# Patient Record
Sex: MALE | Race: OTHER | NOT HISPANIC OR LATINO | URBAN - METROPOLITAN AREA
[De-identification: names, ages, dates, MRNs, and addresses within clinical notes are randomized per-mention and may not be internally consistent; named-entity substitution may affect disease eponyms.]

---

## 2022-01-01 ENCOUNTER — OUTPATIENT (OUTPATIENT)
Dept: OUTPATIENT SERVICES | Facility: HOSPITAL | Age: 67
LOS: 1 days | End: 2022-01-01

## 2022-01-01 ENCOUNTER — RESULT REVIEW (OUTPATIENT)
Age: 67
End: 2022-01-01

## 2022-01-01 ENCOUNTER — TRANSCRIPTION ENCOUNTER (OUTPATIENT)
Age: 67
End: 2022-01-01

## 2022-01-01 ENCOUNTER — APPOINTMENT (OUTPATIENT)
Dept: ORTHOPEDIC SURGERY | Facility: CLINIC | Age: 67
End: 2022-01-01

## 2022-01-01 ENCOUNTER — OUTPATIENT (OUTPATIENT)
Dept: OUTPATIENT SERVICES | Facility: HOSPITAL | Age: 67
LOS: 1 days | Discharge: HOME | End: 2022-01-01
Payer: COMMERCIAL

## 2022-01-01 ENCOUNTER — APPOINTMENT (OUTPATIENT)
Dept: ORTHOPEDIC SURGERY | Facility: HOSPITAL | Age: 67
End: 2022-01-01

## 2022-01-01 VITALS
WEIGHT: 153 LBS | OXYGEN SATURATION: 99 % | TEMPERATURE: 98 F | HEIGHT: 62 IN | RESPIRATION RATE: 16 BRPM | SYSTOLIC BLOOD PRESSURE: 114 MMHG | DIASTOLIC BLOOD PRESSURE: 60 MMHG | HEART RATE: 124 BPM

## 2022-01-01 VITALS
SYSTOLIC BLOOD PRESSURE: 109 MMHG | RESPIRATION RATE: 17 BRPM | OXYGEN SATURATION: 97 % | DIASTOLIC BLOOD PRESSURE: 45 MMHG | HEART RATE: 101 BPM

## 2022-01-01 VITALS
WEIGHT: 153 LBS | DIASTOLIC BLOOD PRESSURE: 53 MMHG | RESPIRATION RATE: 16 BRPM | SYSTOLIC BLOOD PRESSURE: 128 MMHG | HEIGHT: 62 IN | OXYGEN SATURATION: 100 % | HEART RATE: 111 BPM | TEMPERATURE: 98 F

## 2022-01-01 VITALS
WEIGHT: 159 LBS | BODY MASS INDEX: 24.96 KG/M2 | HEART RATE: 137 BPM | OXYGEN SATURATION: 94 % | DIASTOLIC BLOOD PRESSURE: 73 MMHG | HEIGHT: 67 IN | SYSTOLIC BLOOD PRESSURE: 128 MMHG

## 2022-01-01 VITALS
DIASTOLIC BLOOD PRESSURE: 79 MMHG | WEIGHT: 159 LBS | HEIGHT: 67 IN | BODY MASS INDEX: 24.96 KG/M2 | SYSTOLIC BLOOD PRESSURE: 127 MMHG

## 2022-01-01 DIAGNOSIS — Z85.01 PERSONAL HISTORY OF MALIGNANT NEOPLASM OF ESOPHAGUS: ICD-10-CM

## 2022-01-01 DIAGNOSIS — R00.0 TACHYCARDIA, UNSPECIFIED: ICD-10-CM

## 2022-01-01 DIAGNOSIS — Z92.89 PERSONAL HISTORY OF OTHER MEDICAL TREATMENT: ICD-10-CM

## 2022-01-01 DIAGNOSIS — K21.9 GASTRO-ESOPHAGEAL REFLUX DISEASE WITHOUT ESOPHAGITIS: ICD-10-CM

## 2022-01-01 DIAGNOSIS — Z91.89 OTHER SPECIFIED PERSONAL RISK FACTORS, NOT ELSEWHERE CLASSIFIED: ICD-10-CM

## 2022-01-01 DIAGNOSIS — C15.9 MALIGNANT NEOPLASM OF ESOPHAGUS, UNSPECIFIED: ICD-10-CM

## 2022-01-01 DIAGNOSIS — M79.2 NEURALGIA AND NEURITIS, UNSPECIFIED: ICD-10-CM

## 2022-01-01 DIAGNOSIS — R68.89 OTHER GENERAL SYMPTOMS AND SIGNS: ICD-10-CM

## 2022-01-01 DIAGNOSIS — I10 ESSENTIAL (PRIMARY) HYPERTENSION: ICD-10-CM

## 2022-01-01 DIAGNOSIS — Z78.9 OTHER SPECIFIED HEALTH STATUS: ICD-10-CM

## 2022-01-01 DIAGNOSIS — M89.9 DISORDER OF BONE, UNSPECIFIED: ICD-10-CM

## 2022-01-01 DIAGNOSIS — C78.00 SECONDARY MALIGNANT NEOPLASM OF UNSPECIFIED LUNG: ICD-10-CM

## 2022-01-01 DIAGNOSIS — E03.9 HYPOTHYROIDISM, UNSPECIFIED: ICD-10-CM

## 2022-01-01 DIAGNOSIS — M25.561 PAIN IN RIGHT KNEE: ICD-10-CM

## 2022-01-01 LAB
ALBUMIN SERPL ELPH-MCNC: 3.6 G/DL — SIGNIFICANT CHANGE UP (ref 3.3–5)
ALP SERPL-CCNC: 206 U/L — HIGH (ref 40–120)
ALT FLD-CCNC: 40 U/L — SIGNIFICANT CHANGE UP (ref 4–41)
ANION GAP SERPL CALC-SCNC: 11 MMOL/L — SIGNIFICANT CHANGE UP (ref 7–14)
AST SERPL-CCNC: 24 U/L — SIGNIFICANT CHANGE UP (ref 4–40)
BILIRUB SERPL-MCNC: 0.2 MG/DL — SIGNIFICANT CHANGE UP (ref 0.2–1.2)
BLD GP AB SCN SERPL QL: NEGATIVE — SIGNIFICANT CHANGE UP
BLD GP AB SCN SERPL QL: NEGATIVE — SIGNIFICANT CHANGE UP
BUN SERPL-MCNC: 14 MG/DL — SIGNIFICANT CHANGE UP (ref 7–23)
CALCIUM SERPL-MCNC: 10.6 MG/DL — HIGH (ref 8.4–10.5)
CHLORIDE SERPL-SCNC: 96 MMOL/L — LOW (ref 98–107)
CO2 SERPL-SCNC: 23 MMOL/L — SIGNIFICANT CHANGE UP (ref 22–31)
CREAT SERPL-MCNC: 0.72 MG/DL — SIGNIFICANT CHANGE UP (ref 0.5–1.3)
EGFR: 100 ML/MIN/1.73M2 — SIGNIFICANT CHANGE UP
GLUCOSE SERPL-MCNC: 109 MG/DL — HIGH (ref 70–99)
HCT VFR BLD CALC: 26.6 % — LOW (ref 39–50)
HGB BLD-MCNC: 8.1 G/DL — LOW (ref 13–17)
MCHC RBC-ENTMCNC: 26.4 PG — LOW (ref 27–34)
MCHC RBC-ENTMCNC: 30.5 GM/DL — LOW (ref 32–36)
MCV RBC AUTO: 86.6 FL — SIGNIFICANT CHANGE UP (ref 80–100)
NRBC # BLD: 0 /100 WBCS — SIGNIFICANT CHANGE UP (ref 0–0)
NRBC # FLD: 0 K/UL — SIGNIFICANT CHANGE UP (ref 0–0)
PLATELET # BLD AUTO: 683 K/UL — HIGH (ref 150–400)
POTASSIUM SERPL-MCNC: 4.1 MMOL/L — SIGNIFICANT CHANGE UP (ref 3.5–5.3)
POTASSIUM SERPL-SCNC: 4.1 MMOL/L — SIGNIFICANT CHANGE UP (ref 3.5–5.3)
PROT SERPL-MCNC: 7.7 G/DL — SIGNIFICANT CHANGE UP (ref 6–8.3)
RBC # BLD: 3.07 M/UL — LOW (ref 4.2–5.8)
RBC # FLD: 16.4 % — HIGH (ref 10.3–14.5)
RH IG SCN BLD-IMP: POSITIVE — SIGNIFICANT CHANGE UP
RH IG SCN BLD-IMP: POSITIVE — SIGNIFICANT CHANGE UP
SARS-COV-2 N GENE NPH QL NAA+PROBE: NOT DETECTED
SODIUM SERPL-SCNC: 130 MMOL/L — LOW (ref 135–145)
WBC # BLD: 25.73 K/UL — HIGH (ref 3.8–10.5)
WBC # FLD AUTO: 25.73 K/UL — HIGH (ref 3.8–10.5)

## 2022-01-01 PROCEDURE — 99024 POSTOP FOLLOW-UP VISIT: CPT

## 2022-01-01 PROCEDURE — 27365 RESECT FEMUR/KNEE TUMOR: CPT | Mod: RT

## 2022-01-01 PROCEDURE — 93010 ELECTROCARDIOGRAM REPORT: CPT

## 2022-01-01 PROCEDURE — 20245 BONE BIOPSY OPEN DEEP: CPT | Mod: 59,RT

## 2022-01-01 PROCEDURE — 27381 REPAIR/GRAFT KNEECAP TENDON: CPT | Mod: RT

## 2022-01-01 PROCEDURE — 99204 OFFICE O/P NEW MOD 45 MIN: CPT

## 2022-01-01 RX ORDER — SODIUM CHLORIDE 9 MG/ML
1000 INJECTION INTRAMUSCULAR; INTRAVENOUS; SUBCUTANEOUS
Refills: 0 | Status: DISCONTINUED | OUTPATIENT
Start: 2022-01-01 | End: 2022-01-01

## 2022-01-01 RX ORDER — OXYCODONE HYDROCHLORIDE 5 MG/1
5 TABLET ORAL ONCE
Refills: 0 | Status: DISCONTINUED | OUTPATIENT
Start: 2022-01-01 | End: 2022-01-01

## 2022-01-01 RX ORDER — SODIUM CHLORIDE 9 MG/ML
1000 INJECTION, SOLUTION INTRAVENOUS
Refills: 0 | Status: DISCONTINUED | OUTPATIENT
Start: 2022-01-01 | End: 2022-01-01

## 2022-01-01 RX ORDER — ONDANSETRON 8 MG/1
4 TABLET, FILM COATED ORAL ONCE
Refills: 0 | Status: DISCONTINUED | OUTPATIENT
Start: 2022-01-01 | End: 2022-01-01

## 2022-01-01 RX ORDER — HYDROMORPHONE HYDROCHLORIDE 2 MG/ML
0.5 INJECTION INTRAMUSCULAR; INTRAVENOUS; SUBCUTANEOUS
Refills: 0 | Status: DISCONTINUED | OUTPATIENT
Start: 2022-01-01 | End: 2022-01-01

## 2022-01-01 RX ORDER — OXYCODONE HYDROCHLORIDE 5 MG/1
1 TABLET ORAL
Qty: 16 | Refills: 0
Start: 2022-01-01 | End: 2022-01-01

## 2022-01-01 RX ORDER — ASPIRIN/CALCIUM CARB/MAGNESIUM 324 MG
1 TABLET ORAL
Qty: 28 | Refills: 0
Start: 2022-01-01 | End: 2022-01-01

## 2022-01-01 RX ORDER — SODIUM CHLORIDE 9 MG/ML
1 INJECTION INTRAMUSCULAR; INTRAVENOUS; SUBCUTANEOUS
Qty: 0 | Refills: 0 | DISCHARGE

## 2022-01-01 RX ORDER — HYDROMORPHONE HYDROCHLORIDE 2 MG/ML
1 INJECTION INTRAMUSCULAR; INTRAVENOUS; SUBCUTANEOUS
Refills: 0 | Status: DISCONTINUED | OUTPATIENT
Start: 2022-01-01 | End: 2022-01-01

## 2022-01-01 RX ORDER — POLYETHYLENE GLYCOL 3350 17 G/17G
1 POWDER, FOR SOLUTION ORAL
Qty: 7 | Refills: 0
Start: 2022-01-01 | End: 2022-01-01

## 2022-01-01 RX ORDER — OXYCODONE HYDROCHLORIDE 5 MG/1
10 TABLET ORAL ONCE
Refills: 0 | Status: DISCONTINUED | OUTPATIENT
Start: 2022-01-01 | End: 2022-01-01

## 2022-01-01 RX ORDER — MEPERIDINE HYDROCHLORIDE 50 MG/ML
12.5 INJECTION INTRAMUSCULAR; INTRAVENOUS; SUBCUTANEOUS
Refills: 0 | Status: DISCONTINUED | OUTPATIENT
Start: 2022-01-01 | End: 2022-01-01

## 2022-01-01 RX ADMIN — OXYCODONE HYDROCHLORIDE 5 MILLIGRAM(S): 5 TABLET ORAL at 10:45

## 2022-01-01 RX ADMIN — OXYCODONE HYDROCHLORIDE 10 MILLIGRAM(S): 5 TABLET ORAL at 13:40

## 2022-01-01 RX ADMIN — HYDROMORPHONE HYDROCHLORIDE 1 MILLIGRAM(S): 2 INJECTION INTRAMUSCULAR; INTRAVENOUS; SUBCUTANEOUS at 10:20

## 2022-01-01 RX ADMIN — SODIUM CHLORIDE 30 MILLILITER(S): 9 INJECTION, SOLUTION INTRAVENOUS at 10:21

## 2022-01-01 RX ADMIN — OXYCODONE HYDROCHLORIDE 10 MILLIGRAM(S): 5 TABLET ORAL at 13:03

## 2022-01-01 RX ADMIN — HYDROMORPHONE HYDROCHLORIDE 1 MILLIGRAM(S): 2 INJECTION INTRAMUSCULAR; INTRAVENOUS; SUBCUTANEOUS at 10:50

## 2022-01-01 RX ADMIN — OXYCODONE HYDROCHLORIDE 5 MILLIGRAM(S): 5 TABLET ORAL at 11:00

## 2022-01-01 RX ADMIN — OXYCODONE HYDROCHLORIDE 5 MILLIGRAM(S): 5 TABLET ORAL at 11:15

## 2022-01-01 RX ADMIN — HYDROMORPHONE HYDROCHLORIDE 0.5 MILLIGRAM(S): 2 INJECTION INTRAMUSCULAR; INTRAVENOUS; SUBCUTANEOUS at 10:22

## 2022-01-01 RX ADMIN — HYDROMORPHONE HYDROCHLORIDE 0.5 MILLIGRAM(S): 2 INJECTION INTRAMUSCULAR; INTRAVENOUS; SUBCUTANEOUS at 10:10

## 2022-01-01 RX ADMIN — HYDROMORPHONE HYDROCHLORIDE 1 MILLIGRAM(S): 2 INJECTION INTRAMUSCULAR; INTRAVENOUS; SUBCUTANEOUS at 10:40

## 2022-01-01 RX ADMIN — HYDROMORPHONE HYDROCHLORIDE 1 MILLIGRAM(S): 2 INJECTION INTRAMUSCULAR; INTRAVENOUS; SUBCUTANEOUS at 11:01

## 2022-09-19 PROBLEM — Z00.00 ENCOUNTER FOR PREVENTIVE HEALTH EXAMINATION: Status: ACTIVE | Noted: 2022-01-01

## 2022-10-02 PROBLEM — M25.561 ACUTE PAIN OF RIGHT KNEE: Status: ACTIVE | Noted: 2022-01-01

## 2022-10-02 PROBLEM — Z85.01: Status: RESOLVED | Noted: 2022-01-01 | Resolved: 2022-01-01

## 2022-10-02 PROBLEM — C15.9 ESOPHAGEAL CARCINOMA: Status: ACTIVE | Noted: 2022-01-01

## 2022-10-02 PROBLEM — M89.9 BONE LESION: Status: ACTIVE | Noted: 2022-01-01

## 2022-10-02 PROBLEM — C78.00 LUNG METASTASES: Status: ACTIVE | Noted: 2022-01-01

## 2022-10-02 PROBLEM — Z78.9 CURRENT NON-SMOKER: Status: ACTIVE | Noted: 2022-01-01

## 2022-10-02 NOTE — PHYSICAL EXAM
[FreeTextEntry1] : Deconditioned and sarcopenia [Oriented To Time, Place, And Person] : Oriented to person, place, and time [Impaired Insight] : Insight and judgment were intact [Affect] : The affect was normal. [Mood] : the mood was normal [Sclera] : the sclera and conjunctiva were normal [Neck Cervical Mass (___cm)] : no neck mass was observed [Heart Rate And Rhythm] : heart rate was normal and rhythm regular [] : No respiratory distress [Abdomen Soft] : Soft [Walker] : Uses walker for ambulation. [Wheelchair] : Uses wheelchair for mobility. [Tenderness] : tenderness [Swelling] : swelling [Skin Changes - Describe changes:] : No skin changes noted [Full ROM Unless otherwise noted:] : Full range of motion unless otherwise noted: [LE  Motor Strength Normal unless otherwise noted:] : 5/5 strength in bilateral lower extemities unless otherwise noted. [Normal] : Sensation intact to light touch. [2+] : left 2+

## 2022-10-02 NOTE — REVIEW OF SYSTEMS
[Chills] : chills [Feeling Tired] : feeling tired [Fever] : no fever [Recent Wt Gain ___ (lbs)] : recent [unfilled] ~Ulb weight gain [Dyspnea] : dyspnea [Cough] : cough [Joint Pain] : joint pain

## 2022-10-02 NOTE — HISTORY OF PRESENT ILLNESS
[FreeTextEntry1] : This is a 67-year-old gentleman who was diagnosed with esophageal carcinoma in 2015.  He has had multiple lines of chemotherapy and radiation therapy.  He has known lung mets status post wedge resection as well as XRT in his chest.  He is known to have some paraspinal masses consistent with carcinoma most recently biopsied as well as a new right knee pain.  He had imaging showing a large patellar lesion and sent to me for further evaluation.  He is currently in a wheelchair.  He is very deconditioned.  He has significant pain with any motion but is able to bear weight. [Worsening] : worsening [___ mths] : [unfilled] month(s) ago [Bending] : worsened by bending [Direct Pressure] : worsened by direct pressure [Walking] : worsened by walking [None] : No relieving factors are noted

## 2022-10-02 NOTE — DATA REVIEWED
[Imaging Present] : Present [de-identified] : MRI 9/6/2022 right knee:\par Destructive 4.2 cm lesion in the superior pole of the patella coming through the superficial bone as well as going to the quad tendon consistent with a large bony metastasis versus primary lesion.  There is no clear evidence of septic arthritis.\par \par X-ray from late August 2022 similarly shows the same lesion with bony loss at the superior pole as well as a superficial cortical area in the patella.  There are no other findings seen.

## 2022-10-02 NOTE — DISCUSSION/SUMMARY
[Surgical risks reviewed] : Surgical risks reviewed [All Questions Answered] : Patient (and family) had all questions answered to an agreeable level of satisfaction [Interested in Proceeding] : Patient (and family) expressed understanding and interest in proceeding with the plan as outlined [de-identified] : Patient has been losing his right knee.  My recommendation is for biopsy of this as well as possible exploration curettage and treatment.  If this is metastatic carcinoma then I would likely curette out the entire area treat this and possibly cemented bone graft with augmentation of the quad tendon.  If this is a primary lesion then we will treated appropriately.  He understands there is likeliness for stiffness in the knee as well as possible continued pain.  He also may need radiation therapy in this area.  He is not having any significant bony problems other than his back which is being treated by the thoracic and spine surgeons.  I will see him again in time for surgery.\par \par If imaging was ordered, the patient was told to make an appointment to review findings right after all imaging is completed.\par \par We discussed risks, benefits and alternatives. Rationale of care was reviewed and all questions were answered. Patient (and family) had all questions answered to her degree of the level of satisfaction. Patient (and family) expressed understanding and interest in proceeding with the plan as outlined.\par \par \par \par \par This note was done with a voice recognition transcription software and any typos are related to this rather than medical error. Surgical risks reviewed. Patient (and family) had all questions answered to an agreeable level of satisfaction. Patient (and family) expressed understanding and interest in proceeding with the plan as outlined.  \par

## 2022-10-14 NOTE — H&P PST ADULT - NS MD HP INPLANTS MED DEV
Right chest wall mediport , G tube/Vascular access device Right chest wall mediport , G tube for meds and feedings./Vascular access device

## 2022-10-14 NOTE — H&P PST ADULT - NSICDXPASTMEDICALHX_GEN_ALL_CORE_FT
PAST MEDICAL HISTORY:  Disorder of bone, unspecified     Esophageal cancer     GERD (gastroesophageal reflux disease)     H/O constipation     HTN (hypertension)     Hypothyroidism     Lung metastases     Neuropathic pain

## 2022-10-14 NOTE — H&P PST ADULT - PROBLEM SELECTOR PLAN 5
Pt was recently admitted at [facility] 09/22 for elevated WBC and Anemia .  Pt was given 1 unit of PRBC.  Requesting medical  evaluation for hx of recent hospitalization. Pt was recently admitted at [facility] 09/22 for elevated WBC and Anemia .  Pt was given 1 unit of PRBC.  Requesting medical  evaluation for hx of recent hospitalization.  Instructed patient to come on 10/24/2022 for repeat T&S- due to recent blood transfusion.

## 2022-10-14 NOTE — H&P PST ADULT - PROBLEM SELECTOR PLAN 4
Pt 's - 126's at [facility] today.    EKG done .  Discussed case with Anesthesiologist Dr Enamorado .  As per Dr Enamorado' s advise  - Called PCP and faxed EKG to PCP.  PCP to make arrangement for medical evaluation as PCP  is Oncologist to pt .

## 2022-10-14 NOTE — H&P PST ADULT - PROBLEM SELECTOR PLAN 9
·Requesting medical  evaluation due to unable to assess mets( Activity intolerance).  Pt does not have cardiologist.

## 2022-10-14 NOTE — H&P PST ADULT - ASSESSMENT
pre op dx of disorder of bone unspecified is scheduled for biopsy possible curettage cement right patella lesion .   Disorder of bone unspecified

## 2022-10-14 NOTE — H&P PST ADULT - GASTROINTESTINAL COMMENTS
Pt has G tube since last 7 years. G Tube site benign. Dressing intact. Pt reports he cannot take oral food due to hx of esophageal cancer Had multiple chemo and radiation therapy via right chest wall mediport. Pt has G tube since last 7 years. Takes G tube feedings- 2Kcal 2.0 - 7 container in a day .

## 2022-10-14 NOTE — H&P PST ADULT - ATTENDING COMMENTS
I have reviewed and agree with note as written above  for right patella biopsy / curettage /resection possible quad tendon reconstruction  Risks, benefits and alternatives discussed with patient.  Lane Carter MD  Musculoskeletal Oncology  [phone]

## 2022-10-14 NOTE — H&P PST ADULT - PROBLEM SELECTOR PLAN 1
Patient tentatively scheduled for  biopsy possible curettage cement right patella lesion  on 10/27/2022 .    Pre-op instructions provided. Pt given verbal and written instructions with teach back on chlorhexidine wash and pepcid. Pt verbalized understanding with return demonstration.     Pt strongly advised  for  COVID testing requirements to be done  3- 5 days prior to procedure. Pt was provided with information for covid testing locations in written form.   Pt verbalized understanding with return demonstration.

## 2022-10-14 NOTE — H&P PST ADULT - HISTORY OF PRESENT ILLNESS
67 year old male with pre op dx of disorder of bone unspecified is scheduled for  67 year old male with pre op dx of disorder of bone unspecified is scheduled for biopsy possible curettage cement right patella lesion . 67 year old male with pre op dx of disorder of bone unspecified is scheduled for biopsy possible curettage cement right patella lesion .    *** Pt was recently admitted at [facility] 09/22 for elevated WBC and Anemia .  Pt was given 1 unit of PRBC.

## 2022-11-02 NOTE — H&P PST ADULT - ALCOHOL USE HISTORY SINGLE SELECT
never Ear Wedge Repair Text: A wedge excision was completed by carrying down an excision through the full thickness of the ear and cartilage with an inward facing Burow's triangle. The wound was then closed in a layered fashion.

## 2022-11-10 NOTE — PROVIDER CONTACT NOTE (OTHER) - ASSESSMENT
asked by ACU provider to assist with transportation for patient who needs ambulance for discharge home.  contact patient's insurance, [employer] [facility] ([phone]) and spoke to Ritchie DOLL. Per Ritchie, prior auth is not needed for transportation and patient has out of network benefits so he can use any vendor; she provided call reference # [id number].  spoke to Laurita at [facility] ([phone]) and scheduled ambulance. Ambulance is expected to arrive by 3:45pm; trip # [id number]. ACU provider was notified. No additional social work intervention needed at this time.

## 2022-11-10 NOTE — ASU DISCHARGE PLAN (ADULT/PEDIATRIC) - ASU DC SPECIAL INSTRUCTIONSFT
Keep dressing clean and dry  WBAT  KI when OOB  No knee flexion  Crutches for ambulation  Please call Dr. Carter's office to make a postop appointment

## 2022-11-10 NOTE — ASU DISCHARGE PLAN (ADULT/PEDIATRIC) - CARE PROVIDER_API CALL
Lane Carter (MD)  Orthopaedic Surgery  [address], [address]  [place] [address]  Phone: [phone]  Fax: [phone]  Follow Up Time:

## 2022-11-22 PROBLEM — M89.9 DISORDER OF BONE, UNSPECIFIED: Chronic | Status: ACTIVE | Noted: 2022-01-01

## 2022-11-22 PROBLEM — C15.9 MALIGNANT NEOPLASM OF ESOPHAGUS, UNSPECIFIED: Chronic | Status: ACTIVE | Noted: 2022-01-01

## 2022-11-22 PROBLEM — E03.9 HYPOTHYROIDISM, UNSPECIFIED: Chronic | Status: ACTIVE | Noted: 2022-01-01

## 2022-11-22 PROBLEM — C78.00 SECONDARY MALIGNANT NEOPLASM OF UNSPECIFIED LUNG: Chronic | Status: ACTIVE | Noted: 2022-01-01

## 2022-11-22 PROBLEM — Z87.19 PERSONAL HISTORY OF OTHER DISEASES OF THE DIGESTIVE SYSTEM: Chronic | Status: ACTIVE | Noted: 2022-01-01

## 2022-11-22 PROBLEM — K21.9 GASTRO-ESOPHAGEAL REFLUX DISEASE WITHOUT ESOPHAGITIS: Chronic | Status: ACTIVE | Noted: 2022-01-01

## 2022-11-22 PROBLEM — M79.2 NEURALGIA AND NEURITIS, UNSPECIFIED: Chronic | Status: ACTIVE | Noted: 2022-01-01

## 2022-11-22 PROBLEM — I10 ESSENTIAL (PRIMARY) HYPERTENSION: Chronic | Status: ACTIVE | Noted: 2022-01-01

## 2022-11-23 NOTE — HISTORY OF PRESENT ILLNESS
[___ Weeks Post Op] : [unfilled] weeks post op [Clean/Dry/Intact] : clean, dry and intact [Slow Progress] : is progressing slowly [No Sign of Infection] : is showing no signs of infection [Adequate Pain Control] : has adequate pain control [de-identified] : 11/10/2022 -resection and advancement of quadricep tendon right knee [de-identified] : Overall patient still in considerable pain.  He is walking better according to his family but seems still apprehensive with any motion.  He is likely to go back to chemotherapy. [de-identified] : On exam his incision is clean dry and intact.  He has no pain surrounding it.  He is able to move his hip and ankle although he does have pain at the knee with any motion.  His calves are soft and he is neurovascular intact.  I did not range his knee at all.  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 [de-identified] : This is all consistent with esophageal max to his patella.  This is very uncommon however was treated with biopsy. [de-identified] : Continue with extension.  He may always walk with his leg in extension due to the fact that he advanced his quad.  He is also likely be very tight but I want him in extension after at least 2 months.  I will see him again in 6 weeks time.  He is free to start chemotherapy at this point.  Continue DVT prophylaxis.\par \par If imaging was ordered, the patient was told to make an appointment to review findings right after all imaging is completed.\par \par We discussed risks, benefits and alternatives. Rationale of care was reviewed and all questions were answered. Patient (and family) had all questions answered to her degree of the level of satisfaction. Patient (and family) expressed understanding and interest in proceeding with the plan as outlined.\par \par \par \par \par This note was done with a voice recognition transcription software and any typos are related to this rather than medical error. Surgical risks reviewed. Patient (and family) had all questions answered to an agreeable level of satisfaction. Patient (and family) expressed understanding and interest in proceeding with the plan as outlined.  \par

## 2023-01-01 ENCOUNTER — INPATIENT (INPATIENT)
Facility: HOSPITAL | Age: 68
LOS: 30 days | Discharge: EXPIRED | End: 2023-03-08
Attending: STUDENT IN AN ORGANIZED HEALTH CARE EDUCATION/TRAINING PROGRAM | Admitting: STUDENT IN AN ORGANIZED HEALTH CARE EDUCATION/TRAINING PROGRAM
Payer: MEDICARE

## 2023-01-01 ENCOUNTER — APPOINTMENT (OUTPATIENT)
Dept: ORTHOPEDIC SURGERY | Facility: CLINIC | Age: 68
End: 2023-01-01

## 2023-01-01 ENCOUNTER — TRANSCRIPTION ENCOUNTER (OUTPATIENT)
Age: 68
End: 2023-01-01

## 2023-01-01 ENCOUNTER — INPATIENT (INPATIENT)
Facility: HOSPITAL | Age: 68
LOS: 24 days | Discharge: HOME HEALTH | End: 2023-01-26
Attending: STUDENT IN AN ORGANIZED HEALTH CARE EDUCATION/TRAINING PROGRAM | Admitting: STUDENT IN AN ORGANIZED HEALTH CARE EDUCATION/TRAINING PROGRAM
Payer: MEDICARE

## 2023-01-01 ENCOUNTER — FORM ENCOUNTER (OUTPATIENT)
Age: 68
End: 2023-01-01

## 2023-01-01 VITALS
HEART RATE: 105 BPM | HEIGHT: 62 IN | RESPIRATION RATE: 16 BRPM | SYSTOLIC BLOOD PRESSURE: 107 MMHG | TEMPERATURE: 97 F | DIASTOLIC BLOOD PRESSURE: 56 MMHG

## 2023-01-01 VITALS
DIASTOLIC BLOOD PRESSURE: 45 MMHG | SYSTOLIC BLOOD PRESSURE: 71 MMHG | RESPIRATION RATE: 10 BRPM | OXYGEN SATURATION: 97 % | HEART RATE: 61 BPM

## 2023-01-01 VITALS
DIASTOLIC BLOOD PRESSURE: 59 MMHG | TEMPERATURE: 99 F | RESPIRATION RATE: 18 BRPM | OXYGEN SATURATION: 99 % | HEART RATE: 118 BPM | SYSTOLIC BLOOD PRESSURE: 113 MMHG

## 2023-01-01 VITALS
HEART RATE: 120 BPM | SYSTOLIC BLOOD PRESSURE: 121 MMHG | RESPIRATION RATE: 16 BRPM | TEMPERATURE: 99 F | HEIGHT: 67 IN | DIASTOLIC BLOOD PRESSURE: 71 MMHG | OXYGEN SATURATION: 98 %

## 2023-01-01 DIAGNOSIS — A41.9 SEPSIS, UNSPECIFIED ORGANISM: ICD-10-CM

## 2023-01-01 DIAGNOSIS — K94.23 GASTROSTOMY MALFUNCTION: ICD-10-CM

## 2023-01-01 DIAGNOSIS — R25.9 UNSPECIFIED ABNORMAL INVOLUNTARY MOVEMENTS: ICD-10-CM

## 2023-01-01 DIAGNOSIS — M86.10 OTHER ACUTE OSTEOMYELITIS, UNSPECIFIED SITE: ICD-10-CM

## 2023-01-01 DIAGNOSIS — R50.9 FEVER, UNSPECIFIED: ICD-10-CM

## 2023-01-01 DIAGNOSIS — R65.10 SYSTEMIC INFLAMMATORY RESPONSE SYNDROME (SIRS) OF NON-INFECTIOUS ORIGIN WITHOUT ACUTE ORGAN DYSFUNCTION: ICD-10-CM

## 2023-01-01 DIAGNOSIS — M25.561 PAIN IN RIGHT KNEE: ICD-10-CM

## 2023-01-01 DIAGNOSIS — E03.9 HYPOTHYROIDISM, UNSPECIFIED: ICD-10-CM

## 2023-01-01 DIAGNOSIS — I10 ESSENTIAL (PRIMARY) HYPERTENSION: ICD-10-CM

## 2023-01-01 DIAGNOSIS — G89.3 NEOPLASM RELATED PAIN (ACUTE) (CHRONIC): ICD-10-CM

## 2023-01-01 DIAGNOSIS — Z01.818 ENCOUNTER FOR OTHER PREPROCEDURAL EXAMINATION: ICD-10-CM

## 2023-01-01 DIAGNOSIS — D64.9 ANEMIA, UNSPECIFIED: ICD-10-CM

## 2023-01-01 DIAGNOSIS — Z51.5 ENCOUNTER FOR PALLIATIVE CARE: ICD-10-CM

## 2023-01-01 DIAGNOSIS — Z29.9 ENCOUNTER FOR PROPHYLACTIC MEASURES, UNSPECIFIED: ICD-10-CM

## 2023-01-01 DIAGNOSIS — R73.03 PREDIABETES: ICD-10-CM

## 2023-01-01 DIAGNOSIS — E83.39 OTHER DISORDERS OF PHOSPHORUS METABOLISM: ICD-10-CM

## 2023-01-01 DIAGNOSIS — Z93.1 GASTROSTOMY STATUS: Chronic | ICD-10-CM

## 2023-01-01 DIAGNOSIS — R78.81 BACTEREMIA: ICD-10-CM

## 2023-01-01 DIAGNOSIS — J39.8 OTHER SPECIFIED DISEASES OF UPPER RESPIRATORY TRACT: ICD-10-CM

## 2023-01-01 DIAGNOSIS — R06.00 DYSPNEA, UNSPECIFIED: ICD-10-CM

## 2023-01-01 DIAGNOSIS — G93.40 ENCEPHALOPATHY, UNSPECIFIED: ICD-10-CM

## 2023-01-01 DIAGNOSIS — E83.52 HYPERCALCEMIA: ICD-10-CM

## 2023-01-01 DIAGNOSIS — K21.9 GASTRO-ESOPHAGEAL REFLUX DISEASE WITHOUT ESOPHAGITIS: ICD-10-CM

## 2023-01-01 DIAGNOSIS — C15.9 MALIGNANT NEOPLASM OF ESOPHAGUS, UNSPECIFIED: ICD-10-CM

## 2023-01-01 DIAGNOSIS — Z71.89 OTHER SPECIFIED COUNSELING: ICD-10-CM

## 2023-01-01 DIAGNOSIS — M79.2 NEURALGIA AND NEURITIS, UNSPECIFIED: ICD-10-CM

## 2023-01-01 DIAGNOSIS — R53.2 FUNCTIONAL QUADRIPLEGIA: ICD-10-CM

## 2023-01-01 DIAGNOSIS — M25.00 HEMARTHROSIS, UNSPECIFIED JOINT: ICD-10-CM

## 2023-01-01 DIAGNOSIS — E87.1 HYPO-OSMOLALITY AND HYPONATREMIA: ICD-10-CM

## 2023-01-01 DIAGNOSIS — R63.8 OTHER SYMPTOMS AND SIGNS CONCERNING FOOD AND FLUID INTAKE: ICD-10-CM

## 2023-01-01 DIAGNOSIS — D72.829 ELEVATED WHITE BLOOD CELL COUNT, UNSPECIFIED: ICD-10-CM

## 2023-01-01 DIAGNOSIS — M25.461 EFFUSION, RIGHT KNEE: ICD-10-CM

## 2023-01-01 LAB
% ALBUMIN: 32.5 % — SIGNIFICANT CHANGE UP
% ALPHA 1: 6.5 % — SIGNIFICANT CHANGE UP
% ALPHA 2: 14.7 % — SIGNIFICANT CHANGE UP
% BETA: 18.7 % — SIGNIFICANT CHANGE UP
% GAMMA: 27.7 % — SIGNIFICANT CHANGE UP
-  AMIKACIN: SIGNIFICANT CHANGE UP
-  AMIKACIN: SIGNIFICANT CHANGE UP
-  AMOXICILLIN/CLAVULANIC ACID: SIGNIFICANT CHANGE UP
-  AMOXICILLIN/CLAVULANIC ACID: SIGNIFICANT CHANGE UP
-  AMPICILLIN/SULBACTAM: SIGNIFICANT CHANGE UP
-  AMPICILLIN/SULBACTAM: SIGNIFICANT CHANGE UP
-  AMPICILLIN: SIGNIFICANT CHANGE UP
-  AZTREONAM: SIGNIFICANT CHANGE UP
-  AZTREONAM: SIGNIFICANT CHANGE UP
-  CEFAZOLIN: SIGNIFICANT CHANGE UP
-  CEFAZOLIN: SIGNIFICANT CHANGE UP
-  CEFEPIME: SIGNIFICANT CHANGE UP
-  CEFEPIME: SIGNIFICANT CHANGE UP
-  CEFOXITIN: SIGNIFICANT CHANGE UP
-  CEFOXITIN: SIGNIFICANT CHANGE UP
-  CEFTAZIDIME/AVIBACTAM: SIGNIFICANT CHANGE UP
-  CEFTAZIDIME/AVIBACTAM: SIGNIFICANT CHANGE UP
-  CEFTOLOZANE/TAZOBACTAM: SIGNIFICANT CHANGE UP
-  CEFTOLOZANE/TAZOBACTAM: SIGNIFICANT CHANGE UP
-  CEFTRIAXONE: SIGNIFICANT CHANGE UP
-  CEFTRIAXONE: SIGNIFICANT CHANGE UP
-  CIPROFLOXACIN: SIGNIFICANT CHANGE UP
-  CIPROFLOXACIN: SIGNIFICANT CHANGE UP
-  ERTAPENEM: SIGNIFICANT CHANGE UP
-  ERTAPENEM: SIGNIFICANT CHANGE UP
-  GENTAMICIN SYNERGY: SIGNIFICANT CHANGE UP
-  GENTAMICIN: SIGNIFICANT CHANGE UP
-  GENTAMICIN: SIGNIFICANT CHANGE UP
-  IMIPENEM: SIGNIFICANT CHANGE UP
-  IMIPENEM: SIGNIFICANT CHANGE UP
-  LEVOFLOXACIN: SIGNIFICANT CHANGE UP
-  LEVOFLOXACIN: SIGNIFICANT CHANGE UP
-  MEROPENEM: SIGNIFICANT CHANGE UP
-  MEROPENEM: SIGNIFICANT CHANGE UP
-  PIPERACILLIN/TAZOBACTAM: SIGNIFICANT CHANGE UP
-  PIPERACILLIN/TAZOBACTAM: SIGNIFICANT CHANGE UP
-  STREPTOMYCIN SYNERGY: SIGNIFICANT CHANGE UP
-  TOBRAMYCIN: SIGNIFICANT CHANGE UP
-  TOBRAMYCIN: SIGNIFICANT CHANGE UP
-  TRIMETHOPRIM/SULFAMETHOXAZOLE: SIGNIFICANT CHANGE UP
-  TRIMETHOPRIM/SULFAMETHOXAZOLE: SIGNIFICANT CHANGE UP
-  VANCOMYCIN: SIGNIFICANT CHANGE UP
24R-OH-CALCIDIOL SERPL-MCNC: 33.4 NG/ML — SIGNIFICANT CHANGE UP (ref 30–80)
24R-OH-CALCIDIOL SERPL-MCNC: 34.5 NG/ML — SIGNIFICANT CHANGE UP (ref 30–80)
A1C WITH ESTIMATED AVERAGE GLUCOSE RESULT: 5.8 % — HIGH (ref 4–5.6)
ALBUMIN SERPL ELPH-MCNC: 2.2 G/DL — LOW (ref 3.3–5)
ALBUMIN SERPL ELPH-MCNC: 2.3 G/DL — LOW (ref 3.3–5)
ALBUMIN SERPL ELPH-MCNC: 2.4 G/DL — LOW (ref 3.3–5)
ALBUMIN SERPL ELPH-MCNC: 2.44 G/DL — LOW (ref 3.3–4.4)
ALBUMIN SERPL ELPH-MCNC: 2.5 G/DL — LOW (ref 3.3–5)
ALBUMIN SERPL ELPH-MCNC: 2.6 G/DL — LOW (ref 3.3–5)
ALBUMIN SERPL ELPH-MCNC: 2.7 G/DL — LOW (ref 3.3–5)
ALBUMIN SERPL ELPH-MCNC: 2.8 G/DL — LOW (ref 3.3–5)
ALBUMIN SERPL ELPH-MCNC: 2.9 G/DL — LOW (ref 3.3–5)
ALBUMIN SERPL ELPH-MCNC: 3 G/DL — LOW (ref 3.3–5)
ALBUMIN SERPL ELPH-MCNC: 3.1 G/DL — LOW (ref 3.3–5)
ALBUMIN SERPL ELPH-MCNC: 3.2 G/DL — LOW (ref 3.3–5)
ALBUMIN SERPL ELPH-MCNC: 3.2 G/DL — LOW (ref 3.3–5)
ALBUMIN, RANDOM URINE: 10 MG/DL — SIGNIFICANT CHANGE UP
ALBUMIN/CREATININE RATIO (ACR): 243 MG/G — HIGH (ref 0–30)
ALBUMIN/GLOB SERPL ELPH: 0.5 RATIO — SIGNIFICANT CHANGE UP
ALP SERPL-CCNC: 188 U/L — HIGH (ref 40–120)
ALP SERPL-CCNC: 196 U/L — HIGH (ref 40–120)
ALP SERPL-CCNC: 202 U/L — HIGH (ref 40–120)
ALP SERPL-CCNC: 206 U/L — HIGH (ref 40–120)
ALP SERPL-CCNC: 206 U/L — HIGH (ref 40–120)
ALP SERPL-CCNC: 207 U/L — HIGH (ref 40–120)
ALP SERPL-CCNC: 207 U/L — HIGH (ref 40–120)
ALP SERPL-CCNC: 209 U/L — HIGH (ref 40–120)
ALP SERPL-CCNC: 210 U/L — HIGH (ref 40–120)
ALP SERPL-CCNC: 211 U/L — HIGH (ref 40–120)
ALP SERPL-CCNC: 213 U/L — HIGH (ref 40–120)
ALP SERPL-CCNC: 214 U/L — HIGH (ref 40–120)
ALP SERPL-CCNC: 217 U/L — HIGH (ref 40–120)
ALP SERPL-CCNC: 219 U/L — HIGH (ref 40–120)
ALP SERPL-CCNC: 219 U/L — HIGH (ref 40–120)
ALP SERPL-CCNC: 226 U/L — HIGH (ref 40–120)
ALP SERPL-CCNC: 227 U/L — HIGH (ref 40–120)
ALP SERPL-CCNC: 230 U/L — HIGH (ref 40–120)
ALP SERPL-CCNC: 235 U/L — HIGH (ref 40–120)
ALP SERPL-CCNC: 238 U/L — HIGH (ref 40–120)
ALP SERPL-CCNC: 241 U/L — HIGH (ref 40–120)
ALP SERPL-CCNC: 245 U/L — HIGH (ref 40–120)
ALP SERPL-CCNC: 245 U/L — HIGH (ref 40–120)
ALP SERPL-CCNC: 246 U/L — HIGH (ref 40–120)
ALP SERPL-CCNC: 248 U/L — HIGH (ref 40–120)
ALP SERPL-CCNC: 248 U/L — HIGH (ref 40–120)
ALP SERPL-CCNC: 250 U/L — HIGH (ref 40–120)
ALP SERPL-CCNC: 251 U/L — HIGH (ref 40–120)
ALP SERPL-CCNC: 256 U/L — HIGH (ref 40–120)
ALP SERPL-CCNC: 260 U/L — HIGH (ref 40–120)
ALP SERPL-CCNC: 261 U/L — HIGH (ref 40–120)
ALP SERPL-CCNC: 262 U/L — HIGH (ref 40–120)
ALP SERPL-CCNC: 272 U/L — HIGH (ref 40–120)
ALP SERPL-CCNC: 273 U/L — HIGH (ref 40–120)
ALP SERPL-CCNC: 277 U/L — HIGH (ref 40–120)
ALP SERPL-CCNC: 286 U/L — HIGH (ref 40–120)
ALP SERPL-CCNC: 288 U/L — HIGH (ref 40–120)
ALP SERPL-CCNC: 302 U/L — HIGH (ref 40–120)
ALP SERPL-CCNC: 309 U/L — HIGH (ref 40–120)
ALP SERPL-CCNC: 321 U/L — HIGH (ref 40–120)
ALP SERPL-CCNC: 333 U/L — HIGH (ref 40–120)
ALPHA1 GLOB SERPL ELPH-MCNC: 0.49 G/DL — HIGH (ref 0.1–0.3)
ALPHA2 GLOB SERPL ELPH-MCNC: 1.1 G/DL — HIGH (ref 0.6–1)
ALT FLD-CCNC: 20 U/L — SIGNIFICANT CHANGE UP (ref 4–41)
ALT FLD-CCNC: 21 U/L — SIGNIFICANT CHANGE UP (ref 4–41)
ALT FLD-CCNC: 21 U/L — SIGNIFICANT CHANGE UP (ref 4–41)
ALT FLD-CCNC: 22 U/L — SIGNIFICANT CHANGE UP (ref 4–41)
ALT FLD-CCNC: 23 U/L — SIGNIFICANT CHANGE UP (ref 4–41)
ALT FLD-CCNC: 24 U/L — SIGNIFICANT CHANGE UP (ref 4–41)
ALT FLD-CCNC: 25 U/L — SIGNIFICANT CHANGE UP (ref 4–41)
ALT FLD-CCNC: 27 U/L — SIGNIFICANT CHANGE UP (ref 4–41)
ALT FLD-CCNC: 27 U/L — SIGNIFICANT CHANGE UP (ref 4–41)
ALT FLD-CCNC: 28 U/L — SIGNIFICANT CHANGE UP (ref 4–41)
ALT FLD-CCNC: 29 U/L — SIGNIFICANT CHANGE UP (ref 4–41)
ALT FLD-CCNC: 29 U/L — SIGNIFICANT CHANGE UP (ref 4–41)
ALT FLD-CCNC: 30 U/L — SIGNIFICANT CHANGE UP (ref 4–41)
ALT FLD-CCNC: 32 U/L — SIGNIFICANT CHANGE UP (ref 4–41)
ALT FLD-CCNC: 32 U/L — SIGNIFICANT CHANGE UP (ref 4–41)
ALT FLD-CCNC: 33 U/L — SIGNIFICANT CHANGE UP (ref 4–41)
ALT FLD-CCNC: 33 U/L — SIGNIFICANT CHANGE UP (ref 4–41)
ALT FLD-CCNC: 34 U/L — SIGNIFICANT CHANGE UP (ref 4–41)
ALT FLD-CCNC: 35 U/L — SIGNIFICANT CHANGE UP (ref 4–41)
ALT FLD-CCNC: 36 U/L — SIGNIFICANT CHANGE UP (ref 4–41)
ALT FLD-CCNC: 36 U/L — SIGNIFICANT CHANGE UP (ref 4–41)
ALT FLD-CCNC: 42 U/L — HIGH (ref 4–41)
ALT FLD-CCNC: 46 U/L — HIGH (ref 4–41)
ALT FLD-CCNC: 55 U/L — HIGH (ref 4–41)
ANION GAP SERPL CALC-SCNC: 10 MMOL/L — SIGNIFICANT CHANGE UP (ref 7–14)
ANION GAP SERPL CALC-SCNC: 11 MMOL/L — SIGNIFICANT CHANGE UP (ref 7–14)
ANION GAP SERPL CALC-SCNC: 12 MMOL/L — SIGNIFICANT CHANGE UP (ref 7–14)
ANION GAP SERPL CALC-SCNC: 13 MMOL/L — SIGNIFICANT CHANGE UP (ref 7–14)
ANION GAP SERPL CALC-SCNC: 14 MMOL/L — SIGNIFICANT CHANGE UP (ref 7–14)
ANION GAP SERPL CALC-SCNC: 16 MMOL/L — HIGH (ref 7–14)
ANION GAP SERPL CALC-SCNC: 16 MMOL/L — HIGH (ref 7–14)
ANION GAP SERPL CALC-SCNC: 17 MMOL/L — HIGH (ref 7–14)
ANION GAP SERPL CALC-SCNC: 17 MMOL/L — HIGH (ref 7–14)
ANION GAP SERPL CALC-SCNC: 7 MMOL/L — SIGNIFICANT CHANGE UP (ref 7–14)
ANION GAP SERPL CALC-SCNC: 8 MMOL/L — SIGNIFICANT CHANGE UP (ref 7–14)
ANION GAP SERPL CALC-SCNC: 9 MMOL/L — SIGNIFICANT CHANGE UP (ref 7–14)
ANISOCYTOSIS BLD QL: SLIGHT — SIGNIFICANT CHANGE UP
APPEARANCE UR: CLEAR — SIGNIFICANT CHANGE UP
APTT BLD: 26.1 SEC — LOW (ref 27–36.3)
APTT BLD: 26.7 SEC — LOW (ref 27–36.3)
APTT BLD: 27 SEC — SIGNIFICANT CHANGE UP (ref 27–36.3)
APTT BLD: 27.2 SEC — SIGNIFICANT CHANGE UP (ref 27–36.3)
APTT BLD: 27.3 SEC — SIGNIFICANT CHANGE UP (ref 27–36.3)
APTT BLD: 27.5 SEC — SIGNIFICANT CHANGE UP (ref 27–36.3)
APTT BLD: 28 SEC — SIGNIFICANT CHANGE UP (ref 27–36.3)
APTT BLD: 28.1 SEC — SIGNIFICANT CHANGE UP (ref 27–36.3)
APTT BLD: 28.1 SEC — SIGNIFICANT CHANGE UP (ref 27–36.3)
APTT BLD: 28.3 SEC — SIGNIFICANT CHANGE UP (ref 27–36.3)
APTT BLD: 33 SEC — SIGNIFICANT CHANGE UP (ref 27–36.3)
APTT BLD: 33.3 SEC — SIGNIFICANT CHANGE UP (ref 27–36.3)
APTT BLD: 35 SEC — SIGNIFICANT CHANGE UP (ref 27–36.3)
AST SERPL-CCNC: 15 U/L — SIGNIFICANT CHANGE UP (ref 4–40)
AST SERPL-CCNC: 15 U/L — SIGNIFICANT CHANGE UP (ref 4–40)
AST SERPL-CCNC: 16 U/L — SIGNIFICANT CHANGE UP (ref 4–40)
AST SERPL-CCNC: 17 U/L — SIGNIFICANT CHANGE UP (ref 4–40)
AST SERPL-CCNC: 17 U/L — SIGNIFICANT CHANGE UP (ref 4–40)
AST SERPL-CCNC: 18 U/L — SIGNIFICANT CHANGE UP (ref 4–40)
AST SERPL-CCNC: 20 U/L — SIGNIFICANT CHANGE UP (ref 4–40)
AST SERPL-CCNC: 20 U/L — SIGNIFICANT CHANGE UP (ref 4–40)
AST SERPL-CCNC: 21 U/L — SIGNIFICANT CHANGE UP (ref 4–40)
AST SERPL-CCNC: 21 U/L — SIGNIFICANT CHANGE UP (ref 4–40)
AST SERPL-CCNC: 22 U/L — SIGNIFICANT CHANGE UP (ref 4–40)
AST SERPL-CCNC: 23 U/L — SIGNIFICANT CHANGE UP (ref 4–40)
AST SERPL-CCNC: 24 U/L — SIGNIFICANT CHANGE UP (ref 4–40)
AST SERPL-CCNC: 25 U/L — SIGNIFICANT CHANGE UP (ref 4–40)
AST SERPL-CCNC: 26 U/L — SIGNIFICANT CHANGE UP (ref 4–40)
AST SERPL-CCNC: 27 U/L — SIGNIFICANT CHANGE UP (ref 4–40)
AST SERPL-CCNC: 30 U/L — SIGNIFICANT CHANGE UP (ref 4–40)
AST SERPL-CCNC: 30 U/L — SIGNIFICANT CHANGE UP (ref 4–40)
AST SERPL-CCNC: 33 U/L — SIGNIFICANT CHANGE UP (ref 4–40)
AST SERPL-CCNC: 37 U/L — SIGNIFICANT CHANGE UP (ref 4–40)
AST SERPL-CCNC: 40 U/L — SIGNIFICANT CHANGE UP (ref 4–40)
AST SERPL-CCNC: 49 U/L — HIGH (ref 4–40)
AST SERPL-CCNC: 85 U/L — HIGH (ref 4–40)
B PERT DNA SPEC QL NAA+PROBE: SIGNIFICANT CHANGE UP
B PERT IGG+IGM PNL SER: ABNORMAL
B PERT+PARAPERT DNA PNL SPEC NAA+PROBE: SIGNIFICANT CHANGE UP
B-GLOBULIN SERPL ELPH-MCNC: 1.4 G/DL — HIGH (ref 0.6–1.1)
BACTERIA # UR AUTO: ABNORMAL
BACTERIA # UR AUTO: NEGATIVE — SIGNIFICANT CHANGE UP
BASE EXCESS BLDV CALC-SCNC: -3.4 MMOL/L — LOW (ref -2–3)
BASE EXCESS BLDV CALC-SCNC: -3.8 MMOL/L — LOW (ref -2–3)
BASE EXCESS BLDV CALC-SCNC: SIGNIFICANT CHANGE UP MMOL/L (ref -2–3)
BASOPHILS # BLD AUTO: 0 K/UL — SIGNIFICANT CHANGE UP (ref 0–0.2)
BASOPHILS # BLD AUTO: 0.05 K/UL — SIGNIFICANT CHANGE UP (ref 0–0.2)
BASOPHILS # BLD AUTO: 0.05 K/UL — SIGNIFICANT CHANGE UP (ref 0–0.2)
BASOPHILS # BLD AUTO: 0.07 K/UL — SIGNIFICANT CHANGE UP (ref 0–0.2)
BASOPHILS # BLD AUTO: 0.08 K/UL — SIGNIFICANT CHANGE UP (ref 0–0.2)
BASOPHILS # BLD AUTO: 0.09 K/UL — SIGNIFICANT CHANGE UP (ref 0–0.2)
BASOPHILS # BLD AUTO: 0.1 K/UL — SIGNIFICANT CHANGE UP (ref 0–0.2)
BASOPHILS # BLD AUTO: 0.11 K/UL — SIGNIFICANT CHANGE UP (ref 0–0.2)
BASOPHILS # BLD AUTO: 0.12 K/UL — SIGNIFICANT CHANGE UP (ref 0–0.2)
BASOPHILS # BLD AUTO: 0.13 K/UL — SIGNIFICANT CHANGE UP (ref 0–0.2)
BASOPHILS # BLD AUTO: 0.14 K/UL — SIGNIFICANT CHANGE UP (ref 0–0.2)
BASOPHILS # BLD AUTO: 0.15 K/UL — SIGNIFICANT CHANGE UP (ref 0–0.2)
BASOPHILS # BLD AUTO: 0.16 K/UL — SIGNIFICANT CHANGE UP (ref 0–0.2)
BASOPHILS # BLD AUTO: 0.16 K/UL — SIGNIFICANT CHANGE UP (ref 0–0.2)
BASOPHILS # BLD AUTO: 0.18 K/UL — SIGNIFICANT CHANGE UP (ref 0–0.2)
BASOPHILS # BLD AUTO: 0.24 K/UL — HIGH (ref 0–0.2)
BASOPHILS # BLD AUTO: 0.3 K/UL — HIGH (ref 0–0.2)
BASOPHILS # BLD AUTO: 0.31 K/UL — HIGH (ref 0–0.2)
BASOPHILS # BLD AUTO: 0.39 K/UL — HIGH (ref 0–0.2)
BASOPHILS NFR BLD AUTO: 0 % — SIGNIFICANT CHANGE UP (ref 0–2)
BASOPHILS NFR BLD AUTO: 0.1 % — SIGNIFICANT CHANGE UP (ref 0–2)
BASOPHILS NFR BLD AUTO: 0.2 % — SIGNIFICANT CHANGE UP (ref 0–2)
BASOPHILS NFR BLD AUTO: 0.3 % — SIGNIFICANT CHANGE UP (ref 0–2)
BASOPHILS NFR BLD AUTO: 0.4 % — SIGNIFICANT CHANGE UP (ref 0–2)
BASOPHILS NFR BLD AUTO: 0.5 % — SIGNIFICANT CHANGE UP (ref 0–2)
BASOPHILS NFR BLD AUTO: 0.6 % — SIGNIFICANT CHANGE UP (ref 0–2)
BASOPHILS NFR BLD AUTO: 0.9 % — SIGNIFICANT CHANGE UP (ref 0–2)
BILIRUB DIRECT SERPL-MCNC: 0.2 MG/DL — SIGNIFICANT CHANGE UP (ref 0–0.3)
BILIRUB INDIRECT FLD-MCNC: 0.1 MG/DL — SIGNIFICANT CHANGE UP (ref 0–1)
BILIRUB SERPL-MCNC: 0.2 MG/DL — SIGNIFICANT CHANGE UP (ref 0.2–1.2)
BILIRUB SERPL-MCNC: 0.3 MG/DL — SIGNIFICANT CHANGE UP (ref 0.2–1.2)
BILIRUB SERPL-MCNC: 0.4 MG/DL — SIGNIFICANT CHANGE UP (ref 0.2–1.2)
BILIRUB SERPL-MCNC: 0.5 MG/DL — SIGNIFICANT CHANGE UP (ref 0.2–1.2)
BILIRUB SERPL-MCNC: 0.5 MG/DL — SIGNIFICANT CHANGE UP (ref 0.2–1.2)
BILIRUB SERPL-MCNC: 0.6 MG/DL — SIGNIFICANT CHANGE UP (ref 0.2–1.2)
BILIRUB SERPL-MCNC: 0.7 MG/DL — SIGNIFICANT CHANGE UP (ref 0.2–1.2)
BILIRUB SERPL-MCNC: <0.2 MG/DL — SIGNIFICANT CHANGE UP (ref 0.2–1.2)
BILIRUB UR-MCNC: NEGATIVE — SIGNIFICANT CHANGE UP
BLD GP AB SCN SERPL QL: NEGATIVE — SIGNIFICANT CHANGE UP
BLOOD GAS ARTERIAL COMPREHENSIVE RESULT: SIGNIFICANT CHANGE UP
BLOOD GAS VENOUS COMPREHENSIVE RESULT: SIGNIFICANT CHANGE UP
BORDETELLA PARAPERTUSSIS (RAPRVP): SIGNIFICANT CHANGE UP
BUN SERPL-MCNC: 10 MG/DL — SIGNIFICANT CHANGE UP (ref 7–23)
BUN SERPL-MCNC: 12 MG/DL — SIGNIFICANT CHANGE UP (ref 7–23)
BUN SERPL-MCNC: 12 MG/DL — SIGNIFICANT CHANGE UP (ref 7–23)
BUN SERPL-MCNC: 13 MG/DL — SIGNIFICANT CHANGE UP (ref 7–23)
BUN SERPL-MCNC: 13 MG/DL — SIGNIFICANT CHANGE UP (ref 7–23)
BUN SERPL-MCNC: 14 MG/DL — SIGNIFICANT CHANGE UP (ref 7–23)
BUN SERPL-MCNC: 14 MG/DL — SIGNIFICANT CHANGE UP (ref 7–23)
BUN SERPL-MCNC: 15 MG/DL — SIGNIFICANT CHANGE UP (ref 7–23)
BUN SERPL-MCNC: 16 MG/DL — SIGNIFICANT CHANGE UP (ref 7–23)
BUN SERPL-MCNC: 17 MG/DL — SIGNIFICANT CHANGE UP (ref 7–23)
BUN SERPL-MCNC: 19 MG/DL — SIGNIFICANT CHANGE UP (ref 7–23)
BUN SERPL-MCNC: 20 MG/DL — SIGNIFICANT CHANGE UP (ref 7–23)
BUN SERPL-MCNC: 22 MG/DL — SIGNIFICANT CHANGE UP (ref 7–23)
BUN SERPL-MCNC: 23 MG/DL — SIGNIFICANT CHANGE UP (ref 7–23)
BUN SERPL-MCNC: 24 MG/DL — HIGH (ref 7–23)
BUN SERPL-MCNC: 25 MG/DL — HIGH (ref 7–23)
BUN SERPL-MCNC: 25 MG/DL — HIGH (ref 7–23)
BUN SERPL-MCNC: 26 MG/DL — HIGH (ref 7–23)
BUN SERPL-MCNC: 27 MG/DL — HIGH (ref 7–23)
BUN SERPL-MCNC: 28 MG/DL — HIGH (ref 7–23)
BUN SERPL-MCNC: 29 MG/DL — HIGH (ref 7–23)
BUN SERPL-MCNC: 30 MG/DL — HIGH (ref 7–23)
BUN SERPL-MCNC: 31 MG/DL — HIGH (ref 7–23)
BUN SERPL-MCNC: 32 MG/DL — HIGH (ref 7–23)
BUN SERPL-MCNC: 32 MG/DL — HIGH (ref 7–23)
BUN SERPL-MCNC: 33 MG/DL — HIGH (ref 7–23)
BUN SERPL-MCNC: 34 MG/DL — HIGH (ref 7–23)
BUN SERPL-MCNC: 34 MG/DL — HIGH (ref 7–23)
BUN SERPL-MCNC: 35 MG/DL — HIGH (ref 7–23)
BUN SERPL-MCNC: 36 MG/DL — HIGH (ref 7–23)
BUN SERPL-MCNC: 36 MG/DL — HIGH (ref 7–23)
BUN SERPL-MCNC: 37 MG/DL — HIGH (ref 7–23)
BUN SERPL-MCNC: 47 MG/DL — HIGH (ref 7–23)
BUN SERPL-MCNC: 49 MG/DL — HIGH (ref 7–23)
BUN SERPL-MCNC: 50 MG/DL — HIGH (ref 7–23)
BUN SERPL-MCNC: 52 MG/DL — HIGH (ref 7–23)
BUN SERPL-MCNC: 8 MG/DL — SIGNIFICANT CHANGE UP (ref 7–23)
BUN SERPL-MCNC: 9 MG/DL — SIGNIFICANT CHANGE UP (ref 7–23)
BUN SERPL-MCNC: 9 MG/DL — SIGNIFICANT CHANGE UP (ref 7–23)
C PNEUM DNA SPEC QL NAA+PROBE: SIGNIFICANT CHANGE UP
CA-I BLD-SCNC: 1.3 MMOL/L — HIGH (ref 1.15–1.29)
CA-I BLD-SCNC: 1.32 MMOL/L — HIGH (ref 1.15–1.29)
CA-I BLD-SCNC: 1.4 MMOL/L — HIGH (ref 1.15–1.29)
CA-I BLD-SCNC: 1.44 MMOL/L — HIGH (ref 1.15–1.29)
CA-I BLD-SCNC: 1.47 MMOL/L — HIGH (ref 1.15–1.29)
CA-I BLD-SCNC: 1.49 MMOL/L — HIGH (ref 1.15–1.29)
CA-I BLD-SCNC: 1.51 MMOL/L — HIGH (ref 1.15–1.29)
CA-I BLD-SCNC: 1.52 MMOL/L — HIGH (ref 1.15–1.29)
CA-I BLD-SCNC: 1.52 MMOL/L — HIGH (ref 1.15–1.29)
CA-I BLD-SCNC: 1.53 MMOL/L — HIGH (ref 1.15–1.29)
CA-I BLD-SCNC: 1.53 MMOL/L — HIGH (ref 1.15–1.29)
CA-I BLD-SCNC: 1.55 MMOL/L — HIGH (ref 1.15–1.29)
CA-I BLD-SCNC: 1.55 MMOL/L — HIGH (ref 1.15–1.29)
CA-I BLD-SCNC: 1.6 MMOL/L — HIGH (ref 1.15–1.29)
CA-I BLD-SCNC: 1.6 MMOL/L — HIGH (ref 1.15–1.29)
CA-I BLD-SCNC: 1.63 MMOL/L — HIGH (ref 1.15–1.29)
CA-I BLD-SCNC: 1.65 MMOL/L — HIGH (ref 1.15–1.29)
CA-I BLD-SCNC: 1.67 MMOL/L — HIGH (ref 1.15–1.29)
CA-I SERPL-SCNC: 1.73 MMOL/L — CRITICAL HIGH (ref 1.15–1.33)
CALCIUM SERPL-MCNC: 10 MG/DL — SIGNIFICANT CHANGE UP (ref 8.4–10.5)
CALCIUM SERPL-MCNC: 10.1 MG/DL — SIGNIFICANT CHANGE UP (ref 8.4–10.5)
CALCIUM SERPL-MCNC: 10.3 MG/DL — SIGNIFICANT CHANGE UP (ref 8.4–10.5)
CALCIUM SERPL-MCNC: 10.4 MG/DL — SIGNIFICANT CHANGE UP (ref 8.4–10.5)
CALCIUM SERPL-MCNC: 10.5 MG/DL — SIGNIFICANT CHANGE UP (ref 8.4–10.5)
CALCIUM SERPL-MCNC: 10.5 MG/DL — SIGNIFICANT CHANGE UP (ref 8.4–10.5)
CALCIUM SERPL-MCNC: 10.6 MG/DL — HIGH (ref 8.4–10.5)
CALCIUM SERPL-MCNC: 10.6 MG/DL — HIGH (ref 8.4–10.5)
CALCIUM SERPL-MCNC: 10.7 MG/DL — HIGH (ref 8.4–10.5)
CALCIUM SERPL-MCNC: 10.9 MG/DL — HIGH (ref 8.4–10.5)
CALCIUM SERPL-MCNC: 11.1 MG/DL — HIGH (ref 8.4–10.5)
CALCIUM SERPL-MCNC: 11.1 MG/DL — HIGH (ref 8.4–10.5)
CALCIUM SERPL-MCNC: 11.3 MG/DL — HIGH (ref 8.4–10.5)
CALCIUM SERPL-MCNC: 11.5 MG/DL — HIGH (ref 8.4–10.5)
CALCIUM SERPL-MCNC: 11.6 MG/DL — HIGH (ref 8.4–10.5)
CALCIUM SERPL-MCNC: 11.7 MG/DL — HIGH (ref 8.4–10.5)
CALCIUM SERPL-MCNC: 11.7 MG/DL — HIGH (ref 8.4–10.5)
CALCIUM SERPL-MCNC: 11.8 MG/DL — HIGH (ref 8.4–10.5)
CALCIUM SERPL-MCNC: 11.9 MG/DL — HIGH (ref 8.4–10.5)
CALCIUM SERPL-MCNC: 12.1 MG/DL — HIGH (ref 8.4–10.5)
CALCIUM SERPL-MCNC: 12.2 MG/DL — HIGH (ref 8.4–10.5)
CALCIUM SERPL-MCNC: 12.7 MG/DL — HIGH (ref 8.4–10.5)
CALCIUM SERPL-MCNC: 12.8 MG/DL — HIGH (ref 8.4–10.5)
CALCIUM SERPL-MCNC: 13.1 MG/DL — CRITICAL HIGH (ref 8.4–10.5)
CALCIUM SERPL-MCNC: 8.1 MG/DL — LOW (ref 8.4–10.5)
CALCIUM SERPL-MCNC: 8.5 MG/DL — SIGNIFICANT CHANGE UP (ref 8.4–10.5)
CALCIUM SERPL-MCNC: 8.6 MG/DL — SIGNIFICANT CHANGE UP (ref 8.4–10.5)
CALCIUM SERPL-MCNC: 8.7 MG/DL — SIGNIFICANT CHANGE UP (ref 8.4–10.5)
CALCIUM SERPL-MCNC: 8.7 MG/DL — SIGNIFICANT CHANGE UP (ref 8.4–10.5)
CALCIUM SERPL-MCNC: 8.8 MG/DL — SIGNIFICANT CHANGE UP (ref 8.4–10.5)
CALCIUM SERPL-MCNC: 8.9 MG/DL — SIGNIFICANT CHANGE UP (ref 8.4–10.5)
CALCIUM SERPL-MCNC: 8.9 MG/DL — SIGNIFICANT CHANGE UP (ref 8.4–10.5)
CALCIUM SERPL-MCNC: 9 MG/DL — SIGNIFICANT CHANGE UP (ref 8.4–10.5)
CALCIUM SERPL-MCNC: 9.1 MG/DL — SIGNIFICANT CHANGE UP (ref 8.4–10.5)
CALCIUM SERPL-MCNC: 9.2 MG/DL — SIGNIFICANT CHANGE UP (ref 8.4–10.5)
CALCIUM SERPL-MCNC: 9.3 MG/DL — SIGNIFICANT CHANGE UP (ref 8.4–10.5)
CALCIUM SERPL-MCNC: 9.4 MG/DL — SIGNIFICANT CHANGE UP (ref 8.4–10.5)
CALCIUM SERPL-MCNC: 9.4 MG/DL — SIGNIFICANT CHANGE UP (ref 8.4–10.5)
CALCIUM SERPL-MCNC: 9.5 MG/DL — SIGNIFICANT CHANGE UP (ref 8.4–10.5)
CALCIUM SERPL-MCNC: 9.5 MG/DL — SIGNIFICANT CHANGE UP (ref 8.4–10.5)
CALCIUM SERPL-MCNC: 9.6 MG/DL — SIGNIFICANT CHANGE UP (ref 8.4–10.5)
CALCIUM SERPL-MCNC: 9.7 MG/DL — SIGNIFICANT CHANGE UP (ref 8.4–10.5)
CALCIUM SERPL-MCNC: 9.8 MG/DL — SIGNIFICANT CHANGE UP (ref 8.4–10.5)
CALCIUM SERPL-MCNC: 9.9 MG/DL — SIGNIFICANT CHANGE UP (ref 8.4–10.5)
CHLORIDE BLDV-SCNC: 100 MMOL/L — SIGNIFICANT CHANGE UP (ref 96–108)
CHLORIDE BLDV-SCNC: 105 MMOL/L — SIGNIFICANT CHANGE UP (ref 96–108)
CHLORIDE BLDV-SCNC: 99 MMOL/L — SIGNIFICANT CHANGE UP (ref 96–108)
CHLORIDE SERPL-SCNC: 100 MMOL/L — SIGNIFICANT CHANGE UP (ref 98–107)
CHLORIDE SERPL-SCNC: 102 MMOL/L — SIGNIFICANT CHANGE UP (ref 98–107)
CHLORIDE SERPL-SCNC: 103 MMOL/L — SIGNIFICANT CHANGE UP (ref 98–107)
CHLORIDE SERPL-SCNC: 104 MMOL/L — SIGNIFICANT CHANGE UP (ref 98–107)
CHLORIDE SERPL-SCNC: 105 MMOL/L — SIGNIFICANT CHANGE UP (ref 98–107)
CHLORIDE SERPL-SCNC: 106 MMOL/L — SIGNIFICANT CHANGE UP (ref 98–107)
CHLORIDE SERPL-SCNC: 107 MMOL/L — SIGNIFICANT CHANGE UP (ref 98–107)
CHLORIDE SERPL-SCNC: 108 MMOL/L — HIGH (ref 98–107)
CHLORIDE SERPL-SCNC: 108 MMOL/L — HIGH (ref 98–107)
CHLORIDE SERPL-SCNC: 109 MMOL/L — HIGH (ref 98–107)
CHLORIDE SERPL-SCNC: 110 MMOL/L — HIGH (ref 98–107)
CHLORIDE SERPL-SCNC: 111 MMOL/L — HIGH (ref 98–107)
CHLORIDE SERPL-SCNC: 112 MMOL/L — HIGH (ref 98–107)
CHLORIDE SERPL-SCNC: 112 MMOL/L — HIGH (ref 98–107)
CHLORIDE SERPL-SCNC: 113 MMOL/L — HIGH (ref 98–107)
CHLORIDE SERPL-SCNC: 114 MMOL/L — HIGH (ref 98–107)
CHLORIDE SERPL-SCNC: 115 MMOL/L — HIGH (ref 98–107)
CHLORIDE SERPL-SCNC: 117 MMOL/L — HIGH (ref 98–107)
CHLORIDE SERPL-SCNC: 117 MMOL/L — HIGH (ref 98–107)
CHLORIDE SERPL-SCNC: 93 MMOL/L — LOW (ref 98–107)
CHLORIDE SERPL-SCNC: 95 MMOL/L — LOW (ref 98–107)
CHLORIDE SERPL-SCNC: 96 MMOL/L — LOW (ref 98–107)
CHLORIDE SERPL-SCNC: 97 MMOL/L — LOW (ref 98–107)
CHLORIDE SERPL-SCNC: 97 MMOL/L — LOW (ref 98–107)
CHLORIDE SERPL-SCNC: 98 MMOL/L — SIGNIFICANT CHANGE UP (ref 98–107)
CHLORIDE SERPL-SCNC: 98 MMOL/L — SIGNIFICANT CHANGE UP (ref 98–107)
CHLORIDE SERPL-SCNC: 99 MMOL/L — SIGNIFICANT CHANGE UP (ref 98–107)
CHLORIDE UR-SCNC: 54 MMOL/L — SIGNIFICANT CHANGE UP
CO2 BLDV-SCNC: 22.7 MMOL/L — SIGNIFICANT CHANGE UP (ref 22–26)
CO2 BLDV-SCNC: 23.4 MMOL/L — SIGNIFICANT CHANGE UP (ref 22–26)
CO2 BLDV-SCNC: SIGNIFICANT CHANGE UP MMOL/L (ref 22–26)
CO2 SERPL-SCNC: 17 MMOL/L — LOW (ref 22–31)
CO2 SERPL-SCNC: 18 MMOL/L — LOW (ref 22–31)
CO2 SERPL-SCNC: 19 MMOL/L — LOW (ref 22–31)
CO2 SERPL-SCNC: 20 MMOL/L — LOW (ref 22–31)
CO2 SERPL-SCNC: 21 MMOL/L — LOW (ref 22–31)
CO2 SERPL-SCNC: 22 MMOL/L — SIGNIFICANT CHANGE UP (ref 22–31)
CO2 SERPL-SCNC: 23 MMOL/L — SIGNIFICANT CHANGE UP (ref 22–31)
CO2 SERPL-SCNC: 24 MMOL/L — SIGNIFICANT CHANGE UP (ref 22–31)
COLOR FLD: ABNORMAL
COLOR SPEC: SIGNIFICANT CHANGE UP
COLOR SPEC: YELLOW — SIGNIFICANT CHANGE UP
COMMENT - FLUIDS: SIGNIFICANT CHANGE UP
COMMENT - URINE: SIGNIFICANT CHANGE UP
CORTIS AM PEAK SERPL-MCNC: 16.5 UG/DL — SIGNIFICANT CHANGE UP (ref 6–18.4)
CREAT ?TM UR-MCNC: 41 MG/DL — SIGNIFICANT CHANGE UP
CREAT ?TM UR-MCNC: 41 MG/DL — SIGNIFICANT CHANGE UP
CREAT ?TM UR-MCNC: 42 MG/DL — SIGNIFICANT CHANGE UP
CREAT SERPL-MCNC: 0.51 MG/DL — SIGNIFICANT CHANGE UP (ref 0.5–1.3)
CREAT SERPL-MCNC: 0.52 MG/DL — SIGNIFICANT CHANGE UP (ref 0.5–1.3)
CREAT SERPL-MCNC: 0.52 MG/DL — SIGNIFICANT CHANGE UP (ref 0.5–1.3)
CREAT SERPL-MCNC: 0.56 MG/DL — SIGNIFICANT CHANGE UP (ref 0.5–1.3)
CREAT SERPL-MCNC: 0.56 MG/DL — SIGNIFICANT CHANGE UP (ref 0.5–1.3)
CREAT SERPL-MCNC: 0.57 MG/DL — SIGNIFICANT CHANGE UP (ref 0.5–1.3)
CREAT SERPL-MCNC: 0.61 MG/DL — SIGNIFICANT CHANGE UP (ref 0.5–1.3)
CREAT SERPL-MCNC: 0.61 MG/DL — SIGNIFICANT CHANGE UP (ref 0.5–1.3)
CREAT SERPL-MCNC: 0.62 MG/DL — SIGNIFICANT CHANGE UP (ref 0.5–1.3)
CREAT SERPL-MCNC: 0.62 MG/DL — SIGNIFICANT CHANGE UP (ref 0.5–1.3)
CREAT SERPL-MCNC: 0.64 MG/DL — SIGNIFICANT CHANGE UP (ref 0.5–1.3)
CREAT SERPL-MCNC: 0.65 MG/DL — SIGNIFICANT CHANGE UP (ref 0.5–1.3)
CREAT SERPL-MCNC: 0.66 MG/DL — SIGNIFICANT CHANGE UP (ref 0.5–1.3)
CREAT SERPL-MCNC: 0.66 MG/DL — SIGNIFICANT CHANGE UP (ref 0.5–1.3)
CREAT SERPL-MCNC: 0.67 MG/DL — SIGNIFICANT CHANGE UP (ref 0.5–1.3)
CREAT SERPL-MCNC: 0.68 MG/DL — SIGNIFICANT CHANGE UP (ref 0.5–1.3)
CREAT SERPL-MCNC: 0.68 MG/DL — SIGNIFICANT CHANGE UP (ref 0.5–1.3)
CREAT SERPL-MCNC: 0.7 MG/DL — SIGNIFICANT CHANGE UP (ref 0.5–1.3)
CREAT SERPL-MCNC: 0.71 MG/DL — SIGNIFICANT CHANGE UP (ref 0.5–1.3)
CREAT SERPL-MCNC: 0.71 MG/DL — SIGNIFICANT CHANGE UP (ref 0.5–1.3)
CREAT SERPL-MCNC: 0.72 MG/DL — SIGNIFICANT CHANGE UP (ref 0.5–1.3)
CREAT SERPL-MCNC: 0.73 MG/DL — SIGNIFICANT CHANGE UP (ref 0.5–1.3)
CREAT SERPL-MCNC: 0.74 MG/DL — SIGNIFICANT CHANGE UP (ref 0.5–1.3)
CREAT SERPL-MCNC: 0.75 MG/DL — SIGNIFICANT CHANGE UP (ref 0.5–1.3)
CREAT SERPL-MCNC: 0.75 MG/DL — SIGNIFICANT CHANGE UP (ref 0.5–1.3)
CREAT SERPL-MCNC: 0.76 MG/DL — SIGNIFICANT CHANGE UP (ref 0.5–1.3)
CREAT SERPL-MCNC: 0.79 MG/DL — SIGNIFICANT CHANGE UP (ref 0.5–1.3)
CREAT SERPL-MCNC: 0.8 MG/DL — SIGNIFICANT CHANGE UP (ref 0.5–1.3)
CREAT SERPL-MCNC: 0.81 MG/DL — SIGNIFICANT CHANGE UP (ref 0.5–1.3)
CREAT SERPL-MCNC: 0.82 MG/DL — SIGNIFICANT CHANGE UP (ref 0.5–1.3)
CREAT SERPL-MCNC: 0.83 MG/DL — SIGNIFICANT CHANGE UP (ref 0.5–1.3)
CREAT SERPL-MCNC: 0.83 MG/DL — SIGNIFICANT CHANGE UP (ref 0.5–1.3)
CREAT SERPL-MCNC: 0.84 MG/DL — SIGNIFICANT CHANGE UP (ref 0.5–1.3)
CREAT SERPL-MCNC: 0.85 MG/DL — SIGNIFICANT CHANGE UP (ref 0.5–1.3)
CREAT SERPL-MCNC: 0.85 MG/DL — SIGNIFICANT CHANGE UP (ref 0.5–1.3)
CREAT SERPL-MCNC: 0.86 MG/DL — SIGNIFICANT CHANGE UP (ref 0.5–1.3)
CREAT SERPL-MCNC: 0.88 MG/DL — SIGNIFICANT CHANGE UP (ref 0.5–1.3)
CREAT SERPL-MCNC: 0.93 MG/DL — SIGNIFICANT CHANGE UP (ref 0.5–1.3)
CREAT SERPL-MCNC: 0.96 MG/DL — SIGNIFICANT CHANGE UP (ref 0.5–1.3)
CREAT SERPL-MCNC: 1.02 MG/DL — SIGNIFICANT CHANGE UP (ref 0.5–1.3)
CREAT SERPL-MCNC: 1.03 MG/DL — SIGNIFICANT CHANGE UP (ref 0.5–1.3)
CREAT SERPL-MCNC: 1.03 MG/DL — SIGNIFICANT CHANGE UP (ref 0.5–1.3)
CREAT SERPL-MCNC: 1.07 MG/DL — SIGNIFICANT CHANGE UP (ref 0.5–1.3)
CREAT SERPL-MCNC: 1.09 MG/DL — SIGNIFICANT CHANGE UP (ref 0.5–1.3)
CREAT SERPL-MCNC: 1.12 MG/DL — SIGNIFICANT CHANGE UP (ref 0.5–1.3)
CRP SERPL-MCNC: 235.3 MG/L — HIGH
CULTURE RESULTS: NO GROWTH — SIGNIFICANT CHANGE UP
CULTURE RESULTS: SIGNIFICANT CHANGE UP
DIFF PNL FLD: ABNORMAL
DIFF PNL FLD: NEGATIVE — SIGNIFICANT CHANGE UP
E FAECIUM DNA BLD POS QL NAA+NON-PROBE: SIGNIFICANT CHANGE UP
EGFR: 100 ML/MIN/1.73M2 — SIGNIFICANT CHANGE UP
EGFR: 101 ML/MIN/1.73M2 — SIGNIFICANT CHANGE UP
EGFR: 102 ML/MIN/1.73M2 — SIGNIFICANT CHANGE UP
EGFR: 103 ML/MIN/1.73M2 — SIGNIFICANT CHANGE UP
EGFR: 104 ML/MIN/1.73M2 — SIGNIFICANT CHANGE UP
EGFR: 105 ML/MIN/1.73M2 — SIGNIFICANT CHANGE UP
EGFR: 107 ML/MIN/1.73M2 — SIGNIFICANT CHANGE UP
EGFR: 108 ML/MIN/1.73M2 — SIGNIFICANT CHANGE UP
EGFR: 108 ML/MIN/1.73M2 — SIGNIFICANT CHANGE UP
EGFR: 110 ML/MIN/1.73M2 — SIGNIFICANT CHANGE UP
EGFR: 110 ML/MIN/1.73M2 — SIGNIFICANT CHANGE UP
EGFR: 111 ML/MIN/1.73M2 — SIGNIFICANT CHANGE UP
EGFR: 72 ML/MIN/1.73M2 — SIGNIFICANT CHANGE UP
EGFR: 74 ML/MIN/1.73M2 — SIGNIFICANT CHANGE UP
EGFR: 76 ML/MIN/1.73M2 — SIGNIFICANT CHANGE UP
EGFR: 80 ML/MIN/1.73M2 — SIGNIFICANT CHANGE UP
EGFR: 80 ML/MIN/1.73M2 — SIGNIFICANT CHANGE UP
EGFR: 81 ML/MIN/1.73M2 — SIGNIFICANT CHANGE UP
EGFR: 87 ML/MIN/1.73M2 — SIGNIFICANT CHANGE UP
EGFR: 90 ML/MIN/1.73M2 — SIGNIFICANT CHANGE UP
EGFR: 94 ML/MIN/1.73M2 — SIGNIFICANT CHANGE UP
EGFR: 95 ML/MIN/1.73M2 — SIGNIFICANT CHANGE UP
EGFR: 96 ML/MIN/1.73M2 — SIGNIFICANT CHANGE UP
EGFR: 97 ML/MIN/1.73M2 — SIGNIFICANT CHANGE UP
EGFR: 99 ML/MIN/1.73M2 — SIGNIFICANT CHANGE UP
ELLIPTOCYTES BLD QL SMEAR: SLIGHT — SIGNIFICANT CHANGE UP
EOSINOPHIL # BLD AUTO: 0 K/UL — SIGNIFICANT CHANGE UP (ref 0–0.5)
EOSINOPHIL # BLD AUTO: 0.1 K/UL — SIGNIFICANT CHANGE UP (ref 0–0.5)
EOSINOPHIL # BLD AUTO: 0.1 K/UL — SIGNIFICANT CHANGE UP (ref 0–0.5)
EOSINOPHIL # BLD AUTO: 0.16 K/UL — SIGNIFICANT CHANGE UP (ref 0–0.5)
EOSINOPHIL # BLD AUTO: 0.17 K/UL — SIGNIFICANT CHANGE UP (ref 0–0.5)
EOSINOPHIL # BLD AUTO: 0.18 K/UL — SIGNIFICANT CHANGE UP (ref 0–0.5)
EOSINOPHIL # BLD AUTO: 0.22 K/UL — SIGNIFICANT CHANGE UP (ref 0–0.5)
EOSINOPHIL # BLD AUTO: 0.23 K/UL — SIGNIFICANT CHANGE UP (ref 0–0.5)
EOSINOPHIL # BLD AUTO: 0.28 K/UL — SIGNIFICANT CHANGE UP (ref 0–0.5)
EOSINOPHIL # BLD AUTO: 0.29 K/UL — SIGNIFICANT CHANGE UP (ref 0–0.5)
EOSINOPHIL # BLD AUTO: 0.31 K/UL — SIGNIFICANT CHANGE UP (ref 0–0.5)
EOSINOPHIL # BLD AUTO: 0.32 K/UL — SIGNIFICANT CHANGE UP (ref 0–0.5)
EOSINOPHIL # BLD AUTO: 0.33 K/UL — SIGNIFICANT CHANGE UP (ref 0–0.5)
EOSINOPHIL # BLD AUTO: 0.34 K/UL — SIGNIFICANT CHANGE UP (ref 0–0.5)
EOSINOPHIL # BLD AUTO: 0.35 K/UL — SIGNIFICANT CHANGE UP (ref 0–0.5)
EOSINOPHIL # BLD AUTO: 0.36 K/UL — SIGNIFICANT CHANGE UP (ref 0–0.5)
EOSINOPHIL # BLD AUTO: 0.38 K/UL — SIGNIFICANT CHANGE UP (ref 0–0.5)
EOSINOPHIL # BLD AUTO: 0.38 K/UL — SIGNIFICANT CHANGE UP (ref 0–0.5)
EOSINOPHIL # BLD AUTO: 0.39 K/UL — SIGNIFICANT CHANGE UP (ref 0–0.5)
EOSINOPHIL # BLD AUTO: 0.4 K/UL — SIGNIFICANT CHANGE UP (ref 0–0.5)
EOSINOPHIL # BLD AUTO: 0.41 K/UL — SIGNIFICANT CHANGE UP (ref 0–0.5)
EOSINOPHIL # BLD AUTO: 0.42 K/UL — SIGNIFICANT CHANGE UP (ref 0–0.5)
EOSINOPHIL # BLD AUTO: 0.42 K/UL — SIGNIFICANT CHANGE UP (ref 0–0.5)
EOSINOPHIL # BLD AUTO: 0.43 K/UL — SIGNIFICANT CHANGE UP (ref 0–0.5)
EOSINOPHIL # BLD AUTO: 0.46 K/UL — SIGNIFICANT CHANGE UP (ref 0–0.5)
EOSINOPHIL # BLD AUTO: 0.47 K/UL — SIGNIFICANT CHANGE UP (ref 0–0.5)
EOSINOPHIL # BLD AUTO: 0.51 K/UL — HIGH (ref 0–0.5)
EOSINOPHIL # BLD AUTO: 0.53 K/UL — HIGH (ref 0–0.5)
EOSINOPHIL # BLD AUTO: 0.53 K/UL — HIGH (ref 0–0.5)
EOSINOPHIL # BLD AUTO: 0.54 K/UL — HIGH (ref 0–0.5)
EOSINOPHIL # BLD AUTO: 0.55 K/UL — HIGH (ref 0–0.5)
EOSINOPHIL # BLD AUTO: 0.56 K/UL — HIGH (ref 0–0.5)
EOSINOPHIL # BLD AUTO: 0.59 K/UL — HIGH (ref 0–0.5)
EOSINOPHIL # BLD AUTO: 0.64 K/UL — HIGH (ref 0–0.5)
EOSINOPHIL # BLD AUTO: 0.67 K/UL — HIGH (ref 0–0.5)
EOSINOPHIL # BLD AUTO: 0.73 K/UL — HIGH (ref 0–0.5)
EOSINOPHIL # BLD AUTO: 0.74 K/UL — HIGH (ref 0–0.5)
EOSINOPHIL # BLD AUTO: 0.74 K/UL — HIGH (ref 0–0.5)
EOSINOPHIL # BLD AUTO: 0.77 K/UL — HIGH (ref 0–0.5)
EOSINOPHIL # BLD AUTO: 0.78 K/UL — HIGH (ref 0–0.5)
EOSINOPHIL # BLD AUTO: 0.84 K/UL — HIGH (ref 0–0.5)
EOSINOPHIL # BLD AUTO: 0.89 K/UL — HIGH (ref 0–0.5)
EOSINOPHIL # BLD AUTO: 0.89 K/UL — HIGH (ref 0–0.5)
EOSINOPHIL # BLD AUTO: 0.94 K/UL — HIGH (ref 0–0.5)
EOSINOPHIL # BLD AUTO: 1.03 K/UL — HIGH (ref 0–0.5)
EOSINOPHIL # BLD AUTO: 1.03 K/UL — HIGH (ref 0–0.5)
EOSINOPHIL # BLD AUTO: 1.04 K/UL — HIGH (ref 0–0.5)
EOSINOPHIL # FLD: 0 % — SIGNIFICANT CHANGE UP
EOSINOPHIL NFR BLD AUTO: 0 % — SIGNIFICANT CHANGE UP (ref 0–6)
EOSINOPHIL NFR BLD AUTO: 0.3 % — SIGNIFICANT CHANGE UP (ref 0–6)
EOSINOPHIL NFR BLD AUTO: 0.3 % — SIGNIFICANT CHANGE UP (ref 0–6)
EOSINOPHIL NFR BLD AUTO: 0.5 % — SIGNIFICANT CHANGE UP (ref 0–6)
EOSINOPHIL NFR BLD AUTO: 0.7 % — SIGNIFICANT CHANGE UP (ref 0–6)
EOSINOPHIL NFR BLD AUTO: 0.7 % — SIGNIFICANT CHANGE UP (ref 0–6)
EOSINOPHIL NFR BLD AUTO: 0.8 % — SIGNIFICANT CHANGE UP (ref 0–6)
EOSINOPHIL NFR BLD AUTO: 0.9 % — SIGNIFICANT CHANGE UP (ref 0–6)
EOSINOPHIL NFR BLD AUTO: 1 % — SIGNIFICANT CHANGE UP (ref 0–6)
EOSINOPHIL NFR BLD AUTO: 1.2 % — SIGNIFICANT CHANGE UP (ref 0–6)
EOSINOPHIL NFR BLD AUTO: 1.2 % — SIGNIFICANT CHANGE UP (ref 0–6)
EOSINOPHIL NFR BLD AUTO: 1.3 % — SIGNIFICANT CHANGE UP (ref 0–6)
EOSINOPHIL NFR BLD AUTO: 1.5 % — SIGNIFICANT CHANGE UP (ref 0–6)
EOSINOPHIL NFR BLD AUTO: 1.6 % — SIGNIFICANT CHANGE UP (ref 0–6)
EOSINOPHIL NFR BLD AUTO: 1.6 % — SIGNIFICANT CHANGE UP (ref 0–6)
EOSINOPHIL NFR BLD AUTO: 1.7 % — SIGNIFICANT CHANGE UP (ref 0–6)
EOSINOPHIL NFR BLD AUTO: 1.8 % — SIGNIFICANT CHANGE UP (ref 0–6)
EOSINOPHIL NFR BLD AUTO: 1.9 % — SIGNIFICANT CHANGE UP (ref 0–6)
EOSINOPHIL NFR BLD AUTO: 1.9 % — SIGNIFICANT CHANGE UP (ref 0–6)
EOSINOPHIL NFR BLD AUTO: 2 % — SIGNIFICANT CHANGE UP (ref 0–6)
EOSINOPHIL NFR BLD AUTO: 2.2 % — SIGNIFICANT CHANGE UP (ref 0–6)
EOSINOPHIL NFR BLD AUTO: 2.3 % — SIGNIFICANT CHANGE UP (ref 0–6)
EOSINOPHIL NFR BLD AUTO: 2.6 % — SIGNIFICANT CHANGE UP (ref 0–6)
EOSINOPHIL NFR BLD AUTO: 2.7 % — SIGNIFICANT CHANGE UP (ref 0–6)
EOSINOPHIL NFR BLD AUTO: 2.7 % — SIGNIFICANT CHANGE UP (ref 0–6)
EOSINOPHIL NFR BLD AUTO: 3.1 % — SIGNIFICANT CHANGE UP (ref 0–6)
EOSINOPHIL NFR BLD AUTO: 3.1 % — SIGNIFICANT CHANGE UP (ref 0–6)
EOSINOPHIL NFR BLD AUTO: 3.3 % — SIGNIFICANT CHANGE UP (ref 0–6)
EOSINOPHIL NFR BLD AUTO: 3.3 % — SIGNIFICANT CHANGE UP (ref 0–6)
EOSINOPHIL NFR BLD AUTO: 3.4 % — SIGNIFICANT CHANGE UP (ref 0–6)
EOSINOPHIL NFR BLD AUTO: 3.5 % — SIGNIFICANT CHANGE UP (ref 0–6)
EOSINOPHIL NFR BLD AUTO: 3.9 % — SIGNIFICANT CHANGE UP (ref 0–6)
EOSINOPHIL NFR BLD AUTO: 4.1 % — SIGNIFICANT CHANGE UP (ref 0–6)
EOSINOPHIL NFR BLD AUTO: 4.2 % — SIGNIFICANT CHANGE UP (ref 0–6)
EOSINOPHIL NFR BLD AUTO: 4.5 % — SIGNIFICANT CHANGE UP (ref 0–6)
EPI CELLS # UR: 0 /HPF — SIGNIFICANT CHANGE UP (ref 0–5)
EPI CELLS # UR: 1 /HPF — SIGNIFICANT CHANGE UP (ref 0–5)
EPI CELLS # UR: 1 /HPF — SIGNIFICANT CHANGE UP (ref 0–5)
EPI CELLS # UR: 2 /HPF — SIGNIFICANT CHANGE UP (ref 0–5)
EPI CELLS # UR: 2 /HPF — SIGNIFICANT CHANGE UP (ref 0–5)
EPI CELLS # UR: 3 /HPF — SIGNIFICANT CHANGE UP (ref 0–5)
EPI CELLS # UR: 5 /HPF — SIGNIFICANT CHANGE UP (ref 0–5)
ERYTHROCYTE [SEDIMENTATION RATE] IN BLOOD: 109 MM/HR — HIGH (ref 1–15)
ESTIMATED AVERAGE GLUCOSE: 120 — SIGNIFICANT CHANGE UP
FLUAV SUBTYP SPEC NAA+PROBE: SIGNIFICANT CHANGE UP
FLUBV RNA SPEC QL NAA+PROBE: SIGNIFICANT CHANGE UP
FLUID INTAKE SUBSTANCE CLASS: SIGNIFICANT CHANGE UP
FOLATE+VIT B12 SERBLD-IMP: 0 % — SIGNIFICANT CHANGE UP
GAMMA GLOBULIN: 2.08 G/DL — HIGH (ref 0.7–1.7)
GAS PNL BLDV: 129 MMOL/L — LOW (ref 136–145)
GAS PNL BLDV: 133 MMOL/L — LOW (ref 136–145)
GAS PNL BLDV: 134 MMOL/L — LOW (ref 136–145)
GAS PNL BLDV: SIGNIFICANT CHANGE UP
GIANT PLATELETS BLD QL SMEAR: PRESENT — SIGNIFICANT CHANGE UP
GLUCOSE BLDC GLUCOMTR-MCNC: 102 MG/DL — HIGH (ref 70–99)
GLUCOSE BLDC GLUCOMTR-MCNC: 102 MG/DL — HIGH (ref 70–99)
GLUCOSE BLDC GLUCOMTR-MCNC: 104 MG/DL — HIGH (ref 70–99)
GLUCOSE BLDC GLUCOMTR-MCNC: 105 MG/DL — HIGH (ref 70–99)
GLUCOSE BLDC GLUCOMTR-MCNC: 106 MG/DL — HIGH (ref 70–99)
GLUCOSE BLDC GLUCOMTR-MCNC: 106 MG/DL — HIGH (ref 70–99)
GLUCOSE BLDC GLUCOMTR-MCNC: 108 MG/DL — HIGH (ref 70–99)
GLUCOSE BLDC GLUCOMTR-MCNC: 108 MG/DL — HIGH (ref 70–99)
GLUCOSE BLDC GLUCOMTR-MCNC: 111 MG/DL — HIGH (ref 70–99)
GLUCOSE BLDC GLUCOMTR-MCNC: 111 MG/DL — HIGH (ref 70–99)
GLUCOSE BLDC GLUCOMTR-MCNC: 112 MG/DL — HIGH (ref 70–99)
GLUCOSE BLDC GLUCOMTR-MCNC: 113 MG/DL — HIGH (ref 70–99)
GLUCOSE BLDC GLUCOMTR-MCNC: 115 MG/DL — HIGH (ref 70–99)
GLUCOSE BLDC GLUCOMTR-MCNC: 116 MG/DL — HIGH (ref 70–99)
GLUCOSE BLDC GLUCOMTR-MCNC: 117 MG/DL — HIGH (ref 70–99)
GLUCOSE BLDC GLUCOMTR-MCNC: 118 MG/DL — HIGH (ref 70–99)
GLUCOSE BLDC GLUCOMTR-MCNC: 122 MG/DL — HIGH (ref 70–99)
GLUCOSE BLDC GLUCOMTR-MCNC: 122 MG/DL — HIGH (ref 70–99)
GLUCOSE BLDC GLUCOMTR-MCNC: 123 MG/DL — HIGH (ref 70–99)
GLUCOSE BLDC GLUCOMTR-MCNC: 124 MG/DL — HIGH (ref 70–99)
GLUCOSE BLDC GLUCOMTR-MCNC: 125 MG/DL — HIGH (ref 70–99)
GLUCOSE BLDC GLUCOMTR-MCNC: 125 MG/DL — HIGH (ref 70–99)
GLUCOSE BLDC GLUCOMTR-MCNC: 131 MG/DL — HIGH (ref 70–99)
GLUCOSE BLDC GLUCOMTR-MCNC: 151 MG/DL — HIGH (ref 70–99)
GLUCOSE BLDC GLUCOMTR-MCNC: 206 MG/DL — HIGH (ref 70–99)
GLUCOSE BLDC GLUCOMTR-MCNC: 247 MG/DL — HIGH (ref 70–99)
GLUCOSE BLDC GLUCOMTR-MCNC: 85 MG/DL — SIGNIFICANT CHANGE UP (ref 70–99)
GLUCOSE BLDC GLUCOMTR-MCNC: 87 MG/DL — SIGNIFICANT CHANGE UP (ref 70–99)
GLUCOSE BLDC GLUCOMTR-MCNC: 87 MG/DL — SIGNIFICANT CHANGE UP (ref 70–99)
GLUCOSE BLDC GLUCOMTR-MCNC: 89 MG/DL — SIGNIFICANT CHANGE UP (ref 70–99)
GLUCOSE BLDC GLUCOMTR-MCNC: 89 MG/DL — SIGNIFICANT CHANGE UP (ref 70–99)
GLUCOSE BLDC GLUCOMTR-MCNC: 90 MG/DL — SIGNIFICANT CHANGE UP (ref 70–99)
GLUCOSE BLDC GLUCOMTR-MCNC: 93 MG/DL — SIGNIFICANT CHANGE UP (ref 70–99)
GLUCOSE BLDC GLUCOMTR-MCNC: 95 MG/DL — SIGNIFICANT CHANGE UP (ref 70–99)
GLUCOSE BLDC GLUCOMTR-MCNC: 95 MG/DL — SIGNIFICANT CHANGE UP (ref 70–99)
GLUCOSE BLDC GLUCOMTR-MCNC: 96 MG/DL — SIGNIFICANT CHANGE UP (ref 70–99)
GLUCOSE BLDC GLUCOMTR-MCNC: 97 MG/DL — SIGNIFICANT CHANGE UP (ref 70–99)
GLUCOSE BLDC GLUCOMTR-MCNC: 97 MG/DL — SIGNIFICANT CHANGE UP (ref 70–99)
GLUCOSE BLDC GLUCOMTR-MCNC: 99 MG/DL — SIGNIFICANT CHANGE UP (ref 70–99)
GLUCOSE BLDV-MCNC: 100 MG/DL — HIGH (ref 70–99)
GLUCOSE BLDV-MCNC: 142 MG/DL — HIGH (ref 70–99)
GLUCOSE BLDV-MCNC: 95 MG/DL — SIGNIFICANT CHANGE UP (ref 70–99)
GLUCOSE SERPL-MCNC: 101 MG/DL — HIGH (ref 70–99)
GLUCOSE SERPL-MCNC: 101 MG/DL — HIGH (ref 70–99)
GLUCOSE SERPL-MCNC: 102 MG/DL — HIGH (ref 70–99)
GLUCOSE SERPL-MCNC: 103 MG/DL — HIGH (ref 70–99)
GLUCOSE SERPL-MCNC: 103 MG/DL — HIGH (ref 70–99)
GLUCOSE SERPL-MCNC: 104 MG/DL — HIGH (ref 70–99)
GLUCOSE SERPL-MCNC: 104 MG/DL — HIGH (ref 70–99)
GLUCOSE SERPL-MCNC: 105 MG/DL — HIGH (ref 70–99)
GLUCOSE SERPL-MCNC: 106 MG/DL — HIGH (ref 70–99)
GLUCOSE SERPL-MCNC: 107 MG/DL — HIGH (ref 70–99)
GLUCOSE SERPL-MCNC: 108 MG/DL — HIGH (ref 70–99)
GLUCOSE SERPL-MCNC: 108 MG/DL — HIGH (ref 70–99)
GLUCOSE SERPL-MCNC: 109 MG/DL — HIGH (ref 70–99)
GLUCOSE SERPL-MCNC: 114 MG/DL — HIGH (ref 70–99)
GLUCOSE SERPL-MCNC: 114 MG/DL — HIGH (ref 70–99)
GLUCOSE SERPL-MCNC: 115 MG/DL — HIGH (ref 70–99)
GLUCOSE SERPL-MCNC: 117 MG/DL — HIGH (ref 70–99)
GLUCOSE SERPL-MCNC: 117 MG/DL — HIGH (ref 70–99)
GLUCOSE SERPL-MCNC: 118 MG/DL — HIGH (ref 70–99)
GLUCOSE SERPL-MCNC: 118 MG/DL — HIGH (ref 70–99)
GLUCOSE SERPL-MCNC: 119 MG/DL — HIGH (ref 70–99)
GLUCOSE SERPL-MCNC: 120 MG/DL — HIGH (ref 70–99)
GLUCOSE SERPL-MCNC: 122 MG/DL — HIGH (ref 70–99)
GLUCOSE SERPL-MCNC: 125 MG/DL — HIGH (ref 70–99)
GLUCOSE SERPL-MCNC: 126 MG/DL — HIGH (ref 70–99)
GLUCOSE SERPL-MCNC: 127 MG/DL — HIGH (ref 70–99)
GLUCOSE SERPL-MCNC: 130 MG/DL — HIGH (ref 70–99)
GLUCOSE SERPL-MCNC: 132 MG/DL — HIGH (ref 70–99)
GLUCOSE SERPL-MCNC: 132 MG/DL — HIGH (ref 70–99)
GLUCOSE SERPL-MCNC: 133 MG/DL — HIGH (ref 70–99)
GLUCOSE SERPL-MCNC: 133 MG/DL — HIGH (ref 70–99)
GLUCOSE SERPL-MCNC: 134 MG/DL — HIGH (ref 70–99)
GLUCOSE SERPL-MCNC: 135 MG/DL — HIGH (ref 70–99)
GLUCOSE SERPL-MCNC: 136 MG/DL — HIGH (ref 70–99)
GLUCOSE SERPL-MCNC: 137 MG/DL — HIGH (ref 70–99)
GLUCOSE SERPL-MCNC: 137 MG/DL — HIGH (ref 70–99)
GLUCOSE SERPL-MCNC: 138 MG/DL — HIGH (ref 70–99)
GLUCOSE SERPL-MCNC: 138 MG/DL — HIGH (ref 70–99)
GLUCOSE SERPL-MCNC: 139 MG/DL — HIGH (ref 70–99)
GLUCOSE SERPL-MCNC: 141 MG/DL — HIGH (ref 70–99)
GLUCOSE SERPL-MCNC: 142 MG/DL — HIGH (ref 70–99)
GLUCOSE SERPL-MCNC: 144 MG/DL — HIGH (ref 70–99)
GLUCOSE SERPL-MCNC: 146 MG/DL — HIGH (ref 70–99)
GLUCOSE SERPL-MCNC: 148 MG/DL — HIGH (ref 70–99)
GLUCOSE SERPL-MCNC: 149 MG/DL — HIGH (ref 70–99)
GLUCOSE SERPL-MCNC: 155 MG/DL — HIGH (ref 70–99)
GLUCOSE SERPL-MCNC: 163 MG/DL — HIGH (ref 70–99)
GLUCOSE SERPL-MCNC: 166 MG/DL — HIGH (ref 70–99)
GLUCOSE SERPL-MCNC: 167 MG/DL — HIGH (ref 70–99)
GLUCOSE SERPL-MCNC: 64 MG/DL — LOW (ref 70–99)
GLUCOSE SERPL-MCNC: 76 MG/DL — SIGNIFICANT CHANGE UP (ref 70–99)
GLUCOSE SERPL-MCNC: 80 MG/DL — SIGNIFICANT CHANGE UP (ref 70–99)
GLUCOSE SERPL-MCNC: 82 MG/DL — SIGNIFICANT CHANGE UP (ref 70–99)
GLUCOSE SERPL-MCNC: 83 MG/DL — SIGNIFICANT CHANGE UP (ref 70–99)
GLUCOSE SERPL-MCNC: 86 MG/DL — SIGNIFICANT CHANGE UP (ref 70–99)
GLUCOSE SERPL-MCNC: 86 MG/DL — SIGNIFICANT CHANGE UP (ref 70–99)
GLUCOSE SERPL-MCNC: 87 MG/DL — SIGNIFICANT CHANGE UP (ref 70–99)
GLUCOSE SERPL-MCNC: 88 MG/DL — SIGNIFICANT CHANGE UP (ref 70–99)
GLUCOSE SERPL-MCNC: 91 MG/DL — SIGNIFICANT CHANGE UP (ref 70–99)
GLUCOSE SERPL-MCNC: 92 MG/DL — SIGNIFICANT CHANGE UP (ref 70–99)
GLUCOSE SERPL-MCNC: 96 MG/DL — SIGNIFICANT CHANGE UP (ref 70–99)
GLUCOSE SERPL-MCNC: 96 MG/DL — SIGNIFICANT CHANGE UP (ref 70–99)
GLUCOSE SERPL-MCNC: 97 MG/DL — SIGNIFICANT CHANGE UP (ref 70–99)
GLUCOSE SERPL-MCNC: 97 MG/DL — SIGNIFICANT CHANGE UP (ref 70–99)
GLUCOSE SERPL-MCNC: 99 MG/DL — SIGNIFICANT CHANGE UP (ref 70–99)
GLUCOSE SERPL-MCNC: 99 MG/DL — SIGNIFICANT CHANGE UP (ref 70–99)
GLUCOSE UR QL: NEGATIVE — SIGNIFICANT CHANGE UP
GRAM STN FLD: SIGNIFICANT CHANGE UP
HADV DNA SPEC QL NAA+PROBE: SIGNIFICANT CHANGE UP
HCO3 BLDV-SCNC: 22 MMOL/L — SIGNIFICANT CHANGE UP (ref 22–29)
HCO3 BLDV-SCNC: 22 MMOL/L — SIGNIFICANT CHANGE UP (ref 22–29)
HCO3 BLDV-SCNC: SIGNIFICANT CHANGE UP MMOL/L (ref 22–29)
HCOV 229E RNA SPEC QL NAA+PROBE: SIGNIFICANT CHANGE UP
HCOV HKU1 RNA SPEC QL NAA+PROBE: SIGNIFICANT CHANGE UP
HCOV NL63 RNA SPEC QL NAA+PROBE: SIGNIFICANT CHANGE UP
HCOV OC43 RNA SPEC QL NAA+PROBE: SIGNIFICANT CHANGE UP
HCT VFR BLD CALC: 22.2 % — LOW (ref 39–50)
HCT VFR BLD CALC: 23.4 % — LOW (ref 39–50)
HCT VFR BLD CALC: 23.7 % — LOW (ref 39–50)
HCT VFR BLD CALC: 23.9 % — LOW (ref 39–50)
HCT VFR BLD CALC: 24 % — LOW (ref 39–50)
HCT VFR BLD CALC: 24.7 % — LOW (ref 39–50)
HCT VFR BLD CALC: 24.7 % — LOW (ref 39–50)
HCT VFR BLD CALC: 25.3 % — LOW (ref 39–50)
HCT VFR BLD CALC: 25.5 % — LOW (ref 39–50)
HCT VFR BLD CALC: 25.7 % — LOW (ref 39–50)
HCT VFR BLD CALC: 25.7 % — LOW (ref 39–50)
HCT VFR BLD CALC: 25.8 % — LOW (ref 39–50)
HCT VFR BLD CALC: 25.9 % — LOW (ref 39–50)
HCT VFR BLD CALC: 25.9 % — LOW (ref 39–50)
HCT VFR BLD CALC: 26 % — LOW (ref 39–50)
HCT VFR BLD CALC: 26 % — LOW (ref 39–50)
HCT VFR BLD CALC: 26.3 % — LOW (ref 39–50)
HCT VFR BLD CALC: 26.5 % — LOW (ref 39–50)
HCT VFR BLD CALC: 26.6 % — LOW (ref 39–50)
HCT VFR BLD CALC: 26.6 % — LOW (ref 39–50)
HCT VFR BLD CALC: 26.7 % — LOW (ref 39–50)
HCT VFR BLD CALC: 27 % — LOW (ref 39–50)
HCT VFR BLD CALC: 27 % — LOW (ref 39–50)
HCT VFR BLD CALC: 27.2 % — LOW (ref 39–50)
HCT VFR BLD CALC: 27.3 % — LOW (ref 39–50)
HCT VFR BLD CALC: 27.4 % — LOW (ref 39–50)
HCT VFR BLD CALC: 27.4 % — LOW (ref 39–50)
HCT VFR BLD CALC: 27.5 % — LOW (ref 39–50)
HCT VFR BLD CALC: 27.5 % — LOW (ref 39–50)
HCT VFR BLD CALC: 27.7 % — LOW (ref 39–50)
HCT VFR BLD CALC: 27.8 % — LOW (ref 39–50)
HCT VFR BLD CALC: 27.8 % — LOW (ref 39–50)
HCT VFR BLD CALC: 27.9 % — LOW (ref 39–50)
HCT VFR BLD CALC: 27.9 % — LOW (ref 39–50)
HCT VFR BLD CALC: 28 % — LOW (ref 39–50)
HCT VFR BLD CALC: 28 % — LOW (ref 39–50)
HCT VFR BLD CALC: 28.2 % — LOW (ref 39–50)
HCT VFR BLD CALC: 28.4 % — LOW (ref 39–50)
HCT VFR BLD CALC: 28.5 % — LOW (ref 39–50)
HCT VFR BLD CALC: 28.5 % — LOW (ref 39–50)
HCT VFR BLD CALC: 28.6 % — LOW (ref 39–50)
HCT VFR BLD CALC: 28.6 % — LOW (ref 39–50)
HCT VFR BLD CALC: 28.7 % — LOW (ref 39–50)
HCT VFR BLD CALC: 29.1 % — LOW (ref 39–50)
HCT VFR BLD CALC: 29.2 % — LOW (ref 39–50)
HCT VFR BLD CALC: 29.2 % — LOW (ref 39–50)
HCT VFR BLD CALC: 29.3 % — LOW (ref 39–50)
HCT VFR BLD CALC: 29.4 % — LOW (ref 39–50)
HCT VFR BLD CALC: 29.5 % — LOW (ref 39–50)
HCT VFR BLD CALC: 29.6 % — LOW (ref 39–50)
HCT VFR BLD CALC: 29.9 % — LOW (ref 39–50)
HCT VFR BLD CALC: 30.1 % — LOW (ref 39–50)
HCT VFR BLD CALC: 30.8 % — LOW (ref 39–50)
HCT VFR BLD CALC: 31 % — LOW (ref 39–50)
HCT VFR BLD CALC: 31.7 % — LOW (ref 39–50)
HCT VFR BLD CALC: 31.7 % — LOW (ref 39–50)
HCT VFR BLD CALC: 32.8 % — LOW (ref 39–50)
HCT VFR BLD CALC: 34.9 % — LOW (ref 39–50)
HCT VFR BLDA CALC: 24 % — LOW (ref 39–51)
HCT VFR BLDA CALC: 26 % — LOW (ref 39–51)
HCT VFR BLDA CALC: 31 % — LOW (ref 39–51)
HCV AB S/CO SERPL IA: 0.15 S/CO — SIGNIFICANT CHANGE UP (ref 0–0.99)
HCV AB SERPL-IMP: SIGNIFICANT CHANGE UP
HGB BLD CALC-MCNC: 10.4 G/DL — LOW (ref 12.6–17.4)
HGB BLD CALC-MCNC: 8 G/DL — LOW (ref 13–17)
HGB BLD CALC-MCNC: 8.7 G/DL — LOW (ref 13–17)
HGB BLD-MCNC: 10 G/DL — LOW (ref 13–17)
HGB BLD-MCNC: 10 G/DL — LOW (ref 13–17)
HGB BLD-MCNC: 6.7 G/DL — CRITICAL LOW (ref 13–17)
HGB BLD-MCNC: 6.9 G/DL — CRITICAL LOW (ref 13–17)
HGB BLD-MCNC: 6.9 G/DL — CRITICAL LOW (ref 13–17)
HGB BLD-MCNC: 7 G/DL — CRITICAL LOW (ref 13–17)
HGB BLD-MCNC: 7 G/DL — CRITICAL LOW (ref 13–17)
HGB BLD-MCNC: 7.1 G/DL — LOW (ref 13–17)
HGB BLD-MCNC: 7.1 G/DL — LOW (ref 13–17)
HGB BLD-MCNC: 7.3 G/DL — LOW (ref 13–17)
HGB BLD-MCNC: 7.5 G/DL — LOW (ref 13–17)
HGB BLD-MCNC: 7.5 G/DL — LOW (ref 13–17)
HGB BLD-MCNC: 7.6 G/DL — LOW (ref 13–17)
HGB BLD-MCNC: 7.6 G/DL — LOW (ref 13–17)
HGB BLD-MCNC: 7.7 G/DL — LOW (ref 13–17)
HGB BLD-MCNC: 7.8 G/DL — LOW (ref 13–17)
HGB BLD-MCNC: 7.8 G/DL — LOW (ref 13–17)
HGB BLD-MCNC: 7.9 G/DL — LOW (ref 13–17)
HGB BLD-MCNC: 7.9 G/DL — LOW (ref 13–17)
HGB BLD-MCNC: 8 G/DL — LOW (ref 13–17)
HGB BLD-MCNC: 8.1 G/DL — LOW (ref 13–17)
HGB BLD-MCNC: 8.2 G/DL — LOW (ref 13–17)
HGB BLD-MCNC: 8.3 G/DL — LOW (ref 13–17)
HGB BLD-MCNC: 8.4 G/DL — LOW (ref 13–17)
HGB BLD-MCNC: 8.5 G/DL — LOW (ref 13–17)
HGB BLD-MCNC: 8.6 G/DL — LOW (ref 13–17)
HGB BLD-MCNC: 8.7 G/DL — LOW (ref 13–17)
HGB BLD-MCNC: 8.7 G/DL — LOW (ref 13–17)
HGB BLD-MCNC: 8.8 G/DL — LOW (ref 13–17)
HGB BLD-MCNC: 8.9 G/DL — LOW (ref 13–17)
HGB BLD-MCNC: 9 G/DL — LOW (ref 13–17)
HGB BLD-MCNC: 9.1 G/DL — LOW (ref 13–17)
HGB BLD-MCNC: 9.3 G/DL — LOW (ref 13–17)
HGB BLD-MCNC: 9.5 G/DL — LOW (ref 13–17)
HGB BLD-MCNC: 9.6 G/DL — LOW (ref 13–17)
HMPV RNA SPEC QL NAA+PROBE: SIGNIFICANT CHANGE UP
HPIV1 RNA SPEC QL NAA+PROBE: SIGNIFICANT CHANGE UP
HPIV2 RNA SPEC QL NAA+PROBE: SIGNIFICANT CHANGE UP
HPIV3 RNA SPEC QL NAA+PROBE: SIGNIFICANT CHANGE UP
HPIV4 RNA SPEC QL NAA+PROBE: SIGNIFICANT CHANGE UP
HYALINE CASTS # UR AUTO: 0 /LPF — SIGNIFICANT CHANGE UP (ref 0–7)
HYALINE CASTS # UR AUTO: 1 /LPF — SIGNIFICANT CHANGE UP (ref 0–7)
HYALINE CASTS # UR AUTO: 3 /LPF — SIGNIFICANT CHANGE UP (ref 0–7)
HYALINE CASTS # UR AUTO: 4 /LPF — SIGNIFICANT CHANGE UP (ref 0–7)
HYALINE CASTS # UR AUTO: 9 /LPF — HIGH (ref 0–7)
HYPOCHROMIA BLD QL: SLIGHT — SIGNIFICANT CHANGE UP
IANC: 15.07 K/UL — HIGH (ref 1.8–7.4)
IANC: 16.42 K/UL — HIGH (ref 1.8–7.4)
IANC: 18.11 K/UL — HIGH (ref 1.8–7.4)
IANC: 18.92 K/UL — HIGH (ref 1.8–7.4)
IANC: 19.04 K/UL — HIGH (ref 1.8–7.4)
IANC: 19.38 K/UL — HIGH (ref 1.8–7.4)
IANC: 20.03 K/UL — HIGH (ref 1.8–7.4)
IANC: 20.29 K/UL — HIGH (ref 1.8–7.4)
IANC: 20.33 K/UL — HIGH (ref 1.8–7.4)
IANC: 20.43 K/UL — HIGH (ref 1.8–7.4)
IANC: 20.96 K/UL — HIGH (ref 1.8–7.4)
IANC: 21.14 K/UL — HIGH (ref 1.8–7.4)
IANC: 21.16 K/UL — HIGH (ref 1.8–7.4)
IANC: 21.2 K/UL — HIGH (ref 1.8–7.4)
IANC: 21.37 K/UL — HIGH (ref 1.8–7.4)
IANC: 21.42 K/UL — HIGH (ref 1.8–7.4)
IANC: 21.43 K/UL — HIGH (ref 1.8–7.4)
IANC: 21.45 K/UL — HIGH (ref 1.8–7.4)
IANC: 21.47 K/UL — HIGH (ref 1.8–7.4)
IANC: 21.63 K/UL — HIGH (ref 1.8–7.4)
IANC: 21.73 K/UL — HIGH (ref 1.8–7.4)
IANC: 22.28 K/UL — HIGH (ref 1.8–7.4)
IANC: 22.35 K/UL — HIGH (ref 1.8–7.4)
IANC: 22.45 K/UL — HIGH (ref 1.8–7.4)
IANC: 22.61 K/UL — HIGH (ref 1.8–7.4)
IANC: 22.94 K/UL — HIGH (ref 1.8–7.4)
IANC: 23 K/UL — HIGH (ref 1.8–7.4)
IANC: 23.57 K/UL — HIGH (ref 1.8–7.4)
IANC: 24.12 K/UL — HIGH (ref 1.8–7.4)
IANC: 24.26 K/UL — HIGH (ref 1.8–7.4)
IANC: 24.43 K/UL — HIGH (ref 1.8–7.4)
IANC: 24.47 K/UL — HIGH (ref 1.8–7.4)
IANC: 24.49 K/UL — HIGH (ref 1.8–7.4)
IANC: 24.64 K/UL — HIGH (ref 1.8–7.4)
IANC: 25.63 K/UL — HIGH (ref 1.8–7.4)
IANC: 26.11 K/UL — HIGH (ref 1.8–7.4)
IANC: 26.68 K/UL — HIGH (ref 1.8–7.4)
IANC: 27.8 K/UL — HIGH (ref 1.8–7.4)
IANC: 28.15 K/UL — HIGH (ref 1.8–7.4)
IANC: 28.96 K/UL — HIGH (ref 1.8–7.4)
IANC: 29.06 K/UL — HIGH (ref 1.8–7.4)
IANC: 29.17 K/UL — HIGH (ref 1.8–7.4)
IANC: 29.4 K/UL — HIGH (ref 1.8–7.4)
IANC: 30.08 K/UL — HIGH (ref 1.8–7.4)
IANC: 30.36 K/UL — HIGH (ref 1.8–7.4)
IANC: 30.69 K/UL — HIGH (ref 1.8–7.4)
IANC: 31.79 K/UL — HIGH (ref 1.8–7.4)
IANC: 31.95 K/UL — HIGH (ref 1.8–7.4)
IANC: 32.75 K/UL — HIGH (ref 1.8–7.4)
IANC: 33.63 K/UL — HIGH (ref 1.8–7.4)
IANC: 41.23 K/UL — HIGH (ref 1.8–7.4)
IMM GRANULOCYTES NFR BLD AUTO: 0.8 % — SIGNIFICANT CHANGE UP (ref 0–0.9)
IMM GRANULOCYTES NFR BLD AUTO: 0.8 % — SIGNIFICANT CHANGE UP (ref 0–0.9)
IMM GRANULOCYTES NFR BLD AUTO: 0.9 % — SIGNIFICANT CHANGE UP (ref 0–0.9)
IMM GRANULOCYTES NFR BLD AUTO: 1 % — HIGH (ref 0–0.9)
IMM GRANULOCYTES NFR BLD AUTO: 1 % — HIGH (ref 0–0.9)
IMM GRANULOCYTES NFR BLD AUTO: 1.1 % — HIGH (ref 0–0.9)
IMM GRANULOCYTES NFR BLD AUTO: 1.2 % — HIGH (ref 0–0.9)
IMM GRANULOCYTES NFR BLD AUTO: 1.3 % — HIGH (ref 0–0.9)
IMM GRANULOCYTES NFR BLD AUTO: 1.3 % — HIGH (ref 0–0.9)
IMM GRANULOCYTES NFR BLD AUTO: 1.4 % — HIGH (ref 0–0.9)
IMM GRANULOCYTES NFR BLD AUTO: 1.5 % — HIGH (ref 0–0.9)
IMM GRANULOCYTES NFR BLD AUTO: 1.6 % — HIGH (ref 0–0.9)
IMM GRANULOCYTES NFR BLD AUTO: 1.8 % — HIGH (ref 0–0.9)
IMM GRANULOCYTES NFR BLD AUTO: 1.8 % — HIGH (ref 0–0.9)
IMM GRANULOCYTES NFR BLD AUTO: 1.9 % — HIGH (ref 0–0.9)
IMM GRANULOCYTES NFR BLD AUTO: 2.1 % — HIGH (ref 0–0.9)
IMM GRANULOCYTES NFR BLD AUTO: 2.2 % — HIGH (ref 0–0.9)
IMM GRANULOCYTES NFR BLD AUTO: 2.2 % — HIGH (ref 0–0.9)
IMM GRANULOCYTES NFR BLD AUTO: 2.3 % — HIGH (ref 0–0.9)
IMM GRANULOCYTES NFR BLD AUTO: 2.4 % — HIGH (ref 0–0.9)
IMM GRANULOCYTES NFR BLD AUTO: 2.5 % — HIGH (ref 0–0.9)
IMM GRANULOCYTES NFR BLD AUTO: 2.8 % — HIGH (ref 0–0.9)
IMM GRANULOCYTES NFR BLD AUTO: 2.8 % — HIGH (ref 0–0.9)
IMM GRANULOCYTES NFR BLD AUTO: 2.9 % — HIGH (ref 0–0.9)
IMM GRANULOCYTES NFR BLD AUTO: 3.1 % — HIGH (ref 0–0.9)
INR BLD: 1.29 RATIO — HIGH (ref 0.88–1.16)
INR BLD: 1.31 RATIO — HIGH (ref 0.88–1.16)
INR BLD: 1.32 RATIO — HIGH (ref 0.88–1.16)
INR BLD: 1.33 RATIO — HIGH (ref 0.88–1.16)
INR BLD: 1.36 RATIO — HIGH (ref 0.88–1.16)
INR BLD: 1.37 RATIO — HIGH (ref 0.88–1.16)
INR BLD: 1.38 RATIO — HIGH (ref 0.88–1.16)
INR BLD: 1.43 RATIO — HIGH (ref 0.88–1.16)
INTERPRETATION SERPL IFE-IMP: SIGNIFICANT CHANGE UP
KETONES UR-MCNC: NEGATIVE — SIGNIFICANT CHANGE UP
LACTATE BLDV-MCNC: 1.4 MMOL/L — SIGNIFICANT CHANGE UP (ref 0.5–2)
LACTATE BLDV-MCNC: 1.8 MMOL/L — SIGNIFICANT CHANGE UP (ref 0.5–2)
LACTATE BLDV-MCNC: 1.9 MMOL/L — SIGNIFICANT CHANGE UP (ref 0.5–2)
LACTATE SERPL-SCNC: 4.7 MMOL/L — CRITICAL HIGH (ref 0.5–2)
LDH SERPL L TO P-CCNC: 180 U/L — SIGNIFICANT CHANGE UP (ref 135–225)
LEGIONELLA AG UR QL: NEGATIVE — SIGNIFICANT CHANGE UP
LEUKOCYTE ESTERASE UR-ACNC: ABNORMAL
LEUKOCYTE ESTERASE UR-ACNC: NEGATIVE — SIGNIFICANT CHANGE UP
LYMPHOCYTES # BLD AUTO: 0 % — LOW (ref 13–44)
LYMPHOCYTES # BLD AUTO: 0 K/UL — LOW (ref 1–3.3)
LYMPHOCYTES # BLD AUTO: 0.39 K/UL — LOW (ref 1–3.3)
LYMPHOCYTES # BLD AUTO: 0.42 K/UL — LOW (ref 1–3.3)
LYMPHOCYTES # BLD AUTO: 0.43 K/UL — LOW (ref 1–3.3)
LYMPHOCYTES # BLD AUTO: 0.46 K/UL — LOW (ref 1–3.3)
LYMPHOCYTES # BLD AUTO: 0.68 K/UL — LOW (ref 1–3.3)
LYMPHOCYTES # BLD AUTO: 0.79 K/UL — LOW (ref 1–3.3)
LYMPHOCYTES # BLD AUTO: 0.83 K/UL — LOW (ref 1–3.3)
LYMPHOCYTES # BLD AUTO: 0.86 K/UL — LOW (ref 1–3.3)
LYMPHOCYTES # BLD AUTO: 0.87 K/UL — LOW (ref 1–3.3)
LYMPHOCYTES # BLD AUTO: 0.89 K/UL — LOW (ref 1–3.3)
LYMPHOCYTES # BLD AUTO: 0.9 % — LOW (ref 13–44)
LYMPHOCYTES # BLD AUTO: 0.9 K/UL — LOW (ref 1–3.3)
LYMPHOCYTES # BLD AUTO: 0.91 K/UL — LOW (ref 1–3.3)
LYMPHOCYTES # BLD AUTO: 0.93 K/UL — LOW (ref 1–3.3)
LYMPHOCYTES # BLD AUTO: 0.94 K/UL — LOW (ref 1–3.3)
LYMPHOCYTES # BLD AUTO: 0.97 K/UL — LOW (ref 1–3.3)
LYMPHOCYTES # BLD AUTO: 1 K/UL — SIGNIFICANT CHANGE UP (ref 1–3.3)
LYMPHOCYTES # BLD AUTO: 1 K/UL — SIGNIFICANT CHANGE UP (ref 1–3.3)
LYMPHOCYTES # BLD AUTO: 1.02 K/UL — SIGNIFICANT CHANGE UP (ref 1–3.3)
LYMPHOCYTES # BLD AUTO: 1.05 K/UL — SIGNIFICANT CHANGE UP (ref 1–3.3)
LYMPHOCYTES # BLD AUTO: 1.06 K/UL — SIGNIFICANT CHANGE UP (ref 1–3.3)
LYMPHOCYTES # BLD AUTO: 1.08 K/UL — SIGNIFICANT CHANGE UP (ref 1–3.3)
LYMPHOCYTES # BLD AUTO: 1.09 K/UL — SIGNIFICANT CHANGE UP (ref 1–3.3)
LYMPHOCYTES # BLD AUTO: 1.11 K/UL — SIGNIFICANT CHANGE UP (ref 1–3.3)
LYMPHOCYTES # BLD AUTO: 1.17 K/UL — SIGNIFICANT CHANGE UP (ref 1–3.3)
LYMPHOCYTES # BLD AUTO: 1.2 K/UL — SIGNIFICANT CHANGE UP (ref 1–3.3)
LYMPHOCYTES # BLD AUTO: 1.23 K/UL — SIGNIFICANT CHANGE UP (ref 1–3.3)
LYMPHOCYTES # BLD AUTO: 1.25 K/UL — SIGNIFICANT CHANGE UP (ref 1–3.3)
LYMPHOCYTES # BLD AUTO: 1.25 K/UL — SIGNIFICANT CHANGE UP (ref 1–3.3)
LYMPHOCYTES # BLD AUTO: 1.32 K/UL — SIGNIFICANT CHANGE UP (ref 1–3.3)
LYMPHOCYTES # BLD AUTO: 1.32 K/UL — SIGNIFICANT CHANGE UP (ref 1–3.3)
LYMPHOCYTES # BLD AUTO: 1.34 K/UL — SIGNIFICANT CHANGE UP (ref 1–3.3)
LYMPHOCYTES # BLD AUTO: 1.34 K/UL — SIGNIFICANT CHANGE UP (ref 1–3.3)
LYMPHOCYTES # BLD AUTO: 1.36 K/UL — SIGNIFICANT CHANGE UP (ref 1–3.3)
LYMPHOCYTES # BLD AUTO: 1.36 K/UL — SIGNIFICANT CHANGE UP (ref 1–3.3)
LYMPHOCYTES # BLD AUTO: 1.38 K/UL — SIGNIFICANT CHANGE UP (ref 1–3.3)
LYMPHOCYTES # BLD AUTO: 1.4 K/UL — SIGNIFICANT CHANGE UP (ref 1–3.3)
LYMPHOCYTES # BLD AUTO: 1.42 K/UL — SIGNIFICANT CHANGE UP (ref 1–3.3)
LYMPHOCYTES # BLD AUTO: 1.42 K/UL — SIGNIFICANT CHANGE UP (ref 1–3.3)
LYMPHOCYTES # BLD AUTO: 1.43 K/UL — SIGNIFICANT CHANGE UP (ref 1–3.3)
LYMPHOCYTES # BLD AUTO: 1.44 K/UL — SIGNIFICANT CHANGE UP (ref 1–3.3)
LYMPHOCYTES # BLD AUTO: 1.44 K/UL — SIGNIFICANT CHANGE UP (ref 1–3.3)
LYMPHOCYTES # BLD AUTO: 1.46 K/UL — SIGNIFICANT CHANGE UP (ref 1–3.3)
LYMPHOCYTES # BLD AUTO: 1.47 K/UL — SIGNIFICANT CHANGE UP (ref 1–3.3)
LYMPHOCYTES # BLD AUTO: 1.53 K/UL — SIGNIFICANT CHANGE UP (ref 1–3.3)
LYMPHOCYTES # BLD AUTO: 1.63 K/UL — SIGNIFICANT CHANGE UP (ref 1–3.3)
LYMPHOCYTES # BLD AUTO: 1.7 % — LOW (ref 13–44)
LYMPHOCYTES # BLD AUTO: 1.83 K/UL — SIGNIFICANT CHANGE UP (ref 1–3.3)
LYMPHOCYTES # BLD AUTO: 1.89 K/UL — SIGNIFICANT CHANGE UP (ref 1–3.3)
LYMPHOCYTES # BLD AUTO: 2.6 % — LOW (ref 13–44)
LYMPHOCYTES # BLD AUTO: 2.7 % — LOW (ref 13–44)
LYMPHOCYTES # BLD AUTO: 2.7 % — LOW (ref 13–44)
LYMPHOCYTES # BLD AUTO: 2.8 % — LOW (ref 13–44)
LYMPHOCYTES # BLD AUTO: 3 % — LOW (ref 13–44)
LYMPHOCYTES # BLD AUTO: 3.1 % — LOW (ref 13–44)
LYMPHOCYTES # BLD AUTO: 3.2 % — LOW (ref 13–44)
LYMPHOCYTES # BLD AUTO: 3.4 % — LOW (ref 13–44)
LYMPHOCYTES # BLD AUTO: 3.5 % — LOW (ref 13–44)
LYMPHOCYTES # BLD AUTO: 3.5 % — LOW (ref 13–44)
LYMPHOCYTES # BLD AUTO: 3.6 % — LOW (ref 13–44)
LYMPHOCYTES # BLD AUTO: 3.6 % — LOW (ref 13–44)
LYMPHOCYTES # BLD AUTO: 3.7 % — LOW (ref 13–44)
LYMPHOCYTES # BLD AUTO: 4.1 % — LOW (ref 13–44)
LYMPHOCYTES # BLD AUTO: 4.1 % — LOW (ref 13–44)
LYMPHOCYTES # BLD AUTO: 4.3 % — LOW (ref 13–44)
LYMPHOCYTES # BLD AUTO: 4.3 % — LOW (ref 13–44)
LYMPHOCYTES # BLD AUTO: 4.4 % — LOW (ref 13–44)
LYMPHOCYTES # BLD AUTO: 4.5 % — LOW (ref 13–44)
LYMPHOCYTES # BLD AUTO: 4.6 % — LOW (ref 13–44)
LYMPHOCYTES # BLD AUTO: 4.7 % — LOW (ref 13–44)
LYMPHOCYTES # BLD AUTO: 4.7 % — LOW (ref 13–44)
LYMPHOCYTES # BLD AUTO: 5 % — LOW (ref 13–44)
LYMPHOCYTES # BLD AUTO: 5 % — LOW (ref 13–44)
LYMPHOCYTES # BLD AUTO: 5.1 % — LOW (ref 13–44)
LYMPHOCYTES # BLD AUTO: 5.1 % — LOW (ref 13–44)
LYMPHOCYTES # BLD AUTO: 5.2 % — LOW (ref 13–44)
LYMPHOCYTES # BLD AUTO: 5.3 % — LOW (ref 13–44)
LYMPHOCYTES # BLD AUTO: 5.5 % — LOW (ref 13–44)
LYMPHOCYTES # BLD AUTO: 5.7 % — LOW (ref 13–44)
LYMPHOCYTES # BLD AUTO: 5.9 % — LOW (ref 13–44)
LYMPHOCYTES # BLD AUTO: 6 % — LOW (ref 13–44)
LYMPHOCYTES # BLD AUTO: 6.2 % — LOW (ref 13–44)
LYMPHOCYTES # BLD AUTO: 6.9 % — LOW (ref 13–44)
LYMPHOCYTES # BLD AUTO: 7.6 % — LOW (ref 13–44)
LYMPHOCYTES # FLD: 20 % — SIGNIFICANT CHANGE UP
M PNEUMO DNA SPEC QL NAA+PROBE: SIGNIFICANT CHANGE UP
MACROCYTES BLD QL: SLIGHT — SIGNIFICANT CHANGE UP
MAGNESIUM SERPL-MCNC: 1.5 MG/DL — LOW (ref 1.6–2.6)
MAGNESIUM SERPL-MCNC: 1.5 MG/DL — LOW (ref 1.6–2.6)
MAGNESIUM SERPL-MCNC: 1.6 MG/DL — SIGNIFICANT CHANGE UP (ref 1.6–2.6)
MAGNESIUM SERPL-MCNC: 1.7 MG/DL — SIGNIFICANT CHANGE UP (ref 1.6–2.6)
MAGNESIUM SERPL-MCNC: 1.8 MG/DL — SIGNIFICANT CHANGE UP (ref 1.6–2.6)
MAGNESIUM SERPL-MCNC: 1.9 MG/DL — SIGNIFICANT CHANGE UP (ref 1.6–2.6)
MAGNESIUM SERPL-MCNC: 2 MG/DL — SIGNIFICANT CHANGE UP (ref 1.6–2.6)
MAGNESIUM SERPL-MCNC: 2.1 MG/DL — SIGNIFICANT CHANGE UP (ref 1.6–2.6)
MAGNESIUM SERPL-MCNC: 2.2 MG/DL — SIGNIFICANT CHANGE UP (ref 1.6–2.6)
MAGNESIUM SERPL-MCNC: 2.3 MG/DL — SIGNIFICANT CHANGE UP (ref 1.6–2.6)
MAGNESIUM SERPL-MCNC: 2.5 MG/DL — SIGNIFICANT CHANGE UP (ref 1.6–2.6)
MAGNESIUM SERPL-MCNC: 2.6 MG/DL — SIGNIFICANT CHANGE UP (ref 1.6–2.6)
MAGNESIUM SERPL-MCNC: 2.7 MG/DL — HIGH (ref 1.6–2.6)
MANUAL SMEAR VERIFICATION: SIGNIFICANT CHANGE UP
MANUAL SMEAR VERIFICATION: SIGNIFICANT CHANGE UP
MCHC RBC-ENTMCNC: 25.6 PG — LOW (ref 27–34)
MCHC RBC-ENTMCNC: 25.7 PG — LOW (ref 27–34)
MCHC RBC-ENTMCNC: 25.8 PG — LOW (ref 27–34)
MCHC RBC-ENTMCNC: 25.9 PG — LOW (ref 27–34)
MCHC RBC-ENTMCNC: 26 PG — LOW (ref 27–34)
MCHC RBC-ENTMCNC: 26 PG — LOW (ref 27–34)
MCHC RBC-ENTMCNC: 26.1 PG — LOW (ref 27–34)
MCHC RBC-ENTMCNC: 26.1 PG — LOW (ref 27–34)
MCHC RBC-ENTMCNC: 26.2 PG — LOW (ref 27–34)
MCHC RBC-ENTMCNC: 26.3 PG — LOW (ref 27–34)
MCHC RBC-ENTMCNC: 26.4 PG — LOW (ref 27–34)
MCHC RBC-ENTMCNC: 26.5 PG — LOW (ref 27–34)
MCHC RBC-ENTMCNC: 26.6 PG — LOW (ref 27–34)
MCHC RBC-ENTMCNC: 26.8 PG — LOW (ref 27–34)
MCHC RBC-ENTMCNC: 26.8 PG — LOW (ref 27–34)
MCHC RBC-ENTMCNC: 27 PG — SIGNIFICANT CHANGE UP (ref 27–34)
MCHC RBC-ENTMCNC: 27.2 PG — SIGNIFICANT CHANGE UP (ref 27–34)
MCHC RBC-ENTMCNC: 27.3 PG — SIGNIFICANT CHANGE UP (ref 27–34)
MCHC RBC-ENTMCNC: 27.4 GM/DL — LOW (ref 32–36)
MCHC RBC-ENTMCNC: 27.4 PG — SIGNIFICANT CHANGE UP (ref 27–34)
MCHC RBC-ENTMCNC: 27.5 GM/DL — LOW (ref 32–36)
MCHC RBC-ENTMCNC: 27.6 PG — SIGNIFICANT CHANGE UP (ref 27–34)
MCHC RBC-ENTMCNC: 27.7 PG — SIGNIFICANT CHANGE UP (ref 27–34)
MCHC RBC-ENTMCNC: 27.8 PG — SIGNIFICANT CHANGE UP (ref 27–34)
MCHC RBC-ENTMCNC: 27.9 PG — SIGNIFICANT CHANGE UP (ref 27–34)
MCHC RBC-ENTMCNC: 28 PG — SIGNIFICANT CHANGE UP (ref 27–34)
MCHC RBC-ENTMCNC: 28.1 PG — SIGNIFICANT CHANGE UP (ref 27–34)
MCHC RBC-ENTMCNC: 28.2 PG — SIGNIFICANT CHANGE UP (ref 27–34)
MCHC RBC-ENTMCNC: 28.3 PG — SIGNIFICANT CHANGE UP (ref 27–34)
MCHC RBC-ENTMCNC: 28.4 PG — SIGNIFICANT CHANGE UP (ref 27–34)
MCHC RBC-ENTMCNC: 28.6 PG — SIGNIFICANT CHANGE UP (ref 27–34)
MCHC RBC-ENTMCNC: 28.7 GM/DL — LOW (ref 32–36)
MCHC RBC-ENTMCNC: 28.8 GM/DL — LOW (ref 32–36)
MCHC RBC-ENTMCNC: 28.8 PG — SIGNIFICANT CHANGE UP (ref 27–34)
MCHC RBC-ENTMCNC: 28.9 GM/DL — LOW (ref 32–36)
MCHC RBC-ENTMCNC: 28.9 PG — SIGNIFICANT CHANGE UP (ref 27–34)
MCHC RBC-ENTMCNC: 28.9 PG — SIGNIFICANT CHANGE UP (ref 27–34)
MCHC RBC-ENTMCNC: 29 GM/DL — LOW (ref 32–36)
MCHC RBC-ENTMCNC: 29 PG — SIGNIFICANT CHANGE UP (ref 27–34)
MCHC RBC-ENTMCNC: 29.1 GM/DL — LOW (ref 32–36)
MCHC RBC-ENTMCNC: 29.1 GM/DL — LOW (ref 32–36)
MCHC RBC-ENTMCNC: 29.2 GM/DL — LOW (ref 32–36)
MCHC RBC-ENTMCNC: 29.3 GM/DL — LOW (ref 32–36)
MCHC RBC-ENTMCNC: 29.4 GM/DL — LOW (ref 32–36)
MCHC RBC-ENTMCNC: 29.5 GM/DL — LOW (ref 32–36)
MCHC RBC-ENTMCNC: 29.6 GM/DL — LOW (ref 32–36)
MCHC RBC-ENTMCNC: 29.6 GM/DL — LOW (ref 32–36)
MCHC RBC-ENTMCNC: 29.7 GM/DL — LOW (ref 32–36)
MCHC RBC-ENTMCNC: 29.7 GM/DL — LOW (ref 32–36)
MCHC RBC-ENTMCNC: 29.8 GM/DL — LOW (ref 32–36)
MCHC RBC-ENTMCNC: 29.8 GM/DL — LOW (ref 32–36)
MCHC RBC-ENTMCNC: 30 GM/DL — LOW (ref 32–36)
MCHC RBC-ENTMCNC: 30.1 GM/DL — LOW (ref 32–36)
MCHC RBC-ENTMCNC: 30.1 GM/DL — LOW (ref 32–36)
MCHC RBC-ENTMCNC: 30.2 GM/DL — LOW (ref 32–36)
MCHC RBC-ENTMCNC: 30.3 GM/DL — LOW (ref 32–36)
MCHC RBC-ENTMCNC: 30.4 GM/DL — LOW (ref 32–36)
MCHC RBC-ENTMCNC: 30.4 GM/DL — LOW (ref 32–36)
MCHC RBC-ENTMCNC: 30.5 GM/DL — LOW (ref 32–36)
MCHC RBC-ENTMCNC: 30.5 GM/DL — LOW (ref 32–36)
MCHC RBC-ENTMCNC: 30.6 GM/DL — LOW (ref 32–36)
MCHC RBC-ENTMCNC: 30.8 GM/DL — LOW (ref 32–36)
MCHC RBC-ENTMCNC: 30.9 GM/DL — LOW (ref 32–36)
MCHC RBC-ENTMCNC: 31 GM/DL — LOW (ref 32–36)
MCHC RBC-ENTMCNC: 31.1 GM/DL — LOW (ref 32–36)
MCHC RBC-ENTMCNC: 31.1 GM/DL — LOW (ref 32–36)
MCHC RBC-ENTMCNC: 31.2 GM/DL — LOW (ref 32–36)
MCHC RBC-ENTMCNC: 31.3 GM/DL — LOW (ref 32–36)
MCHC RBC-ENTMCNC: 31.3 GM/DL — LOW (ref 32–36)
MCHC RBC-ENTMCNC: 31.4 GM/DL — LOW (ref 32–36)
MCHC RBC-ENTMCNC: 31.5 GM/DL — LOW (ref 32–36)
MCHC RBC-ENTMCNC: 31.5 GM/DL — LOW (ref 32–36)
MCHC RBC-ENTMCNC: 31.8 GM/DL — LOW (ref 32–36)
MCHC RBC-ENTMCNC: 32 GM/DL — SIGNIFICANT CHANGE UP (ref 32–36)
MCHC RBC-ENTMCNC: 32.1 GM/DL — SIGNIFICANT CHANGE UP (ref 32–36)
MCV RBC AUTO: 100.9 FL — HIGH (ref 80–100)
MCV RBC AUTO: 104.3 FL — HIGH (ref 80–100)
MCV RBC AUTO: 80.7 FL — SIGNIFICANT CHANGE UP (ref 80–100)
MCV RBC AUTO: 81.9 FL — SIGNIFICANT CHANGE UP (ref 80–100)
MCV RBC AUTO: 82.6 FL — SIGNIFICANT CHANGE UP (ref 80–100)
MCV RBC AUTO: 82.9 FL — SIGNIFICANT CHANGE UP (ref 80–100)
MCV RBC AUTO: 83 FL — SIGNIFICANT CHANGE UP (ref 80–100)
MCV RBC AUTO: 83.1 FL — SIGNIFICANT CHANGE UP (ref 80–100)
MCV RBC AUTO: 83.2 FL — SIGNIFICANT CHANGE UP (ref 80–100)
MCV RBC AUTO: 83.3 FL — SIGNIFICANT CHANGE UP (ref 80–100)
MCV RBC AUTO: 83.3 FL — SIGNIFICANT CHANGE UP (ref 80–100)
MCV RBC AUTO: 84.4 FL — SIGNIFICANT CHANGE UP (ref 80–100)
MCV RBC AUTO: 84.6 FL — SIGNIFICANT CHANGE UP (ref 80–100)
MCV RBC AUTO: 85 FL — SIGNIFICANT CHANGE UP (ref 80–100)
MCV RBC AUTO: 86.1 FL — SIGNIFICANT CHANGE UP (ref 80–100)
MCV RBC AUTO: 86.1 FL — SIGNIFICANT CHANGE UP (ref 80–100)
MCV RBC AUTO: 87.2 FL — SIGNIFICANT CHANGE UP (ref 80–100)
MCV RBC AUTO: 87.2 FL — SIGNIFICANT CHANGE UP (ref 80–100)
MCV RBC AUTO: 87.7 FL — SIGNIFICANT CHANGE UP (ref 80–100)
MCV RBC AUTO: 87.8 FL — SIGNIFICANT CHANGE UP (ref 80–100)
MCV RBC AUTO: 87.8 FL — SIGNIFICANT CHANGE UP (ref 80–100)
MCV RBC AUTO: 88.2 FL — SIGNIFICANT CHANGE UP (ref 80–100)
MCV RBC AUTO: 88.6 FL — SIGNIFICANT CHANGE UP (ref 80–100)
MCV RBC AUTO: 88.6 FL — SIGNIFICANT CHANGE UP (ref 80–100)
MCV RBC AUTO: 88.8 FL — SIGNIFICANT CHANGE UP (ref 80–100)
MCV RBC AUTO: 89.2 FL — SIGNIFICANT CHANGE UP (ref 80–100)
MCV RBC AUTO: 89.5 FL — SIGNIFICANT CHANGE UP (ref 80–100)
MCV RBC AUTO: 89.7 FL — SIGNIFICANT CHANGE UP (ref 80–100)
MCV RBC AUTO: 89.7 FL — SIGNIFICANT CHANGE UP (ref 80–100)
MCV RBC AUTO: 89.9 FL — SIGNIFICANT CHANGE UP (ref 80–100)
MCV RBC AUTO: 90.1 FL — SIGNIFICANT CHANGE UP (ref 80–100)
MCV RBC AUTO: 90.4 FL — SIGNIFICANT CHANGE UP (ref 80–100)
MCV RBC AUTO: 90.8 FL — SIGNIFICANT CHANGE UP (ref 80–100)
MCV RBC AUTO: 90.8 FL — SIGNIFICANT CHANGE UP (ref 80–100)
MCV RBC AUTO: 90.9 FL — SIGNIFICANT CHANGE UP (ref 80–100)
MCV RBC AUTO: 91.1 FL — SIGNIFICANT CHANGE UP (ref 80–100)
MCV RBC AUTO: 91.2 FL — SIGNIFICANT CHANGE UP (ref 80–100)
MCV RBC AUTO: 91.4 FL — SIGNIFICANT CHANGE UP (ref 80–100)
MCV RBC AUTO: 91.7 FL — SIGNIFICANT CHANGE UP (ref 80–100)
MCV RBC AUTO: 91.9 FL — SIGNIFICANT CHANGE UP (ref 80–100)
MCV RBC AUTO: 91.9 FL — SIGNIFICANT CHANGE UP (ref 80–100)
MCV RBC AUTO: 92.2 FL — SIGNIFICANT CHANGE UP (ref 80–100)
MCV RBC AUTO: 92.4 FL — SIGNIFICANT CHANGE UP (ref 80–100)
MCV RBC AUTO: 92.8 FL — SIGNIFICANT CHANGE UP (ref 80–100)
MCV RBC AUTO: 93.7 FL — SIGNIFICANT CHANGE UP (ref 80–100)
MCV RBC AUTO: 93.9 FL — SIGNIFICANT CHANGE UP (ref 80–100)
MCV RBC AUTO: 94.1 FL — SIGNIFICANT CHANGE UP (ref 80–100)
MCV RBC AUTO: 94.5 FL — SIGNIFICANT CHANGE UP (ref 80–100)
MCV RBC AUTO: 94.5 FL — SIGNIFICANT CHANGE UP (ref 80–100)
MCV RBC AUTO: 94.6 FL — SIGNIFICANT CHANGE UP (ref 80–100)
MCV RBC AUTO: 94.7 FL — SIGNIFICANT CHANGE UP (ref 80–100)
MCV RBC AUTO: 95.3 FL — SIGNIFICANT CHANGE UP (ref 80–100)
MCV RBC AUTO: 95.5 FL — SIGNIFICANT CHANGE UP (ref 80–100)
MCV RBC AUTO: 95.9 FL — SIGNIFICANT CHANGE UP (ref 80–100)
MCV RBC AUTO: 96.1 FL — SIGNIFICANT CHANGE UP (ref 80–100)
MCV RBC AUTO: 96.3 FL — SIGNIFICANT CHANGE UP (ref 80–100)
MCV RBC AUTO: 96.5 FL — SIGNIFICANT CHANGE UP (ref 80–100)
MCV RBC AUTO: 96.6 FL — SIGNIFICANT CHANGE UP (ref 80–100)
MCV RBC AUTO: 96.6 FL — SIGNIFICANT CHANGE UP (ref 80–100)
MCV RBC AUTO: 96.7 FL — SIGNIFICANT CHANGE UP (ref 80–100)
MCV RBC AUTO: 96.8 FL — SIGNIFICANT CHANGE UP (ref 80–100)
MCV RBC AUTO: 97.3 FL — SIGNIFICANT CHANGE UP (ref 80–100)
MCV RBC AUTO: 97.3 FL — SIGNIFICANT CHANGE UP (ref 80–100)
MCV RBC AUTO: 98 FL — SIGNIFICANT CHANGE UP (ref 80–100)
MCV RBC AUTO: 98.8 FL — SIGNIFICANT CHANGE UP (ref 80–100)
MESOTHL CELL # FLD: 0 % — SIGNIFICANT CHANGE UP
METAMYELOCYTES # FLD: 0.9 % — SIGNIFICANT CHANGE UP (ref 0–1)
METHOD TYPE: SIGNIFICANT CHANGE UP
MICROCYTES BLD QL: SLIGHT — SIGNIFICANT CHANGE UP
MICROCYTES BLD QL: SLIGHT — SIGNIFICANT CHANGE UP
MONOCYTES # BLD AUTO: 0 K/UL — SIGNIFICANT CHANGE UP (ref 0–0.9)
MONOCYTES # BLD AUTO: 0 K/UL — SIGNIFICANT CHANGE UP (ref 0–0.9)
MONOCYTES # BLD AUTO: 0.49 K/UL — SIGNIFICANT CHANGE UP (ref 0–0.9)
MONOCYTES # BLD AUTO: 0.58 K/UL — SIGNIFICANT CHANGE UP (ref 0–0.9)
MONOCYTES # BLD AUTO: 0.71 K/UL — SIGNIFICANT CHANGE UP (ref 0–0.9)
MONOCYTES # BLD AUTO: 0.74 K/UL — SIGNIFICANT CHANGE UP (ref 0–0.9)
MONOCYTES # BLD AUTO: 0.86 K/UL — SIGNIFICANT CHANGE UP (ref 0–0.9)
MONOCYTES # BLD AUTO: 0.86 K/UL — SIGNIFICANT CHANGE UP (ref 0–0.9)
MONOCYTES # BLD AUTO: 0.88 K/UL — SIGNIFICANT CHANGE UP (ref 0–0.9)
MONOCYTES # BLD AUTO: 0.9 K/UL — SIGNIFICANT CHANGE UP (ref 0–0.9)
MONOCYTES # BLD AUTO: 0.95 K/UL — HIGH (ref 0–0.9)
MONOCYTES # BLD AUTO: 0.96 K/UL — HIGH (ref 0–0.9)
MONOCYTES # BLD AUTO: 0.97 K/UL — HIGH (ref 0–0.9)
MONOCYTES # BLD AUTO: 1 K/UL — HIGH (ref 0–0.9)
MONOCYTES # BLD AUTO: 1.04 K/UL — HIGH (ref 0–0.9)
MONOCYTES # BLD AUTO: 1.04 K/UL — HIGH (ref 0–0.9)
MONOCYTES # BLD AUTO: 1.1 K/UL — HIGH (ref 0–0.9)
MONOCYTES # BLD AUTO: 1.1 K/UL — HIGH (ref 0–0.9)
MONOCYTES # BLD AUTO: 1.13 K/UL — HIGH (ref 0–0.9)
MONOCYTES # BLD AUTO: 1.16 K/UL — HIGH (ref 0–0.9)
MONOCYTES # BLD AUTO: 1.17 K/UL — HIGH (ref 0–0.9)
MONOCYTES # BLD AUTO: 1.18 K/UL — HIGH (ref 0–0.9)
MONOCYTES # BLD AUTO: 1.18 K/UL — HIGH (ref 0–0.9)
MONOCYTES # BLD AUTO: 1.2 K/UL — HIGH (ref 0–0.9)
MONOCYTES # BLD AUTO: 1.2 K/UL — HIGH (ref 0–0.9)
MONOCYTES # BLD AUTO: 1.21 K/UL — HIGH (ref 0–0.9)
MONOCYTES # BLD AUTO: 1.22 K/UL — HIGH (ref 0–0.9)
MONOCYTES # BLD AUTO: 1.23 K/UL — HIGH (ref 0–0.9)
MONOCYTES # BLD AUTO: 1.24 K/UL — HIGH (ref 0–0.9)
MONOCYTES # BLD AUTO: 1.24 K/UL — HIGH (ref 0–0.9)
MONOCYTES # BLD AUTO: 1.26 K/UL — HIGH (ref 0–0.9)
MONOCYTES # BLD AUTO: 1.26 K/UL — HIGH (ref 0–0.9)
MONOCYTES # BLD AUTO: 1.29 K/UL — HIGH (ref 0–0.9)
MONOCYTES # BLD AUTO: 1.3 K/UL — HIGH (ref 0–0.9)
MONOCYTES # BLD AUTO: 1.32 K/UL — HIGH (ref 0–0.9)
MONOCYTES # BLD AUTO: 1.33 K/UL — HIGH (ref 0–0.9)
MONOCYTES # BLD AUTO: 1.35 K/UL — HIGH (ref 0–0.9)
MONOCYTES # BLD AUTO: 1.37 K/UL — HIGH (ref 0–0.9)
MONOCYTES # BLD AUTO: 1.38 K/UL — HIGH (ref 0–0.9)
MONOCYTES # BLD AUTO: 1.38 K/UL — HIGH (ref 0–0.9)
MONOCYTES # BLD AUTO: 1.41 K/UL — HIGH (ref 0–0.9)
MONOCYTES # BLD AUTO: 1.42 K/UL — HIGH (ref 0–0.9)
MONOCYTES # BLD AUTO: 1.42 K/UL — HIGH (ref 0–0.9)
MONOCYTES # BLD AUTO: 1.49 K/UL — HIGH (ref 0–0.9)
MONOCYTES # BLD AUTO: 1.52 K/UL — HIGH (ref 0–0.9)
MONOCYTES # BLD AUTO: 1.57 K/UL — HIGH (ref 0–0.9)
MONOCYTES # BLD AUTO: 1.57 K/UL — HIGH (ref 0–0.9)
MONOCYTES # BLD AUTO: 1.67 K/UL — HIGH (ref 0–0.9)
MONOCYTES # BLD AUTO: 1.74 K/UL — HIGH (ref 0–0.9)
MONOCYTES # BLD AUTO: 2.05 K/UL — HIGH (ref 0–0.9)
MONOCYTES # BLD AUTO: 2.19 K/UL — HIGH (ref 0–0.9)
MONOCYTES NFR BLD AUTO: 0 % — LOW (ref 2–14)
MONOCYTES NFR BLD AUTO: 0 % — LOW (ref 2–14)
MONOCYTES NFR BLD AUTO: 1.7 % — LOW (ref 2–14)
MONOCYTES NFR BLD AUTO: 1.7 % — LOW (ref 2–14)
MONOCYTES NFR BLD AUTO: 2.3 % — SIGNIFICANT CHANGE UP (ref 2–14)
MONOCYTES NFR BLD AUTO: 2.6 % — SIGNIFICANT CHANGE UP (ref 2–14)
MONOCYTES NFR BLD AUTO: 2.8 % — SIGNIFICANT CHANGE UP (ref 2–14)
MONOCYTES NFR BLD AUTO: 3 % — SIGNIFICANT CHANGE UP (ref 2–14)
MONOCYTES NFR BLD AUTO: 3.1 % — SIGNIFICANT CHANGE UP (ref 2–14)
MONOCYTES NFR BLD AUTO: 3.2 % — SIGNIFICANT CHANGE UP (ref 2–14)
MONOCYTES NFR BLD AUTO: 3.4 % — SIGNIFICANT CHANGE UP (ref 2–14)
MONOCYTES NFR BLD AUTO: 3.4 % — SIGNIFICANT CHANGE UP (ref 2–14)
MONOCYTES NFR BLD AUTO: 3.7 % — SIGNIFICANT CHANGE UP (ref 2–14)
MONOCYTES NFR BLD AUTO: 3.7 % — SIGNIFICANT CHANGE UP (ref 2–14)
MONOCYTES NFR BLD AUTO: 4 % — SIGNIFICANT CHANGE UP (ref 2–14)
MONOCYTES NFR BLD AUTO: 4 % — SIGNIFICANT CHANGE UP (ref 2–14)
MONOCYTES NFR BLD AUTO: 4.2 % — SIGNIFICANT CHANGE UP (ref 2–14)
MONOCYTES NFR BLD AUTO: 4.2 % — SIGNIFICANT CHANGE UP (ref 2–14)
MONOCYTES NFR BLD AUTO: 4.3 % — SIGNIFICANT CHANGE UP (ref 2–14)
MONOCYTES NFR BLD AUTO: 4.4 % — SIGNIFICANT CHANGE UP (ref 2–14)
MONOCYTES NFR BLD AUTO: 4.4 % — SIGNIFICANT CHANGE UP (ref 2–14)
MONOCYTES NFR BLD AUTO: 4.6 % — SIGNIFICANT CHANGE UP (ref 2–14)
MONOCYTES NFR BLD AUTO: 4.6 % — SIGNIFICANT CHANGE UP (ref 2–14)
MONOCYTES NFR BLD AUTO: 4.7 % — SIGNIFICANT CHANGE UP (ref 2–14)
MONOCYTES NFR BLD AUTO: 4.8 % — SIGNIFICANT CHANGE UP (ref 2–14)
MONOCYTES NFR BLD AUTO: 4.9 % — SIGNIFICANT CHANGE UP (ref 2–14)
MONOCYTES NFR BLD AUTO: 4.9 % — SIGNIFICANT CHANGE UP (ref 2–14)
MONOCYTES NFR BLD AUTO: 5 % — SIGNIFICANT CHANGE UP (ref 2–14)
MONOCYTES NFR BLD AUTO: 5.1 % — SIGNIFICANT CHANGE UP (ref 2–14)
MONOCYTES NFR BLD AUTO: 5.2 % — SIGNIFICANT CHANGE UP (ref 2–14)
MONOCYTES NFR BLD AUTO: 5.3 % — SIGNIFICANT CHANGE UP (ref 2–14)
MONOCYTES NFR BLD AUTO: 5.5 % — SIGNIFICANT CHANGE UP (ref 2–14)
MONOCYTES NFR BLD AUTO: 5.5 % — SIGNIFICANT CHANGE UP (ref 2–14)
MONOCYTES NFR BLD AUTO: 5.7 % — SIGNIFICANT CHANGE UP (ref 2–14)
MONOCYTES NFR BLD AUTO: 6 % — SIGNIFICANT CHANGE UP (ref 2–14)
MONOCYTES NFR BLD AUTO: 6.2 % — SIGNIFICANT CHANGE UP (ref 2–14)
MONOCYTES NFR BLD AUTO: 6.3 % — SIGNIFICANT CHANGE UP (ref 2–14)
MONOCYTES NFR BLD AUTO: 6.5 % — SIGNIFICANT CHANGE UP (ref 2–14)
MONOCYTES NFR BLD AUTO: 6.7 % — SIGNIFICANT CHANGE UP (ref 2–14)
MONOCYTES NFR BLD AUTO: 7.1 % — SIGNIFICANT CHANGE UP (ref 2–14)
MONOCYTES NFR BLD AUTO: 7.6 % — SIGNIFICANT CHANGE UP (ref 2–14)
MONOCYTES NFR BLD AUTO: 8.7 % — SIGNIFICANT CHANGE UP (ref 2–14)
MONOS+MACROS # FLD: 0 % — SIGNIFICANT CHANGE UP
MRSA PCR RESULT.: SIGNIFICANT CHANGE UP
MYELOCYTES NFR BLD: 0.9 % — HIGH (ref 0–0)
NEUTROPHILS # BLD AUTO: 15.07 K/UL — HIGH (ref 1.8–7.4)
NEUTROPHILS # BLD AUTO: 16.42 K/UL — HIGH (ref 1.8–7.4)
NEUTROPHILS # BLD AUTO: 18.11 K/UL — HIGH (ref 1.8–7.4)
NEUTROPHILS # BLD AUTO: 18.92 K/UL — HIGH (ref 1.8–7.4)
NEUTROPHILS # BLD AUTO: 19.04 K/UL — HIGH (ref 1.8–7.4)
NEUTROPHILS # BLD AUTO: 19.38 K/UL — HIGH (ref 1.8–7.4)
NEUTROPHILS # BLD AUTO: 20.03 K/UL — HIGH (ref 1.8–7.4)
NEUTROPHILS # BLD AUTO: 20.29 K/UL — HIGH (ref 1.8–7.4)
NEUTROPHILS # BLD AUTO: 20.33 K/UL — HIGH (ref 1.8–7.4)
NEUTROPHILS # BLD AUTO: 20.43 K/UL — HIGH (ref 1.8–7.4)
NEUTROPHILS # BLD AUTO: 20.96 K/UL — HIGH (ref 1.8–7.4)
NEUTROPHILS # BLD AUTO: 21.14 K/UL — HIGH (ref 1.8–7.4)
NEUTROPHILS # BLD AUTO: 21.16 K/UL — HIGH (ref 1.8–7.4)
NEUTROPHILS # BLD AUTO: 21.2 K/UL — HIGH (ref 1.8–7.4)
NEUTROPHILS # BLD AUTO: 21.42 K/UL — HIGH (ref 1.8–7.4)
NEUTROPHILS # BLD AUTO: 21.43 K/UL — HIGH (ref 1.8–7.4)
NEUTROPHILS # BLD AUTO: 21.45 K/UL — HIGH (ref 1.8–7.4)
NEUTROPHILS # BLD AUTO: 21.63 K/UL — HIGH (ref 1.8–7.4)
NEUTROPHILS # BLD AUTO: 21.7 K/UL — HIGH (ref 1.8–7.4)
NEUTROPHILS # BLD AUTO: 21.73 K/UL — HIGH (ref 1.8–7.4)
NEUTROPHILS # BLD AUTO: 21.86 K/UL — HIGH (ref 1.8–7.4)
NEUTROPHILS # BLD AUTO: 22.28 K/UL — HIGH (ref 1.8–7.4)
NEUTROPHILS # BLD AUTO: 22.35 K/UL — HIGH (ref 1.8–7.4)
NEUTROPHILS # BLD AUTO: 22.45 K/UL — HIGH (ref 1.8–7.4)
NEUTROPHILS # BLD AUTO: 22.64 K/UL — HIGH (ref 1.8–7.4)
NEUTROPHILS # BLD AUTO: 23 K/UL — HIGH (ref 1.8–7.4)
NEUTROPHILS # BLD AUTO: 23.57 K/UL — HIGH (ref 1.8–7.4)
NEUTROPHILS # BLD AUTO: 23.73 K/UL — HIGH (ref 1.8–7.4)
NEUTROPHILS # BLD AUTO: 24.12 K/UL — HIGH (ref 1.8–7.4)
NEUTROPHILS # BLD AUTO: 24.26 K/UL — HIGH (ref 1.8–7.4)
NEUTROPHILS # BLD AUTO: 24.43 K/UL — HIGH (ref 1.8–7.4)
NEUTROPHILS # BLD AUTO: 24.47 K/UL — HIGH (ref 1.8–7.4)
NEUTROPHILS # BLD AUTO: 24.49 K/UL — HIGH (ref 1.8–7.4)
NEUTROPHILS # BLD AUTO: 25.85 K/UL — HIGH (ref 1.8–7.4)
NEUTROPHILS # BLD AUTO: 26.11 K/UL — HIGH (ref 1.8–7.4)
NEUTROPHILS # BLD AUTO: 26.23 K/UL — HIGH (ref 1.8–7.4)
NEUTROPHILS # BLD AUTO: 26.68 K/UL — HIGH (ref 1.8–7.4)
NEUTROPHILS # BLD AUTO: 28.15 K/UL — HIGH (ref 1.8–7.4)
NEUTROPHILS # BLD AUTO: 28.95 K/UL — HIGH (ref 1.8–7.4)
NEUTROPHILS # BLD AUTO: 28.96 K/UL — HIGH (ref 1.8–7.4)
NEUTROPHILS # BLD AUTO: 29.17 K/UL — HIGH (ref 1.8–7.4)
NEUTROPHILS # BLD AUTO: 29.4 K/UL — HIGH (ref 1.8–7.4)
NEUTROPHILS # BLD AUTO: 29.68 K/UL — HIGH (ref 1.8–7.4)
NEUTROPHILS # BLD AUTO: 30.08 K/UL — HIGH (ref 1.8–7.4)
NEUTROPHILS # BLD AUTO: 30.36 K/UL — HIGH (ref 1.8–7.4)
NEUTROPHILS # BLD AUTO: 30.69 K/UL — HIGH (ref 1.8–7.4)
NEUTROPHILS # BLD AUTO: 31.79 K/UL — HIGH (ref 1.8–7.4)
NEUTROPHILS # BLD AUTO: 32.23 K/UL — HIGH (ref 1.8–7.4)
NEUTROPHILS # BLD AUTO: 32.75 K/UL — HIGH (ref 1.8–7.4)
NEUTROPHILS # BLD AUTO: 33.63 K/UL — HIGH (ref 1.8–7.4)
NEUTROPHILS # BLD AUTO: 42.05 K/UL — HIGH (ref 1.8–7.4)
NEUTROPHILS NFR BLD AUTO: 82.1 % — HIGH (ref 43–77)
NEUTROPHILS NFR BLD AUTO: 83.6 % — HIGH (ref 43–77)
NEUTROPHILS NFR BLD AUTO: 83.9 % — HIGH (ref 43–77)
NEUTROPHILS NFR BLD AUTO: 83.9 % — HIGH (ref 43–77)
NEUTROPHILS NFR BLD AUTO: 84.8 % — HIGH (ref 43–77)
NEUTROPHILS NFR BLD AUTO: 85 % — HIGH (ref 43–77)
NEUTROPHILS NFR BLD AUTO: 85.1 % — HIGH (ref 43–77)
NEUTROPHILS NFR BLD AUTO: 85.2 % — HIGH (ref 43–77)
NEUTROPHILS NFR BLD AUTO: 85.2 % — HIGH (ref 43–77)
NEUTROPHILS NFR BLD AUTO: 85.3 % — HIGH (ref 43–77)
NEUTROPHILS NFR BLD AUTO: 85.6 % — HIGH (ref 43–77)
NEUTROPHILS NFR BLD AUTO: 85.9 % — HIGH (ref 43–77)
NEUTROPHILS NFR BLD AUTO: 86.1 % — HIGH (ref 43–77)
NEUTROPHILS NFR BLD AUTO: 86.2 % — HIGH (ref 43–77)
NEUTROPHILS NFR BLD AUTO: 86.2 % — HIGH (ref 43–77)
NEUTROPHILS NFR BLD AUTO: 86.3 % — HIGH (ref 43–77)
NEUTROPHILS NFR BLD AUTO: 86.3 % — HIGH (ref 43–77)
NEUTROPHILS NFR BLD AUTO: 86.7 % — HIGH (ref 43–77)
NEUTROPHILS NFR BLD AUTO: 86.8 % — HIGH (ref 43–77)
NEUTROPHILS NFR BLD AUTO: 86.9 % — HIGH (ref 43–77)
NEUTROPHILS NFR BLD AUTO: 87 % — HIGH (ref 43–77)
NEUTROPHILS NFR BLD AUTO: 87.2 % — HIGH (ref 43–77)
NEUTROPHILS NFR BLD AUTO: 87.2 % — HIGH (ref 43–77)
NEUTROPHILS NFR BLD AUTO: 87.3 % — HIGH (ref 43–77)
NEUTROPHILS NFR BLD AUTO: 87.3 % — HIGH (ref 43–77)
NEUTROPHILS NFR BLD AUTO: 87.6 % — HIGH (ref 43–77)
NEUTROPHILS NFR BLD AUTO: 88.1 % — HIGH (ref 43–77)
NEUTROPHILS NFR BLD AUTO: 88.5 % — HIGH (ref 43–77)
NEUTROPHILS NFR BLD AUTO: 88.6 % — HIGH (ref 43–77)
NEUTROPHILS NFR BLD AUTO: 88.7 % — HIGH (ref 43–77)
NEUTROPHILS NFR BLD AUTO: 89 % — HIGH (ref 43–77)
NEUTROPHILS NFR BLD AUTO: 90.2 % — HIGH (ref 43–77)
NEUTROPHILS NFR BLD AUTO: 90.2 % — HIGH (ref 43–77)
NEUTROPHILS NFR BLD AUTO: 90.4 % — HIGH (ref 43–77)
NEUTROPHILS NFR BLD AUTO: 90.7 % — HIGH (ref 43–77)
NEUTROPHILS NFR BLD AUTO: 91.1 % — HIGH (ref 43–77)
NEUTROPHILS NFR BLD AUTO: 91.3 % — HIGH (ref 43–77)
NEUTROPHILS NFR BLD AUTO: 91.6 % — HIGH (ref 43–77)
NEUTROPHILS NFR BLD AUTO: 92.3 % — HIGH (ref 43–77)
NEUTROPHILS NFR BLD AUTO: 92.9 % — HIGH (ref 43–77)
NEUTROPHILS NFR BLD AUTO: 95.7 % — HIGH (ref 43–77)
NEUTROPHILS-BODY FLUID: 76 % — SIGNIFICANT CHANGE UP
NEUTS BAND # BLD: 1.7 % — SIGNIFICANT CHANGE UP (ref 0–6)
NEUTS BAND # BLD: 1.8 % — SIGNIFICANT CHANGE UP (ref 0–6)
NEUTS BAND # BLD: 3.5 % — SIGNIFICANT CHANGE UP (ref 0–6)
NITRITE UR-MCNC: NEGATIVE — SIGNIFICANT CHANGE UP
NRBC # BLD: 0 /100 WBCS — SIGNIFICANT CHANGE UP (ref 0–0)
NRBC # FLD: 0 K/UL — SIGNIFICANT CHANGE UP (ref 0–0)
NRBC # FLD: 0.02 K/UL — HIGH (ref 0–0)
NT-PROBNP SERPL-SCNC: 542 PG/ML — HIGH
ORGANISM # SPEC MICROSCOPIC CNT: SIGNIFICANT CHANGE UP
OSMOLALITY UR: 444 MOSM/KG — SIGNIFICANT CHANGE UP (ref 50–1200)
OSMOLALITY UR: 469 MOSM/KG — SIGNIFICANT CHANGE UP (ref 50–1200)
OTHER CELLS FLD MANUAL: 4 % — SIGNIFICANT CHANGE UP
OVALOCYTES BLD QL SMEAR: SLIGHT — SIGNIFICANT CHANGE UP
PCO2 BLDV: 39 MMHG — LOW (ref 42–55)
PCO2 BLDV: 41 MMHG — LOW (ref 42–55)
PCO2 BLDV: >125 MMHG — HIGH (ref 42–55)
PH BLDV: 7.34 — SIGNIFICANT CHANGE UP (ref 7.32–7.43)
PH BLDV: 7.35 — SIGNIFICANT CHANGE UP (ref 7.32–7.43)
PH BLDV: <6.9 — LOW (ref 7.32–7.43)
PH UR: 6 — SIGNIFICANT CHANGE UP (ref 5–8)
PH UR: 6 — SIGNIFICANT CHANGE UP (ref 5–8)
PH UR: 6.5 — SIGNIFICANT CHANGE UP (ref 5–8)
PH UR: 7 — SIGNIFICANT CHANGE UP (ref 5–8)
PH UR: 7.5 — SIGNIFICANT CHANGE UP (ref 5–8)
PH UR: 8 — SIGNIFICANT CHANGE UP (ref 5–8)
PHOSPHATE 24H UR-MCNC: 70.4 MG/DL — SIGNIFICANT CHANGE UP
PHOSPHATE SERPL-MCNC: 1 MG/DL — CRITICAL LOW (ref 2.5–4.5)
PHOSPHATE SERPL-MCNC: 1.1 MG/DL — LOW (ref 2.5–4.5)
PHOSPHATE SERPL-MCNC: 1.2 MG/DL — LOW (ref 2.5–4.5)
PHOSPHATE SERPL-MCNC: 1.3 MG/DL — LOW (ref 2.5–4.5)
PHOSPHATE SERPL-MCNC: 1.5 MG/DL — LOW (ref 2.5–4.5)
PHOSPHATE SERPL-MCNC: 1.6 MG/DL — LOW (ref 2.5–4.5)
PHOSPHATE SERPL-MCNC: 1.7 MG/DL — LOW (ref 2.5–4.5)
PHOSPHATE SERPL-MCNC: 1.7 MG/DL — LOW (ref 2.5–4.5)
PHOSPHATE SERPL-MCNC: 1.8 MG/DL — LOW (ref 2.5–4.5)
PHOSPHATE SERPL-MCNC: 1.9 MG/DL — LOW (ref 2.5–4.5)
PHOSPHATE SERPL-MCNC: 2 MG/DL — LOW (ref 2.5–4.5)
PHOSPHATE SERPL-MCNC: 2.1 MG/DL — LOW (ref 2.5–4.5)
PHOSPHATE SERPL-MCNC: 2.2 MG/DL — LOW (ref 2.5–4.5)
PHOSPHATE SERPL-MCNC: 2.2 MG/DL — LOW (ref 2.5–4.5)
PHOSPHATE SERPL-MCNC: 2.3 MG/DL — LOW (ref 2.5–4.5)
PHOSPHATE SERPL-MCNC: 2.4 MG/DL — LOW (ref 2.5–4.5)
PHOSPHATE SERPL-MCNC: 2.5 MG/DL — SIGNIFICANT CHANGE UP (ref 2.5–4.5)
PHOSPHATE SERPL-MCNC: 2.5 MG/DL — SIGNIFICANT CHANGE UP (ref 2.5–4.5)
PHOSPHATE SERPL-MCNC: 2.6 MG/DL — SIGNIFICANT CHANGE UP (ref 2.5–4.5)
PHOSPHATE SERPL-MCNC: 2.7 MG/DL — SIGNIFICANT CHANGE UP (ref 2.5–4.5)
PHOSPHATE SERPL-MCNC: 2.7 MG/DL — SIGNIFICANT CHANGE UP (ref 2.5–4.5)
PHOSPHATE SERPL-MCNC: 2.8 MG/DL — SIGNIFICANT CHANGE UP (ref 2.5–4.5)
PHOSPHATE SERPL-MCNC: 2.9 MG/DL — SIGNIFICANT CHANGE UP (ref 2.5–4.5)
PHOSPHATE SERPL-MCNC: 3 MG/DL — SIGNIFICANT CHANGE UP (ref 2.5–4.5)
PHOSPHATE SERPL-MCNC: 3.1 MG/DL — SIGNIFICANT CHANGE UP (ref 2.5–4.5)
PHOSPHATE SERPL-MCNC: 3.2 MG/DL — SIGNIFICANT CHANGE UP (ref 2.5–4.5)
PHOSPHATE SERPL-MCNC: 3.3 MG/DL — SIGNIFICANT CHANGE UP (ref 2.5–4.5)
PHOSPHATE SERPL-MCNC: 3.4 MG/DL — SIGNIFICANT CHANGE UP (ref 2.5–4.5)
PHOSPHATE SERPL-MCNC: 3.4 MG/DL — SIGNIFICANT CHANGE UP (ref 2.5–4.5)
PHOSPHATE SERPL-MCNC: 3.5 MG/DL — SIGNIFICANT CHANGE UP (ref 2.5–4.5)
PHOSPHATE SERPL-MCNC: 3.5 MG/DL — SIGNIFICANT CHANGE UP (ref 2.5–4.5)
PHOSPHATE SERPL-MCNC: 3.6 MG/DL — SIGNIFICANT CHANGE UP (ref 2.5–4.5)
PHOSPHATE SERPL-MCNC: 3.7 MG/DL — SIGNIFICANT CHANGE UP (ref 2.5–4.5)
PHOSPHATE SERPL-MCNC: 3.7 MG/DL — SIGNIFICANT CHANGE UP (ref 2.5–4.5)
PHOSPHATE SERPL-MCNC: 3.8 MG/DL — SIGNIFICANT CHANGE UP (ref 2.5–4.5)
PHOSPHATE SERPL-MCNC: 3.9 MG/DL — SIGNIFICANT CHANGE UP (ref 2.5–4.5)
PHOSPHATE SERPL-MCNC: 3.9 MG/DL — SIGNIFICANT CHANGE UP (ref 2.5–4.5)
PHOSPHATE SERPL-MCNC: 4.1 MG/DL — SIGNIFICANT CHANGE UP (ref 2.5–4.5)
PHOSPHATE SERPL-MCNC: 4.1 MG/DL — SIGNIFICANT CHANGE UP (ref 2.5–4.5)
PHOSPHATE SERPL-MCNC: 4.3 MG/DL — SIGNIFICANT CHANGE UP (ref 2.5–4.5)
PHOSPHATE SERPL-MCNC: 4.3 MG/DL — SIGNIFICANT CHANGE UP (ref 2.5–4.5)
PHOSPHATE SERPL-MCNC: 7.6 MG/DL — HIGH (ref 2.5–4.5)
PLAT MORPH BLD: ABNORMAL
PLAT MORPH BLD: NORMAL — SIGNIFICANT CHANGE UP
PLATELET # BLD AUTO: 519 K/UL — HIGH (ref 150–400)
PLATELET # BLD AUTO: 535 K/UL — HIGH (ref 150–400)
PLATELET # BLD AUTO: 540 K/UL — HIGH (ref 150–400)
PLATELET # BLD AUTO: 549 K/UL — HIGH (ref 150–400)
PLATELET # BLD AUTO: 568 K/UL — HIGH (ref 150–400)
PLATELET # BLD AUTO: 568 K/UL — HIGH (ref 150–400)
PLATELET # BLD AUTO: 569 K/UL — HIGH (ref 150–400)
PLATELET # BLD AUTO: 572 K/UL — HIGH (ref 150–400)
PLATELET # BLD AUTO: 574 K/UL — HIGH (ref 150–400)
PLATELET # BLD AUTO: 577 K/UL — HIGH (ref 150–400)
PLATELET # BLD AUTO: 578 K/UL — HIGH (ref 150–400)
PLATELET # BLD AUTO: 579 K/UL — HIGH (ref 150–400)
PLATELET # BLD AUTO: 581 K/UL — HIGH (ref 150–400)
PLATELET # BLD AUTO: 582 K/UL — HIGH (ref 150–400)
PLATELET # BLD AUTO: 585 K/UL — HIGH (ref 150–400)
PLATELET # BLD AUTO: 587 K/UL — HIGH (ref 150–400)
PLATELET # BLD AUTO: 591 K/UL — HIGH (ref 150–400)
PLATELET # BLD AUTO: 599 K/UL — HIGH (ref 150–400)
PLATELET # BLD AUTO: 604 K/UL — HIGH (ref 150–400)
PLATELET # BLD AUTO: 606 K/UL — HIGH (ref 150–400)
PLATELET # BLD AUTO: 607 K/UL — HIGH (ref 150–400)
PLATELET # BLD AUTO: 609 K/UL — HIGH (ref 150–400)
PLATELET # BLD AUTO: 610 K/UL — HIGH (ref 150–400)
PLATELET # BLD AUTO: 623 K/UL — HIGH (ref 150–400)
PLATELET # BLD AUTO: 627 K/UL — HIGH (ref 150–400)
PLATELET # BLD AUTO: 628 K/UL — HIGH (ref 150–400)
PLATELET # BLD AUTO: 630 K/UL — HIGH (ref 150–400)
PLATELET # BLD AUTO: 633 K/UL — HIGH (ref 150–400)
PLATELET # BLD AUTO: 638 K/UL — HIGH (ref 150–400)
PLATELET # BLD AUTO: 643 K/UL — HIGH (ref 150–400)
PLATELET # BLD AUTO: 643 K/UL — HIGH (ref 150–400)
PLATELET # BLD AUTO: 646 K/UL — HIGH (ref 150–400)
PLATELET # BLD AUTO: 647 K/UL — HIGH (ref 150–400)
PLATELET # BLD AUTO: 650 K/UL — HIGH (ref 150–400)
PLATELET # BLD AUTO: 654 K/UL — HIGH (ref 150–400)
PLATELET # BLD AUTO: 654 K/UL — HIGH (ref 150–400)
PLATELET # BLD AUTO: 658 K/UL — HIGH (ref 150–400)
PLATELET # BLD AUTO: 661 K/UL — HIGH (ref 150–400)
PLATELET # BLD AUTO: 663 K/UL — HIGH (ref 150–400)
PLATELET # BLD AUTO: 669 K/UL — HIGH (ref 150–400)
PLATELET # BLD AUTO: 671 K/UL — HIGH (ref 150–400)
PLATELET # BLD AUTO: 671 K/UL — HIGH (ref 150–400)
PLATELET # BLD AUTO: 672 K/UL — HIGH (ref 150–400)
PLATELET # BLD AUTO: 677 K/UL — HIGH (ref 150–400)
PLATELET # BLD AUTO: 679 K/UL — HIGH (ref 150–400)
PLATELET # BLD AUTO: 680 K/UL — HIGH (ref 150–400)
PLATELET # BLD AUTO: 681 K/UL — HIGH (ref 150–400)
PLATELET # BLD AUTO: 683 K/UL — HIGH (ref 150–400)
PLATELET # BLD AUTO: 684 K/UL — HIGH (ref 150–400)
PLATELET # BLD AUTO: 686 K/UL — HIGH (ref 150–400)
PLATELET # BLD AUTO: 688 K/UL — HIGH (ref 150–400)
PLATELET # BLD AUTO: 693 K/UL — HIGH (ref 150–400)
PLATELET # BLD AUTO: 696 K/UL — HIGH (ref 150–400)
PLATELET # BLD AUTO: 700 K/UL — HIGH (ref 150–400)
PLATELET # BLD AUTO: 713 K/UL — HIGH (ref 150–400)
PLATELET # BLD AUTO: 721 K/UL — HIGH (ref 150–400)
PLATELET # BLD AUTO: 735 K/UL — HIGH (ref 150–400)
PLATELET # BLD AUTO: 741 K/UL — HIGH (ref 150–400)
PLATELET # BLD AUTO: 750 K/UL — HIGH (ref 150–400)
PLATELET # BLD AUTO: 754 K/UL — HIGH (ref 150–400)
PLATELET # BLD AUTO: 757 K/UL — HIGH (ref 150–400)
PLATELET # BLD AUTO: 761 K/UL — HIGH (ref 150–400)
PLATELET # BLD AUTO: 773 K/UL — HIGH (ref 150–400)
PLATELET # BLD AUTO: 774 K/UL — HIGH (ref 150–400)
PLATELET # BLD AUTO: 818 K/UL — HIGH (ref 150–400)
PLATELET COUNT - ESTIMATE: ABNORMAL
PO2 BLDV: 26 MMHG — SIGNIFICANT CHANGE UP
PO2 BLDV: 34 MMHG — SIGNIFICANT CHANGE UP
PO2 BLDV: 73 MMHG — HIGH (ref 25–45)
POIKILOCYTOSIS BLD QL AUTO: SLIGHT — SIGNIFICANT CHANGE UP
POLYCHROMASIA BLD QL SMEAR: SIGNIFICANT CHANGE UP
POLYCHROMASIA BLD QL SMEAR: SLIGHT — SIGNIFICANT CHANGE UP
POTASSIUM BLDV-SCNC: 3.8 MMOL/L — SIGNIFICANT CHANGE UP (ref 3.5–5.1)
POTASSIUM BLDV-SCNC: 3.9 MMOL/L — SIGNIFICANT CHANGE UP (ref 3.5–5.1)
POTASSIUM BLDV-SCNC: 5 MMOL/L — SIGNIFICANT CHANGE UP (ref 3.5–5.1)
POTASSIUM SERPL-MCNC: 3.4 MMOL/L — LOW (ref 3.5–5.3)
POTASSIUM SERPL-MCNC: 3.6 MMOL/L — SIGNIFICANT CHANGE UP (ref 3.5–5.3)
POTASSIUM SERPL-MCNC: 3.7 MMOL/L — SIGNIFICANT CHANGE UP (ref 3.5–5.3)
POTASSIUM SERPL-MCNC: 3.8 MMOL/L — SIGNIFICANT CHANGE UP (ref 3.5–5.3)
POTASSIUM SERPL-MCNC: 3.9 MMOL/L — SIGNIFICANT CHANGE UP (ref 3.5–5.3)
POTASSIUM SERPL-MCNC: 4 MMOL/L — SIGNIFICANT CHANGE UP (ref 3.5–5.3)
POTASSIUM SERPL-MCNC: 4.1 MMOL/L — SIGNIFICANT CHANGE UP (ref 3.5–5.3)
POTASSIUM SERPL-MCNC: 4.2 MMOL/L — SIGNIFICANT CHANGE UP (ref 3.5–5.3)
POTASSIUM SERPL-MCNC: 4.3 MMOL/L — SIGNIFICANT CHANGE UP (ref 3.5–5.3)
POTASSIUM SERPL-MCNC: 4.4 MMOL/L — SIGNIFICANT CHANGE UP (ref 3.5–5.3)
POTASSIUM SERPL-MCNC: 4.5 MMOL/L — SIGNIFICANT CHANGE UP (ref 3.5–5.3)
POTASSIUM SERPL-MCNC: 4.6 MMOL/L — SIGNIFICANT CHANGE UP (ref 3.5–5.3)
POTASSIUM SERPL-MCNC: 4.7 MMOL/L — SIGNIFICANT CHANGE UP (ref 3.5–5.3)
POTASSIUM SERPL-MCNC: 4.8 MMOL/L — SIGNIFICANT CHANGE UP (ref 3.5–5.3)
POTASSIUM SERPL-MCNC: 4.9 MMOL/L — SIGNIFICANT CHANGE UP (ref 3.5–5.3)
POTASSIUM SERPL-MCNC: 4.9 MMOL/L — SIGNIFICANT CHANGE UP (ref 3.5–5.3)
POTASSIUM SERPL-MCNC: 5 MMOL/L — SIGNIFICANT CHANGE UP (ref 3.5–5.3)
POTASSIUM SERPL-MCNC: SIGNIFICANT CHANGE UP MMOL/L (ref 3.5–5.3)
POTASSIUM SERPL-SCNC: 3.4 MMOL/L — LOW (ref 3.5–5.3)
POTASSIUM SERPL-SCNC: 3.6 MMOL/L — SIGNIFICANT CHANGE UP (ref 3.5–5.3)
POTASSIUM SERPL-SCNC: 3.7 MMOL/L — SIGNIFICANT CHANGE UP (ref 3.5–5.3)
POTASSIUM SERPL-SCNC: 3.8 MMOL/L — SIGNIFICANT CHANGE UP (ref 3.5–5.3)
POTASSIUM SERPL-SCNC: 3.9 MMOL/L — SIGNIFICANT CHANGE UP (ref 3.5–5.3)
POTASSIUM SERPL-SCNC: 4 MMOL/L — SIGNIFICANT CHANGE UP (ref 3.5–5.3)
POTASSIUM SERPL-SCNC: 4.1 MMOL/L — SIGNIFICANT CHANGE UP (ref 3.5–5.3)
POTASSIUM SERPL-SCNC: 4.2 MMOL/L — SIGNIFICANT CHANGE UP (ref 3.5–5.3)
POTASSIUM SERPL-SCNC: 4.3 MMOL/L — SIGNIFICANT CHANGE UP (ref 3.5–5.3)
POTASSIUM SERPL-SCNC: 4.4 MMOL/L — SIGNIFICANT CHANGE UP (ref 3.5–5.3)
POTASSIUM SERPL-SCNC: 4.5 MMOL/L — SIGNIFICANT CHANGE UP (ref 3.5–5.3)
POTASSIUM SERPL-SCNC: 4.6 MMOL/L — SIGNIFICANT CHANGE UP (ref 3.5–5.3)
POTASSIUM SERPL-SCNC: 4.7 MMOL/L — SIGNIFICANT CHANGE UP (ref 3.5–5.3)
POTASSIUM SERPL-SCNC: 4.8 MMOL/L — SIGNIFICANT CHANGE UP (ref 3.5–5.3)
POTASSIUM SERPL-SCNC: 4.9 MMOL/L — SIGNIFICANT CHANGE UP (ref 3.5–5.3)
POTASSIUM SERPL-SCNC: 4.9 MMOL/L — SIGNIFICANT CHANGE UP (ref 3.5–5.3)
POTASSIUM SERPL-SCNC: 5 MMOL/L — SIGNIFICANT CHANGE UP (ref 3.5–5.3)
POTASSIUM SERPL-SCNC: SIGNIFICANT CHANGE UP MMOL/L (ref 3.5–5.3)
POTASSIUM UR-SCNC: 36.5 MMOL/L — SIGNIFICANT CHANGE UP
POTASSIUM UR-SCNC: 52.9 MMOL/L — SIGNIFICANT CHANGE UP
PROCALCITONIN SERPL-MCNC: 0.4 NG/ML — HIGH (ref 0.02–0.1)
PROCALCITONIN SERPL-MCNC: 0.52 NG/ML — HIGH (ref 0.02–0.1)
PROCALCITONIN SERPL-MCNC: 0.68 NG/ML — HIGH (ref 0.02–0.1)
PROCALCITONIN SERPL-MCNC: 1.69 NG/ML — HIGH (ref 0.02–0.1)
PROCALCITONIN SERPL-MCNC: 1.95 NG/ML — HIGH (ref 0.02–0.1)
PROCALCITONIN SERPL-MCNC: 2.04 NG/ML — HIGH (ref 0.02–0.1)
PROCALCITONIN SERPL-MCNC: 5.68 NG/ML — HIGH (ref 0.02–0.1)
PROCALCITONIN SERPL-MCNC: 5.79 NG/ML — HIGH (ref 0.02–0.1)
PROT ?TM UR-MCNC: 184 MG/DL — SIGNIFICANT CHANGE UP
PROT ?TM UR-MCNC: 60 MG/DL — SIGNIFICANT CHANGE UP
PROT PATTERN SERPL ELPH-IMP: SIGNIFICANT CHANGE UP
PROT SERPL-MCNC: 6.2 G/DL — SIGNIFICANT CHANGE UP (ref 6–8.3)
PROT SERPL-MCNC: 6.2 G/DL — SIGNIFICANT CHANGE UP (ref 6–8.3)
PROT SERPL-MCNC: 6.3 G/DL — SIGNIFICANT CHANGE UP (ref 6–8.3)
PROT SERPL-MCNC: 6.4 G/DL — SIGNIFICANT CHANGE UP (ref 6–8.3)
PROT SERPL-MCNC: 6.4 G/DL — SIGNIFICANT CHANGE UP (ref 6–8.3)
PROT SERPL-MCNC: 6.5 G/DL — SIGNIFICANT CHANGE UP (ref 6–8.3)
PROT SERPL-MCNC: 6.6 G/DL — SIGNIFICANT CHANGE UP (ref 6–8.3)
PROT SERPL-MCNC: 6.7 G/DL — SIGNIFICANT CHANGE UP (ref 6–8.3)
PROT SERPL-MCNC: 6.8 G/DL — SIGNIFICANT CHANGE UP (ref 6–8.3)
PROT SERPL-MCNC: 6.9 G/DL — SIGNIFICANT CHANGE UP (ref 6–8.3)
PROT SERPL-MCNC: 6.9 G/DL — SIGNIFICANT CHANGE UP (ref 6–8.3)
PROT SERPL-MCNC: 7 G/DL — SIGNIFICANT CHANGE UP (ref 6–8.3)
PROT SERPL-MCNC: 7 G/DL — SIGNIFICANT CHANGE UP (ref 6–8.3)
PROT SERPL-MCNC: 7.1 G/DL — SIGNIFICANT CHANGE UP (ref 6–8.3)
PROT SERPL-MCNC: 7.2 G/DL — SIGNIFICANT CHANGE UP (ref 6–8.3)
PROT SERPL-MCNC: 7.3 G/DL — SIGNIFICANT CHANGE UP (ref 6–8.3)
PROT SERPL-MCNC: 7.4 G/DL — SIGNIFICANT CHANGE UP (ref 6–8.3)
PROT SERPL-MCNC: 7.5 G/DL — SIGNIFICANT CHANGE UP
PROT SERPL-MCNC: 7.5 G/DL — SIGNIFICANT CHANGE UP (ref 6–8.3)
PROT SERPL-MCNC: 7.5 G/DL — SIGNIFICANT CHANGE UP (ref 6–8.3)
PROT SERPL-MCNC: 7.6 G/DL — SIGNIFICANT CHANGE UP (ref 6–8.3)
PROT SERPL-MCNC: 7.6 G/DL — SIGNIFICANT CHANGE UP (ref 6–8.3)
PROT SERPL-MCNC: 7.7 G/DL — SIGNIFICANT CHANGE UP (ref 6–8.3)
PROT SERPL-MCNC: 7.9 G/DL — SIGNIFICANT CHANGE UP (ref 6–8.3)
PROT SERPL-MCNC: 8 G/DL — SIGNIFICANT CHANGE UP (ref 6–8.3)
PROT UR-MCNC: ABNORMAL
PROT/CREAT UR-RTO: 1.4 RATIO — HIGH (ref 0–0.2)
PROTHROM AB SERPL-ACNC: 15 SEC — HIGH (ref 10.5–13.4)
PROTHROM AB SERPL-ACNC: 15.2 SEC — HIGH (ref 10.5–13.4)
PROTHROM AB SERPL-ACNC: 15.4 SEC — HIGH (ref 10.5–13.4)
PROTHROM AB SERPL-ACNC: 15.5 SEC — HIGH (ref 10.5–13.4)
PROTHROM AB SERPL-ACNC: 15.8 SEC — HIGH (ref 10.5–13.4)
PROTHROM AB SERPL-ACNC: 15.9 SEC — HIGH (ref 10.5–13.4)
PROTHROM AB SERPL-ACNC: 16.1 SEC — HIGH (ref 10.5–13.4)
PROTHROM AB SERPL-ACNC: 16.6 SEC — HIGH (ref 10.5–13.4)
PTH RELATED PROT SERPL-MCNC: 2.5 PMOL/L — SIGNIFICANT CHANGE UP
PTH RELATED PROT SERPL-MCNC: <2 PMOL/L — SIGNIFICANT CHANGE UP
PTH RELATED PROT SERPL-MCNC: <2 PMOL/L — SIGNIFICANT CHANGE UP
PTH-INTACT FLD-MCNC: 15 PG/ML — SIGNIFICANT CHANGE UP (ref 15–65)
PTH-INTACT FLD-MCNC: 5 PG/ML — LOW (ref 15–65)
PTH-INTACT FLD-MCNC: 6 PG/ML — LOW (ref 15–65)
RAPID RVP RESULT: SIGNIFICANT CHANGE UP
RBC # BLD: 2.42 M/UL — LOW (ref 4.2–5.8)
RBC # BLD: 2.43 M/UL — LOW (ref 4.2–5.8)
RBC # BLD: 2.45 M/UL — LOW (ref 4.2–5.8)
RBC # BLD: 2.5 M/UL — LOW (ref 4.2–5.8)
RBC # BLD: 2.52 M/UL — LOW (ref 4.2–5.8)
RBC # BLD: 2.6 M/UL — LOW (ref 4.2–5.8)
RBC # BLD: 2.61 M/UL — LOW (ref 4.2–5.8)
RBC # BLD: 2.63 M/UL — LOW (ref 4.2–5.8)
RBC # BLD: 2.68 M/UL — LOW (ref 4.2–5.8)
RBC # BLD: 2.7 M/UL — LOW (ref 4.2–5.8)
RBC # BLD: 2.79 M/UL — LOW (ref 4.2–5.8)
RBC # BLD: 2.8 M/UL — LOW (ref 4.2–5.8)
RBC # BLD: 2.81 M/UL — LOW (ref 4.2–5.8)
RBC # BLD: 2.83 M/UL — LOW (ref 4.2–5.8)
RBC # BLD: 2.83 M/UL — LOW (ref 4.2–5.8)
RBC # BLD: 2.85 M/UL — LOW (ref 4.2–5.8)
RBC # BLD: 2.86 M/UL — LOW (ref 4.2–5.8)
RBC # BLD: 2.87 M/UL — LOW (ref 4.2–5.8)
RBC # BLD: 2.87 M/UL — LOW (ref 4.2–5.8)
RBC # BLD: 2.88 M/UL — LOW (ref 4.2–5.8)
RBC # BLD: 2.9 M/UL — LOW (ref 4.2–5.8)
RBC # BLD: 2.9 M/UL — LOW (ref 4.2–5.8)
RBC # BLD: 2.92 M/UL — LOW (ref 4.2–5.8)
RBC # BLD: 2.93 M/UL — LOW (ref 4.2–5.8)
RBC # BLD: 2.93 M/UL — LOW (ref 4.2–5.8)
RBC # BLD: 2.94 M/UL — LOW (ref 4.2–5.8)
RBC # BLD: 2.96 M/UL — LOW (ref 4.2–5.8)
RBC # BLD: 2.96 M/UL — LOW (ref 4.2–5.8)
RBC # BLD: 2.97 M/UL — LOW (ref 4.2–5.8)
RBC # BLD: 3 M/UL — LOW (ref 4.2–5.8)
RBC # BLD: 3.01 M/UL — LOW (ref 4.2–5.8)
RBC # BLD: 3.02 M/UL — LOW (ref 4.2–5.8)
RBC # BLD: 3.04 M/UL — LOW (ref 4.2–5.8)
RBC # BLD: 3.06 M/UL — LOW (ref 4.2–5.8)
RBC # BLD: 3.06 M/UL — LOW (ref 4.2–5.8)
RBC # BLD: 3.08 M/UL — LOW (ref 4.2–5.8)
RBC # BLD: 3.11 M/UL — LOW (ref 4.2–5.8)
RBC # BLD: 3.12 M/UL — LOW (ref 4.2–5.8)
RBC # BLD: 3.12 M/UL — LOW (ref 4.2–5.8)
RBC # BLD: 3.13 M/UL — LOW (ref 4.2–5.8)
RBC # BLD: 3.14 M/UL — LOW (ref 4.2–5.8)
RBC # BLD: 3.16 M/UL — LOW (ref 4.2–5.8)
RBC # BLD: 3.2 M/UL — LOW (ref 4.2–5.8)
RBC # BLD: 3.23 M/UL — LOW (ref 4.2–5.8)
RBC # BLD: 3.24 M/UL — LOW (ref 4.2–5.8)
RBC # BLD: 3.25 M/UL — LOW (ref 4.2–5.8)
RBC # BLD: 3.27 M/UL — LOW (ref 4.2–5.8)
RBC # BLD: 3.31 M/UL — LOW (ref 4.2–5.8)
RBC # BLD: 3.32 M/UL — LOW (ref 4.2–5.8)
RBC # BLD: 3.34 M/UL — LOW (ref 4.2–5.8)
RBC # BLD: 3.34 M/UL — LOW (ref 4.2–5.8)
RBC # BLD: 3.35 M/UL — LOW (ref 4.2–5.8)
RBC # BLD: 3.35 M/UL — LOW (ref 4.2–5.8)
RBC # BLD: 3.37 M/UL — LOW (ref 4.2–5.8)
RBC # BLD: 3.42 M/UL — LOW (ref 4.2–5.8)
RBC # BLD: 3.46 M/UL — LOW (ref 4.2–5.8)
RBC # BLD: 3.47 M/UL — LOW (ref 4.2–5.8)
RBC # BLD: 3.52 M/UL — LOW (ref 4.2–5.8)
RBC # BLD: 3.56 M/UL — LOW (ref 4.2–5.8)
RBC # BLD: 3.82 M/UL — LOW (ref 4.2–5.8)
RBC # BLD: 3.87 M/UL — LOW (ref 4.2–5.8)
RBC # FLD: 16.7 % — HIGH (ref 10.3–14.5)
RBC # FLD: 16.9 % — HIGH (ref 10.3–14.5)
RBC # FLD: 17 % — HIGH (ref 10.3–14.5)
RBC # FLD: 17.4 % — HIGH (ref 10.3–14.5)
RBC # FLD: 17.5 % — HIGH (ref 10.3–14.5)
RBC # FLD: 17.5 % — HIGH (ref 10.3–14.5)
RBC # FLD: 17.6 % — HIGH (ref 10.3–14.5)
RBC # FLD: 17.7 % — HIGH (ref 10.3–14.5)
RBC # FLD: 17.7 % — HIGH (ref 10.3–14.5)
RBC # FLD: 17.9 % — HIGH (ref 10.3–14.5)
RBC # FLD: 18 % — HIGH (ref 10.3–14.5)
RBC # FLD: 18.1 % — HIGH (ref 10.3–14.5)
RBC # FLD: 18.2 % — HIGH (ref 10.3–14.5)
RBC # FLD: 18.3 % — HIGH (ref 10.3–14.5)
RBC # FLD: 18.3 % — HIGH (ref 10.3–14.5)
RBC # FLD: 18.4 % — HIGH (ref 10.3–14.5)
RBC # FLD: 18.5 % — HIGH (ref 10.3–14.5)
RBC # FLD: 18.6 % — HIGH (ref 10.3–14.5)
RBC # FLD: 18.7 % — HIGH (ref 10.3–14.5)
RBC # FLD: 18.8 % — HIGH (ref 10.3–14.5)
RBC # FLD: 18.9 % — HIGH (ref 10.3–14.5)
RBC # FLD: 19 % — HIGH (ref 10.3–14.5)
RBC # FLD: 19.1 % — HIGH (ref 10.3–14.5)
RBC # FLD: 19.2 % — HIGH (ref 10.3–14.5)
RBC # FLD: 19.3 % — HIGH (ref 10.3–14.5)
RBC # FLD: 19.3 % — HIGH (ref 10.3–14.5)
RBC # FLD: 19.4 % — HIGH (ref 10.3–14.5)
RBC # FLD: 19.4 % — HIGH (ref 10.3–14.5)
RBC # FLD: 19.5 % — HIGH (ref 10.3–14.5)
RBC # FLD: 19.6 % — HIGH (ref 10.3–14.5)
RBC # FLD: 19.8 % — HIGH (ref 10.3–14.5)
RBC BLD AUTO: ABNORMAL
RBC BLD AUTO: NORMAL — SIGNIFICANT CHANGE UP
RBC BLD AUTO: NORMAL — SIGNIFICANT CHANGE UP
RBC CASTS # UR COMP ASSIST: 0 /HPF — SIGNIFICANT CHANGE UP (ref 0–4)
RBC CASTS # UR COMP ASSIST: 2 /HPF — SIGNIFICANT CHANGE UP (ref 0–4)
RBC CASTS # UR COMP ASSIST: 2 /HPF — SIGNIFICANT CHANGE UP (ref 0–4)
RBC CASTS # UR COMP ASSIST: 4 /HPF — SIGNIFICANT CHANGE UP (ref 0–4)
RBC CASTS # UR COMP ASSIST: 4 /HPF — SIGNIFICANT CHANGE UP (ref 0–4)
RBC CASTS # UR COMP ASSIST: 5 /HPF — HIGH (ref 0–4)
RBC CASTS # UR COMP ASSIST: 6 /HPF — HIGH (ref 0–4)
RBC CASTS # UR COMP ASSIST: SIGNIFICANT CHANGE UP /HPF (ref 0–4)
RCV VOL RI: HIGH CELLS/UL (ref 0–5)
RH IG SCN BLD-IMP: POSITIVE — SIGNIFICANT CHANGE UP
RSV RNA SPEC QL NAA+PROBE: SIGNIFICANT CHANGE UP
RV+EV RNA SPEC QL NAA+PROBE: SIGNIFICANT CHANGE UP
S AUREUS DNA NOSE QL NAA+PROBE: SIGNIFICANT CHANGE UP
SAO2 % BLDV: 29.6 % — SIGNIFICANT CHANGE UP
SAO2 % BLDV: 49.4 % — SIGNIFICANT CHANGE UP
SAO2 % BLDV: 90.8 % — HIGH (ref 67–88)
SARS-COV-2 RNA SPEC QL NAA+PROBE: SIGNIFICANT CHANGE UP
SODIUM SERPL-SCNC: 122 MMOL/L — LOW (ref 135–145)
SODIUM SERPL-SCNC: 126 MMOL/L — LOW (ref 135–145)
SODIUM SERPL-SCNC: 126 MMOL/L — LOW (ref 135–145)
SODIUM SERPL-SCNC: 127 MMOL/L — LOW (ref 135–145)
SODIUM SERPL-SCNC: 130 MMOL/L — LOW (ref 135–145)
SODIUM SERPL-SCNC: 131 MMOL/L — LOW (ref 135–145)
SODIUM SERPL-SCNC: 132 MMOL/L — LOW (ref 135–145)
SODIUM SERPL-SCNC: 133 MMOL/L — LOW (ref 135–145)
SODIUM SERPL-SCNC: 134 MMOL/L — LOW (ref 135–145)
SODIUM SERPL-SCNC: 135 MMOL/L — SIGNIFICANT CHANGE UP (ref 135–145)
SODIUM SERPL-SCNC: 136 MMOL/L — SIGNIFICANT CHANGE UP (ref 135–145)
SODIUM SERPL-SCNC: 137 MMOL/L — SIGNIFICANT CHANGE UP (ref 135–145)
SODIUM SERPL-SCNC: 138 MMOL/L — SIGNIFICANT CHANGE UP (ref 135–145)
SODIUM SERPL-SCNC: 139 MMOL/L — SIGNIFICANT CHANGE UP (ref 135–145)
SODIUM SERPL-SCNC: 140 MMOL/L — SIGNIFICANT CHANGE UP (ref 135–145)
SODIUM SERPL-SCNC: 141 MMOL/L — SIGNIFICANT CHANGE UP (ref 135–145)
SODIUM SERPL-SCNC: 142 MMOL/L — SIGNIFICANT CHANGE UP (ref 135–145)
SODIUM SERPL-SCNC: 143 MMOL/L — SIGNIFICANT CHANGE UP (ref 135–145)
SODIUM SERPL-SCNC: 143 MMOL/L — SIGNIFICANT CHANGE UP (ref 135–145)
SODIUM SERPL-SCNC: 144 MMOL/L — SIGNIFICANT CHANGE UP (ref 135–145)
SODIUM SERPL-SCNC: 144 MMOL/L — SIGNIFICANT CHANGE UP (ref 135–145)
SODIUM SERPL-SCNC: 145 MMOL/L — SIGNIFICANT CHANGE UP (ref 135–145)
SODIUM SERPL-SCNC: 147 MMOL/L — HIGH (ref 135–145)
SODIUM SERPL-SCNC: 148 MMOL/L — HIGH (ref 135–145)
SODIUM SERPL-SCNC: 151 MMOL/L — HIGH (ref 135–145)
SODIUM SERPL-SCNC: 151 MMOL/L — HIGH (ref 135–145)
SODIUM SERPL-SCNC: 153 MMOL/L — HIGH (ref 135–145)
SODIUM UR-SCNC: 142 MMOL/L — SIGNIFICANT CHANGE UP
SODIUM UR-SCNC: 58 MMOL/L — SIGNIFICANT CHANGE UP
SODIUM UR-SCNC: 77 MMOL/L — SIGNIFICANT CHANGE UP
SP GR SPEC: 1.01 — SIGNIFICANT CHANGE UP (ref 1.01–1.05)
SP GR SPEC: 1.02 — SIGNIFICANT CHANGE UP (ref 1.01–1.05)
SP GR SPEC: 1.03 — SIGNIFICANT CHANGE UP (ref 1.01–1.05)
SPECIMEN SOURCE FLD: SIGNIFICANT CHANGE UP
SPECIMEN SOURCE: SIGNIFICANT CHANGE UP
STOMATOCYTES BLD QL SMEAR: SLIGHT — SIGNIFICANT CHANGE UP
SYNOVIAL CRYSTALS CLARITY: ABNORMAL
SYNOVIAL CRYSTALS COLOR: ABNORMAL
SYNOVIAL CRYSTALS ID: SIGNIFICANT CHANGE UP
SYNOVIAL CRYSTALS TUBE: SIGNIFICANT CHANGE UP
T4 FREE SERPL-MCNC: 0.8 NG/DL — LOW (ref 0.9–1.8)
T4 FREE SERPL-MCNC: 0.9 NG/DL — SIGNIFICANT CHANGE UP (ref 0.9–1.8)
T4 FREE SERPL-MCNC: 1 NG/DL — SIGNIFICANT CHANGE UP (ref 0.9–1.8)
T4 FREE SERPL-MCNC: 1 NG/DL — SIGNIFICANT CHANGE UP (ref 0.9–1.8)
T4 FREE SERPL-MCNC: 1.2 NG/DL — SIGNIFICANT CHANGE UP (ref 0.9–1.8)
T4 FREE SERPL-MCNC: 1.4 NG/DL — SIGNIFICANT CHANGE UP (ref 0.9–1.8)
TARGETS BLD QL SMEAR: SLIGHT — SIGNIFICANT CHANGE UP
TOTAL CELLS COUNTED, BODY FLUID: 25 CELLS — SIGNIFICANT CHANGE UP
TOTAL NUCLEATED CELL COUNT, BODY FLUID: 90 CELLS/UL — HIGH (ref 0–5)
TROPONIN T, HIGH SENSITIVITY RESULT: 31 NG/L — SIGNIFICANT CHANGE UP
TSH SERPL-MCNC: 11.98 UIU/ML — HIGH (ref 0.27–4.2)
TSH SERPL-MCNC: 23.86 UIU/ML — HIGH (ref 0.27–4.2)
TSH SERPL-MCNC: 4.32 UIU/ML — HIGH (ref 0.27–4.2)
TSH SERPL-MCNC: 6.55 UIU/ML — HIGH (ref 0.27–4.2)
TSH SERPL-MCNC: 7.56 UIU/ML — HIGH (ref 0.27–4.2)
TUBE TYPE: SIGNIFICANT CHANGE UP
URATE CRY FLD QL MICRO: SIGNIFICANT CHANGE UP
URATE SERPL-MCNC: 1.9 MG/DL — LOW (ref 3.4–8.8)
URATE SERPL-MCNC: 3.2 MG/DL — LOW (ref 3.4–8.8)
UROBILINOGEN FLD QL: ABNORMAL
UROBILINOGEN FLD QL: SIGNIFICANT CHANGE UP
UUN UR-MCNC: 328.4 MG/DL — SIGNIFICANT CHANGE UP
VANCOMYCIN FLD-MCNC: 12.1 UG/ML — SIGNIFICANT CHANGE UP
VANCOMYCIN TROUGH SERPL-MCNC: 14.7 UG/ML — SIGNIFICANT CHANGE UP (ref 10–20)
VANCOMYCIN TROUGH SERPL-MCNC: 19.5 UG/ML — SIGNIFICANT CHANGE UP (ref 10–20)
VANCOMYCIN TROUGH SERPL-MCNC: 22.2 UG/ML — HIGH (ref 10–20)
VARIANT LYMPHS # BLD: 0.9 % — SIGNIFICANT CHANGE UP (ref 0–6)
VARIANT LYMPHS # BLD: 1.7 % — SIGNIFICANT CHANGE UP (ref 0–6)
VARIANT LYMPHS # BLD: 1.7 % — SIGNIFICANT CHANGE UP (ref 0–6)
VIT D25+D1,25 OH+D1,25 PNL SERPL-MCNC: 78.2 PG/ML — SIGNIFICANT CHANGE UP (ref 19.9–79.3)
WBC # BLD: 18.37 K/UL — HIGH (ref 3.8–10.5)
WBC # BLD: 19.64 K/UL — HIGH (ref 3.8–10.5)
WBC # BLD: 20.67 K/UL — HIGH (ref 3.8–10.5)
WBC # BLD: 21.02 K/UL — HIGH (ref 3.8–10.5)
WBC # BLD: 21.09 K/UL — HIGH (ref 3.8–10.5)
WBC # BLD: 21.82 K/UL — HIGH (ref 3.8–10.5)
WBC # BLD: 22.27 K/UL — HIGH (ref 3.8–10.5)
WBC # BLD: 22.5 K/UL — HIGH (ref 3.8–10.5)
WBC # BLD: 23.08 K/UL — HIGH (ref 3.8–10.5)
WBC # BLD: 23.55 K/UL — HIGH (ref 3.8–10.5)
WBC # BLD: 23.56 K/UL — HIGH (ref 3.8–10.5)
WBC # BLD: 24 K/UL — HIGH (ref 3.8–10.5)
WBC # BLD: 24.16 K/UL — HIGH (ref 3.8–10.5)
WBC # BLD: 24.2 K/UL — HIGH (ref 3.8–10.5)
WBC # BLD: 24.31 K/UL — HIGH (ref 3.8–10.5)
WBC # BLD: 24.33 K/UL — HIGH (ref 3.8–10.5)
WBC # BLD: 24.52 K/UL — HIGH (ref 3.8–10.5)
WBC # BLD: 24.63 K/UL — HIGH (ref 3.8–10.5)
WBC # BLD: 24.68 K/UL — HIGH (ref 3.8–10.5)
WBC # BLD: 24.85 K/UL — HIGH (ref 3.8–10.5)
WBC # BLD: 24.88 K/UL — HIGH (ref 3.8–10.5)
WBC # BLD: 25.13 K/UL — HIGH (ref 3.8–10.5)
WBC # BLD: 25.14 K/UL — HIGH (ref 3.8–10.5)
WBC # BLD: 25.14 K/UL — HIGH (ref 3.8–10.5)
WBC # BLD: 25.21 K/UL — HIGH (ref 3.8–10.5)
WBC # BLD: 25.42 K/UL — HIGH (ref 3.8–10.5)
WBC # BLD: 25.71 K/UL — HIGH (ref 3.8–10.5)
WBC # BLD: 25.87 K/UL — HIGH (ref 3.8–10.5)
WBC # BLD: 25.97 K/UL — HIGH (ref 3.8–10.5)
WBC # BLD: 26.24 K/UL — HIGH (ref 3.8–10.5)
WBC # BLD: 26.26 K/UL — HIGH (ref 3.8–10.5)
WBC # BLD: 26.29 K/UL — HIGH (ref 3.8–10.5)
WBC # BLD: 26.33 K/UL — HIGH (ref 3.8–10.5)
WBC # BLD: 26.42 K/UL — HIGH (ref 3.8–10.5)
WBC # BLD: 26.46 K/UL — HIGH (ref 3.8–10.5)
WBC # BLD: 26.89 K/UL — HIGH (ref 3.8–10.5)
WBC # BLD: 26.93 K/UL — HIGH (ref 3.8–10.5)
WBC # BLD: 27.06 K/UL — HIGH (ref 3.8–10.5)
WBC # BLD: 27.06 K/UL — HIGH (ref 3.8–10.5)
WBC # BLD: 27.11 K/UL — HIGH (ref 3.8–10.5)
WBC # BLD: 27.13 K/UL — HIGH (ref 3.8–10.5)
WBC # BLD: 27.42 K/UL — HIGH (ref 3.8–10.5)
WBC # BLD: 27.42 K/UL — HIGH (ref 3.8–10.5)
WBC # BLD: 27.9 K/UL — HIGH (ref 3.8–10.5)
WBC # BLD: 27.97 K/UL — HIGH (ref 3.8–10.5)
WBC # BLD: 28.15 K/UL — HIGH (ref 3.8–10.5)
WBC # BLD: 28.46 K/UL — HIGH (ref 3.8–10.5)
WBC # BLD: 28.59 K/UL — HIGH (ref 3.8–10.5)
WBC # BLD: 29.45 K/UL — HIGH (ref 3.8–10.5)
WBC # BLD: 30.25 K/UL — HIGH (ref 3.8–10.5)
WBC # BLD: 30.73 K/UL — HIGH (ref 3.8–10.5)
WBC # BLD: 32.27 K/UL — HIGH (ref 3.8–10.5)
WBC # BLD: 32.31 K/UL — HIGH (ref 3.8–10.5)
WBC # BLD: 32.56 K/UL — HIGH (ref 3.8–10.5)
WBC # BLD: 33.12 K/UL — HIGH (ref 3.8–10.5)
WBC # BLD: 33.17 K/UL — HIGH (ref 3.8–10.5)
WBC # BLD: 33.32 K/UL — HIGH (ref 3.8–10.5)
WBC # BLD: 33.96 K/UL — HIGH (ref 3.8–10.5)
WBC # BLD: 34.32 K/UL — HIGH (ref 3.8–10.5)
WBC # BLD: 34.36 K/UL — HIGH (ref 3.8–10.5)
WBC # BLD: 34.71 K/UL — HIGH (ref 3.8–10.5)
WBC # BLD: 36.78 K/UL — HIGH (ref 3.8–10.5)
WBC # BLD: 37.25 K/UL — HIGH (ref 3.8–10.5)
WBC # BLD: 37.6 K/UL — HIGH (ref 3.8–10.5)
WBC # BLD: 40.02 K/UL — CRITICAL HIGH (ref 3.8–10.5)
WBC # BLD: 42.82 K/UL — CRITICAL HIGH (ref 3.8–10.5)
WBC # BLD: 48.67 K/UL — CRITICAL HIGH (ref 3.8–10.5)
WBC # FLD AUTO: 18.37 K/UL — HIGH (ref 3.8–10.5)
WBC # FLD AUTO: 19.64 K/UL — HIGH (ref 3.8–10.5)
WBC # FLD AUTO: 20.67 K/UL — HIGH (ref 3.8–10.5)
WBC # FLD AUTO: 21.02 K/UL — HIGH (ref 3.8–10.5)
WBC # FLD AUTO: 21.09 K/UL — HIGH (ref 3.8–10.5)
WBC # FLD AUTO: 21.82 K/UL — HIGH (ref 3.8–10.5)
WBC # FLD AUTO: 22.27 K/UL — HIGH (ref 3.8–10.5)
WBC # FLD AUTO: 22.5 K/UL — HIGH (ref 3.8–10.5)
WBC # FLD AUTO: 23.08 K/UL — HIGH (ref 3.8–10.5)
WBC # FLD AUTO: 23.55 K/UL — HIGH (ref 3.8–10.5)
WBC # FLD AUTO: 23.56 K/UL — HIGH (ref 3.8–10.5)
WBC # FLD AUTO: 24 K/UL — HIGH (ref 3.8–10.5)
WBC # FLD AUTO: 24.16 K/UL — HIGH (ref 3.8–10.5)
WBC # FLD AUTO: 24.2 K/UL — HIGH (ref 3.8–10.5)
WBC # FLD AUTO: 24.31 K/UL — HIGH (ref 3.8–10.5)
WBC # FLD AUTO: 24.33 K/UL — HIGH (ref 3.8–10.5)
WBC # FLD AUTO: 24.52 K/UL — HIGH (ref 3.8–10.5)
WBC # FLD AUTO: 24.63 K/UL — HIGH (ref 3.8–10.5)
WBC # FLD AUTO: 24.68 K/UL — HIGH (ref 3.8–10.5)
WBC # FLD AUTO: 24.85 K/UL — HIGH (ref 3.8–10.5)
WBC # FLD AUTO: 24.88 K/UL — HIGH (ref 3.8–10.5)
WBC # FLD AUTO: 25.13 K/UL — HIGH (ref 3.8–10.5)
WBC # FLD AUTO: 25.14 K/UL — HIGH (ref 3.8–10.5)
WBC # FLD AUTO: 25.14 K/UL — HIGH (ref 3.8–10.5)
WBC # FLD AUTO: 25.21 K/UL — HIGH (ref 3.8–10.5)
WBC # FLD AUTO: 25.42 K/UL — HIGH (ref 3.8–10.5)
WBC # FLD AUTO: 25.71 K/UL — HIGH (ref 3.8–10.5)
WBC # FLD AUTO: 25.87 K/UL — HIGH (ref 3.8–10.5)
WBC # FLD AUTO: 25.97 K/UL — HIGH (ref 3.8–10.5)
WBC # FLD AUTO: 26.24 K/UL — HIGH (ref 3.8–10.5)
WBC # FLD AUTO: 26.26 K/UL — HIGH (ref 3.8–10.5)
WBC # FLD AUTO: 26.29 K/UL — HIGH (ref 3.8–10.5)
WBC # FLD AUTO: 26.33 K/UL — HIGH (ref 3.8–10.5)
WBC # FLD AUTO: 26.42 K/UL — HIGH (ref 3.8–10.5)
WBC # FLD AUTO: 26.46 K/UL — HIGH (ref 3.8–10.5)
WBC # FLD AUTO: 26.89 K/UL — HIGH (ref 3.8–10.5)
WBC # FLD AUTO: 26.93 K/UL — HIGH (ref 3.8–10.5)
WBC # FLD AUTO: 27.06 K/UL — HIGH (ref 3.8–10.5)
WBC # FLD AUTO: 27.06 K/UL — HIGH (ref 3.8–10.5)
WBC # FLD AUTO: 27.11 K/UL — HIGH (ref 3.8–10.5)
WBC # FLD AUTO: 27.13 K/UL — HIGH (ref 3.8–10.5)
WBC # FLD AUTO: 27.42 K/UL — HIGH (ref 3.8–10.5)
WBC # FLD AUTO: 27.42 K/UL — HIGH (ref 3.8–10.5)
WBC # FLD AUTO: 27.9 K/UL — HIGH (ref 3.8–10.5)
WBC # FLD AUTO: 27.97 K/UL — HIGH (ref 3.8–10.5)
WBC # FLD AUTO: 28.15 K/UL — HIGH (ref 3.8–10.5)
WBC # FLD AUTO: 28.46 K/UL — HIGH (ref 3.8–10.5)
WBC # FLD AUTO: 28.59 K/UL — HIGH (ref 3.8–10.5)
WBC # FLD AUTO: 29.45 K/UL — HIGH (ref 3.8–10.5)
WBC # FLD AUTO: 30.25 K/UL — HIGH (ref 3.8–10.5)
WBC # FLD AUTO: 30.73 K/UL — HIGH (ref 3.8–10.5)
WBC # FLD AUTO: 32.27 K/UL — HIGH (ref 3.8–10.5)
WBC # FLD AUTO: 32.31 K/UL — HIGH (ref 3.8–10.5)
WBC # FLD AUTO: 32.56 K/UL — HIGH (ref 3.8–10.5)
WBC # FLD AUTO: 33.12 K/UL — HIGH (ref 3.8–10.5)
WBC # FLD AUTO: 33.17 K/UL — HIGH (ref 3.8–10.5)
WBC # FLD AUTO: 33.32 K/UL — HIGH (ref 3.8–10.5)
WBC # FLD AUTO: 33.96 K/UL — HIGH (ref 3.8–10.5)
WBC # FLD AUTO: 34.32 K/UL — HIGH (ref 3.8–10.5)
WBC # FLD AUTO: 34.36 K/UL — HIGH (ref 3.8–10.5)
WBC # FLD AUTO: 34.71 K/UL — HIGH (ref 3.8–10.5)
WBC # FLD AUTO: 36.78 K/UL — HIGH (ref 3.8–10.5)
WBC # FLD AUTO: 37.25 K/UL — HIGH (ref 3.8–10.5)
WBC # FLD AUTO: 37.6 K/UL — HIGH (ref 3.8–10.5)
WBC # FLD AUTO: 40.02 K/UL — CRITICAL HIGH (ref 3.8–10.5)
WBC # FLD AUTO: 42.82 K/UL — CRITICAL HIGH (ref 3.8–10.5)
WBC # FLD AUTO: 48.67 K/UL — CRITICAL HIGH (ref 3.8–10.5)
WBC UR QL: 1 /HPF — SIGNIFICANT CHANGE UP (ref 0–5)
WBC UR QL: 2 /HPF — SIGNIFICANT CHANGE UP (ref 0–5)
WBC UR QL: 3 /HPF — SIGNIFICANT CHANGE UP (ref 0–5)
WBC UR QL: 5 /HPF — SIGNIFICANT CHANGE UP (ref 0–5)
WBC UR QL: SIGNIFICANT CHANGE UP /HPF (ref 0–5)
WBC UR QL: SIGNIFICANT CHANGE UP /HPF (ref 0–5)

## 2023-01-01 PROCEDURE — 99233 SBSQ HOSP IP/OBS HIGH 50: CPT

## 2023-01-01 PROCEDURE — 99233 SBSQ HOSP IP/OBS HIGH 50: CPT | Mod: GC

## 2023-01-01 PROCEDURE — 93010 ELECTROCARDIOGRAM REPORT: CPT

## 2023-01-01 PROCEDURE — 74018 RADEX ABDOMEN 1 VIEW: CPT | Mod: 26

## 2023-01-01 PROCEDURE — 36000 PLACE NEEDLE IN VEIN: CPT

## 2023-01-01 PROCEDURE — 71045 X-RAY EXAM CHEST 1 VIEW: CPT | Mod: 26

## 2023-01-01 PROCEDURE — 99232 SBSQ HOSP IP/OBS MODERATE 35: CPT

## 2023-01-01 PROCEDURE — 99239 HOSP IP/OBS DSCHRG MGMT >30: CPT

## 2023-01-01 PROCEDURE — 93306 TTE W/DOPPLER COMPLETE: CPT | Mod: 26

## 2023-01-01 PROCEDURE — 88305 TISSUE EXAM BY PATHOLOGIST: CPT | Mod: 26

## 2023-01-01 PROCEDURE — 73564 X-RAY EXAM KNEE 4 OR MORE: CPT | Mod: 26,RT

## 2023-01-01 PROCEDURE — 27334 REMOVE KNEE JOINT LINING: CPT | Mod: 59,79,RT

## 2023-01-01 PROCEDURE — 99223 1ST HOSP IP/OBS HIGH 75: CPT

## 2023-01-01 PROCEDURE — 88311 DECALCIFY TISSUE: CPT | Mod: 26

## 2023-01-01 PROCEDURE — 99238 HOSP IP/OBS DSCHRG MGMT 30/<: CPT | Mod: GC

## 2023-01-01 PROCEDURE — 43762 RPLC GTUBE NO REVJ TRC: CPT | Mod: GC

## 2023-01-01 PROCEDURE — 43762 RPLC GTUBE NO REVJ TRC: CPT

## 2023-01-01 PROCEDURE — 99285 EMERGENCY DEPT VISIT HI MDM: CPT | Mod: 25

## 2023-01-01 PROCEDURE — 99232 SBSQ HOSP IP/OBS MODERATE 35: CPT | Mod: GC

## 2023-01-01 PROCEDURE — 93970 EXTREMITY STUDY: CPT | Mod: 26

## 2023-01-01 PROCEDURE — 70486 CT MAXILLOFACIAL W/O DYE: CPT | Mod: 26

## 2023-01-01 PROCEDURE — 71275 CT ANGIOGRAPHY CHEST: CPT | Mod: 26,MA

## 2023-01-01 PROCEDURE — 99291 CRITICAL CARE FIRST HOUR: CPT | Mod: GC

## 2023-01-01 PROCEDURE — 99497 ADVNCD CARE PLAN 30 MIN: CPT | Mod: 25

## 2023-01-01 PROCEDURE — 73562 X-RAY EXAM OF KNEE 3: CPT | Mod: 26,RT

## 2023-01-01 PROCEDURE — 27365 RESECT FEMUR/KNEE TUMOR: CPT | Mod: 78,RT

## 2023-01-01 PROCEDURE — 99285 EMERGENCY DEPT VISIT HI MDM: CPT

## 2023-01-01 PROCEDURE — 71275 CT ANGIOGRAPHY CHEST: CPT | Mod: 26

## 2023-01-01 PROCEDURE — 27430 REVISION OF THIGH MUSCLES: CPT | Mod: 78,RT

## 2023-01-01 PROCEDURE — 99222 1ST HOSP IP/OBS MODERATE 55: CPT

## 2023-01-01 PROCEDURE — 73590 X-RAY EXAM OF LOWER LEG: CPT | Mod: 26,RT

## 2023-01-01 PROCEDURE — 84165 PROTEIN E-PHORESIS SERUM: CPT | Mod: 26

## 2023-01-01 PROCEDURE — 73723 MRI JOINT LWR EXTR W/O&W/DYE: CPT | Mod: 26,RT

## 2023-01-01 PROCEDURE — 99223 1ST HOSP IP/OBS HIGH 75: CPT | Mod: GC

## 2023-01-01 PROCEDURE — 86334 IMMUNOFIX E-PHORESIS SERUM: CPT | Mod: 26

## 2023-01-01 PROCEDURE — 99221 1ST HOSP IP/OBS SF/LOW 40: CPT | Mod: GC,25

## 2023-01-01 RX ORDER — GABAPENTIN 400 MG/1
1 CAPSULE ORAL
Qty: 0 | Refills: 0 | DISCHARGE

## 2023-01-01 RX ORDER — SENNA PLUS 8.6 MG/1
10 TABLET ORAL AT BEDTIME
Refills: 0 | Status: DISCONTINUED | OUTPATIENT
Start: 2023-01-01 | End: 2023-01-01

## 2023-01-01 RX ORDER — SODIUM,POTASSIUM PHOSPHATES 278-250MG
2 POWDER IN PACKET (EA) ORAL
Qty: 180 | Refills: 0
Start: 2023-01-01 | End: 2023-01-01

## 2023-01-01 RX ORDER — LEVOTHYROXINE SODIUM 125 MCG
150 TABLET ORAL DAILY
Refills: 0 | Status: DISCONTINUED | OUTPATIENT
Start: 2023-01-01 | End: 2023-01-01

## 2023-01-01 RX ORDER — POTASSIUM PHOSPHATE, MONOBASIC POTASSIUM PHOSPHATE, DIBASIC 236; 224 MG/ML; MG/ML
15 INJECTION, SOLUTION INTRAVENOUS ONCE
Refills: 0 | Status: COMPLETED | OUTPATIENT
Start: 2023-01-01 | End: 2023-01-01

## 2023-01-01 RX ORDER — LEVALBUTEROL 1.25 MG/.5ML
0.63 SOLUTION, CONCENTRATE RESPIRATORY (INHALATION) EVERY 6 HOURS
Refills: 0 | Status: DISCONTINUED | OUTPATIENT
Start: 2023-01-01 | End: 2023-01-01

## 2023-01-01 RX ORDER — IPRATROPIUM/ALBUTEROL SULFATE 18-103MCG
3 AEROSOL WITH ADAPTER (GRAM) INHALATION EVERY 12 HOURS
Refills: 0 | Status: DISCONTINUED | OUTPATIENT
Start: 2023-01-01 | End: 2023-01-01

## 2023-01-01 RX ORDER — SENNA PLUS 8.6 MG/1
2 TABLET ORAL
Qty: 0 | Refills: 0 | DISCHARGE
Start: 2023-01-01

## 2023-01-01 RX ORDER — DENOSUMAB 60 MG/ML
120 INJECTION SUBCUTANEOUS ONCE
Refills: 0 | Status: COMPLETED | OUTPATIENT
Start: 2023-01-01 | End: 2023-01-01

## 2023-01-01 RX ORDER — ACETAMINOPHEN 500 MG
1000 TABLET ORAL ONCE
Refills: 0 | Status: DISCONTINUED | OUTPATIENT
Start: 2023-01-01 | End: 2023-01-01

## 2023-01-01 RX ORDER — METOCLOPRAMIDE HCL 10 MG
10 TABLET ORAL THREE TIMES A DAY
Refills: 0 | Status: DISCONTINUED | OUTPATIENT
Start: 2023-01-01 | End: 2023-01-01

## 2023-01-01 RX ORDER — HYDROMORPHONE HYDROCHLORIDE 2 MG/ML
1 INJECTION INTRAMUSCULAR; INTRAVENOUS; SUBCUTANEOUS ONCE
Refills: 0 | Status: DISCONTINUED | OUTPATIENT
Start: 2023-01-01 | End: 2023-01-01

## 2023-01-01 RX ORDER — OXYCODONE HYDROCHLORIDE 5 MG/1
10 TABLET ORAL EVERY 4 HOURS
Refills: 0 | Status: DISCONTINUED | OUTPATIENT
Start: 2023-01-01 | End: 2023-01-01

## 2023-01-01 RX ORDER — POLYETHYLENE GLYCOL 3350 17 G/17G
17 POWDER, FOR SOLUTION ORAL
Refills: 0 | Status: DISCONTINUED | OUTPATIENT
Start: 2023-01-01 | End: 2023-01-01

## 2023-01-01 RX ORDER — ACETAMINOPHEN 500 MG
1000 TABLET ORAL ONCE
Refills: 0 | Status: COMPLETED | OUTPATIENT
Start: 2023-01-01 | End: 2023-01-01

## 2023-01-01 RX ORDER — OXYCODONE HYDROCHLORIDE 5 MG/1
5 TABLET ORAL EVERY 4 HOURS
Refills: 0 | Status: DISCONTINUED | OUTPATIENT
Start: 2023-01-01 | End: 2023-01-01

## 2023-01-01 RX ORDER — LEVOTHYROXINE SODIUM 125 MCG
112 TABLET ORAL
Refills: 0 | Status: DISCONTINUED | OUTPATIENT
Start: 2023-01-01 | End: 2023-01-01

## 2023-01-01 RX ORDER — ESOMEPRAZOLE MAGNESIUM 40 MG/1
1 CAPSULE, DELAYED RELEASE ORAL
Qty: 0 | Refills: 0 | DISCHARGE

## 2023-01-01 RX ORDER — KETOROLAC TROMETHAMINE 30 MG/ML
25 SYRINGE (ML) INJECTION EVERY 8 HOURS
Refills: 0 | Status: DISCONTINUED | OUTPATIENT
Start: 2023-01-01 | End: 2023-01-01

## 2023-01-01 RX ORDER — VANCOMYCIN HCL 1 G
1000 VIAL (EA) INTRAVENOUS ONCE
Refills: 0 | Status: COMPLETED | OUTPATIENT
Start: 2023-01-01 | End: 2023-01-01

## 2023-01-01 RX ORDER — AMLODIPINE BESYLATE 2.5 MG/1
5 TABLET ORAL DAILY
Refills: 0 | Status: DISCONTINUED | OUTPATIENT
Start: 2023-01-01 | End: 2023-01-01

## 2023-01-01 RX ORDER — METHADONE HYDROCHLORIDE 40 MG/1
2.5 TABLET ORAL EVERY 12 HOURS
Refills: 0 | Status: DISCONTINUED | OUTPATIENT
Start: 2023-01-01 | End: 2023-01-01

## 2023-01-01 RX ORDER — LEVALBUTEROL 1.25 MG/.5ML
1.25 SOLUTION, CONCENTRATE RESPIRATORY (INHALATION) EVERY 6 HOURS
Refills: 0 | Status: DISCONTINUED | OUTPATIENT
Start: 2023-01-01 | End: 2023-01-01

## 2023-01-01 RX ORDER — ASCORBIC ACID 60 MG
500 TABLET,CHEWABLE ORAL DAILY
Refills: 0 | Status: DISCONTINUED | OUTPATIENT
Start: 2023-01-01 | End: 2023-01-01

## 2023-01-01 RX ORDER — SODIUM,POTASSIUM PHOSPHATES 278-250MG
2 POWDER IN PACKET (EA) ORAL
Refills: 0 | Status: COMPLETED | OUTPATIENT
Start: 2023-01-01 | End: 2023-01-01

## 2023-01-01 RX ORDER — VANCOMYCIN HCL 1 G
1000 VIAL (EA) INTRAVENOUS EVERY 12 HOURS
Refills: 0 | Status: DISCONTINUED | OUTPATIENT
Start: 2023-01-01 | End: 2023-01-01

## 2023-01-01 RX ORDER — PIPERACILLIN AND TAZOBACTAM 4; .5 G/20ML; G/20ML
3.38 INJECTION, POWDER, LYOPHILIZED, FOR SOLUTION INTRAVENOUS ONCE
Refills: 0 | Status: COMPLETED | OUTPATIENT
Start: 2023-01-01 | End: 2023-01-01

## 2023-01-01 RX ORDER — SODIUM CHLORIDE 9 MG/ML
1 INJECTION INTRAMUSCULAR; INTRAVENOUS; SUBCUTANEOUS EVERY 12 HOURS
Refills: 0 | Status: DISCONTINUED | OUTPATIENT
Start: 2023-01-01 | End: 2023-01-01

## 2023-01-01 RX ORDER — ALBUTEROL 90 UG/1
2.5 AEROSOL, METERED ORAL ONCE
Refills: 0 | Status: COMPLETED | OUTPATIENT
Start: 2023-01-01 | End: 2023-01-01

## 2023-01-01 RX ORDER — LACTULOSE 10 G/15ML
10 SOLUTION ORAL ONCE
Refills: 0 | Status: COMPLETED | OUTPATIENT
Start: 2023-01-01 | End: 2023-01-01

## 2023-01-01 RX ORDER — OXYCODONE HYDROCHLORIDE 5 MG/1
1 TABLET ORAL
Qty: 42 | Refills: 0
Start: 2023-01-01 | End: 2023-01-01

## 2023-01-01 RX ORDER — SENNA PLUS 8.6 MG/1
2 TABLET ORAL AT BEDTIME
Refills: 0 | Status: DISCONTINUED | OUTPATIENT
Start: 2023-01-01 | End: 2023-01-01

## 2023-01-01 RX ORDER — LINACLOTIDE 145 UG/1
1 CAPSULE, GELATIN COATED ORAL
Qty: 0 | Refills: 0 | DISCHARGE

## 2023-01-01 RX ORDER — SENNA PLUS 8.6 MG/1
10 TABLET ORAL DAILY
Refills: 0 | Status: DISCONTINUED | OUTPATIENT
Start: 2023-01-01 | End: 2023-01-01

## 2023-01-01 RX ORDER — IPRATROPIUM/ALBUTEROL SULFATE 18-103MCG
3 AEROSOL WITH ADAPTER (GRAM) INHALATION EVERY 6 HOURS
Refills: 0 | Status: DISCONTINUED | OUTPATIENT
Start: 2023-01-01 | End: 2023-01-01

## 2023-01-01 RX ORDER — SODIUM CHLORIDE 9 MG/ML
1000 INJECTION INTRAMUSCULAR; INTRAVENOUS; SUBCUTANEOUS
Refills: 0 | Status: DISCONTINUED | OUTPATIENT
Start: 2023-01-01 | End: 2023-01-01

## 2023-01-01 RX ORDER — ALBUTEROL 90 UG/1
3 AEROSOL, METERED ORAL
Qty: 0 | Refills: 0 | DISCHARGE

## 2023-01-01 RX ORDER — IPRATROPIUM/ALBUTEROL SULFATE 18-103MCG
3 AEROSOL WITH ADAPTER (GRAM) INHALATION ONCE
Refills: 0 | Status: COMPLETED | OUTPATIENT
Start: 2023-01-01 | End: 2023-01-01

## 2023-01-01 RX ORDER — ENOXAPARIN SODIUM 100 MG/ML
40 INJECTION SUBCUTANEOUS ONCE
Refills: 0 | Status: COMPLETED | OUTPATIENT
Start: 2023-01-01 | End: 2023-01-01

## 2023-01-01 RX ORDER — SODIUM CHLORIDE 9 MG/ML
1000 INJECTION INTRAMUSCULAR; INTRAVENOUS; SUBCUTANEOUS ONCE
Refills: 0 | Status: COMPLETED | OUTPATIENT
Start: 2023-01-01 | End: 2023-01-01

## 2023-01-01 RX ORDER — POLYETHYLENE GLYCOL 3350 17 G/17G
17 POWDER, FOR SOLUTION ORAL DAILY
Refills: 0 | Status: DISCONTINUED | OUTPATIENT
Start: 2023-01-01 | End: 2023-01-01

## 2023-01-01 RX ORDER — SODIUM CHLORIDE 9 MG/ML
2 INJECTION INTRAMUSCULAR; INTRAVENOUS; SUBCUTANEOUS EVERY 24 HOURS
Refills: 0 | Status: DISCONTINUED | OUTPATIENT
Start: 2023-01-01 | End: 2023-01-01

## 2023-01-01 RX ORDER — CHLORHEXIDINE GLUCONATE 213 G/1000ML
1 SOLUTION TOPICAL DAILY
Refills: 0 | Status: DISCONTINUED | OUTPATIENT
Start: 2023-01-01 | End: 2023-01-01

## 2023-01-01 RX ORDER — SODIUM,POTASSIUM PHOSPHATES 278-250MG
1 POWDER IN PACKET (EA) ORAL ONCE
Refills: 0 | Status: COMPLETED | OUTPATIENT
Start: 2023-01-01 | End: 2023-01-01

## 2023-01-01 RX ORDER — RIVAROXABAN 15 MG-20MG
10 KIT ORAL DAILY
Refills: 0 | Status: DISCONTINUED | OUTPATIENT
Start: 2023-01-01 | End: 2023-01-01

## 2023-01-01 RX ORDER — ONDANSETRON 8 MG/1
4 TABLET, FILM COATED ORAL EVERY 6 HOURS
Refills: 0 | Status: DISCONTINUED | OUTPATIENT
Start: 2023-01-01 | End: 2023-01-01

## 2023-01-01 RX ORDER — IPRATROPIUM/ALBUTEROL SULFATE 18-103MCG
3 AEROSOL WITH ADAPTER (GRAM) INHALATION
Qty: 0 | Refills: 0 | DISCHARGE
Start: 2023-01-01

## 2023-01-01 RX ORDER — HEPARIN SODIUM 5000 [USP'U]/ML
5000 INJECTION INTRAVENOUS; SUBCUTANEOUS EVERY 12 HOURS
Refills: 0 | Status: DISCONTINUED | OUTPATIENT
Start: 2023-01-01 | End: 2023-01-01

## 2023-01-01 RX ORDER — INFLUENZA VIRUS VACCINE 15; 15; 15; 15 UG/.5ML; UG/.5ML; UG/.5ML; UG/.5ML
0.7 SUSPENSION INTRAMUSCULAR ONCE
Refills: 0 | Status: DISCONTINUED | OUTPATIENT
Start: 2023-01-01 | End: 2023-01-01

## 2023-01-01 RX ORDER — SODIUM CHLORIDE 9 MG/ML
1000 INJECTION, SOLUTION INTRAVENOUS
Refills: 0 | Status: DISCONTINUED | OUTPATIENT
Start: 2023-01-01 | End: 2023-01-01

## 2023-01-01 RX ORDER — LIDOCAINE 4 G/100G
1 CREAM TOPICAL DAILY
Refills: 0 | Status: DISCONTINUED | OUTPATIENT
Start: 2023-01-01 | End: 2023-01-01

## 2023-01-01 RX ORDER — CEFEPIME 1 G/1
2000 INJECTION, POWDER, FOR SOLUTION INTRAMUSCULAR; INTRAVENOUS ONCE
Refills: 0 | Status: COMPLETED | OUTPATIENT
Start: 2023-01-01 | End: 2023-01-01

## 2023-01-01 RX ORDER — RIVAROXABAN 15 MG-20MG
1 KIT ORAL
Qty: 4 | Refills: 0
Start: 2023-01-01 | End: 2023-01-01

## 2023-01-01 RX ORDER — CEFEPIME 1 G/1
INJECTION, POWDER, FOR SOLUTION INTRAMUSCULAR; INTRAVENOUS
Refills: 0 | Status: COMPLETED | OUTPATIENT
Start: 2023-01-01 | End: 2023-01-01

## 2023-01-01 RX ORDER — SODIUM,POTASSIUM PHOSPHATES 278-250MG
2 POWDER IN PACKET (EA) ORAL EVERY 8 HOURS
Refills: 0 | Status: COMPLETED | OUTPATIENT
Start: 2023-01-01 | End: 2023-01-01

## 2023-01-01 RX ORDER — SODIUM CHLORIDE 9 MG/ML
2 INJECTION INTRAMUSCULAR; INTRAVENOUS; SUBCUTANEOUS EVERY 8 HOURS
Refills: 0 | Status: DISCONTINUED | OUTPATIENT
Start: 2023-01-01 | End: 2023-01-01

## 2023-01-01 RX ORDER — METHADONE HYDROCHLORIDE 40 MG/1
2.5 TABLET ORAL
Refills: 0 | Status: DISCONTINUED | OUTPATIENT
Start: 2023-01-01 | End: 2023-01-01

## 2023-01-01 RX ORDER — POTASSIUM CHLORIDE 20 MEQ
1 PACKET (EA) ORAL
Qty: 0 | Refills: 0 | DISCHARGE

## 2023-01-01 RX ORDER — SODIUM,POTASSIUM PHOSPHATES 278-250MG
1 POWDER IN PACKET (EA) ORAL EVERY 4 HOURS
Refills: 0 | Status: COMPLETED | OUTPATIENT
Start: 2023-01-01 | End: 2023-01-01

## 2023-01-01 RX ORDER — GABAPENTIN 400 MG/1
300 CAPSULE ORAL THREE TIMES A DAY
Refills: 0 | Status: DISCONTINUED | OUTPATIENT
Start: 2023-01-01 | End: 2023-01-01

## 2023-01-01 RX ORDER — HYDROMORPHONE HYDROCHLORIDE 2 MG/ML
0.25 INJECTION INTRAMUSCULAR; INTRAVENOUS; SUBCUTANEOUS
Refills: 0 | Status: DISCONTINUED | OUTPATIENT
Start: 2023-01-01 | End: 2023-01-01

## 2023-01-01 RX ORDER — MAGNESIUM OXIDE 400 MG ORAL TABLET 241.3 MG
1 TABLET ORAL
Qty: 0 | Refills: 0 | DISCHARGE

## 2023-01-01 RX ORDER — OXYCODONE HYDROCHLORIDE 5 MG/1
10 TABLET ORAL EVERY 6 HOURS
Refills: 0 | Status: DISCONTINUED | OUTPATIENT
Start: 2023-01-01 | End: 2023-01-01

## 2023-01-01 RX ORDER — NYSTATIN CREAM 100000 [USP'U]/G
1 CREAM TOPICAL EVERY 12 HOURS
Refills: 0 | Status: DISCONTINUED | OUTPATIENT
Start: 2023-01-01 | End: 2023-01-01

## 2023-01-01 RX ORDER — ENOXAPARIN SODIUM 100 MG/ML
40 INJECTION SUBCUTANEOUS EVERY 24 HOURS
Refills: 0 | Status: DISCONTINUED | OUTPATIENT
Start: 2023-01-01 | End: 2023-01-01

## 2023-01-01 RX ORDER — CALCITONIN SALMON 200 [IU]/ML
250 INJECTION, SOLUTION INTRAMUSCULAR EVERY 12 HOURS
Refills: 0 | Status: DISCONTINUED | OUTPATIENT
Start: 2023-01-01 | End: 2023-01-01

## 2023-01-01 RX ORDER — SODIUM,POTASSIUM PHOSPHATES 278-250MG
2 POWDER IN PACKET (EA) ORAL
Refills: 0 | Status: DISCONTINUED | OUTPATIENT
Start: 2023-01-01 | End: 2023-01-01

## 2023-01-01 RX ORDER — MEROPENEM 1 G/30ML
INJECTION INTRAVENOUS
Refills: 0 | Status: DISCONTINUED | OUTPATIENT
Start: 2023-01-01 | End: 2023-01-01

## 2023-01-01 RX ORDER — OXYCODONE HYDROCHLORIDE 5 MG/1
5 TABLET ORAL EVERY 6 HOURS
Refills: 0 | Status: DISCONTINUED | OUTPATIENT
Start: 2023-01-01 | End: 2023-01-01

## 2023-01-01 RX ORDER — MORPHINE SULFATE 50 MG/1
1 CAPSULE, EXTENDED RELEASE ORAL EVERY 6 HOURS
Refills: 0 | Status: DISCONTINUED | OUTPATIENT
Start: 2023-01-01 | End: 2023-01-01

## 2023-01-01 RX ORDER — MAGNESIUM OXIDE 400 MG ORAL TABLET 241.3 MG
400 TABLET ORAL
Refills: 0 | Status: DISCONTINUED | OUTPATIENT
Start: 2023-01-01 | End: 2023-01-01

## 2023-01-01 RX ORDER — PIPERACILLIN AND TAZOBACTAM 4; .5 G/20ML; G/20ML
3.38 INJECTION, POWDER, LYOPHILIZED, FOR SOLUTION INTRAVENOUS EVERY 8 HOURS
Refills: 0 | Status: DISCONTINUED | OUTPATIENT
Start: 2023-01-01 | End: 2023-01-01

## 2023-01-01 RX ORDER — MEROPENEM 1 G/30ML
1000 INJECTION INTRAVENOUS ONCE
Refills: 0 | Status: COMPLETED | OUTPATIENT
Start: 2023-01-01 | End: 2023-01-01

## 2023-01-01 RX ORDER — MAGNESIUM SULFATE 500 MG/ML
2 VIAL (ML) INJECTION ONCE
Refills: 0 | Status: COMPLETED | OUTPATIENT
Start: 2023-01-01 | End: 2023-01-01

## 2023-01-01 RX ORDER — IOHEXOL 300 MG/ML
30 INJECTION, SOLUTION INTRAVENOUS ONCE
Refills: 0 | Status: COMPLETED | OUTPATIENT
Start: 2023-01-01 | End: 2023-01-01

## 2023-01-01 RX ORDER — ACETAMINOPHEN 500 MG
650 TABLET ORAL EVERY 6 HOURS
Refills: 0 | Status: DISCONTINUED | OUTPATIENT
Start: 2023-01-01 | End: 2023-01-01

## 2023-01-01 RX ORDER — SODIUM CHLORIDE 9 MG/ML
1 INJECTION INTRAMUSCULAR; INTRAVENOUS; SUBCUTANEOUS EVERY 8 HOURS
Refills: 0 | Status: DISCONTINUED | OUTPATIENT
Start: 2023-01-01 | End: 2023-01-01

## 2023-01-01 RX ORDER — POTASSIUM PHOSPHATE, MONOBASIC POTASSIUM PHOSPHATE, DIBASIC 236; 224 MG/ML; MG/ML
30 INJECTION, SOLUTION INTRAVENOUS ONCE
Refills: 0 | Status: COMPLETED | OUTPATIENT
Start: 2023-01-01 | End: 2023-01-01

## 2023-01-01 RX ORDER — LEVALBUTEROL 1.25 MG/.5ML
1.25 SOLUTION, CONCENTRATE RESPIRATORY (INHALATION) EVERY 12 HOURS
Refills: 0 | Status: DISCONTINUED | OUTPATIENT
Start: 2023-01-01 | End: 2023-01-01

## 2023-01-01 RX ORDER — SODIUM CHLORIDE 9 MG/ML
4 INJECTION INTRAMUSCULAR; INTRAVENOUS; SUBCUTANEOUS EVERY 12 HOURS
Refills: 0 | Status: DISCONTINUED | OUTPATIENT
Start: 2023-01-01 | End: 2023-01-01

## 2023-01-01 RX ORDER — SODIUM CHLORIDE 9 MG/ML
250 INJECTION INTRAMUSCULAR; INTRAVENOUS; SUBCUTANEOUS ONCE
Refills: 0 | Status: COMPLETED | OUTPATIENT
Start: 2023-01-01 | End: 2023-01-01

## 2023-01-01 RX ORDER — MORPHINE SULFATE 50 MG/1
3 CAPSULE, EXTENDED RELEASE ORAL ONCE
Refills: 0 | Status: DISCONTINUED | OUTPATIENT
Start: 2023-01-01 | End: 2023-01-01

## 2023-01-01 RX ORDER — LANOLIN ALCOHOL/MO/W.PET/CERES
6 CREAM (GRAM) TOPICAL ONCE
Refills: 0 | Status: COMPLETED | OUTPATIENT
Start: 2023-01-01 | End: 2023-01-01

## 2023-01-01 RX ORDER — OXYCODONE HYDROCHLORIDE 5 MG/1
1 TABLET ORAL
Qty: 20 | Refills: 0
Start: 2023-01-01 | End: 2023-01-01

## 2023-01-01 RX ORDER — RIVAROXABAN 15 MG-20MG
1 KIT ORAL
Qty: 30 | Refills: 0
Start: 2023-01-01 | End: 2023-01-01

## 2023-01-01 RX ORDER — PANTOPRAZOLE SODIUM 20 MG/1
40 TABLET, DELAYED RELEASE ORAL DAILY
Refills: 0 | Status: DISCONTINUED | OUTPATIENT
Start: 2023-01-01 | End: 2023-01-01

## 2023-01-01 RX ORDER — AMLODIPINE BESYLATE 2.5 MG/1
1 TABLET ORAL
Qty: 0 | Refills: 0 | DISCHARGE

## 2023-01-01 RX ORDER — MEROPENEM 1 G/30ML
1000 INJECTION INTRAVENOUS EVERY 8 HOURS
Refills: 0 | Status: COMPLETED | OUTPATIENT
Start: 2023-01-01 | End: 2023-01-01

## 2023-01-01 RX ORDER — DIATRIZOATE MEGLUMINE 180 MG/ML
30 INJECTION, SOLUTION INTRAVESICAL ONCE
Refills: 0 | Status: COMPLETED | OUTPATIENT
Start: 2023-01-01 | End: 2023-01-01

## 2023-01-01 RX ORDER — OXYCODONE HYDROCHLORIDE 5 MG/1
1 TABLET ORAL
Qty: 0 | Refills: 0 | DISCHARGE
Start: 2023-01-01

## 2023-01-01 RX ORDER — SODIUM,POTASSIUM PHOSPHATES 278-250MG
1 POWDER IN PACKET (EA) ORAL ONCE
Refills: 0 | Status: DISCONTINUED | OUTPATIENT
Start: 2023-01-01 | End: 2023-01-01

## 2023-01-01 RX ORDER — HYDROMORPHONE HYDROCHLORIDE 2 MG/ML
0.75 INJECTION INTRAMUSCULAR; INTRAVENOUS; SUBCUTANEOUS ONCE
Refills: 0 | Status: DISCONTINUED | OUTPATIENT
Start: 2023-01-01 | End: 2023-01-01

## 2023-01-01 RX ORDER — GABAPENTIN 400 MG/1
400 CAPSULE ORAL THREE TIMES A DAY
Refills: 0 | Status: DISCONTINUED | OUTPATIENT
Start: 2023-01-01 | End: 2023-01-01

## 2023-01-01 RX ORDER — LEVOTHYROXINE SODIUM 125 MCG
1 TABLET ORAL
Qty: 0 | Refills: 0 | DISCHARGE

## 2023-01-01 RX ORDER — OXYCODONE AND ACETAMINOPHEN 5; 325 MG/1; MG/1
1 TABLET ORAL EVERY 6 HOURS
Refills: 0 | Status: DISCONTINUED | OUTPATIENT
Start: 2023-01-01 | End: 2023-01-01

## 2023-01-01 RX ORDER — OXYCODONE HYDROCHLORIDE 5 MG/1
7.5 TABLET ORAL EVERY 6 HOURS
Refills: 0 | Status: DISCONTINUED | OUTPATIENT
Start: 2023-01-01 | End: 2023-01-01

## 2023-01-01 RX ORDER — HYDROMORPHONE HYDROCHLORIDE 2 MG/ML
2 INJECTION INTRAMUSCULAR; INTRAVENOUS; SUBCUTANEOUS ONCE
Refills: 0 | Status: DISCONTINUED | OUTPATIENT
Start: 2023-01-01 | End: 2023-01-01

## 2023-01-01 RX ORDER — CEFEPIME 1 G/1
2000 INJECTION, POWDER, FOR SOLUTION INTRAMUSCULAR; INTRAVENOUS EVERY 8 HOURS
Refills: 0 | Status: COMPLETED | OUTPATIENT
Start: 2023-01-01 | End: 2023-01-01

## 2023-01-01 RX ORDER — ROBINUL 0.2 MG/ML
0.4 INJECTION INTRAMUSCULAR; INTRAVENOUS EVERY 6 HOURS
Refills: 0 | Status: DISCONTINUED | OUTPATIENT
Start: 2023-01-01 | End: 2023-01-01

## 2023-01-01 RX ORDER — PANTOPRAZOLE SODIUM 20 MG/1
40 TABLET, DELAYED RELEASE ORAL
Refills: 0 | Status: DISCONTINUED | OUTPATIENT
Start: 2023-01-01 | End: 2023-01-01

## 2023-01-01 RX ORDER — SODIUM,POTASSIUM PHOSPHATES 278-250MG
2 POWDER IN PACKET (EA) ORAL EVERY 8 HOURS
Refills: 0 | Status: DISCONTINUED | OUTPATIENT
Start: 2023-01-01 | End: 2023-01-01

## 2023-01-01 RX ORDER — ACETAMINOPHEN 500 MG
325 TABLET ORAL ONCE
Refills: 0 | Status: COMPLETED | OUTPATIENT
Start: 2023-01-01 | End: 2023-01-01

## 2023-01-01 RX ORDER — SODIUM CHLORIDE 9 MG/ML
1000 INJECTION, SOLUTION INTRAVENOUS ONCE
Refills: 0 | Status: COMPLETED | OUTPATIENT
Start: 2023-01-01 | End: 2023-01-01

## 2023-01-01 RX ORDER — MEROPENEM 1 G/30ML
1000 INJECTION INTRAVENOUS EVERY 8 HOURS
Refills: 0 | Status: DISCONTINUED | OUTPATIENT
Start: 2023-01-01 | End: 2023-01-01

## 2023-01-01 RX ORDER — ONDANSETRON 8 MG/1
4 TABLET, FILM COATED ORAL EVERY 8 HOURS
Refills: 0 | Status: DISCONTINUED | OUTPATIENT
Start: 2023-01-01 | End: 2023-01-01

## 2023-01-01 RX ORDER — POLYETHYLENE GLYCOL 3350 17 G/17G
17 POWDER, FOR SOLUTION ORAL
Qty: 238 | Refills: 0
Start: 2023-01-01 | End: 2023-01-01

## 2023-01-01 RX ORDER — METOPROLOL TARTRATE 50 MG
2.5 TABLET ORAL ONCE
Refills: 0 | Status: COMPLETED | OUTPATIENT
Start: 2023-01-01 | End: 2023-01-01

## 2023-01-01 RX ORDER — CALCITONIN SALMON 200 [IU]/ML
250 INJECTION, SOLUTION INTRAMUSCULAR EVERY 12 HOURS
Refills: 0 | Status: COMPLETED | OUTPATIENT
Start: 2023-01-01 | End: 2023-01-01

## 2023-01-01 RX ORDER — METHADONE HYDROCHLORIDE 40 MG/1
2.5 TABLET ORAL AT BEDTIME
Refills: 0 | Status: DISCONTINUED | OUTPATIENT
Start: 2023-01-01 | End: 2023-01-01

## 2023-01-01 RX ORDER — CEFAZOLIN SODIUM 1 G
2000 VIAL (EA) INJECTION EVERY 8 HOURS
Refills: 0 | Status: COMPLETED | OUTPATIENT
Start: 2023-01-01 | End: 2023-01-01

## 2023-01-01 RX ORDER — SODIUM,POTASSIUM PHOSPHATES 278-250MG
1 POWDER IN PACKET (EA) ORAL
Refills: 0 | Status: DISCONTINUED | OUTPATIENT
Start: 2023-01-01 | End: 2023-01-01

## 2023-01-01 RX ORDER — MIDODRINE HYDROCHLORIDE 2.5 MG/1
10 TABLET ORAL ONCE
Refills: 0 | Status: DISCONTINUED | OUTPATIENT
Start: 2023-01-01 | End: 2023-01-01

## 2023-01-01 RX ORDER — OXYCODONE HYDROCHLORIDE 5 MG/1
2.5 TABLET ORAL EVERY 4 HOURS
Refills: 0 | Status: DISCONTINUED | OUTPATIENT
Start: 2023-01-01 | End: 2023-01-01

## 2023-01-01 RX ORDER — POTASSIUM CHLORIDE 20 MEQ
20 PACKET (EA) ORAL EVERY 4 HOURS
Refills: 0 | Status: COMPLETED | OUTPATIENT
Start: 2023-01-01 | End: 2023-01-01

## 2023-01-01 RX ORDER — ZOLEDRONIC ACID 5 MG/100ML
4 INJECTION, SOLUTION INTRAVENOUS ONCE
Refills: 0 | Status: COMPLETED | OUTPATIENT
Start: 2023-01-01 | End: 2023-01-01

## 2023-01-01 RX ORDER — HYDROMORPHONE HYDROCHLORIDE 2 MG/ML
0.5 INJECTION INTRAMUSCULAR; INTRAVENOUS; SUBCUTANEOUS
Refills: 0 | Status: DISCONTINUED | OUTPATIENT
Start: 2023-01-01 | End: 2023-01-01

## 2023-01-01 RX ORDER — LANOLIN ALCOHOL/MO/W.PET/CERES
3 CREAM (GRAM) TOPICAL AT BEDTIME
Refills: 0 | Status: DISCONTINUED | OUTPATIENT
Start: 2023-01-01 | End: 2023-01-01

## 2023-01-01 RX ORDER — ACETAMINOPHEN 500 MG
975 TABLET ORAL EVERY 8 HOURS
Refills: 0 | Status: DISCONTINUED | OUTPATIENT
Start: 2023-01-01 | End: 2023-01-01

## 2023-01-01 RX ORDER — SODIUM CHLORIDE 9 MG/ML
2 INJECTION INTRAMUSCULAR; INTRAVENOUS; SUBCUTANEOUS
Qty: 180 | Refills: 0
Start: 2023-01-01 | End: 2023-01-01

## 2023-01-01 RX ORDER — OXYCODONE HYDROCHLORIDE 5 MG/1
5 TABLET ORAL ONCE
Refills: 0 | Status: DISCONTINUED | OUTPATIENT
Start: 2023-01-01 | End: 2023-01-01

## 2023-01-01 RX ORDER — POTASSIUM PHOSPHATE, MONOBASIC POTASSIUM PHOSPHATE, DIBASIC 236; 224 MG/ML; MG/ML
30 INJECTION, SOLUTION INTRAVENOUS ONCE
Refills: 0 | Status: DISCONTINUED | OUTPATIENT
Start: 2023-01-01 | End: 2023-01-01

## 2023-01-01 RX ORDER — ALBUTEROL 90 UG/1
2 AEROSOL, METERED ORAL EVERY 6 HOURS
Refills: 0 | Status: DISCONTINUED | OUTPATIENT
Start: 2023-01-01 | End: 2023-01-01

## 2023-01-01 RX ORDER — ASCORBIC ACID 60 MG
500 TABLET,CHEWABLE ORAL
Refills: 0 | Status: DISCONTINUED | OUTPATIENT
Start: 2023-01-01 | End: 2023-01-01

## 2023-01-01 RX ORDER — CEFEPIME 1 G/1
INJECTION, POWDER, FOR SOLUTION INTRAMUSCULAR; INTRAVENOUS
Refills: 0 | Status: DISCONTINUED | OUTPATIENT
Start: 2023-01-01 | End: 2023-01-01

## 2023-01-01 RX ORDER — MORPHINE SULFATE 50 MG/1
4 CAPSULE, EXTENDED RELEASE ORAL ONCE
Refills: 0 | Status: DISCONTINUED | OUTPATIENT
Start: 2023-01-01 | End: 2023-01-01

## 2023-01-01 RX ORDER — METHADONE HYDROCHLORIDE 40 MG/1
2.5 TABLET ORAL ONCE
Refills: 0 | Status: DISCONTINUED | OUTPATIENT
Start: 2023-01-01 | End: 2023-01-01

## 2023-01-01 RX ORDER — KETOROLAC TROMETHAMINE 30 MG/ML
15 SYRINGE (ML) INJECTION ONCE
Refills: 0 | Status: DISCONTINUED | OUTPATIENT
Start: 2023-01-01 | End: 2023-01-01

## 2023-01-01 RX ORDER — DIATRIZOATE MEGLUMINE 180 MG/ML
30 INJECTION, SOLUTION INTRAVESICAL ONCE
Refills: 0 | Status: DISCONTINUED | OUTPATIENT
Start: 2023-01-01 | End: 2023-01-01

## 2023-01-01 RX ORDER — TRAMADOL HYDROCHLORIDE 50 MG/1
25 TABLET ORAL EVERY 8 HOURS
Refills: 0 | Status: DISCONTINUED | OUTPATIENT
Start: 2023-01-01 | End: 2023-01-01

## 2023-01-01 RX ORDER — GABAPENTIN 400 MG/1
300 CAPSULE ORAL EVERY 8 HOURS
Refills: 0 | Status: DISCONTINUED | OUTPATIENT
Start: 2023-01-01 | End: 2023-01-01

## 2023-01-01 RX ORDER — IPRATROPIUM/ALBUTEROL SULFATE 18-103MCG
3 AEROSOL WITH ADAPTER (GRAM) INHALATION EVERY 6 HOURS
Refills: 0 | Status: COMPLETED | OUTPATIENT
Start: 2023-01-01 | End: 2023-01-01

## 2023-01-01 RX ORDER — CALAMINE AND ZINC OXIDE AND PHENOL 160; 10 MG/ML; MG/ML
1 LOTION TOPICAL
Qty: 0 | Refills: 0 | DISCHARGE

## 2023-01-01 RX ORDER — CALCITONIN SALMON 200 [IU]/ML
400 INJECTION, SOLUTION INTRAMUSCULAR EVERY 12 HOURS
Refills: 0 | Status: COMPLETED | OUTPATIENT
Start: 2023-01-01 | End: 2023-01-01

## 2023-01-01 RX ORDER — SODIUM CHLORIDE 9 MG/ML
2 INJECTION INTRAMUSCULAR; INTRAVENOUS; SUBCUTANEOUS EVERY 12 HOURS
Refills: 0 | Status: DISCONTINUED | OUTPATIENT
Start: 2023-01-01 | End: 2023-01-01

## 2023-01-01 RX ORDER — CEFEPIME 1 G/1
2000 INJECTION, POWDER, FOR SOLUTION INTRAMUSCULAR; INTRAVENOUS EVERY 8 HOURS
Refills: 0 | Status: DISCONTINUED | OUTPATIENT
Start: 2023-01-01 | End: 2023-01-01

## 2023-01-01 RX ORDER — OXYCODONE HYDROCHLORIDE 5 MG/1
2.5 TABLET ORAL ONCE
Refills: 0 | Status: DISCONTINUED | OUTPATIENT
Start: 2023-01-01 | End: 2023-01-01

## 2023-01-01 RX ORDER — PANTOPRAZOLE SODIUM 20 MG/1
40 TABLET, DELAYED RELEASE ORAL EVERY 24 HOURS
Refills: 0 | Status: DISCONTINUED | OUTPATIENT
Start: 2023-01-01 | End: 2023-01-01

## 2023-01-01 RX ORDER — METHADONE HYDROCHLORIDE 40 MG/1
1.25 TABLET ORAL
Qty: 0 | Refills: 0 | DISCHARGE
Start: 2023-01-01

## 2023-01-01 RX ORDER — SODIUM CHLORIDE 9 MG/ML
2 INJECTION INTRAMUSCULAR; INTRAVENOUS; SUBCUTANEOUS
Qty: 60 | Refills: 0
Start: 2023-01-01 | End: 2023-01-01

## 2023-01-01 RX ORDER — SODIUM CHLORIDE 9 MG/ML
500 INJECTION INTRAMUSCULAR; INTRAVENOUS; SUBCUTANEOUS ONCE
Refills: 0 | Status: COMPLETED | OUTPATIENT
Start: 2023-01-01 | End: 2023-01-01

## 2023-01-01 RX ORDER — SENNA PLUS 8.6 MG/1
10 TABLET ORAL
Refills: 0 | Status: DISCONTINUED | OUTPATIENT
Start: 2023-01-01 | End: 2023-01-01

## 2023-01-01 RX ORDER — SODIUM CHLORIDE 9 MG/ML
500 INJECTION, SOLUTION INTRAVENOUS ONCE
Refills: 0 | Status: DISCONTINUED | OUTPATIENT
Start: 2023-01-01 | End: 2023-01-01

## 2023-01-01 RX ORDER — FUROSEMIDE 40 MG
20 TABLET ORAL ONCE
Refills: 0 | Status: COMPLETED | OUTPATIENT
Start: 2023-01-01 | End: 2023-01-01

## 2023-01-01 RX ORDER — ONDANSETRON 8 MG/1
4 TABLET, FILM COATED ORAL ONCE
Refills: 0 | Status: DISCONTINUED | OUTPATIENT
Start: 2023-01-01 | End: 2023-01-01

## 2023-01-01 RX ORDER — METHADONE HYDROCHLORIDE 40 MG/1
1.25 TABLET ORAL
Qty: 35 | Refills: 0
Start: 2023-01-01 | End: 2023-01-01

## 2023-01-01 RX ORDER — NALOXONE HYDROCHLORIDE 4 MG/.1ML
0.3 SPRAY NASAL
Refills: 0 | Status: DISCONTINUED | OUTPATIENT
Start: 2023-01-01 | End: 2023-01-01

## 2023-01-01 RX ORDER — METOPROLOL TARTRATE 50 MG
1 TABLET ORAL
Qty: 0 | Refills: 0 | DISCHARGE

## 2023-01-01 RX ORDER — LEVOTHYROXINE SODIUM 125 MCG
150 TABLET ORAL
Refills: 0 | Status: DISCONTINUED | OUTPATIENT
Start: 2023-01-01 | End: 2023-01-01

## 2023-01-01 RX ORDER — CEFAZOLIN SODIUM 1 G
2000 VIAL (EA) INJECTION EVERY 8 HOURS
Refills: 0 | Status: DISCONTINUED | OUTPATIENT
Start: 2023-01-01 | End: 2023-01-01

## 2023-01-01 RX ORDER — BUMETANIDE 0.25 MG/ML
1 INJECTION INTRAMUSCULAR; INTRAVENOUS ONCE
Refills: 0 | Status: DISCONTINUED | OUTPATIENT
Start: 2023-01-01 | End: 2023-01-01

## 2023-01-01 RX ORDER — MORPHINE SULFATE 50 MG/1
0.5 CAPSULE, EXTENDED RELEASE ORAL EVERY 6 HOURS
Refills: 0 | Status: DISCONTINUED | OUTPATIENT
Start: 2023-01-01 | End: 2023-01-01

## 2023-01-01 RX ADMIN — ENOXAPARIN SODIUM 40 MILLIGRAM(S): 100 INJECTION SUBCUTANEOUS at 05:33

## 2023-01-01 RX ADMIN — Medication 3 MILLIGRAM(S): at 21:56

## 2023-01-01 RX ADMIN — GABAPENTIN 300 MILLIGRAM(S): 400 CAPSULE ORAL at 22:06

## 2023-01-01 RX ADMIN — Medication 650 MILLIGRAM(S): at 23:04

## 2023-01-01 RX ADMIN — GABAPENTIN 300 MILLIGRAM(S): 400 CAPSULE ORAL at 13:19

## 2023-01-01 RX ADMIN — CEFEPIME 100 MILLIGRAM(S): 1 INJECTION, POWDER, FOR SOLUTION INTRAMUSCULAR; INTRAVENOUS at 02:54

## 2023-01-01 RX ADMIN — LIDOCAINE 1 PATCH: 4 CREAM TOPICAL at 20:13

## 2023-01-01 RX ADMIN — PIPERACILLIN AND TAZOBACTAM 25 GRAM(S): 4; .5 INJECTION, POWDER, LYOPHILIZED, FOR SOLUTION INTRAVENOUS at 21:43

## 2023-01-01 RX ADMIN — Medication 3 MILLILITER(S): at 21:51

## 2023-01-01 RX ADMIN — Medication 650 MILLIGRAM(S): at 05:03

## 2023-01-01 RX ADMIN — LIDOCAINE 1 PATCH: 4 CREAM TOPICAL at 13:24

## 2023-01-01 RX ADMIN — SODIUM CHLORIDE 2 GRAM(S): 9 INJECTION INTRAMUSCULAR; INTRAVENOUS; SUBCUTANEOUS at 17:38

## 2023-01-01 RX ADMIN — Medication 1 APPLICATION(S): at 18:02

## 2023-01-01 RX ADMIN — SODIUM CHLORIDE 2 GRAM(S): 9 INJECTION INTRAMUSCULAR; INTRAVENOUS; SUBCUTANEOUS at 21:34

## 2023-01-01 RX ADMIN — MEROPENEM 100 MILLIGRAM(S): 1 INJECTION INTRAVENOUS at 05:05

## 2023-01-01 RX ADMIN — Medication 85 MILLIMOLE(S): at 11:52

## 2023-01-01 RX ADMIN — CEFEPIME 100 MILLIGRAM(S): 1 INJECTION, POWDER, FOR SOLUTION INTRAMUSCULAR; INTRAVENOUS at 03:14

## 2023-01-01 RX ADMIN — PANTOPRAZOLE SODIUM 40 MILLIGRAM(S): 20 TABLET, DELAYED RELEASE ORAL at 05:59

## 2023-01-01 RX ADMIN — GABAPENTIN 300 MILLIGRAM(S): 400 CAPSULE ORAL at 21:12

## 2023-01-01 RX ADMIN — Medication 650 MILLIGRAM(S): at 06:02

## 2023-01-01 RX ADMIN — SODIUM CHLORIDE 125 MILLILITER(S): 9 INJECTION, SOLUTION INTRAVENOUS at 09:23

## 2023-01-01 RX ADMIN — SODIUM CHLORIDE 2 GRAM(S): 9 INJECTION INTRAMUSCULAR; INTRAVENOUS; SUBCUTANEOUS at 21:20

## 2023-01-01 RX ADMIN — Medication 650 MILLIGRAM(S): at 09:27

## 2023-01-01 RX ADMIN — CEFEPIME 100 MILLIGRAM(S): 1 INJECTION, POWDER, FOR SOLUTION INTRAMUSCULAR; INTRAVENOUS at 19:13

## 2023-01-01 RX ADMIN — GABAPENTIN 300 MILLIGRAM(S): 400 CAPSULE ORAL at 23:32

## 2023-01-01 RX ADMIN — SODIUM CHLORIDE 2 GRAM(S): 9 INJECTION INTRAMUSCULAR; INTRAVENOUS; SUBCUTANEOUS at 13:17

## 2023-01-01 RX ADMIN — MAGNESIUM OXIDE 400 MG ORAL TABLET 400 MILLIGRAM(S): 241.3 TABLET ORAL at 17:23

## 2023-01-01 RX ADMIN — Medication 2 PACKET(S): at 17:27

## 2023-01-01 RX ADMIN — Medication 400 MILLIGRAM(S): at 22:49

## 2023-01-01 RX ADMIN — TRAMADOL HYDROCHLORIDE 25 MILLIGRAM(S): 50 TABLET ORAL at 13:12

## 2023-01-01 RX ADMIN — NYSTATIN CREAM 1 APPLICATION(S): 100000 CREAM TOPICAL at 17:31

## 2023-01-01 RX ADMIN — POTASSIUM PHOSPHATE, MONOBASIC POTASSIUM PHOSPHATE, DIBASIC 83.33 MILLIMOLE(S): 236; 224 INJECTION, SOLUTION INTRAVENOUS at 17:40

## 2023-01-01 RX ADMIN — Medication 3 MILLILITER(S): at 16:31

## 2023-01-01 RX ADMIN — Medication 975 MILLIGRAM(S): at 22:54

## 2023-01-01 RX ADMIN — Medication 650 MILLIGRAM(S): at 15:33

## 2023-01-01 RX ADMIN — LIDOCAINE 1 PATCH: 4 CREAM TOPICAL at 12:09

## 2023-01-01 RX ADMIN — GABAPENTIN 300 MILLIGRAM(S): 400 CAPSULE ORAL at 05:59

## 2023-01-01 RX ADMIN — OXYCODONE HYDROCHLORIDE 5 MILLIGRAM(S): 5 TABLET ORAL at 13:10

## 2023-01-01 RX ADMIN — SODIUM CHLORIDE 2 GRAM(S): 9 INJECTION INTRAMUSCULAR; INTRAVENOUS; SUBCUTANEOUS at 18:25

## 2023-01-01 RX ADMIN — Medication 650 MILLIGRAM(S): at 16:13

## 2023-01-01 RX ADMIN — OXYCODONE HYDROCHLORIDE 10 MILLIGRAM(S): 5 TABLET ORAL at 05:59

## 2023-01-01 RX ADMIN — Medication 150 MICROGRAM(S): at 05:47

## 2023-01-01 RX ADMIN — SODIUM CHLORIDE 2 GRAM(S): 9 INJECTION INTRAMUSCULAR; INTRAVENOUS; SUBCUTANEOUS at 14:50

## 2023-01-01 RX ADMIN — ENOXAPARIN SODIUM 40 MILLIGRAM(S): 100 INJECTION SUBCUTANEOUS at 05:51

## 2023-01-01 RX ADMIN — GABAPENTIN 300 MILLIGRAM(S): 400 CAPSULE ORAL at 05:48

## 2023-01-01 RX ADMIN — SODIUM CHLORIDE 1 GRAM(S): 9 INJECTION INTRAMUSCULAR; INTRAVENOUS; SUBCUTANEOUS at 05:55

## 2023-01-01 RX ADMIN — OXYCODONE HYDROCHLORIDE 10 MILLIGRAM(S): 5 TABLET ORAL at 10:54

## 2023-01-01 RX ADMIN — METHADONE HYDROCHLORIDE 2.5 MILLIGRAM(S): 40 TABLET ORAL at 05:50

## 2023-01-01 RX ADMIN — Medication 112 MICROGRAM(S): at 06:36

## 2023-01-01 RX ADMIN — LIDOCAINE 1 PATCH: 4 CREAM TOPICAL at 23:19

## 2023-01-01 RX ADMIN — Medication 112 MICROGRAM(S): at 05:55

## 2023-01-01 RX ADMIN — ENOXAPARIN SODIUM 40 MILLIGRAM(S): 100 INJECTION SUBCUTANEOUS at 12:29

## 2023-01-01 RX ADMIN — GABAPENTIN 300 MILLIGRAM(S): 400 CAPSULE ORAL at 16:12

## 2023-01-01 RX ADMIN — OXYCODONE HYDROCHLORIDE 10 MILLIGRAM(S): 5 TABLET ORAL at 09:01

## 2023-01-01 RX ADMIN — Medication 150 MICROGRAM(S): at 05:09

## 2023-01-01 RX ADMIN — METHADONE HYDROCHLORIDE 2.5 MILLIGRAM(S): 40 TABLET ORAL at 21:20

## 2023-01-01 RX ADMIN — LIDOCAINE 1 PATCH: 4 CREAM TOPICAL at 19:59

## 2023-01-01 RX ADMIN — POLYETHYLENE GLYCOL 3350 17 GRAM(S): 17 POWDER, FOR SOLUTION ORAL at 08:49

## 2023-01-01 RX ADMIN — POLYETHYLENE GLYCOL 3350 17 GRAM(S): 17 POWDER, FOR SOLUTION ORAL at 11:59

## 2023-01-01 RX ADMIN — LEVALBUTEROL 1.25 MILLIGRAM(S): 1.25 SOLUTION, CONCENTRATE RESPIRATORY (INHALATION) at 20:54

## 2023-01-01 RX ADMIN — Medication 650 MILLIGRAM(S): at 21:06

## 2023-01-01 RX ADMIN — OXYCODONE HYDROCHLORIDE 2.5 MILLIGRAM(S): 5 TABLET ORAL at 04:59

## 2023-01-01 RX ADMIN — GABAPENTIN 400 MILLIGRAM(S): 400 CAPSULE ORAL at 06:21

## 2023-01-01 RX ADMIN — ENOXAPARIN SODIUM 40 MILLIGRAM(S): 100 INJECTION SUBCUTANEOUS at 11:46

## 2023-01-01 RX ADMIN — Medication 650 MILLIGRAM(S): at 05:33

## 2023-01-01 RX ADMIN — Medication 85 MILLIMOLE(S): at 18:29

## 2023-01-01 RX ADMIN — OXYCODONE HYDROCHLORIDE 10 MILLIGRAM(S): 5 TABLET ORAL at 11:46

## 2023-01-01 RX ADMIN — Medication 250 MILLIGRAM(S): at 14:59

## 2023-01-01 RX ADMIN — Medication 650 MILLIGRAM(S): at 05:56

## 2023-01-01 RX ADMIN — METHADONE HYDROCHLORIDE 2.5 MILLIGRAM(S): 40 TABLET ORAL at 05:31

## 2023-01-01 RX ADMIN — CEFEPIME 100 MILLIGRAM(S): 1 INJECTION, POWDER, FOR SOLUTION INTRAMUSCULAR; INTRAVENOUS at 19:09

## 2023-01-01 RX ADMIN — Medication 1000 MILLIGRAM(S): at 03:58

## 2023-01-01 RX ADMIN — OXYCODONE HYDROCHLORIDE 10 MILLIGRAM(S): 5 TABLET ORAL at 18:23

## 2023-01-01 RX ADMIN — Medication 85 MILLIMOLE(S): at 18:10

## 2023-01-01 RX ADMIN — Medication 650 MILLIGRAM(S): at 00:42

## 2023-01-01 RX ADMIN — MEROPENEM 100 MILLIGRAM(S): 1 INJECTION INTRAVENOUS at 05:50

## 2023-01-01 RX ADMIN — OXYCODONE HYDROCHLORIDE 5 MILLIGRAM(S): 5 TABLET ORAL at 17:51

## 2023-01-01 RX ADMIN — SODIUM CHLORIDE 4 MILLILITER(S): 9 INJECTION INTRAMUSCULAR; INTRAVENOUS; SUBCUTANEOUS at 23:22

## 2023-01-01 RX ADMIN — Medication 650 MILLIGRAM(S): at 00:26

## 2023-01-01 RX ADMIN — Medication 150 MICROGRAM(S): at 06:25

## 2023-01-01 RX ADMIN — TRAMADOL HYDROCHLORIDE 25 MILLIGRAM(S): 50 TABLET ORAL at 15:10

## 2023-01-01 RX ADMIN — PANTOPRAZOLE SODIUM 40 MILLIGRAM(S): 20 TABLET, DELAYED RELEASE ORAL at 11:42

## 2023-01-01 RX ADMIN — ENOXAPARIN SODIUM 40 MILLIGRAM(S): 100 INJECTION SUBCUTANEOUS at 11:59

## 2023-01-01 RX ADMIN — TRAMADOL HYDROCHLORIDE 25 MILLIGRAM(S): 50 TABLET ORAL at 06:04

## 2023-01-01 RX ADMIN — Medication 1 PACKET(S): at 12:12

## 2023-01-01 RX ADMIN — OXYCODONE HYDROCHLORIDE 10 MILLIGRAM(S): 5 TABLET ORAL at 18:01

## 2023-01-01 RX ADMIN — LIDOCAINE 1 PATCH: 4 CREAM TOPICAL at 13:28

## 2023-01-01 RX ADMIN — Medication 3 MILLIGRAM(S): at 21:06

## 2023-01-01 RX ADMIN — Medication 3 MILLILITER(S): at 21:06

## 2023-01-01 RX ADMIN — LEVALBUTEROL 1.25 MILLIGRAM(S): 1.25 SOLUTION, CONCENTRATE RESPIRATORY (INHALATION) at 10:30

## 2023-01-01 RX ADMIN — POLYETHYLENE GLYCOL 3350 17 GRAM(S): 17 POWDER, FOR SOLUTION ORAL at 08:59

## 2023-01-01 RX ADMIN — SODIUM CHLORIDE 4 MILLILITER(S): 9 INJECTION INTRAMUSCULAR; INTRAVENOUS; SUBCUTANEOUS at 09:49

## 2023-01-01 RX ADMIN — Medication 1 TABLET(S): at 15:25

## 2023-01-01 RX ADMIN — CEFEPIME 100 MILLIGRAM(S): 1 INJECTION, POWDER, FOR SOLUTION INTRAMUSCULAR; INTRAVENOUS at 18:24

## 2023-01-01 RX ADMIN — Medication 2 PACKET(S): at 05:48

## 2023-01-01 RX ADMIN — OXYCODONE HYDROCHLORIDE 10 MILLIGRAM(S): 5 TABLET ORAL at 10:30

## 2023-01-01 RX ADMIN — Medication 500 MILLIGRAM(S): at 12:19

## 2023-01-01 RX ADMIN — Medication 650 MILLIGRAM(S): at 22:32

## 2023-01-01 RX ADMIN — PIPERACILLIN AND TAZOBACTAM 25 GRAM(S): 4; .5 INJECTION, POWDER, LYOPHILIZED, FOR SOLUTION INTRAVENOUS at 06:23

## 2023-01-01 RX ADMIN — TRAMADOL HYDROCHLORIDE 25 MILLIGRAM(S): 50 TABLET ORAL at 14:10

## 2023-01-01 RX ADMIN — NYSTATIN CREAM 1 APPLICATION(S): 100000 CREAM TOPICAL at 06:34

## 2023-01-01 RX ADMIN — Medication 25 GRAM(S): at 09:56

## 2023-01-01 RX ADMIN — LEVALBUTEROL 1.25 MILLIGRAM(S): 1.25 SOLUTION, CONCENTRATE RESPIRATORY (INHALATION) at 06:40

## 2023-01-01 RX ADMIN — Medication 15 MILLIGRAM(S): at 01:20

## 2023-01-01 RX ADMIN — LIDOCAINE 1 PATCH: 4 CREAM TOPICAL at 01:58

## 2023-01-01 RX ADMIN — DIATRIZOATE MEGLUMINE 30 MILLILITER(S): 180 INJECTION, SOLUTION INTRAVESICAL at 15:31

## 2023-01-01 RX ADMIN — Medication 250 MILLIGRAM(S): at 18:06

## 2023-01-01 RX ADMIN — Medication 2 PACKET(S): at 23:42

## 2023-01-01 RX ADMIN — OXYCODONE HYDROCHLORIDE 10 MILLIGRAM(S): 5 TABLET ORAL at 09:31

## 2023-01-01 RX ADMIN — MORPHINE SULFATE 4 MILLIGRAM(S): 50 CAPSULE, EXTENDED RELEASE ORAL at 13:21

## 2023-01-01 RX ADMIN — Medication 1000 MILLIGRAM(S): at 03:00

## 2023-01-01 RX ADMIN — GABAPENTIN 300 MILLIGRAM(S): 400 CAPSULE ORAL at 21:55

## 2023-01-01 RX ADMIN — CHLORHEXIDINE GLUCONATE 1 APPLICATION(S): 213 SOLUTION TOPICAL at 13:05

## 2023-01-01 RX ADMIN — Medication 3 MILLIGRAM(S): at 21:37

## 2023-01-01 RX ADMIN — OXYCODONE HYDROCHLORIDE 10 MILLIGRAM(S): 5 TABLET ORAL at 13:00

## 2023-01-01 RX ADMIN — CEFEPIME 100 MILLIGRAM(S): 1 INJECTION, POWDER, FOR SOLUTION INTRAMUSCULAR; INTRAVENOUS at 21:20

## 2023-01-01 RX ADMIN — Medication 3 MILLILITER(S): at 09:36

## 2023-01-01 RX ADMIN — LIDOCAINE 1 PATCH: 4 CREAM TOPICAL at 14:11

## 2023-01-01 RX ADMIN — OXYCODONE HYDROCHLORIDE 5 MILLIGRAM(S): 5 TABLET ORAL at 18:08

## 2023-01-01 RX ADMIN — GABAPENTIN 300 MILLIGRAM(S): 400 CAPSULE ORAL at 06:55

## 2023-01-01 RX ADMIN — OXYCODONE HYDROCHLORIDE 5 MILLIGRAM(S): 5 TABLET ORAL at 15:31

## 2023-01-01 RX ADMIN — GABAPENTIN 300 MILLIGRAM(S): 400 CAPSULE ORAL at 06:07

## 2023-01-01 RX ADMIN — MEROPENEM 100 MILLIGRAM(S): 1 INJECTION INTRAVENOUS at 05:56

## 2023-01-01 RX ADMIN — HYDROMORPHONE HYDROCHLORIDE 1 MILLIGRAM(S): 2 INJECTION INTRAMUSCULAR; INTRAVENOUS; SUBCUTANEOUS at 19:01

## 2023-01-01 RX ADMIN — GABAPENTIN 300 MILLIGRAM(S): 400 CAPSULE ORAL at 13:16

## 2023-01-01 RX ADMIN — Medication 650 MILLIGRAM(S): at 05:10

## 2023-01-01 RX ADMIN — Medication 1 TABLET(S): at 14:08

## 2023-01-01 RX ADMIN — Medication 1 APPLICATION(S): at 18:22

## 2023-01-01 RX ADMIN — Medication 650 MILLIGRAM(S): at 05:34

## 2023-01-01 RX ADMIN — Medication 150 MICROGRAM(S): at 05:17

## 2023-01-01 RX ADMIN — GABAPENTIN 300 MILLIGRAM(S): 400 CAPSULE ORAL at 05:35

## 2023-01-01 RX ADMIN — OXYCODONE HYDROCHLORIDE 10 MILLIGRAM(S): 5 TABLET ORAL at 07:02

## 2023-01-01 RX ADMIN — Medication 1 APPLICATION(S): at 17:37

## 2023-01-01 RX ADMIN — Medication 1 APPLICATION(S): at 06:15

## 2023-01-01 RX ADMIN — OXYCODONE HYDROCHLORIDE 7.5 MILLIGRAM(S): 5 TABLET ORAL at 12:14

## 2023-01-01 RX ADMIN — CEFEPIME 100 MILLIGRAM(S): 1 INJECTION, POWDER, FOR SOLUTION INTRAMUSCULAR; INTRAVENOUS at 11:57

## 2023-01-01 RX ADMIN — OXYCODONE HYDROCHLORIDE 10 MILLIGRAM(S): 5 TABLET ORAL at 21:21

## 2023-01-01 RX ADMIN — GABAPENTIN 300 MILLIGRAM(S): 400 CAPSULE ORAL at 12:05

## 2023-01-01 RX ADMIN — SODIUM CHLORIDE 125 MILLILITER(S): 9 INJECTION, SOLUTION INTRAVENOUS at 13:15

## 2023-01-01 RX ADMIN — OXYCODONE HYDROCHLORIDE 10 MILLIGRAM(S): 5 TABLET ORAL at 07:04

## 2023-01-01 RX ADMIN — Medication 1 PACKET(S): at 09:06

## 2023-01-01 RX ADMIN — MAGNESIUM OXIDE 400 MG ORAL TABLET 400 MILLIGRAM(S): 241.3 TABLET ORAL at 10:12

## 2023-01-01 RX ADMIN — GABAPENTIN 300 MILLIGRAM(S): 400 CAPSULE ORAL at 17:26

## 2023-01-01 RX ADMIN — Medication 3 MILLILITER(S): at 20:54

## 2023-01-01 RX ADMIN — CHLORHEXIDINE GLUCONATE 1 APPLICATION(S): 213 SOLUTION TOPICAL at 12:06

## 2023-01-01 RX ADMIN — Medication 650 MILLIGRAM(S): at 06:23

## 2023-01-01 RX ADMIN — OXYCODONE HYDROCHLORIDE 10 MILLIGRAM(S): 5 TABLET ORAL at 21:19

## 2023-01-01 RX ADMIN — SODIUM CHLORIDE 2 GRAM(S): 9 INJECTION INTRAMUSCULAR; INTRAVENOUS; SUBCUTANEOUS at 13:05

## 2023-01-01 RX ADMIN — LIDOCAINE 1 PATCH: 4 CREAM TOPICAL at 00:00

## 2023-01-01 RX ADMIN — NYSTATIN CREAM 1 APPLICATION(S): 100000 CREAM TOPICAL at 05:11

## 2023-01-01 RX ADMIN — GABAPENTIN 300 MILLIGRAM(S): 400 CAPSULE ORAL at 05:58

## 2023-01-01 RX ADMIN — Medication 150 MICROGRAM(S): at 06:37

## 2023-01-01 RX ADMIN — PIPERACILLIN AND TAZOBACTAM 200 GRAM(S): 4; .5 INJECTION, POWDER, LYOPHILIZED, FOR SOLUTION INTRAVENOUS at 11:47

## 2023-01-01 RX ADMIN — Medication 1 APPLICATION(S): at 06:11

## 2023-01-01 RX ADMIN — Medication 1 APPLICATION(S): at 07:02

## 2023-01-01 RX ADMIN — LIDOCAINE 1 PATCH: 4 CREAM TOPICAL at 12:44

## 2023-01-01 RX ADMIN — SODIUM CHLORIDE 1000 MILLILITER(S): 9 INJECTION, SOLUTION INTRAVENOUS at 11:37

## 2023-01-01 RX ADMIN — Medication 150 MICROGRAM(S): at 06:11

## 2023-01-01 RX ADMIN — SODIUM CHLORIDE 4 MILLILITER(S): 9 INJECTION INTRAMUSCULAR; INTRAVENOUS; SUBCUTANEOUS at 09:11

## 2023-01-01 RX ADMIN — Medication 10 MILLIGRAM(S): at 14:45

## 2023-01-01 RX ADMIN — SODIUM CHLORIDE 2 GRAM(S): 9 INJECTION INTRAMUSCULAR; INTRAVENOUS; SUBCUTANEOUS at 05:51

## 2023-01-01 RX ADMIN — Medication 150 MICROGRAM(S): at 05:56

## 2023-01-01 RX ADMIN — OXYCODONE HYDROCHLORIDE 5 MILLIGRAM(S): 5 TABLET ORAL at 17:09

## 2023-01-01 RX ADMIN — PANTOPRAZOLE SODIUM 40 MILLIGRAM(S): 20 TABLET, DELAYED RELEASE ORAL at 09:26

## 2023-01-01 RX ADMIN — METHADONE HYDROCHLORIDE 2.5 MILLIGRAM(S): 40 TABLET ORAL at 18:06

## 2023-01-01 RX ADMIN — Medication 100 MILLIGRAM(S): at 14:23

## 2023-01-01 RX ADMIN — SENNA PLUS 2 TABLET(S): 8.6 TABLET ORAL at 22:47

## 2023-01-01 RX ADMIN — CHLORHEXIDINE GLUCONATE 1 APPLICATION(S): 213 SOLUTION TOPICAL at 13:11

## 2023-01-01 RX ADMIN — Medication 650 MILLIGRAM(S): at 18:09

## 2023-01-01 RX ADMIN — Medication 650 MILLIGRAM(S): at 17:53

## 2023-01-01 RX ADMIN — OXYCODONE HYDROCHLORIDE 10 MILLIGRAM(S): 5 TABLET ORAL at 22:19

## 2023-01-01 RX ADMIN — Medication 1 TABLET(S): at 12:28

## 2023-01-01 RX ADMIN — SODIUM CHLORIDE 70 MILLILITER(S): 9 INJECTION, SOLUTION INTRAVENOUS at 21:13

## 2023-01-01 RX ADMIN — SODIUM CHLORIDE 125 MILLILITER(S): 9 INJECTION, SOLUTION INTRAVENOUS at 20:01

## 2023-01-01 RX ADMIN — LIDOCAINE 1 PATCH: 4 CREAM TOPICAL at 12:05

## 2023-01-01 RX ADMIN — MAGNESIUM OXIDE 400 MG ORAL TABLET 400 MILLIGRAM(S): 241.3 TABLET ORAL at 18:18

## 2023-01-01 RX ADMIN — RIVAROXABAN 10 MILLIGRAM(S): KIT at 14:17

## 2023-01-01 RX ADMIN — Medication 100 MILLIGRAM(S): at 06:35

## 2023-01-01 RX ADMIN — METHADONE HYDROCHLORIDE 2.5 MILLIGRAM(S): 40 TABLET ORAL at 06:59

## 2023-01-01 RX ADMIN — CHLORHEXIDINE GLUCONATE 1 APPLICATION(S): 213 SOLUTION TOPICAL at 11:53

## 2023-01-01 RX ADMIN — POLYETHYLENE GLYCOL 3350 17 GRAM(S): 17 POWDER, FOR SOLUTION ORAL at 09:53

## 2023-01-01 RX ADMIN — Medication 650 MILLIGRAM(S): at 12:10

## 2023-01-01 RX ADMIN — Medication 150 MICROGRAM(S): at 06:02

## 2023-01-01 RX ADMIN — OXYCODONE HYDROCHLORIDE 5 MILLIGRAM(S): 5 TABLET ORAL at 22:02

## 2023-01-01 RX ADMIN — Medication 1 APPLICATION(S): at 17:31

## 2023-01-01 RX ADMIN — ENOXAPARIN SODIUM 40 MILLIGRAM(S): 100 INJECTION SUBCUTANEOUS at 06:36

## 2023-01-01 RX ADMIN — LIDOCAINE 1 PATCH: 4 CREAM TOPICAL at 12:18

## 2023-01-01 RX ADMIN — SENNA PLUS 10 MILLILITER(S): 8.6 TABLET ORAL at 21:23

## 2023-01-01 RX ADMIN — MAGNESIUM OXIDE 400 MG ORAL TABLET 400 MILLIGRAM(S): 241.3 TABLET ORAL at 17:14

## 2023-01-01 RX ADMIN — Medication 1 APPLICATION(S): at 19:31

## 2023-01-01 RX ADMIN — Medication 650 MILLIGRAM(S): at 15:44

## 2023-01-01 RX ADMIN — Medication 150 MICROGRAM(S): at 05:20

## 2023-01-01 RX ADMIN — OXYCODONE HYDROCHLORIDE 10 MILLIGRAM(S): 5 TABLET ORAL at 02:37

## 2023-01-01 RX ADMIN — Medication 650 MILLIGRAM(S): at 13:10

## 2023-01-01 RX ADMIN — Medication 650 MILLIGRAM(S): at 21:20

## 2023-01-01 RX ADMIN — OXYCODONE HYDROCHLORIDE 10 MILLIGRAM(S): 5 TABLET ORAL at 11:00

## 2023-01-01 RX ADMIN — Medication 650 MILLIGRAM(S): at 03:56

## 2023-01-01 RX ADMIN — Medication 400 MILLIGRAM(S): at 01:54

## 2023-01-01 RX ADMIN — GABAPENTIN 300 MILLIGRAM(S): 400 CAPSULE ORAL at 21:32

## 2023-01-01 RX ADMIN — LEVALBUTEROL 1.25 MILLIGRAM(S): 1.25 SOLUTION, CONCENTRATE RESPIRATORY (INHALATION) at 07:06

## 2023-01-01 RX ADMIN — Medication 650 MILLIGRAM(S): at 17:52

## 2023-01-01 RX ADMIN — MEROPENEM 100 MILLIGRAM(S): 1 INJECTION INTRAVENOUS at 21:55

## 2023-01-01 RX ADMIN — OXYCODONE HYDROCHLORIDE 5 MILLIGRAM(S): 5 TABLET ORAL at 05:41

## 2023-01-01 RX ADMIN — MAGNESIUM OXIDE 400 MG ORAL TABLET 400 MILLIGRAM(S): 241.3 TABLET ORAL at 05:47

## 2023-01-01 RX ADMIN — CHLORHEXIDINE GLUCONATE 1 APPLICATION(S): 213 SOLUTION TOPICAL at 14:07

## 2023-01-01 RX ADMIN — Medication 650 MILLIGRAM(S): at 01:14

## 2023-01-01 RX ADMIN — PANTOPRAZOLE SODIUM 40 MILLIGRAM(S): 20 TABLET, DELAYED RELEASE ORAL at 06:55

## 2023-01-01 RX ADMIN — Medication 2 PACKET(S): at 14:14

## 2023-01-01 RX ADMIN — LIDOCAINE 1 PATCH: 4 CREAM TOPICAL at 12:29

## 2023-01-01 RX ADMIN — Medication 150 MICROGRAM(S): at 05:45

## 2023-01-01 RX ADMIN — OXYCODONE HYDROCHLORIDE 10 MILLIGRAM(S): 5 TABLET ORAL at 22:40

## 2023-01-01 RX ADMIN — Medication 650 MILLIGRAM(S): at 10:14

## 2023-01-01 RX ADMIN — LEVALBUTEROL 0.63 MILLIGRAM(S): 1.25 SOLUTION, CONCENTRATE RESPIRATORY (INHALATION) at 21:48

## 2023-01-01 RX ADMIN — ENOXAPARIN SODIUM 40 MILLIGRAM(S): 100 INJECTION SUBCUTANEOUS at 06:55

## 2023-01-01 RX ADMIN — LEVALBUTEROL 0.63 MILLIGRAM(S): 1.25 SOLUTION, CONCENTRATE RESPIRATORY (INHALATION) at 10:01

## 2023-01-01 RX ADMIN — SENNA PLUS 10 MILLILITER(S): 8.6 TABLET ORAL at 11:50

## 2023-01-01 RX ADMIN — SODIUM CHLORIDE 1000 MILLILITER(S): 9 INJECTION, SOLUTION INTRAVENOUS at 07:38

## 2023-01-01 RX ADMIN — Medication 650 MILLIGRAM(S): at 10:19

## 2023-01-01 RX ADMIN — NYSTATIN CREAM 1 APPLICATION(S): 100000 CREAM TOPICAL at 06:22

## 2023-01-01 RX ADMIN — Medication 1 APPLICATION(S): at 05:33

## 2023-01-01 RX ADMIN — Medication 1000 MILLIGRAM(S): at 10:56

## 2023-01-01 RX ADMIN — GABAPENTIN 300 MILLIGRAM(S): 400 CAPSULE ORAL at 11:54

## 2023-01-01 RX ADMIN — Medication 150 MICROGRAM(S): at 06:21

## 2023-01-01 RX ADMIN — PANTOPRAZOLE SODIUM 40 MILLIGRAM(S): 20 TABLET, DELAYED RELEASE ORAL at 15:24

## 2023-01-01 RX ADMIN — PIPERACILLIN AND TAZOBACTAM 25 GRAM(S): 4; .5 INJECTION, POWDER, LYOPHILIZED, FOR SOLUTION INTRAVENOUS at 01:18

## 2023-01-01 RX ADMIN — NYSTATIN CREAM 1 APPLICATION(S): 100000 CREAM TOPICAL at 19:31

## 2023-01-01 RX ADMIN — POLYETHYLENE GLYCOL 3350 17 GRAM(S): 17 POWDER, FOR SOLUTION ORAL at 05:29

## 2023-01-01 RX ADMIN — GABAPENTIN 300 MILLIGRAM(S): 400 CAPSULE ORAL at 05:16

## 2023-01-01 RX ADMIN — Medication 1 PACKET(S): at 18:18

## 2023-01-01 RX ADMIN — Medication 150 MICROGRAM(S): at 06:56

## 2023-01-01 RX ADMIN — LEVALBUTEROL 0.63 MILLIGRAM(S): 1.25 SOLUTION, CONCENTRATE RESPIRATORY (INHALATION) at 10:30

## 2023-01-01 RX ADMIN — SODIUM CHLORIDE 100 MILLILITER(S): 9 INJECTION INTRAMUSCULAR; INTRAVENOUS; SUBCUTANEOUS at 04:43

## 2023-01-01 RX ADMIN — METHADONE HYDROCHLORIDE 2.5 MILLIGRAM(S): 40 TABLET ORAL at 06:43

## 2023-01-01 RX ADMIN — PANTOPRAZOLE SODIUM 40 MILLIGRAM(S): 20 TABLET, DELAYED RELEASE ORAL at 08:49

## 2023-01-01 RX ADMIN — Medication 2 PACKET(S): at 22:46

## 2023-01-01 RX ADMIN — PIPERACILLIN AND TAZOBACTAM 25 GRAM(S): 4; .5 INJECTION, POWDER, LYOPHILIZED, FOR SOLUTION INTRAVENOUS at 05:19

## 2023-01-01 RX ADMIN — GABAPENTIN 300 MILLIGRAM(S): 400 CAPSULE ORAL at 05:34

## 2023-01-01 RX ADMIN — OXYCODONE HYDROCHLORIDE 10 MILLIGRAM(S): 5 TABLET ORAL at 06:18

## 2023-01-01 RX ADMIN — MORPHINE SULFATE 1 MILLIGRAM(S): 50 CAPSULE, EXTENDED RELEASE ORAL at 12:46

## 2023-01-01 RX ADMIN — LIDOCAINE 1 PATCH: 4 CREAM TOPICAL at 06:16

## 2023-01-01 RX ADMIN — DENOSUMAB 120 MILLIGRAM(S): 60 INJECTION SUBCUTANEOUS at 15:26

## 2023-01-01 RX ADMIN — SODIUM CHLORIDE 75 MILLILITER(S): 9 INJECTION, SOLUTION INTRAVENOUS at 17:33

## 2023-01-01 RX ADMIN — Medication 650 MILLIGRAM(S): at 10:59

## 2023-01-01 RX ADMIN — Medication 1 PACKET(S): at 17:00

## 2023-01-01 RX ADMIN — OXYCODONE HYDROCHLORIDE 10 MILLIGRAM(S): 5 TABLET ORAL at 15:15

## 2023-01-01 RX ADMIN — SODIUM CHLORIDE 125 MILLILITER(S): 9 INJECTION, SOLUTION INTRAVENOUS at 12:56

## 2023-01-01 RX ADMIN — Medication 500 MILLIGRAM(S): at 11:51

## 2023-01-01 RX ADMIN — Medication 650 MILLIGRAM(S): at 17:34

## 2023-01-01 RX ADMIN — PANTOPRAZOLE SODIUM 40 MILLIGRAM(S): 20 TABLET, DELAYED RELEASE ORAL at 05:41

## 2023-01-01 RX ADMIN — OXYCODONE HYDROCHLORIDE 10 MILLIGRAM(S): 5 TABLET ORAL at 21:59

## 2023-01-01 RX ADMIN — SODIUM CHLORIDE 4 MILLILITER(S): 9 INJECTION INTRAMUSCULAR; INTRAVENOUS; SUBCUTANEOUS at 00:02

## 2023-01-01 RX ADMIN — OXYCODONE HYDROCHLORIDE 5 MILLIGRAM(S): 5 TABLET ORAL at 23:58

## 2023-01-01 RX ADMIN — METHADONE HYDROCHLORIDE 2.5 MILLIGRAM(S): 40 TABLET ORAL at 05:36

## 2023-01-01 RX ADMIN — Medication 1 TABLET(S): at 12:10

## 2023-01-01 RX ADMIN — SODIUM CHLORIDE 2 GRAM(S): 9 INJECTION INTRAMUSCULAR; INTRAVENOUS; SUBCUTANEOUS at 21:22

## 2023-01-01 RX ADMIN — SODIUM CHLORIDE 2 GRAM(S): 9 INJECTION INTRAMUSCULAR; INTRAVENOUS; SUBCUTANEOUS at 06:34

## 2023-01-01 RX ADMIN — Medication 400 MILLIGRAM(S): at 08:59

## 2023-01-01 RX ADMIN — SODIUM CHLORIDE 4 MILLILITER(S): 9 INJECTION INTRAMUSCULAR; INTRAVENOUS; SUBCUTANEOUS at 21:47

## 2023-01-01 RX ADMIN — Medication 650 MILLIGRAM(S): at 22:17

## 2023-01-01 RX ADMIN — Medication 2 PACKET(S): at 06:03

## 2023-01-01 RX ADMIN — Medication 650 MILLIGRAM(S): at 11:58

## 2023-01-01 RX ADMIN — GABAPENTIN 300 MILLIGRAM(S): 400 CAPSULE ORAL at 22:00

## 2023-01-01 RX ADMIN — Medication 650 MILLIGRAM(S): at 15:02

## 2023-01-01 RX ADMIN — MEROPENEM 100 MILLIGRAM(S): 1 INJECTION INTRAVENOUS at 21:13

## 2023-01-01 RX ADMIN — CEFEPIME 100 MILLIGRAM(S): 1 INJECTION, POWDER, FOR SOLUTION INTRAMUSCULAR; INTRAVENOUS at 22:20

## 2023-01-01 RX ADMIN — NYSTATIN CREAM 1 APPLICATION(S): 100000 CREAM TOPICAL at 05:46

## 2023-01-01 RX ADMIN — LIDOCAINE 1 PATCH: 4 CREAM TOPICAL at 00:01

## 2023-01-01 RX ADMIN — LIDOCAINE 1 PATCH: 4 CREAM TOPICAL at 03:20

## 2023-01-01 RX ADMIN — OXYCODONE HYDROCHLORIDE 10 MILLIGRAM(S): 5 TABLET ORAL at 14:40

## 2023-01-01 RX ADMIN — Medication 2 PACKET(S): at 22:47

## 2023-01-01 RX ADMIN — Medication 150 MICROGRAM(S): at 05:35

## 2023-01-01 RX ADMIN — Medication 112 MICROGRAM(S): at 07:46

## 2023-01-01 RX ADMIN — CHLORHEXIDINE GLUCONATE 1 APPLICATION(S): 213 SOLUTION TOPICAL at 13:00

## 2023-01-01 RX ADMIN — CEFEPIME 100 MILLIGRAM(S): 1 INJECTION, POWDER, FOR SOLUTION INTRAMUSCULAR; INTRAVENOUS at 21:36

## 2023-01-01 RX ADMIN — PIPERACILLIN AND TAZOBACTAM 25 GRAM(S): 4; .5 INJECTION, POWDER, LYOPHILIZED, FOR SOLUTION INTRAVENOUS at 22:18

## 2023-01-01 RX ADMIN — ENOXAPARIN SODIUM 40 MILLIGRAM(S): 100 INJECTION SUBCUTANEOUS at 13:05

## 2023-01-01 RX ADMIN — Medication 650 MILLIGRAM(S): at 17:08

## 2023-01-01 RX ADMIN — OXYCODONE HYDROCHLORIDE 10 MILLIGRAM(S): 5 TABLET ORAL at 10:00

## 2023-01-01 RX ADMIN — LIDOCAINE 1 PATCH: 4 CREAM TOPICAL at 01:00

## 2023-01-01 RX ADMIN — CALCITONIN SALMON 250 INTERNATIONAL UNIT(S): 200 INJECTION, SOLUTION INTRAMUSCULAR at 07:26

## 2023-01-01 RX ADMIN — SODIUM CHLORIDE 2 GRAM(S): 9 INJECTION INTRAMUSCULAR; INTRAVENOUS; SUBCUTANEOUS at 05:34

## 2023-01-01 RX ADMIN — ENOXAPARIN SODIUM 40 MILLIGRAM(S): 100 INJECTION SUBCUTANEOUS at 12:59

## 2023-01-01 RX ADMIN — NYSTATIN CREAM 1 APPLICATION(S): 100000 CREAM TOPICAL at 06:02

## 2023-01-01 RX ADMIN — LEVALBUTEROL 1.25 MILLIGRAM(S): 1.25 SOLUTION, CONCENTRATE RESPIRATORY (INHALATION) at 03:31

## 2023-01-01 RX ADMIN — Medication 250 MILLIGRAM(S): at 16:10

## 2023-01-01 RX ADMIN — ALBUTEROL 2.5 MILLIGRAM(S): 90 AEROSOL, METERED ORAL at 09:09

## 2023-01-01 RX ADMIN — Medication 650 MILLIGRAM(S): at 18:17

## 2023-01-01 RX ADMIN — CEFEPIME 100 MILLIGRAM(S): 1 INJECTION, POWDER, FOR SOLUTION INTRAMUSCULAR; INTRAVENOUS at 15:30

## 2023-01-01 RX ADMIN — POLYETHYLENE GLYCOL 3350 17 GRAM(S): 17 POWDER, FOR SOLUTION ORAL at 06:53

## 2023-01-01 RX ADMIN — GABAPENTIN 300 MILLIGRAM(S): 400 CAPSULE ORAL at 14:24

## 2023-01-01 RX ADMIN — Medication 3 MILLILITER(S): at 04:24

## 2023-01-01 RX ADMIN — Medication 650 MILLIGRAM(S): at 21:55

## 2023-01-01 RX ADMIN — OXYCODONE HYDROCHLORIDE 10 MILLIGRAM(S): 5 TABLET ORAL at 00:04

## 2023-01-01 RX ADMIN — OXYCODONE HYDROCHLORIDE 5 MILLIGRAM(S): 5 TABLET ORAL at 12:36

## 2023-01-01 RX ADMIN — Medication 500 MILLIGRAM(S): at 13:29

## 2023-01-01 RX ADMIN — Medication 1 APPLICATION(S): at 18:36

## 2023-01-01 RX ADMIN — PANTOPRAZOLE SODIUM 40 MILLIGRAM(S): 20 TABLET, DELAYED RELEASE ORAL at 08:59

## 2023-01-01 RX ADMIN — Medication 150 MICROGRAM(S): at 05:43

## 2023-01-01 RX ADMIN — Medication 650 MILLIGRAM(S): at 06:32

## 2023-01-01 RX ADMIN — SODIUM CHLORIDE 9999 MILLILITER(S): 9 INJECTION INTRAMUSCULAR; INTRAVENOUS; SUBCUTANEOUS at 00:39

## 2023-01-01 RX ADMIN — Medication 85 MILLIMOLE(S): at 10:58

## 2023-01-01 RX ADMIN — Medication 1 APPLICATION(S): at 05:37

## 2023-01-01 RX ADMIN — GABAPENTIN 300 MILLIGRAM(S): 400 CAPSULE ORAL at 15:03

## 2023-01-01 RX ADMIN — Medication 650 MILLIGRAM(S): at 04:21

## 2023-01-01 RX ADMIN — GABAPENTIN 300 MILLIGRAM(S): 400 CAPSULE ORAL at 22:41

## 2023-01-01 RX ADMIN — OXYCODONE HYDROCHLORIDE 10 MILLIGRAM(S): 5 TABLET ORAL at 23:56

## 2023-01-01 RX ADMIN — TRAMADOL HYDROCHLORIDE 25 MILLIGRAM(S): 50 TABLET ORAL at 21:30

## 2023-01-01 RX ADMIN — MORPHINE SULFATE 1 MILLIGRAM(S): 50 CAPSULE, EXTENDED RELEASE ORAL at 13:05

## 2023-01-01 RX ADMIN — Medication 3 MILLIGRAM(S): at 22:46

## 2023-01-01 RX ADMIN — SODIUM CHLORIDE 2 GRAM(S): 9 INJECTION INTRAMUSCULAR; INTRAVENOUS; SUBCUTANEOUS at 17:47

## 2023-01-01 RX ADMIN — POLYETHYLENE GLYCOL 3350 17 GRAM(S): 17 POWDER, FOR SOLUTION ORAL at 05:59

## 2023-01-01 RX ADMIN — Medication 3 MILLILITER(S): at 00:02

## 2023-01-01 RX ADMIN — OXYCODONE HYDROCHLORIDE 5 MILLIGRAM(S): 5 TABLET ORAL at 21:30

## 2023-01-01 RX ADMIN — OXYCODONE HYDROCHLORIDE 10 MILLIGRAM(S): 5 TABLET ORAL at 00:30

## 2023-01-01 RX ADMIN — POLYETHYLENE GLYCOL 3350 17 GRAM(S): 17 POWDER, FOR SOLUTION ORAL at 21:07

## 2023-01-01 RX ADMIN — Medication 1 APPLICATION(S): at 06:10

## 2023-01-01 RX ADMIN — CHLORHEXIDINE GLUCONATE 1 APPLICATION(S): 213 SOLUTION TOPICAL at 13:01

## 2023-01-01 RX ADMIN — METHADONE HYDROCHLORIDE 2.5 MILLIGRAM(S): 40 TABLET ORAL at 21:55

## 2023-01-01 RX ADMIN — Medication 2 PACKET(S): at 21:31

## 2023-01-01 RX ADMIN — Medication 650 MILLIGRAM(S): at 14:11

## 2023-01-01 RX ADMIN — LEVALBUTEROL 0.63 MILLIGRAM(S): 1.25 SOLUTION, CONCENTRATE RESPIRATORY (INHALATION) at 04:08

## 2023-01-01 RX ADMIN — Medication 650 MILLIGRAM(S): at 18:25

## 2023-01-01 RX ADMIN — LEVALBUTEROL 1.25 MILLIGRAM(S): 1.25 SOLUTION, CONCENTRATE RESPIRATORY (INHALATION) at 04:24

## 2023-01-01 RX ADMIN — Medication 150 MICROGRAM(S): at 05:12

## 2023-01-01 RX ADMIN — Medication 150 MICROGRAM(S): at 06:15

## 2023-01-01 RX ADMIN — OXYCODONE HYDROCHLORIDE 10 MILLIGRAM(S): 5 TABLET ORAL at 08:00

## 2023-01-01 RX ADMIN — SODIUM CHLORIDE 4 MILLILITER(S): 9 INJECTION INTRAMUSCULAR; INTRAVENOUS; SUBCUTANEOUS at 09:41

## 2023-01-01 RX ADMIN — SENNA PLUS 10 MILLILITER(S): 8.6 TABLET ORAL at 13:10

## 2023-01-01 RX ADMIN — OXYCODONE HYDROCHLORIDE 10 MILLIGRAM(S): 5 TABLET ORAL at 22:21

## 2023-01-01 RX ADMIN — PANTOPRAZOLE SODIUM 40 MILLIGRAM(S): 20 TABLET, DELAYED RELEASE ORAL at 12:23

## 2023-01-01 RX ADMIN — LIDOCAINE 1 PATCH: 4 CREAM TOPICAL at 13:55

## 2023-01-01 RX ADMIN — CHLORHEXIDINE GLUCONATE 1 APPLICATION(S): 213 SOLUTION TOPICAL at 14:41

## 2023-01-01 RX ADMIN — Medication 975 MILLIGRAM(S): at 13:15

## 2023-01-01 RX ADMIN — CHLORHEXIDINE GLUCONATE 1 APPLICATION(S): 213 SOLUTION TOPICAL at 11:25

## 2023-01-01 RX ADMIN — OXYCODONE HYDROCHLORIDE 10 MILLIGRAM(S): 5 TABLET ORAL at 17:33

## 2023-01-01 RX ADMIN — PANTOPRAZOLE SODIUM 40 MILLIGRAM(S): 20 TABLET, DELAYED RELEASE ORAL at 11:39

## 2023-01-01 RX ADMIN — Medication 650 MILLIGRAM(S): at 05:37

## 2023-01-01 RX ADMIN — ENOXAPARIN SODIUM 40 MILLIGRAM(S): 100 INJECTION SUBCUTANEOUS at 17:50

## 2023-01-01 RX ADMIN — Medication 10 MILLIGRAM(S): at 21:56

## 2023-01-01 RX ADMIN — Medication 2 PACKET(S): at 13:35

## 2023-01-01 RX ADMIN — METHADONE HYDROCHLORIDE 2.5 MILLIGRAM(S): 40 TABLET ORAL at 22:20

## 2023-01-01 RX ADMIN — SODIUM CHLORIDE 4 MILLILITER(S): 9 INJECTION INTRAMUSCULAR; INTRAVENOUS; SUBCUTANEOUS at 07:06

## 2023-01-01 RX ADMIN — OXYCODONE HYDROCHLORIDE 10 MILLIGRAM(S): 5 TABLET ORAL at 06:34

## 2023-01-01 RX ADMIN — METHADONE HYDROCHLORIDE 2.5 MILLIGRAM(S): 40 TABLET ORAL at 21:30

## 2023-01-01 RX ADMIN — OXYCODONE HYDROCHLORIDE 10 MILLIGRAM(S): 5 TABLET ORAL at 19:23

## 2023-01-01 RX ADMIN — OXYCODONE HYDROCHLORIDE 5 MILLIGRAM(S): 5 TABLET ORAL at 22:45

## 2023-01-01 RX ADMIN — CEFEPIME 100 MILLIGRAM(S): 1 INJECTION, POWDER, FOR SOLUTION INTRAMUSCULAR; INTRAVENOUS at 13:14

## 2023-01-01 RX ADMIN — MAGNESIUM OXIDE 400 MG ORAL TABLET 400 MILLIGRAM(S): 241.3 TABLET ORAL at 07:17

## 2023-01-01 RX ADMIN — OXYCODONE HYDROCHLORIDE 10 MILLIGRAM(S): 5 TABLET ORAL at 03:34

## 2023-01-01 RX ADMIN — HEPARIN SODIUM 5000 UNIT(S): 5000 INJECTION INTRAVENOUS; SUBCUTANEOUS at 05:43

## 2023-01-01 RX ADMIN — CHLORHEXIDINE GLUCONATE 1 APPLICATION(S): 213 SOLUTION TOPICAL at 13:07

## 2023-01-01 RX ADMIN — METHADONE HYDROCHLORIDE 2.5 MILLIGRAM(S): 40 TABLET ORAL at 04:01

## 2023-01-01 RX ADMIN — MAGNESIUM OXIDE 400 MG ORAL TABLET 400 MILLIGRAM(S): 241.3 TABLET ORAL at 17:42

## 2023-01-01 RX ADMIN — Medication 10 MILLIGRAM(S): at 05:50

## 2023-01-01 RX ADMIN — Medication 1 APPLICATION(S): at 20:18

## 2023-01-01 RX ADMIN — Medication 650 MILLIGRAM(S): at 11:20

## 2023-01-01 RX ADMIN — LIDOCAINE 1 PATCH: 4 CREAM TOPICAL at 11:20

## 2023-01-01 RX ADMIN — OXYCODONE HYDROCHLORIDE 10 MILLIGRAM(S): 5 TABLET ORAL at 13:53

## 2023-01-01 RX ADMIN — MAGNESIUM OXIDE 400 MG ORAL TABLET 400 MILLIGRAM(S): 241.3 TABLET ORAL at 17:35

## 2023-01-01 RX ADMIN — SENNA PLUS 10 MILLILITER(S): 8.6 TABLET ORAL at 22:56

## 2023-01-01 RX ADMIN — Medication 650 MILLIGRAM(S): at 05:39

## 2023-01-01 RX ADMIN — SODIUM CHLORIDE 2 GRAM(S): 9 INJECTION INTRAMUSCULAR; INTRAVENOUS; SUBCUTANEOUS at 21:29

## 2023-01-01 RX ADMIN — LIDOCAINE 1 PATCH: 4 CREAM TOPICAL at 13:00

## 2023-01-01 RX ADMIN — Medication 1 TABLET(S): at 13:01

## 2023-01-01 RX ADMIN — METHADONE HYDROCHLORIDE 2.5 MILLIGRAM(S): 40 TABLET ORAL at 20:07

## 2023-01-01 RX ADMIN — Medication 250 MILLIGRAM(S): at 01:08

## 2023-01-01 RX ADMIN — OXYCODONE HYDROCHLORIDE 10 MILLIGRAM(S): 5 TABLET ORAL at 23:24

## 2023-01-01 RX ADMIN — ENOXAPARIN SODIUM 40 MILLIGRAM(S): 100 INJECTION SUBCUTANEOUS at 11:42

## 2023-01-01 RX ADMIN — Medication 1 TABLET(S): at 13:07

## 2023-01-01 RX ADMIN — Medication 25 GRAM(S): at 18:09

## 2023-01-01 RX ADMIN — Medication 500 MILLIGRAM(S): at 12:05

## 2023-01-01 RX ADMIN — LIDOCAINE 1 PATCH: 4 CREAM TOPICAL at 19:55

## 2023-01-01 RX ADMIN — GABAPENTIN 400 MILLIGRAM(S): 400 CAPSULE ORAL at 12:40

## 2023-01-01 RX ADMIN — POLYETHYLENE GLYCOL 3350 17 GRAM(S): 17 POWDER, FOR SOLUTION ORAL at 20:02

## 2023-01-01 RX ADMIN — CEFEPIME 100 MILLIGRAM(S): 1 INJECTION, POWDER, FOR SOLUTION INTRAMUSCULAR; INTRAVENOUS at 11:40

## 2023-01-01 RX ADMIN — Medication 650 MILLIGRAM(S): at 19:55

## 2023-01-01 RX ADMIN — Medication 150 MICROGRAM(S): at 05:55

## 2023-01-01 RX ADMIN — MEROPENEM 100 MILLIGRAM(S): 1 INJECTION INTRAVENOUS at 21:06

## 2023-01-01 RX ADMIN — OXYCODONE HYDROCHLORIDE 10 MILLIGRAM(S): 5 TABLET ORAL at 18:45

## 2023-01-01 RX ADMIN — LIDOCAINE 1 PATCH: 4 CREAM TOPICAL at 23:02

## 2023-01-01 RX ADMIN — Medication 130 MILLIGRAM(S): at 08:18

## 2023-01-01 RX ADMIN — Medication 650 MILLIGRAM(S): at 23:17

## 2023-01-01 RX ADMIN — Medication 500 MILLIGRAM(S): at 13:01

## 2023-01-01 RX ADMIN — Medication 400 MILLIGRAM(S): at 07:39

## 2023-01-01 RX ADMIN — SODIUM CHLORIDE 50 MILLILITER(S): 9 INJECTION, SOLUTION INTRAVENOUS at 04:42

## 2023-01-01 RX ADMIN — PANTOPRAZOLE SODIUM 40 MILLIGRAM(S): 20 TABLET, DELAYED RELEASE ORAL at 09:17

## 2023-01-01 RX ADMIN — ZOLEDRONIC ACID 4 MILLIGRAM(S): 5 INJECTION, SOLUTION INTRAVENOUS at 20:34

## 2023-01-01 RX ADMIN — TRAMADOL HYDROCHLORIDE 25 MILLIGRAM(S): 50 TABLET ORAL at 22:23

## 2023-01-01 RX ADMIN — SODIUM CHLORIDE 2 GRAM(S): 9 INJECTION INTRAMUSCULAR; INTRAVENOUS; SUBCUTANEOUS at 19:34

## 2023-01-01 RX ADMIN — Medication 10 MILLIGRAM(S): at 14:53

## 2023-01-01 RX ADMIN — Medication 250 MILLIGRAM(S): at 05:50

## 2023-01-01 RX ADMIN — PANTOPRAZOLE SODIUM 40 MILLIGRAM(S): 20 TABLET, DELAYED RELEASE ORAL at 06:52

## 2023-01-01 RX ADMIN — Medication 650 MILLIGRAM(S): at 18:34

## 2023-01-01 RX ADMIN — CHLORHEXIDINE GLUCONATE 1 APPLICATION(S): 213 SOLUTION TOPICAL at 11:17

## 2023-01-01 RX ADMIN — OXYCODONE HYDROCHLORIDE 10 MILLIGRAM(S): 5 TABLET ORAL at 21:29

## 2023-01-01 RX ADMIN — MAGNESIUM OXIDE 400 MG ORAL TABLET 400 MILLIGRAM(S): 241.3 TABLET ORAL at 07:34

## 2023-01-01 RX ADMIN — POLYETHYLENE GLYCOL 3350 17 GRAM(S): 17 POWDER, FOR SOLUTION ORAL at 12:20

## 2023-01-01 RX ADMIN — OXYCODONE HYDROCHLORIDE 10 MILLIGRAM(S): 5 TABLET ORAL at 15:06

## 2023-01-01 RX ADMIN — OXYCODONE HYDROCHLORIDE 10 MILLIGRAM(S): 5 TABLET ORAL at 20:24

## 2023-01-01 RX ADMIN — OXYCODONE HYDROCHLORIDE 10 MILLIGRAM(S): 5 TABLET ORAL at 19:13

## 2023-01-01 RX ADMIN — OXYCODONE HYDROCHLORIDE 5 MILLIGRAM(S): 5 TABLET ORAL at 17:28

## 2023-01-01 RX ADMIN — PANTOPRAZOLE SODIUM 40 MILLIGRAM(S): 20 TABLET, DELAYED RELEASE ORAL at 05:34

## 2023-01-01 RX ADMIN — SODIUM CHLORIDE 100 MILLILITER(S): 9 INJECTION INTRAMUSCULAR; INTRAVENOUS; SUBCUTANEOUS at 23:45

## 2023-01-01 RX ADMIN — HYDROMORPHONE HYDROCHLORIDE 0.5 MILLIGRAM(S): 2 INJECTION INTRAMUSCULAR; INTRAVENOUS; SUBCUTANEOUS at 10:31

## 2023-01-01 RX ADMIN — LIDOCAINE 1 PATCH: 4 CREAM TOPICAL at 13:07

## 2023-01-01 RX ADMIN — SODIUM CHLORIDE 500 MILLILITER(S): 9 INJECTION INTRAMUSCULAR; INTRAVENOUS; SUBCUTANEOUS at 23:52

## 2023-01-01 RX ADMIN — SODIUM CHLORIDE 4 MILLILITER(S): 9 INJECTION INTRAMUSCULAR; INTRAVENOUS; SUBCUTANEOUS at 10:23

## 2023-01-01 RX ADMIN — LEVALBUTEROL 0.63 MILLIGRAM(S): 1.25 SOLUTION, CONCENTRATE RESPIRATORY (INHALATION) at 19:59

## 2023-01-01 RX ADMIN — Medication 3 MILLILITER(S): at 09:48

## 2023-01-01 RX ADMIN — OXYCODONE HYDROCHLORIDE 10 MILLIGRAM(S): 5 TABLET ORAL at 04:04

## 2023-01-01 RX ADMIN — ENOXAPARIN SODIUM 40 MILLIGRAM(S): 100 INJECTION SUBCUTANEOUS at 13:29

## 2023-01-01 RX ADMIN — GABAPENTIN 300 MILLIGRAM(S): 400 CAPSULE ORAL at 22:33

## 2023-01-01 RX ADMIN — MEROPENEM 100 MILLIGRAM(S): 1 INJECTION INTRAVENOUS at 14:25

## 2023-01-01 RX ADMIN — Medication 150 MICROGRAM(S): at 05:33

## 2023-01-01 RX ADMIN — Medication 150 MICROGRAM(S): at 06:12

## 2023-01-01 RX ADMIN — SODIUM CHLORIDE 2 GRAM(S): 9 INJECTION INTRAMUSCULAR; INTRAVENOUS; SUBCUTANEOUS at 21:54

## 2023-01-01 RX ADMIN — POLYETHYLENE GLYCOL 3350 17 GRAM(S): 17 POWDER, FOR SOLUTION ORAL at 12:06

## 2023-01-01 RX ADMIN — Medication 650 MILLIGRAM(S): at 22:23

## 2023-01-01 RX ADMIN — LIDOCAINE 1 PATCH: 4 CREAM TOPICAL at 00:45

## 2023-01-01 RX ADMIN — MEROPENEM 100 MILLIGRAM(S): 1 INJECTION INTRAVENOUS at 05:37

## 2023-01-01 RX ADMIN — Medication 85 MILLIMOLE(S): at 09:29

## 2023-01-01 RX ADMIN — GABAPENTIN 300 MILLIGRAM(S): 400 CAPSULE ORAL at 13:10

## 2023-01-01 RX ADMIN — Medication 1 PACKET(S): at 10:17

## 2023-01-01 RX ADMIN — Medication 650 MILLIGRAM(S): at 13:34

## 2023-01-01 RX ADMIN — Medication 85 MILLIMOLE(S): at 10:25

## 2023-01-01 RX ADMIN — Medication 15 MILLIGRAM(S): at 23:44

## 2023-01-01 RX ADMIN — OXYCODONE HYDROCHLORIDE 10 MILLIGRAM(S): 5 TABLET ORAL at 07:12

## 2023-01-01 RX ADMIN — CEFEPIME 100 MILLIGRAM(S): 1 INJECTION, POWDER, FOR SOLUTION INTRAMUSCULAR; INTRAVENOUS at 12:31

## 2023-01-01 RX ADMIN — ENOXAPARIN SODIUM 40 MILLIGRAM(S): 100 INJECTION SUBCUTANEOUS at 12:04

## 2023-01-01 RX ADMIN — Medication 10 MILLIGRAM(S): at 21:40

## 2023-01-01 RX ADMIN — Medication 250 MILLIGRAM(S): at 00:37

## 2023-01-01 RX ADMIN — Medication 2 PACKET(S): at 17:47

## 2023-01-01 RX ADMIN — Medication 2 PACKET(S): at 14:13

## 2023-01-01 RX ADMIN — Medication 500 MILLIGRAM(S): at 15:23

## 2023-01-01 RX ADMIN — PIPERACILLIN AND TAZOBACTAM 200 GRAM(S): 4; .5 INJECTION, POWDER, LYOPHILIZED, FOR SOLUTION INTRAVENOUS at 23:32

## 2023-01-01 RX ADMIN — MAGNESIUM OXIDE 400 MG ORAL TABLET 400 MILLIGRAM(S): 241.3 TABLET ORAL at 18:59

## 2023-01-01 RX ADMIN — PANTOPRAZOLE SODIUM 40 MILLIGRAM(S): 20 TABLET, DELAYED RELEASE ORAL at 05:30

## 2023-01-01 RX ADMIN — Medication 650 MILLIGRAM(S): at 06:01

## 2023-01-01 RX ADMIN — SODIUM CHLORIDE 4 MILLILITER(S): 9 INJECTION INTRAMUSCULAR; INTRAVENOUS; SUBCUTANEOUS at 20:54

## 2023-01-01 RX ADMIN — OXYCODONE HYDROCHLORIDE 10 MILLIGRAM(S): 5 TABLET ORAL at 09:05

## 2023-01-01 RX ADMIN — SODIUM CHLORIDE 200 MILLILITER(S): 9 INJECTION INTRAMUSCULAR; INTRAVENOUS; SUBCUTANEOUS at 11:18

## 2023-01-01 RX ADMIN — LIDOCAINE 1 PATCH: 4 CREAM TOPICAL at 23:49

## 2023-01-01 RX ADMIN — Medication 2 PACKET(S): at 22:13

## 2023-01-01 RX ADMIN — LIDOCAINE 1 PATCH: 4 CREAM TOPICAL at 19:21

## 2023-01-01 RX ADMIN — Medication 650 MILLIGRAM(S): at 18:08

## 2023-01-01 RX ADMIN — GABAPENTIN 300 MILLIGRAM(S): 400 CAPSULE ORAL at 06:20

## 2023-01-01 RX ADMIN — SODIUM CHLORIDE 4 MILLILITER(S): 9 INJECTION INTRAMUSCULAR; INTRAVENOUS; SUBCUTANEOUS at 08:10

## 2023-01-01 RX ADMIN — RIVAROXABAN 10 MILLIGRAM(S): KIT at 12:25

## 2023-01-01 RX ADMIN — OXYCODONE HYDROCHLORIDE 5 MILLIGRAM(S): 5 TABLET ORAL at 17:35

## 2023-01-01 RX ADMIN — METHADONE HYDROCHLORIDE 2.5 MILLIGRAM(S): 40 TABLET ORAL at 09:13

## 2023-01-01 RX ADMIN — Medication 500 MILLIGRAM(S): at 12:10

## 2023-01-01 RX ADMIN — Medication 650 MILLIGRAM(S): at 18:26

## 2023-01-01 RX ADMIN — LIDOCAINE 1 PATCH: 4 CREAM TOPICAL at 15:25

## 2023-01-01 RX ADMIN — Medication 650 MILLIGRAM(S): at 03:14

## 2023-01-01 RX ADMIN — CALCITONIN SALMON 250 INTERNATIONAL UNIT(S): 200 INJECTION, SOLUTION INTRAMUSCULAR at 18:54

## 2023-01-01 RX ADMIN — Medication 1000 MILLIGRAM(S): at 19:40

## 2023-01-01 RX ADMIN — GABAPENTIN 300 MILLIGRAM(S): 400 CAPSULE ORAL at 13:20

## 2023-01-01 RX ADMIN — OXYCODONE HYDROCHLORIDE 10 MILLIGRAM(S): 5 TABLET ORAL at 23:45

## 2023-01-01 RX ADMIN — Medication 400 MILLIGRAM(S): at 23:01

## 2023-01-01 RX ADMIN — LIDOCAINE 1 PATCH: 4 CREAM TOPICAL at 18:12

## 2023-01-01 RX ADMIN — NYSTATIN CREAM 1 APPLICATION(S): 100000 CREAM TOPICAL at 17:32

## 2023-01-01 RX ADMIN — Medication 650 MILLIGRAM(S): at 17:18

## 2023-01-01 RX ADMIN — MEROPENEM 100 MILLIGRAM(S): 1 INJECTION INTRAVENOUS at 13:35

## 2023-01-01 RX ADMIN — Medication 1 TABLET(S): at 11:52

## 2023-01-01 RX ADMIN — POLYETHYLENE GLYCOL 3350 17 GRAM(S): 17 POWDER, FOR SOLUTION ORAL at 18:22

## 2023-01-01 RX ADMIN — Medication 650 MILLIGRAM(S): at 00:23

## 2023-01-01 RX ADMIN — CHLORHEXIDINE GLUCONATE 1 APPLICATION(S): 213 SOLUTION TOPICAL at 12:38

## 2023-01-01 RX ADMIN — OXYCODONE HYDROCHLORIDE 10 MILLIGRAM(S): 5 TABLET ORAL at 23:47

## 2023-01-01 RX ADMIN — OXYCODONE HYDROCHLORIDE 10 MILLIGRAM(S): 5 TABLET ORAL at 18:30

## 2023-01-01 RX ADMIN — Medication 10 MILLIGRAM(S): at 06:12

## 2023-01-01 RX ADMIN — SODIUM CHLORIDE 2 GRAM(S): 9 INJECTION INTRAMUSCULAR; INTRAVENOUS; SUBCUTANEOUS at 22:03

## 2023-01-01 RX ADMIN — Medication 650 MILLIGRAM(S): at 05:31

## 2023-01-01 RX ADMIN — MAGNESIUM OXIDE 400 MG ORAL TABLET 400 MILLIGRAM(S): 241.3 TABLET ORAL at 07:47

## 2023-01-01 RX ADMIN — PANTOPRAZOLE SODIUM 40 MILLIGRAM(S): 20 TABLET, DELAYED RELEASE ORAL at 11:59

## 2023-01-01 RX ADMIN — SODIUM CHLORIDE 4 MILLILITER(S): 9 INJECTION INTRAMUSCULAR; INTRAVENOUS; SUBCUTANEOUS at 21:00

## 2023-01-01 RX ADMIN — Medication 150 MICROGRAM(S): at 05:34

## 2023-01-01 RX ADMIN — OXYCODONE HYDROCHLORIDE 10 MILLIGRAM(S): 5 TABLET ORAL at 01:28

## 2023-01-01 RX ADMIN — OXYCODONE HYDROCHLORIDE 10 MILLIGRAM(S): 5 TABLET ORAL at 12:54

## 2023-01-01 RX ADMIN — MAGNESIUM OXIDE 400 MG ORAL TABLET 400 MILLIGRAM(S): 241.3 TABLET ORAL at 17:28

## 2023-01-01 RX ADMIN — Medication 150 MICROGRAM(S): at 06:01

## 2023-01-01 RX ADMIN — PANTOPRAZOLE SODIUM 40 MILLIGRAM(S): 20 TABLET, DELAYED RELEASE ORAL at 05:32

## 2023-01-01 RX ADMIN — SODIUM CHLORIDE 2 GRAM(S): 9 INJECTION INTRAMUSCULAR; INTRAVENOUS; SUBCUTANEOUS at 06:12

## 2023-01-01 RX ADMIN — Medication 63.75 MILLIMOLE(S): at 15:33

## 2023-01-01 RX ADMIN — CALCITONIN SALMON 250 INTERNATIONAL UNIT(S): 200 INJECTION, SOLUTION INTRAMUSCULAR at 19:13

## 2023-01-01 RX ADMIN — Medication 150 MICROGRAM(S): at 05:36

## 2023-01-01 RX ADMIN — OXYCODONE HYDROCHLORIDE 10 MILLIGRAM(S): 5 TABLET ORAL at 01:10

## 2023-01-01 RX ADMIN — NYSTATIN CREAM 1 APPLICATION(S): 100000 CREAM TOPICAL at 17:51

## 2023-01-01 RX ADMIN — Medication 650 MILLIGRAM(S): at 11:59

## 2023-01-01 RX ADMIN — TRAMADOL HYDROCHLORIDE 25 MILLIGRAM(S): 50 TABLET ORAL at 05:34

## 2023-01-01 RX ADMIN — TRAMADOL HYDROCHLORIDE 25 MILLIGRAM(S): 50 TABLET ORAL at 23:23

## 2023-01-01 RX ADMIN — Medication 2 PACKET(S): at 13:11

## 2023-01-01 RX ADMIN — METHADONE HYDROCHLORIDE 2.5 MILLIGRAM(S): 40 TABLET ORAL at 05:58

## 2023-01-01 RX ADMIN — Medication 20 MILLIEQUIVALENT(S): at 14:23

## 2023-01-01 RX ADMIN — ENOXAPARIN SODIUM 40 MILLIGRAM(S): 100 INJECTION SUBCUTANEOUS at 12:06

## 2023-01-01 RX ADMIN — MEROPENEM 100 MILLIGRAM(S): 1 INJECTION INTRAVENOUS at 14:36

## 2023-01-01 RX ADMIN — POLYETHYLENE GLYCOL 3350 17 GRAM(S): 17 POWDER, FOR SOLUTION ORAL at 13:35

## 2023-01-01 RX ADMIN — LIDOCAINE 1 PATCH: 4 CREAM TOPICAL at 20:34

## 2023-01-01 RX ADMIN — OXYCODONE HYDROCHLORIDE 5 MILLIGRAM(S): 5 TABLET ORAL at 16:30

## 2023-01-01 RX ADMIN — Medication 2 PACKET(S): at 05:12

## 2023-01-01 RX ADMIN — Medication 650 MILLIGRAM(S): at 11:18

## 2023-01-01 RX ADMIN — LEVALBUTEROL 1.25 MILLIGRAM(S): 1.25 SOLUTION, CONCENTRATE RESPIRATORY (INHALATION) at 20:49

## 2023-01-01 RX ADMIN — SODIUM CHLORIDE 100 MILLILITER(S): 9 INJECTION, SOLUTION INTRAVENOUS at 07:39

## 2023-01-01 RX ADMIN — MAGNESIUM OXIDE 400 MG ORAL TABLET 400 MILLIGRAM(S): 241.3 TABLET ORAL at 18:14

## 2023-01-01 RX ADMIN — POTASSIUM PHOSPHATE, MONOBASIC POTASSIUM PHOSPHATE, DIBASIC 83.33 MILLIMOLE(S): 236; 224 INJECTION, SOLUTION INTRAVENOUS at 10:07

## 2023-01-01 RX ADMIN — GABAPENTIN 300 MILLIGRAM(S): 400 CAPSULE ORAL at 21:19

## 2023-01-01 RX ADMIN — LIDOCAINE 1 PATCH: 4 CREAM TOPICAL at 00:56

## 2023-01-01 RX ADMIN — Medication 500 MILLIGRAM(S): at 12:27

## 2023-01-01 RX ADMIN — LIDOCAINE 1 PATCH: 4 CREAM TOPICAL at 19:45

## 2023-01-01 RX ADMIN — PIPERACILLIN AND TAZOBACTAM 25 GRAM(S): 4; .5 INJECTION, POWDER, LYOPHILIZED, FOR SOLUTION INTRAVENOUS at 13:09

## 2023-01-01 RX ADMIN — LEVALBUTEROL 1.25 MILLIGRAM(S): 1.25 SOLUTION, CONCENTRATE RESPIRATORY (INHALATION) at 06:27

## 2023-01-01 RX ADMIN — OXYCODONE HYDROCHLORIDE 10 MILLIGRAM(S): 5 TABLET ORAL at 19:06

## 2023-01-01 RX ADMIN — MEROPENEM 100 MILLIGRAM(S): 1 INJECTION INTRAVENOUS at 22:31

## 2023-01-01 RX ADMIN — OXYCODONE HYDROCHLORIDE 10 MILLIGRAM(S): 5 TABLET ORAL at 11:47

## 2023-01-01 RX ADMIN — Medication 650 MILLIGRAM(S): at 12:04

## 2023-01-01 RX ADMIN — LEVALBUTEROL 0.63 MILLIGRAM(S): 1.25 SOLUTION, CONCENTRATE RESPIRATORY (INHALATION) at 20:52

## 2023-01-01 RX ADMIN — TRAMADOL HYDROCHLORIDE 25 MILLIGRAM(S): 50 TABLET ORAL at 06:43

## 2023-01-01 RX ADMIN — Medication 250 MILLIGRAM(S): at 06:33

## 2023-01-01 RX ADMIN — OXYCODONE HYDROCHLORIDE 10 MILLIGRAM(S): 5 TABLET ORAL at 21:36

## 2023-01-01 RX ADMIN — OXYCODONE HYDROCHLORIDE 5 MILLIGRAM(S): 5 TABLET ORAL at 15:36

## 2023-01-01 RX ADMIN — GABAPENTIN 300 MILLIGRAM(S): 400 CAPSULE ORAL at 05:33

## 2023-01-01 RX ADMIN — POTASSIUM PHOSPHATE, MONOBASIC POTASSIUM PHOSPHATE, DIBASIC 83.33 MILLIMOLE(S): 236; 224 INJECTION, SOLUTION INTRAVENOUS at 15:06

## 2023-01-01 RX ADMIN — LIDOCAINE 1 PATCH: 4 CREAM TOPICAL at 19:53

## 2023-01-01 RX ADMIN — METHADONE HYDROCHLORIDE 2.5 MILLIGRAM(S): 40 TABLET ORAL at 17:19

## 2023-01-01 RX ADMIN — Medication 2 PACKET(S): at 06:56

## 2023-01-01 RX ADMIN — Medication 500 MILLIGRAM(S): at 12:00

## 2023-01-01 RX ADMIN — SODIUM CHLORIDE 70 MILLILITER(S): 9 INJECTION, SOLUTION INTRAVENOUS at 22:56

## 2023-01-01 RX ADMIN — LEVALBUTEROL 0.63 MILLIGRAM(S): 1.25 SOLUTION, CONCENTRATE RESPIRATORY (INHALATION) at 20:22

## 2023-01-01 RX ADMIN — PANTOPRAZOLE SODIUM 40 MILLIGRAM(S): 20 TABLET, DELAYED RELEASE ORAL at 12:33

## 2023-01-01 RX ADMIN — GABAPENTIN 300 MILLIGRAM(S): 400 CAPSULE ORAL at 05:20

## 2023-01-01 RX ADMIN — MEROPENEM 100 MILLIGRAM(S): 1 INJECTION INTRAVENOUS at 14:17

## 2023-01-01 RX ADMIN — Medication 40 MILLIGRAM(S): at 00:45

## 2023-01-01 RX ADMIN — Medication 1000 MILLIGRAM(S): at 19:00

## 2023-01-01 RX ADMIN — Medication 650 MILLIGRAM(S): at 23:41

## 2023-01-01 RX ADMIN — OXYCODONE HYDROCHLORIDE 10 MILLIGRAM(S): 5 TABLET ORAL at 16:00

## 2023-01-01 RX ADMIN — ENOXAPARIN SODIUM 40 MILLIGRAM(S): 100 INJECTION SUBCUTANEOUS at 11:14

## 2023-01-01 RX ADMIN — GABAPENTIN 300 MILLIGRAM(S): 400 CAPSULE ORAL at 14:36

## 2023-01-01 RX ADMIN — HYDROMORPHONE HYDROCHLORIDE 1 MILLIGRAM(S): 2 INJECTION INTRAMUSCULAR; INTRAVENOUS; SUBCUTANEOUS at 19:14

## 2023-01-01 RX ADMIN — PANTOPRAZOLE SODIUM 40 MILLIGRAM(S): 20 TABLET, DELAYED RELEASE ORAL at 05:33

## 2023-01-01 RX ADMIN — LIDOCAINE 1 PATCH: 4 CREAM TOPICAL at 00:07

## 2023-01-01 RX ADMIN — GABAPENTIN 300 MILLIGRAM(S): 400 CAPSULE ORAL at 14:49

## 2023-01-01 RX ADMIN — MEROPENEM 100 MILLIGRAM(S): 1 INJECTION INTRAVENOUS at 06:20

## 2023-01-01 RX ADMIN — Medication 650 MILLIGRAM(S): at 15:29

## 2023-01-01 RX ADMIN — Medication 975 MILLIGRAM(S): at 13:05

## 2023-01-01 RX ADMIN — MAGNESIUM OXIDE 400 MG ORAL TABLET 400 MILLIGRAM(S): 241.3 TABLET ORAL at 18:15

## 2023-01-01 RX ADMIN — OXYCODONE HYDROCHLORIDE 5 MILLIGRAM(S): 5 TABLET ORAL at 07:55

## 2023-01-01 RX ADMIN — Medication 3 MILLIGRAM(S): at 21:20

## 2023-01-01 RX ADMIN — PIPERACILLIN AND TAZOBACTAM 25 GRAM(S): 4; .5 INJECTION, POWDER, LYOPHILIZED, FOR SOLUTION INTRAVENOUS at 06:54

## 2023-01-01 RX ADMIN — Medication 1 ENEMA: at 12:17

## 2023-01-01 RX ADMIN — SODIUM CHLORIDE 70 MILLILITER(S): 9 INJECTION, SOLUTION INTRAVENOUS at 12:29

## 2023-01-01 RX ADMIN — Medication 500 MILLIGRAM(S): at 13:00

## 2023-01-01 RX ADMIN — METHADONE HYDROCHLORIDE 2.5 MILLIGRAM(S): 40 TABLET ORAL at 06:02

## 2023-01-01 RX ADMIN — SODIUM CHLORIDE 2 GRAM(S): 9 INJECTION INTRAMUSCULAR; INTRAVENOUS; SUBCUTANEOUS at 05:36

## 2023-01-01 RX ADMIN — Medication 1 APPLICATION(S): at 07:44

## 2023-01-01 RX ADMIN — Medication 650 MILLIGRAM(S): at 23:57

## 2023-01-01 RX ADMIN — CEFEPIME 100 MILLIGRAM(S): 1 INJECTION, POWDER, FOR SOLUTION INTRAMUSCULAR; INTRAVENOUS at 19:22

## 2023-01-01 RX ADMIN — NYSTATIN CREAM 1 APPLICATION(S): 100000 CREAM TOPICAL at 06:23

## 2023-01-01 RX ADMIN — SODIUM CHLORIDE 2 GRAM(S): 9 INJECTION INTRAMUSCULAR; INTRAVENOUS; SUBCUTANEOUS at 07:03

## 2023-01-01 RX ADMIN — MAGNESIUM OXIDE 400 MG ORAL TABLET 400 MILLIGRAM(S): 241.3 TABLET ORAL at 10:07

## 2023-01-01 RX ADMIN — OXYCODONE HYDROCHLORIDE 5 MILLIGRAM(S): 5 TABLET ORAL at 06:36

## 2023-01-01 RX ADMIN — MAGNESIUM OXIDE 400 MG ORAL TABLET 400 MILLIGRAM(S): 241.3 TABLET ORAL at 18:02

## 2023-01-01 RX ADMIN — MORPHINE SULFATE 3 MILLIGRAM(S): 50 CAPSULE, EXTENDED RELEASE ORAL at 01:32

## 2023-01-01 RX ADMIN — SENNA PLUS 10 MILLILITER(S): 8.6 TABLET ORAL at 20:08

## 2023-01-01 RX ADMIN — SODIUM CHLORIDE 4 MILLILITER(S): 9 INJECTION INTRAMUSCULAR; INTRAVENOUS; SUBCUTANEOUS at 10:45

## 2023-01-01 RX ADMIN — HYDROMORPHONE HYDROCHLORIDE 0.5 MILLIGRAM(S): 2 INJECTION INTRAMUSCULAR; INTRAVENOUS; SUBCUTANEOUS at 11:00

## 2023-01-01 RX ADMIN — OXYCODONE HYDROCHLORIDE 10 MILLIGRAM(S): 5 TABLET ORAL at 06:20

## 2023-01-01 RX ADMIN — POLYETHYLENE GLYCOL 3350 17 GRAM(S): 17 POWDER, FOR SOLUTION ORAL at 05:08

## 2023-01-01 RX ADMIN — CHLORHEXIDINE GLUCONATE 1 APPLICATION(S): 213 SOLUTION TOPICAL at 11:52

## 2023-01-01 RX ADMIN — LEVALBUTEROL 0.63 MILLIGRAM(S): 1.25 SOLUTION, CONCENTRATE RESPIRATORY (INHALATION) at 07:45

## 2023-01-01 RX ADMIN — POLYETHYLENE GLYCOL 3350 17 GRAM(S): 17 POWDER, FOR SOLUTION ORAL at 18:13

## 2023-01-01 RX ADMIN — OXYCODONE HYDROCHLORIDE 10 MILLIGRAM(S): 5 TABLET ORAL at 13:10

## 2023-01-01 RX ADMIN — SODIUM CHLORIDE 2 GRAM(S): 9 INJECTION INTRAMUSCULAR; INTRAVENOUS; SUBCUTANEOUS at 13:04

## 2023-01-01 RX ADMIN — METHADONE HYDROCHLORIDE 2.5 MILLIGRAM(S): 40 TABLET ORAL at 21:06

## 2023-01-01 RX ADMIN — PANTOPRAZOLE SODIUM 40 MILLIGRAM(S): 20 TABLET, DELAYED RELEASE ORAL at 13:14

## 2023-01-01 RX ADMIN — GABAPENTIN 300 MILLIGRAM(S): 400 CAPSULE ORAL at 23:10

## 2023-01-01 RX ADMIN — SODIUM CHLORIDE 75 MILLILITER(S): 9 INJECTION, SOLUTION INTRAVENOUS at 06:18

## 2023-01-01 RX ADMIN — GABAPENTIN 300 MILLIGRAM(S): 400 CAPSULE ORAL at 05:44

## 2023-01-01 RX ADMIN — Medication 1 APPLICATION(S): at 17:38

## 2023-01-01 RX ADMIN — Medication 500 MILLIGRAM(S): at 12:58

## 2023-01-01 RX ADMIN — SODIUM CHLORIDE 4 MILLILITER(S): 9 INJECTION INTRAMUSCULAR; INTRAVENOUS; SUBCUTANEOUS at 20:49

## 2023-01-01 RX ADMIN — GABAPENTIN 300 MILLIGRAM(S): 400 CAPSULE ORAL at 14:17

## 2023-01-01 RX ADMIN — LIDOCAINE 1 PATCH: 4 CREAM TOPICAL at 04:28

## 2023-01-01 RX ADMIN — OXYCODONE HYDROCHLORIDE 10 MILLIGRAM(S): 5 TABLET ORAL at 00:36

## 2023-01-01 RX ADMIN — PIPERACILLIN AND TAZOBACTAM 25 GRAM(S): 4; .5 INJECTION, POWDER, LYOPHILIZED, FOR SOLUTION INTRAVENOUS at 21:17

## 2023-01-01 RX ADMIN — MAGNESIUM OXIDE 400 MG ORAL TABLET 400 MILLIGRAM(S): 241.3 TABLET ORAL at 06:34

## 2023-01-01 RX ADMIN — ENOXAPARIN SODIUM 40 MILLIGRAM(S): 100 INJECTION SUBCUTANEOUS at 06:34

## 2023-01-01 RX ADMIN — Medication 975 MILLIGRAM(S): at 07:23

## 2023-01-01 RX ADMIN — LIDOCAINE 1 PATCH: 4 CREAM TOPICAL at 11:52

## 2023-01-01 RX ADMIN — OXYCODONE HYDROCHLORIDE 5 MILLIGRAM(S): 5 TABLET ORAL at 10:25

## 2023-01-01 RX ADMIN — MAGNESIUM OXIDE 400 MG ORAL TABLET 400 MILLIGRAM(S): 241.3 TABLET ORAL at 18:25

## 2023-01-01 RX ADMIN — Medication 100 MILLIGRAM(S): at 21:21

## 2023-01-01 RX ADMIN — Medication 1 TABLET(S): at 12:00

## 2023-01-01 RX ADMIN — CHLORHEXIDINE GLUCONATE 1 APPLICATION(S): 213 SOLUTION TOPICAL at 12:17

## 2023-01-01 RX ADMIN — OXYCODONE HYDROCHLORIDE 10 MILLIGRAM(S): 5 TABLET ORAL at 14:53

## 2023-01-01 RX ADMIN — MAGNESIUM OXIDE 400 MG ORAL TABLET 400 MILLIGRAM(S): 241.3 TABLET ORAL at 07:03

## 2023-01-01 RX ADMIN — OXYCODONE HYDROCHLORIDE 10 MILLIGRAM(S): 5 TABLET ORAL at 08:01

## 2023-01-01 RX ADMIN — PIPERACILLIN AND TAZOBACTAM 25 GRAM(S): 4; .5 INJECTION, POWDER, LYOPHILIZED, FOR SOLUTION INTRAVENOUS at 15:14

## 2023-01-01 RX ADMIN — TRAMADOL HYDROCHLORIDE 25 MILLIGRAM(S): 50 TABLET ORAL at 07:32

## 2023-01-01 RX ADMIN — OXYCODONE HYDROCHLORIDE 10 MILLIGRAM(S): 5 TABLET ORAL at 22:15

## 2023-01-01 RX ADMIN — Medication 650 MILLIGRAM(S): at 18:41

## 2023-01-01 RX ADMIN — SODIUM CHLORIDE 125 MILLILITER(S): 9 INJECTION, SOLUTION INTRAVENOUS at 22:52

## 2023-01-01 RX ADMIN — Medication 975 MILLIGRAM(S): at 21:54

## 2023-01-01 RX ADMIN — Medication 650 MILLIGRAM(S): at 16:18

## 2023-01-01 RX ADMIN — PANTOPRAZOLE SODIUM 40 MILLIGRAM(S): 20 TABLET, DELAYED RELEASE ORAL at 13:17

## 2023-01-01 RX ADMIN — Medication 650 MILLIGRAM(S): at 11:13

## 2023-01-01 RX ADMIN — Medication 650 MILLIGRAM(S): at 11:54

## 2023-01-01 RX ADMIN — CHLORHEXIDINE GLUCONATE 1 APPLICATION(S): 213 SOLUTION TOPICAL at 12:07

## 2023-01-01 RX ADMIN — LIDOCAINE 1 PATCH: 4 CREAM TOPICAL at 12:07

## 2023-01-01 RX ADMIN — POLYETHYLENE GLYCOL 3350 17 GRAM(S): 17 POWDER, FOR SOLUTION ORAL at 18:36

## 2023-01-01 RX ADMIN — SODIUM CHLORIDE 1 GRAM(S): 9 INJECTION INTRAMUSCULAR; INTRAVENOUS; SUBCUTANEOUS at 17:26

## 2023-01-01 RX ADMIN — SENNA PLUS 2 TABLET(S): 8.6 TABLET ORAL at 23:01

## 2023-01-01 RX ADMIN — SENNA PLUS 10 MILLILITER(S): 8.6 TABLET ORAL at 13:27

## 2023-01-01 RX ADMIN — ENOXAPARIN SODIUM 40 MILLIGRAM(S): 100 INJECTION SUBCUTANEOUS at 05:44

## 2023-01-01 RX ADMIN — Medication 150 MICROGRAM(S): at 05:03

## 2023-01-01 RX ADMIN — PIPERACILLIN AND TAZOBACTAM 25 GRAM(S): 4; .5 INJECTION, POWDER, LYOPHILIZED, FOR SOLUTION INTRAVENOUS at 13:27

## 2023-01-01 RX ADMIN — Medication 2 PACKET(S): at 06:19

## 2023-01-01 RX ADMIN — SENNA PLUS 10 MILLILITER(S): 8.6 TABLET ORAL at 12:06

## 2023-01-01 RX ADMIN — SODIUM CHLORIDE 75 MILLILITER(S): 9 INJECTION, SOLUTION INTRAVENOUS at 22:29

## 2023-01-01 RX ADMIN — POLYETHYLENE GLYCOL 3350 17 GRAM(S): 17 POWDER, FOR SOLUTION ORAL at 05:33

## 2023-01-01 RX ADMIN — RIVAROXABAN 10 MILLIGRAM(S): KIT at 11:33

## 2023-01-01 RX ADMIN — PIPERACILLIN AND TAZOBACTAM 200 GRAM(S): 4; .5 INJECTION, POWDER, LYOPHILIZED, FOR SOLUTION INTRAVENOUS at 07:40

## 2023-01-01 RX ADMIN — Medication 85 MILLIMOLE(S): at 12:13

## 2023-01-01 RX ADMIN — SODIUM CHLORIDE 75 MILLILITER(S): 9 INJECTION, SOLUTION INTRAVENOUS at 21:01

## 2023-01-01 RX ADMIN — OXYCODONE HYDROCHLORIDE 5 MILLIGRAM(S): 5 TABLET ORAL at 18:15

## 2023-01-01 RX ADMIN — LIDOCAINE 1 PATCH: 4 CREAM TOPICAL at 12:06

## 2023-01-01 RX ADMIN — Medication 1 APPLICATION(S): at 06:33

## 2023-01-01 RX ADMIN — SODIUM CHLORIDE 70 MILLILITER(S): 9 INJECTION, SOLUTION INTRAVENOUS at 15:38

## 2023-01-01 RX ADMIN — MEROPENEM 100 MILLIGRAM(S): 1 INJECTION INTRAVENOUS at 22:46

## 2023-01-01 RX ADMIN — METHADONE HYDROCHLORIDE 2.5 MILLIGRAM(S): 40 TABLET ORAL at 18:14

## 2023-01-01 RX ADMIN — PIPERACILLIN AND TAZOBACTAM 25 GRAM(S): 4; .5 INJECTION, POWDER, LYOPHILIZED, FOR SOLUTION INTRAVENOUS at 13:34

## 2023-01-01 RX ADMIN — Medication 250 MILLIGRAM(S): at 06:08

## 2023-01-01 RX ADMIN — Medication 975 MILLIGRAM(S): at 06:54

## 2023-01-01 RX ADMIN — SODIUM CHLORIDE 4 MILLILITER(S): 9 INJECTION INTRAMUSCULAR; INTRAVENOUS; SUBCUTANEOUS at 10:30

## 2023-01-01 RX ADMIN — Medication 1 APPLICATION(S): at 06:02

## 2023-01-01 RX ADMIN — HYDROMORPHONE HYDROCHLORIDE 0.5 MILLIGRAM(S): 2 INJECTION INTRAMUSCULAR; INTRAVENOUS; SUBCUTANEOUS at 13:00

## 2023-01-01 RX ADMIN — OXYCODONE HYDROCHLORIDE 10 MILLIGRAM(S): 5 TABLET ORAL at 22:06

## 2023-01-01 RX ADMIN — SODIUM CHLORIDE 4 MILLILITER(S): 9 INJECTION INTRAMUSCULAR; INTRAVENOUS; SUBCUTANEOUS at 11:38

## 2023-01-01 RX ADMIN — Medication 3 MILLIGRAM(S): at 22:35

## 2023-01-01 RX ADMIN — Medication 650 MILLIGRAM(S): at 06:56

## 2023-01-01 RX ADMIN — LIDOCAINE 1 PATCH: 4 CREAM TOPICAL at 19:28

## 2023-01-01 RX ADMIN — LIDOCAINE 1 PATCH: 4 CREAM TOPICAL at 22:14

## 2023-01-01 RX ADMIN — PIPERACILLIN AND TAZOBACTAM 25 GRAM(S): 4; .5 INJECTION, POWDER, LYOPHILIZED, FOR SOLUTION INTRAVENOUS at 22:08

## 2023-01-01 RX ADMIN — Medication 650 MILLIGRAM(S): at 20:55

## 2023-01-01 RX ADMIN — HYDROMORPHONE HYDROCHLORIDE 0.75 MILLIGRAM(S): 2 INJECTION INTRAMUSCULAR; INTRAVENOUS; SUBCUTANEOUS at 09:20

## 2023-01-01 RX ADMIN — MORPHINE SULFATE 3 MILLIGRAM(S): 50 CAPSULE, EXTENDED RELEASE ORAL at 01:17

## 2023-01-01 RX ADMIN — METHADONE HYDROCHLORIDE 2.5 MILLIGRAM(S): 40 TABLET ORAL at 08:54

## 2023-01-01 RX ADMIN — Medication 112 MICROGRAM(S): at 06:10

## 2023-01-01 RX ADMIN — SODIUM CHLORIDE 4 MILLILITER(S): 9 INJECTION INTRAMUSCULAR; INTRAVENOUS; SUBCUTANEOUS at 08:20

## 2023-01-01 RX ADMIN — Medication 650 MILLIGRAM(S): at 04:23

## 2023-01-01 RX ADMIN — MAGNESIUM OXIDE 400 MG ORAL TABLET 400 MILLIGRAM(S): 241.3 TABLET ORAL at 09:25

## 2023-01-01 RX ADMIN — Medication 650 MILLIGRAM(S): at 11:48

## 2023-01-01 RX ADMIN — Medication 650 MILLIGRAM(S): at 07:27

## 2023-01-01 RX ADMIN — Medication 10 MILLIGRAM(S): at 21:32

## 2023-01-01 RX ADMIN — Medication 112 MICROGRAM(S): at 06:13

## 2023-01-01 RX ADMIN — SODIUM CHLORIDE 4 MILLILITER(S): 9 INJECTION INTRAMUSCULAR; INTRAVENOUS; SUBCUTANEOUS at 06:43

## 2023-01-01 RX ADMIN — GABAPENTIN 300 MILLIGRAM(S): 400 CAPSULE ORAL at 18:59

## 2023-01-01 RX ADMIN — METHADONE HYDROCHLORIDE 2.5 MILLIGRAM(S): 40 TABLET ORAL at 18:23

## 2023-01-01 RX ADMIN — SODIUM CHLORIDE 100 MILLILITER(S): 9 INJECTION INTRAMUSCULAR; INTRAVENOUS; SUBCUTANEOUS at 09:30

## 2023-01-01 RX ADMIN — Medication 650 MILLIGRAM(S): at 05:17

## 2023-01-01 RX ADMIN — Medication 650 MILLIGRAM(S): at 17:16

## 2023-01-01 RX ADMIN — Medication 85 MILLIMOLE(S): at 09:37

## 2023-01-01 RX ADMIN — GABAPENTIN 300 MILLIGRAM(S): 400 CAPSULE ORAL at 19:31

## 2023-01-01 RX ADMIN — GABAPENTIN 300 MILLIGRAM(S): 400 CAPSULE ORAL at 13:03

## 2023-01-01 RX ADMIN — SODIUM CHLORIDE 100 MILLILITER(S): 9 INJECTION, SOLUTION INTRAVENOUS at 10:08

## 2023-01-01 RX ADMIN — Medication 2 PACKET(S): at 05:36

## 2023-01-01 RX ADMIN — OXYCODONE HYDROCHLORIDE 5 MILLIGRAM(S): 5 TABLET ORAL at 03:00

## 2023-01-01 RX ADMIN — POLYETHYLENE GLYCOL 3350 17 GRAM(S): 17 POWDER, FOR SOLUTION ORAL at 13:16

## 2023-01-01 RX ADMIN — LIDOCAINE 1 PATCH: 4 CREAM TOPICAL at 19:30

## 2023-01-01 RX ADMIN — GABAPENTIN 300 MILLIGRAM(S): 400 CAPSULE ORAL at 22:52

## 2023-01-01 RX ADMIN — Medication 1 TABLET(S): at 13:58

## 2023-01-01 RX ADMIN — GABAPENTIN 300 MILLIGRAM(S): 400 CAPSULE ORAL at 15:14

## 2023-01-01 RX ADMIN — MEROPENEM 100 MILLIGRAM(S): 1 INJECTION INTRAVENOUS at 23:56

## 2023-01-01 RX ADMIN — Medication 85 MILLIMOLE(S): at 11:53

## 2023-01-01 RX ADMIN — MEROPENEM 100 MILLIGRAM(S): 1 INJECTION INTRAVENOUS at 16:09

## 2023-01-01 RX ADMIN — Medication 2.5 MILLIGRAM(S): at 23:16

## 2023-01-01 RX ADMIN — OXYCODONE HYDROCHLORIDE 10 MILLIGRAM(S): 5 TABLET ORAL at 09:44

## 2023-01-01 RX ADMIN — Medication 650 MILLIGRAM(S): at 02:59

## 2023-01-01 RX ADMIN — SODIUM CHLORIDE 4 MILLILITER(S): 9 INJECTION INTRAMUSCULAR; INTRAVENOUS; SUBCUTANEOUS at 10:29

## 2023-01-01 RX ADMIN — SENNA PLUS 10 MILLILITER(S): 8.6 TABLET ORAL at 23:58

## 2023-01-01 RX ADMIN — OXYCODONE HYDROCHLORIDE 5 MILLIGRAM(S): 5 TABLET ORAL at 13:55

## 2023-01-01 RX ADMIN — PANTOPRAZOLE SODIUM 40 MILLIGRAM(S): 20 TABLET, DELAYED RELEASE ORAL at 07:39

## 2023-01-01 RX ADMIN — Medication 3 MILLILITER(S): at 15:38

## 2023-01-01 RX ADMIN — LIDOCAINE 1 PATCH: 4 CREAM TOPICAL at 11:50

## 2023-01-01 RX ADMIN — MAGNESIUM OXIDE 400 MG ORAL TABLET 400 MILLIGRAM(S): 241.3 TABLET ORAL at 17:34

## 2023-01-01 RX ADMIN — CHLORHEXIDINE GLUCONATE 1 APPLICATION(S): 213 SOLUTION TOPICAL at 13:15

## 2023-01-01 RX ADMIN — SODIUM CHLORIDE 100 MILLILITER(S): 9 INJECTION, SOLUTION INTRAVENOUS at 10:59

## 2023-01-01 RX ADMIN — GABAPENTIN 300 MILLIGRAM(S): 400 CAPSULE ORAL at 21:08

## 2023-01-01 RX ADMIN — OXYCODONE HYDROCHLORIDE 10 MILLIGRAM(S): 5 TABLET ORAL at 15:54

## 2023-01-01 RX ADMIN — ENOXAPARIN SODIUM 40 MILLIGRAM(S): 100 INJECTION SUBCUTANEOUS at 06:13

## 2023-01-01 RX ADMIN — LIDOCAINE 1 PATCH: 4 CREAM TOPICAL at 01:15

## 2023-01-01 RX ADMIN — OXYCODONE HYDROCHLORIDE 5 MILLIGRAM(S): 5 TABLET ORAL at 18:42

## 2023-01-01 RX ADMIN — SODIUM CHLORIDE 4 MILLILITER(S): 9 INJECTION INTRAMUSCULAR; INTRAVENOUS; SUBCUTANEOUS at 19:59

## 2023-01-01 RX ADMIN — OXYCODONE HYDROCHLORIDE 10 MILLIGRAM(S): 5 TABLET ORAL at 11:52

## 2023-01-01 RX ADMIN — Medication 650 MILLIGRAM(S): at 09:52

## 2023-01-01 RX ADMIN — Medication 650 MILLIGRAM(S): at 21:32

## 2023-01-01 RX ADMIN — LEVALBUTEROL 1.25 MILLIGRAM(S): 1.25 SOLUTION, CONCENTRATE RESPIRATORY (INHALATION) at 23:45

## 2023-01-01 RX ADMIN — OXYCODONE HYDROCHLORIDE 10 MILLIGRAM(S): 5 TABLET ORAL at 10:24

## 2023-01-01 RX ADMIN — Medication 2 PACKET(S): at 06:11

## 2023-01-01 RX ADMIN — OXYCODONE HYDROCHLORIDE 10 MILLIGRAM(S): 5 TABLET ORAL at 19:43

## 2023-01-01 RX ADMIN — ENOXAPARIN SODIUM 40 MILLIGRAM(S): 100 INJECTION SUBCUTANEOUS at 07:02

## 2023-01-01 RX ADMIN — NYSTATIN CREAM 1 APPLICATION(S): 100000 CREAM TOPICAL at 06:15

## 2023-01-01 RX ADMIN — Medication 150 MICROGRAM(S): at 06:19

## 2023-01-01 RX ADMIN — Medication 975 MILLIGRAM(S): at 23:38

## 2023-01-01 RX ADMIN — Medication 85 MILLIMOLE(S): at 09:19

## 2023-01-01 RX ADMIN — METHADONE HYDROCHLORIDE 2.5 MILLIGRAM(S): 40 TABLET ORAL at 18:21

## 2023-01-01 RX ADMIN — SENNA PLUS 2 TABLET(S): 8.6 TABLET ORAL at 22:12

## 2023-01-01 RX ADMIN — OXYCODONE HYDROCHLORIDE 10 MILLIGRAM(S): 5 TABLET ORAL at 18:44

## 2023-01-01 RX ADMIN — Medication 250 MILLIGRAM(S): at 18:09

## 2023-01-01 RX ADMIN — CEFEPIME 100 MILLIGRAM(S): 1 INJECTION, POWDER, FOR SOLUTION INTRAMUSCULAR; INTRAVENOUS at 03:13

## 2023-01-01 RX ADMIN — IOHEXOL 30 MILLILITER(S): 300 INJECTION, SOLUTION INTRAVENOUS at 09:40

## 2023-01-01 RX ADMIN — LEVALBUTEROL 1.25 MILLIGRAM(S): 1.25 SOLUTION, CONCENTRATE RESPIRATORY (INHALATION) at 16:49

## 2023-01-01 RX ADMIN — GABAPENTIN 300 MILLIGRAM(S): 400 CAPSULE ORAL at 21:42

## 2023-01-01 RX ADMIN — Medication 650 MILLIGRAM(S): at 19:35

## 2023-01-01 RX ADMIN — OXYCODONE HYDROCHLORIDE 10 MILLIGRAM(S): 5 TABLET ORAL at 17:12

## 2023-01-01 RX ADMIN — GABAPENTIN 300 MILLIGRAM(S): 400 CAPSULE ORAL at 06:02

## 2023-01-01 RX ADMIN — Medication 85 MILLIMOLE(S): at 12:47

## 2023-01-01 RX ADMIN — SODIUM CHLORIDE 70 MILLILITER(S): 9 INJECTION, SOLUTION INTRAVENOUS at 12:24

## 2023-01-01 RX ADMIN — METHADONE HYDROCHLORIDE 2.5 MILLIGRAM(S): 40 TABLET ORAL at 22:05

## 2023-01-01 RX ADMIN — MEROPENEM 100 MILLIGRAM(S): 1 INJECTION INTRAVENOUS at 22:56

## 2023-01-01 RX ADMIN — Medication 1 TABLET(S): at 13:29

## 2023-01-01 RX ADMIN — Medication 2 PACKET(S): at 09:17

## 2023-01-01 RX ADMIN — LEVALBUTEROL 1.25 MILLIGRAM(S): 1.25 SOLUTION, CONCENTRATE RESPIRATORY (INHALATION) at 08:11

## 2023-01-01 RX ADMIN — Medication 1 TABLET(S): at 13:04

## 2023-01-01 RX ADMIN — Medication 150 MICROGRAM(S): at 06:52

## 2023-01-01 RX ADMIN — SENNA PLUS 10 MILLILITER(S): 8.6 TABLET ORAL at 21:31

## 2023-01-01 RX ADMIN — Medication 3 MILLILITER(S): at 09:50

## 2023-01-01 RX ADMIN — Medication 400 MILLIGRAM(S): at 16:16

## 2023-01-01 RX ADMIN — Medication 250 MILLIGRAM(S): at 01:18

## 2023-01-01 RX ADMIN — Medication 150 MICROGRAM(S): at 06:57

## 2023-01-01 RX ADMIN — Medication 150 MICROGRAM(S): at 04:33

## 2023-01-01 RX ADMIN — Medication 1 APPLICATION(S): at 21:21

## 2023-01-01 RX ADMIN — LEVALBUTEROL 1.25 MILLIGRAM(S): 1.25 SOLUTION, CONCENTRATE RESPIRATORY (INHALATION) at 16:23

## 2023-01-01 RX ADMIN — Medication 650 MILLIGRAM(S): at 03:35

## 2023-01-01 RX ADMIN — OXYCODONE HYDROCHLORIDE 10 MILLIGRAM(S): 5 TABLET ORAL at 01:41

## 2023-01-01 RX ADMIN — LIDOCAINE 1 PATCH: 4 CREAM TOPICAL at 21:46

## 2023-01-01 RX ADMIN — TRAMADOL HYDROCHLORIDE 25 MILLIGRAM(S): 50 TABLET ORAL at 22:00

## 2023-01-01 RX ADMIN — CALCITONIN SALMON 250 INTERNATIONAL UNIT(S): 200 INJECTION, SOLUTION INTRAMUSCULAR at 05:03

## 2023-01-01 RX ADMIN — Medication 650 MILLIGRAM(S): at 17:39

## 2023-01-01 RX ADMIN — LEVALBUTEROL 1.25 MILLIGRAM(S): 1.25 SOLUTION, CONCENTRATE RESPIRATORY (INHALATION) at 04:10

## 2023-01-01 RX ADMIN — METHADONE HYDROCHLORIDE 2.5 MILLIGRAM(S): 40 TABLET ORAL at 17:38

## 2023-01-01 RX ADMIN — Medication 650 MILLIGRAM(S): at 17:25

## 2023-01-01 RX ADMIN — MEROPENEM 100 MILLIGRAM(S): 1 INJECTION INTRAVENOUS at 22:12

## 2023-01-01 RX ADMIN — LEVALBUTEROL 1.25 MILLIGRAM(S): 1.25 SOLUTION, CONCENTRATE RESPIRATORY (INHALATION) at 21:01

## 2023-01-01 RX ADMIN — GABAPENTIN 300 MILLIGRAM(S): 400 CAPSULE ORAL at 21:18

## 2023-01-01 RX ADMIN — Medication 650 MILLIGRAM(S): at 12:44

## 2023-01-01 RX ADMIN — Medication 650 MILLIGRAM(S): at 05:28

## 2023-01-01 RX ADMIN — CEFEPIME 100 MILLIGRAM(S): 1 INJECTION, POWDER, FOR SOLUTION INTRAMUSCULAR; INTRAVENOUS at 11:41

## 2023-01-01 RX ADMIN — OXYCODONE HYDROCHLORIDE 5 MILLIGRAM(S): 5 TABLET ORAL at 23:00

## 2023-01-01 RX ADMIN — SODIUM CHLORIDE 4 MILLILITER(S): 9 INJECTION INTRAMUSCULAR; INTRAVENOUS; SUBCUTANEOUS at 21:13

## 2023-01-01 RX ADMIN — Medication 650 MILLIGRAM(S): at 22:20

## 2023-01-01 RX ADMIN — PANTOPRAZOLE SODIUM 40 MILLIGRAM(S): 20 TABLET, DELAYED RELEASE ORAL at 08:04

## 2023-01-01 RX ADMIN — LIDOCAINE 1 PATCH: 4 CREAM TOPICAL at 19:46

## 2023-01-01 RX ADMIN — OXYCODONE HYDROCHLORIDE 10 MILLIGRAM(S): 5 TABLET ORAL at 07:18

## 2023-01-01 RX ADMIN — Medication 650 MILLIGRAM(S): at 15:11

## 2023-01-01 RX ADMIN — Medication 150 MICROGRAM(S): at 05:51

## 2023-01-01 RX ADMIN — CALCITONIN SALMON 400 INTERNATIONAL UNIT(S): 200 INJECTION, SOLUTION INTRAMUSCULAR at 08:09

## 2023-01-01 RX ADMIN — Medication 650 MILLIGRAM(S): at 17:26

## 2023-01-01 RX ADMIN — POLYETHYLENE GLYCOL 3350 17 GRAM(S): 17 POWDER, FOR SOLUTION ORAL at 12:13

## 2023-01-01 RX ADMIN — Medication 650 MILLIGRAM(S): at 17:30

## 2023-01-01 RX ADMIN — SENNA PLUS 10 MILLILITER(S): 8.6 TABLET ORAL at 19:37

## 2023-01-01 RX ADMIN — GABAPENTIN 300 MILLIGRAM(S): 400 CAPSULE ORAL at 22:08

## 2023-01-01 RX ADMIN — CALCITONIN SALMON 250 INTERNATIONAL UNIT(S): 200 INJECTION, SOLUTION INTRAMUSCULAR at 05:55

## 2023-01-01 RX ADMIN — Medication 150 MICROGRAM(S): at 06:26

## 2023-01-01 RX ADMIN — GABAPENTIN 300 MILLIGRAM(S): 400 CAPSULE ORAL at 05:31

## 2023-01-01 RX ADMIN — POLYETHYLENE GLYCOL 3350 17 GRAM(S): 17 POWDER, FOR SOLUTION ORAL at 12:33

## 2023-01-01 RX ADMIN — MEROPENEM 100 MILLIGRAM(S): 1 INJECTION INTRAVENOUS at 16:28

## 2023-01-01 RX ADMIN — LEVALBUTEROL 1.25 MILLIGRAM(S): 1.25 SOLUTION, CONCENTRATE RESPIRATORY (INHALATION) at 11:38

## 2023-01-01 RX ADMIN — OXYCODONE HYDROCHLORIDE 5 MILLIGRAM(S): 5 TABLET ORAL at 03:49

## 2023-01-01 RX ADMIN — SODIUM CHLORIDE 2 GRAM(S): 9 INJECTION INTRAMUSCULAR; INTRAVENOUS; SUBCUTANEOUS at 05:29

## 2023-01-01 RX ADMIN — METHADONE HYDROCHLORIDE 2.5 MILLIGRAM(S): 40 TABLET ORAL at 05:03

## 2023-01-01 RX ADMIN — Medication 2 PACKET(S): at 06:12

## 2023-01-01 RX ADMIN — GABAPENTIN 300 MILLIGRAM(S): 400 CAPSULE ORAL at 06:53

## 2023-01-01 RX ADMIN — OXYCODONE HYDROCHLORIDE 10 MILLIGRAM(S): 5 TABLET ORAL at 00:23

## 2023-01-01 RX ADMIN — SODIUM CHLORIDE 4 MILLILITER(S): 9 INJECTION INTRAMUSCULAR; INTRAVENOUS; SUBCUTANEOUS at 20:53

## 2023-01-01 RX ADMIN — PANTOPRAZOLE SODIUM 40 MILLIGRAM(S): 20 TABLET, DELAYED RELEASE ORAL at 05:36

## 2023-01-01 RX ADMIN — METHADONE HYDROCHLORIDE 2.5 MILLIGRAM(S): 40 TABLET ORAL at 22:37

## 2023-01-01 RX ADMIN — OXYCODONE HYDROCHLORIDE 5 MILLIGRAM(S): 5 TABLET ORAL at 12:06

## 2023-01-01 RX ADMIN — OXYCODONE HYDROCHLORIDE 10 MILLIGRAM(S): 5 TABLET ORAL at 10:28

## 2023-01-01 RX ADMIN — POLYETHYLENE GLYCOL 3350 17 GRAM(S): 17 POWDER, FOR SOLUTION ORAL at 06:02

## 2023-01-01 RX ADMIN — OXYCODONE HYDROCHLORIDE 10 MILLIGRAM(S): 5 TABLET ORAL at 01:31

## 2023-01-01 RX ADMIN — Medication 500 MILLIGRAM(S): at 13:57

## 2023-01-01 RX ADMIN — GABAPENTIN 300 MILLIGRAM(S): 400 CAPSULE ORAL at 13:58

## 2023-01-01 RX ADMIN — PIPERACILLIN AND TAZOBACTAM 25 GRAM(S): 4; .5 INJECTION, POWDER, LYOPHILIZED, FOR SOLUTION INTRAVENOUS at 05:28

## 2023-01-01 RX ADMIN — PIPERACILLIN AND TAZOBACTAM 25 GRAM(S): 4; .5 INJECTION, POWDER, LYOPHILIZED, FOR SOLUTION INTRAVENOUS at 05:17

## 2023-01-01 RX ADMIN — DIATRIZOATE MEGLUMINE 30 MILLILITER(S): 180 INJECTION, SOLUTION INTRAVESICAL at 15:48

## 2023-01-01 RX ADMIN — SODIUM CHLORIDE 1000 MILLILITER(S): 9 INJECTION, SOLUTION INTRAVENOUS at 09:00

## 2023-01-01 RX ADMIN — Medication 650 MILLIGRAM(S): at 06:55

## 2023-01-01 RX ADMIN — Medication 1 TABLET(S): at 12:19

## 2023-01-01 RX ADMIN — Medication 2 PACKET(S): at 09:12

## 2023-01-01 RX ADMIN — HEPARIN SODIUM 5000 UNIT(S): 5000 INJECTION INTRAVENOUS; SUBCUTANEOUS at 17:19

## 2023-01-01 RX ADMIN — METHADONE HYDROCHLORIDE 2.5 MILLIGRAM(S): 40 TABLET ORAL at 05:16

## 2023-01-01 RX ADMIN — LIDOCAINE 1 PATCH: 4 CREAM TOPICAL at 11:15

## 2023-01-01 RX ADMIN — Medication 650 MILLIGRAM(S): at 22:06

## 2023-01-01 RX ADMIN — METHADONE HYDROCHLORIDE 2.5 MILLIGRAM(S): 40 TABLET ORAL at 21:27

## 2023-01-01 RX ADMIN — OXYCODONE HYDROCHLORIDE 10 MILLIGRAM(S): 5 TABLET ORAL at 02:10

## 2023-01-01 RX ADMIN — SODIUM CHLORIDE 2 GRAM(S): 9 INJECTION INTRAMUSCULAR; INTRAVENOUS; SUBCUTANEOUS at 15:06

## 2023-01-01 RX ADMIN — Medication 250 MILLIGRAM(S): at 22:00

## 2023-01-01 RX ADMIN — PANTOPRAZOLE SODIUM 40 MILLIGRAM(S): 20 TABLET, DELAYED RELEASE ORAL at 06:01

## 2023-01-01 RX ADMIN — ENOXAPARIN SODIUM 40 MILLIGRAM(S): 100 INJECTION SUBCUTANEOUS at 15:24

## 2023-01-01 RX ADMIN — Medication 1 PACKET(S): at 15:15

## 2023-01-01 RX ADMIN — SENNA PLUS 10 MILLILITER(S): 8.6 TABLET ORAL at 11:53

## 2023-01-01 RX ADMIN — METHADONE HYDROCHLORIDE 2.5 MILLIGRAM(S): 40 TABLET ORAL at 08:28

## 2023-01-01 RX ADMIN — LIDOCAINE 1 PATCH: 4 CREAM TOPICAL at 19:42

## 2023-01-01 RX ADMIN — Medication 650 MILLIGRAM(S): at 19:02

## 2023-01-01 RX ADMIN — SODIUM CHLORIDE 2 GRAM(S): 9 INJECTION INTRAMUSCULAR; INTRAVENOUS; SUBCUTANEOUS at 06:36

## 2023-01-01 RX ADMIN — GABAPENTIN 300 MILLIGRAM(S): 400 CAPSULE ORAL at 22:59

## 2023-01-01 RX ADMIN — Medication 400 MILLIGRAM(S): at 21:02

## 2023-01-01 RX ADMIN — OXYCODONE HYDROCHLORIDE 5 MILLIGRAM(S): 5 TABLET ORAL at 18:26

## 2023-01-01 RX ADMIN — CEFEPIME 100 MILLIGRAM(S): 1 INJECTION, POWDER, FOR SOLUTION INTRAMUSCULAR; INTRAVENOUS at 19:19

## 2023-01-01 RX ADMIN — GABAPENTIN 300 MILLIGRAM(S): 400 CAPSULE ORAL at 22:28

## 2023-01-01 RX ADMIN — Medication 1 PACKET(S): at 16:46

## 2023-01-01 RX ADMIN — CHLORHEXIDINE GLUCONATE 1 APPLICATION(S): 213 SOLUTION TOPICAL at 19:30

## 2023-01-01 RX ADMIN — OXYCODONE HYDROCHLORIDE 10 MILLIGRAM(S): 5 TABLET ORAL at 13:40

## 2023-01-01 RX ADMIN — SODIUM CHLORIDE 4 MILLILITER(S): 9 INJECTION INTRAMUSCULAR; INTRAVENOUS; SUBCUTANEOUS at 21:46

## 2023-01-01 RX ADMIN — OXYCODONE HYDROCHLORIDE 5 MILLIGRAM(S): 5 TABLET ORAL at 22:00

## 2023-01-01 RX ADMIN — CHLORHEXIDINE GLUCONATE 1 APPLICATION(S): 213 SOLUTION TOPICAL at 12:29

## 2023-01-01 RX ADMIN — SODIUM CHLORIDE 100 MILLILITER(S): 9 INJECTION INTRAMUSCULAR; INTRAVENOUS; SUBCUTANEOUS at 19:32

## 2023-01-01 RX ADMIN — Medication 10 MILLIGRAM(S): at 05:34

## 2023-01-01 RX ADMIN — ENOXAPARIN SODIUM 40 MILLIGRAM(S): 100 INJECTION SUBCUTANEOUS at 11:21

## 2023-01-01 RX ADMIN — SODIUM CHLORIDE 1000 MILLILITER(S): 9 INJECTION INTRAMUSCULAR; INTRAVENOUS; SUBCUTANEOUS at 22:02

## 2023-01-01 RX ADMIN — Medication 25 GRAM(S): at 12:56

## 2023-01-01 RX ADMIN — OXYCODONE HYDROCHLORIDE 5 MILLIGRAM(S): 5 TABLET ORAL at 11:25

## 2023-01-01 RX ADMIN — POLYETHYLENE GLYCOL 3350 17 GRAM(S): 17 POWDER, FOR SOLUTION ORAL at 11:41

## 2023-01-01 RX ADMIN — GABAPENTIN 300 MILLIGRAM(S): 400 CAPSULE ORAL at 14:28

## 2023-01-01 RX ADMIN — SENNA PLUS 2 TABLET(S): 8.6 TABLET ORAL at 22:54

## 2023-01-01 RX ADMIN — POLYETHYLENE GLYCOL 3350 17 GRAM(S): 17 POWDER, FOR SOLUTION ORAL at 17:30

## 2023-01-01 RX ADMIN — OXYCODONE HYDROCHLORIDE 5 MILLIGRAM(S): 5 TABLET ORAL at 10:45

## 2023-01-01 RX ADMIN — MORPHINE SULFATE 4 MILLIGRAM(S): 50 CAPSULE, EXTENDED RELEASE ORAL at 09:38

## 2023-01-01 RX ADMIN — ENOXAPARIN SODIUM 40 MILLIGRAM(S): 100 INJECTION SUBCUTANEOUS at 05:55

## 2023-01-01 RX ADMIN — LEVALBUTEROL 0.63 MILLIGRAM(S): 1.25 SOLUTION, CONCENTRATE RESPIRATORY (INHALATION) at 13:42

## 2023-01-01 RX ADMIN — Medication 650 MILLIGRAM(S): at 09:09

## 2023-01-01 RX ADMIN — SODIUM CHLORIDE 100 MILLILITER(S): 9 INJECTION, SOLUTION INTRAVENOUS at 21:57

## 2023-01-01 RX ADMIN — LEVALBUTEROL 1.25 MILLIGRAM(S): 1.25 SOLUTION, CONCENTRATE RESPIRATORY (INHALATION) at 04:40

## 2023-01-01 RX ADMIN — MAGNESIUM OXIDE 400 MG ORAL TABLET 400 MILLIGRAM(S): 241.3 TABLET ORAL at 12:23

## 2023-01-01 RX ADMIN — ENOXAPARIN SODIUM 40 MILLIGRAM(S): 100 INJECTION SUBCUTANEOUS at 05:46

## 2023-01-01 RX ADMIN — OXYCODONE HYDROCHLORIDE 10 MILLIGRAM(S): 5 TABLET ORAL at 14:15

## 2023-01-01 RX ADMIN — Medication 3 MILLILITER(S): at 21:35

## 2023-01-01 RX ADMIN — ENOXAPARIN SODIUM 40 MILLIGRAM(S): 100 INJECTION SUBCUTANEOUS at 12:40

## 2023-01-01 RX ADMIN — TRAMADOL HYDROCHLORIDE 25 MILLIGRAM(S): 50 TABLET ORAL at 14:12

## 2023-01-01 RX ADMIN — PIPERACILLIN AND TAZOBACTAM 25 GRAM(S): 4; .5 INJECTION, POWDER, LYOPHILIZED, FOR SOLUTION INTRAVENOUS at 13:05

## 2023-01-01 RX ADMIN — SODIUM CHLORIDE 1000 MILLILITER(S): 9 INJECTION INTRAMUSCULAR; INTRAVENOUS; SUBCUTANEOUS at 12:00

## 2023-01-01 RX ADMIN — SODIUM CHLORIDE 4 MILLILITER(S): 9 INJECTION INTRAMUSCULAR; INTRAVENOUS; SUBCUTANEOUS at 22:07

## 2023-01-01 RX ADMIN — MAGNESIUM OXIDE 400 MG ORAL TABLET 400 MILLIGRAM(S): 241.3 TABLET ORAL at 07:51

## 2023-01-01 RX ADMIN — Medication 650 MILLIGRAM(S): at 05:59

## 2023-01-01 RX ADMIN — ENOXAPARIN SODIUM 40 MILLIGRAM(S): 100 INJECTION SUBCUTANEOUS at 13:01

## 2023-01-01 RX ADMIN — OXYCODONE HYDROCHLORIDE 5 MILLIGRAM(S): 5 TABLET ORAL at 00:23

## 2023-01-01 RX ADMIN — Medication 1000 MILLIGRAM(S): at 00:00

## 2023-01-01 RX ADMIN — LEVALBUTEROL 0.63 MILLIGRAM(S): 1.25 SOLUTION, CONCENTRATE RESPIRATORY (INHALATION) at 10:45

## 2023-01-01 RX ADMIN — Medication 500 MILLIGRAM(S): at 15:31

## 2023-01-01 RX ADMIN — OXYCODONE HYDROCHLORIDE 10 MILLIGRAM(S): 5 TABLET ORAL at 15:10

## 2023-01-01 RX ADMIN — ENOXAPARIN SODIUM 40 MILLIGRAM(S): 100 INJECTION SUBCUTANEOUS at 14:09

## 2023-01-01 RX ADMIN — GABAPENTIN 300 MILLIGRAM(S): 400 CAPSULE ORAL at 13:07

## 2023-01-01 RX ADMIN — Medication 3 MILLILITER(S): at 12:44

## 2023-01-01 RX ADMIN — METHADONE HYDROCHLORIDE 2.5 MILLIGRAM(S): 40 TABLET ORAL at 05:32

## 2023-01-01 RX ADMIN — OXYCODONE HYDROCHLORIDE 10 MILLIGRAM(S): 5 TABLET ORAL at 01:06

## 2023-01-01 RX ADMIN — Medication 1000 MILLIGRAM(S): at 10:45

## 2023-01-01 RX ADMIN — GABAPENTIN 300 MILLIGRAM(S): 400 CAPSULE ORAL at 14:23

## 2023-01-01 RX ADMIN — GABAPENTIN 300 MILLIGRAM(S): 400 CAPSULE ORAL at 17:01

## 2023-01-01 RX ADMIN — Medication 500 MILLIGRAM(S): at 12:38

## 2023-01-01 RX ADMIN — Medication 650 MILLIGRAM(S): at 21:40

## 2023-01-01 RX ADMIN — Medication 250 MILLIGRAM(S): at 23:55

## 2023-01-01 RX ADMIN — Medication 650 MILLIGRAM(S): at 01:18

## 2023-01-01 RX ADMIN — POTASSIUM PHOSPHATE, MONOBASIC POTASSIUM PHOSPHATE, DIBASIC 62.5 MILLIMOLE(S): 236; 224 INJECTION, SOLUTION INTRAVENOUS at 13:20

## 2023-01-01 RX ADMIN — OXYCODONE HYDROCHLORIDE 10 MILLIGRAM(S): 5 TABLET ORAL at 07:25

## 2023-01-01 RX ADMIN — LIDOCAINE 1 PATCH: 4 CREAM TOPICAL at 18:09

## 2023-01-01 RX ADMIN — METHADONE HYDROCHLORIDE 2.5 MILLIGRAM(S): 40 TABLET ORAL at 22:34

## 2023-01-01 RX ADMIN — OXYCODONE HYDROCHLORIDE 10 MILLIGRAM(S): 5 TABLET ORAL at 19:15

## 2023-01-01 RX ADMIN — Medication 1 APPLICATION(S): at 17:47

## 2023-01-01 RX ADMIN — Medication 150 MICROGRAM(S): at 06:20

## 2023-01-01 RX ADMIN — Medication 250 MILLIGRAM(S): at 17:30

## 2023-01-01 RX ADMIN — Medication 3 MILLILITER(S): at 09:02

## 2023-01-01 RX ADMIN — POLYETHYLENE GLYCOL 3350 17 GRAM(S): 17 POWDER, FOR SOLUTION ORAL at 18:05

## 2023-01-01 RX ADMIN — MEROPENEM 100 MILLIGRAM(S): 1 INJECTION INTRAVENOUS at 14:59

## 2023-01-01 RX ADMIN — GABAPENTIN 300 MILLIGRAM(S): 400 CAPSULE ORAL at 05:07

## 2023-01-01 RX ADMIN — SODIUM CHLORIDE 75 MILLILITER(S): 9 INJECTION, SOLUTION INTRAVENOUS at 21:29

## 2023-01-01 RX ADMIN — LEVALBUTEROL 0.63 MILLIGRAM(S): 1.25 SOLUTION, CONCENTRATE RESPIRATORY (INHALATION) at 15:46

## 2023-01-01 RX ADMIN — Medication 3 MILLIGRAM(S): at 22:05

## 2023-01-01 RX ADMIN — POLYETHYLENE GLYCOL 3350 17 GRAM(S): 17 POWDER, FOR SOLUTION ORAL at 18:21

## 2023-01-01 RX ADMIN — Medication 650 MILLIGRAM(S): at 23:23

## 2023-01-01 RX ADMIN — PIPERACILLIN AND TAZOBACTAM 200 GRAM(S): 4; .5 INJECTION, POWDER, LYOPHILIZED, FOR SOLUTION INTRAVENOUS at 14:32

## 2023-01-01 RX ADMIN — Medication 85 MILLIMOLE(S): at 17:44

## 2023-01-01 RX ADMIN — LEVALBUTEROL 0.63 MILLIGRAM(S): 1.25 SOLUTION, CONCENTRATE RESPIRATORY (INHALATION) at 15:32

## 2023-01-01 RX ADMIN — Medication 650 MILLIGRAM(S): at 04:39

## 2023-01-01 RX ADMIN — Medication 10 MILLIGRAM(S): at 22:22

## 2023-01-01 RX ADMIN — Medication 650 MILLIGRAM(S): at 12:18

## 2023-01-01 RX ADMIN — LEVALBUTEROL 1.25 MILLIGRAM(S): 1.25 SOLUTION, CONCENTRATE RESPIRATORY (INHALATION) at 17:00

## 2023-01-01 RX ADMIN — Medication 650 MILLIGRAM(S): at 01:26

## 2023-01-01 RX ADMIN — Medication 2 PACKET(S): at 14:18

## 2023-01-01 RX ADMIN — POLYETHYLENE GLYCOL 3350 17 GRAM(S): 17 POWDER, FOR SOLUTION ORAL at 17:53

## 2023-01-01 RX ADMIN — PIPERACILLIN AND TAZOBACTAM 25 GRAM(S): 4; .5 INJECTION, POWDER, LYOPHILIZED, FOR SOLUTION INTRAVENOUS at 05:34

## 2023-01-01 RX ADMIN — METHADONE HYDROCHLORIDE 2.5 MILLIGRAM(S): 40 TABLET ORAL at 17:34

## 2023-01-01 RX ADMIN — Medication 400 MILLIGRAM(S): at 10:41

## 2023-01-01 RX ADMIN — LIDOCAINE 1 PATCH: 4 CREAM TOPICAL at 00:50

## 2023-01-01 RX ADMIN — CHLORHEXIDINE GLUCONATE 1 APPLICATION(S): 213 SOLUTION TOPICAL at 11:21

## 2023-01-01 RX ADMIN — GABAPENTIN 300 MILLIGRAM(S): 400 CAPSULE ORAL at 13:06

## 2023-01-01 RX ADMIN — CHLORHEXIDINE GLUCONATE 1 APPLICATION(S): 213 SOLUTION TOPICAL at 14:01

## 2023-01-01 RX ADMIN — OXYCODONE HYDROCHLORIDE 10 MILLIGRAM(S): 5 TABLET ORAL at 18:14

## 2023-01-01 RX ADMIN — Medication 500 MILLIGRAM(S): at 12:31

## 2023-01-01 RX ADMIN — NYSTATIN CREAM 1 APPLICATION(S): 100000 CREAM TOPICAL at 18:22

## 2023-01-01 RX ADMIN — Medication 2.5 MILLIGRAM(S): at 22:49

## 2023-01-01 RX ADMIN — OXYCODONE HYDROCHLORIDE 10 MILLIGRAM(S): 5 TABLET ORAL at 16:30

## 2023-01-01 RX ADMIN — GABAPENTIN 300 MILLIGRAM(S): 400 CAPSULE ORAL at 05:43

## 2023-01-01 RX ADMIN — ENOXAPARIN SODIUM 40 MILLIGRAM(S): 100 INJECTION SUBCUTANEOUS at 11:13

## 2023-01-01 RX ADMIN — Medication 6 MILLIGRAM(S): at 22:40

## 2023-01-01 RX ADMIN — SODIUM CHLORIDE 70 MILLILITER(S): 9 INJECTION, SOLUTION INTRAVENOUS at 16:29

## 2023-01-01 RX ADMIN — LIDOCAINE 1 PATCH: 4 CREAM TOPICAL at 00:39

## 2023-01-01 RX ADMIN — PIPERACILLIN AND TAZOBACTAM 25 GRAM(S): 4; .5 INJECTION, POWDER, LYOPHILIZED, FOR SOLUTION INTRAVENOUS at 13:15

## 2023-01-01 RX ADMIN — Medication 1 APPLICATION(S): at 17:33

## 2023-01-01 RX ADMIN — ENOXAPARIN SODIUM 40 MILLIGRAM(S): 100 INJECTION SUBCUTANEOUS at 12:11

## 2023-01-01 RX ADMIN — Medication 85 MILLIMOLE(S): at 09:24

## 2023-01-01 RX ADMIN — Medication 1 PACKET(S): at 06:30

## 2023-01-01 RX ADMIN — POLYETHYLENE GLYCOL 3350 17 GRAM(S): 17 POWDER, FOR SOLUTION ORAL at 18:17

## 2023-01-01 RX ADMIN — LIDOCAINE 1 PATCH: 4 CREAM TOPICAL at 15:32

## 2023-01-01 RX ADMIN — OXYCODONE HYDROCHLORIDE 10 MILLIGRAM(S): 5 TABLET ORAL at 05:42

## 2023-01-01 RX ADMIN — PIPERACILLIN AND TAZOBACTAM 25 GRAM(S): 4; .5 INJECTION, POWDER, LYOPHILIZED, FOR SOLUTION INTRAVENOUS at 22:52

## 2023-01-01 RX ADMIN — Medication 150 MICROGRAM(S): at 05:16

## 2023-01-01 RX ADMIN — Medication 2 PACKET(S): at 22:54

## 2023-01-01 RX ADMIN — Medication 500 MILLIGRAM(S): at 11:14

## 2023-01-01 RX ADMIN — Medication 650 MILLIGRAM(S): at 13:04

## 2023-01-01 RX ADMIN — SODIUM CHLORIDE 2 GRAM(S): 9 INJECTION INTRAMUSCULAR; INTRAVENOUS; SUBCUTANEOUS at 20:16

## 2023-01-01 RX ADMIN — MEROPENEM 100 MILLIGRAM(S): 1 INJECTION INTRAVENOUS at 05:59

## 2023-01-01 RX ADMIN — NYSTATIN CREAM 1 APPLICATION(S): 100000 CREAM TOPICAL at 05:36

## 2023-01-01 RX ADMIN — POLYETHYLENE GLYCOL 3350 17 GRAM(S): 17 POWDER, FOR SOLUTION ORAL at 18:07

## 2023-01-01 RX ADMIN — Medication 1 APPLICATION(S): at 18:26

## 2023-01-01 RX ADMIN — OXYCODONE HYDROCHLORIDE 10 MILLIGRAM(S): 5 TABLET ORAL at 01:00

## 2023-01-01 RX ADMIN — OXYCODONE HYDROCHLORIDE 5 MILLIGRAM(S): 5 TABLET ORAL at 07:06

## 2023-01-01 RX ADMIN — MAGNESIUM OXIDE 400 MG ORAL TABLET 400 MILLIGRAM(S): 241.3 TABLET ORAL at 17:27

## 2023-01-01 RX ADMIN — Medication 150 MICROGRAM(S): at 05:28

## 2023-01-01 RX ADMIN — OXYCODONE HYDROCHLORIDE 5 MILLIGRAM(S): 5 TABLET ORAL at 02:49

## 2023-01-01 RX ADMIN — LEVALBUTEROL 1.25 MILLIGRAM(S): 1.25 SOLUTION, CONCENTRATE RESPIRATORY (INHALATION) at 08:27

## 2023-01-01 RX ADMIN — PIPERACILLIN AND TAZOBACTAM 25 GRAM(S): 4; .5 INJECTION, POWDER, LYOPHILIZED, FOR SOLUTION INTRAVENOUS at 14:05

## 2023-01-01 RX ADMIN — SODIUM CHLORIDE 75 MILLILITER(S): 9 INJECTION, SOLUTION INTRAVENOUS at 04:06

## 2023-01-01 RX ADMIN — METHADONE HYDROCHLORIDE 2.5 MILLIGRAM(S): 40 TABLET ORAL at 09:36

## 2023-01-01 RX ADMIN — PIPERACILLIN AND TAZOBACTAM 25 GRAM(S): 4; .5 INJECTION, POWDER, LYOPHILIZED, FOR SOLUTION INTRAVENOUS at 06:59

## 2023-01-01 RX ADMIN — OXYCODONE HYDROCHLORIDE 10 MILLIGRAM(S): 5 TABLET ORAL at 12:45

## 2023-01-01 RX ADMIN — CEFEPIME 100 MILLIGRAM(S): 1 INJECTION, POWDER, FOR SOLUTION INTRAMUSCULAR; INTRAVENOUS at 10:15

## 2023-01-01 RX ADMIN — Medication 100 MILLIGRAM(S): at 13:05

## 2023-01-01 RX ADMIN — Medication 150 MICROGRAM(S): at 05:38

## 2023-01-01 RX ADMIN — Medication 1 APPLICATION(S): at 06:57

## 2023-01-01 RX ADMIN — LACTULOSE 10 GRAM(S): 10 SOLUTION ORAL at 17:30

## 2023-01-01 RX ADMIN — Medication 150 MICROGRAM(S): at 04:40

## 2023-01-01 RX ADMIN — SODIUM CHLORIDE 100 MILLILITER(S): 9 INJECTION, SOLUTION INTRAVENOUS at 10:41

## 2023-01-01 RX ADMIN — OXYCODONE HYDROCHLORIDE 10 MILLIGRAM(S): 5 TABLET ORAL at 22:05

## 2023-01-01 RX ADMIN — Medication 3 MILLILITER(S): at 15:15

## 2023-01-01 RX ADMIN — GABAPENTIN 300 MILLIGRAM(S): 400 CAPSULE ORAL at 01:12

## 2023-01-01 RX ADMIN — SODIUM CHLORIDE 100 MILLILITER(S): 9 INJECTION, SOLUTION INTRAVENOUS at 12:40

## 2023-01-01 RX ADMIN — OXYCODONE HYDROCHLORIDE 10 MILLIGRAM(S): 5 TABLET ORAL at 18:11

## 2023-01-01 RX ADMIN — OXYCODONE HYDROCHLORIDE 10 MILLIGRAM(S): 5 TABLET ORAL at 01:58

## 2023-01-01 RX ADMIN — LEVALBUTEROL 1.25 MILLIGRAM(S): 1.25 SOLUTION, CONCENTRATE RESPIRATORY (INHALATION) at 20:32

## 2023-01-01 RX ADMIN — SODIUM CHLORIDE 4 MILLILITER(S): 9 INJECTION INTRAMUSCULAR; INTRAVENOUS; SUBCUTANEOUS at 21:06

## 2023-01-01 RX ADMIN — GABAPENTIN 300 MILLIGRAM(S): 400 CAPSULE ORAL at 06:34

## 2023-01-01 RX ADMIN — GABAPENTIN 300 MILLIGRAM(S): 400 CAPSULE ORAL at 22:53

## 2023-01-01 RX ADMIN — LIDOCAINE 1 PATCH: 4 CREAM TOPICAL at 19:49

## 2023-01-01 RX ADMIN — Medication 650 MILLIGRAM(S): at 07:02

## 2023-01-01 RX ADMIN — SODIUM CHLORIDE 4 MILLILITER(S): 9 INJECTION INTRAMUSCULAR; INTRAVENOUS; SUBCUTANEOUS at 07:45

## 2023-01-01 RX ADMIN — OXYCODONE HYDROCHLORIDE 10 MILLIGRAM(S): 5 TABLET ORAL at 08:21

## 2023-01-01 RX ADMIN — MAGNESIUM OXIDE 400 MG ORAL TABLET 400 MILLIGRAM(S): 241.3 TABLET ORAL at 17:38

## 2023-01-01 RX ADMIN — NYSTATIN CREAM 1 APPLICATION(S): 100000 CREAM TOPICAL at 06:07

## 2023-01-01 RX ADMIN — LIDOCAINE 1 PATCH: 4 CREAM TOPICAL at 19:35

## 2023-01-01 RX ADMIN — Medication 250 MILLIGRAM(S): at 18:01

## 2023-01-01 RX ADMIN — Medication 650 MILLIGRAM(S): at 13:22

## 2023-01-01 RX ADMIN — POLYETHYLENE GLYCOL 3350 17 GRAM(S): 17 POWDER, FOR SOLUTION ORAL at 19:38

## 2023-01-01 RX ADMIN — Medication 250 MILLIGRAM(S): at 06:22

## 2023-01-01 RX ADMIN — GABAPENTIN 300 MILLIGRAM(S): 400 CAPSULE ORAL at 12:24

## 2023-01-01 RX ADMIN — Medication 650 MILLIGRAM(S): at 23:58

## 2023-01-01 RX ADMIN — NYSTATIN CREAM 1 APPLICATION(S): 100000 CREAM TOPICAL at 17:26

## 2023-01-01 RX ADMIN — OXYCODONE HYDROCHLORIDE 10 MILLIGRAM(S): 5 TABLET ORAL at 12:28

## 2023-01-01 RX ADMIN — SENNA PLUS 10 MILLILITER(S): 8.6 TABLET ORAL at 22:13

## 2023-01-01 RX ADMIN — MEROPENEM 100 MILLIGRAM(S): 1 INJECTION INTRAVENOUS at 22:06

## 2023-01-01 RX ADMIN — Medication 500 MILLIGRAM(S): at 13:21

## 2023-01-01 RX ADMIN — MEROPENEM 100 MILLIGRAM(S): 1 INJECTION INTRAVENOUS at 06:15

## 2023-01-01 RX ADMIN — GABAPENTIN 300 MILLIGRAM(S): 400 CAPSULE ORAL at 13:27

## 2023-01-01 RX ADMIN — Medication 1 PACKET(S): at 22:38

## 2023-01-01 RX ADMIN — Medication 650 MILLIGRAM(S): at 00:41

## 2023-01-01 RX ADMIN — LIDOCAINE 1 PATCH: 4 CREAM TOPICAL at 00:59

## 2023-01-01 RX ADMIN — GABAPENTIN 300 MILLIGRAM(S): 400 CAPSULE ORAL at 12:58

## 2023-01-01 RX ADMIN — Medication 400 MILLIGRAM(S): at 03:09

## 2023-01-01 RX ADMIN — LIDOCAINE 1 PATCH: 4 CREAM TOPICAL at 19:39

## 2023-01-01 RX ADMIN — GABAPENTIN 300 MILLIGRAM(S): 400 CAPSULE ORAL at 22:23

## 2023-01-01 RX ADMIN — PANTOPRAZOLE SODIUM 40 MILLIGRAM(S): 20 TABLET, DELAYED RELEASE ORAL at 10:31

## 2023-01-01 RX ADMIN — OXYCODONE HYDROCHLORIDE 10 MILLIGRAM(S): 5 TABLET ORAL at 09:21

## 2023-01-01 RX ADMIN — MAGNESIUM OXIDE 400 MG ORAL TABLET 400 MILLIGRAM(S): 241.3 TABLET ORAL at 17:53

## 2023-01-01 RX ADMIN — OXYCODONE HYDROCHLORIDE 10 MILLIGRAM(S): 5 TABLET ORAL at 16:12

## 2023-01-01 RX ADMIN — Medication 1 PACKET(S): at 12:32

## 2023-01-01 RX ADMIN — Medication 650 MILLIGRAM(S): at 13:55

## 2023-01-01 RX ADMIN — Medication 1 TABLET(S): at 12:05

## 2023-01-01 RX ADMIN — GABAPENTIN 300 MILLIGRAM(S): 400 CAPSULE ORAL at 22:56

## 2023-01-01 RX ADMIN — Medication 20 MILLIEQUIVALENT(S): at 10:19

## 2023-01-01 RX ADMIN — SODIUM CHLORIDE 2 GRAM(S): 9 INJECTION INTRAMUSCULAR; INTRAVENOUS; SUBCUTANEOUS at 22:11

## 2023-01-01 RX ADMIN — NYSTATIN CREAM 1 APPLICATION(S): 100000 CREAM TOPICAL at 17:23

## 2023-01-01 RX ADMIN — Medication 500 MILLIGRAM(S): at 14:09

## 2023-01-01 RX ADMIN — SENNA PLUS 10 MILLILITER(S): 8.6 TABLET ORAL at 20:02

## 2023-01-01 RX ADMIN — OXYCODONE HYDROCHLORIDE 10 MILLIGRAM(S): 5 TABLET ORAL at 14:05

## 2023-01-01 RX ADMIN — METHADONE HYDROCHLORIDE 2.5 MILLIGRAM(S): 40 TABLET ORAL at 19:37

## 2023-01-01 RX ADMIN — PANTOPRAZOLE SODIUM 40 MILLIGRAM(S): 20 TABLET, DELAYED RELEASE ORAL at 05:22

## 2023-01-01 RX ADMIN — LEVALBUTEROL 1.25 MILLIGRAM(S): 1.25 SOLUTION, CONCENTRATE RESPIRATORY (INHALATION) at 12:44

## 2023-01-01 RX ADMIN — LIDOCAINE 1 PATCH: 4 CREAM TOPICAL at 01:30

## 2023-01-01 RX ADMIN — Medication 650 MILLIGRAM(S): at 18:39

## 2023-01-01 RX ADMIN — Medication 1 APPLICATION(S): at 18:05

## 2023-01-01 RX ADMIN — OXYCODONE HYDROCHLORIDE 10 MILLIGRAM(S): 5 TABLET ORAL at 06:55

## 2023-01-01 RX ADMIN — OXYCODONE HYDROCHLORIDE 10 MILLIGRAM(S): 5 TABLET ORAL at 15:40

## 2023-01-01 RX ADMIN — METHADONE HYDROCHLORIDE 2.5 MILLIGRAM(S): 40 TABLET ORAL at 17:58

## 2023-01-01 RX ADMIN — Medication 2 PACKET(S): at 20:28

## 2023-01-01 RX ADMIN — METHADONE HYDROCHLORIDE 2.5 MILLIGRAM(S): 40 TABLET ORAL at 18:08

## 2023-01-01 RX ADMIN — GABAPENTIN 300 MILLIGRAM(S): 400 CAPSULE ORAL at 05:28

## 2023-01-01 RX ADMIN — SENNA PLUS 10 MILLILITER(S): 8.6 TABLET ORAL at 21:07

## 2023-01-01 RX ADMIN — CEFEPIME 100 MILLIGRAM(S): 1 INJECTION, POWDER, FOR SOLUTION INTRAMUSCULAR; INTRAVENOUS at 11:39

## 2023-01-01 RX ADMIN — SODIUM CHLORIDE 2 GRAM(S): 9 INJECTION INTRAMUSCULAR; INTRAVENOUS; SUBCUTANEOUS at 06:33

## 2023-01-01 RX ADMIN — LEVALBUTEROL 1.25 MILLIGRAM(S): 1.25 SOLUTION, CONCENTRATE RESPIRATORY (INHALATION) at 09:10

## 2023-01-01 RX ADMIN — LIDOCAINE 1 PATCH: 4 CREAM TOPICAL at 21:25

## 2023-01-01 RX ADMIN — CEFEPIME 100 MILLIGRAM(S): 1 INJECTION, POWDER, FOR SOLUTION INTRAMUSCULAR; INTRAVENOUS at 18:58

## 2023-01-01 RX ADMIN — POLYETHYLENE GLYCOL 3350 17 GRAM(S): 17 POWDER, FOR SOLUTION ORAL at 12:12

## 2023-01-01 RX ADMIN — Medication 1000 MILLIGRAM(S): at 23:35

## 2023-01-01 RX ADMIN — SODIUM CHLORIDE 100 MILLILITER(S): 9 INJECTION, SOLUTION INTRAVENOUS at 00:06

## 2023-01-01 RX ADMIN — OXYCODONE HYDROCHLORIDE 10 MILLIGRAM(S): 5 TABLET ORAL at 06:57

## 2023-01-01 RX ADMIN — GABAPENTIN 300 MILLIGRAM(S): 400 CAPSULE ORAL at 06:15

## 2023-01-01 RX ADMIN — PIPERACILLIN AND TAZOBACTAM 25 GRAM(S): 4; .5 INJECTION, POWDER, LYOPHILIZED, FOR SOLUTION INTRAVENOUS at 13:36

## 2023-01-01 RX ADMIN — GABAPENTIN 300 MILLIGRAM(S): 400 CAPSULE ORAL at 13:11

## 2023-01-01 RX ADMIN — Medication 500 MILLIGRAM(S): at 11:15

## 2023-01-01 RX ADMIN — GABAPENTIN 300 MILLIGRAM(S): 400 CAPSULE ORAL at 13:34

## 2023-01-01 RX ADMIN — Medication 400 MILLIGRAM(S): at 22:27

## 2023-01-01 RX ADMIN — MAGNESIUM OXIDE 400 MG ORAL TABLET 400 MILLIGRAM(S): 241.3 TABLET ORAL at 07:45

## 2023-01-01 RX ADMIN — ENOXAPARIN SODIUM 40 MILLIGRAM(S): 100 INJECTION SUBCUTANEOUS at 11:49

## 2023-01-01 RX ADMIN — MORPHINE SULFATE 4 MILLIGRAM(S): 50 CAPSULE, EXTENDED RELEASE ORAL at 13:51

## 2023-01-01 RX ADMIN — OXYCODONE HYDROCHLORIDE 10 MILLIGRAM(S): 5 TABLET ORAL at 14:04

## 2023-01-01 RX ADMIN — SODIUM CHLORIDE 2 GRAM(S): 9 INJECTION INTRAMUSCULAR; INTRAVENOUS; SUBCUTANEOUS at 05:47

## 2023-01-01 RX ADMIN — LIDOCAINE 1 PATCH: 4 CREAM TOPICAL at 19:00

## 2023-01-01 RX ADMIN — OXYCODONE HYDROCHLORIDE 10 MILLIGRAM(S): 5 TABLET ORAL at 12:47

## 2023-01-01 RX ADMIN — OXYCODONE HYDROCHLORIDE 5 MILLIGRAM(S): 5 TABLET ORAL at 23:54

## 2023-01-01 RX ADMIN — Medication 1 TABLET(S): at 12:59

## 2023-01-01 RX ADMIN — MEROPENEM 100 MILLIGRAM(S): 1 INJECTION INTRAVENOUS at 21:29

## 2023-01-01 RX ADMIN — OXYCODONE HYDROCHLORIDE 10 MILLIGRAM(S): 5 TABLET ORAL at 22:56

## 2023-01-01 RX ADMIN — SODIUM CHLORIDE 100 MILLILITER(S): 9 INJECTION, SOLUTION INTRAVENOUS at 06:20

## 2023-01-01 RX ADMIN — Medication 650 MILLIGRAM(S): at 00:36

## 2023-01-01 RX ADMIN — OXYCODONE HYDROCHLORIDE 10 MILLIGRAM(S): 5 TABLET ORAL at 01:11

## 2023-01-01 RX ADMIN — OXYCODONE HYDROCHLORIDE 10 MILLIGRAM(S): 5 TABLET ORAL at 23:04

## 2023-01-01 RX ADMIN — OXYCODONE HYDROCHLORIDE 10 MILLIGRAM(S): 5 TABLET ORAL at 22:47

## 2023-01-01 RX ADMIN — Medication 3 MILLILITER(S): at 10:23

## 2023-01-01 RX ADMIN — Medication 3 MILLILITER(S): at 20:42

## 2023-01-01 RX ADMIN — PIPERACILLIN AND TAZOBACTAM 25 GRAM(S): 4; .5 INJECTION, POWDER, LYOPHILIZED, FOR SOLUTION INTRAVENOUS at 05:41

## 2023-01-01 RX ADMIN — GABAPENTIN 300 MILLIGRAM(S): 400 CAPSULE ORAL at 14:15

## 2023-01-01 RX ADMIN — PANTOPRAZOLE SODIUM 40 MILLIGRAM(S): 20 TABLET, DELAYED RELEASE ORAL at 07:23

## 2023-01-01 RX ADMIN — MEROPENEM 100 MILLIGRAM(S): 1 INJECTION INTRAVENOUS at 05:17

## 2023-01-01 RX ADMIN — Medication 1 APPLICATION(S): at 05:38

## 2023-01-01 RX ADMIN — LIDOCAINE 1 PATCH: 4 CREAM TOPICAL at 12:57

## 2023-01-01 RX ADMIN — LEVALBUTEROL 0.63 MILLIGRAM(S): 1.25 SOLUTION, CONCENTRATE RESPIRATORY (INHALATION) at 04:03

## 2023-01-01 RX ADMIN — Medication 40 MILLIGRAM(S): at 01:05

## 2023-01-01 RX ADMIN — Medication 85 MILLIMOLE(S): at 21:58

## 2023-01-01 RX ADMIN — SODIUM CHLORIDE 2 GRAM(S): 9 INJECTION INTRAMUSCULAR; INTRAVENOUS; SUBCUTANEOUS at 22:14

## 2023-01-01 RX ADMIN — Medication 10 MILLIGRAM(S): at 13:53

## 2023-01-01 RX ADMIN — LIDOCAINE 1 PATCH: 4 CREAM TOPICAL at 00:34

## 2023-01-01 RX ADMIN — Medication 10 MILLIGRAM(S): at 14:42

## 2023-01-01 RX ADMIN — Medication 650 MILLIGRAM(S): at 13:29

## 2023-01-01 RX ADMIN — LEVALBUTEROL 0.63 MILLIGRAM(S): 1.25 SOLUTION, CONCENTRATE RESPIRATORY (INHALATION) at 03:19

## 2023-01-01 RX ADMIN — LIDOCAINE 1 PATCH: 4 CREAM TOPICAL at 11:13

## 2023-01-01 RX ADMIN — SODIUM CHLORIDE 100 MILLILITER(S): 9 INJECTION INTRAMUSCULAR; INTRAVENOUS; SUBCUTANEOUS at 09:59

## 2023-01-01 RX ADMIN — Medication 1 TABLET(S): at 12:39

## 2023-01-01 RX ADMIN — HEPARIN SODIUM 5000 UNIT(S): 5000 INJECTION INTRAVENOUS; SUBCUTANEOUS at 05:20

## 2023-01-01 RX ADMIN — SODIUM CHLORIDE 100 MILLILITER(S): 9 INJECTION INTRAMUSCULAR; INTRAVENOUS; SUBCUTANEOUS at 01:19

## 2023-01-01 RX ADMIN — SODIUM CHLORIDE 1000 MILLILITER(S): 9 INJECTION INTRAMUSCULAR; INTRAVENOUS; SUBCUTANEOUS at 09:09

## 2023-01-01 RX ADMIN — ENOXAPARIN SODIUM 40 MILLIGRAM(S): 100 INJECTION SUBCUTANEOUS at 15:31

## 2023-01-01 RX ADMIN — POLYETHYLENE GLYCOL 3350 17 GRAM(S): 17 POWDER, FOR SOLUTION ORAL at 22:15

## 2023-01-01 RX ADMIN — Medication 25 GRAM(S): at 18:23

## 2023-01-01 RX ADMIN — OXYCODONE HYDROCHLORIDE 10 MILLIGRAM(S): 5 TABLET ORAL at 01:57

## 2023-01-01 RX ADMIN — Medication 650 MILLIGRAM(S): at 06:15

## 2023-01-01 RX ADMIN — OXYCODONE HYDROCHLORIDE 5 MILLIGRAM(S): 5 TABLET ORAL at 02:00

## 2023-01-01 RX ADMIN — OXYCODONE HYDROCHLORIDE 10 MILLIGRAM(S): 5 TABLET ORAL at 23:06

## 2023-01-01 RX ADMIN — PANTOPRAZOLE SODIUM 40 MILLIGRAM(S): 20 TABLET, DELAYED RELEASE ORAL at 06:16

## 2023-01-01 RX ADMIN — Medication 150 MICROGRAM(S): at 05:41

## 2023-01-01 RX ADMIN — Medication 650 MILLIGRAM(S): at 18:07

## 2023-01-01 RX ADMIN — SODIUM CHLORIDE 4 MILLILITER(S): 9 INJECTION INTRAMUSCULAR; INTRAVENOUS; SUBCUTANEOUS at 10:02

## 2023-01-01 RX ADMIN — SODIUM CHLORIDE 4 MILLILITER(S): 9 INJECTION INTRAMUSCULAR; INTRAVENOUS; SUBCUTANEOUS at 11:12

## 2023-01-01 RX ADMIN — Medication 650 MILLIGRAM(S): at 02:14

## 2023-01-01 RX ADMIN — Medication 400 MILLIGRAM(S): at 01:51

## 2023-01-01 RX ADMIN — Medication 400 MILLIGRAM(S): at 00:53

## 2023-01-01 RX ADMIN — SODIUM CHLORIDE 70 MILLILITER(S): 9 INJECTION, SOLUTION INTRAVENOUS at 13:04

## 2023-01-01 RX ADMIN — PANTOPRAZOLE SODIUM 40 MILLIGRAM(S): 20 TABLET, DELAYED RELEASE ORAL at 11:40

## 2023-01-01 RX ADMIN — Medication 250 MILLIGRAM(S): at 05:45

## 2023-01-01 RX ADMIN — Medication 250 MILLIGRAM(S): at 02:14

## 2023-01-01 RX ADMIN — PIPERACILLIN AND TAZOBACTAM 25 GRAM(S): 4; .5 INJECTION, POWDER, LYOPHILIZED, FOR SOLUTION INTRAVENOUS at 21:19

## 2023-01-01 RX ADMIN — Medication 85 MILLIMOLE(S): at 11:40

## 2023-01-01 RX ADMIN — OXYCODONE HYDROCHLORIDE 10 MILLIGRAM(S): 5 TABLET ORAL at 20:50

## 2023-01-01 RX ADMIN — LIDOCAINE 1 PATCH: 4 CREAM TOPICAL at 19:25

## 2023-01-01 RX ADMIN — GABAPENTIN 300 MILLIGRAM(S): 400 CAPSULE ORAL at 05:49

## 2023-01-01 RX ADMIN — Medication 400 MILLIGRAM(S): at 18:46

## 2023-01-01 RX ADMIN — GABAPENTIN 300 MILLIGRAM(S): 400 CAPSULE ORAL at 05:41

## 2023-01-01 RX ADMIN — CEFEPIME 100 MILLIGRAM(S): 1 INJECTION, POWDER, FOR SOLUTION INTRAMUSCULAR; INTRAVENOUS at 14:28

## 2023-01-01 RX ADMIN — ENOXAPARIN SODIUM 40 MILLIGRAM(S): 100 INJECTION SUBCUTANEOUS at 13:27

## 2023-01-01 RX ADMIN — Medication 1 APPLICATION(S): at 06:37

## 2023-01-01 RX ADMIN — Medication 1 PACKET(S): at 18:14

## 2023-01-01 RX ADMIN — ENOXAPARIN SODIUM 40 MILLIGRAM(S): 100 INJECTION SUBCUTANEOUS at 06:09

## 2023-01-01 RX ADMIN — OXYCODONE HYDROCHLORIDE 10 MILLIGRAM(S): 5 TABLET ORAL at 07:32

## 2023-01-01 RX ADMIN — Medication 1 TABLET(S): at 15:31

## 2023-01-01 RX ADMIN — PANTOPRAZOLE SODIUM 40 MILLIGRAM(S): 20 TABLET, DELAYED RELEASE ORAL at 05:03

## 2023-01-01 RX ADMIN — Medication 650 MILLIGRAM(S): at 04:20

## 2023-01-01 RX ADMIN — MEROPENEM 100 MILLIGRAM(S): 1 INJECTION INTRAVENOUS at 05:12

## 2023-01-01 RX ADMIN — PIPERACILLIN AND TAZOBACTAM 25 GRAM(S): 4; .5 INJECTION, POWDER, LYOPHILIZED, FOR SOLUTION INTRAVENOUS at 22:59

## 2023-01-01 RX ADMIN — CALCITONIN SALMON 250 INTERNATIONAL UNIT(S): 200 INJECTION, SOLUTION INTRAMUSCULAR at 18:01

## 2023-01-01 RX ADMIN — OXYCODONE HYDROCHLORIDE 10 MILLIGRAM(S): 5 TABLET ORAL at 00:24

## 2023-01-01 RX ADMIN — PANTOPRAZOLE SODIUM 40 MILLIGRAM(S): 20 TABLET, DELAYED RELEASE ORAL at 09:13

## 2023-01-01 RX ADMIN — GABAPENTIN 300 MILLIGRAM(S): 400 CAPSULE ORAL at 19:39

## 2023-01-01 RX ADMIN — CEFEPIME 100 MILLIGRAM(S): 1 INJECTION, POWDER, FOR SOLUTION INTRAMUSCULAR; INTRAVENOUS at 22:01

## 2023-01-01 RX ADMIN — ENOXAPARIN SODIUM 40 MILLIGRAM(S): 100 INJECTION SUBCUTANEOUS at 11:53

## 2023-01-01 RX ADMIN — CEFEPIME 100 MILLIGRAM(S): 1 INJECTION, POWDER, FOR SOLUTION INTRAMUSCULAR; INTRAVENOUS at 12:35

## 2023-01-01 RX ADMIN — MEROPENEM 100 MILLIGRAM(S): 1 INJECTION INTRAVENOUS at 06:08

## 2023-01-01 RX ADMIN — LIDOCAINE 1 PATCH: 4 CREAM TOPICAL at 00:12

## 2023-01-01 RX ADMIN — Medication 1 TABLET(S): at 11:14

## 2023-01-01 RX ADMIN — MEROPENEM 100 MILLIGRAM(S): 1 INJECTION INTRAVENOUS at 13:04

## 2023-01-01 RX ADMIN — Medication 3 MILLILITER(S): at 16:10

## 2023-01-01 RX ADMIN — OXYCODONE HYDROCHLORIDE 10 MILLIGRAM(S): 5 TABLET ORAL at 11:22

## 2023-01-01 RX ADMIN — Medication 400 MILLIGRAM(S): at 10:30

## 2023-01-01 RX ADMIN — Medication 650 MILLIGRAM(S): at 08:15

## 2023-01-01 RX ADMIN — PANTOPRAZOLE SODIUM 40 MILLIGRAM(S): 20 TABLET, DELAYED RELEASE ORAL at 12:12

## 2023-01-01 RX ADMIN — OXYCODONE HYDROCHLORIDE 2.5 MILLIGRAM(S): 5 TABLET ORAL at 04:09

## 2023-01-01 RX ADMIN — Medication 112 MICROGRAM(S): at 07:02

## 2023-01-01 RX ADMIN — CEFEPIME 100 MILLIGRAM(S): 1 INJECTION, POWDER, FOR SOLUTION INTRAMUSCULAR; INTRAVENOUS at 02:19

## 2023-01-01 RX ADMIN — MEROPENEM 100 MILLIGRAM(S): 1 INJECTION INTRAVENOUS at 06:03

## 2023-01-01 RX ADMIN — Medication 25 GRAM(S): at 22:07

## 2023-01-01 RX ADMIN — Medication 650 MILLIGRAM(S): at 11:50

## 2023-01-01 RX ADMIN — Medication 1000 MILLIGRAM(S): at 12:20

## 2023-01-01 RX ADMIN — PIPERACILLIN AND TAZOBACTAM 25 GRAM(S): 4; .5 INJECTION, POWDER, LYOPHILIZED, FOR SOLUTION INTRAVENOUS at 22:33

## 2023-01-01 RX ADMIN — Medication 650 MILLIGRAM(S): at 06:21

## 2023-01-01 RX ADMIN — Medication 150 MICROGRAM(S): at 05:50

## 2023-01-01 RX ADMIN — ENOXAPARIN SODIUM 40 MILLIGRAM(S): 100 INJECTION SUBCUTANEOUS at 19:23

## 2023-01-01 RX ADMIN — Medication 1 APPLICATION(S): at 19:33

## 2023-01-01 RX ADMIN — PANTOPRAZOLE SODIUM 40 MILLIGRAM(S): 20 TABLET, DELAYED RELEASE ORAL at 11:41

## 2023-01-01 RX ADMIN — Medication 250 MILLIGRAM(S): at 17:40

## 2023-01-01 RX ADMIN — PANTOPRAZOLE SODIUM 40 MILLIGRAM(S): 20 TABLET, DELAYED RELEASE ORAL at 11:58

## 2023-01-01 RX ADMIN — Medication 975 MILLIGRAM(S): at 05:32

## 2023-01-01 RX ADMIN — Medication 250 MILLIGRAM(S): at 12:22

## 2023-01-01 RX ADMIN — PANTOPRAZOLE SODIUM 40 MILLIGRAM(S): 20 TABLET, DELAYED RELEASE ORAL at 05:16

## 2023-01-01 RX ADMIN — CHLORHEXIDINE GLUCONATE 1 APPLICATION(S): 213 SOLUTION TOPICAL at 14:28

## 2023-01-01 RX ADMIN — Medication 2 PACKET(S): at 18:02

## 2023-01-01 RX ADMIN — Medication 650 MILLIGRAM(S): at 05:40

## 2023-01-01 RX ADMIN — Medication 3 MILLIGRAM(S): at 22:32

## 2023-01-01 RX ADMIN — LIDOCAINE 1 PATCH: 4 CREAM TOPICAL at 13:10

## 2023-01-01 RX ADMIN — SODIUM CHLORIDE 2 GRAM(S): 9 INJECTION INTRAMUSCULAR; INTRAVENOUS; SUBCUTANEOUS at 13:18

## 2023-01-01 RX ADMIN — Medication 112 MICROGRAM(S): at 07:15

## 2023-01-01 RX ADMIN — RIVAROXABAN 10 MILLIGRAM(S): KIT at 12:12

## 2023-01-01 RX ADMIN — Medication 1 TABLET(S): at 13:20

## 2023-01-01 RX ADMIN — Medication 650 MILLIGRAM(S): at 10:46

## 2023-01-01 RX ADMIN — Medication 650 MILLIGRAM(S): at 18:59

## 2023-01-01 RX ADMIN — NYSTATIN CREAM 1 APPLICATION(S): 100000 CREAM TOPICAL at 05:51

## 2023-01-01 RX ADMIN — Medication 650 MILLIGRAM(S): at 13:08

## 2023-01-01 RX ADMIN — NYSTATIN CREAM 1 APPLICATION(S): 100000 CREAM TOPICAL at 17:40

## 2023-01-01 RX ADMIN — SODIUM CHLORIDE 2 GRAM(S): 9 INJECTION INTRAMUSCULAR; INTRAVENOUS; SUBCUTANEOUS at 05:55

## 2023-01-01 RX ADMIN — CHLORHEXIDINE GLUCONATE 1 APPLICATION(S): 213 SOLUTION TOPICAL at 12:35

## 2023-01-01 RX ADMIN — OXYCODONE HYDROCHLORIDE 10 MILLIGRAM(S): 5 TABLET ORAL at 22:35

## 2023-01-01 RX ADMIN — CHLORHEXIDINE GLUCONATE 1 APPLICATION(S): 213 SOLUTION TOPICAL at 13:28

## 2023-01-01 RX ADMIN — OXYCODONE HYDROCHLORIDE 10 MILLIGRAM(S): 5 TABLET ORAL at 22:45

## 2023-01-01 RX ADMIN — MAGNESIUM OXIDE 400 MG ORAL TABLET 400 MILLIGRAM(S): 241.3 TABLET ORAL at 07:49

## 2023-01-01 RX ADMIN — CEFEPIME 100 MILLIGRAM(S): 1 INJECTION, POWDER, FOR SOLUTION INTRAMUSCULAR; INTRAVENOUS at 19:16

## 2023-01-01 RX ADMIN — Medication 650 MILLIGRAM(S): at 12:58

## 2023-01-01 RX ADMIN — Medication 150 MICROGRAM(S): at 06:09

## 2023-01-01 RX ADMIN — POLYETHYLENE GLYCOL 3350 17 GRAM(S): 17 POWDER, FOR SOLUTION ORAL at 14:05

## 2023-01-01 RX ADMIN — CHLORHEXIDINE GLUCONATE 1 APPLICATION(S): 213 SOLUTION TOPICAL at 16:29

## 2023-01-01 RX ADMIN — OXYCODONE HYDROCHLORIDE 10 MILLIGRAM(S): 5 TABLET ORAL at 05:46

## 2023-01-01 RX ADMIN — Medication 975 MILLIGRAM(S): at 22:38

## 2023-01-01 RX ADMIN — Medication 1 PACKET(S): at 22:56

## 2023-01-01 RX ADMIN — SODIUM CHLORIDE 100 MILLILITER(S): 9 INJECTION, SOLUTION INTRAVENOUS at 02:55

## 2023-01-01 RX ADMIN — PANTOPRAZOLE SODIUM 40 MILLIGRAM(S): 20 TABLET, DELAYED RELEASE ORAL at 11:08

## 2023-01-01 RX ADMIN — OXYCODONE HYDROCHLORIDE 10 MILLIGRAM(S): 5 TABLET ORAL at 21:02

## 2023-01-01 RX ADMIN — Medication 975 MILLIGRAM(S): at 05:55

## 2023-01-01 RX ADMIN — OXYCODONE HYDROCHLORIDE 10 MILLIGRAM(S): 5 TABLET ORAL at 10:15

## 2023-01-01 RX ADMIN — POLYETHYLENE GLYCOL 3350 17 GRAM(S): 17 POWDER, FOR SOLUTION ORAL at 06:12

## 2023-01-01 RX ADMIN — Medication 150 MICROGRAM(S): at 04:23

## 2023-01-01 RX ADMIN — LIDOCAINE 1 PATCH: 4 CREAM TOPICAL at 12:31

## 2023-01-01 RX ADMIN — OXYCODONE HYDROCHLORIDE 10 MILLIGRAM(S): 5 TABLET ORAL at 15:30

## 2023-01-01 RX ADMIN — GABAPENTIN 300 MILLIGRAM(S): 400 CAPSULE ORAL at 13:53

## 2023-01-01 RX ADMIN — ENOXAPARIN SODIUM 40 MILLIGRAM(S): 100 INJECTION SUBCUTANEOUS at 12:19

## 2023-01-01 RX ADMIN — LIDOCAINE 1 PATCH: 4 CREAM TOPICAL at 21:50

## 2023-01-01 RX ADMIN — Medication 650 MILLIGRAM(S): at 11:15

## 2023-01-01 RX ADMIN — POTASSIUM PHOSPHATE, MONOBASIC POTASSIUM PHOSPHATE, DIBASIC 83.33 MILLIMOLE(S): 236; 224 INJECTION, SOLUTION INTRAVENOUS at 09:55

## 2023-01-01 RX ADMIN — OXYCODONE HYDROCHLORIDE 10 MILLIGRAM(S): 5 TABLET ORAL at 12:05

## 2023-01-01 RX ADMIN — GABAPENTIN 300 MILLIGRAM(S): 400 CAPSULE ORAL at 06:01

## 2023-01-01 RX ADMIN — Medication 650 MILLIGRAM(S): at 13:20

## 2023-01-01 RX ADMIN — Medication 1000 MILLIGRAM(S): at 00:41

## 2023-01-01 RX ADMIN — METHADONE HYDROCHLORIDE 2.5 MILLIGRAM(S): 40 TABLET ORAL at 22:49

## 2023-01-01 RX ADMIN — PIPERACILLIN AND TAZOBACTAM 25 GRAM(S): 4; .5 INJECTION, POWDER, LYOPHILIZED, FOR SOLUTION INTRAVENOUS at 05:42

## 2023-01-01 RX ADMIN — METHADONE HYDROCHLORIDE 2.5 MILLIGRAM(S): 40 TABLET ORAL at 22:46

## 2023-01-01 RX ADMIN — Medication 63.75 MILLIMOLE(S): at 09:55

## 2023-01-01 RX ADMIN — Medication 20 MILLIGRAM(S): at 10:41

## 2023-01-01 RX ADMIN — MEROPENEM 100 MILLIGRAM(S): 1 INJECTION INTRAVENOUS at 20:07

## 2023-01-01 RX ADMIN — LEVALBUTEROL 0.63 MILLIGRAM(S): 1.25 SOLUTION, CONCENTRATE RESPIRATORY (INHALATION) at 15:40

## 2023-01-01 RX ADMIN — OXYCODONE HYDROCHLORIDE 5 MILLIGRAM(S): 5 TABLET ORAL at 10:38

## 2023-01-01 RX ADMIN — OXYCODONE HYDROCHLORIDE 10 MILLIGRAM(S): 5 TABLET ORAL at 11:44

## 2023-01-01 RX ADMIN — PANTOPRAZOLE SODIUM 40 MILLIGRAM(S): 20 TABLET, DELAYED RELEASE ORAL at 08:29

## 2023-01-01 RX ADMIN — SODIUM CHLORIDE 4 MILLILITER(S): 9 INJECTION INTRAMUSCULAR; INTRAVENOUS; SUBCUTANEOUS at 20:24

## 2023-01-01 RX ADMIN — GABAPENTIN 300 MILLIGRAM(S): 400 CAPSULE ORAL at 21:43

## 2023-01-01 RX ADMIN — SODIUM CHLORIDE 4 MILLILITER(S): 9 INJECTION INTRAMUSCULAR; INTRAVENOUS; SUBCUTANEOUS at 20:33

## 2023-01-01 RX ADMIN — LIDOCAINE 1 PATCH: 4 CREAM TOPICAL at 19:36

## 2023-01-01 RX ADMIN — GABAPENTIN 300 MILLIGRAM(S): 400 CAPSULE ORAL at 05:37

## 2023-01-01 RX ADMIN — SODIUM CHLORIDE 4 MILLILITER(S): 9 INJECTION INTRAMUSCULAR; INTRAVENOUS; SUBCUTANEOUS at 06:30

## 2023-01-01 RX ADMIN — OXYCODONE HYDROCHLORIDE 5 MILLIGRAM(S): 5 TABLET ORAL at 06:17

## 2023-01-01 RX ADMIN — SODIUM CHLORIDE 75 MILLILITER(S): 9 INJECTION, SOLUTION INTRAVENOUS at 17:42

## 2023-01-01 RX ADMIN — ENOXAPARIN SODIUM 40 MILLIGRAM(S): 100 INJECTION SUBCUTANEOUS at 13:20

## 2023-01-01 RX ADMIN — Medication 250 MILLIGRAM(S): at 11:39

## 2023-01-01 RX ADMIN — OXYCODONE HYDROCHLORIDE 5 MILLIGRAM(S): 5 TABLET ORAL at 14:36

## 2023-01-01 RX ADMIN — SODIUM CHLORIDE 4 MILLILITER(S): 9 INJECTION INTRAMUSCULAR; INTRAVENOUS; SUBCUTANEOUS at 23:40

## 2023-01-01 RX ADMIN — Medication 500 MILLIGRAM(S): at 13:04

## 2023-01-01 RX ADMIN — CEFEPIME 100 MILLIGRAM(S): 1 INJECTION, POWDER, FOR SOLUTION INTRAMUSCULAR; INTRAVENOUS at 02:56

## 2023-01-01 RX ADMIN — Medication 650 MILLIGRAM(S): at 23:16

## 2023-01-01 RX ADMIN — LIDOCAINE 1 PATCH: 4 CREAM TOPICAL at 11:58

## 2023-01-01 RX ADMIN — POLYETHYLENE GLYCOL 3350 17 GRAM(S): 17 POWDER, FOR SOLUTION ORAL at 17:08

## 2023-01-01 RX ADMIN — LIDOCAINE 1 PATCH: 4 CREAM TOPICAL at 17:29

## 2023-01-01 RX ADMIN — Medication 1000 MILLIGRAM(S): at 22:00

## 2023-01-01 RX ADMIN — CEFEPIME 100 MILLIGRAM(S): 1 INJECTION, POWDER, FOR SOLUTION INTRAMUSCULAR; INTRAVENOUS at 04:01

## 2023-01-01 RX ADMIN — Medication 650 MILLIGRAM(S): at 06:52

## 2023-01-01 RX ADMIN — PANTOPRAZOLE SODIUM 40 MILLIGRAM(S): 20 TABLET, DELAYED RELEASE ORAL at 05:45

## 2023-01-01 RX ADMIN — Medication 85 MILLIMOLE(S): at 15:50

## 2023-01-01 RX ADMIN — OXYCODONE HYDROCHLORIDE 10 MILLIGRAM(S): 5 TABLET ORAL at 15:34

## 2023-01-01 RX ADMIN — POLYETHYLENE GLYCOL 3350 17 GRAM(S): 17 POWDER, FOR SOLUTION ORAL at 05:03

## 2023-01-01 RX ADMIN — CHLORHEXIDINE GLUCONATE 1 APPLICATION(S): 213 SOLUTION TOPICAL at 13:19

## 2023-01-01 RX ADMIN — MEROPENEM 100 MILLIGRAM(S): 1 INJECTION INTRAVENOUS at 22:41

## 2023-01-01 RX ADMIN — POTASSIUM PHOSPHATE, MONOBASIC POTASSIUM PHOSPHATE, DIBASIC 83.33 MILLIMOLE(S): 236; 224 INJECTION, SOLUTION INTRAVENOUS at 10:46

## 2023-01-01 RX ADMIN — Medication 650 MILLIGRAM(S): at 17:50

## 2023-01-01 RX ADMIN — POLYETHYLENE GLYCOL 3350 17 GRAM(S): 17 POWDER, FOR SOLUTION ORAL at 17:52

## 2023-01-01 RX ADMIN — GABAPENTIN 300 MILLIGRAM(S): 400 CAPSULE ORAL at 05:03

## 2023-01-01 RX ADMIN — Medication 85 MILLIMOLE(S): at 12:20

## 2023-01-01 RX ADMIN — Medication 650 MILLIGRAM(S): at 00:30

## 2023-01-01 RX ADMIN — Medication 650 MILLIGRAM(S): at 13:00

## 2023-01-01 RX ADMIN — Medication 2 PACKET(S): at 18:16

## 2023-01-01 RX ADMIN — POLYETHYLENE GLYCOL 3350 17 GRAM(S): 17 POWDER, FOR SOLUTION ORAL at 20:08

## 2023-01-01 RX ADMIN — Medication 3 MILLIGRAM(S): at 22:41

## 2023-01-01 RX ADMIN — OXYCODONE HYDROCHLORIDE 10 MILLIGRAM(S): 5 TABLET ORAL at 10:44

## 2023-01-01 RX ADMIN — Medication 400 MILLIGRAM(S): at 17:28

## 2023-01-01 RX ADMIN — Medication 1 TABLET(S): at 11:15

## 2023-01-01 RX ADMIN — Medication 1 APPLICATION(S): at 18:04

## 2023-01-01 RX ADMIN — Medication 100 MILLIGRAM(S): at 22:40

## 2023-01-01 RX ADMIN — LEVALBUTEROL 0.63 MILLIGRAM(S): 1.25 SOLUTION, CONCENTRATE RESPIRATORY (INHALATION) at 16:05

## 2023-01-01 RX ADMIN — Medication 2 PACKET(S): at 07:06

## 2023-01-01 RX ADMIN — OXYCODONE HYDROCHLORIDE 10 MILLIGRAM(S): 5 TABLET ORAL at 13:34

## 2023-01-01 RX ADMIN — SODIUM CHLORIDE 2 GRAM(S): 9 INJECTION INTRAMUSCULAR; INTRAVENOUS; SUBCUTANEOUS at 22:39

## 2023-01-01 RX ADMIN — MEROPENEM 100 MILLIGRAM(S): 1 INJECTION INTRAVENOUS at 15:32

## 2023-01-01 RX ADMIN — Medication 1 APPLICATION(S): at 17:13

## 2023-01-01 RX ADMIN — CALCITONIN SALMON 400 INTERNATIONAL UNIT(S): 200 INJECTION, SOLUTION INTRAMUSCULAR at 17:57

## 2023-01-01 RX ADMIN — SODIUM CHLORIDE 2 GRAM(S): 9 INJECTION INTRAMUSCULAR; INTRAVENOUS; SUBCUTANEOUS at 05:32

## 2023-01-01 RX ADMIN — OXYCODONE HYDROCHLORIDE 5 MILLIGRAM(S): 5 TABLET ORAL at 05:33

## 2023-01-01 RX ADMIN — CEFEPIME 100 MILLIGRAM(S): 1 INJECTION, POWDER, FOR SOLUTION INTRAMUSCULAR; INTRAVENOUS at 03:10

## 2023-01-01 RX ADMIN — Medication 1 APPLICATION(S): at 17:02

## 2023-01-01 RX ADMIN — Medication 650 MILLIGRAM(S): at 14:24

## 2023-01-01 RX ADMIN — OXYCODONE HYDROCHLORIDE 10 MILLIGRAM(S): 5 TABLET ORAL at 22:02

## 2023-01-01 RX ADMIN — Medication 400 MILLIGRAM(S): at 13:05

## 2023-01-01 RX ADMIN — OXYCODONE HYDROCHLORIDE 5 MILLIGRAM(S): 5 TABLET ORAL at 12:00

## 2023-01-01 RX ADMIN — PANTOPRAZOLE SODIUM 40 MILLIGRAM(S): 20 TABLET, DELAYED RELEASE ORAL at 11:55

## 2023-01-01 RX ADMIN — CEFEPIME 100 MILLIGRAM(S): 1 INJECTION, POWDER, FOR SOLUTION INTRAMUSCULAR; INTRAVENOUS at 05:34

## 2023-01-01 RX ADMIN — CHLORHEXIDINE GLUCONATE 1 APPLICATION(S): 213 SOLUTION TOPICAL at 18:01

## 2023-01-01 RX ADMIN — METHADONE HYDROCHLORIDE 2.5 MILLIGRAM(S): 40 TABLET ORAL at 19:02

## 2023-01-01 RX ADMIN — LIDOCAINE 1 PATCH: 4 CREAM TOPICAL at 13:19

## 2023-01-01 RX ADMIN — GABAPENTIN 300 MILLIGRAM(S): 400 CAPSULE ORAL at 20:08

## 2023-01-01 RX ADMIN — NYSTATIN CREAM 1 APPLICATION(S): 100000 CREAM TOPICAL at 18:02

## 2023-01-01 RX ADMIN — CHLORHEXIDINE GLUCONATE 1 APPLICATION(S): 213 SOLUTION TOPICAL at 14:08

## 2023-01-01 RX ADMIN — Medication 85 MILLIMOLE(S): at 10:50

## 2023-01-01 RX ADMIN — Medication 1 APPLICATION(S): at 18:03

## 2023-01-01 RX ADMIN — Medication 112 MICROGRAM(S): at 05:30

## 2023-01-01 RX ADMIN — SODIUM CHLORIDE 4 MILLILITER(S): 9 INJECTION INTRAMUSCULAR; INTRAVENOUS; SUBCUTANEOUS at 21:51

## 2023-01-01 RX ADMIN — OXYCODONE HYDROCHLORIDE 7.5 MILLIGRAM(S): 5 TABLET ORAL at 13:05

## 2023-01-01 RX ADMIN — Medication 1000 MILLIGRAM(S): at 08:30

## 2023-01-01 RX ADMIN — METHADONE HYDROCHLORIDE 2.5 MILLIGRAM(S): 40 TABLET ORAL at 06:56

## 2023-01-01 RX ADMIN — LIDOCAINE 1 PATCH: 4 CREAM TOPICAL at 00:06

## 2023-01-01 RX ADMIN — Medication 400 MILLIGRAM(S): at 11:51

## 2023-01-01 RX ADMIN — CEFEPIME 100 MILLIGRAM(S): 1 INJECTION, POWDER, FOR SOLUTION INTRAMUSCULAR; INTRAVENOUS at 06:35

## 2023-01-01 RX ADMIN — OXYCODONE HYDROCHLORIDE 10 MILLIGRAM(S): 5 TABLET ORAL at 19:00

## 2023-01-01 RX ADMIN — Medication 150 MICROGRAM(S): at 05:49

## 2023-01-01 RX ADMIN — SODIUM CHLORIDE 4 MILLILITER(S): 9 INJECTION INTRAMUSCULAR; INTRAVENOUS; SUBCUTANEOUS at 21:43

## 2023-01-01 RX ADMIN — METHADONE HYDROCHLORIDE 2.5 MILLIGRAM(S): 40 TABLET ORAL at 18:54

## 2023-01-01 RX ADMIN — POLYETHYLENE GLYCOL 3350 17 GRAM(S): 17 POWDER, FOR SOLUTION ORAL at 13:11

## 2023-01-01 RX ADMIN — SODIUM CHLORIDE 100 MILLILITER(S): 9 INJECTION INTRAMUSCULAR; INTRAVENOUS; SUBCUTANEOUS at 09:55

## 2023-01-01 RX ADMIN — CHLORHEXIDINE GLUCONATE 1 APPLICATION(S): 213 SOLUTION TOPICAL at 15:34

## 2023-01-01 RX ADMIN — Medication 1 APPLICATION(S): at 06:41

## 2023-01-01 RX ADMIN — GABAPENTIN 300 MILLIGRAM(S): 400 CAPSULE ORAL at 04:09

## 2023-01-01 RX ADMIN — MEROPENEM 100 MILLIGRAM(S): 1 INJECTION INTRAVENOUS at 12:26

## 2023-01-01 RX ADMIN — PIPERACILLIN AND TAZOBACTAM 3.38 GRAM(S): 4; .5 INJECTION, POWDER, LYOPHILIZED, FOR SOLUTION INTRAVENOUS at 15:00

## 2023-01-01 RX ADMIN — ENOXAPARIN SODIUM 40 MILLIGRAM(S): 100 INJECTION SUBCUTANEOUS at 05:37

## 2023-01-01 RX ADMIN — SODIUM CHLORIDE 70 MILLILITER(S): 9 INJECTION, SOLUTION INTRAVENOUS at 22:06

## 2023-01-01 RX ADMIN — SODIUM CHLORIDE 2 GRAM(S): 9 INJECTION INTRAMUSCULAR; INTRAVENOUS; SUBCUTANEOUS at 06:56

## 2023-01-01 RX ADMIN — Medication 650 MILLIGRAM(S): at 09:17

## 2023-01-01 RX ADMIN — LIDOCAINE 1 PATCH: 4 CREAM TOPICAL at 13:01

## 2023-01-01 RX ADMIN — Medication 3 MILLILITER(S): at 03:32

## 2023-01-01 RX ADMIN — SODIUM CHLORIDE 100 MILLILITER(S): 9 INJECTION, SOLUTION INTRAVENOUS at 17:48

## 2023-01-01 RX ADMIN — OXYCODONE HYDROCHLORIDE 5 MILLIGRAM(S): 5 TABLET ORAL at 11:24

## 2023-01-01 RX ADMIN — Medication 650 MILLIGRAM(S): at 19:45

## 2023-01-01 RX ADMIN — LIDOCAINE 1 PATCH: 4 CREAM TOPICAL at 02:30

## 2023-01-01 RX ADMIN — SODIUM CHLORIDE 100 MILLILITER(S): 9 INJECTION INTRAMUSCULAR; INTRAVENOUS; SUBCUTANEOUS at 10:07

## 2023-01-01 RX ADMIN — RIVAROXABAN 10 MILLIGRAM(S): KIT at 11:53

## 2023-01-01 RX ADMIN — ENOXAPARIN SODIUM 40 MILLIGRAM(S): 100 INJECTION SUBCUTANEOUS at 07:08

## 2023-01-01 RX ADMIN — SODIUM CHLORIDE 70 MILLILITER(S): 9 INJECTION, SOLUTION INTRAVENOUS at 07:31

## 2023-01-01 RX ADMIN — MEROPENEM 100 MILLIGRAM(S): 1 INJECTION INTRAVENOUS at 21:31

## 2023-01-01 RX ADMIN — Medication 650 MILLIGRAM(S): at 11:53

## 2023-01-01 RX ADMIN — Medication 1 TABLET(S): at 12:30

## 2023-01-01 RX ADMIN — Medication 112 MICROGRAM(S): at 06:33

## 2023-01-01 RX ADMIN — Medication 250 MILLIGRAM(S): at 11:37

## 2023-01-01 RX ADMIN — Medication 650 MILLIGRAM(S): at 23:13

## 2023-01-01 RX ADMIN — Medication 150 MICROGRAM(S): at 05:59

## 2023-01-01 RX ADMIN — Medication 1 APPLICATION(S): at 07:15

## 2023-01-01 RX ADMIN — OXYCODONE HYDROCHLORIDE 10 MILLIGRAM(S): 5 TABLET ORAL at 02:01

## 2023-01-01 RX ADMIN — LEVALBUTEROL 1.25 MILLIGRAM(S): 1.25 SOLUTION, CONCENTRATE RESPIRATORY (INHALATION) at 04:14

## 2023-01-01 RX ADMIN — GABAPENTIN 300 MILLIGRAM(S): 400 CAPSULE ORAL at 22:17

## 2023-01-01 RX ADMIN — Medication 650 MILLIGRAM(S): at 22:30

## 2023-01-01 RX ADMIN — METHADONE HYDROCHLORIDE 2.5 MILLIGRAM(S): 40 TABLET ORAL at 17:18

## 2023-01-01 RX ADMIN — SODIUM CHLORIDE 60 MILLILITER(S): 9 INJECTION, SOLUTION INTRAVENOUS at 14:08

## 2023-01-01 RX ADMIN — Medication 2 PACKET(S): at 21:23

## 2023-01-01 RX ADMIN — PANTOPRAZOLE SODIUM 40 MILLIGRAM(S): 20 TABLET, DELAYED RELEASE ORAL at 12:20

## 2023-01-01 RX ADMIN — Medication 650 MILLIGRAM(S): at 15:58

## 2023-01-01 RX ADMIN — Medication 650 MILLIGRAM(S): at 15:32

## 2023-01-01 RX ADMIN — ENOXAPARIN SODIUM 40 MILLIGRAM(S): 100 INJECTION SUBCUTANEOUS at 13:02

## 2023-01-01 RX ADMIN — Medication 650 MILLIGRAM(S): at 23:45

## 2023-01-01 RX ADMIN — LEVALBUTEROL 1.25 MILLIGRAM(S): 1.25 SOLUTION, CONCENTRATE RESPIRATORY (INHALATION) at 15:11

## 2023-01-01 RX ADMIN — OXYCODONE HYDROCHLORIDE 10 MILLIGRAM(S): 5 TABLET ORAL at 23:15

## 2023-01-01 RX ADMIN — SODIUM CHLORIDE 500 MILLILITER(S): 9 INJECTION INTRAMUSCULAR; INTRAVENOUS; SUBCUTANEOUS at 06:37

## 2023-01-01 RX ADMIN — CEFEPIME 100 MILLIGRAM(S): 1 INJECTION, POWDER, FOR SOLUTION INTRAMUSCULAR; INTRAVENOUS at 06:32

## 2023-01-01 RX ADMIN — POTASSIUM PHOSPHATE, MONOBASIC POTASSIUM PHOSPHATE, DIBASIC 83.33 MILLIMOLE(S): 236; 224 INJECTION, SOLUTION INTRAVENOUS at 11:57

## 2023-01-01 RX ADMIN — SODIUM CHLORIDE 2 GRAM(S): 9 INJECTION INTRAMUSCULAR; INTRAVENOUS; SUBCUTANEOUS at 17:00

## 2023-01-01 RX ADMIN — Medication 112 MICROGRAM(S): at 05:33

## 2023-01-01 RX ADMIN — Medication 2 PACKET(S): at 05:09

## 2023-01-01 RX ADMIN — MORPHINE SULFATE 4 MILLIGRAM(S): 50 CAPSULE, EXTENDED RELEASE ORAL at 09:09

## 2023-01-01 RX ADMIN — Medication 650 MILLIGRAM(S): at 18:14

## 2023-01-01 RX ADMIN — Medication 650 MILLIGRAM(S): at 05:41

## 2023-01-01 NOTE — H&P ADULT - PROBLEM SELECTOR PLAN 3
Plan  - c/w synthroid 150 mcg daily (home med)  - check TSH, free T4 Plan  - c/w synthroid 150 mcg daily (home med) (or IV equivalent if needed)  - check TSH, free T4

## 2023-01-01 NOTE — ED PROCEDURE NOTE - PROCEDURE ADDITIONAL DETAILS
To be confirmed by tube study x-ray.
Peripheral IV access in the Emergency Department obtained under dynamic ultrasound guidance with dark nonpulsatile blood return.  Catheter was flushed afterwards without any resistance or resulting extravasation.  IV catheter confirmed in compressible vein after insertion.

## 2023-01-01 NOTE — ED PROVIDER NOTE - NS ED ROS FT
Needs appt before further refills. GENERAL: No fever, chills  EYES: no vision changes, no discharge.   ENT: no difficulty swallowing or speaking   CARDIAC: no chest pain/pressure, SOB, lower extremity swelling  PULMONARY: no cough, SOB  GI: no abdominal pain, n/v/d  : no dysuria, no hematuria  SKIN: no rashes, no ecchymosis  NEURO: no headache, lightheadedness  MSK: + joint pain, no myalgia, weakness.

## 2023-01-01 NOTE — ED PROVIDER NOTE - OBJECTIVE STATEMENT
67-year-old male with past medical history of metastatic esophageal CA, hypertension, hypothyroidism presents to the ED as a transfer from [facility] to see Ortho for possible right knee malignancy versus septic arthritis. 67-year-old male with past medical history of metastatic esophageal CA, hypertension, hypothyroidism presents to the ED as a transfer from [facility] to see Ortho for possible right knee malignancy versus septic arthritis. Patient had labs at [facility] showing leukocytosis of 30,000.  Patient received a dose of vancomycin and Zosyn as well as 1 L of IV fluids.  CT of the knee showing concern for osteomyelitis versus worsening malignancy of the patella. Patient initially went to [facility] for 2 days of worsening right knee pain and inability to bend it.  Patient was admitted for COVID-pneumonia the week prior and was discharged on 12/30.

## 2023-01-01 NOTE — ED ADULT NURSE NOTE - CHIEF COMPLAINT QUOTE
Pt. is transferred from [facility] by [id number] unit [id number])  for re-evaluation of his right knee. Pt. had a tumor removed on his right knee and needed to be tapped a month ago. PMH: esophageal cancer with metastasis to right knee and lungs. No chemotherapy or radiation therapy at present. c/o right knee pain. Received IV Morphine 4 mg given at 0430 am.  Pt. is covid positive and was being treated at [facility] for L lower lobe pneumonia. Accidentally pulled out GT at triage. Has Right side mediport. EKG performed at triage.

## 2023-01-01 NOTE — H&P ADULT - PROBLEM SELECTOR PLAN 2
- diagnosed about 8 yrs ago  - follows with Dr. Gi Thomas ([place])    Plan  - Will contact outpatient oncologist to understand current regimen and goal  - Continue goals of care conversation with family  - C/w pantoprazole 40 mg daily, miralax PRN daily, gabapentin 300 mg TID PRN for pain, MgOH and KCl for supplementation  - C/w tube feeds, strict NPO - diagnosed about 8 yrs ago  - follows with Dr. Gi Thomas ([place])  - CXR with near complete opacification of L lung, stable from previous imaging and likely represents pleural metastases    Plan  - Will contact outpatient oncologist to understand current regimen and goal  - Continue goals of care conversation with family  - C/w pantoprazole 40 mg daily, miralax PRN daily, gabapentin 300 mg TID PRN for pain, MgOH and KCl for supplementation  - C/w tube feeds, strict NPO

## 2023-01-01 NOTE — ED PROVIDER NOTE - CLINICAL SUMMARY MEDICAL DECISION MAKING FREE TEXT BOX
Cole Yung,  PGY-2: 67-year-old male with past medical history of metastatic esophageal CA, hypertension, hypothyroidism presents to the ED as a transfer from [facility] to see Ortho for possible right knee malignancy versus septic arthritis. Patient had labs at [facility] showing leukocytosis of 30,000.  Patient received a dose of vancomycin and Zosyn as well as 1 L of IV fluids.  CT of the knee showing concern for osteomyelitis versus worsening malignancy of the patella. Patient initially went to [facility] for 2 days of worsening right knee pain and inability to bend it.  Patient was admitted for COVID-pneumonia the week prior and was discharged on 12/30. Patient with significant tenderness diffusely over the R knee joint distal thigh and proximal shin without obvious knee effusion or skin changes patient refuses to bend the knee.  Concern for septic arthritis versus other intra-articular pathology and sepsis.  Plan for Ortho consult, sepsis work-up, imaging of the knee.  Reassess likely admit.

## 2023-01-01 NOTE — CONSULT NOTE PEDS - ASSESSMENT
ASSESSMENT & PLAN:  67y Male with worsening Right knee pain and serosanguinous sp mass excision on 11/10 concerning for oncologic process. Patient currently tachycardic with elevated wbc, covid positive, recently admitted for covid pneumonia, on  antibiotics.    -Please obtain MRI R knee w/wo contrast  - Please obtain Xray of Right knee 3 views  - WBAT RLE with knee in extension  - Recommend medicine admission  - Recommend ID consult  - Continue IV Antibiotics   -Pain control as needed  -DVT ppx  -WBAT RLE with knee in extension  - Discussed with attending orthopaedic oncologist Dr. Carter.    Orthopaedic Surgery  [id number]  J        [id number]  [facility]  [id number]/ [phone]

## 2023-01-01 NOTE — H&P ADULT - NSHPREVIEWOFSYSTEMS_GEN_ALL_CORE
REVIEW OF SYSTEMS:  CONSTITUTIONAL: No weakness, fevers or chills  EYES/ENT: No visual changes;  No vertigo or throat pain   NECK: No pain or stiffness  RESPIRATORY: No cough, wheezing, hemoptysis; No shortness of breath  CARDIOVASCULAR: No chest pain or palpitations  GASTROINTESTINAL: No abdominal or epigastric pain. No nausea, vomiting, or hematemesis; No diarrhea or constipation. No melena or hematochezia.  GENITOURINARY: No dysuria, frequency or hematuria  NEUROLOGICAL: No numbness or weakness  SKIN: No itching, rashes REVIEW OF SYSTEMS:  CONSTITUTIONAL: No weakness, fevers or chills  EYES/ENT: +throat pain. No visual changes or vertigo  NECK: No pain or stiffness  RESPIRATORY: + cough productive of white sputum; No wheezing, hemoptysis; No shortness of breath  CARDIOVASCULAR: No chest pain or palpitations  GASTROINTESTINAL: No abdominal or epigastric pain. No nausea, vomiting, or hematemesis; No diarrhea or constipation. No melena or hematochezia.  GENITOURINARY: No dysuria, frequency or hematuria  NEUROLOGICAL: + ambulatory dusfunction, knee pain. No numbness or weakness  SKIN: No itching, rashes REVIEW OF SYSTEMS:  CONSTITUTIONAL: No weakness, fevers or chills  EYES/ENT: +throat pain. No visual changes or vertigo  NECK: No pain or stiffness  RESPIRATORY: + cough productive of white sputum; No wheezing, hemoptysis; No shortness of breath  CARDIOVASCULAR: No chest pain or palpitations  GASTROINTESTINAL: No abdominal or epigastric pain. No nausea, vomiting, or hematemesis; No diarrhea or constipation. No melena or hematochezia.  GENITOURINARY: No dysuria, frequency or hematuria  NEUROLOGICAL: + ambulatory dysfunction, knee pain. No numbness or weakness  SKIN: No itching, rashes  MSK: R knee pain as per HPI  ALLERGY: NKDA

## 2023-01-01 NOTE — ED ADULT NURSE NOTE - NSIMPLEMENTINTERV_GEN_ALL_ED
Implemented All Fall with Harm Risk Interventions:  [place] to call system. Call bell, personal items and telephone within reach. Instruct patient to call for assistance. Room bathroom lighting operational. Non-slip footwear when patient is off stretcher. Physically safe environment: no spills, clutter or unnecessary equipment. Stretcher in lowest position, wheels locked, appropriate side rails in place. Provide visual cue, wrist band, yellow gown, etc. Monitor gait and stability. Monitor for mental status changes and reorient to person, place, and time. Review medications for side effects contributing to fall risk. Reinforce activity limits and safety measures with patient and family. Provide visual clues: red socks.

## 2023-01-01 NOTE — H&P ADULT - ATTENDING COMMENTS
Patient seen and examined, d/w Dr. Aguiar, agree w/ above.     68 yo M w/ metastatic esophageal cancer w/ mets to chest wall, c/b dysphagia s/p PEG placement, recent biopsy of patella positive for squamous cell carcinoma, HTN, hypothyroidism, GERD, recent hospitalization for COVID PNA, presents with worsening R knee pain, difficulty ambulating, transferred to [facility] for orthopedic evaluation/further management.     Patient lying in ED gurney, R knee w/ serosanguinous drainage (soaking through dressing), tender to palpation, swollen in size compared to L knee. Patient understands plan to obtain MRI for further evaluation of knee, and to continue empiric antibiotics at this time. Only request is for adequate pain control.     # R knee pain, leukocytosis, elevated inflammatory markers  - prior R patella biopsy w/ SCC, favor malignancy > septic arthritis, patient afebrile, lactate wnl, but will continue empiric antibiotics at this time (monitor vanc level to avoid nephrotoxicity), appreciate ortho input, await MRI knee for further evaluation, no plans for arthroscopy at this time given concern for seeding cancer, c/w pain management (titrate as needed, consider use of IV meds if pain not well controlled), bowel regimen to prevent opioid induced constipation  # Metastatic esophageal ca w/ chest wall involvement, likely pleural mets, c/b dysphagia on PEG feeds  - plan to coordinate care with otpt onc, c/w tube feeds (nutrition assistance appreciated)  # Hypothyroidism - c/w synthroid  # Essential HTN - holding BP meds at this time    VTE ppx: higher risk d/t active malignancy, on lovenox

## 2023-01-01 NOTE — H&P ADULT - HISTORY OF PRESENT ILLNESS
67-year-old male with PMH of metastatic esophageal cancer, hypothyroidism, HTN was transferred from [facility] for Orthopedics consult for possible right knee malignancy versus septic arthritis.     ED Course:   67-year-old male with PMH of metastatic esophageal cancer, hypothyroidism, HTN was transferred from [facility] for Orthopedics consult for possible right knee malignancy versus septic arthritis. Per Ortho note, patient has a right patella mass excision with Dr. Carter on 11/10 with pathology positive for squamous cell carcinoma.    Per ED documentation -- Patient had labs at [facility] showing leukocytosis of 30,000. Patient received a dose of vancomycin and Zosyn as well as 1 L of IV fluids.  CT of the knee showing concern for osteomyelitis versus worsening malignancy of the patella. Patient initially went to [facility] for 2 days of worsening right knee pain and inability to bend it.  Patient was admitted for COVID-pneumonia the week prior and was discharged on 12/30. Patient states that he has been able to ambulate but with more pain recently. Has not been bending his knee since surgery per Dr. Carter instruction. Patient typically wears a brace but has removed it because the knee was too painful. States that he "likely had a fever" recently. Per chart review, patient recently admitted to [facility] for Covid pneumonia. Had arthrocentesis which with neg cultures. XR/CT concerning for osteolysis of patella and distal femur. However unable to access original arthrocentesis data and view imaging.       ED Course:  VS 97.2 F, , /56, RR 24/100%  Zosyn, Vanc 1 g, 2 L of LR  PEG tube accidentally dislodged in triage, then replaced in ED  Ortho consulted   67-year-old male with PMH of metastatic esophageal cancer, hypothyroidism, HTN was transferred from [facility] for Orthopedics consult for possible right knee malignancy versus septic arthritis.     He received a biopsy of his right patella on 11/10/22 with pathology positive for squamous cell carcinoma. Per collateral from son, patient has been experiencing worsening pain, erythema, edema, and     Patient with recent admission to [facility] from 12/23-12/31 with COVID and superimposed bacterial pneumonia.      Per ED documentation -- Patient had labs at [facility] showing leukocytosis of 30,000. Patient received a dose of vancomycin and Zosyn as well as 1 L of IV fluids.  CT of the knee showing concern for osteomyelitis versus worsening malignancy of the patella. Patient initially went to [facility] for 2 days of worsening right knee pain and inability to bend it.  Patient was admitted for COVID-pneumonia the week prior and was discharged on 12/30. Patient states that he has been able to ambulate but with more pain recently. Has not been bending his knee since surgery per Dr. Carter instruction. Patient typically wears a brace but has removed it because the knee was too painful. States that he "likely had a fever" recently. Per chart review, patient recently admitted to [facility] for Covid pneumonia. Had arthrocentesis which with neg cultures. XR/CT concerning for osteolysis of patella and distal femur. However unable to access original arthrocentesis data and view imaging.       ED Course:  VS 97.2 F, , /56, RR 24/100%  Zosyn, Vanc 1 g, LR 2 L  PEG tube accidentally dislodged in triage, then replaced in ED  Ortho consulted   67-year-old male with PMH of metastatic esophageal cancer, hypothyroidism, HTN was transferred from [facility] for Orthopedics consult for possible right knee malignancy versus septic arthritis.     He received a biopsy of his right patella on 11/10/22 with pathology positive for squamous cell carcinoma. Per collateral from son, patient has been experiencing worsening pain, erythema, edema, and warmth at the R knee joint for about 1 month since biopsy. On 12/30, patient became unable to walk 2/2 pain. Previously was able to ambulate with walker and brace on R knee. On 12/31, son notes patient was attempting to walk and had significant bleeding from the joint. No history of infection in patient's knee or bleeding into any joints/bleeding disorder. Family brought patient to [facility], where they report that he had an arthroscopy which was negative for signs of infection (no records yet). Labs at [facility] significant for WBC 30k.     Of not, patient with recent admission to [facility] from 12/23-12/31 with COVID and superimposed bacterial pneumonia.      ED Course:  VS 97.2 F, , /56, RR 24/100%  Zosyn, Vanc 1 g, LR 2 L  PEG tube accidentally dislodged in triage, then replaced in ED  Ortho consulted, recommending MRI knee  XR of the knee showing concern for osteomyelitis versus worsening malignancy of the patella   67-year-old male with PMH of metastatic esophageal cancer, hypothyroidism, GERD, HTN was transferred from [facility] for Orthopedics consult for possible right knee malignancy versus septic arthritis.     He received a biopsy of his right patella on 11/10/22 with pathology positive for squamous cell carcinoma (Dr. Carter). Per collateral from son, patient has been experiencing worsening pain, erythema, edema, and warmth at the R knee joint for about 1 month since biopsy. On 12/30, patient became unable to walk 2/2 pain. Previously was able to ambulate with walker and brace on R knee. On 12/31, son notes patient was attempting to walk and had significant bleeding from the joint. No history of infection in patient's knee or bleeding into any joints/bleeding disorder. Family brought patient to [facility], where they report that he had an arthroscopy which was negative for signs of infection (no records yet). [facility]k.     Of not, patient with recent admission to [facility] from 12/23-12/31 with COVID and superimposed bacterial pneumonia.    Patient was diagnosed with esophageal SCC about 8 years ago and is s/p chemo, radiation, and ?resection of part of his lung.      ED Course:  VS 97.2 F, , /56, RR 24/100%  Zosyn, Vanc 1 g, LR 2 L  PEG tube accidentally dislodged in triage, then replaced in ED  Ortho consulted, recommending MRI knee  XR of the knee showing concern for osteomyelitis versus worsening malignancy of the patella   67-year-old male with PMH of metastatic esophageal cancer, hypothyroidism, GERD, HTN who was transferred from [facility] for Orthopedics consult for possible right knee malignancy versus septic arthritis.     He received a biopsy of his right patella on 11/10/22 with pathology positive for squamous cell carcinoma (Dr. Carter). Per collateral from son, patient has been experiencing progressively worsening pain, erythema, edema, and warmth at the R knee joint for about 1 month since biopsy. On 12/30, patient became unable to walk 2/2 pain. Previously was able to ambulate with walker and brace on R knee. On 12/31, son notes patient was attempting to walk and had bleeding from the joint that resolved when he elevated the leg and held pressure. No history of septic arthritis or bleeding disorder. Denies any fevers, chills, night sweats, sick contacts, SOB, chest pain, N/V at home. Patient with chronic productive cough and sore throat with hoarse voice. Family brought patient to [facility] ([facility]) ED, where they report that he had an arthroscopy (no records) and IV antibiotics.Collateral obtained from JUSTINE Linda at [facility], who stated patient was did not get an arthroscopy while in OSH ED. He did receive 1 dose of IV Zosyn and Vancomycin yesterday 12/31. He was afebrile but labs significant for leukocytosis to 30k.    Of note, patient with recent admission to [facility] from 12/23-12/31 with COVID and superimposed bacterial pneumonia. Patient was diagnosed with esophageal SCC about 8 years ago and is s/p chemo and radiation. In Nov 2019, he restarted chemotherapy for metastasis to his chest wall (last dose 2 months ago). He follows with outpatient oncologist Dr. Gi Thomas in [place]. He does not take anything by mouth, with all food and meds via PEG tube. Patient reports 10/10 pain currently at R knee and in throat. He is AOx4 but gives brief answers to questions.    ED Course:  VS 97.2 F, , /56, RR 24/100%  Zosyn, Vanc 1 g, LR 2 L  PEG tube accidentally dislodged in triage, then replaced in ED  Ortho consulted, recommending MRI knee  XR of the knee showing concern for osteomyelitis versus worsening malignancy of the patella   67-year-old male with PMH of metastatic esophageal cancer, hypothyroidism, GERD, HTN who was transferred from [facility] for Orthopedics consult for possible right knee malignancy versus septic arthritis.     He received a biopsy of his right patella on 11/10/22 with pathology positive for squamous cell carcinoma (Dr. Carter). Per collateral from son, patient has been experiencing progressively worsening pain, erythema, edema, and warmth at the R knee joint for about 1 month since biopsy. On 12/30, patient became unable to walk 2/2 pain. Previously was able to ambulate with walker and brace on R knee. On 12/31, son notes patient was attempting to walk and had bleeding from the joint that resolved when he elevated the leg and held pressure. No history of septic arthritis or bleeding disorder. Denies any fevers, chills, night sweats, sick contacts, SOB, chest pain, N/V at home. Patient with chronic productive cough and sore throat with hoarse voice. Family brought patient to [facility] ([facility]) ED, where they report that he had an arthroscopy (no records) and IV antibiotics.Collateral obtained from JUSTINE Linda at [facility], who stated patient was did not get an arthroscopy while in OSH ED. He did receive 1 dose of IV Zosyn and Vancomycin yesterday 12/31. He was afebrile but labs significant for leukocytosis to 30k.    Of note, patient with recent admission to [facility] from 12/23-12/31 with COVID and superimposed bacterial pneumonia. Patient was diagnosed with esophageal SCC about 8 years ago and is s/p chemo and radiation. In Nov 2019, he restarted chemotherapy for metastasis to his chest wall (last dose 2 months ago). He follows with outpatient oncologist Dr. Gi Thomas in [place]. He does not take anything by mouth, with all food and meds via PEG tube. Patient reports 10/10 pain currently at R knee and in throat. He is AOx4 but gives brief answers to questions.    ED Course:  VS 97.2 F, , /56, RR 24/100%  Zosyn, Vanc 1 g, LR 2 L  PEG tube accidentally dislodged in triage, then replaced in ED  Ortho consulted, recommending MRI knee  XR of the knee showing anterior soft tissue swelling  Concern for osteomyelitis versus worsening malignancy of the patella

## 2023-01-01 NOTE — ED PROCEDURE NOTE - ATTENDING CONTRIBUTION TO CARE
Gonnori: I have seen and examined the patient face to face.  I was present during the above procedure and  have reviewed and addended the procedure note.
Procedure performed as above and tolerated well by patient without any complications.

## 2023-01-01 NOTE — H&P ADULT - NSHPLABSRESULTS_GEN_ALL_CORE
LABS:                        8.2    24.52 )-----------( 568      ( 01 Jan 2023 07:40 )             26.3     01-01    130<L>  |  99  |  14  ----------------------------<  102<H>  4.0   |  21<L>  |  0.75    Ca    9.9      01 Jan 2023 07:40    TPro  7.1  /  Alb  3.2<L>  /  TBili  0.4  /  DBili  x   /  AST  33  /  ALT  36  /  AlkPhos  230<H>  01-01    PT/INR - ( 01 Jan 2023 07:40 )   PT: 16.6 sec;   INR: 1.43 ratio         PTT - ( 01 Jan 2023 07:40 )  PTT:35.0 sec LABS:                        8.2    24.52 )-----------( 568      ( 01 Jan 2023 07:40 )             26.3     01-01    130<L>  |  99  |  14  ----------------------------<  102<H>  4.0   |  21<L>  |  0.75    Ca    9.9      01 Jan 2023 07:40    TPro  7.1  /  Alb  3.2<L>  /  TBili  0.4  /  DBili  x   /  AST  33  /  ALT  36  /  AlkPhos  230<H>  01-01    PT/INR - ( 01 Jan 2023 07:40 )   PT: 16.6 sec;   INR: 1.43 ratio         PTT - ( 01 Jan 2023 07:40 )  PTT:35.0 sec    .3,     RVP negative, UA negative (30 protein, 5 RBCs)  VBG: pH 7.35, lactate 1.4    XR chest: L lung near complete opacification, posterior rib deformity  XR R knee: post-surgical changes, anterior swelling  XR abdomen: PEG in place LABS/Imaging/EKG/OSH records personally reviewed                        8.2    24.52 )-----------( 568      ( 01 Jan 2023 07:40 )             26.3     01-01    130<L>  |  99  |  14  ----------------------------<  102<H>  4.0   |  21<L>  |  0.75    Ca    9.9      01 Jan 2023 07:40    TPro  7.1  /  Alb  3.2<L>  /  TBili  0.4  /  DBili  x   /  AST  33  /  ALT  36  /  AlkPhos  230<H>  01-01    PT/INR - ( 01 Jan 2023 07:40 )   PT: 16.6 sec;   INR: 1.43 ratio         PTT - ( 01 Jan 2023 07:40 )  PTT:35.0 sec    .3,     RVP negative, UA negative (30 protein, 5 RBCs)  VBG: pH 7.35, lactate 1.4    EKG sinus tach 106 bpm    Consultant notes reviewed: Ortho  OSH records reviewed: [facility]    XR chest: L lung near complete opacification, posterior rib deformity  XR R knee: post-surgical changes, anterior swelling  XR abdomen: PEG in place

## 2023-01-01 NOTE — ED PROVIDER NOTE - PROGRESS NOTE DETAILS
Sheri: Patient endorsed to me from Dr. Sanchez, pending ortho eval and likely medicine admission.  67-year-old male with metastatic esophageal CA, hypertension, hypothyroidism transferred from [facility] for ortho evaluation for  possible right knee malignancy versus septic arthritis. s/p vancomycin at 1800 and Zosyn and 2100 yesterday, will dose antibiotics here in ED.  Repeat labs and blood cultures sent with placement of US guided IV.  Pt appears well resuscitated with dependent lower ext edema.  Will continue to monitor.  Ortho c/s pending. Henrietta Barkley MD ([id number]) -  Pt's Xray noted to be abnormal. Upon review of prior records from 12/31/22 at outside hospital, Xray results there show similar findings. Results from OSH also revealed that patient had a CT chest on 12/23 which showed near completed whiteout of lung could be c/w neoplastic process vs atelectasis vs postobstrutcive pna.

## 2023-01-01 NOTE — H&P ADULT - PROBLEM SELECTOR PLAN 5
- Diet: Osmolite 1.2 kcal per PEG tube  - Nutrition consult  - PT consult  - DVT ppx: Lovenox 40 mg SQ daily  - Dispo: pending course - Diet: Jevity 1.2 kcal with goal 40 cc/h continuous per PEG tube  - Nutrition consult  - PT consult  - DVT ppx: Lovenox 40 mg SQ daily  - Dispo: pending course - Diet: Jevity 1.2 kcal with goal 40 cc/h continuous per PEG tube  - Nutrition consult  - PT consult  - DVT ppx: Lovenox 40 mg SQ daily  - Dispo: pending course/clinical improvement

## 2023-01-01 NOTE — H&P ADULT - PROBLEM SELECTOR PLAN 4
- home meds include amlodipine 5 mg, metoprolol succinate 50 mg   - BP 90s-100s/50s-60s in ED    Plan  - hold BP meds as hypotensive in ED

## 2023-01-01 NOTE — ED ADULT TRIAGE NOTE - CHIEF COMPLAINT QUOTE
Pt. is transferred from [facility] by [facility]9 unit [id number])  for re-evaluation of his right knee. Pt. had a tumor removed on his right knee and needed to be tapped a month ago. PMH: esophageal cancer with metastasis to right knee and lungs. No chemotherapy or radiation therapy at present. c/o right knee pain. Received IV Morphine 4 mg given at 0430 am.  Pt. is covid positive and was being treated at [facility] for L lower lobe pneumonia. Accidentally pulled out GT at triage. Has Right side mediport. Pt. is transferred from [facility] by [facility] unit [id number])  for re-evaluation of his right knee. Pt. had a tumor removed on his right knee and needed to be tapped a month ago. PMH: esophageal cancer with metastasis to right knee and lungs. No chemotherapy or radiation therapy at present. c/o right knee pain. Received IV Morphine 4 mg given at 0430 am.  Pt. is covid positive and was being treated at [facility] for L lower lobe pneumonia. Accidentally pulled out GT at triage. Has Right side mediport. EKG performed at triage.

## 2023-01-01 NOTE — CHART NOTE - NSCHARTNOTEFT_GEN_A_CORE
ISTOP records checked, patient prescribed oxycodone-acetaminophen 5-325 mg tablet q6h PRN for severe pain by oncologist Dr. Thomas. Last filled on 12/29/22 for 30 day supply.    This report was requested by: Damaris Aguiar | Reference #: [id number]

## 2023-01-01 NOTE — PATIENT PROFILE ADULT - FALL HARM RISK - HARM RISK INTERVENTIONS

## 2023-01-01 NOTE — ED ADULT NURSE REASSESSMENT NOTE - NS ED NURSE REASSESS COMMENT FT1
20g US IV to the LAC. labs collected by MD and sent. pt medicated as per md orders. pt in NAD and resting in stretcher at this time.

## 2023-01-01 NOTE — H&P ADULT - ASSESSMENT
67-year-old male with PMH of metastatic esophageal cancer, hypothyroidism, GERD, HTN who was transferred from [facility] for Orthopedics consult for possible right knee malignancy versus septic arthritis.     He received a biopsy of his right patella on 11/10/22 with pathology positive for squamous cell carcinoma (Dr. Carter). Per collateral from son, patient has been experiencing progressively worsening pain, erythema, edema, and warmth at the R knee joint for about 1 month since biopsy. Orthopedics following, recommend continuing IV antibiotics pending MRI knee. Presentation more consistent with known R knee malignancy in the context of stable vital signs and progression over 1 month. Collateral information from [facility] states no arthroscopy performed, will continue to hold off due to risk of seeding cancer.

## 2023-01-01 NOTE — CONSULT NOTE PEDS - SUBJECTIVE AND OBJECTIVE BOX
67-year-old male with past medical history of metastatic esophageal CA, hypertension, hypothyroidism presents to the ED as a transfer from [facility] for Right knee pain. Patient is sp Right patella mass excision on 11/10 w Dr. Carter. Pathology was positive for squamous cell carcinoma. Patient states that he has been able to ambulate but with more pain recently. Has not been bending his knee since surgery per Dr. Carter instruction. Patient typically wears a brace but has removed it because the knee was too painful. States that he "likely had a fever" recently.    Per chart review, patient recently admitted to [facility] for Covid pneumonia. Had arthrocentesis which with neg cultures. XR/CT concerning for osteolysis of patella and distal femur. However unable to access original arthrocentesis data and view imaging.         PAST MEDICAL & SURGICAL HISTORY:  HTN (hypertension)      GERD (gastroesophageal reflux disease)      Hypothyroidism      Neuropathic pain      Disorder of bone, unspecified      H/O constipation      Esophageal cancer      Lung metastases        Home Medications:  amLODIPine 5 mg oral tablet: 1 tab(s) orally once a day (10 Nov 2022 08:06)  esomeprazole 40 mg oral delayed release capsule: 1 cap(s) orally once a day (10 Nov 2022 08:06)  gabapentin 300 mg oral capsule: 1 cap(s) orally 3 times a day, As Needed (10 Nov 2022 08:06)  levothyroxine 150 mcg (0.15 mg) oral tablet: 1 tab(s) orally once a day (10 Nov 2022 08:06)  Linzess 290 mcg oral capsule: 1 cap(s) orally once a day (10 Nov 2022 08:06)  magnesium oxide 400 mg oral tablet: 1 tab(s) orally 2 times a day (10 Nov 2022 08:06)  potassium chloride 20 mEq oral tablet, extended release: 1 tab(s) orally once a day (10 Nov 2022 08:06)    Allergies    No Known Allergies    Intolerances                            8.2    24.52 )-----------( 568      ( 01 Jan 2023 07:40 )             26.3     01-01    130<L>  |  99  |  14  ----------------------------<  102<H>  4.0   |  21<L>  |  0.75    Ca    9.9      01 Jan 2023 07:40    TPro  7.1  /  Alb  3.2<L>  /  TBili  0.4  /  DBili  x   /  AST  33  /  ALT  36  /  AlkPhos  230<H>  01-01    PT/INR - ( 01 Jan 2023 07:40 )   PT: 16.6 sec;   INR: 1.43 ratio         PTT - ( 01 Jan 2023 07:40 )  PTT:35.0 sec      VITALS  Vital Signs Last 24 Hrs  T(C): 36.8 (01 Jan 2023 08:28), Max: 37.6 (01 Jan 2023 06:27)  T(F): 98.2 (01 Jan 2023 08:28), Max: 99.7 (01 Jan 2023 06:27)  HR: 106 (01 Jan 2023 08:28) (105 - 107)  BP: 90/62 (01 Jan 2023 08:28) (90/62 - 110/57)  BP(mean): --  RR: 18 (01 Jan 2023 08:28) (16 - 24)  SpO2: 100% (01 Jan 2023 08:28) (100% - 100%)    Parameters below as of 01 Jan 2023 08:28  Patient On (Oxygen Delivery Method): room air        PHYSICAL EXAM  General: NAD, Awake and Alert, resting comfortably  Resp: Non-labored breathing, No accessory muscle use  RLE:  Skin mainly intact with <2mm punctate opening mid incision with sanguinous drainage  no erythema  knee globally swollen with TTP  deferred knee ROM  SILT L2-S1  palpable pulses  Warm      IMAGING:  Pending

## 2023-01-01 NOTE — H&P ADULT - NSHPPHYSICALEXAM_GEN_ALL_CORE
T(C): 36.8 (01-01-23 @ 08:28), Max: 37.6 (01-01-23 @ 06:27)  HR: 106 (01-01-23 @ 08:28) (105 - 107)  BP: 90/62 (01-01-23 @ 08:28) (90/62 - 110/57)  RR: 18 (01-01-23 @ 08:28) (16 - 24)  SpO2: 100% (01-01-23 @ 08:28) (100% - 100%)    CONSTITUTIONAL: Well groomed, no apparent distress  EYES: PERRLA and symmetric, EOMI, No conjunctival or scleral injection, non-icteric  ENMT: Oral mucosa with moist membranes. Normal dentition; no pharyngeal injection or exudates             NECK: Supple, symmetric and without tracheal deviation   RESP: No respiratory distress, no use of accessory muscles; CTA b/l, no WRR  CV: RRR, +S1S2, no MRG; no JVD; no peripheral edema  GI: Soft, NT, ND, no rebound, no guarding; no palpable masses; no hepatosplenomegaly; no hernia palpated  LYMPH: No cervical LAD or tenderness; no axillary LAD or tenderness; no inguinal LAD or tenderness  MSK: Normal gait; No digital clubbing or cyanosis; examination of the (head/neck/spine/ribs/pelvis, RUE, LUE, RLE, LLE) without misalignment,            Normal ROM without pain, no spinal tenderness, normal muscle strength/tone  SKIN: No rashes or ulcers noted; no subcutaneous nodules or induration palpable  NEURO: CN II-XII intact; normal reflexes in upper and lower extremities, sensation intact in upper and lower extremities b/l to light touch   PSYCH: Appropriate insight/judgment; A+O x 3, mood and affect appropriate, recent/remote memory intact T(C): 36.8 (01-01-23 @ 08:28), Max: 37.6 (01-01-23 @ 06:27)  HR: 106 (01-01-23 @ 08:28) (105 - 107)  BP: 90/62 (01-01-23 @ 08:28) (90/62 - 110/57)  RR: 18 (01-01-23 @ 08:28) (16 - 24)  SpO2: 100% (01-01-23 @ 08:28) (100% - 100%)    CONSTITUTIONAL: Alert, in moderate distress  EYES: PERRLA and symmetric, EOMI, No conjunctival or scleral injection, non-icteric  ENMT: Oral mucosa with moist membranes. Poor dentition, missing most teeth; no pharyngeal injection, tongue with white build-up             NECK: Supple, symmetric and without tracheal deviation   RESP: No respiratory distress, no use of accessory muscles; CTA b/l, no WRR, +productive cough  CV: RRR, +S1S2, no MRG; no JVD; no peripheral edema  GI: Soft, tender at PEG site (recently placed), ND, no rebound, no guarding; no palpable masses; no hepatosplenomegaly; no hernia palpated  MSK: Unable to ambulate. No digital clubbing or cyanosis;           - R knee: serosanguinous drainage on gauze covering knee; erythema and edema compared to L knee, warm and tender to touch, no ballotable effusion  SKIN: No rashes or ulcers noted; no subcutaneous nodules or induration palpable  NEURO: CN II-XII intact; normal reflexes in upper and lower extremities, sensation intact in upper and lower extremities b/l to light touch   PSYCH: Appropriate insight/judgment; A+O x 3, mood and affect appropriate, recent/remote memory intact T(C): 36.8 (01-01-23 @ 08:28), Max: 37.6 (01-01-23 @ 06:27)  HR: 106 (01-01-23 @ 08:28) (105 - 107)  BP: 90/62 (01-01-23 @ 08:28) (90/62 - 110/57)  RR: 18 (01-01-23 @ 08:28) (16 - 24)  SpO2: 100% (01-01-23 @ 08:28) (100% - 100%)    CONSTITUTIONAL: Alert, in moderate distress (2/2 pain)  EYES: PERRLA and symmetric, EOMI, No conjunctival or scleral injection, non-icteric  ENMT: Oral mucosa with moist membranes. Poor dentition, missing most teeth; no pharyngeal injection, tongue with white build-up             NECK: Supple, symmetric and without tracheal deviation   RESP: No respiratory distress, no use of accessory muscles; CTA b/l, no WRR, +productive cough  CV: RRR, +S1S2, no MRG; no JVD; no peripheral edema  GI: Soft, tender at PEG site (recently placed), ND, no rebound, no guarding; no palpable masses; no hepatosplenomegaly; no hernia palpated  MSK: Unable to ambulate. No digital clubbing or cyanosis;           - R knee: serosanguinous drainage on gauze covering knee; erythema and edema compared to L knee, warm and tender to touch, no ballotable effusion  SKIN: No rashes or ulcers noted; no subcutaneous nodules or induration palpable  NEURO: CN II-XII intact; normal reflexes in upper and lower extremities, sensation intact in upper and lower extremities b/l to light touch   PSYCH: Appropriate insight/judgment; A+O x 3, mood and affect appropriate, recent/remote memory intact

## 2023-01-01 NOTE — ED ADULT NURSE NOTE - OBJECTIVE STATEMENT
pt rcd to rm 20 transferred from [facility] for re-eval of right knee. 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 right knee appears to be red and swollen with active draining and bleeding. pt pmhx  chemo and was unable to state last tx. pt right chest wall port and states "only accessed for chemo." pt rcd IV morphine at 0430 AM PTA. pt rcd vanco 1800 and zosyn 2100. pt COVID positive. pt accidentally pulled out G tube in triage while trying to use urinal. replaced by ED MD at bedside. pt arrived with 20g to the  with no redness or swelling. pt respirations even and unlabored with no accessory muscle use. pt sinus tachy on monitor. pt denies chest pain, sob, nausea, vomiting, dizziness, headache. pt pending USIV at this time. MD aware. pt with non-blanchable pressure injury to sacral region. pt in NAD and stable at this time. covid swab collected and sent.

## 2023-01-01 NOTE — ED PROVIDER NOTE - PHYSICAL EXAMINATION
GEN: Patient awake alert NAD.   HEENT: normocephalic, atraumatic, moist MM  CARDIAC: RRR, S1, S2, no murmur.   PULM: CTA B/L no wheeze, rhonchi, rales.   ABD: soft NT, ND, no rebound no guarding, no CVA tenderness. PEG tube in left upper quadrant dislodged, site without erythema or drainage  MSK: Moving all extremities, no edema. 5/5 strength and full ROM in all extremities. Except significant tenderness diffusely over the R knee joint distal thigh and proximal shin without obvious knee effusion or skin changes patient refuses to bend the knee.   NEURO: A&Ox3, no focal neurological deficits  SKIN: warm, dry, no rash.

## 2023-01-01 NOTE — ED PROVIDER NOTE - CAS EDP CONSULT REGARDING 1

## 2023-01-01 NOTE — H&P ADULT - PROBLEM SELECTOR PLAN 1
Appreciate Ortho recs:  - Please obtain MRI R knee w/wo contrast  - Please obtain Xray of Right knee 3 views  - WBAT RLE with knee in extension  - Recommend ID consult  - Continue IV Antibiotics   - Pain control as needed  - DVT ppx - progressively worsening pain, erythema, edema, and warmth at the R knee joint for about 1 month  - recent excision of R patella on 11/10 with SCC on biopsy but unclear margins  - current presentation more consistent with progression of known cancer than septic arthritis  - WBC 30k, afebrile, CK and ESR elevated    Plan  - Pending MRI R knee w/wo contrast to evaluate for spread of cancer  - Appreciate Ortho recs:     - WBAT RLE with knee in extension  - Hold off on ID consult  - Continue IV Cefepime and Vancomycin for empiric coverage  - Pain control:      - Tylenol 650 mg q6h for mild to moderate  - DVT ppx with lovenox 40 mg SQ daily - progressively worsening pain, erythema, edema, and warmth at the R knee joint for about 1 month  - recent excision of R patella on 11/10 with SCC on biopsy but unclear margins  - current presentation more consistent with progression of known cancer than septic arthritis  - WBC 30k, afebrile, CK and ESR elevated    Plan  - Pending MRI R knee w/wo contrast to evaluate for spread of cancer  - Appreciate Ortho recs:     - WBAT RLE with knee in extension     - No indications for arthroscopy now due to risk of seeding cancer  - Hold off on ID consult  - Continue IV Cefepime and Vancomycin for empiric coverage  - Pain control:      - Tylenol 650 mg q6h for mild to moderate     - Oxycodone-acetaminophen 5-325 mg q6h PRN for severe  - DVT ppx with lovenox 40 mg SQ daily - progressively worsening pain, erythema, edema, and warmth at the R knee joint for about 1 month  - recent excision of R patella on 11/10 with SCC on biopsy but unclear margins  - current presentation more consistent with progression of known cancer than septic arthritis  - WBC 30k, afebrile, CK and ESR elevated    Plan  - Pending MRI R knee w/wo contrast to evaluate for spread of cancer  - Appreciate Ortho recs:     - WBAT RLE with knee in extension     - No indications for arthroscopy now due to risk of seeding cancer  - Hold off on ID consult  - Continue IV Cefepime and Vancomycin for empiric coverage (monitor vanc level to avoid nephrotoxicity)  - Pain control:      - Tylenol 650 mg q6h for mild to moderate     - Oxycodone-acetaminophen 5-325 mg q6h PRN for severe pain, c/w bowel regimen to prevent opioid induced constipation  - DVT ppx with lovenox 40 mg SQ daily

## 2023-01-01 NOTE — H&P ADULT - NSHPSOCIALHISTORY_GEN_ALL_CORE
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

## 2023-01-02 NOTE — PROGRESS NOTE ADULT - PROBLEM SELECTOR PLAN 2
- diagnosed about 8 yrs ago  - follows with Dr. 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    Plan  - Will contact outpatient oncologist to understand current regimen and goal  - Continue goals of care conversation with family  - C/w pantoprazole 40 mg daily, miralax PRN daily, gabapentin 300 mg TID PRN for pain, MgOH and KCl for supplementation  - C/w tube feeds, strict NPO - PEG tube dislodged overnight on 1/2 at 1:30 am, patient unsure how it happened  - Patient placed on D5-LR overnight while he was not receiving continuous tube feeds    Plan  - All essential medications switched to IV during the day, including pain meds as needed, synthroid, and antibiotics  - GI consulted to replace PEG at bedside, completed today afternoon  - XR abdomen with gastrografin confirmed placement  - Can resume using PEG tube for medications and nutrition

## 2023-01-02 NOTE — DIETITIAN INITIAL EVALUATION ADULT - PERTINENT LABORATORY DATA
01-02    132<L>  |  100  |  8   ----------------------------<  104<H>  3.8   |  21<L>  |  0.66    Ca    9.9      02 Jan 2023 07:45  Phos  1.3     01-02  Mg     1.90     01-02    TPro  6.8  /  Alb  3.2<L>  /  TBili  0.4  /  DBili  x   /  AST  26  /  ALT  28  /  AlkPhos  207<H>  01-02

## 2023-01-02 NOTE — PROGRESS NOTE ADULT - SUBJECTIVE AND OBJECTIVE BOX
ORTHO PROGRESS NOTE     Pt seen and examined at bedside, denies SOB, CP, Dizziness. N/V/D /HA.  No significant overnight events. Pain well controlled.    Vital Signs Last 24 Hrs  T(C): 37.1 (01 Jan 2023 22:17), Max: 37.1 (01 Jan 2023 22:17)  T(F): 98.7 (01 Jan 2023 22:17), Max: 98.7 (01 Jan 2023 22:17)  HR: 102 (01 Jan 2023 22:17) (88 - 121)  BP: 96/51 (01 Jan 2023 22:17) (90/62 - 114/60)  BP(mean): --  RR: 18 (01 Jan 2023 22:17) (18 - 18)  SpO2: 98% (01 Jan 2023 22:17) (98% - 100%)    Parameters below as of 01 Jan 2023 22:17  Patient On (Oxygen Delivery Method): room air        Gen: NAD, alert and oriented  Resp: Unlabored breathing  LLE: Dressing c/d/i       SILT DP/SP/ Rosio/Saph/tib       5/5 EHL 5/5 FHL 5/5 TA 5/5 Gastroc 5/5 IP        DP+,        soft compartments, no calf ttp,       Labs:  CBC Full  -  ( 01 Jan 2023 07:40 )  WBC Count : 24.52 K/uL  RBC Count : 3.16 M/uL  Hemoglobin : 8.2 g/dL  Hematocrit : 26.3 %  Platelet Count - Automated : 568 K/uL  Mean Cell Volume : 83.2 fL  Mean Cell Hemoglobin : 25.9 pg  Mean Cell Hemoglobin Concentration : 31.2 gm/dL  Auto Neutrophil # : 21.70 K/uL  Auto Lymphocyte # : 0.42 K/uL  Auto Monocyte # : 1.74 K/uL  Auto Eosinophil # : 0.00 K/uL  Auto Basophil # : 0.00 K/uL  Auto Neutrophil % : 86.7 %  Auto Lymphocyte % : 1.7 %  Auto Monocyte % : 7.1 %  Auto Eosinophil % : 0.0 %  Auto Basophil % : 0.0 %      01-01    130<L>  |  99  |  14  ----------------------------<  102<H>  4.0   |  21<L>  |  0.75    Ca    9.9      01 Jan 2023 07:40    TPro  7.1  /  Alb  3.2<L>  /  TBili  0.4  /  DBili  x   /  AST  33  /  ALT  36  /  AlkPhos  230<H>  01-01      67y Male with worsening Right knee pain and serosanguinous sp mass excision on 11/10 concerning for oncologic process. Patient currently tachycardic with elevated wbc, covid positive, recently admitted for covid pneumonia, on  antibiotics.    -Please obtain MRI R knee w/wo contrast  - Please obtain Xray of Right knee 3 views  - WBAT RLE with knee in extension  - Recommend medicine admission  - Recommend ID consult  - Continue IV Antibiotics   -Pain control as needed  -DVT ppx  -WBAT RLE with knee in extension  - Discussed with attending orthopaedic oncologist Dr. Carter. ORTHO PROGRESS NOTE     Pt seen and examined at bedside, denies SOB, CP, Dizziness. N/V/D /HA.  No significant overnight events. Pain well controlled.    Vital Signs Last 24 Hrs  T(C): 37.1 (01 Jan 2023 22:17), Max: 37.1 (01 Jan 2023 22:17)  T(F): 98.7 (01 Jan 2023 22:17), Max: 98.7 (01 Jan 2023 22:17)  HR: 102 (01 Jan 2023 22:17) (88 - 121)  BP: 96/51 (01 Jan 2023 22:17) (90/62 - 114/60)  BP(mean): --  RR: 18 (01 Jan 2023 22:17) (18 - 18)  SpO2: 98% (01 Jan 2023 22:17) (98% - 100%)    Parameters below as of 01 Jan 2023 22:17  Patient On (Oxygen Delivery Method): room air      PHYSICAL EXAM  General: NAD, Awake and Alert, resting comfortably  Resp: Non-labored breathing, No accessory muscle use  RLE:  Skin mainly intact with <2mm punctate opening mid incision with sanguinous drainage  no erythema  knee globally swollen with TTP  deferred knee ROM  SILT L2-S1  palpable pulses  Warm      Labs:  CBC Full  -  ( 01 Jan 2023 07:40 )  WBC Count : 24.52 K/uL  RBC Count : 3.16 M/uL  Hemoglobin : 8.2 g/dL  Hematocrit : 26.3 %  Platelet Count - Automated : 568 K/uL  Mean Cell Volume : 83.2 fL  Mean Cell Hemoglobin : 25.9 pg  Mean Cell Hemoglobin Concentration : 31.2 gm/dL  Auto Neutrophil # : 21.70 K/uL  Auto Lymphocyte # : 0.42 K/uL  Auto Monocyte # : 1.74 K/uL  Auto Eosinophil # : 0.00 K/uL  Auto Basophil # : 0.00 K/uL  Auto Neutrophil % : 86.7 %  Auto Lymphocyte % : 1.7 %  Auto Monocyte % : 7.1 %  Auto Eosinophil % : 0.0 %  Auto Basophil % : 0.0 %      01-01    130<L>  |  99  |  14  ----------------------------<  102<H>  4.0   |  21<L>  |  0.75    Ca    9.9      01 Jan 2023 07:40    TPro  7.1  /  Alb  3.2<L>  /  TBili  0.4  /  DBili  x   /  AST  33  /  ALT  36  /  AlkPhos  230<H>  01-01      67y Male with worsening Right knee pain and serosanguinous sp mass excision on 11/10 concerning for oncologic process. Patient currently tachycardic with elevated wbc, covid positive, recently admitted for covid pneumonia, on  antibiotics.    -Please obtain MRI R knee w/wo contrast  - Please obtain Xray of Right knee 3 views  - WBAT RLE with knee in extension  - Recommend medicine admission  - Recommend ID consult  - Continue IV Antibiotics   -Pain control as needed  -DVT ppx  -WBAT RLE with knee in extension  - Discussed with attending orthopaedic oncologist Dr. Carter.

## 2023-01-02 NOTE — DIETITIAN INITIAL EVALUATION ADULT - NSFNSGIIOFT_GEN_A_CORE
01-01-23 @ 07:01  -  01-02-23 @ 07:00  --------------------------------------------------------  OUT:  Total OUT: 0 mL    Total NET: 10 mL

## 2023-01-02 NOTE — DIETITIAN INITIAL EVALUATION ADULT - NS FNS DIET ORDER
Diet, NPO with Tube Feed:   Tube Feeding Modality: Gastrostomy  Jevity 1.2 Prateek (JEVITY1.2RTH)  Total Volume for 24 Hours (mL): 960  Continuous  Starting Tube Feed Rate {mL per Hour}: 10  Increase Tube Feed Rate by (mL): 10     Every 4 hours  Until Goal Tube Feed Rate (mL per Hour): 40  Tube Feed Duration (in Hours): 24  Tube Feed Start Time: 05:00 (01-01-23 @ 16:37)

## 2023-01-02 NOTE — CONSULT NOTE ADULT - ASSESSMENT
[facility] [place] [facility] [place] [address] [facility]  Adiel Shivers: [phone]  F: [phone]      Reason for Visit:   Alise Gandhi is a 67 y.o. female who is seen in consultation at the request of Dr. Steven Andino  for evaluation of coagulapathy; not on coumadin. History of Present Illness:   Ms. Alise Gandhi was admitted on 12/16/2019 from the ED when she presented with c/o abdominal pain associated with N/V. ED labs WBC 12.3 CT abd/pelv revealed sigmoid diverticulitis and RLL/RUL PNA. Hospital consults: cardiology, GI, intensivist, palliative   We have been consulted for coagulopathy. Elevated INR 3.0  Ms Truett Lot is awake and alert. States breathing is better, having some abd pain to lower mid abd area. Does not have much of an appetite. Significant other at bedside reports she has not eaten much for the last 3 weeks. He has encouraged her to eat but she is afraid  of throwing up; he has ordered some soup for her tonight to hopefully get her to eat more. She does endorse being on several rounds of antibiotics recently as an outpatient. She does not currently drink EtOH, but she states she was a heavy drinker 30 yrs ago.       Past Medical History:   Diagnosis Date    Anxiety and depression     COPD (chronic obstructive pulmonary disease) (HCC)     Crohn's disease ([address] 1989    Diverticulosis     Fibromyalgia 1989    Gastritis     Generalized anxiety disorder with panic attacks     GERD (gastroesophageal reflux disease)     Hypothyroid     Irritable bowel syndrome (IBS) 1989    Macular degeneration     Migraines     Multilevel degenerative disc disease 1989    Neuropathy     Anvik's syndrome     Paroxysmal A-fib ([address]     Rheumatoid arthritis ([address] 2018      Past Surgical History:   Procedure Laterality Date    HX BLADDER SUSPENSION  2019    HX OTHER SURGICAL  2019    vaginal suspension      Social History     Tobacco Use    Smoking status: Former Smoker     Packs/day: 1.50     Years: 59.00     Pack years: 88.50     Last attempt to quit: 2019     Years since quittin.1    Smokeless tobacco: Never Used   Substance Use Topics    Alcohol use: Not Currently      History reviewed. No pertinent family history.   Current Facility-Administered Medications   Medication Dose Route Frequency    methylPREDNISolone (PF) (SOLU-MEDROL) injection 20 mg  20 mg IntraVENous Q12H    docusate sodium (COLACE) capsule 100 mg  100 mg Oral DAILY    HYDROmorphone (DILAUDID) syringe 1 mg  1 mg IntraVENous Q4H PRN    levothyroxine (SYNTHROID) injection 75 mcg  75 mcg IntraVENous DAILY    amiodarone (CORDARONE) 375 mg in dextrose 5% 250 mL infusion  0.5-1 mg/min IntraVENous TITRATE    OLANZapine (ZyPREXA) 5 mg in sterile water (preservative free) 1 mL injection  5 mg IntraMUSCular Q6H PRN    dexmedeTOMidine in 0.9 % NaCl (PRECEDEX) 400 mcg/100 mL (4 mcg/mL) infusion soln  0.2-1.4 mcg/kg/hr IntraVENous TITRATE    levalbuterol (XOPENEX) nebulizer soln 1.25 mg/3 mL  1.25 mg Nebulization Q4H RT    ipratropium (ATROVENT) 0.02 % nebulizer solution 0.5 mg  0.5 mg Nebulization Q4H RT    levalbuterol (XOPENEX) nebulizer soln 1.25 mg/3 mL  1.25 mg Nebulization Q2H PRN    famotidine (PF) (PEPCID) 20 mg in 0.9% sodium chloride 10 mL injection  20 mg IntraVENous Q12H    pantoprazole (PROTONIX) 40 mg in 0.9% sodium chloride 10 mL injection  40 mg IntraVENous Q12H    nystatin (MYCOSTATIN) 100,000 unit/gram powder   Topical BID    influenza vaccine - (6 mos+)(PF) (FLUARIX/FLULAVAL/FLUZONE QUAD) injection 0.5 mL  0.5 mL IntraMUSCular PRIOR TO DISCHARGE    prochlorperazine (COMPAZINE) injection 5 mg  5 mg IntraVENous Q6H PRN    traMADol (ULTRAM) tablet 50 mg  50 mg Oral Q6H PRN    sodium chloride (NS) flush 5-40 mL  5-40 mL IntraVENous Q8H    sodium chloride (NS) flush 5-40 mL  5-40 mL IntraVENous PRN    sodium chloride (NS) flush 5-10 mL  5-10 mL IntraVENous PRN    sodium chloride (NS) flush 5-40 mL  5-40 mL IntraVENous Q8H    sodium chloride (NS) flush 5-40 mL  5-40 mL IntraVENous PRN    ondansetron (ZOFRAN) injection 4 mg  4 mg IntraVENous Q6H PRN    arformoterol (BROVANA) neb solution 15 mcg  15 mcg Nebulization BID RT    budesonide (PULMICORT) 500 mcg/2 ml nebulizer suspension  500 mcg Nebulization BID RT    acetaminophen (TYLENOL) tablet 650 mg  650 mg Oral Q6H PRN      Allergies   Allergen Reactions    Metronidazole Other (comments)     Family unaware of reaction          Review of Systems: A complete review of systems was obtained, negative except as described above. Physical Exam:     Visit Vitals  /59   Pulse 72   Temp 97.8 °F (36.6 °C)   Resp 13   Ht 5' 3\" (1.6 m)   Wt 60 kg (132 lb 4.4 oz)   SpO2 92%   BMI 23.43 kg/m²       General: elderly, ill appearing, No distress  Eyes: PERRLA, anicteric sclerae  HENT: Hi Flow in use; Atraumatic with normal appearance of ears and nose; OP clear  Neck: Supple; no thyromegaly   Lymphatic: No cervical, supraclavicular, or axillary adenopathy  Respiratory: anteriorly CTAB, normal respiratory effort  CV: Normal rate, regular rhythm, no murmurs, 2-3+ BLE pitting edema noted  GI: Soft, nontender, nondistended, no masses, no hepatomegaly, no splenomegaly  MS: Digits without clubbing or cyanosis. Skin: No rashes, ecchymoses, or petechiae. Normal temperature, turgor, and texture. Neuro/Psych: Alert, appropriate affect, judgment/insight not tested      Results:     Lab Results   Component Value Date/Time    WBC 25.4 (H) 01/02/2020 03:52 AM    HGB 9.6 (L) 01/02/2020 03:52 AM    HCT 29.1 (L) 01/02/2020 03:52 AM    PLATELET 578 (L) 05/56/2146 03:52 AM    MCV 92.4 01/02/2020 03:52 AM    ABS.  NEUTROPHILS 22.1 (H) 01/02/2020 03:52 AM     Lab Results   Component Value Date/Time    Sodium 140 01/02/2020 03:52 AM    Potassium 3.9 01/02/2020 03:52 AM    Chloride 103 01/02/2020 03:52 AM    CO2 31 01/02/2020 03:52 AM    Glucose 148 (H) 01/02/2020 03:52 AM    BUN 19 01/02/2020 03:52 AM    Creatinine 0.64 01/02/2020 03:52 AM    GFR est AA >60 01/02/2020 03:52 AM    GFR est non-AA >60 01/02/2020 03:52 AM    Calcium 7.7 (L) 01/02/2020 03:52 AM    Glucose (POC) 168 (H) 01/02/2020 11:55 AM     Lab Results   Component Value Date/Time    Bilirubin, total 0.8 01/02/2020 03:52 AM    ALT (SGPT) 33 01/02/2020 03:52 AM    AST (SGOT) 49 (H) 01/02/2020 03:52 AM    Alk. phosphatase 211 (H) 01/02/2020 03:52 AM    Protein, total 5.7 (L) 01/02/2020 03:52 AM    Albumin 2.0 (L) 01/02/2020 03:52 AM    Globulin 3.7 01/02/2020 03:52 AM     Lab Results   Component Value Date/Time    Reticulocyte count 1.8 12/17/2019 08:31 AM    Iron % saturation 38 12/17/2019 08:31 AM    TIBC 120 (L) 12/17/2019 08:31 AM    Ferritin 104 12/17/2019 08:31 AM    Vitamin B12 1,947 (H) 12/17/2019 08:31 AM    Folate 8.8 12/17/2019 08:31 AM    Haptoglobin 197 12/17/2019 08:31 AM     12/17/2019 08:31 AM    TSH 18.30 (H) 12/17/2019 12:17 AM    Lipase 107 12/16/2019 03:12 AM     Lab Results   Component Value Date/Time    INR 3.0 (H) 01/02/2020 03:52 AM    Fibrinogen 379 01/02/2020 08:48 AM     Lab Results   Component Value Date/Time     12/17/2019 08:31 AM     12/16/2019 CT ABD PELV W CONT  IMPRESSION:  1. Diverticulosis with findings suspicious for sigmoid diverticulitis. 2. Right lower lobe and right upper lobe pneumonia. 12/28/2019 CT ABD PELV W CONT  IMPRESSION:  No evidence of acute diverticulitis. Diffuse hepatic steatosis. Persistent  diffuse interstitial lung disease. Interval resolution of pleural effusions. 12/31/2019 XR CHEST  IMPRESSION: Moderately severe pulmonary edema. Left basilar atelectasis. Assessment and Recommendations:   66 yo female with PMH of anxiety, depression, COPD, Crohn's disease, diverticulosis, GERD, fibromyalgia, IBS, migraine HAs, Afib, RA admitted with abdominal pain.     1. Coagulopathy: Elevated INR 3.0 may be related to Vit K deficiency related to malnutrition/poor oral intake or multiple rounds of antibiotics. Normal fibrinogen rules out DIC. Recommend further evaluation with PT mixing study. No active bleeding at this time. Recommend awaiting results of mixing study before trial of Vit K.  -- PT mixing study ordered  -- Observe for signs of bleeding;     2. Acute resp failure / PNA: On broad spectrum antibiotics, IV steroids, and hi flow O2. Pulmonary following. 3. Sigmoid diverticulitis: GI following: completed course of abx    5. AFib: Cardio following    6. LLE DVT: Eliquis on hold due to coagulopathy    7. Goals of care: Palliative team following    Patient seen in conjunction with Arpita Martinez NP.     Signed By: Jared Davis MD Impression:     #PEG tube dislodgement  Unknown how the PEG tube got dislodged as per the patient. Primarily uses the PEG for food and medications. Pt. reported that he had the PEG tube replaced multiple times in the past, unknown when was the last time. He has no pus or erythema surrounding the PEG tube site.     Recommendations:   - will exchange the PEG tube bedside.     All recommendations are tentative until note is attested by an attending.     Deng Salazar, PGY-4  Gastroenterology/Hepatology Fellow  Available on Microsoft Teams  [phone] (Short Range Pager)  [phone] (Long Range Pager)    After 5pm, please contact the on-call GI fellow. [phone]

## 2023-01-02 NOTE — PROGRESS NOTE ADULT - PROBLEM SELECTOR PLAN 6
- Diet: Jevity 1.2 kcal with goal 40 cc/h continuous per PEG tube  - Nutrition consult, appreciate recs  - PT consult recommending JOHN PAUL  - DVT ppx: Lovenox 40 mg SQ daily  - Dispo: pending course/clinical improvement

## 2023-01-02 NOTE — DIETITIAN INITIAL EVALUATION ADULT - PERTINENT MEDS FT
MEDICATIONS  (STANDING):  cefepime   IVPB 2000 milliGRAM(s) IV Intermittent every 8 hours  cefepime   IVPB      diatrizoate meglumine/diatrizoate sodium. 30 milliLiter(s) Oral once  enoxaparin Injectable 40 milliGRAM(s) SubCutaneous every 24 hours  levothyroxine Injectable 112 MICROGram(s) IV Push <User Schedule>  magnesium oxide 400 milliGRAM(s) Oral two times a day with meals  pantoprazole   Suspension 40 milliGRAM(s) Oral daily  polyethylene glycol 3350 17 Gram(s) Oral daily  vancomycin  IVPB 1000 milliGRAM(s) IV Intermittent every 12 hours    MEDICATIONS  (PRN):  acetaminophen    Suspension .. 650 milliGRAM(s) Oral every 6 hours PRN Temp greater or equal to 38C (100.4F), Mild Pain (1 - 3), Moderate Pain (4 - 6)  albuterol    90 MICROgram(s) HFA Inhaler 2 Puff(s) Inhalation every 6 hours PRN Shortness of Breath and/or Wheezing  gabapentin 300 milliGRAM(s) Oral three times a day PRN neuropathic pain  oxyCODONE    IR 5 milliGRAM(s) Oral every 6 hours PRN Severe Pain (7 - 10)

## 2023-01-02 NOTE — PROGRESS NOTE ADULT - PROBLEM SELECTOR PLAN 3
Plan  - c/w synthroid 150 mcg daily (home med) (or IV equivalent if needed)  - check TSH, free T4 - diagnosed about 8 yrs ago  - follows with Dr. 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

## 2023-01-02 NOTE — PROGRESS NOTE ADULT - ASSESSMENT
67-year-old male with PMH of metastatic esophageal cancer, hypothyroidism, GERD, HTN who was transferred from [facility] for Orthopedics consult for possible right knee malignancy versus septic arthritis.     He received a biopsy of his right patella on 11/10/22 with pathology positive for squamous cell carcinoma (Dr. Carter). Per collateral from son, patient has been experiencing progressively worsening pain, erythema, edema, and warmth at the R knee joint for about 1 month since biopsy. Orthopedics following, recommend continuing IV antibiotics pending MRI knee. Presentation more consistent with known R knee malignancy in the context of stable vital signs and progression over 1 month. Collateral information from [facility] states no arthroscopy performed, will continue to hold off due to risk of seeding cancer.  67-year-old male with PMH of metastatic esophageal cancer, hypothyroidism, GERD, HTN who was transferred from [facility] for Orthopedics consult for possible right knee malignancy versus septic arthritis.     He received a biopsy of his right patella on 11/10/22 with pathology positive for squamous cell carcinoma (Dr. Carter). Per collateral from son, patient has been experiencing progressively worsening pain, erythema, edema, and warmth at the R knee joint for about 1 month since biopsy. Orthopedics following, recommend continuing IV antibiotics pending MRI knee. Presentation more consistent with known R knee malignancy in the context of stable vital signs and progression over 1 month. Collateral information from [facility] states no arthroscopy performed, will continue to hold off due to risk of seeding cancer.

## 2023-01-02 NOTE — CHART NOTE - NSCHARTNOTEFT_GEN_A_CORE
Others' Prescriptions  Patient Name: Chris Farris Date: 1955  Address: [address] [place] [id number]: Male  Rx Written	Rx Dispensed	Drug	Quantity	Days Supply	Prescriber Name	Prescriber Daisy #	Payment Method	Dispenser  12/19/2022	12/29/2022	oxycodone-acetaminophen 5-325 mg tablet	120	30	Gi Thomas	[id number]	[facility]  11/10/2022	11/25/2022	oxycodone hcl (ir) 5 mg tablet	16	4	Lane Carter J (MD)	[id number]	[facility]  11/14/2022	11/25/2022	oxycodone-acetaminophen 5-325 mg tablet	120	30	Gi Thomas	[id number]	[facility]  10/14/2022	10/17/2022	oxycodone-acetaminophen 5-325 mg tablet	120	30	Gi Thomas	[id number]	[facility]  08/29/2022	08/29/2022	oxycodone-acetaminophen 5-325 mg tablet	120	30	Gi Thomas	[id number]	[facility]  06/27/2022	07/01/2022	oxycodone-acetaminophen 5-325 mg tablet	120	30	Gi Thomas	[id number]	[facility]  05/03/2022	05/03/2022	oxycodone-acetaminophen 5-325 mg tablet	120	30	Gi Thomas	[id number]	[facility]  02/18/2022	02/22/2022	oxycodone-acetaminophen 5-325 mg tablet	120	30	Yazan Rubalcava P, MD	[id number]	[facility]  01/24/2022	01/24/2022	oxycodone-acetaminophen 5-325 mg tablet	120	30	Yazan Rubalcava P, MD	[id number]	[facility]

## 2023-01-02 NOTE — DIETITIAN INITIAL EVALUATION ADULT - OTHER INFO
Per chart review, 67-year-old male with PMH of metastatic esophageal cancer, hypothyroidism, GERD, HTN who was transferred from [facility] for Orthopedics consult for possible right knee malignancy versus septic arthritis.     Patient seen at bedside. TF is being held. PEG tube dislodgement. GI consulted this morning for PEG exchange. Patient has been ordered with Jevity 1.2 @ goal rate of 40ml x 24hr, which provides 1152kcal, and 53gm pro. Denies any GI issues (nausea/vomiting/diarrhea/constipation.) Last BM today. Patient noted with 2+ edema to rt knee which is masking his weight loss. Recommend change formula to Two Prateek initiate @ 25ml and increase 10ml q4hr to meet goal rate 45ml/hr x 24 hrs, total volume 1080ml. This TF provides total 2160ml, 90.18gm pro, and 915.8 Free water.

## 2023-01-02 NOTE — PROGRESS NOTE ADULT - PROBLEM SELECTOR PLAN 5
- Diet: Jevity 1.2 kcal with goal 40 cc/h continuous per PEG tube  - Nutrition consult  - PT consult  - DVT ppx: Lovenox 40 mg SQ daily  - Dispo: pending course/clinical improvement - home meds include amlodipine 5 mg, metoprolol succinate 50 mg   - BP 90s-100s/50s-60s in ED    Plan  - hold BP meds as hypotensive in ED

## 2023-01-02 NOTE — DIETITIAN INITIAL EVALUATION ADULT - ORAL INTAKE PTA/DIET HISTORY
Diet hx collateral with pt's wife at bedside, who reports pt has had multiple hospitalization in the past few months. Patient has been on PEG feeding for 7 years already. Home TF regimen per wife was Two Prateek via PEG bolus feeding.~7 bottles daily, 1bottle every 1-2 hrs. However, since he adm in the [facility] few months ago, they provided him Jevity 1.2. Reports ~20lbs weight loss in 3mos.

## 2023-01-02 NOTE — PHYSICAL THERAPY INITIAL EVALUATION ADULT - ADDITIONAL COMMENTS
pt lives with his family in a house with 5-67 stairs to enter and 12 steps to the bedroom. prior to admission pt was independent with all mobility and ambulated with a rolling walker. Pt has a home health aide for 5 hours/day 7 days a week.     Pt. left comfortable in bed with all tubes/lines intact, +bed alarm, call bell in reach and in NAD. pt lives with his family in a house with 5-6 stairs to enter and 12 steps to the bedroom. prior to admission pt was independent with all mobility and ambulated with a rolling walker. Pt has a home health aide for 5 hours/day 7 days a week.     Pt. left comfortable in bed with all tubes/lines intact, +bed alarm, call bell in reach and in NAD.

## 2023-01-02 NOTE — CHART NOTE - NSCHARTNOTEFT_GEN_A_CORE
Peg Exchange Note:     Pt seen at bedside. Oswald was removed from track.    A 20 French Luis Balloon Peg was placed into track. 10 cc of sterile water were inserted into balloon. Peg was noted to be secure in place with external bumper at 4.5 cm    Recommend:   -Tube study to evaluate location of peg in stomach. If tube study shows appropriate position, can restart tube feeding and medications via tube.  - wound care consultation to assess and treat skin around peg site, would likely need barrier cream  - please do not place gauze under external bumper as this can create tissue injury    GI will continue to follow.     All recommendations are tentative until note is attested by an attending.     Deng Salazar, PGY-4  Gastroenterology/Hepatology Fellow  Available on Microsoft Teams  [phone] (Short Range Pager)  [phone] (Long Range Pager)    After 5pm, please contact the on-call GI fellow. [phone]

## 2023-01-02 NOTE — PHYSICAL THERAPY INITIAL EVALUATION ADULT - RANGE OF MOTION EXAMINATION, REHAB EVAL
right LE not tested 2/2 pt complaint of pain/bilateral upper extremity ROM was WFL (within functional limits)/Left LE ROM was WFL (within functional limits)

## 2023-01-02 NOTE — DIETITIAN INITIAL EVALUATION ADULT - ADD RECOMMEND
1) When PEG in placed, recommend initiate Two Prateek initiate @ 25ml and increase 10ml q4hr to meet goal rate 45ml/hr x 24 hrs, total volume 1080ml. This TF provides total 2160ml, 90.18gm pro, and 915.8 Free water.   2) Monitor TF tolerance, Labs, weights, BMs, and skin integrity.   3) RD to remain available for further nutritional interventions as indicated.

## 2023-01-02 NOTE — CHART NOTE - NSCHARTNOTEFT_GEN_A_CORE
Patient seen and evaluated at bedside. RN had notified writer that PEG tube had apparently fallen out spontaneously. Patient endorsed PEG procedure had happened years ago, and that when tube has previously fallen out at home, it was replaced in a hospital setting. MAR temporized by inserting Oswald into PEG site (writer assisted), with plan to email GI for PEG tube replacement. Discussed with MAR and RN plan for RN to clamp Oswald catheter.

## 2023-01-02 NOTE — DIETITIAN INITIAL EVALUATION ADULT - ENTERAL
Recommend change formula to Two Prateek initiate @ 25ml and increase 10ml q4hr to meet goal rate 45ml/hr x 24 hrs, total volume 1080ml. This TF provides total 2160ml, 90.18gm pro, and 915.8 Free water.

## 2023-01-02 NOTE — PROGRESS NOTE ADULT - PROBLEM SELECTOR PLAN 4
- home meds include amlodipine 5 mg, metoprolol succinate 50 mg   - BP 90s-100s/50s-60s in ED    Plan  - hold BP meds as hypotensive in ED Plan  - c/w synthroid 150 mcg daily (home med) --> switched to IV equivalent today as PEG displaced  - check TSH, free T4

## 2023-01-02 NOTE — PROGRESS NOTE ADULT - PROBLEM SELECTOR PLAN 1
- progressively worsening pain, erythema, edema, and warmth at the R knee joint for about 1 month  - recent excision of R patella on 11/10 with SCC on biopsy but unclear margins  - current presentation more consistent with progression of known cancer than septic arthritis  - WBC 30k, afebrile, CK and ESR elevated    Plan  - Pending MRI R knee w/wo contrast to evaluate for spread of cancer  - Appreciate Ortho recs:     - WBAT RLE with knee in extension     - No indications for arthroscopy now due to risk of seeding cancer  - Hold off on ID consult  - Continue IV Cefepime and Vancomycin for empiric coverage (monitor vanc level to avoid nephrotoxicity)  - Pain control:      - Tylenol 650 mg q6h for mild to moderate     - Oxycodone-acetaminophen 5-325 mg q6h PRN for severe pain, c/w bowel regimen to prevent opioid induced constipation  - DVT ppx with lovenox 40 mg SQ daily - progressively worsening pain, erythema, edema, and warmth at the R knee joint for about 1 month  - recent excision of R patella on 11/10 with SCC on biopsy but unclear margins  - current presentation more consistent with progression of known cancer than septic arthritis  - WBC 30k, afebrile, CK and ESR elevated    Plan  - Pending MRI R knee w/wo contrast to evaluate for spread of cancer  - Appreciate Ortho recs:     - WBAT RLE with knee in extension     - No indications for arthroscopy now due to risk of seeding cancer  - Hold off on ID consult  - Continue IV Cefepime and Vancomycin for empiric coverage, day 2 now   - Monitor vanc level before 4th dose today to avoid nephrotoxicity  - Pain control:      - Tylenol 650 mg q6h for mild to moderate     - Oxycodone-acetaminophen 5-325 mg q6h PRN for severe pain     - c/w bowel regimen to prevent opioid induced constipation  - DVT ppx with lovenox 40 mg SQ daily

## 2023-01-02 NOTE — DIETITIAN INITIAL EVALUATION ADULT - FEEDING SKILL
Subjective findings: The patient return to clinic today for postop visit #1, 1 week status post amputation third toe right foot.  The patient is in good spirits.  Had no complaints.  The patient also complained of a painful second toe right foot.    Objective findings: The dressings were removed and wound margins well coaptated and maintained.  There is no edema, erythema, cellulitis, drainage or bleeding noted.  Neurovascular status is intact.  Vital signs are stable.  There is a small necrotic lesion at the distal aspect second toe right foot.  No drainage or bleeding noted.  There is a flexion deformity at the proximal interphalangeal joint second toe right foot.    Assessment: Osteomyelitis  Hammertoe second toe right foot    Plan: Sterile dressing was applied to the right foot.  The patient is to return to the clinic in 1 week for postop visit #2 at which time the sutures will be removed.  I have also recommended diabetic shoes.   independent Flor Haley

## 2023-01-02 NOTE — PROGRESS NOTE ADULT - SUBJECTIVE AND OBJECTIVE BOX
Damaris Aguiar, PGY1  TEAMS or Pager [phone]     Patient is a 67y old  Male who presents with a chief complaint of     SUBJECTIVE/INTERVAL EVENTS: Patient seen and examined at bedside. Otherwise, denies chest pain, SOB, lightheadedness, nausea, vomiting, constipation, or diarrhea.    MEDICATIONS  (STANDING):  cefepime   IVPB 2000 milliGRAM(s) IV Intermittent every 8 hours  cefepime   IVPB      dextrose 5% + lactated ringers. 1000 milliLiter(s) (50 mL/Hr) IV Continuous <Continuous>  enoxaparin Injectable 40 milliGRAM(s) SubCutaneous every 24 hours  levothyroxine Injectable 112 MICROGram(s) IV Push <User Schedule>  magnesium oxide 400 milliGRAM(s) Oral two times a day with meals  pantoprazole   Suspension 40 milliGRAM(s) Oral daily  polyethylene glycol 3350 17 Gram(s) Oral daily  vancomycin  IVPB 1000 milliGRAM(s) IV Intermittent every 12 hours    MEDICATIONS  (PRN):  acetaminophen    Suspension .. 650 milliGRAM(s) Oral every 6 hours PRN Temp greater or equal to 38C (100.4F), Mild Pain (1 - 3), Moderate Pain (4 - 6)  albuterol    90 MICROgram(s) HFA Inhaler 2 Puff(s) Inhalation every 6 hours PRN Shortness of Breath and/or Wheezing  gabapentin 300 milliGRAM(s) Oral three times a day PRN neuropathic pain  oxyCODONE    IR 5 milliGRAM(s) Oral every 6 hours PRN Severe Pain (7 - 10)      VITAL SIGNS:  T(F): 97.5 (23 @ 06:30), Max: 98.7 (23 @ 22:17)  HR: 113 (23 @ 06:30) (88 - 121)  BP: 104/61 (23 @ 06:30) (90/62 - 114/60)  RR: 18 (23 @ 06:30) (18 - 18)  SpO2: 100% (23 @ 06:30) (98% - 100%)    I&O's Summary    2023 07:01  -  2023 07:00  --------------------------------------------------------  IN: 60 mL / OUT: 400 mL / NET: -340 mL      Daily     Daily     PHYSICAL EXAM:  Gen: Alert, NAD  HEENT: NCAT, conjunctiva clear, sclera anicteric, no erythema or exudates in the oropharynx, mmm  Neck: Supple, no JVD  CV: RRR, S1S2, no m/r/g  Resp: CTAB, normal respiratory effort  Abd: Soft, nontender, nondistended, normal bowel sounds  Ext: no edema, no clubbing or cyanosis  Neuro: AOx3, CN2-12 grossly intact, ANDREWS  SKIN: warm, perfused    LABS:                        8.2    24.52 )-----------( 568      ( 2023 07:40 )             26.3     Hgb Trend: 8.2<--      130<L>  |  99  |  14  ----------------------------<  102<H>  4.0   |  21<L>  |  0.75    Ca    9.9      2023 07:40    TPro  7.1  /  Alb  3.2<L>  /  TBili  0.4  /  DBili  x   /  AST  33  /  ALT  36  /  AlkPhos  230<H>      Creatinine Trend: 0.75<--  LIVER FUNCTIONS - ( 2023 07:40 )  Alb: 3.2 g/dL / Pro: 7.1 g/dL / ALK PHOS: 230 U/L / ALT: 36 U/L / AST: 33 U/L / GGT: x           PT/INR - ( 2023 07:40 )   PT: 16.6 sec;   INR: 1.43 ratio         PTT - ( 2023 07:40 )  PTT:35.0 sec      Urinalysis Basic - ( 2023 10:08 )    Color: Yellow / Appearance: Clear / S.027 / pH: x  Gluc: x / Ketone: Negative  / Bili: Negative / Urobili: <2 mg/dL   Blood: x / Protein: 30 mg/dL / Nitrite: Negative   Leuk Esterase: Negative / RBC: 5 /HPF / WBC 1 /HPF   Sq Epi: x / Non Sq Epi: 1 /HPF / Bacteria: Negative        CAPILLARY BLOOD GLUCOSE          RADIOLOGY & ADDITIONAL TESTS: Reviewed    Imaging Personally Reviewed:    Consultant(s) Notes Reviewed:      Care Discussed with Consultants/Other Providers:   Damaris Aguiar, PGY1  TEAMS or Pager [phone]     Patient is a 67y old  Male who presents with a chief complaint of R knee pain    SUBJECTIVE/INTERVAL EVENTS: Overnight around 1:30 am, patient's PEG tube was displaced and a small tube was inserted to keep the tract open. Patient seen and examined at bedside. He reports 10/10 sharp R knee pain and is requesting IV pain medication as PEG tube not functioning. Orthopedics placed patient's knee in a wrap and brace today. He has limited ROM 2/2 pain and has not been able to reposition self in bed or ambulate. Patient reports normal BM today. Otherwise, denies chest pain, SOB, lightheadedness, nausea, vomiting, constipation, or diarrhea. GI replaced PEG at bedside today afternoon and XR with gastrografin confirmed placement.    MEDICATIONS  (STANDING):  cefepime   IVPB 2000 milliGRAM(s) IV Intermittent every 8 hours  cefepime   IVPB      dextrose 5% + lactated ringers. 1000 milliLiter(s) (50 mL/Hr) IV Continuous <Continuous>  enoxaparin Injectable 40 milliGRAM(s) SubCutaneous every 24 hours  levothyroxine Injectable 112 MICROGram(s) IV Push <User Schedule>  magnesium oxide 400 milliGRAM(s) Oral two times a day with meals  pantoprazole   Suspension 40 milliGRAM(s) Oral daily  polyethylene glycol 3350 17 Gram(s) Oral daily  vancomycin  IVPB 1000 milliGRAM(s) IV Intermittent every 12 hours    MEDICATIONS  (PRN):  acetaminophen    Suspension .. 650 milliGRAM(s) Oral every 6 hours PRN Temp greater or equal to 38C (100.4F), Mild Pain (1 - 3), Moderate Pain (4 - 6)  albuterol    90 MICROgram(s) HFA Inhaler 2 Puff(s) Inhalation every 6 hours PRN Shortness of Breath and/or Wheezing  gabapentin 300 milliGRAM(s) Oral three times a day PRN neuropathic pain  oxyCODONE    IR 5 milliGRAM(s) Oral every 6 hours PRN Severe Pain (7 - 10)      VITAL SIGNS:  T(F): 97.5 (23 @ 06:30), Max: 98.7 (23 @ 22:17)  HR: 113 (23 @ 06:30) (88 - 121)  BP: 104/61 (23 @ 06:30) (90/62 - 114/60)  RR: 18 (23 @ 06:30) (18 - 18)  SpO2: 100% (23 @ 06:30) (98% - 100%)    I&O's Summary    2023 07:01  -  2023 07:00  --------------------------------------------------------  IN: 60 mL / OUT: 400 mL / NET: -340 mL      Daily     Daily     PHYSICAL EXAM:  Gen: Alert, NAD  HEENT: NCAT, conjunctiva clear, sclera anicteric, poor dentition, missing most teeth; no pharyngeal injection, tongue with white build-up  Neck: Supple, no JVD  CV: RRR, S1S2, no m/r/g  Resp: CTAB, normal respiratory effort, +productive cough  Abd: Soft, mildly TTP at PEG site (recently dislodged), nondistended, normal bowel sounds  Ext: no edema, no clubbing or cyanosis   - R knee: wrapped in bandages and in brace; erythema and edema compared to L knee  Neuro: AOx3, CN2-12 grossly intact, ANDREWS, unable to ambulate  SKIN: warm, perfused    LABS:                        8.2    24.52 )-----------( 568      ( 2023 07:40 )             26.3     Hgb Trend: 8.2<--      130<L>  |  99  |  14  ----------------------------<  102<H>  4.0   |  21<L>  |  0.75    Ca    9.9      2023 07:40    TPro  7.1  /  Alb  3.2<L>  /  TBili  0.4  /  DBili  x   /  AST  33  /  ALT  36  /  AlkPhos  230<H>      Creatinine Trend: 0.75<--  LIVER FUNCTIONS - ( 2023 07:40 )  Alb: 3.2 g/dL / Pro: 7.1 g/dL / ALK PHOS: 230 U/L / ALT: 36 U/L / AST: 33 U/L / GGT: x           PT/INR - ( 2023 07:40 )   PT: 16.6 sec;   INR: 1.43 ratio         PTT - ( 2023 07:40 )  PTT:35.0 sec      Urinalysis Basic - ( 2023 10:08 )    Color: Yellow / Appearance: Clear / S.027 / pH: x  Gluc: x / Ketone: Negative  / Bili: Negative / Urobili: <2 mg/dL   Blood: x / Protein: 30 mg/dL / Nitrite: Negative   Leuk Esterase: Negative / RBC: 5 /HPF / WBC 1 /HPF   Sq Epi: x / Non Sq Epi: 1 /HPF / Bacteria: Negative      CAPILLARY BLOOD GLUCOSE    RADIOLOGY & ADDITIONAL TESTS: Reviewed    Imaging Personally Reviewed: XR knee    Consultant(s) Notes Reviewed:  Orthopedics, GI    Care Discussed with Consultants/Other Providers: GI   Damaris Aguiar, PGY1  TEAMS or Pager [phone]     Patient is a 67y old  Male who presents with a chief complaint of R knee pain    SUBJECTIVE/INTERVAL EVENTS: Overnight around 1:30 am, patient's PEG tube was displaced and a small tube was inserted to keep the tract open. Patient seen and examined at bedside. He reports 10/10 sharp R knee pain and is requesting IV pain medication as PEG tube not functioning. Orthopedics placed patient's knee in a wrap and brace today. He has limited ROM 2/2 pain and has not been able to reposition self in bed or ambulate. Patient reports normal BM today. Otherwise, denies chest pain, SOB, lightheadedness, nausea, vomiting, constipation, or diarrhea. GI replaced PEG at bedside today afternoon and XR with gastrografin confirmed placement.    MEDICATIONS  (STANDING):  cefepime   IVPB 2000 milliGRAM(s) IV Intermittent every 8 hours  cefepime   IVPB      dextrose 5% + lactated ringers. 1000 milliLiter(s) (50 mL/Hr) IV Continuous <Continuous>  enoxaparin Injectable 40 milliGRAM(s) SubCutaneous every 24 hours  levothyroxine Injectable 112 MICROGram(s) IV Push <User Schedule>  magnesium oxide 400 milliGRAM(s) Oral two times a day with meals  pantoprazole   Suspension 40 milliGRAM(s) Oral daily  polyethylene glycol 3350 17 Gram(s) Oral daily  vancomycin  IVPB 1000 milliGRAM(s) IV Intermittent every 12 hours    MEDICATIONS  (PRN):  acetaminophen    Suspension .. 650 milliGRAM(s) Oral every 6 hours PRN Temp greater or equal to 38C (100.4F), Mild Pain (1 - 3), Moderate Pain (4 - 6)  albuterol    90 MICROgram(s) HFA Inhaler 2 Puff(s) Inhalation every 6 hours PRN Shortness of Breath and/or Wheezing  gabapentin 300 milliGRAM(s) Oral three times a day PRN neuropathic pain  oxyCODONE    IR 5 milliGRAM(s) Oral every 6 hours PRN Severe Pain (7 - 10)      VITAL SIGNS:  T(F): 97.5 (23 @ 06:30), Max: 98.7 (23 @ 22:17)  HR: 113 (23 @ 06:30) (88 - 121)  BP: 104/61 (23 @ 06:30) (90/62 - 114/60)  RR: 18 (23 @ 06:30) (18 - 18)  SpO2: 100% (23 @ 06:30) (98% - 100%)    I&O's Summary    2023 07:  -  2023 07:00  --------------------------------------------------------  IN: 60 mL / OUT: 400 mL / NET: -340 mL      Daily     Daily     PHYSICAL EXAM:  Gen: Alert, NAD  HEENT: NCAT, conjunctiva clear, sclera anicteric, poor dentition, missing most teeth; no pharyngeal injection, tongue with white build-up  Neck: Supple, no JVD  CV: RRR, S1S2, no m/r/g  Resp: CTAB, normal respiratory effort, +productive cough  Abd: Soft, mildly TTP at PEG site (recently dislodged), nondistended, normal bowel sounds  Ext: no edema, no clubbing or cyanosis   - R knee: wrapped in bandages and in brace; erythema and edema compared to L knee  Neuro: AOx3, CN2-12 grossly intact, ANDREWS, unable to ambulate  SKIN: warm, perfused    LABS:                                   8.3    25.42 )-----------( 604      ( 2023 07:45 )             26.5                  8.2    24.52 )-----------( 568      ( 2023 07:40 )             26.3     Hgb Trend: 8.2<--      132<L>  |  100  |  8   ----------------------------<  104<H>  3.8   |  21<L>  |  0.66    Ca    9.9      2023 07:45  Phos  1.3       Mg     1.90         TPro  6.8  /  Alb  3.2<L>  /  TBili  0.4  /  DBili  x   /  AST  26  /  ALT  28  /  AlkPhos  207<H>      130<L>  |  99  |  14  ----------------------------<  102<H>  4.0   |  21<L>  |  0.75    Ca    9.9      2023 07:40    TPro  7.1  /  Alb  3.2<L>  /  TBili  0.4  /  DBili  x   /  AST  33  /  ALT  36  /  AlkPhos  230<H>      Creatinine Trend: 0.75<--  LIVER FUNCTIONS - ( 2023 07:40 )  Alb: 3.2 g/dL / Pro: 7.1 g/dL / ALK PHOS: 230 U/L / ALT: 36 U/L / AST: 33 U/L / GGT: x           PT/INR - ( 2023 07:40 )   PT: 16.6 sec;   INR: 1.43 ratio         PTT - ( 2023 07:40 )  PTT:35.0 sec      Urinalysis Basic - ( 2023 10:08 )    Color: Yellow / Appearance: Clear / S.027 / pH: x  Gluc: x / Ketone: Negative  / Bili: Negative / Urobili: <2 mg/dL   Blood: x / Protein: 30 mg/dL / Nitrite: Negative   Leuk Esterase: Negative / RBC: 5 /HPF / WBC 1 /HPF   Sq Epi: x / Non Sq Epi: 1 /HPF / Bacteria: Negative      CAPILLARY BLOOD GLUCOSE    RADIOLOGY & ADDITIONAL TESTS: Reviewed    Imaging Personally Reviewed:   < from: Xray Abdomen 1 View PORTABLE -Routine (Xray Abdomen 1 View PORTABLE -Routine .) (23 @ 09:19) >    IMPRESSION:  PEG tube in place  Nonobstructive bowel gas pattern.    < end of copied text >  < from: Xray Knee 4 Views, Right (23 @ 09:18) >    IMPRESSION:  Post-surgical changes of the patella. Anterior soft tissue swelling.    No acute fracture or dislocation.    Further information may be obtained from the patient's subsequent MRI of   the right knee which was pending at the time of this dictation.    < end of copied text >      Consultant(s) Notes Reviewed:  Orthopedics, GI    Care Discussed with Consultants/Other Providers: GI

## 2023-01-02 NOTE — CONSULT NOTE ADULT - SUBJECTIVE AND OBJECTIVE BOX
HPI:    Allergies:  No Known Allergies      Home Medications:    Hospital Medications:  acetaminophen    Suspension .. 650 milliGRAM(s) Oral every 6 hours PRN  albuterol    90 MICROgram(s) HFA Inhaler 2 Puff(s) Inhalation every 6 hours PRN  cefepime   IVPB 2000 milliGRAM(s) IV Intermittent every 8 hours  cefepime   IVPB      dextrose 5% + lactated ringers. 1000 milliLiter(s) IV Continuous <Continuous>  enoxaparin Injectable 40 milliGRAM(s) SubCutaneous every 24 hours  gabapentin 300 milliGRAM(s) Oral three times a day PRN  levothyroxine Injectable 112 MICROGram(s) IV Push <User Schedule>  magnesium oxide 400 milliGRAM(s) Oral two times a day with meals  oxyCODONE    IR 5 milliGRAM(s) Oral every 6 hours PRN  pantoprazole   Suspension 40 milliGRAM(s) Oral daily  polyethylene glycol 3350 17 Gram(s) Oral daily  vancomycin  IVPB 1000 milliGRAM(s) IV Intermittent every 12 hours      PMHX/PSHX:  HTN (hypertension)    GERD (gastroesophageal reflux disease)    Hypothyroidism    Neuropathic pain    Disorder of bone, unspecified    H/O constipation    Esophageal cancer    Liver metastases    Lung metastases    S/P percutaneous endoscopic gastrostomy (PEG) tube placement        Family history:  No pertinent family history in first degree relatives        Denies family history of colon cancer/polyps, stomach cancer/polyps, pancreatic cancer/masses, liver cancer/disease, ovarian cancer and endometrial cancer.    Social History:   Tob: Denies  EtOH: Denies  Illicit Drugs: Denies    ROS:     General:  No wt loss, fevers, chills, night sweats, fatigue  Eyes:  Good vision, no reported pain  ENT:  No sore throat, pain, runny nose, dysphagia  CV:  No pain, palpitations, hypo/hypertension  Pulm:  No dyspnea, cough, tachypnea, wheezing  GI:  see HPI  :  No pain, bleeding, incontinence, nocturia  Muscle:  No pain, weakness  Neuro:  No weakness, tingling, memory problems  Psych:  No fatigue, insomnia, mood problems, depression  Endocrine:  No polyuria, polydipsia, cold/heat intolerance  Heme:  No petechiae, ecchymosis, easy bruisability  Skin:  No rash, tattoos, scars, edema    PHYSICAL EXAM:     GENERAL:  No acute distress  HEENT:  NCAT, no scleral icterus   CHEST:  no respiratory distress  HEART:  Regular rate and rhythm  ABDOMEN:  Soft, non-tender, non-distended, normoactive bowel sounds,  no masses  EXTREMITIES: No edema  SKIN:  No rash/erythema/ecchymoses/petechiae/wounds/abscess/warm/dry  NEURO:  Alert and oriented x 3, no asterixis    Vital Signs:  Vital Signs Last 24 Hrs  T(C): 36.4 (2023 06:30), Max: 37.1 (2023 22:17)  T(F): 97.5 (2023 06:30), Max: 98.7 (2023 22:17)  HR: 113 (2023 06:30) (88 - 121)  BP: 104/61 (2023 06:30) (90/62 - 114/60)  BP(mean): --  RR: 18 (2023 06:30) (18 - 18)  SpO2: 100% (2023 06:30) (98% - 100%)    Parameters below as of 2023 06:30  Patient On (Oxygen Delivery Method): room air      Daily     Daily     LABS:                        8.2    24.52 )-----------( 568      ( 2023 07:40 )             26.3     Mean Cell Volume: 83.2 fL (23 @ 07:40)        130<L>  |  99  |  14  ----------------------------<  102<H>  4.0   |  21<L>  |  0.75    Ca    9.9      2023 07:40    TPro  7.1  /  Alb  3.2<L>  /  TBili  0.4  /  DBili  x   /  AST  33  /  ALT  36  /  AlkPhos  230<H>      LIVER FUNCTIONS - ( 2023 07:40 )  Alb: 3.2 g/dL / Pro: 7.1 g/dL / ALK PHOS: 230 U/L / ALT: 36 U/L / AST: 33 U/L / GGT: x           PT/INR - ( 2023 07:40 )   PT: 16.6 sec;   INR: 1.43 ratio         PTT - ( 2023 07:40 )  PTT:35.0 sec  Urinalysis Basic - ( 2023 10:08 )    Color: Yellow / Appearance: Clear / S.027 / pH: x  Gluc: x / Ketone: Negative  / Bili: Negative / Urobili: <2 mg/dL   Blood: x / Protein: 30 mg/dL / Nitrite: Negative   Leuk Esterase: Negative / RBC: 5 /HPF / WBC 1 /HPF   Sq Epi: x / Non Sq Epi: 1 /HPF / Bacteria: Negative                              8.2    24.52 )-----------( 568      ( 2023 07:40 )             26.3       Imaging:             HPI:    Ms. Benson is a 67 yrs old male w/ metastatic esophageal cancer, hypothyroidism, GERD, HTN who was transferred from [facility] for Orthopedics consult for possible right knee malignancy versus septic arthritis. Patient was diagnosed with esophageal SCC about 8 years ago and is s/p chemo and radiation. In 2019, he restarted chemotherapy for metastasis to his chest wall (last dose 2 months ago). He follows with outpatient oncologist Dr. Gi Thomas in [place]. He does not take anything by mouth, with all food and meds via PEG tube. However, his PEG tube got dislodged, pt. does not know how it fell out. Reported that he had the PEG tube replaced in the past. As per primary team, the ED had placed a small tube attached to a metz bag to gravity to keep the tract open. Pt. does not remember where or how he got the PEG placed initially. GI consulted for PEG exchange.     Allergies:  No Known Allergies      Home Medications:    · 	polyethylene glycol 3350 oral powder for reconstitution: Last Dose Taken:  , 1 gram(s) orally once a day (at bedtime) MDD:1 dose / day  · 	aspirin 81 mg oral capsule: Last Dose Taken:  , 1 cap(s) orally 2 times a day MDD:2 tabs/day  · 	oxyCODONE 5 mg oral tablet: Last Dose Taken:  , 1 tab(s) orally every 6 hours MDD:4 tabs/day  · 	gabapentin 300 mg oral capsule: Last Dose Taken:  , 1 cap(s) orally 3 times a day, As Needed  · 	potassium chloride 20 mEq oral tablet, extended release: Last Dose Taken:  , 1 tab(s) orally once a day  · 	amLODIPine 5 mg oral tablet: Last Dose Taken:  , 1 tab(s) orally once a day  · 	esomeprazole 40 mg oral delayed release capsule: Last Dose Taken:  , 1 cap(s) orally once a day  · 	Linzess 290 mcg oral capsule: Last Dose Taken:  , 1 cap(s) orally once a day  · 	levothyroxine 150 mcg (0.15 mg) oral tablet: Last Dose Taken:  , 1 tab(s) orally once a day  · 	magnesium oxide 400 mg oral tablet: Last Dose Taken:  , 1 tab(s) orally 2 times a day    Hospital Medications:  acetaminophen    Suspension .. 650 milliGRAM(s) Oral every 6 hours PRN  albuterol    90 MICROgram(s) HFA Inhaler 2 Puff(s) Inhalation every 6 hours PRN  cefepime   IVPB 2000 milliGRAM(s) IV Intermittent every 8 hours  cefepime   IVPB      dextrose 5% + lactated ringers. 1000 milliLiter(s) IV Continuous <Continuous>  enoxaparin Injectable 40 milliGRAM(s) SubCutaneous every 24 hours  gabapentin 300 milliGRAM(s) Oral three times a day PRN  levothyroxine Injectable 112 MICROGram(s) IV Push <User Schedule>  magnesium oxide 400 milliGRAM(s) Oral two times a day with meals  oxyCODONE    IR 5 milliGRAM(s) Oral every 6 hours PRN  pantoprazole   Suspension 40 milliGRAM(s) Oral daily  polyethylene glycol 3350 17 Gram(s) Oral daily  vancomycin  IVPB 1000 milliGRAM(s) IV Intermittent every 12 hours      PMHX/PSHX:  HTN (hypertension)    GERD (gastroesophageal reflux disease)    Hypothyroidism    Neuropathic pain    Disorder of bone, unspecified    H/O constipation    Esophageal cancer    Liver metastases    Lung metastases    S/P percutaneous endoscopic gastrostomy (PEG) tube placement    Family history:  No pertinent family history in first degree relatives      Social History:   Tob: Denies  EtOH: Denies  Illicit Drugs: Denies    ROS:     General:  No wt loss, fevers, chills, night sweats, fatigue  Eyes:  Good vision, no reported pain  ENT:  No sore throat, pain, runny nose, dysphagia  CV:  No pain, palpitations, hypo/hypertension  Pulm:  No dyspnea, cough, tachypnea, wheezing  GI:  see HPI  :  No pain, bleeding, incontinence, nocturia  Muscle:  No pain, weakness  Neuro:  No weakness, tingling, memory problems  Psych:  No fatigue, insomnia, mood problems, depression  Endocrine:  No polyuria, polydipsia, cold/heat intolerance  Heme:  No petechiae, ecchymosis, easy bruisability  Skin:  No rash, tattoos, scars, edema    PHYSICAL EXAM:     GENERAL:  No acute distress, lying in bed.   HEENT:  NCAT, no scleral icterus   CHEST:  no respiratory distress  HEART:  Regular rate and rhythm  ABDOMEN:  Soft, non distended, peg tube site intact w/ a tube connecting to the metz catheter, no erythema or pus around the site, mild tenderness w/ palpation.   EXTREMITIES: No LE edema, has significant tenderness in the right knee w/ swelling.   NEURO:  Alert and oriented x 3, no tremors.     Vital Signs:  Vital Signs Last 24 Hrs  T(C): 36.4 (2023 06:30), Max: 37.1 (2023 22:17)  T(F): 97.5 (2023 06:30), Max: 98.7 (2023 22:17)  HR: 113 (2023 06:30) (88 - 121)  BP: 104/61 (2023 06:30) (90/62 - 114/60)  BP(mean): --  RR: 18 (2023 06:30) (18 - 18)  SpO2: 100% (2023 06:30) (98% - 100%)    Parameters below as of 2023 06:30  Patient On (Oxygen Delivery Method): room air      Daily     Daily     LABS:                        8.2    24.52 )-----------( 568      ( 2023 07:40 )             26.3     Mean Cell Volume: 83.2 fL (23 @ 07:40)        130<L>  |  99  |  14  ----------------------------<  102<H>  4.0   |  21<L>  |  0.75    Ca    9.9      2023 07:40    TPro  7.1  /  Alb  3.2<L>  /  TBili  0.4  /  DBili  x   /  AST  33  /  ALT  36  /  AlkPhos  230<H>      LIVER FUNCTIONS - ( 2023 07:40 )  Alb: 3.2 g/dL / Pro: 7.1 g/dL / ALK PHOS: 230 U/L / ALT: 36 U/L / AST: 33 U/L / GGT: x           PT/INR - ( 2023 07:40 )   PT: 16.6 sec;   INR: 1.43 ratio         PTT - ( 2023 07:40 )  PTT:35.0 sec  Urinalysis Basic - ( 2023 10:08 )    Color: Yellow / Appearance: Clear / S.027 / pH: x  Gluc: x / Ketone: Negative  / Bili: Negative / Urobili: <2 mg/dL   Blood: x / Protein: 30 mg/dL / Nitrite: Negative   Leuk Esterase: Negative / RBC: 5 /HPF / WBC 1 /HPF   Sq Epi: x / Non Sq Epi: 1 /HPF / Bacteria: Negative                              8.2    24.52 )-----------( 568      ( 2023 07:40 )             26.3       Imaging:  No recent abd imaging.

## 2023-01-03 NOTE — CHART NOTE - NSCHARTNOTEFT_GEN_A_CORE
RCRI Class I Risk (3.9% 30 day risk of death or cardiac arrest)    67-year-old male with PMH of metastatic esophageal SCC, hypothyroidism, GERD, HTN who was transferred from [facility] for Orthopedics consult for possible right knee malignancy versus septic arthritis.         Condition:/Dx:/Planned procedure:/Any present SSx  Infection/Hematological/Thromboembolic/Cardiac (Valvular/ Arrhyth/ CHF/Excertional )/Pulm [COPD/Asthma/PHTN/IPF](SOB/Cough/sputum)/Metabolic (DM/Thyroid)/Immuno-Auto/Neoplasm. Use of Immunomodifier-suppressant/NSAID/ASA/Vit E/ A/C. Insulin/Antbx    VS/ Tergumen/ HEENT(TMJ/dental (denture/crown/implant),cleft palate).Neck (LN,Thyroid,Carotid)/Lungs:/CV/Breast/Abdomen//Ext:/ FB (eye/IC--shunt/[PPM/ICD/Stents]/Breast implant/IA pain modulator/HD Cath/Penil implant/ MS hardware.    Patient seen and examined today Plan discussed/Questions answered  (with patient/ancillary staff/colleagues) Chart notes reviewd More detailed Care notes, assessment and plan  will be added later today as needed after reviewing further labs as they become available     Notable interval events reviewed    Medically optimized for planned procedure. No contraindication. RCRI Class I Risk (minimal risk of adverse cardiac event)    67-year-old male with PMH of metastatic esophageal SCC, hypothyroidism, GERD, HTN who was transferred from [facility] for Orthopedics consult for possible right knee malignancy versus septic arthritis.     Diagnosis: R knee pain 2/2 progression of known SCC in knee vs septic arthritis  Planned procedure: R knee surgical washout  Any present symptoms: R knee pain, unable to ambulate 2/2 pain, chronic productive cough, NPO with PEG due to hx esophageal cancer    Patient seen and examined today. Plan discussed and questions answered. Notable interval events reviewed    Medically optimized for planned procedure. No contraindication. 67-year-old male with PMH of metastatic esophageal SCC, hypothyroidism, GERD, HTN who was transferred from [facility] for Orthopedics consult for possible right knee malignancy versus septic arthritis.     Diagnosis: R knee pain 2/2 progression of known SCC in knee vs septic arthritis  Planned procedure: R knee surgical washout  Any present symptoms: R knee pain, unable to ambulate 2/2 pain, chronic productive cough, NPO with PEG due to hx esophageal cancer    Patient seen and examined today. Plan discussed and questions answered. Notable interval events reviewed    The patient is not undergoing an emergent or elective surgery.   He has no recent cardiac events including MI, CAD, or CHF. No known history of bleeding disorder. Not currently on insulin therapy.  RCRI Class I Risk (minimal risk of adverse cardiac event)    Medically optimized for planned procedure. No contraindication.

## 2023-01-03 NOTE — PROGRESS NOTE ADULT - ATTENDING COMMENTS
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 Noted to have leukocytosis, elevated inflammatory markers, on empiric antibiotics though suspect malignancy > septic arthritis, awaiting MRI for further evaluation, appreciate Ortho recs  D/w Ortho, MRI to be done this evening, if any signs of infection would plan for OR in AM for washout.     Plan of care d/w earlier in the day with pt, wife, and son at bedside.

## 2023-01-03 NOTE — PROGRESS NOTE ADULT - PROBLEM SELECTOR PLAN 5
- home meds include amlodipine 5 mg, metoprolol succinate 50 mg   - BP 90s-100s/50s-60s in ED    Plan  - hold BP meds as hypotensive in ED - home meds include amlodipine 5 mg, metoprolol succinate 50 mg   - BP 90s-100s/50s-60s in ED    Plan  - BP meds held due to concern for sepsis  - BP increasing now to sys 130-160, will monitor and consider restarting tomorrow

## 2023-01-03 NOTE — PROGRESS NOTE ADULT - PROBLEM SELECTOR PLAN 4
Plan  - c/w synthroid 150 mcg daily (home med) --> switched to IV equivalent today as PEG displaced  - check TSH, free T4 - takes synthroid 150 mcg PO daily (home med)    Plan  - c/w synthroid 112 mcg IV daily  - TSH 11.98 but free T4 WNL  - patient denies new symptoms of hypothyroidism including constipation, hypotension, dry skin, brittle hair  - recommend outpatient follow-up as TSH may be inaccurate in context of current hospitalization

## 2023-01-03 NOTE — PROGRESS NOTE ADULT - ASSESSMENT
67y Male with worsening Right knee pain and serosanguinous sp mass excision on 11/10 concerning for oncologic process. Patient currently tachycardic with elevated wbc, covid positive, recently admitted for covid pneumonia, on antibiotics.    - Please obtain MRI R knee w/wo contrast  - WBAT RLE with knee in extension  - Recommend ID consult  - Continue IV Antibiotics   - Local wound care  - Pain control as needed  - DVT ppx per primary  - Discussed with attending orthopaedic oncologist Dr. Carter.

## 2023-01-03 NOTE — PROGRESS NOTE ADULT - PROBLEM SELECTOR PLAN 2
- PEG tube dislodged overnight on 1/2 at 1:30 am, patient unsure how it happened  - Patient placed on D5-LR overnight while he was not receiving continuous tube feeds    Plan  - All essential medications switched to IV during the day, including pain meds as needed, synthroid, and antibiotics  - GI consulted to replace PEG at bedside, completed today afternoon  - XR abdomen with gastrografin confirmed placement  - Can resume using PEG tube for medications and nutrition - PEG tube dislodged overnight on 1/2 at 1:30 am, patient unsure how it happened    Plan  - All essential medications switched to IV during the day, including pain meds as needed, synthroid, and antibiotics  - GI consulted to replace PEG at bedside, completed on 1/2   - XR abdomen with gastrografin confirmed placement  - Can resume using PEG tube for medications and nutrition

## 2023-01-03 NOTE — PROGRESS NOTE ADULT - PROBLEM SELECTOR PLAN 3
- diagnosed about 8 yrs ago  - follows with Dr. 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

## 2023-01-03 NOTE — CONSULT NOTE ADULT - SUBJECTIVE AND OBJECTIVE BOX
HPI:  67-year-old male with PMH of metastatic esophageal cancer, hypothyroidism, GERD, HTN who was transferred from [facility] on 1/1/2023 for Orthopedics consult for possible right knee malignancy versus septic arthritis.   He received a biopsy of his right patella on 11/10/22 with pathology positive for squamous cell carcinoma (Dr. Carter). Per collateral from son, patient has been experiencing progressively worsening pain, erythema, edema, and warmth at the R knee joint for about 1 month since biopsy. On 12/30/2022, patient became unable to walk 2/2 pain. Previously was able to ambulate with walker and brace on R knee. On 12/31, son notes patient was attempting to walk and had bleeding from the joint that resolved when he elevated the leg and held pressure. No history of septic arthritis or bleeding disorder. Denies any fevers, chills, night sweats, sick contacts, SOB, chest pain, N/V at home. Patient with chronic productive cough and sore throat with hoarse voice. Family brought patient to [facility] ([facility]) ED, where they report that he had an arthroscopy (no records) and IV antibiotics. Collateral obtained from JUSTINE Linda at [facility], who stated patient was did not get an arthroscopy while in OSH ED. He did receive 1 dose of IV Zosyn and Vancomycin 12/31. He was afebrile but labs significant for leukocytosis to 30k.    Of note, patient with recent admission to [facility] from 12/23-12/31 with COVID and superimposed bacterial pneumonia. Patient was diagnosed with esophageal SCC about 8 years ago and is s/p chemo and radiation. In Nov 2019, he restarted chemotherapy for metastasis to his chest wall (last dose 2 months ago). He follows with outpatient oncologist Dr. Gi Thomas in [place]. He does not take anything by mouth, with all food and meds via PEG tube. Patient reports 10/10 pain currently at R knee and in throat. He is AOx4 but gives brief answers to questions. At ER, VS 97.2 F, , /56, RR 24/100%. received zosyn and vanco. Then Started Cefepime and Vanc IV;  PEG tube accidentally dislodged in triage, then replaced in ED. Ortho consulted, recommending MRI knee  XR of the knee showing anterior soft tissue swelling, Concern for osteomyelitis versus worsening malignancy of the patella.     After admission, pt and family would like to transfer oncology care to [facility] (per moe team).       PAST MEDICAL & SURGICAL HISTORY:  HTN (hypertension)      GERD (gastroesophageal reflux disease)      Hypothyroidism      Neuropathic pain      Disorder of bone, unspecified      H/O constipation      Esophageal cancer      Lung metastases      S/P percutaneous endoscopic gastrostomy (PEG) tube placement          Allergies    No Known Allergies    Intolerances        MEDICATIONS  (STANDING):  cefepime   IVPB 2000 milliGRAM(s) IV Intermittent every 8 hours  cefepime   IVPB      enoxaparin Injectable 40 milliGRAM(s) SubCutaneous every 24 hours  levothyroxine Injectable 112 MICROGram(s) IV Push <User Schedule>  magnesium oxide 400 milliGRAM(s) Oral two times a day with meals  pantoprazole   Suspension 40 milliGRAM(s) Oral daily  polyethylene glycol 3350 17 Gram(s) Oral daily  vancomycin  IVPB 1000 milliGRAM(s) IV Intermittent every 12 hours    MEDICATIONS  (PRN):  acetaminophen    Suspension .. 650 milliGRAM(s) Oral every 6 hours PRN Temp greater or equal to 38C (100.4F), Mild Pain (1 - 3), Moderate Pain (4 - 6)  albuterol    90 MICROgram(s) HFA Inhaler 2 Puff(s) Inhalation every 6 hours PRN Shortness of Breath and/or Wheezing  gabapentin 300 milliGRAM(s) Oral three times a day PRN neuropathic pain  oxyCODONE    IR 5 milliGRAM(s) Oral every 6 hours PRN Severe Pain (7 - 10)      FAMILY HISTORY:  No pertinent family history in first degree relatives        SOCIAL HISTORY: No EtOH, no tobacco    REVIEW OF SYSTEMS:    See HPI     Height (cm): 170.2 (01-02 @ 17:30)  Weight (kg): 67.1 (01-02 @ 17:30)  BMI (kg/m2): 23.2 (01-02 @ 17:30)  BSA (m2): 1.78 (01-02 @ 17:30)    T(F): 98.2 (01-03-23 @ 06:30), Max: 100 (01-02-23 @ 21:54)  HR: 107 (01-03-23 @ 06:30)  BP: 106/62 (01-03-23 @ 06:30)  RR: 18 (01-03-23 @ 06:30)  SpO2: 100% (01-03-23 @ 06:30)  Wt(kg): --    GENERAL: NAD, well-developed  HEAD:  Atraumatic, Normocephalic  EYES: EOMI, PERRLA, conjunctiva and sclera clear  NECK: Supple, No JVD  CHEST/LUNG: Clear to auscultation bilaterally; No wheeze  HEART: Regular rate and rhythm; No murmurs, rubs, or gallops  ABDOMEN: Soft, Nontender, Nondistended; Bowel sounds present  EXTREMITIES:  2+ Peripheral Pulses, No clubbing, cyanosis, or edema  NEUROLOGY: non-focal  SKIN: No rashes or lesions                          7.5    24.63 )-----------( 627      ( 03 Jan 2023 06:15 )             24.0       01-03    127<L>  |  99  |  9   ----------------------------<  86  3.8   |  17<L>  |  0.72    Ca    9.3      03 Jan 2023 06:15  Phos  1.7     01-03  Mg     1.80     01-03    TPro  6.7  /  Alb  2.7<L>  /  TBili  0.3  /  DBili  x   /  AST  27  /  ALT  28  /  AlkPhos  207<H>  01-03      Magnesium, Serum: 1.80 mg/dL (01-03 @ 06:15)  Phosphorus Level, Serum: 1.7 mg/dL (01-03 @ 06:15)          Clean Catch Clean Catch (Midstream)  01-01 @ 10:08   <10,000 CFU/mL Normal Urogenital Susan  --  --      .Blood Blood-Peripheral  01-01 @ 07:40   No growth to date.  --  --      .Blood Blood-Peripheral  01-01 @ 06:30   No growth to date.  --  --      .Surgical Swab RIGHT PATELLA LESION  11-10 @ 09:28   No growth at 5 days  --    No polymorphonuclear leukocytes per low power field  No organisms seen per oil power field       HPI:  67-year-old male with PMH of metastatic esophageal cancer, hypothyroidism, GERD, HTN who was transferred from [facility] on 1/1/2023 for Orthopedics consult for possible right knee malignancy versus septic arthritis.   He received a biopsy of his right patella on 11/10/22 with pathology positive for squamous cell carcinoma (Dr. Carter). Per son, patient has been experiencing progressively worsening pain, erythema, edema, and warmth at the R knee joint for about 1 month since biopsy. On 12/30/2022, patient became unable to walk 2/2 pain. Previously was able to ambulate with walker and brace on R knee. On 12/31, son notes patient was attempting to walk and had bleeding from the joint that resolved when he elevated the leg and held pressure. No history of septic arthritis or bleeding disorder. Denies any fevers, chills, night sweats, sick contacts, SOB, chest pain, N/V at home. Patient with chronic productive cough and sore throat with hoarse voice. Family brought patient to [facility] ED, where they report that he had an arthroscopy (no records) and IV antibiotics. Coll[facility], who stated patient was did not get an arthroscopy while in OSH ED. He did receive 1 dose of IV Zosyn and Vancomycin 12/31. He was afebrile but labs significant for leukocytosis to 30k.    Of note, patient with recent admission to [facility] from 12/23-12/31 with COVID and superimposed bacterial pneumonia.     Patient was diagnosed with esophageal SCC about 8 years ago and is s/p chemo and radiation. In Nov 2019, he restarted chemotherapy for metastasis to his chest wall (last dose chemo about two and half months ago with his med oncologist). He follows with outpatient oncologist Dr. Gi Thomas at [facility] in [place]. He does not take anything by mouth, with all food and meds via PEG tube. He also received radiation therapy (about 8wks) with a radOnc at [facility] for chest wall mass.      Patient reports 10/10 pain currently at R knee and in throat. He is AOx4 but gives brief answers to questions. At ER, VS 97.2 F, , /56, RR 24/100%. received zosyn and vanco. Then Started Cefepime and Vanc IV;  PEG tube accidentally dislodged in triage, then replaced in ED. Ortho consulted, recommending MRI knee XR of the knee showing anterior soft tissue swelling, Concern for osteomyelitis versus worsening malignancy of the patella.     After admission, pt and family would like to request 2nd opinion at [facility].        PAST MEDICAL & SURGICAL HISTORY:  HTN (hypertension)      GERD (gastroesophageal reflux disease)      Hypothyroidism      Neuropathic pain      Disorder of bone, unspecified      H/O constipation      Esophageal cancer      Lung metastases      S/P percutaneous endoscopic gastrostomy (PEG) tube placement          Allergies    No Known Allergies    Intolerances        MEDICATIONS  (STANDING):  cefepime   IVPB 2000 milliGRAM(s) IV Intermittent every 8 hours  cefepime   IVPB      enoxaparin Injectable 40 milliGRAM(s) SubCutaneous every 24 hours  levothyroxine Injectable 112 MICROGram(s) IV Push <User Schedule>  magnesium oxide 400 milliGRAM(s) Oral two times a day with meals  pantoprazole   Suspension 40 milliGRAM(s) Oral daily  polyethylene glycol 3350 17 Gram(s) Oral daily  vancomycin  IVPB 1000 milliGRAM(s) IV Intermittent every 12 hours    MEDICATIONS  (PRN):  acetaminophen    Suspension .. 650 milliGRAM(s) Oral every 6 hours PRN Temp greater or equal to 38C (100.4F), Mild Pain (1 - 3), Moderate Pain (4 - 6)  albuterol    90 MICROgram(s) HFA Inhaler 2 Puff(s) Inhalation every 6 hours PRN Shortness of Breath and/or Wheezing  gabapentin 300 milliGRAM(s) Oral three times a day PRN neuropathic pain  oxyCODONE    IR 5 milliGRAM(s) Oral every 6 hours PRN Severe Pain (7 - 10)      FAMILY HISTORY:  No pertinent family history in first degree relatives        SOCIAL HISTORY: No EtOH, no tobacco    REVIEW OF SYSTEMS:    See HPI     Height (cm): 170.2 (01-02 @ 17:30)  Weight (kg): 67.1 (01-02 @ 17:30)  BMI (kg/m2): 23.2 (01-02 @ 17:30)  BSA (m2): 1.78 (01-02 @ 17:30)    T(F): 98.2 (01-03-23 @ 06:30), Max: 100 (01-02-23 @ 21:54)  HR: 107 (01-03-23 @ 06:30)  BP: 106/62 (01-03-23 @ 06:30)  RR: 18 (01-03-23 @ 06:30)  SpO2: 100% (01-03-23 @ 06:30)  Wt(kg): --    GENERAL: NAD, well-developed; following instructions;  Responded with simple words   HEAD:  Atraumatic, Normocephalic  EYES: EOMI, PERRLA, conjunctiva and sclera clear  NECK: Supple, No JVD  CHEST/LUNG: Clear to auscultation bilaterally; No wheeze; chemo port on R chest   HEART: Regular rate and rhythm; No murmurs, rubs, or gallops  ABDOMEN: Soft, Nontender, Nondistended; Bowel sounds present; PEG tube in placed.   EXTREMITIES:  2+ Peripheral Pulses, RLEx dressed; moving toes.   NEUROLOGY: non-focal on UEx and Katharine  SKIN: No rashes or lesions                          7.5    24.63 )-----------( 627      ( 03 Jan 2023 06:15 )             24.0       01-03    127<L>  |  99  |  9   ----------------------------<  86  3.8   |  17<L>  |  0.72    Ca    9.3      03 Jan 2023 06:15  Phos  1.7     01-03  Mg     1.80     01-03    TPro  6.7  /  Alb  2.7<L>  /  TBili  0.3  /  DBili  x   /  AST  27  /  ALT  28  /  AlkPhos  207<H>  01-03      Magnesium, Serum: 1.80 mg/dL (01-03 @ 06:15)  Phosphorus Level, Serum: 1.7 mg/dL (01-03 @ 06:15)          Clean Catch Clean Catch (Midstream)  01-01 @ 10:08   <10,000 CFU/mL Normal Urogenital Susan  --  --      .Blood Blood-Peripheral  01-01 @ 07:40   No growth to date.  --  --      .Blood Blood-Peripheral  01-01 @ 06:30   No growth to date.  --  --      .Surgical Swab RIGHT PATELLA LESION  11-10 @ 09:28   No growth at 5 days  --    No polymorphonuclear leukocytes per low power field  No organisms seen per oil power field

## 2023-01-03 NOTE — PROGRESS NOTE ADULT - PROBLEM SELECTOR PLAN 1
- progressively worsening pain, erythema, edema, and warmth at the R knee joint for about 1 month  - recent excision of R patella on 11/10 with SCC on biopsy but unclear margins  - current presentation more consistent with progression of known cancer than septic arthritis  - WBC 30k, afebrile, CK and ESR elevated    Plan  - Pending MRI R knee w/wo contrast to evaluate for spread of cancer  - Appreciate Ortho recs:     - WBAT RLE with knee in extension     - No indications for arthroscopy now due to risk of seeding cancer  - Hold off on ID consult  - Continue IV Cefepime and Vancomycin for empiric coverage, day 2 now   - Monitor vanc level before 4th dose today to avoid nephrotoxicity  - Pain control:      - Tylenol 650 mg q6h for mild to moderate     - Oxycodone-acetaminophen 5-325 mg q6h PRN for severe pain     - c/w bowel regimen to prevent opioid induced constipation  - DVT ppx with lovenox 40 mg SQ daily - progressively worsening pain, erythema, edema, and warmth at the R knee joint for about 1 month  - recent excision of R patella on 11/10 with SCC on biopsy but unclear margins  - current presentation more consistent with progression of known cancer than septic arthritis  - WBC 30k, afebrile, CK and ESR elevated    Plan  - Pending MRI R knee w/wo contrast to evaluate for spread of cancer  - Ortho recommending possible surgical washout of R knee tomorrow       - NPO at midnight, hold lovenox, AM labs orders, risk stratification note written  - Appreciate Ortho recs:     - WBAT RLE with knee in extension     - No indications for arthroscopy now due to risk of seeding cancer  - Hold off on ID consult  - Continue IV Cefepime and Vancomycin for empiric coverage, day 3 now   - Vanc trough on 1/2 before 4th dose 19.5, in therapeutic range  - Pain control:      - Tylenol 650 mg q6h for mild to moderate     - Oxycodone-acetaminophen 5-325 mg q6h PRN for severe pain     - c/w bowel regimen to prevent opioid induced constipation  - DVT ppx with lovenox 40 mg SQ daily

## 2023-01-03 NOTE — PROGRESS NOTE ADULT - ASSESSMENT
67-year-old male with PMH of metastatic esophageal cancer, hypothyroidism, GERD, HTN who was transferred from [facility] for Orthopedics consult for possible right knee malignancy versus septic arthritis.     He received a biopsy of his right patella on 11/10/22 with pathology positive for squamous cell carcinoma (Dr. Carter). Per collateral from son, patient has been experiencing progressively worsening pain, erythema, edema, and warmth at the R knee joint for about 1 month since biopsy. Orthopedics following, recommend continuing IV antibiotics pending MRI knee. Presentation more consistent with known R knee malignancy in the context of stable vital signs and progression over 1 month. Collateral information from [facility] states no arthroscopy performed, will continue to hold off due to risk of seeding cancer.

## 2023-01-03 NOTE — PROGRESS NOTE ADULT - SUBJECTIVE AND OBJECTIVE BOX
Orthopaedic Surgery Progress Note    Subjective:   Patient seen and examined. No acute events overnight. States pain is controlled. Denies fever/chills/chest pain/shortness of breath/numbness/tingling.    Objective:  T(C): 37.8 (23 @ 21:54), Max: 37.8 (23 @ 21:54)  HR: 105 (23 @ 21:54) (104 - 113)  BP: 113/62 (23 @ 21:54) (104/61 - 113/62)  RR: 18 (23 @ 21:54) (18 - 18)  SpO2: 99% (23 @ 21:54) (99% - 100%)  Wt(kg): --     @ 07:01  -   @ 07:00  --------------------------------------------------------  IN: 60 mL / OUT: 400 mL / NET: -340 mL        PHYSICAL EXAM  General: NAD, Awake and Alert, resting comfortably  Resp: Non-labored breathing, No accessory muscle use  RLE:  Skin mainly intact with <2mm punctate opening mid incision with sanguinous drainage  no erythema  knee globally swollen with TTP  deferred knee ROM  SILT L2-S1  palpable pulses  Warm                          8.3    25.42 )-----------( 604      ( 2023 07:45 )             26.5         132<L>  |  100  |  8   ----------------------------<  104<H>  3.8   |  21<L>  |  0.66    Ca    9.9      2023 07:45  Phos  1.3       Mg     1.90         TPro  6.8  /  Alb  3.2<L>  /  TBili  0.4  /  DBili  x   /  AST  26  /  ALT  28  /  AlkPhos  207<H>      PT/INR - ( 2023 07:40 )   PT: 16.6 sec;   INR: 1.43 ratio         PTT - ( 2023 07:40 )  PTT:35.0 sec  Urinalysis Basic - ( 2023 10:08 )    Color: Yellow / Appearance: Clear / S.027 / pH: x  Gluc: x / Ketone: Negative  / Bili: Negative / Urobili: <2 mg/dL   Blood: x / Protein: 30 mg/dL / Nitrite: Negative   Leuk Esterase: Negative / RBC: 5 /HPF / WBC 1 /HPF   Sq Epi: x / Non Sq Epi: 1 /HPF / Bacteria: Negative

## 2023-01-03 NOTE — CONSULT NOTE ADULT - ASSESSMENT
67-year-old male PMH of metastatic esophageal cancer, hypothyroidism, GERD, HTN who was transferred from [facility] on 1/1/2023 for Orthopedics consult for possible right knee malignancy versus septic arthritis. Recent admission to [facility] from 12/23-12/31 with COVID and superimposed bacterial pneumonia. COVID neg on this admission. Oncology consulted since pt and family would like to transfer oncology care to [facility].     #metastatic SCC on R Patella   #esophageal SCC about 8 years ago and is s/p chemo and radiation.   -Nov 2019, he restarted chemotherapy for metastasis to his chest wall (last dose 2 months ago). He follows with outpatient oncologist Dr. Gi Thomas in [place].   -PEG tube for food and med; new PEG placed at ER pending confirmation.   -s/p biopsy of his right patella on 11/10/22 with pathology positive for squamous cell carcinoma (Dr. Carter). Per collateral from son, patient has been experiencing progressively worsening pain, erythema, edema, and warmth at the R knee joint for about 1 month since biopsy. On 12/30/2022, patient became unable to walk 2/2 pain. On 12/31, son notes patient was attempting to walk and had bleeding from the joint that resolved when he elevated the leg and held pressure. No history of septic arthritis or bleeding disorder.  Collateral obtained from JUSTINE Linda at [facility], who stated patient was did not get an arthroscopy while in OSH ED. He did receive 1 dose of IV Zosyn and Vancomycin 12/31. He was afebrile but labs significant for leukocytosis to 30k.    Recommendations   -please request Medical record and image study report from his med onc Dr. Gi Thomas at [place]  -no plan for chemo as inpt; will refer pt to [facility] after d/c, staff may call pt for setting up an appointment.   -palliative care consultation for GOC discussion   -PEG tube use per GI   -C/w IV abx; f/u blood culture and local tissue/fluid culture   -F/u Ortho and MRI right Knee   -leukocytosis and thrombosis likely related to infection; continue monitor CBC with diff;   -check iron/ferritin, B12/folate, haptoglobin, LDH, uric acid    note is not finalized until signed by onc attending  Claritza Pardo PGY5   hem & onc fellow [phone]      67-year-old male PMH of metastatic esophageal cancer, hypothyroidism, GERD, HTN who was transferred from [facility] on 1/1/2023 for Orthopedics consult for possible right knee malignancy versus septic arthritis. Recent admission to [facility] from 12/23-12/31 with COVID and superimposed bacterial pneumonia. COVID neg on this admission. Oncology consulted since pt and family would like to request 2nd opinion for cancer treatment/care to [facility].     #metastatic SCC on R Patella   #esophageal SCC about 8 years ago and is s/p chemo and radiation.   -Nov 2019, he restarted chemotherapy for metastasis to his chest wall (last dose 2 months ago). He follows with outpatient oncologist Dr. Gi Thomas in [place].   -PEG tube for food and med; new PEG placed at ER; started feeding.   -s/p biopsy of his right patella on 11/10/22 with pathology positive for squamous cell carcinoma (Dr. Carter). Per collateral from son, patient has been experiencing progressively worsening pain, erythema, edema, and warmth at the R knee joint for about 1 month since biopsy. On 12/30/2022, patient became unable to walk 2/2 pain. On 12/31, son notes patient was attempting to walk and had bleeding from the joint that resolved when he elevated the leg and held pressure. No history of septic arthritis or bleeding disorder.  Collateral obtained from JUSTINE Linda at [facility], who stated patient was did not get an arthroscopy while in OSH ED. He did receive 1 dose of IV Zosyn and Vancomycin 12/31. He was afebrile but labs significant for leukocytosis to 30k.    Recommendations   -please request Medical record and image study report from his med onc Dr. Gi Thomas at [place]  -no plan for chemo as inpt; will refer pt to [facility] after d/c, staff may call pt for setting up an appointment.   -palliative care consultation for GOC discussion   -PEG tube feeding per GI; nutritional support    -C/w IV abx; f/u blood culture and local tissue/fluid culture   -F/u Ortho and MRI right Knee   -leukocytosis and thrombosis likely related to infection; continue monitor CBC with diff;   -check iron studies/ferritin, B12/folate, haptoglobin, LDH, uric acid    note is not finalized until signed by onc attending  Claritza Pardo PGY5   hem & onc fellow [phone]      67-year-old male PMH of metastatic esophageal cancer, hypothyroidism, GERD, HTN who was transferred from [facility] on 1/1/2023 for Orthopedics consult for possible right knee malignancy versus septic arthritis. Recent admission to [facility] from 12/23-12/31 with COVID and superimposed bacterial pneumonia. COVID neg on this admission. Oncology consulted since pt and family would like to request 2nd opinion for cancer treatment/care to [facility].     #metastatic SCC on R Patella   #esophageal SCC about 8 years ago and is s/p chemo and radiation.   -Nov 2019, he restarted chemotherapy for metastasis to his chest wall (last dose 2 months ago). He follows with outpatient oncologist Dr. Gi Thomas in [place].   -PEG tube for food and med; new PEG placed at ER; started feeding.   -s/p biopsy of his right patella on 11/10/22 with pathology positive for squamous cell carcinoma (Dr. Carter). Per collateral from son, patient has been experiencing progressively worsening pain, erythema, edema, and warmth at the R knee joint for about 1 month since biopsy. On 12/30/2022, patient became unable to walk 2/2 pain. On 12/31, son notes patient was attempting to walk and had bleeding from the joint that resolved when he elevated the leg and held pressure. No history of septic arthritis or bleeding disorder.  Collateral obtained from JUSTINE Linda at [facility], who stated patient was did not get an arthroscopy while in OSH ED. He did receive 1 dose of IV Zosyn and Vancomycin 12/31. He was afebrile but labs significant for leukocytosis to 30k.    Recommendations   -please request Medical record and image study report from his med onc Dr. Gi Thomas at [place]  -no plan for chemo as inpt; will refer pt to [facility] after d/c, staff may call pt for setting up an appointment.   -palliative care consultation for GOC discussion   -PEG tube feeding per GI; nutritional support    -C/w IV abx; f/u blood culture and local tissue/fluid culture   -F/u Ortho and MRI right Knee   -leukocytosis and thrombosis likely related to infection; continue monitor CBC with diff;   -check iron studies/ferritin, B12/folate, haptoglobin, LDH, uric acid    note is not finalized until signed by onc attending  Claritza Pardo PGY5   hem & onc fellow [phone]

## 2023-01-03 NOTE — PROGRESS NOTE ADULT - SUBJECTIVE AND OBJECTIVE BOX
Damaris Aguiar, PGY1  TEAMS or Pager [phone]     Patient is a 67y old  Male who presents with a chief complaint of R knee pain (2023 06:22)      SUBJECTIVE/INTERVAL EVENTS: Patient seen and examined at bedside. Otherwise, denies chest pain, SOB, lightheadedness, nausea, vomiting, constipation, or diarrhea.    MEDICATIONS  (STANDING):  cefepime   IVPB 2000 milliGRAM(s) IV Intermittent every 8 hours  cefepime   IVPB      enoxaparin Injectable 40 milliGRAM(s) SubCutaneous every 24 hours  levothyroxine Injectable 112 MICROGram(s) IV Push <User Schedule>  magnesium oxide 400 milliGRAM(s) Oral two times a day with meals  pantoprazole   Suspension 40 milliGRAM(s) Oral daily  polyethylene glycol 3350 17 Gram(s) Oral daily  vancomycin  IVPB 1000 milliGRAM(s) IV Intermittent every 12 hours    MEDICATIONS  (PRN):  acetaminophen    Suspension .. 650 milliGRAM(s) Oral every 6 hours PRN Temp greater or equal to 38C (100.4F), Mild Pain (1 - 3), Moderate Pain (4 - 6)  albuterol    90 MICROgram(s) HFA Inhaler 2 Puff(s) Inhalation every 6 hours PRN Shortness of Breath and/or Wheezing  gabapentin 300 milliGRAM(s) Oral three times a day PRN neuropathic pain  oxyCODONE    IR 5 milliGRAM(s) Oral every 6 hours PRN Severe Pain (7 - 10)      VITAL SIGNS:  T(F): 98.2 (23 @ 06:30), Max: 100 (23 @ 21:54)  HR: 107 (23 @ 06:30) (104 - 107)  BP: 106/62 (23 @ 06:30) (106/62 - 113/62)  RR: 18 (23 @ 06:30) (18 - 18)  SpO2: 100% (23 @ 06:30) (99% - 100%)    I&O's Summary    Daily Height in cm: 170.18 (2023 17:30)    Daily     PHYSICAL EXAM:  Gen: Alert, NAD  HEENT: NCAT, conjunctiva clear, sclera anicteric, no erythema or exudates in the oropharynx, mmm  Neck: Supple, no JVD  CV: RRR, S1S2, no m/r/g  Resp: CTAB, normal respiratory effort  Abd: Soft, nontender, nondistended, normal bowel sounds  Ext: no edema, no clubbing or cyanosis  Neuro: AOx3, CN2-12 grossly intact, ANDREWS  SKIN: warm, perfused    LABS:                        7.5    24.63 )-----------( 627      ( 2023 06:15 )             24.0     Hgb Trend: 7.5<--, 8.3<--, 8.2<--  01-02    132<L>  |  100  |  8   ----------------------------<  104<H>  3.8   |  21<L>  |  0.66    Ca    9.9      2023 07:45  Phos  1.3       Mg     1.90         TPro  6.8  /  Alb  3.2<L>  /  TBili  0.4  /  DBili  x   /  AST  26  /  ALT  28  /  AlkPhos  207<H>      Creatinine Trend: 0.66<--, 0.75<--  LIVER FUNCTIONS - ( 2023 07:45 )  Alb: 3.2 g/dL / Pro: 6.8 g/dL / ALK PHOS: 207 U/L / ALT: 28 U/L / AST: 26 U/L / GGT: x           PT/INR - ( 2023 07:40 )   PT: 16.6 sec;   INR: 1.43 ratio         PTT - ( 2023 07:40 )  PTT:35.0 sec      Urinalysis Basic - ( 2023 10:08 )    Color: Yellow / Appearance: Clear / S.027 / pH: x  Gluc: x / Ketone: Negative  / Bili: Negative / Urobili: <2 mg/dL   Blood: x / Protein: 30 mg/dL / Nitrite: Negative   Leuk Esterase: Negative / RBC: 5 /HPF / WBC 1 /HPF   Sq Epi: x / Non Sq Epi: 1 /HPF / Bacteria: Negative        CAPILLARY BLOOD GLUCOSE          RADIOLOGY & ADDITIONAL TESTS: Reviewed    Imaging Personally Reviewed:    Consultant(s) Notes Reviewed:      Care Discussed with Consultants/Other Providers:   Damaris Aguiar, PGY1  TEAMS or Pager [phone]     Patient is a 67y old  Male who presents with a chief complaint of R knee pain (2023 06:22)      SUBJECTIVE/INTERVAL EVENTS: NAOE. Patient seen and examined at bedside. Otherwise, denies chest pain, SOB, lightheadedness, nausea, vomiting, constipation, or diarrhea.    MEDICATIONS  (STANDING):  cefepime   IVPB 2000 milliGRAM(s) IV Intermittent every 8 hours  cefepime   IVPB      enoxaparin Injectable 40 milliGRAM(s) SubCutaneous every 24 hours  levothyroxine Injectable 112 MICROGram(s) IV Push <User Schedule>  magnesium oxide 400 milliGRAM(s) Oral two times a day with meals  pantoprazole   Suspension 40 milliGRAM(s) Oral daily  polyethylene glycol 3350 17 Gram(s) Oral daily  vancomycin  IVPB 1000 milliGRAM(s) IV Intermittent every 12 hours    MEDICATIONS  (PRN):  acetaminophen    Suspension .. 650 milliGRAM(s) Oral every 6 hours PRN Temp greater or equal to 38C (100.4F), Mild Pain (1 - 3), Moderate Pain (4 - 6)  albuterol    90 MICROgram(s) HFA Inhaler 2 Puff(s) Inhalation every 6 hours PRN Shortness of Breath and/or Wheezing  gabapentin 300 milliGRAM(s) Oral three times a day PRN neuropathic pain  oxyCODONE    IR 5 milliGRAM(s) Oral every 6 hours PRN Severe Pain (7 - 10)      VITAL SIGNS:  T(F): 98.2 (23 @ 06:30), Max: 100 (23 @ 21:54)  HR: 107 (23 @ 06:30) (104 - 107)  BP: 106/62 (23 @ 06:30) (106/62 - 113/62)  RR: 18 (23 @ 06:30) (18 - 18)  SpO2: 100% (23 @ 06:30) (99% - 100%)    I&O's Summary    Daily Height in cm: 170.18 (2023 17:30)    Daily     PHYSICAL EXAM:  Gen: Alert, NAD  HEENT: NCAT, conjunctiva clear, sclera anicteric, no erythema or exudates in the oropharynx, mmm  Neck: Supple, no JVD  CV: RRR, S1S2, no m/r/g  Resp: CTAB, normal respiratory effort  Abd: Soft, nontender, nondistended, normal bowel sounds  Ext: no edema, no clubbing or cyanosis  Neuro: AOx3, CN2-12 grossly intact, ANDREWS  SKIN: warm, perfused    LABS:                        7.5    24.63 )-----------( 627      ( 2023 06:15 )             24.0     Hgb Trend: 7.5<--, 8.3<--, 8.2<--  02    132<L>  |  100  |  8   ----------------------------<  104<H>  3.8   |  21<L>  |  0.66    Ca    9.9      2023 07:45  Phos  1.3       Mg     1.90         TPro  6.8  /  Alb  3.2<L>  /  TBili  0.4  /  DBili  x   /  AST  26  /  ALT  28  /  AlkPhos  207<H>      Creatinine Trend: 0.66<--, 0.75<--  LIVER FUNCTIONS - ( 2023 07:45 )  Alb: 3.2 g/dL / Pro: 6.8 g/dL / ALK PHOS: 207 U/L / ALT: 28 U/L / AST: 26 U/L / GGT: x           PT/INR - ( 2023 07:40 )   PT: 16.6 sec;   INR: 1.43 ratio         PTT - ( 2023 07:40 )  PTT:35.0 sec      Urinalysis Basic - ( 2023 10:08 )    Color: Yellow / Appearance: Clear / S.027 / pH: x  Gluc: x / Ketone: Negative  / Bili: Negative / Urobili: <2 mg/dL   Blood: x / Protein: 30 mg/dL / Nitrite: Negative   Leuk Esterase: Negative / RBC: 5 /HPF / WBC 1 /HPF   Sq Epi: x / Non Sq Epi: 1 /HPF / Bacteria: Negative        CAPILLARY BLOOD GLUCOSE          RADIOLOGY & ADDITIONAL TESTS: Reviewed    Imaging Personally Reviewed:    Consultant(s) Notes Reviewed:      Care Discussed with Consultants/Other Providers:    __________________    Risk stratification:    67-year-old male with PMH of metastatic esophageal SCC, hypothyroidism, GERD, HTN who was transferred from [facility] for Orthopedics consult for possible right knee malignancy versus septic arthritis.     Diagnosis: R knee pain 2/2 progression of known SCC in knee vs septic arthritis  Planned procedure: R knee surgical washout  Any present symptoms: R knee pain, unable to ambulate 2/2 pain, chronic productive cough, NPO with PEG due to hx esophageal cancer    Patient seen and examined today. Plan discussed and questions answered. Notable interval events reviewed    The patient is not undergoing an emergent or elective surgery.   He has no recent cardiac events including MI, CAD, or CHF. No known history of bleeding disorder. Not currently on insulin therapy.  RCRI Class I Risk (minimal risk of adverse cardiac event)    Medically optimized for planned procedure. No contraindication.   Damaris Aguiar, PGY1  TEAMS or Pager [phone]     Patient is a 67y old  Male who presents with a chief complaint of R knee pain (2023 06:22)    SUBJECTIVE/INTERVAL EVENTS: Overnight, PEG tube replaced by GI and XR confirmed placement so meds/feeds resumed. Patient seen and examined at bedside. He reports 10/10 pain this morning at his R knee exacerbated by movement. He reports PRN percocet by G-tube does make pain tolerable but wears off after 2-3 hours. He had a normal BM yesterday. Otherwise, denies chest pain, SOB, lightheadedness, nausea, vomiting, constipation, or diarrhea.    MEDICATIONS  (STANDING):  cefepime   IVPB 2000 milliGRAM(s) IV Intermittent every 8 hours  cefepime   IVPB      enoxaparin Injectable 40 milliGRAM(s) SubCutaneous every 24 hours  levothyroxine Injectable 112 MICROGram(s) IV Push <User Schedule>  magnesium oxide 400 milliGRAM(s) Oral two times a day with meals  pantoprazole   Suspension 40 milliGRAM(s) Oral daily  polyethylene glycol 3350 17 Gram(s) Oral daily  vancomycin  IVPB 1000 milliGRAM(s) IV Intermittent every 12 hours    MEDICATIONS  (PRN):  acetaminophen    Suspension .. 650 milliGRAM(s) Oral every 6 hours PRN Temp greater or equal to 38C (100.4F), Mild Pain (1 - 3), Moderate Pain (4 - 6)  albuterol    90 MICROgram(s) HFA Inhaler 2 Puff(s) Inhalation every 6 hours PRN Shortness of Breath and/or Wheezing  gabapentin 300 milliGRAM(s) Oral three times a day PRN neuropathic pain  oxyCODONE    IR 5 milliGRAM(s) Oral every 6 hours PRN Severe Pain (7 - 10)      VITAL SIGNS:  T(F): 98.2 (23 @ 06:30), Max: 100 (23 @ 21:54)  HR: 107 (23 @ 06:30) (104 - 107)  BP: 106/62 (23 @ 06:30) (106/62 - 113/62)  RR: 18 (23 @ 06:30) (18 - 18)  SpO2: 100% (23 @ 06:30) (99% - 100%)    I&O's Summary    Daily Height in cm: 170.18 (2023 17:30)    Daily     PHYSICAL EXAM:  Gen: Alert, NAD  HEENT: NCAT, conjunctiva clear, sclera anicteric, no erythema or exudates in the oropharynx, mmm  Neck: Supple, no JVD  CV: RRR, S1S2, no m/r/g  Resp: CTAB, normal respiratory effort  Abd: Soft, nontender, nondistended, normal bowel sounds  Ext: no edema, no clubbing or cyanosis  - R knee: wrapped and in immobilizer (slipped down below knee, attempted to adjust), 2+ PT pulse, painful with movement of leg or palpation of knee  Neuro: AOx3, CN2-12 grossly intact, ANDREWS  SKIN: warm, perfused    LABS:                        7.5    24.63 )-----------( 627      ( 2023 06:15 )             24.0     Hgb Trend: 7.5<--, 8.3<--, 8.2<--      132<L>  |  100  |  8   ----------------------------<  104<H>  3.8   |  21<L>  |  0.66    Ca    9.9      2023 07:45  Phos  1.3       Mg     1.90         TPro  6.8  /  Alb  3.2<L>  /  TBili  0.4  /  DBili  x   /  AST  26  /  ALT  28  /  AlkPhos  207<H>      Creatinine Trend: 0.66<--, 0.75<--  LIVER FUNCTIONS - ( 2023 07:45 )  Alb: 3.2 g/dL / Pro: 6.8 g/dL / ALK PHOS: 207 U/L / ALT: 28 U/L / AST: 26 U/L / GGT: x           PT/INR - ( 2023 07:40 )   PT: 16.6 sec;   INR: 1.43 ratio         PTT - ( 2023 07:40 )  PTT:35.0 sec      Urinalysis Basic - ( 2023 10:08 )    Color: Yellow / Appearance: Clear / S.027 / pH: x  Gluc: x / Ketone: Negative  / Bili: Negative / Urobili: <2 mg/dL   Blood: x / Protein: 30 mg/dL / Nitrite: Negative   Leuk Esterase: Negative / RBC: 5 /HPF / WBC 1 /HPF   Sq Epi: x / Non Sq Epi: 1 /HPF / Bacteria: Negative        CAPILLARY BLOOD GLUCOSE          RADIOLOGY & ADDITIONAL TESTS: Reviewed    Imaging Personally Reviewed:    Consultant(s) Notes Reviewed:      Care Discussed with Consultants/Other Providers:    __________________    Risk stratification:    67-year-old male with PMH of metastatic esophageal SCC, hypothyroidism, GERD, HTN who was transferred from [facility] for Orthopedics consult for possible right knee malignancy versus septic arthritis.     Diagnosis: R knee pain 2/2 progression of known SCC in knee vs septic arthritis  Planned procedure: R knee surgical washout  Any present symptoms: R knee pain, unable to ambulate 2/2 pain, chronic productive cough, NPO with PEG due to hx esophageal cancer    Patient seen and examined today. Plan discussed and questions answered. Notable interval events reviewed    The patient is not undergoing an emergent or elective surgery.   He has no recent cardiac events including MI, CAD, or CHF. No known history of bleeding disorder. Not currently on insulin therapy.  RCRI Class I Risk (minimal risk of adverse cardiac event)    Medically optimized for planned procedure. No contraindication.

## 2023-01-03 NOTE — PROGRESS NOTE ADULT - NUTRITIONAL ASSESSMENT
This patient has been assessed with a concern for Malnutrition and has been determined to have a diagnosis/diagnoses of Severe protein-calorie malnutrition.    This patient is being managed with:   Diet NPO with Tube Feed-  Tube Feeding Modality: Gastrostomy  TwoCal HN (TWOCALHNRTH)  Total Volume for 24 Hours (mL): 1080  Continuous  Starting Tube Feed Rate {mL per Hour}: 25  Increase Tube Feed Rate by (mL): 10     Every 4 hours  Until Goal Tube Feed Rate (mL per Hour): 45  Tube Feed Duration (in Hours): 24  Tube Feed Start Time: 16:00  Entered: Jan 2 2023  4:39PM

## 2023-01-03 NOTE — CHART NOTE - NSCHARTNOTEFT_GEN_A_CORE
I note the read of this patient's abdominal xray that indicates that some contrast was noted outside of the gastrointestinal tract. Of note, when I administered contrast immediately prior to this patient's scan, some contrast spilled onto his abdomen. Thus, I suspect that the identified contrast is extracorporeal. Additionally, I note that contrast is entering directly into the patient's stomach, and the patient does not have any abdominal pain or rigidity, and he has not had fevers. Thus, I strongly suspect that the patient's peg tube is placed in his stomach, and it is highly unlikely that there is any reason for contrast to have extravasated into the patient's abdomen.

## 2023-01-04 NOTE — PROGRESS NOTE ADULT - SUBJECTIVE AND OBJECTIVE BOX
Orthopaedic Surgery Progress Note    Subjective:   Patient seen and examined. No acute events overnight. Continues to endorse R knee pain.    Objective:  Vital Signs Last 24 Hrs  T(C): 36.9 (04 Jan 2023 06:17), Max: 37.1 (03 Jan 2023 21:44)  T(F): 98.5 (04 Jan 2023 06:17), Max: 98.7 (03 Jan 2023 21:44)  HR: 116 (04 Jan 2023 06:17) (110 - 116)  BP: 118/69 (04 Jan 2023 06:17) (117/64 - 120/65)  RR: 17 (04 Jan 2023 06:17) (17 - 18)  SpO2: 99% (04 Jan 2023 06:17) (99% - 100%)  Parameters below as of 04 Jan 2023 06:17  Patient On (Oxygen Delivery Method): room air      PHYSICAL EXAM  General: NAD, Awake and Alert, resting comfortably  Resp: Non-labored breathing  RLE:  BJKI c/d/i  Motor FHL/EHL/TA/GSC intact  Sensory DP/SP/Rosio/Saph/Tib SILT  Palpable DP pulse  WWP    LABS                        7.5    24.63 )-----------( 627      ( 03 Jan 2023 06:15 )             24.0     01-03    127<L>  |  99  |  9   ----------------------------<  86  3.8   |  17<L>  |  0.72    Ca    9.3      03 Jan 2023 06:15  Phos  1.7     01-03  Mg     1.80     01-03    TPro  6.7  /  Alb  2.7<L>  /  TBili  0.3  /  DBili  x   /  AST  27  /  ALT  28  /  AlkPhos  207<H>  01-03

## 2023-01-04 NOTE — DISCHARGE NOTE PROVIDER - NSCORESITESY/N_GEN_A_CORE_RD
Date: 4/15/2022    Time of Call: 2:10 PM     [ TORB ] Ordering provider: Kori NIETO and CVTS WILIAN  Order: Keep old chest tube sites and the lower aspect of pt's sternal wound open to air and dry, as much as possible.  Pt will need to see CVTS for removal of suture material from sternal wound next Tuesday.  Pt to remain on Keflex until appt.     Order received by: writer     Follow-up/additional notes: Called pt/caregiver to inform them of the above instructions and plans.  Scheduling to call pt to confirm appt time on Tuesday.               No

## 2023-01-04 NOTE — DISCHARGE NOTE PROVIDER - CARE PROVIDER_API CALL
PCP,   Phone: (   )    -  Fax: (   )    -  Follow Up Time:     Lane Carter)  Orthopaedic Surgery  [address], [address]  [place] [address]  Phone: [phone]  Fax: [phone]  Follow Up Time:

## 2023-01-04 NOTE — DISCHARGE NOTE PROVIDER - DETAILS OF MALNUTRITION DIAGNOSIS/DIAGNOSES
This patient has been assessed with a concern for Malnutrition and was treated during this hospitalization for the following Nutrition diagnosis/diagnoses:     -  01/02/2023: Severe protein-calorie malnutrition

## 2023-01-04 NOTE — PROGRESS NOTE ADULT - NUTRITIONAL ASSESSMENT
This patient has been assessed with a concern for Malnutrition and has been determined to have a diagnosis/diagnoses of Severe protein-calorie malnutrition.    This patient is being managed with:   Diet NPO after Midnight-     NPO Start Date: 03-Jan-2023   NPO Start Time: 23:59  Entered: Alexander  3 2023  2:09PM    Diet NPO with Tube Feed-  Tube Feeding Modality: Gastrostomy  TwoCal HN (TWOCALHNRTH)  Total Volume for 24 Hours (mL): 1080  Continuous  Starting Tube Feed Rate {mL per Hour}: 25  Increase Tube Feed Rate by (mL): 10     Every 4 hours  Until Goal Tube Feed Rate (mL per Hour): 45  Tube Feed Duration (in Hours): 24  Tube Feed Start Time: 16:00  Entered: Jan 2 2023  4:39PM

## 2023-01-04 NOTE — DISCHARGE NOTE PROVIDER - HOSPITAL COURSE
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 He received a biopsy of his right patella on 11/10/22 with pathology positive for squamous cell carcinoma (Dr. Carter). Per collateral from son, patient has been experiencing progressively worsening pain, erythema, edema, and warmth at the R knee joint for about 1 month since biopsy. On 12/30, patient became unable to walk 2/2 pain. Previously was able to ambulate with walker and brace on R knee. On 12/31, son notes patient was attempting to walk and had bleeding from the joint that resolved when he elevated the leg and held pressure. No history of septic arthritis or bleeding disorder. Denies any fevers, chills, night sweats, sick contacts, SOB, chest pain, N/V at home. Patient with chronic productive cough and sore throat with hoarse voice. Family brought patient to [facility] ([facility]) ED, where they report that he had an arthroscopy (no records) and IV antibiotics. Collateral obtained from JUSTINE Linda at [facility], who stated patient was did not get an arthroscopy while in their ED yesterday. He did receive 1 dose of IV Zosyn and Vancomycin on 12/31. He was afebrile but labs significant for leukocytosis to 30k.    Of note, patient with recent admission to [facility] from 12/23-12/31 with COVID and superimposed bacterial pneumonia. Patient was diagnosed with esophageal SCC about 8 years ago and is s/p chemo and radiation. In Nov 2019, he restarted chemotherapy for metastasis to his chest wall (last dose 2 months ago). He follows with outpatient oncologist Dr. Gi Thomas in and meds via PEG tube. Patient reports 10/10 pain currently at R knee and in throat. He is AOx4 but gives brief answers to questions.    ED Course:  VS 97.2 F, , /56, RR 24/100%  Given Zosyn, Vanc, LR x 2 L  PEG tube accidentally dislodged in triage, then replaced in ED  Ortho consulted, recommending MRI knee. XR of the knee showing anterior soft tissue swelling  Concern for septic arthritis versus worsening malignancy of the patella    Hospital Course:   For full details, please see H&P, progress notes, consult notes and ancillary notes. The patient's hospital course will be summarized in a problem based format.    # Right knee pain.   Patient presented with progressively worsening pain, erythema, edema, and warmth at the R knee joint for about 1 month. Patient had a arthroscopy on 12/25 with fluid culture showing no growth. Given negative culture, WBCs at baseline for last month, and lack of fevers, his current presentation is more consistent with progression of known cancer than septic arthritis. He had an XR of his R knee that showed patellar fragmentation and     - Pending MRI R knee w/wo contrast to evaluate for spread of cancer  - Ortho recommending possible surgical washout of R knee after MRI completed     - unlikely to be completed today per ortho, will resume feeds  - Appreciate Ortho recs:     - WBAT RLE with knee in extension     - No indications for arthroscopy now due to risk of seeding cancer  - Continue IV Cefepime and Vancomycin for empiric coverage, day 4 now      - Vanc trough on 1/2 before 4th dose 19.5, in therapeutic range  - Pain control:      - Tylenol 650 mg q6h for mild pain     - Oxycodone 5 mg q6h PRN for moderate pain     - Oxycodone 10 mg q6h PRN for severe pain        - c/w bowel regimen to prevent opioid induced constipation (miralax daily)  - DVT ppx with lovenox 40 mg SQ daily.    # Hyponatremia.   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    # Primary esophageal squamous cell carcinoma.   - diagnosed in 2015  - follows with Dr. Gi Thomas ([place])  - complicated by dysphagia with all meds/nutrition via PEG  - CXR with near complete opacification of L lung, stable from previous imaging and likely represents pleural metastases  - Will contact outpatient oncologist to understand current regimen and obtain records  - Family interested in transition to [facility], spoke with Oncology on 1/3  - Continue goals of care conversation with family  - C/w pantoprazole 40 mg daily, miralax PRN daily, gabapentin 300 mg TID PRN for pain, MgOH and KCl for supplementation  - C/w tube feeds, strict NPO.    # Hypothyroidism.   - takes synthroid 150 mcg PO daily (home med)  - c/w synthroid 112 mcg IV daily    On day of discharge, patient is clinically stable with no new exam findings or acute symptoms compared to prior. The patient was seen by the attending physician on the date of discharge and deemed stable and acceptable for discharge. The patient's chronic medical conditions were treated accordingly per the patient's home medication regimen. The patient's medication reconciliation (with changes made to chronic medications), follow up appointments, discharge orders, instructions, and significant lab and diagnostic studies are as noted.     Discharge follow up action items:     1. Follow up with PCP in 1-2 weeks.   2. Follow up labs, path, & imaging ***  3. Medication changes ***  4. On hold medications ***    Patient's ordered code status: FULL code    Patient disposition: JOHN PAUL? Discharge diagnoses:   R knee pain 2/2 squamous cell carcinoma    HPI:  67-year-old male with PMH of metastatic esophageal cancer, hypothyroidism, GERD, HTN who was transferred from [facility] for Orthopedics consult for possible right knee malignancy versus septic arthritis. He received a biopsy of his right patella on 11/10/22 with pathology positive for squamous cell carcinoma (Dr. Carter). Per collateral from son, patient has been experiencing progressively worsening pain, erythema, edema, and warmth at the R knee joint for about 1 month since biopsy. On 12/30, patient became unable to walk 2/2 pain. Previously was able to ambulate with walker and brace on R knee. On 12/31, son notes patient was attempting to walk and had bleeding from the joint that resolved when he elevated the leg and held pressure. No history of septic arthritis or bleeding disorder. Denies any fevers, chills, night sweats, sick contacts, SOB, chest pain, N/V at home. Patient with chronic productive cough and sore throat with hoarse voice. Family brought patient to [facility] ([facility]) ED, where they report that he had an arthroscopy (no records) and IV antibiotics. Collateral obtained from JUSTINE Linda at [facility], who stated patient was did not get an arthroscopy while in their ED yesterday. He did receive 1 dose of IV Zosyn and Vancomycin on 12/31. He was afebrile but labs significant for leukocytosis to 30k.    Of note, patient with recent admission to [facility] from 12/23-12/31 with COVID and superimposed bacterial pneumonia. Patient was diagnosed with esophageal SCC about 8 years ago and is s/p chemo and radiation. In Nov 2019, he restarted chemotherapy for metastasis to his chest wall (last dose 2 months ago). He follows with outpatient oncologist Dr. Gi Thomas in and meds via PEG tube. Patient reports 10/10 pain currently at R knee and in throat. He is AOx4 but gives brief answers to questions.    ED Course:  VS 97.2 F, , /56, RR 24/100%  Given Zosyn, Vanc, LR x 2 L  PEG tube accidentally dislodged in triage, then replaced in ED  Ortho consulted, recommending MRI knee. XR of the knee showing anterior soft tissue swelling  Concern for septic arthritis versus worsening malignancy of the patella    Hospital Course:   For full details, please see H&P, progress notes, consult notes and ancillary notes. The patient's hospital course will be summarized in a problem based format.    # Right knee pain.   Patient presented with progressively worsening pain, erythema, edema, and warmth at the R knee joint for about 1 month. Patient had a arthroscopy on 12/25 with fluid culture showing no growth. Given negative culture, WBCs at baseline for last month, and lack of fevers, his current presentation is more consistent with progression of known cancer than septic arthritis. He had an XR of his R knee that showed patellar fragmentation and     - Pending MRI R knee w/wo contrast to evaluate for spread of cancer  - Ortho recommending possible surgical washout of R knee after MRI completed     - unlikely to be completed today per ortho, will resume feeds  - Appreciate Ortho recs:     - WBAT RLE with knee in extension     - No indications for arthroscopy now due to risk of seeding cancer  - Continue IV Cefepime and Vancomycin for empiric coverage, day 4 now      - Vanc trough on 1/2 before 4th dose 19.5, in therapeutic range  - Pain control:      - Tylenol 650 mg q6h for mild pain     - Oxycodone 5 mg q6h PRN for moderate pain     - Oxycodone 10 mg q6h PRN for severe pain        - c/w bowel regimen to prevent opioid induced constipation (miralax daily)  - DVT ppx with lovenox 40 mg SQ daily.    # Hyponatremia.   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    # Primary esophageal squamous cell carcinoma.   - diagnosed in 2015  - follows with Dr. Gi Thomas ([place])  - complicated by dysphagia with all meds/nutrition via PEG  - CXR with near complete opacification of L lung, stable from previous imaging and likely represents pleural metastases  - Will contact outpatient oncologist to understand current regimen and obtain records  - Family interested in transition to [facility], spoke with Oncology on 1/3  - Continue goals of care conversation with family  - C/w pantoprazole 40 mg daily, miralax PRN daily, gabapentin 300 mg TID PRN for pain, MgOH and KCl for supplementation  - C/w tube feeds, strict NPO.    # Hypothyroidism.   - takes synthroid 150 mcg PO daily (home med)  - c/w synthroid 112 mcg IV daily    #hypophosphatemia  Unclear etiology at this time. Patient was noted to have persistently low phosphorus requiring supplementation. At first, this was thought to be refeeding syndrome but seemed inconsistent once patient was on feeds for two weeks. Magnesium supplementation and protonix were d/marion as it was thought that these medications were interfering with gut absorption of phosphorus but patient still had low phosphate levels. PTH and Vitamin D25 were at the low end of normal and patient urine studies were noted to have an elevated FePO4 indicating increased renal excretion of phosphorus  - Patient will continue with phosphorus packets outpatient  - Patient will follow up other labs regarding RTA work up, paraproteinemia given gamma gap and parathyroid-related peptide for paraneoplastic syndrome    On day of discharge, patient is clinically stable with no new exam findings or acute symptoms compared to prior. The patient was seen by the attending physician on the date of discharge and deemed stable and acceptable for discharge. The patient's chronic medical conditions were treated accordingly per the patient's home medication regimen. The patient's medication reconciliation (with changes made to chronic medications), follow up appointments, discharge orders, instructions, and significant lab and diagnostic studies are as noted.     Discharge follow up action items:     1. Follow up with PCP in 1-2 weeks.   2. Follow up labs, path, & imaging ***  3. Medication changes ***  4. On hold medications ***    Patient's ordered code status: FULL code    Patient disposition: JOHN PAUL? Discharge diagnoses:   R knee pain 2/2 squamous cell carcinoma    HPI:  67-year-old male with PMH of metastatic esophageal cancer, hypothyroidism, GERD, HTN who was transferred from [facility] for Orthopedics consult for possible right knee malignancy versus septic arthritis. He received a biopsy of his right patella on 11/10/22 with pathology positive for squamous cell carcinoma (Dr. Carter). Per collateral from son, patient has been experiencing progressively worsening pain, erythema, edema, and warmth at the R knee joint for about 1 month since biopsy. On 12/30, patient became unable to walk 2/2 pain. Previously was able to ambulate with walker and brace on R knee. On 12/31, son notes patient was attempting to walk and had bleeding from the joint that resolved when he elevated the leg and held pressure. No history of septic arthritis or bleeding disorder. Denies any fevers, chills, night sweats, sick contacts, SOB, chest pain, N/V at home. Patient with chronic productive cough and sore throat with hoarse voice. Family brought patient to [facility] ([facility]) ED, where they report that he had an arthroscopy (no records) and IV antibiotics. Collateral obtained from JUSTINE Linda at [facility], who stated patient was did not get an arthroscopy while in their ED yesterday. He did receive 1 dose of IV Zosyn and Vancomycin on 12/31. He was afebrile but labs significant for leukocytosis to 30k.    Of note, patient with recent admission to [facility] from 12/23-12/31 with COVID and superimposed bacterial pneumonia. Patient was diagnosed with esophageal SCC about 8 years ago and is s/p chemo and radiation. In Nov 2019, he restarted chemotherapy for metastasis to his chest wall (last dose 2 months ago). He follows with outpatient oncologist Dr. Gi Thomas in and meds via PEG tube. Patient reports 10/10 pain currently at R knee and in throat. He is AOx4 but gives brief answers to questions.    ED Course:  VS 97.2 F, , /56, RR 24/100%  Given Zosyn, Vanc, LR x 2 L  PEG tube accidentally dislodged in triage, then replaced in ED  Ortho consulted, recommending MRI knee. XR of the knee showing anterior soft tissue swelling  Concern for septic arthritis versus worsening malignancy of the patella    Hospital Course:   For full details, please see H&P, progress notes, consult notes and ancillary notes. The patient's hospital course will be summarized in a problem based format.    # Right knee pain.   Patient presented with progressively worsening pain, erythema, edema, and warmth at the R knee joint for about 1 month. Patient had a arthroscopy on 12/25 with fluid culture showing no growth. Given negative culture, WBCs at baseline for last month, and lack of fevers, his current presentation is more consistent with progression of known cancer than septic arthritis. He had an XR of his R knee that showed patellar fragmentation and     - MRI R knee w/wo contrast:  pathologic right patellar fracture  - Appreciate Ortho recs:     - WBAT RLE with knee in extension     - No indications for arthroscopy now due to risk of seeding cancer     - rad onc consulted - NTD inpatient  - Pain control:      - Tylenol 650 mg q6h for mild pain     - Oxycodone 10 mg q4h PRN for severe pain        - c/w bowel regimen to prevent opioid induced constipation (miralax daily)    # Primary esophageal squamous cell carcinoma.   - diagnosed in 2015  - follows with Dr. Gi Thomas ([place])  - complicated by dysphagia with all meds/nutrition via PEG  - CXR with near complete opacification of L lung, stable from previous imaging and likely represents pleural metastases  - C/w pantoprazole 40 mg daily, miralax PRN daily, gabapentin 300 mg TID PRN for pain, MgOH and KCl for supplementation  - C/w tube feeds, strict NPO.    #Fevers  BCx x 3 negative, RVP, CXR negative. Infectious disease consulted - not recommending anymore antibiotics at this time. fevers, likely 2/2 malignancy, continue to monitor.    #HAP  s/p treatment for enterobacter HAP seen on CT angio with cefepime (1/12-1/16).    # Hypothyroidism.   - takes synthroid 150 mcg PO daily (home med)  - c/w synthroid 112 mcg IV daily    #hypophosphatemia  Unclear etiology at this time. Patient was noted to have persistently low phosphorus requiring supplementation. At first, this was thought to be refeeding syndrome but seemed inconsistent once patient was on feeds for two weeks. Magnesium supplementation and protonix were d/marion as it was thought that these medications were interfering with gut absorption of phosphorus but patient still had low phosphate levels. PTH and Vitamin D25 were at the low end of normal and patient urine studies were noted to have an elevated FePO4 indicating increased renal excretion of phosphorus  - Patient will continue with phosphorus packets outpatient  - Patient will follow up other labs regarding RTA work up, paraproteinemia given gamma gap and parathyroid-related peptide for paraneoplastic syndrome    On day of discharge, patient is clinically stable with no new exam findings or acute symptoms compared to prior. The patient was seen by the attending physician on the date of discharge and deemed stable and acceptable for discharge. The patient's chronic medical conditions were treated accordingly per the patient's home medication regimen. The patient's medication reconciliation (with changes made to chronic medications), follow up appointments, discharge orders, instructions, and significant lab and diagnostic studies are as noted.     Discharge follow up action items:     1. Follow up with PCP, oncologist, radiation oncology in 1-2 weeks.     Patient's ordered code status: FULL code    Patient disposition: home w/ home PT   Discharge diagnoses:   R knee pain 2/2 squamous cell carcinoma    HPI:  67-year-old male with PMH of metastatic esophageal cancer, hypothyroidism, GERD, HTN who was transferred from [facility] for Orthopedics consult for possible right knee malignancy versus septic arthritis. He received a biopsy of his right patella on 11/10/22 with pathology positive for squamous cell carcinoma (Dr. Carter). Per collateral from son, patient has been experiencing progressively worsening pain, erythema, edema, and warmth at the R knee joint for about 1 month since biopsy. On 12/30, patient became unable to walk 2/2 pain. Previously was able to ambulate with walker and brace on R knee. On 12/31, son notes patient was attempting to walk and had bleeding from the joint that resolved when he elevated the leg and held pressure. No history of septic arthritis or bleeding disorder. Denies any fevers, chills, night sweats, sick contacts, SOB, chest pain, N/V at home. Patient with chronic productive cough and sore throat with hoarse voice. Family brought patient to [facility] ([facility]) ED, where they report that he had an arthroscopy (no records) and IV antibiotics. Collateral obtained from JUSTINE Linda at [facility], who stated patient was did not get an arthroscopy while in their ED yesterday. He did receive 1 dose of IV Zosyn and Vancomycin on 12/31. He was afebrile but labs significant for leukocytosis to 30k.    Of note, patient with recent admission to [facility] from 12/23-12/31 with COVID and superimposed bacterial pneumonia. Patient was diagnosed with esophageal SCC about 8 years ago and is s/p chemo and radiation. In Nov 2019, he restarted chemotherapy for metastasis to his chest wall (last dose 2 months ago). He follows with outpatient oncologist Dr. Gi Thomas in and meds via PEG tube. Patient reports 10/10 pain currently at R knee and in throat. He is AOx4 but gives brief answers to questions.    ED Course:  VS 97.2 F, , /56, RR 24/100%  Given Zosyn, Vanc, LR x 2 L  PEG tube accidentally dislodged in triage, then replaced in ED  Ortho consulted, recommending MRI knee. XR of the knee showing anterior soft tissue swelling  Concern for septic arthritis versus worsening malignancy of the patella    Hospital Course:   For full details, please see H&P, progress notes, consult notes and ancillary notes. The patient's hospital course will be summarized in a problem based format.    # Right knee pain.   Patient presented with progressively worsening pain, erythema, edema, and warmth at the R knee joint for about 1 month. Patient had a arthroscopy on 12/25 with fluid culture showing no growth. Given negative culture, WBCs at baseline for last month, and lack of fevers, his current presentation is more consistent with progression of known cancer than septic arthritis.   - MRI R knee w/wo contrast:  pathologic right patellar fracture  - S/p patellectomy, quadricepsplasty and synovectomy on 1/7 with Orthopedics      - WBAT RLE with knee in extension     - rad onc consulted - NTD inpatient  - Pain control:      - Tylenol 650 mg q6h for mild pain     - Oxycodone 10 mg q4h PRN for severe pain        - c/w bowel regimen to prevent opioid induced constipation (miralax daily)    # Primary esophageal squamous cell carcinoma.   - diagnosed in 2015  - follows with Dr. Gi Thomas ([place])  - complicated by dysphagia with all meds/nutrition via PEG  - CXR with near complete opacification of L lung, stable from previous imaging and likely represents pleural metastases  - C/w pantoprazole 40 mg daily, miralax PRN daily, gabapentin 300 mg TID PRN for pain, MgOH and KCl for supplementation  - C/w tube feeds, strict NPO.    #Fevers  BCx x 3 negative, RVP, CXR negative. Infectious disease consulted - not recommending anymore antibiotics at this time. fevers, likely 2/2 malignancy, continue to monitor.    #HAP  s/p treatment for enterobacter HAP seen on CT angio with cefepime (1/12-1/16).    # Hypothyroidism.   - takes synthroid 150 mcg PO daily (home med)  - c/w synthroid 112 mcg IV daily    #hypophosphatemia  Unclear etiology at this time. Patient was noted to have persistently low phosphorus requiring supplementation. At first, this was thought to be refeeding syndrome but seemed inconsistent once patient was on feeds for two weeks. Magnesium supplementation and protonix were d/marion as it was thought that these medications were interfering with gut absorption of phosphorus but patient still had low phosphate levels. PTH and Vitamin D25 were at the low end of normal and patient urine studies were noted to have an elevated FePO4 indicating increased renal excretion of phosphorus  - Patient will continue with phosphorus packets outpatient  - Patient will follow up other labs regarding RTA work up, paraproteinemia given gamma gap and parathyroid-related peptide for paraneoplastic syndrome    On day of discharge, patient is clinically stable with no new exam findings or acute symptoms compared to prior. The patient was seen by the attending physician on the date of discharge and deemed stable and acceptable for discharge. The patient's chronic medical conditions were treated accordingly per the patient's home medication regimen. The patient's medication reconciliation (with changes made to chronic medications), follow up appointments, discharge orders, instructions, and significant lab and diagnostic studies are as noted.     Discharge follow up action items:     1. Follow up with PCP, oncologist, radiation oncology in 1-2 weeks.     Patient's ordered code status: FULL code    Patient disposition: home w/ home PT

## 2023-01-04 NOTE — PROGRESS NOTE ADULT - SUBJECTIVE AND OBJECTIVE BOX
Damaris Aguiar, PGY1  TEAMS or Pager [phone]     Patient is a 67y old  Male who presents with a chief complaint of R knee pain (04 Jan 2023 07:02)    SUBJECTIVE/INTERVAL EVENTS: Patient seen and examined at bedside. Otherwise, denies chest pain, SOB, lightheadedness, nausea, vomiting, constipation, or diarrhea.    MEDICATIONS  (STANDING):  cefepime   IVPB 2000 milliGRAM(s) IV Intermittent every 8 hours  cefepime   IVPB      levothyroxine Injectable 112 MICROGram(s) IV Push <User Schedule>  magnesium oxide 400 milliGRAM(s) Oral two times a day with meals  pantoprazole   Suspension 40 milliGRAM(s) Oral daily  polyethylene glycol 3350 17 Gram(s) Oral daily  vancomycin  IVPB 1000 milliGRAM(s) IV Intermittent every 12 hours    MEDICATIONS  (PRN):  acetaminophen    Suspension .. 650 milliGRAM(s) Oral every 6 hours PRN Temp greater or equal to 38C (100.4F), Mild Pain (1 - 3)  albuterol    90 MICROgram(s) HFA Inhaler 2 Puff(s) Inhalation every 6 hours PRN Shortness of Breath and/or Wheezing  gabapentin 300 milliGRAM(s) Oral three times a day PRN neuropathic pain  oxyCODONE    IR 5 milliGRAM(s) Oral every 6 hours PRN Moderate Pain (4 - 6)  oxyCODONE    IR 10 milliGRAM(s) Oral every 6 hours PRN Severe Pain (7 - 10)      VITAL SIGNS:  T(F): 98.5 (01-04-23 @ 06:17), Max: 98.7 (01-03-23 @ 21:44)  HR: 116 (01-04-23 @ 06:17) (110 - 116)  BP: 118/69 (01-04-23 @ 06:17) (117/64 - 120/65)  RR: 17 (01-04-23 @ 06:17) (17 - 18)  SpO2: 99% (01-04-23 @ 06:17) (99% - 100%)    I&O's Summary    03 Jan 2023 07:01  -  04 Jan 2023 07:00  --------------------------------------------------------  IN: 590 mL / OUT: 650 mL / NET: -60 mL      Daily     Daily     PHYSICAL EXAM:  Gen: Alert, NAD  HEENT: NCAT, conjunctiva clear, sclera anicteric, no erythema or exudates in the oropharynx, mmm  Neck: Supple, no JVD  CV: RRR, S1S2, no m/r/g  Resp: CTAB, normal respiratory effort  Abd: Soft, nontender, nondistended, normal bowel sounds  Ext: no edema, no clubbing or cyanosis  Neuro: AOx3, CN2-12 grossly intact, ANDREWS  SKIN: warm, perfused    LABS:                        7.5    24.63 )-----------( 627      ( 03 Jan 2023 06:15 )             24.0     Hgb Trend: 7.5<--, 8.3<--, 8.2<--  01-03    127<L>  |  99  |  9   ----------------------------<  86  3.8   |  17<L>  |  0.72    Ca    9.3      03 Jan 2023 06:15  Phos  1.7     01-03  Mg     1.80     01-03    TPro  6.7  /  Alb  2.7<L>  /  TBili  0.3  /  DBili  x   /  AST  27  /  ALT  28  /  AlkPhos  207<H>  01-03    Creatinine Trend: 0.72<--, 0.66<--, 0.75<--  LIVER FUNCTIONS - ( 03 Jan 2023 06:15 )  Alb: 2.7 g/dL / Pro: 6.7 g/dL / ALK PHOS: 207 U/L / ALT: 28 U/L / AST: 27 U/L / GGT: x                     CAPILLARY BLOOD GLUCOSE          RADIOLOGY & ADDITIONAL TESTS: Reviewed    Imaging Personally Reviewed:    Consultant(s) Notes Reviewed:      Care Discussed with Consultants/Other Providers:   Damaris Aguiar, PGY1  TEAMS or Pager [phone]     Patient is a 67y old male who presents with a chief complaint of R knee pain (04 Jan 2023 07:02)    SUBJECTIVE/INTERVAL EVENTS: Overnight, patient became NPO at midnight for possible washout of R knee by Orthopedics today. He was also tachycardiac to . EKG showing sinus tachycardiac and patient asymptomatic. Patient seen and examined at bedside today. He reports 10/10 R knee pain despite receiving PRN oxycodone 5 mg 1.5 hours ago. In the last 24 hours, he received 4 doses of PRN oxycodone q6h. Discussed adjusting his pain regimen. Pain is worst at R patella but radiates down to R ankle. He also endorses chronic productive cough. He denies chest pain, SOB, palpitations, or vision changes. Otherwise, denies lightheadedness, nausea, vomiting, constipation, or diarrhea.    MEDICATIONS  (STANDING):  cefepime   IVPB 2000 milliGRAM(s) IV Intermittent every 8 hours  cefepime   IVPB      levothyroxine Injectable 112 MICROGram(s) IV Push <User Schedule>  magnesium oxide 400 milliGRAM(s) Oral two times a day with meals  pantoprazole   Suspension 40 milliGRAM(s) Oral daily  polyethylene glycol 3350 17 Gram(s) Oral daily  vancomycin  IVPB 1000 milliGRAM(s) IV Intermittent every 12 hours    MEDICATIONS  (PRN):  acetaminophen    Suspension .. 650 milliGRAM(s) Oral every 6 hours PRN Temp greater or equal to 38C (100.4F), Mild Pain (1 - 3)  albuterol    90 MICROgram(s) HFA Inhaler 2 Puff(s) Inhalation every 6 hours PRN Shortness of Breath and/or Wheezing  gabapentin 300 milliGRAM(s) Oral three times a day PRN neuropathic pain  oxyCODONE    IR 5 milliGRAM(s) Oral every 6 hours PRN Moderate Pain (4 - 6)  oxyCODONE    IR 10 milliGRAM(s) Oral every 6 hours PRN Severe Pain (7 - 10)    VITAL SIGNS:  T(F): 98.5 (01-04-23 @ 06:17), Max: 98.7 (01-03-23 @ 21:44)  HR: 116 (01-04-23 @ 06:17) (110 - 116)  BP: 118/69 (01-04-23 @ 06:17) (117/64 - 120/65)  RR: 17 (01-04-23 @ 06:17) (17 - 18)  SpO2: 99% (01-04-23 @ 06:17) (99% - 100%)    I&O's Summary    03 Jan 2023 07:01  -  04 Jan 2023 07:00  --------------------------------------------------------  IN: 590 mL / OUT: 650 mL / NET: -60 mL      Daily     Daily     PHYSICAL EXAM:  Gen: Alert, NAD  HEENT: NCAT, conjunctiva clear, sclera anicteric, white build-up on tongue, cracked lips  Neck: Supple, no JVD  CV: RRR, S1S2, no m/r/g, tachycardia  Resp: CTAB, normal respiratory effort, intermittent cough  Abd: Soft, nontender, nondistended, normal bowel sounds, PEG site C/D/I  Ext: 1+ edema to calf on R LE, no clubbing or cyanosis  - R leg: wrapped and in immobilizer, painful with any movements (ex: dorsi/plantarflexion), DP pulse 2+, warm toes  Neuro: AOx3, CN2-12 grossly intact, ANDREWS  SKIN: warm, perfused    LABS:                        7.5    24.63 )-----------( 627      ( 03 Jan 2023 06:15 )             24.0     Hgb Trend: 7.5<--, 8.3<--, 8.2<--  01-03    127<L>  |  99  |  9   ----------------------------<  86  3.8   |  17<L>  |  0.72    Ca    9.3      03 Jan 2023 06:15  Phos  1.7     01-03  Mg     1.80     01-03    TPro  6.7  /  Alb  2.7<L>  /  TBili  0.3  /  DBili  x   /  AST  27  /  ALT  28  /  AlkPhos  207<H>  01-03    Creatinine Trend: 0.72<--, 0.66<--, 0.75<--  LIVER FUNCTIONS - ( 03 Jan 2023 06:15 )  Alb: 2.7 g/dL / Pro: 6.7 g/dL / ALK PHOS: 207 U/L / ALT: 28 U/L / AST: 27 U/L / GGT: x           CAPILLARY BLOOD GLUCOSE    RADIOLOGY & ADDITIONAL TESTS: Reviewed    Imaging Personally Reviewed: XR knee    Consultant(s) Notes Reviewed: Orthopedics, Oncology    Care Discussed with Consultants/Other Providers: Orthopedics, Oncology

## 2023-01-04 NOTE — PROGRESS NOTE ADULT - ASSESSMENT
67y Male with worsening Right knee pain and serosanguinous sp mass excision on 11/10 concerning for oncologic process. Patient currently tachycardic with elevated wbc, covid positive, recently admitted for covid pneumonia, on antibiotics.    - Please keep NPO and hold DVT ppx today 1/4/23 in case MRI gets done and can take to OR  - Please obtain MRI R knee w/wo contrast  - WBAT RLE with knee in extension  - Recommend ID consult  - Continue IV Antibiotics   - Local wound care  - Pain control as needed

## 2023-01-04 NOTE — DISCHARGE NOTE PROVIDER - NSDCCPCAREPLAN_GEN_ALL_CORE_FT
PRINCIPAL DISCHARGE DIAGNOSIS  Diagnosis: Esophageal cancer  Assessment and Plan of Treatment:       SECONDARY DISCHARGE DIAGNOSES  Diagnosis: PNA (pneumonia)  Assessment and Plan of Treatment:     Diagnosis: Hypophosphatemia  Assessment and Plan of Treatment:      PRINCIPAL DISCHARGE DIAGNOSIS  Diagnosis: Esophageal cancer  Assessment and Plan of Treatment: Esophageal cancer starts in the cells that line the esophagus.   You came to the hospital with pain in your knee. You were found to have cancer that most likely metastasized from your original esophageal cancer. Orthopaedic surgeons performed surgeon on your knee.  - Please follow up with your PCP  - Please follow up with your oncologist  DISCHARGE INSTRUCTIONS:  Call your local emergency number (911 in the [place]) for any of the following:   •You have chest pain when you take a deep breath or cough.  •You suddenly feel lightheaded and short of breath.  •You cough up blood.  Seek care immediately if:   •You vomit multiple times and cannot keep food or liquids down.  •You feel you cannot cope with your illness.  •You are bleeding from your mouth or nose.  •Your arm or leg feels warm, tender, and painful. It may look swollen and red.  Call your doctor or oncologist if:   •You have a fever.  •You have pain that does not decrease or go away after you take your pain medicine.  •You have questions or concerns about your condition or care.        SECONDARY DISCHARGE DIAGNOSES  Diagnosis: PNA (pneumonia)  Assessment and Plan of Treatment: Bacterial pneumonia is a lung infection caused by bacteria. Your lungs become inflamed and cannot work well. Bacterial pneumonia germs are easily spread when an infected person coughs, sneezes, or has close contact with others.  You came into the hospital and you had irregular lung sounds. You got a CT showing possible pneumonia. You were given antibiotics and your condition improved.  - Please follow up with your PCP  DISCHARGE INSTRUCTIONS:  Call your local emergency number (911 in the [place]) or have someone call if:   •You are confused or cannot think clearly.  Seek care immediately if:   •You are urinating less or not at all.  •You cough up blood.  •You have more trouble breathing, or your breathing seems faster than normal.  •Your heart or pulse beats more than 100 times in 1 minute.  •Your lips or fingernails turn blue.  Call your doctor or pulmonologist if:   •Your symptoms are the same or get worse 48 hours after you start antibiotics.  •You cannot eat, you have loss of appetite or nausea, or you are vomiting.  •You have questions or concerns about your condition or care.      Diagnosis: Hypophosphatemia  Assessment and Plan of Treatment: Your phosphorus levels were constantly low in the hospital. You were given phosphorus packets and your phosphorus levels stabilized.   - Please follow up with your PCP  - Please retake your bloodwork to check your phosphorus levels  - Please take your phosphorus packets as prescribed  - Please follow up with your other blood work that is still in the [facility] lab (Parathyroid-related peptide hormone)     PRINCIPAL DISCHARGE DIAGNOSIS  Diagnosis: Esophageal cancer  Assessment and Plan of Treatment: Esophageal cancer starts in the cells that line the esophagus.   You came to the hospital with pain in your knee. You were found to have cancer that most likely metastasized from your original esophageal cancer. Orthopaedic surgeons performed surgeon on your knee. Radiation oncology was consulted and recommended following up outpatient.  - Please follow up with your PCP  - Please follow up with your oncologist  DISCHARGE INSTRUCTIONS:  Call your local emergency number (911 in the [place]) for any of the following:   •You have chest pain when you take a deep breath or cough.  •You suddenly feel lightheaded and short of breath.  •You cough up blood.  Seek care immediately if:   •You vomit multiple times and cannot keep food or liquids down.  •You feel you cannot cope with your illness.  •You are bleeding from your mouth or nose.  •Your arm or leg feels warm, tender, and painful. It may look swollen and red.  Call your doctor or oncologist if:   •You have a fever.  •You have pain that does not decrease or go away after you take your pain medicine.  •You have questions or concerns about your condition or care.        SECONDARY DISCHARGE DIAGNOSES  Diagnosis: PNA (pneumonia)  Assessment and Plan of Treatment: Bacterial pneumonia is a lung infection caused by bacteria. Your lungs become inflamed and cannot work well. Bacterial pneumonia germs are easily spread when an infected person coughs, sneezes, or has close contact with others.  You came into the hospital and you had irregular lung sounds. You got a CT showing possible pneumonia. You were given antibiotics and your condition improved.  - Please follow up with your PCP  DISCHARGE INSTRUCTIONS:  Call your local emergency number (911 in the [place]) or have someone call if:   •You are confused or cannot think clearly.  Seek care immediately if:   •You are urinating less or not at all.  •You cough up blood.  •You have more trouble breathing, or your breathing seems faster than normal.  •Your heart or pulse beats more than 100 times in 1 minute.  •Your lips or fingernails turn blue.  Call your doctor or pulmonologist if:   •Your symptoms are the same or get worse 48 hours after you start antibiotics.  •You cannot eat, you have loss of appetite or nausea, or you are vomiting.  •You have questions or concerns about your condition or care.      Diagnosis: Hypophosphatemia  Assessment and Plan of Treatment: Your phosphorus levels were constantly low in the hospital. You were given phosphorus packets and your phosphorus levels stabilized.   - Please follow up with your PCP  - Please retake your bloodwork to check your phosphorus levels  - Please take your phosphorus packets as prescribed  - Please follow up with your other blood work that is still in the [facility] lab (Parathyroid-related peptide hormone)     PRINCIPAL DISCHARGE DIAGNOSIS  Diagnosis: Esophageal cancer  Assessment and Plan of Treatment: Esophageal cancer starts in the cells that line the esophagus.   You came to the hospital with pain in your knee. You were found to have cancer that most likely metastasized from your original esophageal cancer. Orthopaedic surgeons performed surgeon on your knee. Radiation oncology was consulted and recommended following up outpatient. Please continue taking your blood thinner for 4 more days  - Please follow up with your PCP  - Please follow up with your oncologist  DISCHARGE INSTRUCTIONS:  Call your local emergency number (911 in the [place]) for any of the following:   •You have chest pain when you take a deep breath or cough.  •You suddenly feel lightheaded and short of breath.  •You cough up blood.  Seek care immediately if:   •You vomit multiple times and cannot keep food or liquids down.  •You feel you cannot cope with your illness.  •You are bleeding from your mouth or nose.  •Your arm or leg feels warm, tender, and painful. It may look swollen and red.  Call your doctor or oncologist if:   •You have a fever.  •You have pain that does not decrease or go away after you take your pain medicine.  •You have questions or concerns about your condition or care.        SECONDARY DISCHARGE DIAGNOSES  Diagnosis: PNA (pneumonia)  Assessment and Plan of Treatment: Bacterial pneumonia is a lung infection caused by bacteria. Your lungs become inflamed and cannot work well. Bacterial pneumonia germs are easily spread when an infected person coughs, sneezes, or has close contact with others.  You came into the hospital and you had irregular lung sounds. You got a CT showing possible pneumonia. You were given antibiotics and your condition improved.  - Please follow up with your PCP  DISCHARGE INSTRUCTIONS:  Call your local emergency number (461 in the [place]) or have someone call if:   •You are confused or cannot think clearly.  Seek care immediately if:   •You are urinating less or not at all.  •You cough up blood.  •You have more trouble breathing, or your breathing seems faster than normal.  •Your heart or pulse beats more than 100 times in 1 minute.  •Your lips or fingernails turn blue.  Call your doctor or pulmonologist if:   •Your symptoms are the same or get worse 48 hours after you start antibiotics.  •You cannot eat, you have loss of appetite or nausea, or you are vomiting.  •You have questions or concerns about your condition or care.      Diagnosis: Hypophosphatemia  Assessment and Plan of Treatment: Your phosphorus levels were constantly low in the hospital. You were given phosphorus packets and your phosphorus levels stabilized.   - Please follow up with your PCP  - Please retake your bloodwork to check your phosphorus levels  - Please take your phosphorus packets as prescribed  - Please follow up with your other blood work that is still in the [facility] lab (Parathyroid-related peptide hormone)

## 2023-01-04 NOTE — DISCHARGE NOTE PROVIDER - ATTENDING DISCHARGE PHYSICAL EXAMINATION:
NAD, resting comfortably  CV: Tachycardic otherwise, no m/r/g   Resp: CTABL   Abdomen: Soft, non-tender, non-distended   Ext: Trace LE edema

## 2023-01-04 NOTE — DISCHARGE NOTE PROVIDER - NSDCFUADDAPPT_GEN_ALL_CORE_FT
1. Please follow up with your PCP  2. Please follow up with your oncologist  3. Please follow up with  in regards to your knee surgery 1. Please follow up with your PCP  2. Please follow up with your oncologist. An appointment has been scheduled for you at 8 am on 1/23  3. Please follow up with  in regards to your knee surgery 1. Please follow up with your PCP  2. Please follow up with your oncologist. An appointment has been scheduled for you at 8 am on 1/23  3. Please follow up with  in regards to your knee surgery  4. Please follow up with yourradiation oncologist after dishcarge

## 2023-01-04 NOTE — CONSULT NOTE ADULT - SUBJECTIVE AND OBJECTIVE BOX
INFECTIOUS DISEASE CONSULT NOTE    Patient is a 67y old  Male who presents with a chief complaint of R knee pain (2023 07:24)    HPI:  67-year-old male with PMH of metastatic esophageal cancer, hypothyroidism, GERD, HTN who was transferred from [facility] for Orthopedics consult for possible right knee malignancy versus septic arthritis.     He received a biopsy of his right patella on 11/10/22 with pathology positive for squamous cell carcinoma (Dr. Carter). Per collateral from son, patient has been experiencing progressively worsening pain, erythema, edema, and warmth at the R knee joint for about 1 month since biopsy. On , patient became unable to walk 2/2 pain. Previously was able to ambulate with walker and brace on R knee. On , son notes patient was attempting to walk and had bleeding from the joint that resolved when he elevated the leg and held pressure. No history of septic arthritis or bleeding disorder. Denies any fevers, chills, night sweats, sick contacts, SOB, chest pain, N/V at home. Patient with chronic productive cough and sore throat with hoarse voice. Family brought patient to [facility] ([facility]) ED, where they report that he had an arthroscopy (no records) and IV antibiotics.Collateral obtained from JUSTINE Linda at [facility], who stated patient was did not get an arthroscopy while in OSH ED. He did receive 1 dose of IV Zosyn and Vancomycin yesterday . He was afebrile but labs significant for leukocytosis to 30k.    Of note, patient with recent admission to [facility] from - with COVID and superimposed bacterial pneumonia. Patient was diagnosed with esophageal SCC about 8 years ago and is s/p chemo and radiation. In 2019, he restarted chemotherapy for metastasis to his chest wall (last dose 2 months ago). He follows with outpatient oncologist Dr. Gi Thomas in [place]. He does not take anything by mouth, with all food and meds via PEG tube. Patient reports 10/10 pain currently at R knee and in throat. He is AOx4 but gives brief answers to questions.    ED Course:  VS 97.2 F, , /56, RR 24/100%  Zosyn, Vanc 1 g, LR 2 L  PEG tube accidentally dislodged in triage, then replaced in ED  Ortho consulted, recommending MRI knee  XR of the knee showing anterior soft tissue swelling  Concern for osteomyelitis versus worsening malignancy of the patella   (2023 11:33)         Prior hospital charts reviewed [Yes]  Primary team notes reviewed [Yes]  Other consultant notes reviewed [Yes]    REVIEW OF SYSTEMS:  CONSTITUTIONAL: No fever or chills  HEENT: No sore throat  RESPIRATORY: + intermittent cough  CARDIOVASCULAR: No chest pain or palpitations  GASTROINTESTINAL: No abdominal or epigastric pain  GENITOURINARY: No dysuria  NEUROLOGICAL: No headache/dizziness  MSK: + R knee pain  SKIN: No itching, rashes  All other ROS negative except noted above    PAST MEDICAL & SURGICAL HISTORY:  HTN (hypertension)      GERD (gastroesophageal reflux disease)      Hypothyroidism      Neuropathic pain      Disorder of bone, unspecified      H/O constipation      Esophageal cancer      Lung metastases      S/P percutaneous endoscopic gastrostomy (PEG) tube placement          SOCIAL HISTORY:  - Former heavy tobacco use  - Former heavy alcohol use  - Denies illicit drug use    FAMILY HISTORY:  Family history of liver diseases        Allergies:  No Known Allergies      ANTIMICROBIALS:      ANTIMICROBIALS (past 90 days):  MEDICATIONS  (STANDING):  cefepime   IVPB   100 mL/Hr IV Intermittent (23 @ 18:58)    cefepime   IVPB   100 mL/Hr IV Intermittent (23 @ 10:15)   100 mL/Hr IV Intermittent (23 @ 03:10)   100 mL/Hr IV Intermittent (23 @ 19:19)   100 mL/Hr IV Intermittent (23 @ 12:35)   100 mL/Hr IV Intermittent (23 @ 06:35)   100 mL/Hr IV Intermittent (23 @ 22:01)   100 mL/Hr IV Intermittent (23 @ 14:28)   100 mL/Hr IV Intermittent (23 @ 04:01)    piperacillin/tazobactam IVPB...   200 mL/Hr IV Intermittent (23 @ 07:40)    vancomycin  IVPB   250 mL/Hr IV Intermittent (23 @ 11:39)   250 mL/Hr IV Intermittent (23 @ 23:55)   250 mL/Hr IV Intermittent (23 @ 12:22)   250 mL/Hr IV Intermittent (23 @ 02:14)   250 mL/Hr IV Intermittent (23 @ 14:59)   250 mL/Hr IV Intermittent (23 @ 00:37)    vancomycin  IVPB.   250 mL/Hr IV Intermittent (23 @ 11:37)        OTHER MEDS:   MEDICATIONS  (STANDING):  acetaminophen    Suspension .. 650 every 6 hours PRN  albuterol    90 MICROgram(s) HFA Inhaler 2 every 6 hours PRN  enoxaparin Injectable 40 every 24 hours  gabapentin 300 three times a day PRN  levothyroxine Injectable 112 <User Schedule>  oxyCODONE    IR 5 every 6 hours PRN  oxyCODONE    IR 10 every 6 hours PRN  pantoprazole   Suspension 40 daily  polyethylene glycol 3350 17 daily      VITALS:  Vital Signs Last 24 Hrs  T(F): 98.2 (23 @ 12:13), Max: 100 (23 @ 21:54)    Vital Signs Last 24 Hrs  HR: 111 (23 @ 12:13) (111 - 116)  BP: 117/73 (23 @ 12:13) (117/73 - 120/65)  RR: 17 (23 @ 12:13)  SpO2: 96% (23 @ 12:13) (96% - 100%)  Wt(kg): --    EXAM:  GENERAL: NAD, lying in bed  HEAD: No head lesions  EYES: Conjunctiva pink and cornea white  ENT: Normal external ears and nose, no discharges; moist mucous membranes  NECK: Supple, nontender to palpation; no JVD; enlarged LN of right anterior chest  CHEST/LUNG: Nearly silence breath sound on left side  HEART: S1 S2  ABDOMEN: Soft, nontender, nondistended; normoactive bowel sounds: PEG tube in place  EXTREMITIES: No clubbing, cyanosis, or petal edema  NERVOUS SYSTEM: Alert and oriented to person, time, place and situation, speech clear. No focal deficits   MSK: Right knee is warm and tender, minimally erythematous  SKIN: No rashes or lesions, no superficial thrombophlebitis  PSYCH: Normal affect    Labs:                        7.6    22.27 )-----------( 630      ( 2023 07:14 )             23.7     -    126<L>  |  95<L>  |  10  ----------------------------<  97  4.2   |  19<L>  |  0.76    Ca    9.3      2023 07:14  Phos  2.0       Mg     1.80         TPro  6.7  /  Alb  2.7<L>  /  TBili  0.3  /  DBili  x   /  AST  27  /  ALT  28  /  AlkPhos  207<H>        WBC Trend:  WBC Count: 22.27 (23 @ 07:14)  WBC Count: 24.63 (23 @ 06:15)  WBC Count: 25.42 (23 @ 07:45)  WBC Count: 24.52 (23 @ 07:40)      Auto Neutrophil #: 18.92 K/uL (23 @ 07:14)  Auto Neutrophil #: 20.96 K/uL (23 @ 06:15)  Auto Neutrophil #: 22.28 K/uL (23 @ 07:45)  Auto Neutrophil #: 21.70 K/uL (23 @ 07:40)  Band Neutrophils %: 1.8 % (23 @ 07:40)      Creatine Trend:  Creatinine, Serum: 0.76 ()  Creatinine, Serum: 0.72 ()  Creatinine, Serum: 0.66 ()  Creatinine, Serum: 0.75 ()      Liver Biochemical Testing Trend:  Alanine Aminotransferase (ALT/SGPT): 28 ()  Alanine Aminotransferase (ALT/SGPT): 28 ()  Alanine Aminotransferase (ALT/SGPT): 36 ()  Alanine Aminotransferase (ALT/SGPT): 40 (10-14)  Aspartate Aminotransferase (AST/SGOT): 27 (23 @ 06:15)  Aspartate Aminotransferase (AST/SGOT): 26 (23 @ 07:45)  Aspartate Aminotransferase (AST/SGOT): 33 (23 @ 07:40)  Aspartate Aminotransferase (AST/SGOT): 24 (10-14- @ 11:41)  Bilirubin Total, Serum: 0.3 ()  Bilirubin Total, Serum: 0.4 ()  Bilirubin Total, Serum: 0.4 ()  Bilirubin Total, Serum: 0.2 (10-14)      Trend LDH      Auto Eosinophil %: 1.8 % (23 @ 07:14)  Auto Eosinophil %: 3.1 % (23 @ 06:15)  Auto Eosinophil %: 1.7 % (23 @ 07:45)      Urinalysis Basic - ( 2023 10:28 )    Color: Light Yellow / Appearance: Clear / S.015 / pH: x  Gluc: x / Ketone: Negative  / Bili: Negative / Urobili: <2 mg/dL   Blood: x / Protein: 30 mg/dL / Nitrite: Negative   Leuk Esterase: Small / RBC: 0-2 /HPF / WBC 2-5 /HPF   Sq Epi: x / Non Sq Epi: x / Bacteria: x        MICROBIOLOGY:  Vancomycin Level, Trough: 19.5 ( @ 01:01)        Culture - Urine (collected 2023 10:08)  Source: Clean Catch Clean Catch (Midstream)  Final Report (2023 09:53):    <10,000 CFU/mL Normal Urogenital Susan    Culture - Blood (collected 2023 07:40)  Source: .Blood Blood-Peripheral  Preliminary Report (2023 10:01):    No growth to date.    Culture - Blood (collected 2023 06:30)  Source: .Blood Blood-Peripheral  Preliminary Report (2023 10:01):    No growth to date.        Rapid RVP Result: NotDetec ( @ 07:34)    C-Reactive Protein, Serum: 235.3 ()  C-Reactive Protein, Serum: 219.3 ()      RADIOLOGY:  imaging below personally reviewed      < from: Xray Knee 3 Views, Right (23 @ 17:35) >  IMPRESSION:  Redemonstrated patellar fracture/fragmentation/discontinuity, currently   increased distraction with approximately 8 mm gap. Associated   peripatellar soft tissue swelling and small knee joint effusion. Also   correlate with findings on already pending knee MRI.    Intact aligned remaining osteoarticular structures.    Preserved tibiofemoral joint spaces.    Generalized mild osteopenia otherwise no discrete suspicious lytic or   blastic lesions.    < end of copied text >   INFECTIOUS DISEASE CONSULT NOTE    Patient is a 67y old  Male who presents with a chief complaint of R knee pain (2023 07:24)    HPI:  67-year-old male with PMH of metastatic esophageal cancer, hypothyroidism, GERD, HTN who was transferred from [facility] for Orthopedics consult for possible right knee malignancy versus septic arthritis.     He had a right patella mass radically resected on 11/10/22 with pathology positive for squamous cell carcinoma (Dr. Carter). Per collateral from son, patient has been experiencing progressively worsening pain, erythema, edema, and warmth at the R knee joint for about 1 month since biopsy. On , patient became unable to walk 2/2 pain. Previously was able to ambulate with walker and brace on R knee. On , son notes patient was attempting to walk and had bleeding from the joint that resolved when he elevated the leg and held pressure. No history of septic arthritis or bleeding disorder. Denies any fevers, chills, night sweats, sick contacts, SOB, chest pain, N/V at home. Patient with chronic productive cough and sore throat with hoarse voice. Family brought patient to [facility] ([facility]) ED, where they report that he had an arthroscopy (no records) and IV antibiotics.Collateral obtained from JUSTINE Linda at [facility], who stated patient was did not get an arthroscopy while in OSH ED. He did receive 1 dose of IV Zosyn and Vancomycin yesterday . He was afebrile but labs significant for leukocytosis to 30k.    Of note, patient with recent admission to [facility] from - with COVID and superimposed bacterial pneumonia. Patient was diagnosed with esophageal SCC about 8 years ago and is s/p chemo and radiation. In 2019, he restarted chemotherapy for metastasis to his chest wall (last dose 2 months ago). He follows with outpatient oncologist Dr. Gi Thomas in [place]. He does not take anything by mouth, with all food and meds via PEG tube. Patient reports 10/10 pain currently at R knee and in throat. He is AOx4 but gives brief answers to questions.    ED Course:  VS 97.2 F, , /56, RR 24/100%  Zosyn, Vanc 1 g, LR 2 L  PEG tube accidentally dislodged in triage, then replaced in ED  Ortho consulted, recommending MRI knee  XR of the knee showing anterior soft tissue swelling  Concern for osteomyelitis versus worsening malignancy of the patella   (2023 11:33)         Prior hospital charts reviewed [Yes]  Primary team notes reviewed [Yes]  Other consultant notes reviewed [Yes]    REVIEW OF SYSTEMS:  CONSTITUTIONAL: No fever or chills  HEENT: No sore throat  RESPIRATORY: + intermittent cough  CARDIOVASCULAR: No chest pain or palpitations  GASTROINTESTINAL: No abdominal or epigastric pain  GENITOURINARY: No dysuria  NEUROLOGICAL: No headache/dizziness  MSK: + R knee pain  SKIN: No itching, rashes  All other ROS negative except noted above    PAST MEDICAL & SURGICAL HISTORY:  HTN (hypertension)      GERD (gastroesophageal reflux disease)      Hypothyroidism      Neuropathic pain      Disorder of bone, unspecified      H/O constipation      Esophageal cancer      Lung metastases      S/P percutaneous endoscopic gastrostomy (PEG) tube placement          SOCIAL HISTORY:  - Former heavy tobacco use  - Former heavy alcohol use  - Denies illicit drug use    FAMILY HISTORY:  Family history of liver diseases        Allergies:  No Known Allergies      ANTIMICROBIALS:      ANTIMICROBIALS (past 90 days):  MEDICATIONS  (STANDING):  cefepime   IVPB   100 mL/Hr IV Intermittent (23 @ 18:58)    cefepime   IVPB   100 mL/Hr IV Intermittent (23 @ 10:15)   100 mL/Hr IV Intermittent (23 @ 03:10)   100 mL/Hr IV Intermittent (23 @ 19:19)   100 mL/Hr IV Intermittent (23 @ 12:35)   100 mL/Hr IV Intermittent (23 @ 06:35)   100 mL/Hr IV Intermittent (23 @ 22:01)   100 mL/Hr IV Intermittent (23 @ 14:28)   100 mL/Hr IV Intermittent (23 @ 04:01)    piperacillin/tazobactam IVPB...   200 mL/Hr IV Intermittent (23 @ 07:40)    vancomycin  IVPB   250 mL/Hr IV Intermittent (23 @ 11:39)   250 mL/Hr IV Intermittent (23 @ 23:55)   250 mL/Hr IV Intermittent (23 @ 12:22)   250 mL/Hr IV Intermittent (23 @ 02:14)   250 mL/Hr IV Intermittent (23 @ 14:59)   250 mL/Hr IV Intermittent (23 @ 00:37)    vancomycin  IVPB.   250 mL/Hr IV Intermittent (23 @ 11:37)        OTHER MEDS:   MEDICATIONS  (STANDING):  acetaminophen    Suspension .. 650 every 6 hours PRN  albuterol    90 MICROgram(s) HFA Inhaler 2 every 6 hours PRN  enoxaparin Injectable 40 every 24 hours  gabapentin 300 three times a day PRN  levothyroxine Injectable 112 <User Schedule>  oxyCODONE    IR 5 every 6 hours PRN  oxyCODONE    IR 10 every 6 hours PRN  pantoprazole   Suspension 40 daily  polyethylene glycol 3350 17 daily      VITALS:  Vital Signs Last 24 Hrs  T(F): 98.2 (23 @ 12:13), Max: 100 (23 @ 21:54)    Vital Signs Last 24 Hrs  HR: 111 (23 @ 12:13) (111 - 116)  BP: 117/73 (23 @ 12:13) (117/73 - 120/65)  RR: 17 (23 @ 12:13)  SpO2: 96% (23 @ 12:13) (96% - 100%)  Wt(kg): --    EXAM:  GENERAL: NAD, lying in bed  HEAD: No head lesions  EYES: Conjunctiva pink and cornea white  ENT: Normal external ears and nose, no discharges; moist mucous membranes  NECK: Supple, nontender to palpation; no JVD; enlarged LN of right anterior chest  CHEST/LUNG: Nearly absent breath sound on left side. clear on the right   HEART: regular rate   ABDOMEN: Soft, nontender, nondistended; normoactive bowel sounds: PEG tube in place  EXTREMITIES: No clubbing, cyanosis, or petal edema  NERVOUS SYSTEM: nonfocal   MSK: Right knee is dressed, in a brace. warm and tender, minimally erythematous  SKIN: No rashes or lesions, no superficial thrombophlebitis  PSYCH: Normal affect    Labs:                        7.6    22.27 )-----------( 630      ( 2023 07:14 )             23.7         126<L>  |  95<L>  |  10  ----------------------------<  97  4.2   |  19<L>  |  0.76    Ca    9.3      2023 07:14  Phos  2.0       Mg     1.80         TPro  6.7  /  Alb  2.7<L>  /  TBili  0.3  /  DBili  x   /  AST  27  /  ALT  28  /  AlkPhos  207<H>        WBC Trend:  WBC Count: 22.27 (23 @ 07:14)  WBC Count: 24.63 (23 @ 06:15)  WBC Count: 25.42 (23 @ 07:45)  WBC Count: 24.52 (23 @ 07:40)      Auto Neutrophil #: 18.92 K/uL (23 @ :14)  Auto Neutrophil #: 20.96 K/uL (23 @ 06:15)  Auto Neutrophil #: 22.28 K/uL (23 @ 07:45)  Auto Neutrophil #: 21.70 K/uL (23 @ 07:40)  Band Neutrophils %: 1.8 % (23 @ 07:40)      Creatine Trend:  Creatinine, Serum: 0.76 ()  Creatinine, Serum: 0.72 ()  Creatinine, Serum: 0.66 ()  Creatinine, Serum: 0.75 ()      Liver Biochemical Testing Trend:  Alanine Aminotransferase (ALT/SGPT): 28 ()  Alanine Aminotransferase (ALT/SGPT): 28 ()  Alanine Aminotransferase (ALT/SGPT): 36 ()  Alanine Aminotransferase (ALT/SGPT): 40 (10-14)  Aspartate Aminotransferase (AST/SGOT): 27 (23 @ 06:15)  Aspartate Aminotransferase (AST/SGOT): 26 (23 @ 07:45)  Aspartate Aminotransferase (AST/SGOT): 33 (23 @ 07:40)  Aspartate Aminotransferase (AST/SGOT): 24 (10-14- @ 11:41)  Bilirubin Total, Serum: 0.3 ()  Bilirubin Total, Serum: 0.4 ()  Bilirubin Total, Serum: 0.4 ()  Bilirubin Total, Serum: 0.2 (10-14)      Trend LDH      Auto Eosinophil %: 1.8 % (23 @ 07:14)  Auto Eosinophil %: 3.1 % (23 @ 06:15)  Auto Eosinophil %: 1.7 % (23 @ 07:45)      Urinalysis Basic - ( 2023 10:28 )    Color: Light Yellow / Appearance: Clear / S.015 / pH: x  Gluc: x / Ketone: Negative  / Bili: Negative / Urobili: <2 mg/dL   Blood: x / Protein: 30 mg/dL / Nitrite: Negative   Leuk Esterase: Small / RBC: 0-2 /HPF / WBC 2-5 /HPF   Sq Epi: x / Non Sq Epi: x / Bacteria: x        MICROBIOLOGY:  Vancomycin Level, Trough: 19.5 ( @ 01:01)        Culture - Urine (collected 2023 10:08)  Source: Clean Catch Clean Catch (Midstream)  Final Report (2023 09:53):    <10,000 CFU/mL Normal Urogenital Susan    Culture - Blood (collected 2023 07:40)  Source: .Blood Blood-Peripheral  Preliminary Report (2023 10:01):    No growth to date.    Culture - Blood (collected 2023 06:30)  Source: .Blood Blood-Peripheral  Preliminary Report (2023 10:01):    No growth to date.        Rapid RVP Result: NotDetec ( @ 07:34)    C-Reactive Protein, Serum: 235.3 ()  C-Reactive Protein, Serum: 219.3 ()      RADIOLOGY:  imaging below personally reviewed      < from: Xray Knee 3 Views, Right (23 @ 17:35) >  IMPRESSION:  Redemonstrated patellar fracture/fragmentation/discontinuity, currently   increased distraction with approximately 8 mm gap. Associated   peripatellar soft tissue swelling and small knee joint effusion. Also   correlate with findings on already pending knee MRI.    Intact aligned remaining osteoarticular structures.    Preserved tibiofemoral joint spaces.    Generalized mild osteopenia otherwise no discrete suspicious lytic or   blastic lesions.    < end of copied text >

## 2023-01-04 NOTE — CONSULT NOTE ADULT - ASSESSMENT
67-year-old male with PMH of metastatic esophageal cancer, hypothyroidism, GERD, HTN who was transferred from [facility] for Orthopedics consult for possible right knee malignancy versus septic arthritis.     ID is consulted for right septic knee infection  Biopsy of right patella on 11/10/22 showed +SCC, operative reports indicated that it did not involve joint space  Has been having worsening pain  Hospitalized for COVID in [facility], had R knee aspiration with negative fluid culture; no cell count was available; BCx and sputum Cx NGTD (in [facility]); received abx there  Returned with bleeding of right knee  Afebrile, but with leukocytosis  Xray right knee showed swelling of patella tendon and knee effusion  Elevated ESR/CRP  CXR showed opacification of left lung due to tumor invasion    Antibiotics:  Vancomycin 1/1 ->  Cefepime 1/1 ->    Pending MRI and ortho plan for possible joint washout  With recent negative fluid culture before abx in OSH, this is less likely to be septic joint infection  It would be helpful to have MRI and possible washout/sampling for culture  Would observe off abx  Leukocytosis could be reactive to knee inflammation      IMPRESSION:  Possible metastatic SCC to right knee  Leukocytosis  Hx metastatic esophageal cancer    RECOMMENDATIONS:  - D/C vancomycin and cefepime  - Follow up MRI result  - Ortho follow up for possible OR intervention; if so please send fluid for cell count, bacterial/fungal/AFB culture and cytology  - Trend WBC, fever curve, transaminases, creatinine daily  - Will continue to follow      Patient is seen and examined with attending and case is discussed with primary team      Meseret Lazo D.O.  PGY-5 Infectious Diseases Fellow  Please contact me via page or text through [employer] Teams  If after 5PM or on weekends, please call [phone]

## 2023-01-04 NOTE — PROGRESS NOTE ADULT - PROBLEM SELECTOR PLAN 5
- home meds include amlodipine 5 mg, metoprolol succinate 50 mg   - BP 90s-100s/50s-60s in ED    Plan  - BP meds held due to concern for sepsis  - BP increasing now to sys 130-160, will monitor and consider restarting tomorrow - home meds include amlodipine 5 mg, metoprolol succinate 50 mg   - BP 90s-100s/50s-60s in ED    Plan  - BP meds held due to concern for sepsis  - BP 110s-120s/60s on 1/4, will continue to hold

## 2023-01-04 NOTE — DISCHARGE NOTE PROVIDER - INSTRUCTIONS
Bolus tube feeds: TwoCal HN (237 ml x 5 feeds daily) TwoCal HN at a goal rate of 54 mL/hr x20 hrs to provide 1080 mL formula, 2160 kcal, 90.18 gm protein, 756 mL free water daily (meets 32.5 kcal/kg, 1.36 gm protein/kg @ dosing weight 66.4 kg)

## 2023-01-04 NOTE — ADVANCED PRACTICE NURSE CONSULT - REASON FOR CONSULT
Patient seen on skin care rounds after wound care referral received for assessment of skin impairment and recommendations of topical management. Patient is a 67-year-old male with PMH of metastatic esophageal cancer, hypothyroidism, GERD, HTN who was transferred from [facility] for Orthopedics consult for possible right knee malignancy versus septic arthritis. Patient followed by orthopedic surgery and infectious disease for R knee. Pending MRI right knee, Plan for possible washout in OR with orthopedic surgery. s/p PEG dislodgement, PEG exchanged by GI on 1/2/2022. Chart reviewed: WBC 22.27, H/H 7.6/23.7, INR 1.36, Platelets 630, BMI 22.9, talita 15.  Patient seen on skin care rounds after wound care referral received for assessment of skin impairment and recommendations of topical management. Patient is a 67-year-old male with PMH of metastatic esophageal cancer, hypothyroidism, GERD, HTN who was transferred from [facility] for Orthopedics consult for possible right knee malignancy versus septic arthritis. Patient followed by orthopedic surgery for R knee pain and drainage. Pending infectious disease consult, pending MRI of right knee, Plan for possible washout in OR today with orthopedic surgery. s/p PEG dislodgement, PEG exchanged by GI on 1/2/2022. Chart reviewed: WBC 22.27, H/H 7.6/23.7, INR 1.36, Platelets 630, BMI 22.9, talita 15.

## 2023-01-04 NOTE — PROGRESS NOTE ADULT - PROBLEM SELECTOR PLAN 2
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

## 2023-01-04 NOTE — ADVANCED PRACTICE NURSE CONSULT - RECOMMEDATIONS
Continue to f/u orthopedic surgery for R knee.     Topical recommendations:     PEG - Cleanse q shift with NS, apply liquid barrier film beneath mauri disc. If redness noted under mauri disc bumper apply thin foam  dressing without border (Mepilex Lite)- cut to mid dressing with a 'Y' shape. Secondary securement to abdomen taping in 'H' fashion with Steri-strips.     Plan discussed with patient, spouse, and primary RN Noemy Silveira.     Please contact Wound/Ostomy Care Service Line if we can be of further assistance (ext [phone]).  Continue to f/u with orthopedic surgery for R knee.     Topical recommendations:     PEG - Cleanse q shift with NS, apply liquid barrier film beneath mauri disc. If redness noted under mauri disc bumper apply thin foam  dressing without border (Mepilex Lite)- cut to mid dressing with a 'Y' shape. Secondary securement to abdomen taping in 'H' fashion with Steri-strips.     Plan discussed with patient, spouse, and primary RN Noemy Silveira.     Please contact Wound/Ostomy Care Service Line if we can be of further assistance (ext [phone]).  Continue to f/u with orthopedic surgery for R knee.     Topical recommendations:     PEG - Cleanse q shift with NS, apply liquid barrier film beneath mauri disc. If redness noted under mauri disc bumper apply thin foam  dressing without border (Mepilex Lite)- cut to mid dressing with a 'Y' shape. Secondary securement to abdomen taping in 'H' fashion with Steri-strips.     Continue low air loss bed therapy, continue heel elevation, continue to turn & reposition per protocol, continue moisture management with barrier creams & single breathable pad, continue measures to decrease friction/shear/pressure.     Plan discussed with patient, spouse, and primary RN Noemy Silveira.     Please contact Wound/Ostomy Care Service Line if we can be of further assistance (ext [phone]).

## 2023-01-04 NOTE — CONSULT NOTE ADULT - ATTENDING COMMENTS
PEG tube dislodged, metz was placed by primary team to maintain tract and exchanged with balloon replacement G-tube. Please see chart note from this afternoon for further recommendations. OK to use if placement confirmed on tube study.
Metastatic esophageal SCC.   SCC involving right knee patella s/p radical resection 11/10.   His knee pain and swelling never resolved.   Unless the joint space was entered intraoperatively (doesn't appear to be the case in operative report), I don't think he would have a secondary septic arthritis.   Furthermore, joint space fluid 12/25 (HIE) was negative growth.   Pending MRI and possible OR.     Between [facility] and here, I think he got enough antibiotics for possible pneumonia.   He's comfortable on room air and metastasis could explain the near-complete left lung opacification.     Favor monitoring off antibiotics.     Red Streeter MD   Infectious Disease   Available on TEAMS. After 5PM and on weekends please page fellow on call or call [phone]
I have seen and examined the patient at bedside. Agree with Dr. Pardo's assessment and recommendations as outlined in the note.  66 y/o M with metastatic esophageal cancer (to chest wall) recently on chemotherapy with outpatient oncologist (unclear what regimen), transferred from [facility] for further management of progressive R knee pain : metastasis vs. septic arthritis. Recent admission c/b COVID infection and pneumonia. Biopsy of R patella c/w SCC, concerning for metastasis. Patient's family would like a second oncology opinion. Please obtain oncology treatment records from [facility] so that further treatment recommendations can be made. NGS testing from recent R patella biopsy can be sent out for potential treatment targets. Recommend palliative care evaluation for further recommendations on symptoms management, pain control and GOC discussions. PT evaluation.

## 2023-01-04 NOTE — DISCHARGE NOTE PROVIDER - NSDCCPTREATMENT_GEN_ALL_CORE_FT
PRINCIPAL PROCEDURE  Procedure: CT  Findings and Treatment:        PRINCIPAL PROCEDURE  Procedure: CT  Findings and Treatment: FINDINGS:  PULMONARY ANGIOGRAM: No pulmonary embolus to the lobar branches of the   pulmonary arteries. Limited evaluation of segmental and subsegmental   vessels due to respiratory motion.  LYMPH NODES/MEDIASTINUM: Enlarged 1.7 x 1.2 cm lymph node in the azygos   esophageal recess (4:64). There is fluid/  < end of copied text >  debris within the lumen of the   esophagus.  HEART/VASCULATURE: The heart is normal in size. No pericardial effusion.   Mild calcified and noncalcified aortic plaque.  AIRWAYS/LUNGS/PLEURA: Central airways are patent. Patchy right upper lobe   groundglass opacities. Large mass in the left upper hemithorax measuring   9.7 x 6.8 x 10.3 cm, invading through the left chest wall. Associated   compressive atelectasis of the left upper and lower lobes. Small left   pleural effusion.  UPPER ABDOMEN: Small to moderate hiatal hernia. Mild left adrenal nodular   thickening.  BONES/SOFT TISSUES: Destructive changes of the left third through fifth   ribs as they course through the region of the aforementioned mass, with   near total obliteration of the anterolateral aspects of the left third   and fourth ribs.  IMPRESSION:  No pulmonary embolism detected.  Large mass in the left hemithorax invading the chest wall and involving   the left third through fifth ribs, which may be secondary to metastatic   disease.  Patchy right upper lobe groundglass opacities. While nonspecific, primary   considerations include infection and asymmetric pulmonary edema.  Small left pleural effusion.< from: CT Angio Chest PE Protocol w/ IV Cont (01.10.23 @ 18:24) >

## 2023-01-04 NOTE — PROGRESS NOTE ADULT - ASSESSMENT
67-year-old male with PMH of metastatic esophageal cancer, hypothyroidism, GERD, HTN who was transferred from [facility] for Orthopedics consult for possible right knee malignancy versus septic arthritis.     He received a biopsy of his right patella on 11/10/22 with pathology positive for squamous cell carcinoma (Dr. Carter). Per collateral from son, patient has been experiencing progressively worsening pain, erythema, edema, and warmth at the R knee joint for about 1 month since biopsy. Orthopedics following, recommend continuing IV antibiotics pending MRI knee. Presentation more consistent with known R knee malignancy in the context of stable vital signs and progression over 1 month. Collateral information from [facility] states no arthroscopy performed, will continue to hold off due to risk of seeding cancer. 67-year-old male with PMH of metastatic esophageal cancer, hypothyroidism, GERD, HTN who was transferred from [facility] for Orthopedics consult for possible right knee malignancy versus septic arthritis.     He received a biopsy of his right patella on 11/10/22 with pathology positive for squamous cell carcinoma (Dr. Carter). Per collateral from son, patient has been experiencing progressively worsening pain, erythema, edema, and warmth at the R knee joint for about 1 month since biopsy. Orthopedics following, recommend continuing IV antibiotics pending MRI knee. Presentation more consistent with known R knee malignancy in the context of stable vital signs and progression over 1 month. Collateral information from [facility] states no arthroscopy performed, will continue to hold off due to risk of seeding cancer. Patient scheduled for surgical washout of R knee by Orthopedics on 1/4 pending MRI knee.

## 2023-01-04 NOTE — ADVANCED PRACTICE NURSE CONSULT - ASSESSMENT
General: A&Ox4, bedbound, incontinent of urine and stool. Skin warm, dry. Assessment limited due to patient refusal. Pt states he was just cleaned up and cream (ni moisture barrier cream) was applied to his buttocks and that the orthopedic team is following for his R leg wound already. R leg with ace bandage intact in immobilizer. Patient did not want to turn for assessment and did not want R leg dressing to be refused, assessment limited to abdomen as per patient request.     Midline PEG - PEG intact with blanchable peritubular erythema extending 1cm circumferentially. Scant dried crust noted to previous dressing. Soiled dressing removed, area cleansed, mepilex lite dressing placed with secondary securement.     Spouse at bedside educated on importance of secondary securement of PEG tube and frequent turning and positioning while in bed, spouse verbalized understanding.  General: A&Ox4, bedbound, incontinent of urine and stool. Skin warm, dry. Assessment limited due to patient refusal. Pt states he was just cleaned up and cream (ni moisture barrier cream) was applied to his buttocks and that the orthopedic team is following for his R leg wound already. R leg with ace bandage intact in immobilizer. Patient did not want to turn for assessment and did not want R leg dressing to be removed, assessment limited to abdomen as per patient request.     Midline PEG - PEG intact with blanchable peritubular erythema extending 1cm circumferentially. Scant dried crust noted to previous dressing. Soiled dressing removed, area cleansed, mepilex lite dressing placed with secondary securement.     Spouse at bedside educated on importance of secondary securement of PEG tube and frequent turning and positioning while in bed, spouse verbalized understanding.  General: A&Ox4, bedbound, incontinent of urine and stool. Skin warm, dry. Assessment limited due to patient refusal. Pt states he was just cleaned up and cream (ni moisture barrier cream) was applied to his buttocks and that the orthopedic team is following for his R leg wound already. R leg with ace bandage intact in immobilizer. Patient did not want to turn for assessment and did not want R leg dressing to be removed, assessment limited to abdomen as per patient request. Primary YOSELIN Salguero and primary team MD Damaris Aguiar made aware of refusal.    Midline PEG - PEG intact with blanchable peritubular erythema extending 1cm circumferentially. Scant dried crust noted to previous dressing. Soiled dressing removed, area cleansed, mepilex lite dressing placed with secondary securement.     Spouse at bedside educated on importance of secondary securement of PEG tube and frequent turning and positioning while in bed, spouse verbalized understanding.

## 2023-01-04 NOTE — PROGRESS NOTE ADULT - PROBLEM SELECTOR PLAN 6
- Diet: Jevity 1.2 kcal with goal 40 cc/h continuous per PEG tube  - Nutrition consult, appreciate recs  - PT consult recommending JOHN PAUL  - DVT ppx: Lovenox 40 mg SQ daily  - Dispo: pending course/clinical improvement - Diet: Jevity 1.2 kcal with goal 40 cc/h continuous per PEG tube --> NPO now for procedure  - PT consult recommending JOHN PAUL  - DVT ppx: Lovenox 40 mg SQ daily  - Dispo: pending course/clinical improvement - Diet: Jevity 1.2 kcal with goal 40 cc/h continuous per PEG tube  - PT consult recommending JOHN PAUL  - DVT ppx: Lovenox 40 mg SQ daily  - Dispo: pending course/clinical improvement

## 2023-01-04 NOTE — DISCHARGE NOTE PROVIDER - NSDCMRMEDTOKEN_GEN_ALL_CORE_FT
albuterol 0.63 mg/3 mL (0.021%) inhalation solution: 3 milliliter(s) inhaled 3 times a day  amLODIPine 5 mg oral tablet: 1 tab(s) orally once a day  calamine topical lotion: Apply topically to affected area 1 to 2 times a day, As Needed  esomeprazole 20 mg oral delayed release capsule: 1 cap(s) orally once a day  gabapentin 300 mg oral capsule: 1 cap(s) orally 3 times a day, As Needed  levothyroxine 150 mcg (0.15 mg) oral tablet: 1 tab(s) orally once a day  Linzess 290 mcg oral capsule: 1 cap(s) orally once a day  magnesium oxide 400 mg oral tablet: 1 tab(s) orally 2 times a day  metoprolol succinate 50 mg oral tablet, extended release: 1 tab(s) orally once a day  oxyCODONE 5 mg oral tablet: 1 tab(s) orally every 6 hours MDD:4 tabs/day   albuterol 0.63 mg/3 mL (0.021%) inhalation solution: 3 milliliter(s) inhaled 3 times a day  calamine topical lotion: Apply topically to affected area 1 to 2 times a day, As Needed  esomeprazole 20 mg oral delayed release capsule: 1 cap(s) orally once a day  gabapentin 300 mg oral capsule: 1 cap(s) orally 3 times a day, As Needed  levothyroxine 150 mcg (0.15 mg) oral tablet: 1 tab(s) orally once a day  Linzess 290 mcg oral capsule: 1 cap(s) orally once a day  magnesium oxide 400 mg oral tablet: 1 tab(s) orally 2 times a day  oxyCODONE 5 mg oral tablet: 1 tab(s) orally every 6 hours MDD:4 tabs/day  polyethylene glycol 3350 oral powder for reconstitution: 17 gram(s) orally once a day  rivaroxaban 10 mg oral tablet: 1 tab(s) orally once a day   sodium chloride 1 g oral tablet: 2 tab(s) orally every 8 hours   albuterol 0.63 mg/3 mL (0.021%) inhalation solution: 3 milliliter(s) inhaled 3 times a day  calamine topical lotion: Apply topically to affected area 1 to 2 times a day, As Needed  esomeprazole 20 mg oral delayed release capsule: 1 cap(s) orally once a day  gabapentin 300 mg oral capsule: 1 cap(s) orally 3 times a day, As Needed  levothyroxine 150 mcg (0.15 mg) oral tablet: 1 tab(s) orally once a day  Linzess 290 mcg oral capsule: 1 cap(s) orally once a day  magnesium oxide 400 mg oral tablet: 1 tab(s) orally 2 times a day  oxyCODONE 5 mg oral tablet: 1 tab(s) orally every 6 hours MDD:4 tabs/day  polyethylene glycol 3350 oral powder for reconstitution: 17 gram(s) orally once a day  rivaroxaban 10 mg oral tablet: 1 tab(s) orally once a day   sodium chloride 1 g oral tablet: 2 tab(s) orally once a day    albuterol 0.63 mg/3 mL (0.021%) inhalation solution: 3 milliliter(s) inhaled 3 times a day  calamine topical lotion: Apply topically to affected area 1 to 2 times a day, As Needed  esomeprazole 20 mg oral delayed release capsule: 1 cap(s) orally once a day  gabapentin 300 mg oral capsule: 1 cap(s) orally 3 times a day, As Needed  levothyroxine 150 mcg (0.15 mg) oral tablet: 1 tab(s) orally once a day  Linzess 290 mcg oral capsule: 1 cap(s) orally once a day  oxyCODONE 5 mg oral tablet: 1 tab(s) orally every 6 hours MDD:4 tabs/day  polyethylene glycol 3350 oral powder for reconstitution: 17 gram(s) orally once a day  rivaroxaban 10 mg oral tablet: 1 tab(s) orally once a day    albuterol 0.63 mg/3 mL (0.021%) inhalation solution: 3 milliliter(s) inhaled 3 times a day  calamine topical lotion: Apply topically to affected area 1 to 2 times a day, As Needed  esomeprazole 20 mg oral delayed release capsule: 1 cap(s) orally once a day  gabapentin 300 mg oral capsule: 1 cap(s) orally 3 times a day, As Needed  levothyroxine 150 mcg (0.15 mg) oral tablet: 1 tab(s) orally once a day  Linzess 290 mcg oral capsule: 1 cap(s) orally once a day  oxyCODONE 5 mg oral tablet: 1 tab(s) orally every 6 hours MDD:4 tabs/day  polyethylene glycol 3350 oral powder for reconstitution: 17 gram(s) orally once a day  rivaroxaban 10 mg oral tablet: 1 tab(s) orally once a day   senna leaf extract oral tablet: 2 tab(s) orally once a day (at bedtime)   albuterol 0.63 mg/3 mL (0.021%) inhalation solution: 3 milliliter(s) inhaled 3 times a day  calamine topical lotion: Apply topically to affected area 1 to 2 times a day, As Needed  esomeprazole 20 mg oral delayed release capsule: 1 cap(s) orally once a day  gabapentin 300 mg oral capsule: 1 cap(s) orally 3 times a day, As Needed  levothyroxine 150 mcg (0.15 mg) oral tablet: 1 tab(s) orally once a day  Linzess 290 mcg oral capsule: 1 cap(s) orally once a day  oxyCODONE 10 mg oral tablet: 1 tab(s) orally every 4 hours, As needed, Severe Pain (7 - 10) MDD:60mg  polyethylene glycol 3350 oral powder for reconstitution: 17 gram(s) orally once a day  rivaroxaban 10 mg oral tablet: 1 tab(s) orally once a day   senna leaf extract oral tablet: 2 tab(s) orally once a day (at bedtime)   albuterol 0.63 mg/3 mL (0.021%) inhalation solution: 3 milliliter(s) inhaled 3 times a day  calamine topical lotion: Apply topically to affected area 1 to 2 times a day, As Needed  esomeprazole 20 mg oral delayed release capsule: 1 cap(s) by gastrostomy tube once a day  gabapentin 300 mg oral capsule: 1 cap(s) by gastrostomy tube 3 times a day, As Needed  levothyroxine 150 mcg (0.15 mg) oral tablet: 1 tab(s) by gastrostomy tube once a day  Linzess 290 mcg oral capsule: 1 cap(s) by gastrostomy tube once a day  oxyCODONE 10 mg oral tablet: 1 tab(s) by gastrostomy tube every 4 hours, As Needed -Severe Pain (7 - 10) MDD:60mg   polyethylene glycol 3350 oral powder for reconstitution: 17 gram(s) by gastrostomy tube once a day   rivaroxaban 10 mg oral tablet: 1 tab(s) by gastrostomy tube once a day   senna leaf extract oral tablet: 2 tab(s) by gastrostomy tube once a day (at bedtime)

## 2023-01-04 NOTE — PROGRESS NOTE ADULT - PROBLEM SELECTOR PLAN 4
- takes synthroid 150 mcg PO daily (home med)    Plan  - c/w synthroid 112 mcg IV daily  - TSH 11.98 but free T4 WNL  - patient denies new symptoms of hypothyroidism including constipation, hypotension, dry skin, brittle hair  - recommend outpatient follow-up as TSH may be inaccurate in context of current hospitalization

## 2023-01-04 NOTE — PROGRESS NOTE ADULT - PROBLEM SELECTOR PLAN 3
- diagnosed about 8 yrs ago  - follows with Dr. 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

## 2023-01-04 NOTE — PROGRESS NOTE ADULT - PROBLEM SELECTOR PLAN 1
- progressively worsening pain, erythema, edema, and warmth at the R knee joint for about 1 month  - recent excision of R patella on 11/10 with SCC on biopsy but unclear margins  - current presentation more consistent with progression of known cancer than septic arthritis  - WBC 30k, afebrile, CK and ESR elevated    Plan  - Pending MRI R knee w/wo contrast to evaluate for spread of cancer  - Ortho recommending possible surgical washout of R knee tomorrow       - NPO at midnight, hold lovenox, AM labs orders, risk stratification note written  - Appreciate Ortho recs:     - WBAT RLE with knee in extension     - No indications for arthroscopy now due to risk of seeding cancer  - Hold off on ID consult  - Continue IV Cefepime and Vancomycin for empiric coverage, day 3 now   - Vanc trough on 1/2 before 4th dose 19.5, in therapeutic range  - Pain control:      - Tylenol 650 mg q6h for mild to moderate     - Oxycodone-acetaminophen 5-325 mg q6h PRN for severe pain     - c/w bowel regimen to prevent opioid induced constipation  - DVT ppx with lovenox 40 mg SQ daily - progressively worsening pain, erythema, edema, and warmth at the R knee joint for about 1 month  - recent excision of R patella on 11/10 with SCC on biopsy but unclear margins  - current presentation more consistent with progression of known cancer than septic arthritis  - WBC 30k, afebrile, CK and ESR elevated    Plan  - Pending MRI R knee w/wo contrast to evaluate for spread of cancer  - Ortho recommending possible surgical washout of R knee today 1/4       - NPO at midnight, lovenox held, AM labs ordered, risk stratification note written  - Appreciate Ortho recs:     - WBAT RLE with knee in extension     - No indications for arthroscopy now due to risk of seeding cancer  - Continue IV Cefepime and Vancomycin for empiric coverage, day 4 now      - Vanc trough on 1/2 before 4th dose 19.5, in therapeutic range  - Pain control:      - Tylenol 650 mg q6h for mild pain     - Oxycodone 5 mg q6h PRN for moderate pain     - Oxycodone 10 mg q6h PRN for severe pain        - c/w bowel regimen to prevent opioid induced constipation (miralax daily)  - DVT ppx with lovenox 40 mg SQ daily - progressively worsening pain, erythema, edema, and warmth at the R knee joint for about 1 month  - recent excision of R patella on 11/10 with SCC on biopsy but unclear margins  - current presentation more consistent with progression of known cancer than septic arthritis    Plan  - Pending MRI R knee w/wo contrast to evaluate for spread of cancer  - Ortho recommending possible surgical washout of R knee after MRI completed     - unlikely to be completed today per ortho, will resume feeds  - Appreciate Ortho recs:     - WBAT RLE with knee in extension     - No indications for arthroscopy now due to risk of seeding cancer  - Continue IV Cefepime and Vancomycin for empiric coverage, day 4 now      - Vanc trough on 1/2 before 4th dose 19.5, in therapeutic range  - Pain control:      - Tylenol 650 mg q6h for mild pain     - Oxycodone 5 mg q6h PRN for moderate pain     - Oxycodone 10 mg q6h PRN for severe pain        - c/w bowel regimen to prevent opioid induced constipation (miralax daily)  - DVT ppx with lovenox 40 mg SQ daily

## 2023-01-04 NOTE — PROGRESS NOTE ADULT - ATTENDING COMMENTS
68 yo M w/ metastatic SCC esophageal cancer w/ mets to chest wall, c/b dysphagia s/p PEG placement, recent biopsy of R patella positive for squamous cell carcinoma, HTN, hypothyroidism, GERD, recent hospitalization for COVID PNA, presents with worsening R knee pain, difficulty ambulating, transferred to [facility] for orthopedic evaluation/further management. Noted to have leukocytosis, elevated inflammatory markers, on empiric antibiotics though suspect malignancy > septic arthritis, awaiting MRI for further evaluation.   Appreciate ID eval, will DC abx and monitor.   F/u Ortho recs.   C/w pain control.   D/w wife & son at bedside.

## 2023-01-05 NOTE — PROGRESS NOTE ADULT - PROBLEM SELECTOR PLAN 5
- home meds include amlodipine 5 mg, metoprolol succinate 50 mg   - BP 90s-100s/50s-60s in ED    Plan  - BP meds held due to concern for sepsis  - BP 110s-120s/60s on 1/4, will continue to hold - home meds include amlodipine 5 mg, metoprolol succinate 50 mg   - BP 90s-100s/50s-60s in ED; now 120s/70s    Plan  - BP meds held due to concern for sepsis  - BP within acceptable range, will continue to hold

## 2023-01-05 NOTE — CHART NOTE - NSCHARTNOTEFT_GEN_A_CORE
Pt seen for malnutrition follow up.    Medical Course:  - Per chart, pt is 67 year old male PMH metastatic esophageal cancer s/p PEG, hypothyroidism, GERD, HTN transferred from [facility] for orthopedics consult.  - Presentation more consistent with known right knee malignancy. Orthopedics following, pending MRI knee.     Nutrition Course:  - Pt was transitioned from Jevity 1.2 to TwoCal, visibly running via pump at goal rate 45 mL/hr at time of visit. Pt tolerating EN well. Pt denies GI distress, last BM 1/3, fecal incontinence per flowsheets; ordered for Miralax 17 gm qD.   - Labs notable for hyponatremia (ordered for salt tabs 2 gm q8 hrs) and hypophosphatemia (repleted).     Diet Prescription:   - NPO with Tube Feed: TwoCal HN via Gastrostomy initiated at 25 mL/hr increased by 10 mL q4 hrs to goal rate 45 mL/hr x24 hrs     Pertinent Medications:   - levothyroxine IV, magnesium oxide, pantoprazole Suspension, polyethylene glycol, potassium phosphate IV, sodium chloride    Pertinent Labs:   - (1/5) Na 122 mmol/L<L> Glu 109 mg/dL<H> K+ 3.9 mmol/L Cr 0.79 mg/dL BUN 14 mg/dL Phos 1.8 mg/dL<L> Alb 2.8 g/dL<L>    Weight: (1/5 bed scale obtained at time of visit, unable to tare) 146.7 lbs / 66.7 kg, (1/4 dosing) 146.3 lbs / 66.4 kg   Height: 67 in / 170.18 cm  IBW: 148 lbs / 67.3 kg +/-10%  BMI: 23.0 kg/m^2 (at most recent weight)    Physical Assessment, per flowsheets:  Edema: 1+ right knee  Pressure Injury: none noted    Estimated Needs:   [X] Recalculated, based on dosing weight 146.3 lbs / 66.4 kg   1992-2324 kcal daily @30-35 kcal/kg, 92..24 gm protein daily @1.4-1.6 gm/kg     Previous Nutrition Diagnosis: [X] Severe malnutrition in the context of acute illness, remains appropriate  New Nutrition Diagnosis: [X] not applicable     Interventions:   1) Consider transition to bolus feeds; recommend TwoCal HN 1 can (237 mL) bolus via PEG 5x daily to provide 1185 mL formula, 2370 kcal, 98.9 gm protein, 830 mL free water (meets 35.7 kcal/kg, 1.5 gm protein/kg @ dosing weight 66.4 kg).   2) Obtain weekly weights.   3) Monitor electrolytes (K+, Mg, P) and replete to within desired limits as clinically indicated.     Monitor & Evaluate:  Tolerance to EN, nutrition related lab values, weight trends, BMs/GI distress, hydration status, skin integrity.  Cyndee Andrade RDN, CDN #[id number]  Also available on [employer] Teams Pt seen for malnutrition follow up.    Medical Course:  - Per chart, pt is 67 year old male PMH metastatic esophageal cancer s/p PEG, hypothyroidism, GERD, HTN transferred from [facility] for orthopedics consult.  - Presentation more consistent with known right knee malignancy. Orthopedics following, pending MRI knee.     Nutrition Course:  - Pt was transitioned from [facility] 1.2 to TwoCal, visibly running via pump at goal rate 45 mL/hr at time of visit. This regimen provides 1080 mL formula, 2160 kcal, 90.18 gm protein, 756 mL free water daily. Pt tolerating EN well. Pt denies GI distress, last BM 1/3, fecal incontinence per flowsheets; ordered for Miralax 17 gm qD.   - Labs notable for hyponatremia (ordered for salt tabs 2 gm q8 hrs) and hypophosphatemia (repleted).     Diet Prescription:   - NPO with Tube Feed: TwoCal HN via Gastrostomy initiated at 25 mL/hr increased by 10 mL q4 hrs to goal rate 45 mL/hr x24 hrs     Pertinent Medications:   - levothyroxine IV, magnesium oxide, pantoprazole Suspension, polyethylene glycol, potassium phosphate IV, sodium chloride    Pertinent Labs:   - (1/5) Na 122 mmol/L<L> Glu 109 mg/dL<H> K+ 3.9 mmol/L Cr 0.79 mg/dL BUN 14 mg/dL Phos 1.8 mg/dL<L> Alb 2.8 g/dL<L>    Weight: (1/5 bed scale obtained at time of visit, unable to tare) 146.7 lbs / 66.7 kg, (1/4 dosing) 146.3 lbs / 66.4 kg   Height: 67 in / 170.18 cm  IBW: 148 lbs / 67.3 kg +/-10%  BMI: 23.0 kg/m^2 (at most recent weight)    Physical Assessment, per flowsheets:  Edema: 1+ right knee  Pressure Injury: none noted    Estimated Needs:   [X] Recalculated, based on dosing weight 146.3 lbs / 66.4 kg   1992-2324 kcal daily @30-35 kcal/kg, 92..24 gm protein daily @1.4-1.6 gm/kg     Previous Nutrition Diagnosis: [X] Severe malnutrition in the context of acute illness, remains appropriate  New Nutrition Diagnosis: [X] not applicable     Interventions:   1) Consider transition to bolus feeds; recommend TwoCal HN 1 can (237 mL) bolus via PEG 5x daily to provide 1185 mL formula, 2370 kcal, 98.9 gm protein, 830 mL free water (meets 35.7 kcal/kg, 1.5 gm protein/kg @ dosing weight 66.4 kg).   2) Obtain weekly weights.   3) Monitor electrolytes (K+, Mg, P) and replete to within desired limits as clinically indicated.     Monitor & Evaluate:  Tolerance to EN, nutrition related lab values, weight trends, BMs/GI distress, hydration status, skin integrity.  Cyndee Andrade RDN, CDN #[id number]  Also available on [employer] Teams

## 2023-01-05 NOTE — PROGRESS NOTE ADULT - PROBLEM SELECTOR PLAN 3
- diagnosed about 8 yrs ago  - follows with Dr. Gi Thomas ([place])  - complicated by dysphagia with all meds/nutrition via PEG  - CXR with near complete opacification of L lung, stable from previous imaging and likely represents pleural metastases    Plan  - Will contact outpatient oncologist to understand current regimen and obtain records  - Family interested in transition to [facility], spoke with Oncology on 1/3  - Continue goals of care conversation with family  - C/w pantoprazole 40 mg daily, miralax PRN daily, gabapentin 300 mg TID PRN for pain, MgOH and KCl for supplementation  - C/w tube feeds, strict NPO - diagnosed about 8 yrs ago  - follows with Dr. Gi Thomas ([place])  - complicated by dysphagia with all meds/nutrition via PEG  - CXR with near complete opacification of L lung, stable from previous imaging and likely represents pleural metastases    Plan  - Contacted outpatient oncologist, most recent progress note obtained  - Family considering transition to [facility], spoke with Oncology on 1/3  - On discussion on 1/5, family prefers to follow with current oncologist  - Continue goals of care conversation with family  - C/w pantoprazole 40 mg daily, miralax PRN daily, gabapentin 300 mg TID PRN for pain, MgOH and KCl for supplementation  - C/w tube feeds, strict NPO

## 2023-01-05 NOTE — PROGRESS NOTE ADULT - PROBLEM SELECTOR PLAN 6
- Diet: Jevity 1.2 kcal with goal 40 cc/h continuous per PEG tube  - PT consult recommending JOHN PAUL  - DVT ppx: Lovenox 40 mg SQ daily  - Dispo: pending course/clinical improvement - Diet: appreciate nutrition recs for bolus feeds with TwoCal HN (237 ml x 5 feeds daily)  - PT consult recommending JOHN PAUL, family declining  - DVT ppx: Lovenox 40 mg SQ daily  - Dispo: pending course/clinical improvement

## 2023-01-05 NOTE — CHART NOTE - NSCHARTNOTEFT_GEN_A_CORE
Oncology chart note    oncology fellow called pt's son Omkar and wife Patricia ([phone]). They reported that wife will call pt's med onc Dr. Gi Smith's office tomorrow 1/6/23 for requesting Medical Records to be sent to [facility] [place]. I also informed them that [facility] staff will call them tomorrow or in the next few days for setting up an appointment since they are requesting a 2nd opinion at [facility]. [facility] staff may provide fax number for them to send medical records there.     Onc team will f/u.     Claritza Pardo PGY5   hem & onc fellow p [phone]

## 2023-01-05 NOTE — PROGRESS NOTE ADULT - SUBJECTIVE AND OBJECTIVE BOX
Damaris Aguiar, PGY1  TEAMS or Pager [phone]     Patient is a 67y old  Male who presents with a chief complaint of R knee pain (2023 14:41)      SUBJECTIVE/INTERVAL EVENTS: Patient seen and examined at bedside. Otherwise, denies chest pain, SOB, lightheadedness, nausea, vomiting, constipation, or diarrhea.    MEDICATIONS  (STANDING):  enoxaparin Injectable 40 milliGRAM(s) SubCutaneous every 24 hours  levothyroxine Injectable 112 MICROGram(s) IV Push <User Schedule>  magnesium oxide 400 milliGRAM(s) Oral two times a day with meals  pantoprazole   Suspension 40 milliGRAM(s) Oral daily  polyethylene glycol 3350 17 Gram(s) Oral daily  sodium chloride 1 Gram(s) Oral every 12 hours    MEDICATIONS  (PRN):  acetaminophen    Suspension .. 650 milliGRAM(s) Oral every 6 hours PRN Temp greater or equal to 38C (100.4F), Mild Pain (1 - 3)  albuterol    90 MICROgram(s) HFA Inhaler 2 Puff(s) Inhalation every 6 hours PRN Shortness of Breath and/or Wheezing  gabapentin 300 milliGRAM(s) Oral three times a day PRN neuropathic pain  oxyCODONE    IR 5 milliGRAM(s) Oral every 6 hours PRN Moderate Pain (4 - 6)  oxyCODONE    IR 10 milliGRAM(s) Oral every 6 hours PRN Severe Pain (7 - 10)      VITAL SIGNS:  T(F): 99.1 (23 @ 22:52), Max: 99.1 (23 @ 22:52)  HR: 114 (23 @ 22:52) (111 - 116)  BP: 121/67 (23 @ 22:52) (117/73 - 121/67)  RR: 18 (23 @ 22:52) (17 - 18)  SpO2: 99% (23 @ 22:52) (96% - 99%)    I&O's Summary    2023 07:01  -  2023 07:00  --------------------------------------------------------  IN: 590 mL / OUT: 750 mL / NET: -160 mL    2023 07:01  -  2023 05:52  --------------------------------------------------------  IN: 568 mL / OUT: 900 mL / NET: -332 mL      Daily     Daily     PHYSICAL EXAM:  Gen: Alert, NAD  HEENT: NCAT, conjunctiva clear, sclera anicteric, no erythema or exudates in the oropharynx, mmm  Neck: Supple, no JVD  CV: RRR, S1S2, no m/r/g  Resp: CTAB, normal respiratory effort  Abd: Soft, nontender, nondistended, normal bowel sounds  Ext: no edema, no clubbing or cyanosis  Neuro: AOx3, CN2-12 grossly intact, ANDREWS  SKIN: warm, perfused    LABS:                        7.6    22.27 )-----------( 630      ( 2023 07:14 )             23.7     Hgb Trend: 7.6<--, 7.5<--, 8.3<--, 8.2<--  01-04    126<L>  |  95<L>  |  10  ----------------------------<  97  4.2   |  19<L>  |  0.76    Ca    9.3      2023 07:14  Phos  2.0     -  Mg     1.80         TPro  6.7  /  Alb  2.7<L>  /  TBili  0.3  /  DBili  x   /  AST  27  /  ALT  28  /  AlkPhos  207<H>  -03    Creatinine Trend: 0.76<--, 0.72<--, 0.66<--, 0.75<--  LIVER FUNCTIONS - ( 2023 06:15 )  Alb: 2.7 g/dL / Pro: 6.7 g/dL / ALK PHOS: 207 U/L / ALT: 28 U/L / AST: 27 U/L / GGT: x           PT/INR - ( 2023 07:14 )   PT: 15.8 sec;   INR: 1.36 ratio         PTT - ( 2023 07:14 )  PTT:33.3 sec      Urinalysis Basic - ( 2023 10:28 )    Color: Light Yellow / Appearance: Clear / S.015 / pH: x  Gluc: x / Ketone: Negative  / Bili: Negative / Urobili: <2 mg/dL   Blood: x / Protein: 30 mg/dL / Nitrite: Negative   Leuk Esterase: Small / RBC: 0-2 /HPF / WBC 2-5 /HPF   Sq Epi: x / Non Sq Epi: x / Bacteria: x        CAPILLARY BLOOD GLUCOSE          RADIOLOGY & ADDITIONAL TESTS: Reviewed    Imaging Personally Reviewed:    Consultant(s) Notes Reviewed:      Care Discussed with Consultants/Other Providers:   Damaris Aguiar, PGY1  TEAMS or Pager [phone]     Patient is a 67y old  Male who presents with a chief complaint of R knee pain (2023 14:41)      SUBJECTIVE/INTERVAL EVENTS: NAOE. Patient seen and examined at bedside. Otherwise, denies chest pain, SOB, lightheadedness, nausea, vomiting, constipation, or diarrhea.    MEDICATIONS  (STANDING):  enoxaparin Injectable 40 milliGRAM(s) SubCutaneous every 24 hours  levothyroxine Injectable 112 MICROGram(s) IV Push <User Schedule>  magnesium oxide 400 milliGRAM(s) Oral two times a day with meals  pantoprazole   Suspension 40 milliGRAM(s) Oral daily  polyethylene glycol 3350 17 Gram(s) Oral daily  sodium chloride 1 Gram(s) Oral every 12 hours    MEDICATIONS  (PRN):  acetaminophen    Suspension .. 650 milliGRAM(s) Oral every 6 hours PRN Temp greater or equal to 38C (100.4F), Mild Pain (1 - 3)  albuterol    90 MICROgram(s) HFA Inhaler 2 Puff(s) Inhalation every 6 hours PRN Shortness of Breath and/or Wheezing  gabapentin 300 milliGRAM(s) Oral three times a day PRN neuropathic pain  oxyCODONE    IR 5 milliGRAM(s) Oral every 6 hours PRN Moderate Pain (4 - 6)  oxyCODONE    IR 10 milliGRAM(s) Oral every 6 hours PRN Severe Pain (7 - 10)      VITAL SIGNS:  T(F): 99.1 (23 @ 22:52), Max: 99.1 (23 @ 22:52)  HR: 114 (23 @ 22:52) (111 - 116)  BP: 121/67 (23 @ 22:52) (117/73 - 121/67)  RR: 18 (23 @ 22:52) (17 - 18)  SpO2: 99% (23 @ 22:52) (96% - 99%)    I&O's Summary    2023 07:01  -  2023 07:00  --------------------------------------------------------  IN: 590 mL / OUT: 750 mL / NET: -160 mL    2023 07:01  -  2023 05:52  --------------------------------------------------------  IN: 568 mL / OUT: 900 mL / NET: -332 mL      Daily     Daily     PHYSICAL EXAM:  Gen: Alert, NAD  HEENT: NCAT, conjunctiva clear, sclera anicteric, no erythema or exudates in the oropharynx, mmm  Neck: Supple, no JVD  CV: RRR, S1S2, no m/r/g  Resp: CTAB, normal respiratory effort  Abd: Soft, nontender, nondistended, normal bowel sounds  Ext: no edema, no clubbing or cyanosis  Neuro: AOx3, CN2-12 grossly intact, ANDREWS  SKIN: warm, perfused    LABS:                        7.6    22.27 )-----------( 630      ( 2023 07:14 )             23.7     Hgb Trend: 7.6<--, 7.5<--, 8.3<--, 8.2<--  01-04    126<L>  |  95<L>  |  10  ----------------------------<  97  4.2   |  19<L>  |  0.76    Ca    9.3      2023 07:14  Phos  2.0     -  Mg     1.80         TPro  6.7  /  Alb  2.7<L>  /  TBili  0.3  /  DBili  x   /  AST  27  /  ALT  28  /  AlkPhos  207<H>      Creatinine Trend: 0.76<--, 0.72<--, 0.66<--, 0.75<--  LIVER FUNCTIONS - ( 2023 06:15 )  Alb: 2.7 g/dL / Pro: 6.7 g/dL / ALK PHOS: 207 U/L / ALT: 28 U/L / AST: 27 U/L / GGT: x           PT/INR - ( 2023 07:14 )   PT: 15.8 sec;   INR: 1.36 ratio         PTT - ( 2023 07:14 )  PTT:33.3 sec      Urinalysis Basic - ( 2023 10:28 )    Color: Light Yellow / Appearance: Clear / S.015 / pH: x  Gluc: x / Ketone: Negative  / Bili: Negative / Urobili: <2 mg/dL   Blood: x / Protein: 30 mg/dL / Nitrite: Negative   Leuk Esterase: Small / RBC: 0-2 /HPF / WBC 2-5 /HPF   Sq Epi: x / Non Sq Epi: x / Bacteria: x        CAPILLARY BLOOD GLUCOSE          RADIOLOGY & ADDITIONAL TESTS: Reviewed    Imaging Personally Reviewed:    Consultant(s) Notes Reviewed:      Care Discussed with Consultants/Other Providers:   Damaris Aguiar, PGY1  TEAMS or Pager [phone]     Patient is a 67y old  Male who presents with a chief complaint of R knee pain (2023 14:41)      SUBJECTIVE/INTERVAL EVENTS: NAOE. Patient seen and examined at bedside. He reports R knee pain is severe but slightly improved from yesterday. He requested PRN oxycodone 10 mg x 2 and 5 mg x 2 in last 24 hours. He denies constipation and last had a BM on 1/3. He has been tolerating tube feeds with no abdominal pain, N/V, or pain at PEG site. Otherwise, denies chest pain, SOB, lightheadedness, or palpitations.    MEDICATIONS  (STANDING):  enoxaparin Injectable 40 milliGRAM(s) SubCutaneous every 24 hours  levothyroxine Injectable 112 MICROGram(s) IV Push <User Schedule>  magnesium oxide 400 milliGRAM(s) Oral two times a day with meals  pantoprazole   Suspension 40 milliGRAM(s) Oral daily  polyethylene glycol 3350 17 Gram(s) Oral daily  sodium chloride 1 Gram(s) Oral every 12 hours    MEDICATIONS  (PRN):  acetaminophen    Suspension .. 650 milliGRAM(s) Oral every 6 hours PRN Temp greater or equal to 38C (100.4F), Mild Pain (1 - 3)  albuterol    90 MICROgram(s) HFA Inhaler 2 Puff(s) Inhalation every 6 hours PRN Shortness of Breath and/or Wheezing  gabapentin 300 milliGRAM(s) Oral three times a day PRN neuropathic pain  oxyCODONE    IR 5 milliGRAM(s) Oral every 6 hours PRN Moderate Pain (4 - 6)  oxyCODONE    IR 10 milliGRAM(s) Oral every 6 hours PRN Severe Pain (7 - 10)      VITAL SIGNS:  T(F): 99.1 (23 @ 22:52), Max: 99.1 (23 @ 22:52)  HR: 114 (23 @ 22:52) (111 - 116)  BP: 121/67 (23 @ 22:52) (117/73 - 121/67)  RR: 18 (23 @ 22:52) (17 - 18)  SpO2: 99% (23 @ 22:52) (96% - 99%)    I&O's Summary    2023 07:01  -  2023 07:00  --------------------------------------------------------  IN: 590 mL / OUT: 750 mL / NET: -160 mL    2023 07:01  -  2023 05:52  --------------------------------------------------------  IN: 568 mL / OUT: 900 mL / NET: -332 mL      Daily     Daily     PHYSICAL EXAM:  Gen: Alert, NAD  HEENT: NCAT, conjunctiva clear, sclera anicteric, white exudates in the oropharynx, dry mucous membranes  Neck: Supple, no JVD  CV: RRR, S1S2, no m/r/g, has productive cough  Resp: CTAB, normal respiratory effort  Abd: Soft, nontender, nondistended, normal bowel sounds, PEG site with no erythema, edema, or pain to palpation  Ext: 1+ edema to calf R foot only, no clubbing or cyanosis  - RLE: in immobilizer and ace wrap, patella painful to palpation, sensation intact to LT, 2+ DP  Neuro: AOx3, CN2-12 grossly intact, ANDREWS  SKIN: warm, perfused    LABS:                        7.6    22.27 )-----------( 630      ( 2023 07:14 )             23.7     Hgb Trend: 7.6<--, 7.5<--, 8.3<--, 8.2<--  01-04    126<L>  |  95<L>  |  10  ----------------------------<  97  4.2   |  19<L>  |  0.76    Ca    9.3      2023 07:14  Phos  2.0     01-04  Mg     1.80     01-04    TPro  6.7  /  Alb  2.7<L>  /  TBili  0.3  /  DBili  x   /  AST  27  /  ALT  28  /  AlkPhos  207<H>  -    Creatinine Trend: 0.76<--, 0.72<--, 0.66<--, 0.75<--  LIVER FUNCTIONS - ( 2023 06:15 )  Alb: 2.7 g/dL / Pro: 6.7 g/dL / ALK PHOS: 207 U/L / ALT: 28 U/L / AST: 27 U/L / GGT: x           PT/INR - ( 2023 07:14 )   PT: 15.8 sec;   INR: 1.36 ratio         PTT - ( 2023 07:14 )  PTT:33.3 sec      Urinalysis Basic - ( 2023 10:28 )    Color: Light Yellow / Appearance: Clear / S.015 / pH: x  Gluc: x / Ketone: Negative  / Bili: Negative / Urobili: <2 mg/dL   Blood: x / Protein: 30 mg/dL / Nitrite: Negative   Leuk Esterase: Small / RBC: 0-2 /HPF / WBC 2-5 /HPF   Sq Epi: x / Non Sq Epi: x / Bacteria: x        CAPILLARY BLOOD GLUCOSE      RADIOLOGY & ADDITIONAL TESTS: Reviewed    Imaging Personally Reviewed: MRI knee    Consultant(s) Notes Reviewed:  Orthopedics, Nutrition, Oncology    Care Discussed with Consultants/Other Providers: Orthopedics

## 2023-01-05 NOTE — PROGRESS NOTE ADULT - PROBLEM SELECTOR PLAN 2
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

## 2023-01-05 NOTE — PROGRESS NOTE ADULT - PROBLEM SELECTOR PLAN 1
- progressively worsening pain, erythema, edema, and warmth at the R knee joint for about 1 month  - recent excision of R patella on 11/10 with SCC on biopsy but unclear margins  - current presentation more consistent with progression of known cancer than septic arthritis    Plan  - Pending MRI R knee w/wo contrast to evaluate for spread of cancer  - Ortho recommending possible surgical washout of R knee after MRI completed     - unlikely to be completed today per ortho, will resume feeds  - Appreciate Ortho recs:     - WBAT RLE with knee in extension     - No indications for arthroscopy now due to risk of seeding cancer  - Continue IV Cefepime and Vancomycin for empiric coverage, day 4 now      - Vanc trough on 1/2 before 4th dose 19.5, in therapeutic range  - Pain control:      - Tylenol 650 mg q6h for mild pain     - Oxycodone 5 mg q6h PRN for moderate pain     - Oxycodone 10 mg q6h PRN for severe pain        - c/w bowel regimen to prevent opioid induced constipation (miralax daily)  - DVT ppx with lovenox 40 mg SQ daily - progressively worsening pain, erythema, edema, and warmth at the R knee joint for about 1 month  - recent excision of R patella on 11/10 with SCC on biopsy but unclear margins  - current presentation more consistent with progression of known cancer than septic arthritis    Plan  - MRI R knee w/wo contrast showing R patellar fracture with OM vs osteonecrosis, moderate complicated effusion  - Ortho recommending tap of R knee for cell counts, cytology, bacterial/fungal/AFB culture  - Appreciate Ortho recs:     - WBAT RLE with knee in extension  - Discontinue IV antibiotics per ID, less likely septic arthritis given chronic course and known cancer in that location  - Pain control:      - Tylenol 650 mg q6h for mild pain     - Oxycodone 5 mg q6h PRN for moderate pain     - Oxycodone 10 mg q6h PRN for severe pain        - c/w bowel regimen to prevent opioid induced constipation (miralax daily)  - DVT ppx with lovenox 40 mg SQ daily

## 2023-01-05 NOTE — PROGRESS NOTE ADULT - ASSESSMENT
67-year-old male with PMH of metastatic esophageal cancer, hypothyroidism, GERD, HTN who was transferred from [facility] for Orthopedics consult for possible right knee malignancy versus septic arthritis.     He received a biopsy of his right patella on 11/10/22 with pathology positive for squamous cell carcinoma (Dr. Carter). Per collateral from son, patient has been experiencing progressively worsening pain, erythema, edema, and warmth at the R knee joint for about 1 month since biopsy. Orthopedics following, recommend continuing IV antibiotics pending MRI knee. Presentation more consistent with known R knee malignancy in the context of stable vital signs and progression over 1 month. Collateral information from [facility] states no arthroscopy performed, will continue to hold off due to risk of seeding cancer. Patient scheduled for surgical washout of R knee by Orthopedics on 1/4 pending MRI knee.

## 2023-01-05 NOTE — PROGRESS NOTE ADULT - SUBJECTIVE AND OBJECTIVE BOX
Patient seen and examined. No acute events overnight. Continues to endorse R knee pain.    Objective:  Vital Signs Last 24 Hrs  T(C): 37.3 (05 Jan 2023 06:03), Max: 37.3 (04 Jan 2023 22:52)  T(F): 99.2 (05 Jan 2023 06:03), Max: 99.2 (05 Jan 2023 06:03)  HR: 115 (05 Jan 2023 06:03) (111 - 115)  BP: 106/66 (05 Jan 2023 06:03) (106/66 - 121/67)  RR: 18 (05 Jan 2023 06:03) (17 - 18)  SpO2: 99% (05 Jan 2023 06:03) (96% - 99%)  Parameters below as of 05 Jan 2023 06:03  Patient On (Oxygen Delivery Method): room air    PHYSICAL EXAM  General: NAD, Awake and Alert, resting comfortably  Resp: Non-labored breathing  RLE:  BJKI c/d/i  Motor FHL/EHL/TA/GSC intact  Sensory DP/SP/Rosio/Saph/Tib SILT  Palpable DP pulse  WWP      LABS                         7.6    22.27 )-----------( 630      ( 04 Jan 2023 07:14 )             23.7   01-04    126<L>  |  95<L>  |  10  ----------------------------<  97  4.2   |  19<L>  |  0.76    Ca    9.3      04 Jan 2023 07:14  Phos  2.0     01-04  Mg     1.80     01-04

## 2023-01-05 NOTE — PROGRESS NOTE ADULT - ASSESSMENT
67y Male with worsening Right knee pain and serosanguinous sp mass excision on 11/10 concerning for oncologic process. Patient currently tachycardic with elevated wbc, covid positive, recently admitted for covid pneumonia, on antibiotics.    - Please obtain MRI R knee w/wo contrast  - WBAT RLE with knee in extension  - Abx management per ID/primary team  - Local wound care  - Pain control as needed  - Care per primary team

## 2023-01-05 NOTE — PROGRESS NOTE ADULT - ATTENDING COMMENTS
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 Noted to have leukocytosis, elevated inflammatory markers, on empiric antibiotics though suspect malignancy > septic arthritis, awaiting MRI for further evaluation.   Appreciate ID eval, abx d/c'ed on 1/4, monitoring off abx.   Pain better controlled w/ increased oxycodone, ctm.   Hyponatremia - studies c/w SIADH, started on salt tabs, worsened this am, will increase salt tab, and repeat BMP this PM

## 2023-01-06 NOTE — PROGRESS NOTE ADULT - ASSESSMENT
67y Male with worsening Right knee pain and serosanguinous sp mass excision on 11/10 concerning for oncologic process. Patient currently tachycardic with elevated wbc, covid positive, recently admitted for covid pneumonia, on antibiotics.    - MRI R knee completed  - R knee arthrocentesis performed 1/5/23 after MRI: ~5cc sanguinous fluid, Gram stain negative, bacterial/fungal culture pending in lab, cell count/crystals/AFB not enough fluid for sample  - WBAT RLE with knee in extension/BJKI  - Abx management per ID/primary team  - Local wound care  - Pain control as needed  - Care per primary team 67y Male with worsening Right knee pain and serosanguinous sp mass excision on 11/10 concerning for oncologic process. Patient currently tachycardic with elevated wbc, covid positive, recently admitted for covid pneumonia, on antibiotics.    - MRI R knee completed: no evidence of neoplasm recurrence  - R knee arthrocentesis performed 1/5/23 after MRI: ~5cc sanguinous fluid, Gram stain negative, bacterial/fungal culture pending in lab, cell count/crystals/AFB not enough fluid for sample  - WBAT RLE with knee in extension/BJKI  - Abx management per ID/primary team  - Local wound care  - Pain control as needed  - Care per primary team  - F/u outpt with Dr. Carter in 1 week, please call office for appt

## 2023-01-06 NOTE — PROGRESS NOTE ADULT - SUBJECTIVE AND OBJECTIVE BOX
Follow Up: knee pain     Interval History/ROS: Knee still hurts. Some cough still, not worse, chronic per wife. No fevers or chills.     Allergies  No Known Allergies        ANTIMICROBIALS:      OTHER MEDS:  acetaminophen    Suspension .. 650 milliGRAM(s) Oral every 6 hours PRN  albuterol    90 MICROgram(s) HFA Inhaler 2 Puff(s) Inhalation every 6 hours PRN  gabapentin 300 milliGRAM(s) Oral three times a day PRN  levothyroxine Injectable 112 MICROGram(s) IV Push <User Schedule>  magnesium oxide 400 milliGRAM(s) Oral two times a day with meals  oxyCODONE    IR 5 milliGRAM(s) Oral every 6 hours PRN  oxyCODONE    IR 10 milliGRAM(s) Oral every 6 hours PRN  pantoprazole   Suspension 40 milliGRAM(s) Oral daily  polyethylene glycol 3350 17 Gram(s) Oral daily  potassium phosphate IVPB 30 milliMole(s) IV Intermittent once  sodium chloride 2 Gram(s) Oral every 8 hours      Vital Signs Last 24 Hrs  T(C): 36.9 (06 Jan 2023 16:30), Max: 36.9 (06 Jan 2023 14:34)  T(F): 98.5 (06 Jan 2023 16:30), Max: 98.5 (06 Jan 2023 14:34)  HR: 129 (06 Jan 2023 16:30) (109 - 129)  BP: 115/67 (06 Jan 2023 16:30) (104/61 - 128/70)  BP(mean): --  RR: 17 (06 Jan 2023 14:34) (17 - 18)  SpO2: 100% (06 Jan 2023 16:30) (99% - 100%)    Parameters below as of 06 Jan 2023 16:30  Patient On (Oxygen Delivery Method): room air        Physical Exam:  General: non toxic, tired  Cardio: regular rate   Respiratory: nonlabored, grossly clear   abd: nondistended, soft nontender  Musculoskeletal: right knee brace   vascular: no phlebitis   Skin: no rash                          7.9    25.13 )-----------( 646      ( 05 Jan 2023 06:47 )             25.5       01-06    131<L>  |  98  |  17  ----------------------------<  105<H>  4.5   |  21<L>  |  0.74    Ca    9.7      06 Jan 2023 12:30  Phos  1.2     01-06  Mg     1.90     01-06    TPro  6.8  /  Alb  2.9<L>  /  TBili  0.3  /  DBili  x   /  AST  49<H>  /  ALT  55<H>  /  AlkPhos  302<H>  01-06          MICROBIOLOGY:  Culture - Joint Fluid (collected 01-05-23 @ 17:43)  Source: Joint Fl Joint Fluid- right knee  Gram Stain (01-06-23 @ 02:18):    No polymorphonuclear leukocytes seen    No organisms seen    by cytocentrifuge    Culture - Fungal, Body Fluid (collected 01-05-23 @ 17:43)  Source: .Body Fluid Synovial Fluid-right knee  Preliminary Report (01-06-23 @ 08:06):    Testing in progress    Culture - Urine (collected 01-01-23 @ 10:08)  Source: Clean Catch Clean Catch (Midstream)  Final Report (01-02-23 @ 09:53):    <10,000 CFU/mL Normal Urogenital Susan    Culture - Blood (collected 01-01-23 @ 07:40)  Source: .Blood Blood-Peripheral  Final Report (01-06-23 @ 10:00):    No Growth Final    Culture - Blood (collected 01-01-23 @ 06:30)  Source: .Blood Blood-Peripheral  Final Report (01-06-23 @ 10:00):    No Growth Final    Rapid RVP Result: NotDetec (01-01 @ 07:34)      RADIOLOGY:  Images below reviewed personally  MR Knee w/wo IV Cont, Right (01.05.23 @ 14:51)   1.  Ill-defined comminuted pathologic right patellar fracture including   transverse component through the midpole with approximately 2 cm   retraction of the dominant proximal fragment. Superimposed acute   osteomyelitis and/or osteonecrosis of the enhancing dominant distal   patellar fragment. Limited evaluation for residual osseous metastasis in   this setting.  2.  Moderate volume complex right knee joint effusion without acute on   chronic synovitis. Consider aspiration analysis.  3.  Additional lower grade and chronic findings as detailed in body   section of this report.

## 2023-01-06 NOTE — PROGRESS NOTE ADULT - ASSESSMENT
67-year-old male with PMH of metastatic esophageal cancer, hypothyroidism, GERD, HTN who was transferred from [facility] for Orthopedics consult for possible right knee malignancy versus septic arthritis.     He received a biopsy of his right patella on 11/10/22 with pathology positive for squamous cell carcinoma (Dr. Carter). Per collateral from son, patient has been experiencing progressively worsening pain, erythema, edema, and warmth at the R knee joint for about 1 month since biopsy. Orthopedics following, recommend continuing IV antibiotics pending MRI knee. Presentation more consistent with known R knee malignancy in the context of stable vital signs and progression over 1 month. Collateral information from [facility] states no arthroscopy performed, will continue to hold off due to risk of seeding cancer. Patient scheduled for surgical washout of R knee by Orthopedics on 1/4 pending MRI knee.

## 2023-01-06 NOTE — PROGRESS NOTE ADULT - PROBLEM SELECTOR PLAN 3
- diagnosed about 8 yrs ago  - follows with Dr. 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

## 2023-01-06 NOTE — PROGRESS NOTE ADULT - PROBLEM SELECTOR PLAN 1
- progressively worsening pain, erythema, edema, and warmth at the R knee joint for about 1 month  - recent excision of R patella on 11/10 with SCC on biopsy but unclear margins  - current presentation more consistent with progression of known cancer than septic arthritis    Plan  - MRI R knee w/wo contrast showing R patellar fracture with OM vs osteonecrosis, moderate complicated effusion  - Ortho recommending tap of R knee for cell counts, cytology, bacterial/fungal/AFB culture  - Appreciate Ortho recs:     - WBAT RLE with knee in extension  - Discontinue IV antibiotics per ID, less likely septic arthritis given chronic course and known cancer in that location  - Pain control:      - Tylenol 650 mg q6h for mild pain     - Oxycodone 5 mg q6h PRN for moderate pain     - Oxycodone 10 mg q6h PRN for severe pain        - c/w bowel regimen to prevent opioid induced constipation (miralax daily)  - DVT ppx with lovenox 40 mg SQ daily - progressively worsening pain, erythema, edema, and warmth at the R knee joint for about 1 month  - recent excision of R patella on 11/10 with SCC on biopsy but unclear margins  - current presentation more consistent with progression of known cancer than septic arthritis    Plan  - MRI R knee w/wo contrast showing R patellar fracture with OM vs osteonecrosis, moderate complicated effusion  - S/p tap of R knee but small sample collected; gram stain with no PMNs or bacteria  - Appreciate Ortho recs:     - plan for patellar excision in OR tomorrow, NPO at midnight, pre-op labs ordered, lovenox held, given 2 units PRBCs to optimize patient for procedure     - WBAT RLE with knee in extension  - Discontinue IV antibiotics per ID, less likely septic arthritis given chronic course and known cancer in that location  - Pain control:      - Tylenol 650 mg q6h for mild pain     - Oxycodone 5 mg q6h PRN for moderate pain     - Oxycodone 10 mg q6h PRN for severe pain        - c/w bowel regimen to prevent opioid induced constipation (miralax daily)  - DVT ppx with lovenox 40 mg SQ daily

## 2023-01-06 NOTE — PROGRESS NOTE ADULT - SUBJECTIVE AND OBJECTIVE BOX
Damaris Aguiar, PGY1  TEAMS or Pager [phone]     Patient is a 67y old  Male who presents with a chief complaint of R knee pain (06 Jan 2023 06:57)      SUBJECTIVE/INTERVAL EVENTS: Patient seen and examined at bedside. Otherwise, denies chest pain, SOB, lightheadedness, nausea, vomiting, constipation, or diarrhea.    MEDICATIONS  (STANDING):  enoxaparin Injectable 40 milliGRAM(s) SubCutaneous once  levothyroxine Injectable 112 MICROGram(s) IV Push <User Schedule>  magnesium oxide 400 milliGRAM(s) Oral two times a day with meals  pantoprazole   Suspension 40 milliGRAM(s) Oral daily  polyethylene glycol 3350 17 Gram(s) Oral daily  potassium phosphate / sodium phosphate Powder (PHOS-NaK) 1 Packet(s) Oral once  potassium phosphate IVPB 30 milliMole(s) IV Intermittent once  sodium chloride 2 Gram(s) Oral every 8 hours    MEDICATIONS  (PRN):  acetaminophen    Suspension .. 650 milliGRAM(s) Oral every 6 hours PRN Temp greater or equal to 38C (100.4F), Mild Pain (1 - 3)  albuterol    90 MICROgram(s) HFA Inhaler 2 Puff(s) Inhalation every 6 hours PRN Shortness of Breath and/or Wheezing  gabapentin 300 milliGRAM(s) Oral three times a day PRN neuropathic pain  oxyCODONE    IR 5 milliGRAM(s) Oral every 6 hours PRN Moderate Pain (4 - 6)  oxyCODONE    IR 10 milliGRAM(s) Oral every 6 hours PRN Severe Pain (7 - 10)      VITAL SIGNS:  T(F): 98.5 (01-06-23 @ 14:34), Max: 98.5 (01-06-23 @ 14:34)  HR: 109 (01-06-23 @ 14:34) (108 - 116)  BP: 115/67 (01-06-23 @ 14:34) (104/61 - 142/84)  RR: 17 (01-06-23 @ 14:34) (17 - 18)  SpO2: 100% (01-06-23 @ 14:34) (98% - 100%)    I&O's Summary    05 Jan 2023 07:01  -  06 Jan 2023 07:00  --------------------------------------------------------  IN: 297 mL / OUT: 0 mL / NET: 297 mL      Daily     Daily     PHYSICAL EXAM:  Gen: Alert, NAD  HEENT: NCAT, conjunctiva clear, sclera anicteric, no erythema or exudates in the oropharynx, mmm  Neck: Supple, no JVD  CV: RRR, S1S2, no m/r/g  Resp: CTAB, normal respiratory effort  Abd: Soft, nontender, nondistended, normal bowel sounds  Ext: no edema, no clubbing or cyanosis  Neuro: AOx3, CN2-12 grossly intact, ANDREWS  SKIN: warm, perfused    LABS:                        7.9    25.13 )-----------( 646      ( 05 Jan 2023 06:47 )             25.5     Hgb Trend: 7.9<--, 7.6<--, 7.5<--, 8.3<--, 8.2<--  01-06    131<L>  |  98  |  17  ----------------------------<  105<H>  4.5   |  21<L>  |  0.74    Ca    9.7      06 Jan 2023 12:30  Phos  1.2     01-06  Mg     1.90     01-06    TPro  6.8  /  Alb  2.9<L>  /  TBili  0.3  /  DBili  x   /  AST  49<H>  /  ALT  55<H>  /  AlkPhos  302<H>  01-06    Creatinine Trend: 0.74<--, 0.76<--, 0.79<--, 0.76<--, 0.72<--, 0.66<--  LIVER FUNCTIONS - ( 06 Jan 2023 12:30 )  Alb: 2.9 g/dL / Pro: 6.8 g/dL / ALK PHOS: 302 U/L / ALT: 55 U/L / AST: 49 U/L / GGT: x                     CAPILLARY BLOOD GLUCOSE          RADIOLOGY & ADDITIONAL TESTS: Reviewed    Imaging Personally Reviewed:    Consultant(s) Notes Reviewed:      Care Discussed with Consultants/Other Providers:   Damaris Aguiar, PGY1  TEAMS or Pager [phone]     Patient is a 67y old  Male who presents with a chief complaint of R knee pain (06 Jan 2023 06:57)    SUBJECTIVE/INTERVAL EVENTS: NAOE. Patient seen and examined at bedside. He is AOx3 but still reports moderate R knee pain. He used PRN oxycodone 10 mg x 3 and 5 mg x 2 in last 24 hours. He denies SOB but has productive cough at baseline. Per wife at bedside, pt had a BM yesterday and no concerns for constipation. He denies pain at PEG site. Otherwise, denies chest pain, lightheadedness, nausea, vomiting, or diarrhea.    MEDICATIONS  (STANDING):  enoxaparin Injectable 40 milliGRAM(s) SubCutaneous once  levothyroxine Injectable 112 MICROGram(s) IV Push <User Schedule>  magnesium oxide 400 milliGRAM(s) Oral two times a day with meals  pantoprazole   Suspension 40 milliGRAM(s) Oral daily  polyethylene glycol 3350 17 Gram(s) Oral daily  potassium phosphate / sodium phosphate Powder (PHOS-NaK) 1 Packet(s) Oral once  potassium phosphate IVPB 30 milliMole(s) IV Intermittent once  sodium chloride 2 Gram(s) Oral every 8 hours    MEDICATIONS  (PRN):  acetaminophen    Suspension .. 650 milliGRAM(s) Oral every 6 hours PRN Temp greater or equal to 38C (100.4F), Mild Pain (1 - 3)  albuterol    90 MICROgram(s) HFA Inhaler 2 Puff(s) Inhalation every 6 hours PRN Shortness of Breath and/or Wheezing  gabapentin 300 milliGRAM(s) Oral three times a day PRN neuropathic pain  oxyCODONE    IR 5 milliGRAM(s) Oral every 6 hours PRN Moderate Pain (4 - 6)  oxyCODONE    IR 10 milliGRAM(s) Oral every 6 hours PRN Severe Pain (7 - 10)      VITAL SIGNS:  T(F): 98.5 (01-06-23 @ 14:34), Max: 98.5 (01-06-23 @ 14:34)  HR: 109 (01-06-23 @ 14:34) (108 - 116)  BP: 115/67 (01-06-23 @ 14:34) (104/61 - 142/84)  RR: 17 (01-06-23 @ 14:34) (17 - 18)  SpO2: 100% (01-06-23 @ 14:34) (98% - 100%)    I&O's Summary    05 Jan 2023 07:01  -  06 Jan 2023 07:00  --------------------------------------------------------  IN: 297 mL / OUT: 0 mL / NET: 297 mL      Daily     Daily     PHYSICAL EXAM:  Gen: Alert, NAD  HEENT: NCAT, conjunctiva clear, sclera anicteric, white exudates in the oropharynx, dry mucous membranes  Neck: Supple, no JVD  CV: RRR, S1S2, no m/r/g, no JVD  Resp: CTAB, normal respiratory effort, intermittent cough  Abd: Soft, nontender, nondistended, normal bowel sounds, no pain at PEG  Ext: no edema, no clubbing or cyanosis  - RLE: in ACE wrap and immobilizer, patella TTP, dorsi/plantarflexion 5/5 strength, 2+ DP  Neuro: AOx3, CN2-12 grossly intact, ANDREWS  SKIN: warm, perfused    LABS:                        7.9    25.13 )-----------( 646      ( 05 Jan 2023 06:47 )             25.5     Hgb Trend: 7.9<--, 7.6<--, 7.5<--, 8.3<--, 8.2<--  01-06    131<L>  |  98  |  17  ----------------------------<  105<H>  4.5   |  21<L>  |  0.74    Ca    9.7      06 Jan 2023 12:30  Phos  1.2     01-06  Mg     1.90     01-06    TPro  6.8  /  Alb  2.9<L>  /  TBili  0.3  /  DBili  x   /  AST  49<H>  /  ALT  55<H>  /  AlkPhos  302<H>  01-06    Creatinine Trend: 0.74<--, 0.76<--, 0.79<--, 0.76<--, 0.72<--, 0.66<--  LIVER FUNCTIONS - ( 06 Jan 2023 12:30 )  Alb: 2.9 g/dL / Pro: 6.8 g/dL / ALK PHOS: 302 U/L / ALT: 55 U/L / AST: 49 U/L / GGT: x           CAPILLARY BLOOD GLUCOSE    RADIOLOGY & ADDITIONAL TESTS: Reviewed    Imaging Personally Reviewed:    Consultant(s) Notes Reviewed:  Ortho, ID, Nutrition    Care Discussed with Consultants/Other Providers: Ortho, ID, Nutrition

## 2023-01-06 NOTE — PROGRESS NOTE ADULT - SUBJECTIVE AND OBJECTIVE BOX
Patient seen and examined. No acute events overnight.    Objective:  Vital Signs Last 24 Hrs  T(C): 36.8 (05 Jan 2023 21:45), Max: 36.8 (05 Jan 2023 21:45)  T(F): 98.3 (05 Jan 2023 21:45), Max: 98.3 (05 Jan 2023 21:45)  HR: 116 (05 Jan 2023 21:45) (108 - 116)  BP: 104/61 (05 Jan 2023 21:45) (104/61 - 142/84)  RR: 18 (05 Jan 2023 21:45) (18 - 18)  SpO2: 100% (05 Jan 2023 21:45) (98% - 100%)  Parameters below as of 05 Jan 2023 21:45  Patient On (Oxygen Delivery Method): room air    PHYSICAL EXAM  General: NAD, Awake and Alert, resting comfortably  Resp: Non-labored breathing  RLE:  BJKI c/d/i  Motor FHL/EHL/TA/GSC intact  Sensory DP/SP/Rosio/Saph/Tib SILT  Palpable DP pulse  WWP    LABS                                    7.9    25.13 )-----------( 646      ( 05 Jan 2023 06:47 )             25.5   01-05    126<L>  |  96<L>  |  15  ----------------------------<  122<H>  4.7   |  19<L>  |  0.76    Ca    9.1      05 Jan 2023 20:50  Phos  3.2     01-05  Mg     1.80     01-05    TPro  6.9  /  Alb  2.8<L>  /  TBili  0.3  /  DBili  x   /  AST  40  /  ALT  46<H>  /  AlkPhos  246<H>  01-05      IMAGING  < from: MR Knee w/wo IV Cont, Right (01.05.23 @ 14:51) >  ACC: [id number] EXAM:  MR KNEE WAW IC RT                          PROCEDURE DATE:  01/05/2023      INTERPRETATION:  RIGHT KNEE MRI    CLINICAL INDICATION: Metastatic esophageal carcinoma, status post   patellar biopsy/mass excision on 11/10/2022 demonstrating squamous cell   carcinoma with necrosis.    COMPARISON: Radiographs dated 1/2/2023. No prior MRI available..    TECHNIQUE: Multiplanar, Multisequence MRI was obtained of the right knee   both prior to and following administration of intravenous contrast. Total   7 cc Gadavist intravenous contrast was administered into 0.5 cc was   discarded.    FINDINGS:    CRUCIATE LIGAMENTS: Mucoid degeneration/fraying versus chronic grade 2   strain at the femoral origin of the anterior cruciate ligament, but with   intact fibers throughout. Posterior cruciate ligament is intact.    MEDIAL COLLATERAL LIGAMENT : Chronic grade 2 sprain and healed minimally   displaced cortical avulsion stress fracture at the femoral origin of the   medial collateral ligament.    LATERAL COLLATERAL LIGAMENTOUS COMPLEX:  Intact.    MEDIAL FEMOROTIBIAL COMPARTMENT: Likely status post post partial medial   meniscectomy with post debridement changes of the body and posterior horn.  Short segment longitudinal tearat the residual posterior horn/root   junction with minimally displaced free edge fragment (series 7, image 24).    LATERAL FEMOROTIBIAL COMPARTMENT: Lateral meniscus is intact. No focal   chondral defect.    PATELLOFEMORAL COMPARTMENT: Trochlear cartilage appears intact.    EXTENSOR MECHANISM: Severe tendinosis and postsurgical changes of the   quadriceps tendon. Tendinosis and laxity of the patellar tendon with   postsurgical changes along its proximal to mid lateral half.    OSSEOUS: Confluentmarrow edema like signal and site of ill-defined acute   comminuted patellar fracture including transverse component through the   midpole with approximately 2 cm retraction of the dominant proximal   fragment, worsened femoris and the performed radiographs. Homogeneous   enhancement of the dominant distal patellar fracture fragment (series 9,   image 19). No enhancement of the dominant proximal patellar fracture   fragment.    SYNOVIUM: Moderate volume complex joint effusion with heterogeneous   synovial hypertrophy and diffuse synovial hyperenhancement.    GENERAL: No distended Baker's popliteal cyst.    IMPRESSION:  1.  Ill-defined comminuted pathologic right patellar fracture including   transverse component through the midpole with approximately 2 cm   retraction of the dominant proximal fragment. Superimposed acute   osteomyelitis and/or osteonecrosis of the enhancing dominant distal   patellar fragment. Limited evaluation for residual osseous metastasis in   this setting.  2.  Moderate volume complex right knee joint effusion without acute on   chronic synovitis. Consider aspiration analysis.  3.  Additional lower grade and chronic findings as detailed in body   section of this report.      --- End of Report ---     Patient seen and examined. No acute events overnight.    Objective:  Vital Signs Last 24 Hrs  T(C): 36.8 (05 Jan 2023 21:45), Max: 36.8 (05 Jan 2023 21:45)  T(F): 98.3 (05 Jan 2023 21:45), Max: 98.3 (05 Jan 2023 21:45)  HR: 116 (05 Jan 2023 21:45) (108 - 116)  BP: 104/61 (05 Jan 2023 21:45) (104/61 - 142/84)  RR: 18 (05 Jan 2023 21:45) (18 - 18)  SpO2: 100% (05 Jan 2023 21:45) (98% - 100%)  Parameters below as of 05 Jan 2023 21:45  Patient On (Oxygen Delivery Method): room air    PHYSICAL EXAM  General: NAD, Awake and Alert, resting comfortably  Resp: Non-labored breathing  RLE:  BJKI c/d/i  Motor FHL/EHL/TA/GSC intact  Sensory DP/SP/Rosio/Saph/Tib SILT  Palpable DP pulse  WWP    LABS                                    7.9    25.13 )-----------( 646      ( 05 Jan 2023 06:47 )             25.5   01-05    126<L>  |  96<L>  |  15  ----------------------------<  122<H>  4.7   |  19<L>  |  0.76    Ca    9.1      05 Jan 2023 20:50  Phos  3.2     01-05  Mg     1.80     01-05    TPro  6.9  /  Alb  2.8<L>  /  TBili  0.3  /  DBili  x   /  AST  40  /  ALT  46<H>  /  AlkPhos  246<H>  01-05      IMAGING  < from: MR Knee w/wo IV Cont, Right (01.05.23 @ 14:51) >  ACC: [id number] EXAM:  MR KNEE WAW IC RT                          PROCEDURE DATE:  01/05/2023      INTERPRETATION:  RIGHT KNEE MRI    CLINICAL INDICATION: Metastatic esophageal carcinoma, status post   patellar biopsy/mass excision on 11/10/2022 demonstrating squamous cell   carcinoma with necrosis.    COMPARISON: Radiographs dated 1/2/2023. No prior MRI available..    TECHNIQUE: Multiplanar, Multisequence MRI was obtained of the right knee   both prior to and following administration of intravenous contrast. Total   7 cc Gadavist intravenous contrast was administered into 0.5 cc was   discarded.    FINDINGS:    CRUCIATE LIGAMENTS: Mucoid degeneration/fraying versus chronic grade 2   strain at the femoral origin of the anterior cruciate ligament, but with   intact fibers throughout. Posterior cruciate ligament is intact.    MEDIAL COLLATERAL LIGAMENT : Chronic grade 2 sprain and healed minimally   displaced cortical avulsion stress fracture at the femoral origin of the   medial collateral ligament.    LATERAL COLLATERAL LIGAMENTOUS COMPLEX:  Intact.    MEDIAL FEMOROTIBIAL COMPARTMENT: Likely status post post partial medial   meniscectomy with post debridement changes of the body and posterior horn.  Short segment longitudinal tearat the residual posterior horn/root   junction with minimally displaced free edge fragment (series 7, image 24).    LATERAL FEMOROTIBIAL COMPARTMENT: Lateral meniscus is intact. No focal   chondral defect.    PATELLOFEMORAL COMPARTMENT: Trochlear cartilage appears intact.    EXTENSOR MECHANISM: Severe tendinosis and postsurgical changes of the   quadriceps tendon. Tendinosis and laxity of the patellar tendon with   postsurgical changes along its proximal to mid lateral half.    OSSEOUS: Confluentmarrow edema like signal and site of ill-defined acute   comminuted patellar fracture including transverse component through the   midpole with approximately 2 cm retraction of the dominant proximal   fragment, worsened femoris and the performed radiographs. Homogeneous   enhancement of the dominant distal patellar fracture fragment (series 9,   image 19). No enhancement of the dominant proximal patellar fracture   fragment.    SYNOVIUM: Moderate volume complex joint effusion with heterogeneous   synovial hypertrophy and diffuse synovial hyperenhancement.    GENERAL: No distended Baker's popliteal cyst.    IMPRESSION:  1.  Ill-defined comminuted pathologic right patellar fracture including   transverse component through the midpole with approximately 2 cm   retraction of the dominant proximal fragment. Superimposed acute   osteomyelitis and/or osteonecrosis of the enhancing dominant distal   patellar fragment. Limited evaluation for residual osseous metastasis in   this setting.  2.  Moderate volume complex right knee joint effusion without acute on   chronic synovitis. Consider aspiration analysis.  3.  Additional lower grade and chronic findings as detailed in body   section of this report.    --- End of Report ---

## 2023-01-06 NOTE — PROGRESS NOTE ADULT - PROBLEM SELECTOR PLAN 2
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 - Na 122 on 1/5, increased to 131 on 1/6  - Serum osmolarity calculated as 262 (hypotonic), patient euvolemic on exam  - Patient with known pleural metastasis, 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

## 2023-01-06 NOTE — PROGRESS NOTE ADULT - PROBLEM SELECTOR PLAN 6
- Diet: appreciate nutrition recs for bolus feeds with TwoCal HN (237 ml x 5 feeds daily)  - PT consult recommending JOHN PAUL, family declining  - DVT ppx: Lovenox 40 mg SQ daily  - Dispo: pending course/clinical improvement

## 2023-01-06 NOTE — PROGRESS NOTE ADULT - ASSESSMENT
67M metastatic esophageal SCC.   SCC involving right patella s/p radical resection 11/10.   Pain and swelling never resolved.   Here 1/1 from [facility] for further eval.   No clear evidence of superimposed infection.   Joint space fluid no growth (HIE 12/25) and gram stain negative on repeat 1/5.   MRI suggests acute osteomyelitis and/or osteonecrosis of patella fragment. I favor the latter.   Planned for OR revision tomorrow.     Chronic cough, left chest wall metastases, opacification, completed enough antibiotics for possible pneumonia.     Suggest  -favor monitoring off antibiotics for now   -please send more cultures from OR tomorrow     Discussed with medicine and orthopedics     Red Streeter MD   Infectious Disease   Available on TEAMS. After 5PM and on weekends please page fellow on call or call [phone]

## 2023-01-06 NOTE — PROGRESS NOTE ADULT - ATTENDING COMMENTS
66 yo M w/ metastatic SCC esophageal cancer w/ mets to chest wall, c/b dysphagia s/p PEG placement, recent biopsy of R patella positive for squamous cell carcinoma, HTN, hypothyroidism, GERD, recent hospitalization for COVID PNA, presents with worsening R knee pain, difficulty ambulating, transferred to [facility] for orthopedic evaluation/further management. Noted to have leukocytosis, elevated inflammatory markers, on empiric antibiotics though suspect malignancy > septic arthritis, awaiting MRI for further evaluation.   Appreciate ID eval, abx d/c'ed on 1/4, monitoring off abx.   Pain better controlled w/ increased oxycodone, ctm.   Hyponatremia - studies c/w SIADH, started on salt tabs, now improving  MRI R knee "Ill-defined comminuted pathologic right patellar fracture including   transverse component through the midpole with approximately 2 cm   retraction of the dominant proximal fragment. Superimposed acute   osteomyelitis and/or osteonecrosis of the enhancing dominant distal   patellar fragment. Limited evaluation for residual osseous metastasis in   this setting."  As per d/w Ortho attending Dr. Carter, plan for surgical resection of patella, tentative plan for tomorrow. Refer to chart note from 1/3, pt is medically optimized for surgery, no further medical testing needed.

## 2023-01-06 NOTE — PROGRESS NOTE ADULT - PROBLEM SELECTOR PLAN 5
- home meds include amlodipine 5 mg, metoprolol succinate 50 mg   - BP 90s-100s/50s-60s in ED; now 120s/70s    Plan  - BP meds held due to concern for sepsis  - BP within acceptable range, will continue to hold

## 2023-01-06 NOTE — PROGRESS NOTE ADULT - NUTRITIONAL ASSESSMENT
This patient has been assessed with a concern for Malnutrition and has been determined to have a diagnosis/diagnoses of Severe protein-calorie malnutrition.    This patient is being managed with:   Diet NPO after Midnight-     NPO Start Date: 06-Jan-2023   NPO Start Time: 23:59  Entered: Jan 6 2023  1:13PM    Diet NPO with Tube Feed-  Tube Feeding Modality: Gastrostomy  TwoCal HN (TWOCALHNRTH)  Total Volume for 24 Hours (mL): 1896  Bolus  Total Volume of Bolus (mL):  237  Total # of Feeds: 5  Tube Feed Frequency: Every 3 hours   Tube Feed Start Time: 06:00  Bolus Feed Rate (mL per Hour): 237   Bolus Feed Duration (in Hours): 1  Entered: Jan 5 2023  4:40PM

## 2023-01-07 NOTE — PROGRESS NOTE ADULT - PROBLEM SELECTOR PLAN 4
- takes synthroid 150 mcg PO daily (home med)    Plan  - c/w synthroid 112 mcg IV daily  - TSH 11.98 but free T4 WNL  - patient denies new symptoms of hypothyroidism including constipation, hypotension, dry skin, brittle hair  - recommend outpatient follow-up as TSH may be inaccurate in context of current hospitalization and could instead be suggestive of sick euthyroid syndrome

## 2023-01-07 NOTE — PROGRESS NOTE ADULT - ASSESSMENT
67y Male with worsening Right knee pain and serosanguinous sp mass excision on 11/10 concerning for oncologic process. Patient currently tachycardic with elevated wbc, covid positive, recently admitted for covid pneumonia, on antibiotics.    - NPO/IVF for OR  - MRI R knee completed: no evidence of neoplasm recurrence  - R knee arthrocentesis performed 1/5/23 after MRI: ~5cc sanguinous fluid, Gram stain negative, bacterial/fungal culture pending in lab, cell count/crystals/AFB not enough fluid for sample  - WBAT RLE with knee in extension/BJKI  - Abx management per ID/primary team  - Local wound care  - Pain control as needed  - Care per primary team  - Plan for OR 1/7/23 with Dr. Carter for total patellectomy

## 2023-01-07 NOTE — PROGRESS NOTE ADULT - SUBJECTIVE AND OBJECTIVE BOX
Orthopaedic Surgery Progress Note    Subjective:   Patient seen and examined. No acute events overnight. States pain is controlled.    Objective:  T(C): 35.9 (23 @ 07:31), Max: 37.9 (23 @ 19:45)  HR: 118 (23 @ 07:31) (109 - 129)  BP: 121/65 (23 @ 07:31) (102/54 - 121/65)  RR: 18 (23 @ 07:31) (17 - 18)  SpO2: 100% (23 @ 07:31) (100% - 100%)  Wt(kg): --     @ 07:01  -   @ 07:00  --------------------------------------------------------  IN: 1628 mL / OUT: 0 mL / NET: 1628 mL        Physical Exam:    General: NAD, Awake and Alert, resting comfortably  Resp: Non-labored breathing  RLE:  BJKI c/d/i  Motor FHL/EHL/TA/GSC intact  Sensory DP/SP/Rosio/Saph/Tib SILT  Palpable DP pulse  WWP                          10.0   25.14 )-----------( 647      ( 2023 04:45 )             31.7         131<L>  |  97<L>  |  20  ----------------------------<  83  5.0   |  21<L>  |  0.76    Ca    9.8      2023 04:45  Phos  1.2       Mg     2.00         TPro  6.8  /  Alb  2.9<L>  /  TBili  0.3  /  DBili  x   /  AST  49<H>  /  ALT  55<H>  /  AlkPhos  302<H>      PT/INR - ( 2023 04:45 )   PT: 15.9 sec;   INR: 1.37 ratio         PTT - ( 2023 04:45 )  PTT:33.0 sec  Urinalysis Basic - ( 2023 04:32 )    Color: Yellow / Appearance: Clear / S.018 / pH: x  Gluc: x / Ketone: Negative  / Bili: Negative / Urobili: <2 mg/dL   Blood: x / Protein: 100 mg/dL / Nitrite: Negative   Leuk Esterase: Negative / RBC: 4 /HPF / WBC 3-6 /HPF   Sq Epi: x / Non Sq Epi: 3 /HPF / Bacteria: Negative

## 2023-01-07 NOTE — PROGRESS NOTE ADULT - ATTENDING COMMENTS
66 yo M w/ metastatic SCC esophageal cancer w/ mets to chest wall, c/b dysphagia s/p PEG placement, recent biopsy of R patella positive for squamous cell carcinoma, HTN, hypothyroidism, GERD, recent hospitalization for COVID PNA, presents with worsening R knee pain, difficulty ambulating, transferred to [facility] for orthopedic evaluation/further management. Noted to have leukocytosis, elevated inflammatory markers, on empiric antibiotics though suspect malignancy > septic arthritis, MRI showed pathologic fracture, with superimposed OM or osteonecrosis.  Appreciate ID eval, abx d/c'ed on 1/4, monitoring off abx.   Pain control as per resident note   Hyponatremia - studies c/w SIADH, started on salt tabs, now improving  Hypophosphatemia- replete  Chronic leukocytosis and thrombocytosis- continue to monitor  -Seen by ortho, s/p Rt patellectomy, quadricepsplasty, synovectomy 1/7  -monitor post-op  -ortho following    PT rec JOHN PAUL

## 2023-01-07 NOTE — PROGRESS NOTE ADULT - SUBJECTIVE AND OBJECTIVE BOX
Margarito Melgar [id number]     INTERVAL HPI/OVERNIGHT EVENTS: The patient was administered two units of packed red blood cells in anticipation of surgical resection of his right patella today.     SUBJECTIVE: Patient seen and examined at bedside.     CONSTITUTIONAL: No weakness, fevers, or chills  EYES/ENT: No visual changes; no dizziness   NECK: No pain, no stiffness  RESPIRATORY: Rare cough that has persisted for years, no shortness of breath  CARDIOVASCULAR: No chest pain, no palpitations  GASTROINTESTINAL: No abdominal or epigastric pain. No nausea, vomiting, or hematemesis; No diarrhea or constipation. No melena or hematochezia.  GENITOURINARY: No dysuria, frequency, or hematuria  NEUROLOGICAL: No numbness, no weakness  EXTREMITIES: Right knee pain and swelling noted  SKIN: No itching, no rashes    OBJECTIVE:    VITAL SIGNS:  T(C): 36.9 (2023 10:05), Max: 37.9 (2023 19:45)  T(F): 98.4 (2023 10:05), Max: 100.3 (2023 19:45)  HR: 118 (2023 11:15) (109 - 129)  BP: 125/75 (2023 11:15) (102/54 - 127/63)  BP(mean): 88 (2023 11:15) (67 - 88)  RR: 20 (2023 11:15) (12 - 22)  SpO2: 97% (2023 11:15) (97% - 100%)    O2 Parameters below as of 2023 11:10  Patient On (Oxygen Delivery Method): room air     @ : @ 07:00  --------------------------------------------------------  IN: 1628 mL / OUT: 0 mL / NET: 1628 mL     @ : @ 14:38  --------------------------------------------------------  IN: 0 mL / OUT: 270 mL / NET: -270 mL    CAPILLARY BLOOD GLUCOSE    PHYSICAL EXAM:    General: NAD; resting in bed comfortably    HEENT: PERRL grossly, clear conjunctiva  Neck: No jvd, no lymphadenopathy  Respiratory: CTA b/l, no rales, no wheezes; equal respiratory excursion   Cardiovascular: +S1/S2; RRR  Abdomen: soft, NT/ND; +BS x4  Extremities: WWP, 2+ peripheral pulses b/l; no LE edema; swollen and erythematous right knee wrapped   Skin: normal color and turgor; no rash  Neurological: No gross motor or sensory deficits; coordination intact     MEDICATIONS:  MEDICATIONS  (STANDING):  acetaminophen     Tablet .. 975 milliGRAM(s) Oral every 8 hours  ceFAZolin   IVPB 2000 milliGRAM(s) IV Intermittent every 8 hours  levothyroxine Injectable 112 MICROGram(s) IV Push <User Schedule>  magnesium oxide 400 milliGRAM(s) Oral two times a day with meals  pantoprazole   Suspension 40 milliGRAM(s) Oral daily  polyethylene glycol 3350 17 Gram(s) Oral daily  potassium phosphate IVPB 30 milliMole(s) IV Intermittent once  sodium chloride 2 Gram(s) Oral every 8 hours    MEDICATIONS  (PRN):  acetaminophen    Suspension .. 650 milliGRAM(s) Oral every 6 hours PRN Temp greater or equal to 38C (100.4F), Mild Pain (1 - 3)  albuterol    90 MICROgram(s) HFA Inhaler 2 Puff(s) Inhalation every 6 hours PRN Shortness of Breath and/or Wheezing  gabapentin 300 milliGRAM(s) Oral three times a day PRN neuropathic pain  ondansetron Injectable 4 milliGRAM(s) IV Push every 6 hours PRN Nausea and/or Vomiting  oxyCODONE    IR 5 milliGRAM(s) Oral every 4 hours PRN Severe Pain (7 - 10)  oxyCODONE    IR 2.5 milliGRAM(s) Oral every 4 hours PRN Moderate Pain (4 - 6)  oxyCODONE    IR 5 milliGRAM(s) Oral every 6 hours PRN Moderate Pain (4 - 6)  oxyCODONE    IR 10 milliGRAM(s) Oral every 6 hours PRN Severe Pain (7 - 10)      ALLERGIES:  Allergies    No Known Allergies    Intolerances        LABS:                        10.0   25.14 )-----------( 647      ( 2023 04:45 )             31.7         131<L>  |  97<L>  |  20  ----------------------------<  83  5.0   |  21<L>  |  0.76    Ca    9.8      2023 04:45  Phos  1.8       Mg     2.00         TPro  6.8  /  Alb  2.9<L>  /  TBili  0.3  /  DBili  x   /  AST  49<H>  /  ALT  55<H>  /  AlkPhos  302<H>      PT/INR - ( 2023 04:45 )   PT: 15.9 sec;   INR: 1.37 ratio         PTT - ( 2023 04:45 )  PTT:33.0 sec  Urinalysis Basic - ( 2023 04:32 )    Color: Yellow / Appearance: Clear / S.018 / pH: x  Gluc: x / Ketone: Negative  / Bili: Negative / Urobili: <2 mg/dL   Blood: x / Protein: 100 mg/dL / Nitrite: Negative   Leuk Esterase: Negative / RBC: 4 /HPF / WBC 3-6 /HPF   Sq Epi: x / Non Sq Epi: 3 /HPF / Bacteria: Negative        RADIOLOGY & ADDITIONAL TESTS: Reviewed.

## 2023-01-07 NOTE — PROGRESS NOTE ADULT - PROBLEM SELECTOR PLAN 1
- progressively worsening pain, erythema, edema, and warmth at the R knee joint for about 1 month  - recent excision of R patella on 11/10 with SCC on biopsy but unclear margins  - current presentation more consistent with progression of known cancer than septic arthritis    Plan  - MRI R knee w/wo contrast showing R patellar fracture with OM vs osteonecrosis, moderate complicated effusion--clinical suspicion is greatest for osteonecrosis from metastatic disease   - S/p tap of R knee but small sample collected; gram stain negative with no PMNs or bacteria  - Appreciate Ortho recs:     - plan for patellar excision in OR today   - Pain control:      - Tylenol 650 mg q6h for mild pain     - Oxycodone 5 mg q6h PRN for moderate pain     - Oxycodone 10 mg q6h PRN for severe pain        - c/w bowel regimen to prevent opioid induced constipation (miralax daily)  -Following surgical repair, the patient will require repeat evaluation by orthopedic surgery to plan for transition to outpatient care

## 2023-01-07 NOTE — PROGRESS NOTE ADULT - PROBLEM SELECTOR PLAN 2
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

## 2023-01-07 NOTE — PROGRESS NOTE ADULT - ASSESSMENT
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 Although septic arthritis initially was a consideration, the patient had no evident fevers or infectious signs, and his pain was progressive and slow, so antibiotics were discontinued. He was noted on mri of the knee to have findings concerning for metastatic disease and is planned for surgical resection of his right patella today.

## 2023-01-07 NOTE — PRE-OP CHECKLIST - SELECT TESTS ORDERED
BMP/CBC/PT/PTT/Type and Cross/Type and Screen/EKG BMP/CBC/PT/PTT/Type and Cross/Type and Screen/EKG/COVID-19

## 2023-01-07 NOTE — CHART NOTE - NSCHARTNOTEFT_GEN_A_CORE
ORTHOPEDIC SURGERY POST-OP CHECK    S: Patient seen and examined at bedside POD0 s/p patellectomy, quadricepsplasty. Pain well controlled with current regimen. Denies numbness/tingling in the extremity. Denies fever, chills, shortness of breath, and chest pain.     O: T(C): 36.9 (01-07-23 @ 10:05), Max: 37.9 (01-06-23 @ 19:45)  HR: 118 (01-07-23 @ 11:15) (109 - 129)  BP: 125/75 (01-07-23 @ 11:15) (102/54 - 127/63)  RR: 20 (01-07-23 @ 11:15) (12 - 22)  SpO2: 97% (01-07-23 @ 11:15) (97% - 100%)    Exam:   Gen: NAD, resting in bed  Resp: unlabored breathing  RLE: dressing c/d/i, prevena, HVx1, KI        +EHL/FHL/TA/GS         SILT Owens/Saph/Tib/DP/SP        +DP, cap refill <2 sec                    01-06-23 @ 07:01  -  01-07-23 @ 07:00  --------------------------------------------------------  IN: 1628 mL / OUT: 0 mL / NET: 1628 mL    01-07-23 @ 07:01  -  01-07-23 @ 12:51  --------------------------------------------------------  IN: 0 mL / OUT: 270 mL / NET: -270 mL          A/P: 67yMale POD0 s/p R patallectomy/quadicepsplasty, recovering well    - Pain control  - WBAT RLE  - DVT ppx: lovenox  - PT/OT  - OOB/AAT  - Regular diet  - Monitor I&Os  - FU drain output

## 2023-01-08 NOTE — PHYSICAL THERAPY INITIAL EVALUATION ADULT - PLANNED THERAPY INTERVENTIONS, PT EVAL
balance training/bed mobility training/gait training/strengthening/transfer training
stairs training/balance training/bed mobility training/gait training/transfer training

## 2023-01-08 NOTE — PROGRESS NOTE ADULT - PROBLEM SELECTOR PLAN 5
- home meds include amlodipine 5 mg, metoprolol succinate 50 mg   - BP 90s-100s/50s-60s in ED; now 120s/70s    Plan  - BP meds held due to concern for sepsis  - BP within acceptable range, will continue to hold - takes synthroid 150 mcg PO daily (home med)    Plan  - c/w synthroid 112 mcg IV daily  - TSH 11.98 but free T4 WNL  - patient denies new symptoms of hypothyroidism including constipation, hypotension, dry skin, brittle hair  - recommend outpatient follow-up as TSH may be inaccurate in context of current hospitalization and could instead be suggestive of sick euthyroid syndrome

## 2023-01-08 NOTE — PHYSICAL THERAPY INITIAL EVALUATION ADULT - NSPTDISCHREC_GEN_A_CORE
Sub-acute Rehab
Anticipated discharge to rehab facility to address current functional limitation to optimize safety to allow pt. to reach their optimal level of function.

## 2023-01-08 NOTE — PHYSICAL THERAPY INITIAL EVALUATION ADULT - CRITERIA FOR SKILLED THERAPEUTIC INTERVENTIONS
impairments found/functional limitations in following categories
impairments found/rehab potential/anticipated discharge recommendation

## 2023-01-08 NOTE — PROGRESS NOTE ADULT - PROBLEM SELECTOR PLAN 7
- Diet: appreciate nutrition recs for bolus feeds with TwoCal HN (237 ml x 5 feeds daily)  - PT consult recommending JOHN PAUL, family declining --> pending PT re-eval now that patient is post-op with improving pain  - DVT ppx: Lovenox 40 mg SQ daily  - Dispo: pending course/clinical improvement

## 2023-01-08 NOTE — OCCUPATIONAL THERAPY INITIAL EVALUATION ADULT - PERTINENT HX OF CURRENT PROBLEM, REHAB EVAL
Pt is a 67-year-old male with PMH of metastatic esophageal cancer, hypothyroidism, GERD, HTN who was transferred from [facility] for Orthopedics consult for possible right knee malignancy versus septic arthritis. Pt is now s/p right patellectomy and quadricepsplasty.

## 2023-01-08 NOTE — PROGRESS NOTE ADULT - PROBLEM SELECTOR PLAN 1
- progressively worsening pain, erythema, edema, and warmth at the R knee joint for about 1 month  - recent excision of R patella on 11/10 with SCC on biopsy but unclear margins  - current presentation more consistent with progression of known cancer than septic arthritis    Plan  - MRI R knee w/wo contrast showing R patellar fracture with OM vs osteonecrosis, moderate complicated effusion--clinical suspicion is greatest for osteonecrosis from metastatic disease   - S/p tap of R knee but small sample collected; gram stain negative with no PMNs or bacteria  - Appreciate Ortho recs:     - plan for patellar excision in OR today   - Pain control:      - Tylenol 650 mg q6h for mild pain     - Oxycodone 5 mg q6h PRN for moderate pain     - Oxycodone 10 mg q6h PRN for severe pain        - c/w bowel regimen to prevent opioid induced constipation (miralax daily)  -Following surgical repair, the patient will require repeat evaluation by orthopedic surgery to plan for transition to outpatient care - progressively worsening pain, erythema, edema, and warmth at the R knee joint for about 1 month  - recent partial excision of R patella on 11/10 with SCC on biopsy but unclear margins  - current presentation more consistent with progression of known cancer than septic arthritis  - MRI R knee w/wo contrast showing R patellar fracture with OM vs osteonecrosis, moderate complicated effusion--clinical suspicion is greatest for osteonecrosis from metastatic disease   - s/p tap of R knee but small sample collected; gram stain negative with no PMNs or bacteria    Plan  - Appreciate Ortho recs:     - s/p patellectomy, quadricepsplasty, and synovectomy on 1/7 in OR     - wound vac in place over R knee, drained 70 cc yesterday and 150 cc today     - continue to monitor drain output, follow up with ortho about wound care requirements  - Pain control:      - Tylenol 650 mg q6h for mild pain     - Oxycodone 5 mg q6h PRN for moderate pain     - Oxycodone 10 mg q6h PRN for severe pain        - c/w bowel regimen to prevent opioid induced constipation (miralax daily)  - patient requires repeat evaluation by orthopedic surgery to plan for transition to outpatient care

## 2023-01-08 NOTE — PHYSICAL THERAPY INITIAL EVALUATION ADULT - LEVEL OF INDEPENDENCE: SIT/STAND, REHAB EVAL
pt deferred despite maximum encouragement/unable to perform
Patient deferred despite polite encouragement/unable to perform

## 2023-01-08 NOTE — OCCUPATIONAL THERAPY INITIAL EVALUATION ADULT - ADDITIONAL COMMENTS
Pt required assistance with ADL and functional mobility prior to hospital. Pt had home health aide and therapy at home.

## 2023-01-08 NOTE — PROGRESS NOTE ADULT - PROBLEM SELECTOR PLAN 6
- Diet: appreciate nutrition recs for bolus feeds with TwoCal HN (237 ml x 5 feeds daily)  - PT consult recommending JOHN PAUL, family declining  - DVT ppx: Lovenox 40 mg SQ daily  - Dispo: pending course/clinical improvement - Diet: appreciate nutrition recs for bolus feeds with TwoCal HN (237 ml x 5 feeds daily)  - PT consult recommending JOHN PAUL, family declining --> pending PT re-eval now that patient is post-op with improving pain  - DVT ppx: Lovenox 40 mg SQ daily  - Dispo: pending course/clinical improvement - home meds include amlodipine 5 mg, metoprolol succinate 50 mg   - BP 90s-100s/50s-60s in ED; now 120s/70s    Plan  - BP meds held due to concern for sepsis  - BP within acceptable range, will continue to hold

## 2023-01-08 NOTE — PROGRESS NOTE ADULT - ASSESSMENT
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 for evaluation and management of right knee pain and swelling. Although septic arthritis initially was a consideration, the patient had no evident fevers or infectious signs, and his pain was progressive and slow, so antibiotics were discontinued. He was noted on mri of the knee to have findings concerning for metastatic disease and is planned for surgical resection of his right patella today.  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 Although septic arthritis initially was a consideration, the patient had no evident fevers or infectious signs, and his pain was progressive and slow, so antibiotics were discontinued. He was noted on MRI of the knee to have findings concerning for metastatic disease and is s/p surgical resection of his right patella on 1/7. He reports slight improvement in pain after procedure, now pending PT re-evaluation and disposition.

## 2023-01-08 NOTE — PHYSICAL THERAPY INITIAL EVALUATION ADULT - ACTIVE RANGE OF MOTION EXAMINATION, REHAB EVAL
left LE: not assessed except ankle WFL/bilateral upper extremity Active ROM was WFL (within functional limits)/Right LE Active ROM was WFL (within functional limits)

## 2023-01-08 NOTE — PHYSICAL THERAPY INITIAL EVALUATION ADULT - MANUAL MUSCLE TESTING RESULTS, REHAB EVAL
both UE 3+/5, right LE 3/5, left LE: not assessed
bilateral UE grossly at least 3/5, Left Lower Extremity: grossly 3/5, right LE not tested 2/2 knee immobilizer/grossly assessed due to

## 2023-01-08 NOTE — PROGRESS NOTE ADULT - NUTRITIONAL ASSESSMENT
This patient has been assessed with a concern for Malnutrition and has been determined to have a diagnosis/diagnoses of Severe protein-calorie malnutrition.    This patient is being managed with:   Diet NPO after Midnight-     NPO Start Date: 06-Jan-2023   NPO Start Time: 23:59  Entered: Jan 6 2023  1:13PM    Diet NPO with Tube Feed-  Tube Feeding Modality: Gastrostomy  TwoCal HN (TWOCALHNRTH)  Total Volume for 24 Hours (mL): 1896  Bolus  Total Volume of Bolus (mL):  237  Total # of Feeds: 5  Tube Feed Frequency: Every 3 hours   Tube Feed Start Time: 06:00  Bolus Feed Rate (mL per Hour): 237   Bolus Feed Duration (in Hours): 1  Entered: Jan 5 2023  4:40PM     This patient has been assessed with a concern for Malnutrition and has been determined to have a diagnosis/diagnoses of Severe protein-calorie malnutrition.    Diet NPO with Tube Feed-  Tube Feeding Modality: Gastrostomy  TwoCal HN (TWOCALHNRTH)  Total Volume for 24 Hours (mL): 1896  Bolus  Total Volume of Bolus (mL):  237  Total # of Feeds: 5  Tube Feed Frequency: Every 3 hours   Tube Feed Start Time: 06:00  Bolus Feed Rate (mL per Hour): 237   Bolus Feed Duration (in Hours): 1  Entered: Jan 5 2023  4:40PM

## 2023-01-08 NOTE — PHYSICAL THERAPY INITIAL EVALUATION ADULT - GENERAL OBSERVATIONS, REHAB EVAL
Pt received semi-supine, +IV, + right knee immobilizer and ace wrap, +bed alarm. pt's family members at bedside.
Patient seen semi supine in bed, knee immobilizer connected to left knee, family at bed side.

## 2023-01-08 NOTE — PROGRESS NOTE ADULT - PROBLEM SELECTOR PLAN 4
- takes synthroid 150 mcg PO daily (home med)    Plan  - c/w synthroid 112 mcg IV daily  - TSH 11.98 but free T4 WNL  - patient denies new symptoms of hypothyroidism including constipation, hypotension, dry skin, brittle hair  - recommend outpatient follow-up as TSH may be inaccurate in context of current hospitalization and could instead be suggestive of sick euthyroid syndrome - diagnosed about 8 yrs ago  - follows with Dr. Gi Thomas ([place])  - complicated by dysphagia with all meds/nutrition via PEG  - CXR with near complete opacification of L lung, stable from previous imaging and likely represents pleural metastases    Plan  - Contacted outpatient oncologist, most recent progress note obtained and sent to [facility]  - Family considering transition to [facility], spoke with Oncology on 1/3  - On discussion on 1/8, family prefers to follow with current oncologist  - Continue goals of care conversation with family: currently interested in resuming palliative chemo as outpatient  - C/w bolus tube feeds, strict NPO  - C/w pantoprazole 40 mg daily for GERD, gabapentin 300 mg TID PRN for pain, MgOH and KCl for supplementation

## 2023-01-08 NOTE — PROGRESS NOTE ADULT - PROBLEM SELECTOR PLAN 3
- diagnosed about 8 yrs ago  - follows with Dr. 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  - Family considering transition to [facility], spoke with Oncology on 1/3  - On discussion on 1/8, family prefers to follow with current oncologist  - Continue goals of care conversation with family: currently interested in resuming palliative chemo as outpatient  - C/w bolus tube feeds, strict NPO  - C/w pantoprazole 40 mg daily for GERD, gabapentin 300 mg TID PRN for pain, MgOH and KCl for supplementation - Na 122 on 1/5, increased to 136 on 1/8, now resolving  - Serum osmolarity calculated as 262 (hypotonic), patient euvolemic on exam  - Elevated urine osmolarity and urine sodium are suggestive of inappropriate concentration of urine   - Patient with known pleural metastasis, likely 2/2 SIADH  - Less likely, but differential includes hypothyroidism (TSH 11.98); despite mild elevation in tsh, clinical picture is not consistent with this (no bradycardia, no hypotension); adrenal insufficieny unlikely as AM cortisol WNL    Plan  - C/w salt tabs 2 g TID via PEG tube  - Recheck BMP tomorrow AM, trend Na daily

## 2023-01-08 NOTE — PROGRESS NOTE ADULT - ATTENDING COMMENTS
66 yo M w/ metastatic SCC esophageal cancer w/ mets to chest wall, c/b dysphagia s/p PEG placement, recent biopsy of R patella positive for squamous cell carcinoma, HTN, hypothyroidism, GERD, recent hospitalization for COVID PNA, presents with worsening R knee pain, difficulty ambulating, transferred to [facility] for orthopedic evaluation/further management. Noted to have leukocytosis, elevated inflammatory markers, on empiric antibiotics though suspect malignancy > septic arthritis, MRI showed pathologic fracture, with superimposed OM or osteonecrosis.  Appreciate ID eval, abx d/c'ed on 1/4, monitoring off abx.   Pain control as per resident note   Hyponatremia - studies c/w SIADH, started on salt tabs, now resolved  Hypophosphatemia- replete: IV + PO  Chronic leukocytosis and thrombocytosis- continue to monitor  -Seen by ortho, s/p Rt patellectomy, quadricepsplasty, synovectomy 1/7  -monitoring post-op  -follow up ortho recs    PT rec JOHN PAUL but family declining.

## 2023-01-08 NOTE — OCCUPATIONAL THERAPY INITIAL EVALUATION ADULT - RANGE OF MOTION EXAMINATION, UPPER EXTREMITY
except left shoulder 0-110 flexion actively/bilateral UE Active ROM was WFL  (within functional limits)

## 2023-01-08 NOTE — PROGRESS NOTE ADULT - PROBLEM SELECTOR PLAN 2
- Na 122 on 1/5, increased to 131 on 1/6, 131 again on 1/7  - Serum osmolarity calculated as 262 (hypotonic), patient euvolemic on exam  - elevated urine osmolarity and urine sodium are suggestive of inappropriate concentration of urine   - Patient with known pleural metastasis, could be 2/2 SIADH  - Also consider hypothyroidism (TSH 11.98); despite mild elevation in tsh, clinical picture is not consistent with this (no bradycardia, no hypotension); adrenal insufficieny unlikely as AM cortisol WNL    Plan  - C/w salt tabs 2 g TID via PEG tube  - Recheck BMP tomorrow AM, trend Na daily - Na 122 on 1/5, increased to 131 on 1/7  - Serum osmolarity calculated as 262 (hypotonic), patient euvolemic on exam  - Elevated urine osmolarity and urine sodium are suggestive of inappropriate concentration of urine   - Patient with known pleural metastasis, likely 2/2 SIADH  - Less likely, but differential includes hypothyroidism (TSH 11.98); despite mild elevation in tsh, clinical picture is not consistent with this (no bradycardia, no hypotension); adrenal insufficieny unlikely as AM cortisol WNL    Plan  - C/w salt tabs 2 g TID via PEG tube  - Recheck BMP tomorrow AM, trend Na daily - Phos 1.8 on 1/7, s/p oral repletion x 2 packets  - Phos 1.0 on 1/8 in context of pt NPO for procedure yesterday    Plan  - as K 4.3, will replete with IV NaPO4 30 mmol  - recheck phosphorus in afternoon, replete to goal > 2.5

## 2023-01-08 NOTE — PROGRESS NOTE ADULT - SUBJECTIVE AND OBJECTIVE BOX
Damaris Aguiar, PGY1  TEAMS or Pager [phone]     Patient is a 67y old  Male who presents with a chief complaint of R knee pain (2023 06:33)    SUBJECTIVE/INTERVAL EVENTS: NAOE. Patient seen and examined at bedside. He is POD1 from patellectomy, quadricepsplasty, and synovectomy performed by Orthopedics. Otherwise, denies chest pain, SOB, lightheadedness, nausea, vomiting, constipation, or diarrhea.    MEDICATIONS  (STANDING):  acetaminophen     Tablet .. 975 milliGRAM(s) Oral every 8 hours  clotrimazole 1% Cream 1 Application(s) Topical every 12 hours  enoxaparin Injectable 40 milliGRAM(s) SubCutaneous every 24 hours  levothyroxine Injectable 112 MICROGram(s) IV Push <User Schedule>  magnesium oxide 400 milliGRAM(s) Oral two times a day with meals  pantoprazole   Suspension 40 milliGRAM(s) Oral daily  polyethylene glycol 3350 17 Gram(s) Oral daily  sodium chloride 2 Gram(s) Oral every 8 hours    MEDICATIONS  (PRN):  acetaminophen    Suspension .. 650 milliGRAM(s) Oral every 6 hours PRN Temp greater or equal to 38C (100.4F), Mild Pain (1 - 3)  albuterol    90 MICROgram(s) HFA Inhaler 2 Puff(s) Inhalation every 6 hours PRN Shortness of Breath and/or Wheezing  gabapentin 300 milliGRAM(s) Oral three times a day PRN neuropathic pain  ondansetron Injectable 4 milliGRAM(s) IV Push every 6 hours PRN Nausea and/or Vomiting  oxyCODONE    IR 5 milliGRAM(s) Oral every 4 hours PRN Severe Pain (7 - 10)  oxyCODONE    IR 2.5 milliGRAM(s) Oral every 4 hours PRN Moderate Pain (4 - 6)  oxyCODONE    IR 5 milliGRAM(s) Oral every 6 hours PRN Moderate Pain (4 - 6)  oxyCODONE    IR 10 milliGRAM(s) Oral every 6 hours PRN Severe Pain (7 - 10)      VITAL SIGNS:  T(F): 98.1 (23 @ 05:01), Max: 100.1 (23 @ 19:21)  HR: 104 (23 @ 05:01) (101 - 134)  BP: 103/67 (23 @ 05:01) (100/60 - 127/63)  RR: 17 (23 @ 05:01) (12 - )  SpO2: 100% (23 @ 05:01) (97% - 100%)    I&O's Summary    2023 07:01  -  2023 07:00  --------------------------------------------------------  IN: 1628 mL / OUT: 0 mL / NET: 1628 mL    2023 07:01  -  2023 05:51  --------------------------------------------------------  IN: 931 mL / OUT: 1220 mL / NET: -289 mL      Daily Height in cm: 170.2 (2023 06:50)    Daily     PHYSICAL EXAM:  Gen: Alert, NAD  HEENT: NCAT, conjunctiva clear, sclera anicteric, no erythema or exudates in the oropharynx, mmm  Neck: Supple, no JVD  CV: RRR, S1S2, no m/r/g  Resp: CTAB, normal respiratory effort  Abd: Soft, nontender, nondistended, normal bowel sounds  Ext: no edema, no clubbing or cyanosis  Neuro: AOx3, CN2-12 grossly intact, ANDREWS  SKIN: warm, perfused    LABS:                        9.1    27.42 )-----------( 577      ( 2023 20:48 )             28.4     Hgb Trend: 9.1<--, 10.0<--, 7.9<--, 7.6<--, 7.5<--      131<L>  |  97<L>  |  20  ----------------------------<  83  5.0   |  21<L>  |  0.76    Ca    9.8      2023 04:45  Phos  1.8       Mg     2.00         TPro  6.8  /  Alb  2.9<L>  /  TBili  0.3  /  DBili  x   /  AST  49<H>  /  ALT  55<H>  /  AlkPhos  302<H>      Creatinine Trend: 0.76<--, 0.74<--, 0.76<--, 0.79<--, 0.76<--, 0.72<--  LIVER FUNCTIONS - ( 2023 12:30 )  Alb: 2.9 g/dL / Pro: 6.8 g/dL / ALK PHOS: 302 U/L / ALT: 55 U/L / AST: 49 U/L / GGT: x           PT/INR - ( 2023 04:45 )   PT: 15.9 sec;   INR: 1.37 ratio         PTT - ( 2023 04:45 )  PTT:33.0 sec      Urinalysis Basic - ( 2023 04:32 )    Color: Yellow / Appearance: Clear / S.018 / pH: x  Gluc: x / Ketone: Negative  / Bili: Negative / Urobili: <2 mg/dL   Blood: x / Protein: 100 mg/dL / Nitrite: Negative   Leuk Esterase: Negative / RBC: 4 /HPF / WBC 3-6 /HPF   Sq Epi: x / Non Sq Epi: 3 /HPF / Bacteria: Negative        CAPILLARY BLOOD GLUCOSE          RADIOLOGY & ADDITIONAL TESTS: Reviewed    Imaging Personally Reviewed:    Consultant(s) Notes Reviewed:      Care Discussed with Consultants/Other Providers:   Damaris Aguiar, PGY1  TEAMS or Pager [phone]     Patient is a 67y old  Male who presents with a chief complaint of R knee pain (2023 06:33)    SUBJECTIVE/INTERVAL EVENTS: NAOE. Patient seen and examined at bedside. He is POD1 from patellectomy, quadricepsplasty, and synovectomy performed by Orthopedics. He reports pain is slightly improved compared to before the procedure. Patient has wound vac in place draining serosanguinous fluid. He denies attempting to sit up in bed and has been mostly bed-bound. He is declining [facility] or LTC, states that sons can assist him at home. Otherwise, denies chest pain, SOB, lightheadedness, pain at PEG site, N/V, constipation, or diarrhea.    MEDICATIONS  (STANDING):  acetaminophen     Tablet .. 975 milliGRAM(s) Oral every 8 hours  clotrimazole 1% Cream 1 Application(s) Topical every 12 hours  enoxaparin Injectable 40 milliGRAM(s) SubCutaneous every 24 hours  levothyroxine Injectable 112 MICROGram(s) IV Push <User Schedule>  magnesium oxide 400 milliGRAM(s) Oral two times a day with meals  pantoprazole   Suspension 40 milliGRAM(s) Oral daily  polyethylene glycol 3350 17 Gram(s) Oral daily  sodium chloride 2 Gram(s) Oral every 8 hours    MEDICATIONS  (PRN):  acetaminophen    Suspension .. 650 milliGRAM(s) Oral every 6 hours PRN Temp greater or equal to 38C (100.4F), Mild Pain (1 - 3)  albuterol    90 MICROgram(s) HFA Inhaler 2 Puff(s) Inhalation every 6 hours PRN Shortness of Breath and/or Wheezing  gabapentin 300 milliGRAM(s) Oral three times a day PRN neuropathic pain  ondansetron Injectable 4 milliGRAM(s) IV Push every 6 hours PRN Nausea and/or Vomiting  oxyCODONE    IR 5 milliGRAM(s) Oral every 4 hours PRN Severe Pain (7 - 10)  oxyCODONE    IR 2.5 milliGRAM(s) Oral every 4 hours PRN Moderate Pain (4 - 6)  oxyCODONE    IR 5 milliGRAM(s) Oral every 6 hours PRN Moderate Pain (4 - 6)  oxyCODONE    IR 10 milliGRAM(s) Oral every 6 hours PRN Severe Pain (7 - 10)      VITAL SIGNS:  T(F): 98.1 (23 @ 05:01), Max: 100.1 (23 @ 19:21)  HR: 104 (23 @ 05:01) (101 - 134)  BP: 103/67 (23 @ 05:01) (100/60 - 127/63)  RR: 17 (23 @ 05:01) (12 - 22)  SpO2: 100% (23 @ 05:01) (97% - 100%)    I&O's Summary    2023 07:01  -  2023 07:00  --------------------------------------------------------  IN: 1628 mL / OUT: 0 mL / NET: 1628 mL    2023 07:01  -  2023 05:51  --------------------------------------------------------  IN: 931 mL / OUT: 1220 mL / NET: -289 mL      Daily Height in cm: 170.2 (2023 06:50)    Daily     PHYSICAL EXAM:  Gen: Alert, NAD  HEENT: NC/AT, conjunctiva clear, sclera anicteric, dry mucous membranes, cracked lips  Neck: Supple, no JVD  CV: RRR, S1S2, no m/r/g, no JVD  Resp: CTAB, normal respiratory effort, chronic productive cough  Abd: Soft, nontender, mildly distended, normal bowel sounds, PEG site with no erythema/edema  Ext: no edema, no clubbing or cyanosis  - RLE: DP 2+, trace pedal edema, warm extremities, wound vac in place over knee, TTP over anterior knee, immobilizer on leg, sensation to LT same upper thigh as LLE  Neuro: AOx3, CN2-12 grossly intact, ANDREWS  SKIN: warm, perfused    LABS:                        9.1    27.42 )-----------( 577      ( 2023 20:48 )             28.4     Hgb Trend: 9.1<--, 10.0<--, 7.9<--, 7.6<--, 7.5<--      131<L>  |  97<L>  |  20  ----------------------------<  83  5.0   |  21<L>  |  0.76    Ca    9.8      2023 04:45  Phos  1.8       Mg     2.00         TPro  6.8  /  Alb  2.9<L>  /  TBili  0.3  /  DBili  x   /  AST  49<H>  /  ALT  55<H>  /  AlkPhos  302<H>      Creatinine Trend: 0.76<--, 0.74<--, 0.76<--, 0.79<--, 0.76<--, 0.72<--  LIVER FUNCTIONS - ( 2023 12:30 )  Alb: 2.9 g/dL / Pro: 6.8 g/dL / ALK PHOS: 302 U/L / ALT: 55 U/L / AST: 49 U/L / GGT: x           PT/INR - ( 2023 04:45 )   PT: 15.9 sec;   INR: 1.37 ratio         PTT - ( 2023 04:45 )  PTT:33.0 sec      Urinalysis Basic - ( 2023 04:32 )    Color: Yellow / Appearance: Clear / S.018 / pH: x  Gluc: x / Ketone: Negative  / Bili: Negative / Urobili: <2 mg/dL   Blood: x / Protein: 100 mg/dL / Nitrite: Negative   Leuk Esterase: Negative / RBC: 4 /HPF / WBC 3-6 /HPF   Sq Epi: x / Non Sq Epi: 3 /HPF / Bacteria: Negative    CAPILLARY BLOOD GLUCOSE    RADIOLOGY & ADDITIONAL TESTS: Reviewed    Imaging Personally Reviewed:    Consultant(s) Notes Reviewed:  Orthopedics, PT    Care Discussed with Consultants/Other Providers: Orthopedics

## 2023-01-08 NOTE — PROGRESS NOTE ADULT - SUBJECTIVE AND OBJECTIVE BOX
ORTHOPAEDICS DAILY PROGRESS NOTE:       SUBJECTIVE/ROS: POD 1. Seen and examined. Pain well controlled. Denies CP/SOB. HV 50/120         MEDICATIONS  (STANDING):  acetaminophen     Tablet .. 975 milliGRAM(s) Oral every 8 hours  clotrimazole 1% Cream 1 Application(s) Topical every 12 hours  enoxaparin Injectable 40 milliGRAM(s) SubCutaneous every 24 hours  levothyroxine Injectable 112 MICROGram(s) IV Push <User Schedule>  magnesium oxide 400 milliGRAM(s) Oral two times a day with meals  pantoprazole   Suspension 40 milliGRAM(s) Oral daily  polyethylene glycol 3350 17 Gram(s) Oral daily  sodium chloride 2 Gram(s) Oral every 8 hours    MEDICATIONS  (PRN):  acetaminophen    Suspension .. 650 milliGRAM(s) Oral every 6 hours PRN Temp greater or equal to 38C (100.4F), Mild Pain (1 - 3)  albuterol    90 MICROgram(s) HFA Inhaler 2 Puff(s) Inhalation every 6 hours PRN Shortness of Breath and/or Wheezing  gabapentin 300 milliGRAM(s) Oral three times a day PRN neuropathic pain  ondansetron Injectable 4 milliGRAM(s) IV Push every 6 hours PRN Nausea and/or Vomiting  oxyCODONE    IR 5 milliGRAM(s) Oral every 4 hours PRN Severe Pain (7 - 10)  oxyCODONE    IR 2.5 milliGRAM(s) Oral every 4 hours PRN Moderate Pain (4 - 6)  oxyCODONE    IR 5 milliGRAM(s) Oral every 6 hours PRN Moderate Pain (4 - 6)  oxyCODONE    IR 10 milliGRAM(s) Oral every 6 hours PRN Severe Pain (7 - 10)      OBJECTIVE:    Vital Signs Last 24 Hrs  T(C): 36.7 (2023 05:01), Max: 37.8 (2023 19:21)  T(F): 98.1 (2023 05:01), Max: 100.1 (2023 19:21)  HR: 104 (2023 05:01) (101 - 134)  BP: 103/67 (2023 05:01) (100/60 - 127/63)  BP(mean): 88 (2023 11:15) (67 - 88)  RR: 17 (2023 05:01) (12 - 22)  SpO2: 100% (2023 05:01) (97% - 100%)    Parameters below as of 2023 05:01  Patient On (Oxygen Delivery Method): room air        I&O's Detail    2023 07:01  -  2023 07:00  --------------------------------------------------------  IN:    Enteral Tube Flush: 230 mL    IV PiggyBack: 50 mL    TwoCal HN: 948 mL  Total IN: 1228 mL    OUT:    Accordian (mL): 150 mL    Voided (mL): 1300 mL  Total OUT: 1450 mL    Total NET: -222 mL          Daily     Daily     LABS:                        9.1    23.55 )-----------( 587      ( 2023 07:12 )             29.5     -    131<L>  |  97<L>  |  20  ----------------------------<  83  5.0   |  21<L>  |  0.76    Ca    9.8      2023 04:45  Phos  1.8     -  Mg     2.00     -    TPro  6.8  /  Alb  2.9<L>  /  TBili  0.3  /  DBili  x   /  AST  49<H>  /  ALT  55<H>  /  AlkPhos  302<H>      PT/INR - ( 2023 04:45 )   PT: 15.9 sec;   INR: 1.37 ratio         PTT - ( 2023 04:45 )  PTT:33.0 sec  Urinalysis Basic - ( 2023 04:32 )    Color: Yellow / Appearance: Clear / S.018 / pH: x  Gluc: x / Ketone: Negative  / Bili: Negative / Urobili: <2 mg/dL   Blood: x / Protein: 100 mg/dL / Nitrite: Negative   Leuk Esterase: Negative / RBC: 4 /HPF / WBC 3-6 /HPF   Sq Epi: x / Non Sq Epi: 3 /HPF / Bacteria: Negative                PHYSICAL EXAM:    Gen: NAD, resting in bed  Resp: unlabored breathing  RLE: dressing c/d/i, prevena, HVx1, KI        +EHL/FHL/TA/GS         SILT Owens/Saph/Tib/DP/SP        +DP, cap refill <2 sec

## 2023-01-08 NOTE — PHYSICAL THERAPY INITIAL EVALUATION ADULT - PERTINENT HX OF CURRENT PROBLEM, REHAB EVAL
Pt is a 67-year-old male with PMH of metastatic esophageal cancer, hypothyroidism, GERD, HTN who was transferred from [facility] for Orthopedics consult for possible right knee malignancy versus septic arthritis.
Patient is 67 year old Male s/p R patallectomy/quadicepsplasty

## 2023-01-08 NOTE — PROGRESS NOTE ADULT - ASSESSMENT
A/P: 67yMale POD0 s/p R patallectomy/quadicepsplasty, recovering well    - Pain control  - WBAT RLE  - DVT ppx: lovenox  - PT/OT  - OOB/AAT  - Regular diet  - Monitor I&Os  - FU drain output.

## 2023-01-09 NOTE — PROGRESS NOTE ADULT - ASSESSMENT
A/P: 67yMale s/p R patallectomy/quadicepsplasty on 1/7, recovering approrpriately    - Pain control  - WBAT RLE in KI  - Prevena  - DVT ppx: lovenox  - PT/OT/OOB  - FU drain output  - FU OR cx/path

## 2023-01-09 NOTE — PROGRESS NOTE ADULT - PROBLEM SELECTOR PLAN 7
- Diet: appreciate nutrition recs for bolus feeds with TwoCal HN (237 ml x 5 feeds daily)  - PT consult recommending [facility], family declining --> pending PT re-eval now that patient is post-op with improving pain  - DVT ppx: Lovenox 40 mg SQ daily  - Dispo: pending course/clinical improvement - Diet: appreciate nutrition recs for bolus feeds with TwoCal HN (237 ml x 5 feeds daily)  - PT/OT recommending [facility], family prefers home services  - DVT ppx: Lovenox 40 mg SQ daily  - Dispo: likely home

## 2023-01-09 NOTE — PROGRESS NOTE ADULT - ASSESSMENT
67M metastatic esophageal SCC.   Involving the right patella s/p radical resection 11/10 but pain and swelling did not resolve.   Now s/p patellectomy, quadricepsplasty, synovectomy 1/7.   No clear superimposed infection throughout his course.     Chronic unchanged cough, left chest wall metastases, opacification, completed enough antibiotics for possible pneumonia.     Suggest  -post-op antibiotics per ortho   -f/u final cultures     Discussed with medicine and orthopedics   Will sign off, call back if needed    Red Streeter MD   Infectious Disease   Available on TEAMS. After 5PM and on weekends please page fellow on call or call [phone]

## 2023-01-09 NOTE — PROGRESS NOTE ADULT - SUBJECTIVE AND OBJECTIVE BOX
Damaris Aguiar, PGY1  TEAMS or Pager [phone]     Patient is a 67y old  Male who presents with a chief complaint of R knee pain (09 Jan 2023 01:59)      SUBJECTIVE/INTERVAL EVENTS: Patient seen and examined at bedside. Otherwise, denies chest pain, SOB, lightheadedness, nausea, vomiting, constipation, or diarrhea.    MEDICATIONS  (STANDING):  acetaminophen     Tablet .. 975 milliGRAM(s) Oral every 8 hours  clotrimazole 1% Cream 1 Application(s) Topical every 12 hours  enoxaparin Injectable 40 milliGRAM(s) SubCutaneous every 24 hours  levothyroxine Injectable 112 MICROGram(s) IV Push <User Schedule>  magnesium oxide 400 milliGRAM(s) Oral two times a day with meals  pantoprazole   Suspension 40 milliGRAM(s) Oral daily  polyethylene glycol 3350 17 Gram(s) Oral daily  sodium chloride 2 Gram(s) Oral every 8 hours    MEDICATIONS  (PRN):  acetaminophen    Suspension .. 650 milliGRAM(s) Oral every 6 hours PRN Temp greater or equal to 38C (100.4F), Mild Pain (1 - 3)  albuterol    90 MICROgram(s) HFA Inhaler 2 Puff(s) Inhalation every 6 hours PRN Shortness of Breath and/or Wheezing  gabapentin 300 milliGRAM(s) Oral three times a day PRN neuropathic pain  ondansetron Injectable 4 milliGRAM(s) IV Push every 6 hours PRN Nausea and/or Vomiting  oxyCODONE    IR 5 milliGRAM(s) Oral every 4 hours PRN Severe Pain (7 - 10)  oxyCODONE    IR 2.5 milliGRAM(s) Oral every 4 hours PRN Moderate Pain (4 - 6)  oxyCODONE    IR 5 milliGRAM(s) Oral every 6 hours PRN Moderate Pain (4 - 6)  oxyCODONE    IR 10 milliGRAM(s) Oral every 6 hours PRN Severe Pain (7 - 10)      VITAL SIGNS:  T(F): 98.6 (01-09-23 @ 04:40), Max: 98.6 (01-09-23 @ 04:40)  HR: 112 (01-09-23 @ 04:40) (102 - 116)  BP: 117/68 (01-09-23 @ 04:40) (105/65 - 117/68)  RR: 17 (01-09-23 @ 04:40) (17 - 19)  SpO2: 99% (01-09-23 @ 04:40) (99% - 100%)    I&O's Summary    07 Jan 2023 07:01  -  08 Jan 2023 07:00  --------------------------------------------------------  IN: 1228 mL / OUT: 1450 mL / NET: -222 mL    08 Jan 2023 07:01  -  09 Jan 2023 05:45  --------------------------------------------------------  IN: 1815 mL / OUT: 440 mL / NET: 1375 mL      Daily     Daily     PHYSICAL EXAM:  Gen: Alert, NAD  HEENT: NCAT, conjunctiva clear, sclera anicteric, no erythema or exudates in the oropharynx, mmm  Neck: Supple, no JVD  CV: RRR, S1S2, no m/r/g  Resp: CTAB, normal respiratory effort  Abd: Soft, nontender, nondistended, normal bowel sounds  Ext: no edema, no clubbing or cyanosis  Neuro: AOx3, CN2-12 grossly intact, ANDREWS  SKIN: warm, perfused    LABS:                        9.1    23.55 )-----------( 587      ( 08 Jan 2023 07:12 )             29.5     Hgb Trend: 9.1<--, 9.1<--, 10.0<--, 7.9<--, 7.6<--  01-08    136  |  104  |  24<H>  ----------------------------<  141<H>  4.3   |  22  |  0.71    Ca    9.7      08 Jan 2023 07:12  Phos  1.6     01-08  Mg     2.00     01-08      Creatinine Trend: 0.71<--, 0.76<--, 0.74<--, 0.76<--, 0.79<--, 0.76<--              CAPILLARY BLOOD GLUCOSE          RADIOLOGY & ADDITIONAL TESTS: Reviewed    Imaging Personally Reviewed:    Consultant(s) Notes Reviewed:      Care Discussed with Consultants/Other Providers:   Damaris Aguiar, PGY1  TEAMS or Pager [phone]     Patient is a 67y old Male who presents with a chief complaint of R knee pain (09 Jan 2023 01:59)    SUBJECTIVE/INTERVAL EVENTS: Overnight, patient had persistent hypophosphatemia (1.0 > 1.6) despite repletion. Patient seen and examined at bedside today. He reports 10/10 pain at R knee which is unchanged from before the procedure. He admits that PRN oxycodone has been controlling his pain to a tolerable level (used oxycodone 10 mg x 3 in last 24h). He has been moving R leg laterally in bed and was able to stand briefly with PT assist today. Otherwise, denies chest pain, SOB, lightheadedness, nausea, vomiting, constipation, or diarrhea.    MEDICATIONS  (STANDING):  acetaminophen     Tablet .. 975 milliGRAM(s) Oral every 8 hours  clotrimazole 1% Cream 1 Application(s) Topical every 12 hours  enoxaparin Injectable 40 milliGRAM(s) SubCutaneous every 24 hours  levothyroxine Injectable 112 MICROGram(s) IV Push <User Schedule>  magnesium oxide 400 milliGRAM(s) Oral two times a day with meals  pantoprazole   Suspension 40 milliGRAM(s) Oral daily  polyethylene glycol 3350 17 Gram(s) Oral daily  sodium chloride 2 Gram(s) Oral every 8 hours    MEDICATIONS  (PRN):  acetaminophen    Suspension .. 650 milliGRAM(s) Oral every 6 hours PRN Temp greater or equal to 38C (100.4F), Mild Pain (1 - 3)  albuterol    90 MICROgram(s) HFA Inhaler 2 Puff(s) Inhalation every 6 hours PRN Shortness of Breath and/or Wheezing  gabapentin 300 milliGRAM(s) Oral three times a day PRN neuropathic pain  ondansetron Injectable 4 milliGRAM(s) IV Push every 6 hours PRN Nausea and/or Vomiting  oxyCODONE    IR 5 milliGRAM(s) Oral every 4 hours PRN Severe Pain (7 - 10)  oxyCODONE    IR 2.5 milliGRAM(s) Oral every 4 hours PRN Moderate Pain (4 - 6)  oxyCODONE    IR 5 milliGRAM(s) Oral every 6 hours PRN Moderate Pain (4 - 6)  oxyCODONE    IR 10 milliGRAM(s) Oral every 6 hours PRN Severe Pain (7 - 10)      VITAL SIGNS:  T(F): 98.6 (01-09-23 @ 04:40), Max: 98.6 (01-09-23 @ 04:40)  HR: 112 (01-09-23 @ 04:40) (102 - 116)  BP: 117/68 (01-09-23 @ 04:40) (105/65 - 117/68)  RR: 17 (01-09-23 @ 04:40) (17 - 19)  SpO2: 99% (01-09-23 @ 04:40) (99% - 100%)    I&O's Summary    07 Jan 2023 07:01  -  08 Jan 2023 07:00  --------------------------------------------------------  IN: 1228 mL / OUT: 1450 mL / NET: -222 mL    08 Jan 2023 07:01  -  09 Jan 2023 05:45  --------------------------------------------------------  IN: 1815 mL / OUT: 440 mL / NET: 1375 mL      Daily     Daily     PHYSICAL EXAM:  Gen: Alert, NAD  HEENT: NCAT, conjunctiva clear, sclera anicteric, cracked lips, dry mucous membranes  Neck: Supple, no JVD  CV: RRR, S1S2, no m/r/g  Resp: CTAB, normal respiratory effort  Abd: Soft, nontender, nondistended, normal bowel sounds, LUQ PEG with no erythema/edema/pain  Ext: no edema, no clubbing or cyanosis  - RLE: 2+ DP pulse, toes warm with cap refill <2 sec, leg in immobilizer with drain over knee, TTP over R patella  Neuro: AOx3, CN2-12 grossly intact, ANDREWS  SKIN: warm, perfused    LABS:                        9.1    23.55 )-----------( 587      ( 08 Jan 2023 07:12 )             29.5     Hgb Trend: 9.1<--, 9.1<--, 10.0<--, 7.9<--, 7.6<--  01-08    136  |  104  |  24<H>  ----------------------------<  141<H>  4.3   |  22  |  0.71    Ca    9.7      08 Jan 2023 07:12  Phos  1.6     01-08  Mg     2.00     01-08      Creatinine Trend: 0.71<--, 0.76<--, 0.74<--, 0.76<--, 0.79<--, 0.76<--    CAPILLARY BLOOD GLUCOSE    RADIOLOGY & ADDITIONAL TESTS: Reviewed    Imaging Personally Reviewed:    Consultant(s) Notes Reviewed:  Orthopedics, Nutrition, PT    Care Discussed with Consultants/Other Providers: Orthopedics

## 2023-01-09 NOTE — PROGRESS NOTE ADULT - SUBJECTIVE AND OBJECTIVE BOX
Follow Up: knee pain     Interval History/ROS: OR 1/7 cultures negative. Knee still hurts but was able to stand on it today with PT per wife. Afebrile. Cough is the same, unchanged. No diarrhea or chills.     Allergies  No Known Allergies        ANTIMICROBIALS:  ceFAZolin   IVPB 2000 every 8 hours      OTHER MEDS:  acetaminophen    Suspension .. 650 milliGRAM(s) Oral every 6 hours PRN  albuterol    90 MICROgram(s) HFA Inhaler 2 Puff(s) Inhalation every 6 hours PRN  clotrimazole 1% Cream 1 Application(s) Topical every 12 hours  enoxaparin Injectable 40 milliGRAM(s) SubCutaneous every 24 hours  gabapentin 300 milliGRAM(s) Oral three times a day PRN  levothyroxine Injectable 112 MICROGram(s) IV Push <User Schedule>  magnesium oxide 400 milliGRAM(s) Oral two times a day with meals  ondansetron Injectable 4 milliGRAM(s) IV Push every 6 hours PRN  oxyCODONE    IR 5 milliGRAM(s) Oral every 4 hours PRN  oxyCODONE    IR 2.5 milliGRAM(s) Oral every 4 hours PRN  oxyCODONE    IR 10 milliGRAM(s) Oral every 6 hours PRN  oxyCODONE    IR 5 milliGRAM(s) Oral every 6 hours PRN  pantoprazole   Suspension 40 milliGRAM(s) Oral daily  polyethylene glycol 3350 17 Gram(s) Oral daily  sodium chloride 2 Gram(s) Oral every 8 hours      Vital Signs Last 24 Hrs  T(C): 36.8 (09 Jan 2023 14:26), Max: 37 (09 Jan 2023 04:40)  T(F): 98.2 (09 Jan 2023 14:26), Max: 98.6 (09 Jan 2023 04:40)  HR: 104 (09 Jan 2023 14:26) (102 - 116)  BP: 113/70 (09 Jan 2023 14:26) (105/65 - 150/70)  BP(mean): --  RR: 17 (09 Jan 2023 14:26) (17 - 19)  SpO2: 100% (09 Jan 2023 14:26) (99% - 100%)    Parameters below as of 09 Jan 2023 14:26  Patient On (Oxygen Delivery Method): room air        Physical Exam:  General: non toxic  Cardio: regular rate   Respiratory: nonlabored, occasional cough   abd: nondistended  Musculoskeletal: right knee in a brace, drain and vac in place   vascular: no phlebitis   Skin: no rash                          8.8    27.06 )-----------( 607      ( 09 Jan 2023 07:50 )             28.5       01-09    134<L>  |  100  |  23  ----------------------------<  119<H>  3.8   |  22  |  0.64    Ca    8.9      09 Jan 2023 07:50  Phos  2.1     01-09  Mg     1.70     01-09            MICROBIOLOGY:  Culture - Surgical Swab (collected 01-07-23 @ 08:30)  Source: Drainage #2 RIGHT KNEE  Preliminary Report (01-08-23 @ 18:25):    No growth    Culture - Fungal, Other (collected 01-07-23 @ 08:30)  Source: .Other #2 RIGHT KNEE  Preliminary Report (01-09-23 @ 10:49):    Testing in progress    Culture - Acid Fast - Tissue w/Smear (collected 01-07-23 @ 08:20)  Source: .Tissue RIGHT KNEE    Culture - Fungal, Tissue (collected 01-07-23 @ 08:20)  Source: .Tissue RIGHT KNEE  Preliminary Report (01-09-23 @ 10:49):    Testing in progress    Culture - Tissue with Gram Stain (collected 01-07-23 @ 08:20)  Source: .Tissue  Gram Stain (01-07-23 @ 20:44):    No polymorphonuclear cells seen    No organisms seen  Preliminary Report (01-08-23 @ 18:41):    No growth    Culture - Joint Fluid (collected 01-05-23 @ 17:43)  Source: Joint Fl Joint Fluid- right knee  Gram Stain (01-06-23 @ 02:18):    No polymorphonuclear leukocytes seen    No organisms seen    by cytocentrifuge  Preliminary Report (01-06-23 @ 21:19):    No growth    Culture - Fungal, Body Fluid (collected 01-05-23 @ 17:43)  Source: .Body Fluid Synovial Fluid-right knee  Preliminary Report (01-07-23 @ 15:03):    Culture is being performed. Fungal cultures are held for 4 weeks.      RADIOLOGY:  Images below reviewed personally  MR Knee w/wo IV Cont, Right (01.05.23 @ 14:51)   1.  Ill-defined comminuted pathologic right patellar fracture including   transverse component through the midpole with approximately 2 cm   retraction of the dominant proximal fragment. Superimposed acute   osteomyelitis and/or osteonecrosis of the enhancing dominant distal   patellar fragment. Limited evaluation for residual osseous metastasis in   this setting.  2.  Moderate volume complex right knee joint effusion without acute on   chronic synovitis. Consider aspiration analysis.  3.  Additional lower grade and chronic findings as detailed in body   section of this report.

## 2023-01-09 NOTE — PROGRESS NOTE ADULT - PROBLEM SELECTOR PLAN 2
- Phos 1.8 on 1/7, s/p oral repletion x 2 packets  - Phos 1.0 on 1/8 in context of pt NPO for procedure yesterday    Plan  - as K 4.3, will replete with IV NaPO4 30 mmol  - recheck phosphorus in afternoon, replete to goal > 2.5 - Phos 1.8 on 1/7, s/p oral repletion x 2 packets  - Phos 1.0 > 1.6 on 1/8 in context of pt NPO for procedure on 1/7  - concern for refeeding syndrome, diet restarted on 1/8    Plan  - Phos 2.1 today, repleting with IV KPhos  - recheck phosphorus in afternoon, replete to goal > 2.5

## 2023-01-09 NOTE — PROGRESS NOTE ADULT - PROBLEM SELECTOR PLAN 4
- diagnosed about 8 yrs ago  - follows with Dr. 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    Plan  - Contacted outpatient oncologist, most recent progress note obtained and sent to [facility]  - Family considering transition to [facility], spoke with Oncology on 1/3  - On discussion on 1/8, family prefers to follow with current oncologist  - Continue goals of care conversation with family: currently interested in resuming palliative chemo as outpatient  - C/w bolus tube feeds, strict NPO  - C/w pantoprazole 40 mg daily for GERD, gabapentin 300 mg TID PRN for pain, MgOH and KCl for supplementation - diagnosed about 8 yrs ago  - follows with Dr. 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

## 2023-01-09 NOTE — PROGRESS NOTE ADULT - SUBJECTIVE AND OBJECTIVE BOX
ORTHOPAEDICS DAILY PROGRESS NOTE:     SUBJECTIVE/ROS:  Seen and examined at bedside. Pain controlled.    OBJECTIVE:  Vital Signs Last 24 Hrs  T(C): 36.9 (09 Jan 2023 00:40), Max: 36.9 (09 Jan 2023 00:40)  T(F): 98.4 (09 Jan 2023 00:40), Max: 98.4 (09 Jan 2023 00:40)  HR: 111 (09 Jan 2023 00:40) (102 - 116)  BP: 105/65 (09 Jan 2023 00:40) (103/67 - 107/67)  RR: 19 (09 Jan 2023 00:40) (17 - 19)  SpO2: 100% (09 Jan 2023 00:40) (99% - 100%)  Parameters below as of 09 Jan 2023 00:40  Patient On (Oxygen Delivery Method): room air      LABS:                                   9.1    23.55 )-----------( 587      ( 08 Jan 2023 07:12 )             29.5   01-08    136  |  104  |  24<H>  ----------------------------<  141<H>  4.3   |  22  |  0.71    Ca    9.7      08 Jan 2023 07:12  Phos  1.6     01-08  Mg     2.00     01-08        PHYSICAL EXAM:    Gen: NAD, resting in bed  Resp: unlabored breathing  RLE: dressing/KI c/d/i, prevena holding suction, HV w/ SS output        +EHL/FHL/TA/GS         SILT Owens/Saph/Tib/DP/SP        +DP

## 2023-01-09 NOTE — PROGRESS NOTE ADULT - PROBLEM SELECTOR PLAN 1
- progressively worsening pain, erythema, edema, and warmth at the R knee joint for about 1 month  - recent partial excision of R patella on 11/10 with SCC on biopsy but unclear margins  - current presentation more consistent with progression of known cancer than septic arthritis  - MRI R knee w/wo contrast showing R patellar fracture with OM vs osteonecrosis, moderate complicated effusion--clinical suspicion is greatest for osteonecrosis from metastatic disease   - s/p tap of R knee but small sample collected; gram stain negative with no PMNs or bacteria    Plan  - Appreciate Ortho recs:     - s/p patellectomy, quadricepsplasty, and synovectomy on 1/7 in OR     - wound vac in place over R knee, drained 70 cc yesterday and 150 cc today     - continue to monitor drain output, follow up with ortho about wound care requirements  - Pain control:      - Tylenol 650 mg q6h for mild pain     - Oxycodone 5 mg q6h PRN for moderate pain     - Oxycodone 10 mg q6h PRN for severe pain        - c/w bowel regimen to prevent opioid induced constipation (miralax daily)  - patient requires repeat evaluation by orthopedic surgery to plan for transition to outpatient care - progressively worsening pain, erythema, edema, and warmth at the R knee joint for about 1 month  - recent partial excision of R patella on 11/10 with SCC on biopsy but unclear margins  - current presentation more consistent with progression of known cancer than septic arthritis  - MRI R knee w/wo contrast showing R patellar fracture with OM vs osteonecrosis, moderate complicated effusion--clinical suspicion is greatest for osteonecrosis from metastatic disease   - s/p tap of R knee but small sample collected; gram stain negative with no PMNs or bacteria    Plan  - Appreciate Ortho recs:     - s/p patellectomy, quadricepsplasty, and synovectomy on 1/7 in OR     - wound vac in place over R knee, drained 150 cc yesterday and 40 cc today     - continue to monitor drain output, ortho will remove vacuum prior to discharge     - started on ancef 2 g q8h today for prophylaxis against post-op infection; will contact Ortho for abx course (day 1 today)     - recommending outpt DVT ppx with xarelto until f/u with Ortho (Wells score for PE 4.0, moderate risk)  - Pain control:      - Tylenol 650 mg q6h for mild pain     - Oxycodone 5 mg q6h PRN for moderate pain     - Oxycodone 10 mg q6h PRN for severe pain        - c/w bowel regimen to prevent opioid induced constipation (miralax daily)  - Coordinate home PT and wound care for knee on D/C

## 2023-01-09 NOTE — CHART NOTE - NSCHARTNOTEFT_GEN_A_CORE
Pt seen for malnutrition follow up.    Medical Course:  - Per chart, pt is 67 year old male PMH metastatic esophageal cancer s/p PEG, hypothyroidism, GERD, HTN transferred from [facility] for orthopedics consult.  - Orthopedics following. MRI knee findings concerning for metastatic disease. Now s/p surgical resection of right patella (1/7).     Nutrition Course:  - Pt was transitioned from continuous feeds of TwoCal to bolus feeds. Pt tolerating EN well, no noted GI distress.  - Labs notable for hyponatremia (ordered for salt tabs 2 gm q8 hrs) and hypophosphatemia (repleted).     Diet Prescription:   - NPO with Tube Feed: TwoCal HN via Gastrostomy 237 mL bolus 5 feeds daily +150 mL flush q8 hrs    Pertinent Medications:   - ceFAZolin IV, levothyroxine IV, magnesium oxide, pantoprazole Suspension, polyethylene glycol, sodium chloride, ondansetron IV PRN    Pertinent Labs:   - (1/9) Na 134 mmol/L<L> Glu 119 mg/dL<H> K+ 3.8 mmol/L Cr 0.64 mg/dL BUN 23 mg/dL Phos 2.1 mg/dL<L>     Weight: (1/7 dosing) 146.3 lbs / 66.4 kg, (1/5 bed scale obtained at time of visit, unable to tare) 146.7 lbs / 66.7 kg, (1/4 dosing) 146.3 lbs / 66.4 kg   Height: 67 in / 170.18 cm  IBW: 148 lbs / 67.3 kg +/-10%  BMI: 22.9 kg/m^2 (at most recent weight)    Physical Assessment, per flowsheets:  Edema/Pressure Injury: none noted    Estimated Needs:   [X] No changes since previous assessment, based on dosing weight 146.3 lbs / 66.4 kg   4388-4240 kcal daily @30-35 kcal/kg, 92..24 gm protein daily @1.4-1.6 gm/kg     Previous Nutrition Diagnosis: [X] Severe malnutrition in the context of acute illness, remains appropriate  New Nutrition Diagnosis: [X] not applicable     Interventions:   1) Recommend continue TwoCal HN 1 can (237 mL) bolus via PEG 5x daily to provide 1185 mL formula, 2370 kcal, 98.9 gm protein, 830 mL free water (meets 35.7 kcal/kg, 1.5 gm protein/kg @ dosing weight 66.4 kg). Additional provision of free water per medical discretion.  2) Obtain weekly weights.   3) Continue to monitor electrolytes (K+, Mg, P) and replete to within desired limits as clinically indicated.   4) Monitor BMs, adjust bowel regimen as appropriate.    Monitor & Evaluate:  Tolerance to EN, nutrition related lab values, weight trends, BMs/GI distress, hydration status, skin integrity.  Cyndee Andrade RDN, CDN #[id number]  Also available on [employer] Teams. Pt seen for malnutrition follow up.     Medical Course:  - Per chart, pt is 67 year old male PMH metastatic esophageal cancer s/p PEG, hypothyroidism, GERD, HTN transferred from [facility] for orthopedics consult.  - Orthopedics following. MRI knee findings concerning for metastatic disease. Now s/p surgical resection of right patella (1/7).     Nutrition Course:  - Pt was transitioned from continuous feeds of TwoCal to bolus feeds. Pt tolerating EN well, no noted GI distress.  - Labs notable for hyponatremia (ordered for salt tabs 2 gm q8 hrs) and hypophosphatemia (repleted).     Diet Prescription:   - NPO with Tube Feed: TwoCal HN via Gastrostomy 237 mL bolus 5 feeds daily +150 mL flush q8 hrs    Pertinent Medications:   - ceFAZolin IV, levothyroxine IV, magnesium oxide, pantoprazole Suspension, polyethylene glycol, sodium chloride, ondansetron IV PRN    Pertinent Labs:   - (1/9) Na 134 mmol/L<L> Glu 119 mg/dL<H> K+ 3.8 mmol/L Cr 0.64 mg/dL BUN 23 mg/dL Phos 2.1 mg/dL<L>     Weight: (1/7 dosing) 146.3 lbs / 66.4 kg, (1/5 bed scale obtained at time of visit, unable to tare) 146.7 lbs / 66.7 kg, (1/4 dosing) 146.3 lbs / 66.4 kg   Height: 67 in / 170.18 cm  IBW: 148 lbs / 67.3 kg +/-10%  BMI: 22.9 kg/m^2 (at most recent weight)    Physical Assessment, per flowsheets:  Edema/Pressure Injury: none noted    Estimated Needs:   [X] No changes since previous assessment, based on dosing weight 146.3 lbs / 66.4 kg   9019-4206 kcal daily @30-35 kcal/kg, 92..24 gm protein daily @1.4-1.6 gm/kg     Previous Nutrition Diagnosis: [X] Severe malnutrition in the context of acute illness, remains appropriate  New Nutrition Diagnosis: [X] not applicable     Interventions:   1) Recommend continue TwoCal HN 1 can (237 mL) bolus via PEG 5x daily to provide 1185 mL formula, 2370 kcal, 98.9 gm protein, 830 mL free water (meets 35.7 kcal/kg, 1.5 gm protein/kg @ dosing weight 66.4 kg). Additional provision of free water per medical discretion.  2) Obtain weekly weights.   3) Continue to monitor electrolytes (K+, Mg, P) and replete to within desired limits as clinically indicated.   4) Monitor BMs, adjust bowel regimen as appropriate.    Monitor & Evaluate:  Tolerance to EN, nutrition related lab values, weight trends, BMs/GI distress, hydration status, skin integrity.  Cyndee Andrade RDN, CDN #[id number]  Also available on [employer] Teams.

## 2023-01-09 NOTE — PROGRESS NOTE ADULT - ATTENDING COMMENTS
Patient seen and examined, care plan discussed with house staff as above.    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 Ongoing sinus tachycardia and WBC in the 20k range (since admission). Currently being treated for suspected refeeding syndrome, with ongoing hypophos.     Primary Onc is Dr. Gi Thomas in [place], he was first diagnosed w SCC in 2015, unclear what his prior chemo was. Need records [ ].    F/u final culture data from OR. [ ] Continues on Ancef (1/7 - ) post OR.

## 2023-01-09 NOTE — PROGRESS NOTE ADULT - NUTRITIONAL ASSESSMENT
This patient has been assessed with a concern for Malnutrition and has been determined to have a diagnosis/diagnoses of Severe protein-calorie malnutrition.    This patient is being managed with:   Diet NPO with Tube Feed-  Tube Feeding Modality: Gastrostomy  TwoCal HN (TWOCALHNRTH)  Total Volume for 24 Hours (mL): 1896  Bolus  Total Volume of Bolus (mL):  237  Total # of Feeds: 5  Tube Feed Frequency: Every 3 hours   Tube Feed Start Time: 06:00  Bolus Feed Rate (mL per Hour): 237   Bolus Feed Duration (in Hours): 1  Bolus   Total Volume per Flush (mL): 150   Frequency: Every 8 Hours   Total Daily Volume of Flush (mL): 450    Start Time: 06:00  Entered: Jan 8 2023 11:45AM

## 2023-01-09 NOTE — PROGRESS NOTE ADULT - ASSESSMENT
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 Although septic arthritis initially was a consideration, the patient had no evident fevers or infectious signs, and his pain was progressive and slow, so antibiotics were discontinued. He was noted on MRI of the knee to have findings concerning for metastatic disease and is s/p surgical resection of his right patella on 1/7. He reports slight improvement in pain after procedure, now pending PT re-evaluation and disposition.  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 Although septic arthritis initially was a consideration, the patient had no evident fevers or infectious signs, and his pain was progressive and slow, so antibiotics were discontinued. He was noted on MRI of the knee to have findings concerning for metastatic disease and is s/p surgical resection of his right patella on 1/7. He reports slight improvement in pain after procedure but is still requiring PRN oxycodone 10 mg multiple times per day. He is now pending disposition with home PT and wound care for R knee. Orthopedic Surgery following and will remove vacuum over R knee on day of discharge.

## 2023-01-09 NOTE — PROGRESS NOTE ADULT - PROBLEM SELECTOR PLAN 3
- Na 122 on 1/5, increased to 136 on 1/8, now resolving  - Serum osmolarity calculated as 262 (hypotonic), patient euvolemic on exam  - Elevated urine osmolarity and urine sodium are suggestive of inappropriate concentration of urine   - Patient with known pleural metastasis, likely 2/2 SIADH  - Less likely, but differential includes hypothyroidism (TSH 11.98); despite mild elevation in tsh, clinical picture is not consistent with this (no bradycardia, no hypotension); adrenal insufficieny unlikely as AM cortisol WNL    Plan  - C/w salt tabs 2 g TID via PEG tube  - Recheck BMP tomorrow AM, trend Na daily - Na 122 on 1/5, increased to 134 on 1/9, now resolving  - Serum osmolarity calculated as 262 (hypotonic), patient euvolemic on exam  - Elevated urine osmolarity and urine sodium are suggestive of inappropriate concentration of urine   - Patient with known pleural metastasis, likely 2/2 SIADH  - Less likely, but differential includes hypothyroidism (TSH 11.98); despite mild elevation in tsh, clinical picture is not consistent with this (no bradycardia, no hypotension); adrenal insufficieny unlikely as AM cortisol WNL    Plan  - C/w salt tabs 2 g TID via PEG tube  - Recheck BMP tomorrow AM, trend Na daily  - slowly add back free water flushes to PEG feeds (150 ml q8h)

## 2023-01-10 NOTE — PROGRESS NOTE ADULT - ASSESSMENT
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 Although septic arthritis initially was a consideration, the patient had no evident fevers or infectious signs, and his pain was progressive and slow, so antibiotics were discontinued. He was noted on MRI of the knee to have findings concerning for metastatic disease and is s/p surgical resection of his right patella on 1/7. He reports slight improvement in pain after procedure but is still requiring PRN oxycodone 10 mg multiple times per day. He is now pending disposition with home PT and wound care for R knee. Orthopedic Surgery following and will remove vacuum over R knee on day of discharge. 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 for evaluation and management of right knee pain and swelling. Although septic arthritis initially was a consideration, the patient had no evident fevers or infectious signs, and his pain was progressive and slow, so antibiotics were discontinued. He was noted on MRI of the knee to have findings concerning for metastatic disease and is s/p surgical resection of his right patella on 1/7. He reports slight improvement in pain after procedure but is still requiring PRN oxycodone 10 mg multiple times per day. He is now pending disposition with home PT and wound care for R knee. Orthopedic Surgery following, removed wound vacuum today and will remove prevena drain on day of discharge. 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 Although septic arthritis initially was a consideration, the patient had no evident fevers or infectious signs, and his pain was progressive and slow, so antibiotics were discontinued. He was noted on MRI of the knee to have findings concerning for metastatic disease and is s/p surgical resection of his right patella on 1/7. He reports slight improvement in pain after procedure but is still requiring PRN oxycodone 10 mg multiple times per day. He is now pending disposition with home PT and wound care for R knee. Orthopedic Surgery following, removed wound vacuum today and will remove prevena drain on day of Patient seen and examined by attending physician on morning of discharge day. Greater than 30 minutes spent on discharge preparation and education..

## 2023-01-10 NOTE — PROGRESS NOTE ADULT - PROBLEM SELECTOR PLAN 4
- diagnosed about 8 yrs ago  - follows with Dr. Gi Thomas ([place])  - complicated by dysphagia with all meds/nutrition via PEG  - CXR with near complete opacification of L lung, stable from previous imaging and likely represents pleural metastases    Plan  - On 3rd line chemo with single agent irinotecan (started 6/27/22, s/p 3 cycles, most recently on 8/1/22)  - Contacted outpatient oncologist, most recent progress note sent to [facility] (family requested 2nd opinion, considering transfer)  - Continue goals of care conversation with family: currently interested in resuming palliative chemo on D/C  - C/w bolus tube feeds, strict NPO with all meds via PEG  - C/w pantoprazole 40 mg daily for GERD, gabapentin 300 mg TID PRN for pain, MgOH and KCl for supplementation - Na 122 on 1/5, increased to 134 on 1/9, now resolving  - Serum osmolarity calculated as 262 (hypotonic), patient euvolemic on exam  - Elevated urine osmolarity and urine sodium are suggestive of inappropriate concentration of urine   - Patient with known pleural metastasis, likely 2/2 SIADH  - Less likely, but differential includes hypothyroidism (TSH 11.98); despite mild elevation in tsh, clinical picture is not consistent with this (no bradycardia, no hypotension); adrenal insufficieny unlikely as AM cortisol WNL    Plan  - C/w salt tabs 2 g TID via PEG tube  - Recheck BMP tomorrow AM, trend Na daily  - slowly add back free water flushes to PEG feeds (150 ml q8h)

## 2023-01-10 NOTE — PROGRESS NOTE ADULT - PROBLEM SELECTOR PLAN 1
- progressively worsening pain, erythema, edema, and warmth at the R knee joint for about 1 month  - recent partial excision of R patella on 11/10 with SCC on biopsy but unclear margins  - current presentation more consistent with progression of known cancer than septic arthritis  - MRI R knee w/wo contrast showing R patellar fracture with OM vs osteonecrosis, moderate complicated effusion--clinical suspicion is greatest for osteonecrosis from metastatic disease   - s/p tap of R knee but small sample collected; gram stain negative with no PMNs or bacteria    Plan  - Appreciate Ortho recs:     - s/p patellectomy, quadricepsplasty, and synovectomy on 1/7 in OR     - wound vac in place over R knee, drained 150 cc yesterday and 40 cc today     - continue to monitor drain output, ortho will remove vacuum prior to discharge     - started on ancef 2 g q8h today for prophylaxis against post-op infection; will contact Ortho for abx course (day 1 today)     - recommending outpt DVT ppx with xarelto until f/u with Ortho (Wells score for PE 4.0, moderate risk)  - Pain control:      - Tylenol 650 mg q6h for mild pain     - Oxycodone 5 mg q6h PRN for moderate pain     - Oxycodone 10 mg q6h PRN for severe pain        - c/w bowel regimen to prevent opioid induced constipation (miralax daily)  - Coordinate home PT and wound care for knee on D/C - progressively worsening pain, erythema, edema, and warmth at the R knee joint for about 1 month  - recent partial excision of R patella on 11/10 with SCC on biopsy but unclear margins  - current presentation more consistent with progression of known cancer than septic arthritis  - MRI R knee w/wo contrast showing R patellar fracture with OM vs osteonecrosis, moderate complicated effusion--clinical suspicion is greatest for osteonecrosis from metastatic disease   - s/p tap of R knee but small sample collected; gram stain negative with no PMNs or bacteria    Plan  - Appreciate Ortho recs:     - s/p patellectomy, quadricepsplasty, and synovectomy on 1/7 in OR     - wound vac in place over R knee, drained 40 cc yesterday     - continue to monitor drain output, ortho will remove Provena prior to discharge     - started on ancef 2 g q8h today for prophylaxis against post-op infection; will contact Ortho for abx course (day 2 today)     - recommending outpt DVT ppx with xarelto until f/u with Ortho (Wells score for PE 4.0, moderate risk)  - Pain control:      - Tylenol 650 mg q6h for mild pain     - Oxycodone 5 mg q6h PRN for moderate pain     - Oxycodone 10 mg q6h PRN for severe pain        - c/w bowel regimen to prevent opioid induced constipation (miralax daily)  - Coordinate home PT and wound care for knee on D/C  - Outpatient f/u with Dr. Carter within 1 week after D/C

## 2023-01-10 NOTE — PROGRESS NOTE ADULT - PROBLEM SELECTOR PROBLEM 3
Hyponatremia Breathing spontaneous and unlabored. Breath sounds clear and equal bilaterally with regular rhythm. Hypophosphatemia

## 2023-01-10 NOTE — PROGRESS NOTE ADULT - SUBJECTIVE AND OBJECTIVE BOX
ORTHOPAEDICS DAILY PROGRESS NOTE:     SUBJECTIVE/ROS:  Seen and examined at bedside. Pain controlled.    OBJECTIVE:  Vital Signs Last 24 Hrs  T(C): 37.3 (09 Jan 2023 21:34), Max: 37.3 (09 Jan 2023 21:34)  T(F): 99.2 (09 Jan 2023 21:34), Max: 99.2 (09 Jan 2023 21:34)  HR: 100 (09 Jan 2023 21:34) (100 - 112)  BP: 118/72 (09 Jan 2023 21:34) (113/70 - 150/70)  RR: 17 (09 Jan 2023 21:34) (17 - 17)  SpO2: 98% (09 Jan 2023 21:34) (98% - 100%)  Parameters below as of 09 Jan 2023 21:34  Patient On (Oxygen Delivery Method): room air  O2 Flow (L/min): 12      LABS:                          8.8    27.06 )-----------( 607      ( 09 Jan 2023 07:50 )             28.5   01-09    139  |  104  |  25<H>  ----------------------------<  166<H>  4.6   |  22  |  0.68    Ca    8.8      09 Jan 2023 19:20  Phos  2.3     01-09  Mg     1.50     01-09    TPro  6.7  /  Alb  2.8<L>  /  TBili  0.3  /  DBili  0.2  /  AST  37  /  ALT  42<H>  /  AlkPhos  321<H>  01-09      PHYSICAL EXAM:  Gen: NAD, resting in bed  Resp: unlabored breathing  RLE: dressing/KI c/d/i, prevena holding suction, HV w/ SS output        +EHL/FHL/TA/GS         SILT Owens/Saph/Tib/DP/SP        +DP

## 2023-01-10 NOTE — PROGRESS NOTE ADULT - PROBLEM SELECTOR PLAN 7
- Diet: appreciate nutrition recs for bolus feeds with TwoCal HN (237 ml x 5 feeds daily)  - PT/OT recommending [facility], family prefers home services  - DVT ppx: Lovenox 40 mg SQ daily  - Dispo: likely home - home meds include amlodipine 5 mg, metoprolol succinate 50 mg   - BP 90s-100s/50s-60s in ED; now 120s/70s    Plan  - BP meds held due to concern for sepsis  - BP within acceptable range, will continue to hold

## 2023-01-10 NOTE — PROGRESS NOTE ADULT - PROBLEM SELECTOR PLAN 8
- Diet: appreciate nutrition recs for bolus feeds with TwoCal HN (237 ml x 5 feeds daily)  - PT/OT recommending [facility], family prefers home services  - DVT ppx: Lovenox 40 mg SQ daily  - Dispo: likely home

## 2023-01-10 NOTE — PROGRESS NOTE ADULT - SUBJECTIVE AND OBJECTIVE BOX
Damaris Aguiar, PGY1  TEAMS or Pager [phone]     Patient is a 67y old  Male who presents with a chief complaint of R knee pain (10 Alexander 2023 00:48)      SUBJECTIVE/INTERVAL EVENTS: Patient seen and examined at bedside. Otherwise, denies chest pain, SOB, lightheadedness, nausea, vomiting, constipation, or diarrhea.    MEDICATIONS  (STANDING):  ceFAZolin   IVPB 2000 milliGRAM(s) IV Intermittent every 8 hours  clotrimazole 1% Cream 1 Application(s) Topical every 12 hours  enoxaparin Injectable 40 milliGRAM(s) SubCutaneous every 24 hours  levothyroxine Injectable 112 MICROGram(s) IV Push <User Schedule>  magnesium oxide 400 milliGRAM(s) Oral two times a day with meals  pantoprazole   Suspension 40 milliGRAM(s) Oral daily  polyethylene glycol 3350 17 Gram(s) Oral daily  sodium chloride 2 Gram(s) Oral every 8 hours  sodium phosphate 30 milliMole(s)/500 mL IVPB 30 milliMole(s) IV Intermittent once    MEDICATIONS  (PRN):  acetaminophen    Suspension .. 650 milliGRAM(s) Oral every 6 hours PRN Temp greater or equal to 38C (100.4F), Mild Pain (1 - 3)  albuterol    90 MICROgram(s) HFA Inhaler 2 Puff(s) Inhalation every 6 hours PRN Shortness of Breath and/or Wheezing  gabapentin 300 milliGRAM(s) Oral three times a day PRN neuropathic pain  ondansetron Injectable 4 milliGRAM(s) IV Push every 6 hours PRN Nausea and/or Vomiting  oxyCODONE    IR 5 milliGRAM(s) Oral every 4 hours PRN Severe Pain (7 - 10)  oxyCODONE    IR 2.5 milliGRAM(s) Oral every 4 hours PRN Moderate Pain (4 - 6)  oxyCODONE    IR 10 milliGRAM(s) Oral every 6 hours PRN Severe Pain (7 - 10)  oxyCODONE    IR 5 milliGRAM(s) Oral every 6 hours PRN Moderate Pain (4 - 6)      VITAL SIGNS:  T(F): 98.2 (01-10-23 @ 04:55), Max: 99.2 (01-09-23 @ 21:34)  HR: 118 (01-10-23 @ 04:55) (100 - 118)  BP: 107/62 (01-10-23 @ 04:55) (107/62 - 150/70)  RR: 18 (01-10-23 @ 04:55) (17 - 18)  SpO2: 99% (01-10-23 @ 04:55) (98% - 100%)    I&O's Summary    08 Jan 2023 07:01  -  09 Jan 2023 07:00  --------------------------------------------------------  IN: 1815 mL / OUT: 440 mL / NET: 1375 mL    09 Jan 2023 07:01  -  10 Alexander 2023 06:18  --------------------------------------------------------  IN: 2209 mL / OUT: 357 mL / NET: 1852 mL      Daily     Daily     PHYSICAL EXAM:  Gen: Alert, NAD  HEENT: NCAT, conjunctiva clear, sclera anicteric, no erythema or exudates in the oropharynx, mmm  Neck: Supple, no JVD  CV: RRR, S1S2, no m/r/g  Resp: CTAB, normal respiratory effort  Abd: Soft, nontender, nondistended, normal bowel sounds  Ext: no edema, no clubbing or cyanosis  Neuro: AOx3, CN2-12 grossly intact, ANDREWS  SKIN: warm, perfused    LABS:                        8.8    27.06 )-----------( 607      ( 09 Jan 2023 07:50 )             28.5     Hgb Trend: 8.8<--, 9.1<--, 9.1<--, 10.0<--, 7.9<--  01-09    139  |  104  |  25<H>  ----------------------------<  166<H>  4.6   |  22  |  0.68    Ca    8.8      09 Jan 2023 19:20  Phos  2.3     01-09  Mg     1.50     01-09    TPro  6.7  /  Alb  2.8<L>  /  TBili  0.3  /  DBili  0.2  /  AST  37  /  ALT  42<H>  /  AlkPhos  321<H>  01-09    Creatinine Trend: 0.68<--, 0.64<--, 0.71<--, 0.76<--, 0.74<--, 0.76<--  LIVER FUNCTIONS - ( 09 Jan 2023 07:50 )  Alb: 2.8 g/dL / Pro: 6.7 g/dL / ALK PHOS: 321 U/L / ALT: 42 U/L / AST: 37 U/L / GGT: x                     CAPILLARY BLOOD GLUCOSE          RADIOLOGY & ADDITIONAL TESTS: Reviewed    Imaging Personally Reviewed:    Consultant(s) Notes Reviewed:      Care Discussed with Consultants/Other Providers:   Damaris Aguiar, PGY1  TEAMS or Pager [phone]     Patient is a 67y old Male who presents with a chief complaint of R knee pain (10 Alexander 2023 00:48)      SUBJECTIVE/INTERVAL EVENTS: NAOE. Patient seen and examined at bedside. He reports 10/10 knee pain that is improved but not completely relieved by pain medications. He requested PRN oxycodone 5 mg x 1, 10 mg x 2, and gabapentin 300 mg x 1 in last 24 hours. Orthopedics PA at bedside and removed hemovac today. Patient with new dyspnea with speech today and chronic productive cough. Denies chest pain, hemoptysis, SOB at rest.    MEDICATIONS  (STANDING):  ceFAZolin   IVPB 2000 milliGRAM(s) IV Intermittent every 8 hours  clotrimazole 1% Cream 1 Application(s) Topical every 12 hours  enoxaparin Injectable 40 milliGRAM(s) SubCutaneous every 24 hours  levothyroxine Injectable 112 MICROGram(s) IV Push <User Schedule>  magnesium oxide 400 milliGRAM(s) Oral two times a day with meals  pantoprazole   Suspension 40 milliGRAM(s) Oral daily  polyethylene glycol 3350 17 Gram(s) Oral daily  sodium chloride 2 Gram(s) Oral every 8 hours  sodium phosphate 30 milliMole(s)/500 mL IVPB 30 milliMole(s) IV Intermittent once    MEDICATIONS  (PRN):  acetaminophen    Suspension .. 650 milliGRAM(s) Oral every 6 hours PRN Temp greater or equal to 38C (100.4F), Mild Pain (1 - 3)  albuterol    90 MICROgram(s) HFA Inhaler 2 Puff(s) Inhalation every 6 hours PRN Shortness of Breath and/or Wheezing  gabapentin 300 milliGRAM(s) Oral three times a day PRN neuropathic pain  ondansetron Injectable 4 milliGRAM(s) IV Push every 6 hours PRN Nausea and/or Vomiting  oxyCODONE    IR 5 milliGRAM(s) Oral every 4 hours PRN Severe Pain (7 - 10)  oxyCODONE    IR 2.5 milliGRAM(s) Oral every 4 hours PRN Moderate Pain (4 - 6)  oxyCODONE    IR 10 milliGRAM(s) Oral every 6 hours PRN Severe Pain (7 - 10)  oxyCODONE    IR 5 milliGRAM(s) Oral every 6 hours PRN Moderate Pain (4 - 6)      VITAL SIGNS:  T(F): 98.2 (01-10-23 @ 04:55), Max: 99.2 (01-09-23 @ 21:34)  HR: 118 (01-10-23 @ 04:55) (100 - 118)  BP: 107/62 (01-10-23 @ 04:55) (107/62 - 150/70)  RR: 18 (01-10-23 @ 04:55) (17 - 18)  SpO2: 99% (01-10-23 @ 04:55) (98% - 100%)    I&O's Summary    08 Jan 2023 07:01  -  09 Jan 2023 07:00  --------------------------------------------------------  IN: 1815 mL / OUT: 440 mL / NET: 1375 mL    09 Jan 2023 07:01  -  10 Alexander 2023 06:18  --------------------------------------------------------  IN: 2209 mL / OUT: 357 mL / NET: 1852 mL      Daily     Daily     PHYSICAL EXAM:  Gen: Alert, NAD  HEENT: NCAT, conjunctiva clear, sclera anicteric, white exudates in the oropharynx, dry mucous membranes  Neck: Supple, no JVD  CV: RRR, S1S2, no m/r/g  Resp: diffuse expiratory wheezing anteriorly, dyspnea with speech, tachypnea  Abd: Soft, nontender, nondistended, normal bowel sounds, PEG tube in LUQ with no TTP  Ext: no edema, no clubbing or cyanosis  - RLE: in immobilizer, DP 2+, warm LE, plantar/dorsiflexion 4+/5 but limited by pain  Neuro: AOx3, CN2-12 grossly intact, ANDREWS  SKIN: warm, perfused    LABS:                        8.8    27.06 )-----------( 607      ( 09 Jan 2023 07:50 )             28.5     Hgb Trend: 8.8<--, 9.1<--, 9.1<--, 10.0<--, 7.9<--  01-09    139  |  104  |  25<H>  ----------------------------<  166<H>  4.6   |  22  |  0.68    Ca    8.8      09 Jan 2023 19:20  Phos  2.3     01-09  Mg     1.50     01-09    TPro  6.7  /  Alb  2.8<L>  /  TBili  0.3  /  DBili  0.2  /  AST  37  /  ALT  42<H>  /  AlkPhos  321<H>  01-09    Creatinine Trend: 0.68<--, 0.64<--, 0.71<--, 0.76<--, 0.74<--, 0.76<--  LIVER FUNCTIONS - ( 09 Jan 2023 07:50 )  Alb: 2.8 g/dL / Pro: 6.7 g/dL / ALK PHOS: 321 U/L / ALT: 42 U/L / AST: 37 U/L / GGT: x                     CAPILLARY BLOOD GLUCOSE          RADIOLOGY & ADDITIONAL TESTS: Reviewed    Imaging Personally Reviewed:    Consultant(s) Notes Reviewed:  Orthopedics    Care Discussed with Consultants/Other Providers:  Orthopedics

## 2023-01-10 NOTE — PROGRESS NOTE ADULT - PROBLEM SELECTOR PLAN 2
- Phos 1.8 on 1/7, s/p oral repletion x 2 packets  - Phos 1.0 > 1.6 on 1/8 in context of pt NPO for procedure on 1/7  - concern for refeeding syndrome, diet restarted on 1/8    Plan  - Phos 2.1 today, repleting with IV KPhos  - recheck phosphorus in afternoon, replete to goal > 2.5 - Phos 1.8 on 1/7, s/p oral repletion x 2 packets  - Phos 1.0 > 1.6 on 1/8 in context of pt NPO for procedure on 1/7  - concern for refeeding syndrome, diet restarted on 1/8    Plan  - Phos 2.0 today, repleting with IV KPhos  - recheck phosphorus in afternoon, replete to goal > 2.5 - patient with new dyspnea with speech, tachypnea to 20, wheezing on exam, tachycardia to 110s x few days  - DDx includes PE vs HAP vs atelectasis from decreased ambulation       - denies worsening cough, hemoptysis, LE pain/swelling; pt at high risk for clots due to malignancy and immobilization, Wells score for PE 4.0       - has been afebrile but WBCs increasing; with chronic productive cough    Plan  - Check RVP for viral infection, monitor VS and CBC  - Encourage incentive spirometry for atelectasis  - CT angio to r/o PE

## 2023-01-10 NOTE — PROGRESS NOTE ADULT - PROBLEM SELECTOR PLAN 5
- takes synthroid 150 mcg PO daily (home med)    Plan  - c/w synthroid 112 mcg IV daily  - TSH 11.98 but free T4 WNL  - patient denies new symptoms of hypothyroidism including constipation, hypotension, dry skin, brittle hair  - recommend outpatient follow-up as TSH may be inaccurate in context of current hospitalization and could instead be suggestive of sick euthyroid syndrome - diagnosed about 8 yrs ago  - follows with Dr. Gi Thomas ([place])  - complicated by dysphagia with all meds/nutrition via PEG  - CXR with near complete opacification of L lung, stable from previous imaging and likely represents pleural metastases    Plan  - On 3rd line chemo with single agent irinotecan (started 6/27/22, s/p 3 cycles, most recently on 8/1/22)  - Contacted outpatient oncologist, most recent progress note sent to [facility] (family requested 2nd opinion, considering transfer)  - Continue goals of care conversation with family: currently interested in resuming palliative chemo on D/C  - C/w bolus tube feeds, strict NPO with all meds via PEG  - C/w pantoprazole 40 mg daily for GERD, gabapentin 300 mg TID PRN for pain, MgOH and KCl for supplementation

## 2023-01-10 NOTE — CHART NOTE - NSCHARTNOTEFT_GEN_A_CORE
Went to see patient at bedside for hemovac drain removal. Medicine Resident at bedside informed of the plan. Per Team, planning for patient to be discharged to home tomorrow. Orthopedic Team to discontinue Prevena vac on the day of patient's discharge. Informed the team to let Orthopedics know when discharge is finalized. All questions answered, hemovac drain was removed. Please notify with any further questions.    [id number]

## 2023-01-10 NOTE — PROGRESS NOTE ADULT - ATTENDING COMMENTS
Patient seen and examined, care plan discussed with house staff as above.    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 Ongoing sinus tachycardia and WBC in the 20k range (since admission, uptrending on 1/10). Currently being treated for suspected refeeding syndrome, with ongoing hypophos.     Primary Onc is Dr. Gi Thomas in [place], he was first diagnosed w SCC in 2015, unclear what his prior chemo was. Need records [ ].    F/u final culture data from OR. [ ] Continues on Ancef (1/7 - ) post OR. F/u w Ortho. Will proceed w Xarelto 10 mg daily x 30 days for dvt ppx.    Wheezing and MAN when speaking full sentences on 1/10-- f/u RVP [neg], and CTA chest [ ]. Will compare to prior CT done at [facility] (no images available, but report in hard chart).

## 2023-01-10 NOTE — PROGRESS NOTE ADULT - PROBLEM SELECTOR PLAN 3
- Na 122 on 1/5, increased to 134 on 1/9, now resolving  - Serum osmolarity calculated as 262 (hypotonic), patient euvolemic on exam  - Elevated urine osmolarity and urine sodium are suggestive of inappropriate concentration of urine   - Patient with known pleural metastasis, likely 2/2 SIADH  - Less likely, but differential includes hypothyroidism (TSH 11.98); despite mild elevation in tsh, clinical picture is not consistent with this (no bradycardia, no hypotension); adrenal insufficieny unlikely as AM cortisol WNL    Plan  - C/w salt tabs 2 g TID via PEG tube  - Recheck BMP tomorrow AM, trend Na daily  - slowly add back free water flushes to PEG feeds (150 ml q8h) - Phos 1.8 on 1/7, s/p oral repletion x 2 packets  - Phos 1.0 > 1.6 on 1/8 in context of pt NPO for procedure on 1/7  - concern for refeeding syndrome, diet restarted on 1/8    Plan  - Phos 2.0 today, repleting with IV KPhos  - recheck phosphorus in afternoon, replete to goal > 2.5

## 2023-01-11 NOTE — PROGRESS NOTE ADULT - SUBJECTIVE AND OBJECTIVE BOX
Damaris Aguiar, PGY1  TEAMS or Pager [phone]     Patient is a 67y old Male who presents with a chief complaint of R knee pain (10 Alexander 2023 00:48)      SUBJECTIVE/INTERVAL EVENTS: NAOE. Patient seen and examined at bedside. He reports 10/10 knee pain that is improved but not completely relieved by pain medications. Patient with dyspnea and chronic productive cough. Denies chest pain, hemoptysis, SOB at rest.    MEDICATIONS  (STANDING):  ceFAZolin   IVPB 2000 milliGRAM(s) IV Intermittent every 8 hours  clotrimazole 1% Cream 1 Application(s) Topical every 12 hours  enoxaparin Injectable 40 milliGRAM(s) SubCutaneous every 24 hours  levothyroxine Injectable 112 MICROGram(s) IV Push <User Schedule>  magnesium oxide 400 milliGRAM(s) Oral two times a day with meals  pantoprazole   Suspension 40 milliGRAM(s) Oral daily  polyethylene glycol 3350 17 Gram(s) Oral daily  sodium chloride 2 Gram(s) Oral every 8 hours  sodium phosphate 30 milliMole(s)/500 mL IVPB 30 milliMole(s) IV Intermittent once    MEDICATIONS  (PRN):  acetaminophen    Suspension .. 650 milliGRAM(s) Oral every 6 hours PRN Temp greater or equal to 38C (100.4F), Mild Pain (1 - 3)  albuterol    90 MICROgram(s) HFA Inhaler 2 Puff(s) Inhalation every 6 hours PRN Shortness of Breath and/or Wheezing  gabapentin 300 milliGRAM(s) Oral three times a day PRN neuropathic pain  ondansetron Injectable 4 milliGRAM(s) IV Push every 6 hours PRN Nausea and/or Vomiting  oxyCODONE    IR 5 milliGRAM(s) Oral every 4 hours PRN Severe Pain (7 - 10)  oxyCODONE    IR 2.5 milliGRAM(s) Oral every 4 hours PRN Moderate Pain (4 - 6)  oxyCODONE    IR 10 milliGRAM(s) Oral every 6 hours PRN Severe Pain (7 - 10)  oxyCODONE    IR 5 milliGRAM(s) Oral every 6 hours PRN Moderate Pain (4 - 6)      VITAL SIGNS:  T(F): 98.2 (01-10-23 @ 04:55), Max: 99.2 (01-09-23 @ 21:34)  HR: 118 (01-10-23 @ 04:55) (100 - 118)  BP: 107/62 (01-10-23 @ 04:55) (107/62 - 150/70)  RR: 18 (01-10-23 @ 04:55) (17 - 18)  SpO2: 99% (01-10-23 @ 04:55) (98% - 100%)    I&O's Summary    08 Jan 2023 07:01  -  09 Jan 2023 07:00  --------------------------------------------------------  IN: 1815 mL / OUT: 440 mL / NET: 1375 mL    09 Jan 2023 07:01  -  10 Alexander 2023 06:18  --------------------------------------------------------  IN: 2209 mL / OUT: 357 mL / NET: 1852 mL      Daily     Daily     PHYSICAL EXAM:  Gen: Alert, NAD  HEENT: NCAT, conjunctiva clear, sclera anicteric, white exudates in the oropharynx, dry mucous membranes  Neck: Supple, no JVD  CV: RRR, S1S2, no m/r/g  Resp: diffuse expiratory wheezing anteriorly, dyspnea with speech, tachypnea  Abd: Soft, nontender, nondistended, normal bowel sounds, PEG tube in LUQ with no TTP  Ext: no edema, no clubbing or cyanosis  - RLE: in immobilizer, DP 2+, warm LE, plantar/dorsiflexion 4+/5 but limited by pain  Neuro: AOx3, CN2-12 grossly intact, ANDREWS  SKIN: warm, perfused    LABS:                        8.8    27.06 )-----------( 607      ( 09 Jan 2023 07:50 )             28.5     Hgb Trend: 8.8<--, 9.1<--, 9.1<--, 10.0<--, 7.9<--  01-09    139  |  104  |  25<H>  ----------------------------<  166<H>  4.6   |  22  |  0.68    Ca    8.8      09 Jan 2023 19:20  Phos  2.3     01-09  Mg     1.50     01-09    TPro  6.7  /  Alb  2.8<L>  /  TBili  0.3  /  DBili  0.2  /  AST  37  /  ALT  42<H>  /  AlkPhos  321<H>  01-09    Creatinine Trend: 0.68<--, 0.64<--, 0.71<--, 0.76<--, 0.74<--, 0.76<--  LIVER FUNCTIONS - ( 09 Jan 2023 07:50 )  Alb: 2.8 g/dL / Pro: 6.7 g/dL / ALK PHOS: 321 U/L / ALT: 42 U/L / AST: 37 U/L / GGT: x                     CAPILLARY BLOOD GLUCOSE          RADIOLOGY & ADDITIONAL TESTS: Reviewed    Imaging Personally Reviewed:    Consultant(s) Notes Reviewed:  Orthopedics    Care Discussed with Consultants/Other Providers:  Orthopedics   Damaris Aguiar, PGY1  TEAMS or Pager [phone]     Patient is a 67y old Male who presents with a chief complaint of R knee pain (10 Alexander 2023 00:48)    SUBJECTIVE/INTERVAL EVENTS: Overnight, patient had high residual volumes (>200 cc) in PEG tube so he was made NPO. Patient seen and examined at bedside. He reports 10/10 knee pain that is improved but not completely relieved by pain medications. Patient with dyspnea on speaking and chronic productive cough. Denies chest pain, hemoptysis, SOB at rest. He denies abdominal pain, nausea, or vomiting.    MEDICATIONS  (STANDING):  ceFAZolin   IVPB 2000 milliGRAM(s) IV Intermittent every 8 hours  clotrimazole 1% Cream 1 Application(s) Topical every 12 hours  enoxaparin Injectable 40 milliGRAM(s) SubCutaneous every 24 hours  levothyroxine Injectable 112 MICROGram(s) IV Push <User Schedule>  magnesium oxide 400 milliGRAM(s) Oral two times a day with meals  pantoprazole   Suspension 40 milliGRAM(s) Oral daily  polyethylene glycol 3350 17 Gram(s) Oral daily  sodium chloride 2 Gram(s) Oral every 8 hours  sodium phosphate 30 milliMole(s)/500 mL IVPB 30 milliMole(s) IV Intermittent once    MEDICATIONS  (PRN):  acetaminophen    Suspension .. 650 milliGRAM(s) Oral every 6 hours PRN Temp greater or equal to 38C (100.4F), Mild Pain (1 - 3)  albuterol    90 MICROgram(s) HFA Inhaler 2 Puff(s) Inhalation every 6 hours PRN Shortness of Breath and/or Wheezing  gabapentin 300 milliGRAM(s) Oral three times a day PRN neuropathic pain  ondansetron Injectable 4 milliGRAM(s) IV Push every 6 hours PRN Nausea and/or Vomiting  oxyCODONE    IR 5 milliGRAM(s) Oral every 4 hours PRN Severe Pain (7 - 10)  oxyCODONE    IR 2.5 milliGRAM(s) Oral every 4 hours PRN Moderate Pain (4 - 6)  oxyCODONE    IR 10 milliGRAM(s) Oral every 6 hours PRN Severe Pain (7 - 10)  oxyCODONE    IR 5 milliGRAM(s) Oral every 6 hours PRN Moderate Pain (4 - 6)      VITAL SIGNS:  T(F): 98.2 (01-10-23 @ 04:55), Max: 99.2 (01-09-23 @ 21:34)  HR: 118 (01-10-23 @ 04:55) (100 - 118)  BP: 107/62 (01-10-23 @ 04:55) (107/62 - 150/70)  RR: 18 (01-10-23 @ 04:55) (17 - 18)  SpO2: 99% (01-10-23 @ 04:55) (98% - 100%)    I&O's Summary    08 Jan 2023 07:01  -  09 Jan 2023 07:00  --------------------------------------------------------  IN: 1815 mL / OUT: 440 mL / NET: 1375 mL    09 Jan 2023 07:01  -  10 Alexander 2023 06:18  --------------------------------------------------------  IN: 2209 mL / OUT: 357 mL / NET: 1852 mL      Daily     Daily     PHYSICAL EXAM:  Gen: Alert, NAD  HEENT: NCAT, conjunctiva clear, sclera anicteric, white exudates in the oropharynx, dry mucous membranes  Neck: Supple, no JVD  CV: RRR, S1S2, no m/r/g  Resp: diffuse expiratory wheezing anteriorly, dyspnea with speech, no accessory muscle use  Abd: Soft, nontender, nondistended, normal bowel sounds, PEG tube in LUQ mildly TTP and with white creamy leakage at stoma  Ext: no edema, no clubbing or cyanosis  - RLE: in immobilizer, DP 2+, warm toes, plantar/dorsiflexion 4+/5 but limited by pain  Neuro: AOx3, CN2-12 grossly intact, ANDREWS  SKIN: warm, perfused    LABS:                        8.8    27.06 )-----------( 607      ( 09 Jan 2023 07:50 )             28.5     Hgb Trend: 8.8<--, 9.1<--, 9.1<--, 10.0<--, 7.9<--  01-09    139  |  104  |  25<H>  ----------------------------<  166<H>  4.6   |  22  |  0.68    Ca    8.8      09 Jan 2023 19:20  Phos  2.3     01-09  Mg     1.50     01-09    TPro  6.7  /  Alb  2.8<L>  /  TBili  0.3  /  DBili  0.2  /  AST  37  /  ALT  42<H>  /  AlkPhos  321<H>  01-09    Creatinine Trend: 0.68<--, 0.64<--, 0.71<--, 0.76<--, 0.74<--, 0.76<--  LIVER FUNCTIONS - ( 09 Jan 2023 07:50 )  Alb: 2.8 g/dL / Pro: 6.7 g/dL / ALK PHOS: 321 U/L / ALT: 42 U/L / AST: 37 U/L / GGT: x                     CAPILLARY BLOOD GLUCOSE      RADIOLOGY & ADDITIONAL TESTS: Reviewed    Imaging Personally Reviewed:    Consultant(s) Notes Reviewed:  Orthopedics, GI, ID    Care Discussed with Consultants/Other Providers:  Orthopedics, GI, ID

## 2023-01-11 NOTE — PROGRESS NOTE ADULT - ATTENDING COMMENTS
Patient seen and examined, care plan discussed with house staff as above.    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 Ongoing sinus tachycardia and WBC in the 20k range (since admission, uptrending on 1/10). Currently being treated for suspected refeeding syndrome, with ongoing hypophos.     Primary Onc is Dr. Gi Thomas in [place], he was first diagnosed w SCC in 2015, unclear what his prior chemo was. Need records [ ].    F/u final culture data from OR. [ NGTD] S/p Ancef (1/7 - 1/10) post OR. F/u w Ortho. Will proceed w Xarelto 10 mg daily x 30 days for dvt ppx. UA from Jan 7 showed budding yeast of unclear significance. F/u repeat [ ].    Wheezing, worsening cough and MAN when speaking full sentences on 1/10-- f/u RVP [neg], and CTA chest on 1/10 with new infiltrate in RUL concerning for aspiration; debri in esophagus, known chest wall mass visualized again on the L with atelectasis. Unable to compare to prior CT done at [facility], [place] (no images available or report in chart, but notes suggest there was no RUL mass previously). Will d/w ID [ ]. Leaking noted around PEG site on 1/11, GI consulted. TF on HOLD (1/11- ).

## 2023-01-11 NOTE — PROGRESS NOTE ADULT - ASSESSMENT
A/P: 67yMale s/p R patellectomy/quadicepsplasty on 1/7, recovering appropriately    - Pain control  - WBAT RLE in KI  - Prevena (ortho will remove prior to discharge)  - DVT ppx: lovenox  - PT/OT/OOB  - FU OR cx/path  - FU outpt with Dr. Carter in 1 week, please call office for appt

## 2023-01-11 NOTE — PROGRESS NOTE ADULT - ATTENDING COMMENTS
HPI:  67-year-old male with PMH of metastatic esophageal cancer, hypothyroidism, GERD, HTN who was transferred from [facility] for Orthopedics consult for possible right knee malignancy versus septic arthritis.   He received a biopsy of his right patella on 11/10/22 with pathology positive for squamous cell carcinoma.  Patient s/p PEG tube exchange at bedside with balloon PEG on 1/2/2023, with difficulty flushing tube and possible drainage one day prior.  On exam, tube is easily flushed with water.  Check XRtube study.  If PEG tube in place, may continue using for feeds.  If not, will replace tube at bedside.

## 2023-01-11 NOTE — PROGRESS NOTE ADULT - PROBLEM SELECTOR PLAN 9
- Diet: appreciate nutrition recs for bolus feeds with TwoCal HN (237 ml x 5 feeds daily) --> now NPO pending abd XR with gastrograffin  - PT/OT recommending [facility], family prefers home services  - DVT ppx: Lovenox 40 mg SQ daily  - Dispo: likely home - Diet: appreciate nutrition recs for bolus feeds with TwoCal HN (237 ml x 5 feeds daily) --> now NPO pending abd XR with gastrograffin  - PT/OT recommending [facility] PAUL, family prefers home services  - DVT ppx: Lovenox 40 mg SQ daily  - Dispo: likely home    Updated wife at bedside and son Omkar via her phone

## 2023-01-11 NOTE — PROGRESS NOTE ADULT - ASSESSMENT
67M metastatic esophageal SCC.   Involving the right patella s/p radical resection 11/10.   Had persistent pain and swelling.   Now s/p patellectomy, quadricepsplasty, synovectomy 1/7.   No clear superimposed infection.     Acute worsening of chronic cough yesterday 1/10 with RUL opacity on CT.   No prior CT to compared to and seems back to baseline now but it looks like something that would've been seen on his prior XR here and with rising leukocytosis, can treat for pneumonia.     Suggest  -check sputum cultures   -empiric Cefepime, aim for 5 days     Discussed with medicine     Red Streeter MD   Infectious Disease   Available on TEAMS. After 5PM and on weekends please page fellow on call or call [phone]

## 2023-01-11 NOTE — PROGRESS NOTE ADULT - PROBLEM SELECTOR PLAN 3
- Phos 1.8 on 1/7, s/p oral repletion x 2 packets  - Phos 1.0 > 1.6 on 1/8 in context of pt NPO for procedure on 1/7  - concern for refeeding syndrome, diet restarted on 1/8    Plan  - Phos 2.0 today, repleting with IV KPhos  - recheck phosphorus in afternoon, replete to goal > 2.5 - high residuals overnight (>200 cc)  - mild pain around PEG and white leaking around tube, concerning for tube dislodged vs granulation tissue    Plan  - GI consulted for PEG dysfunction, tube newly placed on 1/2  - Abdominal XR with gastrograffin via tube to evaluate for leakage  - NPO pending tube study  - D5-LR at 100 cc/h for mIVF while NPO

## 2023-01-11 NOTE — PROGRESS NOTE ADULT - PROBLEM SELECTOR PLAN 6
- takes synthroid 150 mcg PO daily (home med)    Plan  - c/w synthroid 112 mcg IV daily  - TSH 11.98 but free T4 WNL  - patient denies new symptoms of hypothyroidism including constipation, hypotension, dry skin, brittle hair  - recommend outpatient follow-up as TSH may be inaccurate in context of current hospitalization and could instead be suggestive of sick euthyroid syndrome - diagnosed about 8 yrs ago  - follows with Dr. Gi Thomas ([place])  - complicated by dysphagia with all meds/nutrition via PEG  - CXR with near complete opacification of L lung, stable from previous imaging and likely represents pleural metastases    Plan  - On 3rd line chemo with single agent irinotecan (started 6/27/22, s/p 3 cycles, most recently on 8/1/22)  - Contacted outpatient oncologist, most recent progress note sent to [facility] (family requested 2nd opinion, considering transfer)  - Continue goals of care conversation with family: currently interested in resuming palliative chemo on D/C  - C/w bolus tube feeds, strict NPO with all meds via PEG  - C/w pantoprazole 40 mg daily for GERD, gabapentin 300 mg TID PRN for pain, MgOH and KCl for supplementation

## 2023-01-11 NOTE — PROGRESS NOTE ADULT - SUBJECTIVE AND OBJECTIVE BOX
Follow Up: opacity     Interval History/ROS: Worsening cough and dyspnea yesterday with CT chest showing RUL opacity. He and his wife both report his sputum had increased but overall he feels about the same. No fevers or chills.     Allergies  No Known Allergies        ANTIMICROBIALS:  cefepime   IVPB    cefepime   IVPB 2000 once  cefepime   IVPB 2000 every 8 hours      OTHER MEDS:  acetaminophen    Suspension .. 650 milliGRAM(s) Oral every 6 hours PRN  albuterol    90 MICROgram(s) HFA Inhaler 2 Puff(s) Inhalation every 6 hours PRN  clotrimazole 1% Cream 1 Application(s) Topical every 12 hours  dextrose 5% + lactated ringers. 1000 milliLiter(s) IV Continuous <Continuous>  enoxaparin Injectable 40 milliGRAM(s) SubCutaneous every 24 hours  gabapentin 300 milliGRAM(s) Oral three times a day PRN  levothyroxine Injectable 112 MICROGram(s) IV Push <User Schedule>  magnesium oxide 400 milliGRAM(s) Oral two times a day with meals  morphine  - Injectable 1 milliGRAM(s) IV Push every 6 hours PRN  morphine  - Injectable 0.5 milliGRAM(s) IV Push every 6 hours PRN  ondansetron Injectable 4 milliGRAM(s) IV Push every 6 hours PRN  oxyCODONE    IR 10 milliGRAM(s) Oral every 6 hours PRN  oxyCODONE    IR 5 milliGRAM(s) Oral every 6 hours PRN  pantoprazole   Suspension 40 milliGRAM(s) Oral daily  polyethylene glycol 3350 17 Gram(s) Oral daily  sodium chloride 2 Gram(s) Oral every 8 hours      Vital Signs Last 24 Hrs  T(C): 36.8 (2023 12:20), Max: 36.8 (10 Alexander 2023 21:14)  T(F): 98.2 (2023 12:20), Max: 98.2 (10 Alexander 2023 21:14)  HR: 92 (2023 12:20) (92 - 121)  BP: 124/64 (2023 12:20) (104/62 - 124/64)  BP(mean): --  RR: 18 (2023 12:20) (18 - 18)  SpO2: 97% (2023 12:20) (97% - 99%)    Parameters below as of 2023 12:20  Patient On (Oxygen Delivery Method): room air        Physical Exam:  General: non toxic  Cardio: regular rate   Respiratory: nonlabored on room air, scattered rhonchi   abd: nondistended, soft. PEG site not inflamed   Musculoskeletal: right knee in a brace  Skin: no rash                          8.7    30.25 )-----------( 581      ( 2023 10:33 )             28.0           140  |  109<H>  |  30<H>  ----------------------------<  133<H>  4.1   |  20<L>  |  0.72    Ca    8.8      2023 06:55  Phos  2.0       Mg     2.10         TPro  6.6  /  Alb  2.6<L>  /  TBili  0.4  /  DBili  x   /  AST  20  /  ALT  20  /  AlkPhos  286<H>        Urinalysis Basic - ( 2023 12:55 )    Color: Yellow / Appearance: Clear / S.024 / pH: x  Gluc: x / Ketone: Negative  / Bili: Negative / Urobili: 3 mg/dL   Blood: x / Protein: 100 mg/dL / Nitrite: Negative   Leuk Esterase: Negative / RBC: 4 /HPF / WBC 3 /HPF   Sq Epi: x / Non Sq Epi: 2 /HPF / Bacteria: Negative        MICROBIOLOGY:  Culture - Surgical Swab (collected 23 @ 08:30)  Source: Drainage #2 RIGHT KNEE  Preliminary Report (23 @ 18:25):    No growth    Culture - Fungal, Other (collected 23 @ 08:30)  Source: .Other #2 RIGHT KNEE  Preliminary Report (23 @ 15:03):    Culture is being performed. Fungal cultures are held for 4 weeks.    Culture - Acid Fast - Tissue w/Smear (collected 23 @ 08:20)  Source: .Tissue RIGHT KNEE  Preliminary Report (23 @ 15:07):    Culture is being performed.    Culture - Fungal, Tissue (collected 23 @ 08:20)  Source: .Tissue RIGHT KNEE  Preliminary Report (23 @ 15:03):    Culture is being performed. Fungal cultures are held for 4 weeks.    Culture - Tissue with Gram Stain (collected 23 @ 08:20)  Source: .Tissue  Gram Stain (23 @ 20:44):    No polymorphonuclear cells seen    No organisms seen  Preliminary Report (23 @ 18:41):    No growth    Culture - Joint Fluid (collected 23 @ 17:43)  Source: Joint Fl Joint Fluid- right knee  Gram Stain (23 @ 02:18):    No polymorphonuclear leukocytes seen    No organisms seen    by cytocentrifuge  Preliminary Report (01-10-23 @ 16:22):    No growth at 5 days    Culture - Fungal, Body Fluid (collected 23 @ 17:43)  Source: .Body Fluid Synovial Fluid-right knee  Preliminary Report (23 @ 15:03):    Culture is being performed. Fungal cultures are held for 4 weeks.    Rapid RVP Result: NotDetec (01-10 @ 12:13)      RADIOLOGY:  Images below reviewed personally  CT Angio Chest PE Protocol w/ IV Cont (01.10.23 @ 18:24)   No pulmonary embolism detected.  Large mass in the left hemithorax invading the chest wall and involving   the left third through fifth ribs, which may be secondary to metastatic   disease.  Patchy right upper lobe groundglass opacities. While nonspecific, primary   considerations include infection and asymmetric pulmonary edema.  Small left pleural effusion.

## 2023-01-11 NOTE — PROGRESS NOTE ADULT - PROBLEM SELECTOR PLAN 5
- diagnosed about 8 yrs ago  - follows with Dr. Gi Thomas ([place])  - complicated by dysphagia with all meds/nutrition via PEG  - CXR with near complete opacification of L lung, stable from previous imaging and likely represents pleural metastases    Plan  - On 3rd line chemo with single agent irinotecan (started 6/27/22, s/p 3 cycles, most recently on 8/1/22)  - Contacted outpatient oncologist, most recent progress note sent to [facility] (family requested 2nd opinion, considering transfer)  - Continue goals of care conversation with family: currently interested in resuming palliative chemo on D/C  - C/w bolus tube feeds, strict NPO with all meds via PEG  - C/w pantoprazole 40 mg daily for GERD, gabapentin 300 mg TID PRN for pain, MgOH and KCl for supplementation - Na 122 on 1/5, increased to 134 on 1/9, now resolving  - Serum osmolarity calculated as 262 (hypotonic), patient euvolemic on exam  - Elevated urine osmolarity and urine sodium are suggestive of inappropriate concentration of urine   - Patient with known pleural metastasis, likely 2/2 SIADH  - Less likely, but differential includes hypothyroidism (TSH 11.98); despite mild elevation in tsh, clinical picture is not consistent with this (no bradycardia, no hypotension); adrenal insufficieny unlikely as AM cortisol WNL    Plan  - C/w salt tabs 2 g TID via PEG tube  - Recheck BMP tomorrow AM, trend Na daily  - slowly add back free water flushes to PEG feeds (150 ml q8h)

## 2023-01-11 NOTE — PROGRESS NOTE ADULT - PROBLEM SELECTOR PLAN 2
- patient with new dyspnea with speech, tachypnea to 20, wheezing on exam, tachycardia to 110s x few days  - DDx includes PE vs HAP vs atelectasis from decreased ambulation       - denies worsening cough, hemoptysis, LE pain/swelling; pt at high risk for clots due to malignancy and immobilization, Wells score for PE 4.0       - has been afebrile but WBCs increasing; with chronic productive cough    Plan  - Check RVP for viral infection, monitor VS and CBC  - Encourage incentive spirometry for atelectasis  - CT angio to r/o PE - patient with new dyspnea with speech, tachypnea to 20, wheezing on exam, tachycardia to 110s x few days  - DDx includes HAP vs atelectasis from decreased ambulation vs PE       - denies worsening cough, hemoptysis, LE pain/swelling; pt at high risk for clots due to malignancy and immobilization, Wells score for PE 4.0       - has been afebrile but WBCs increasing; with chronic productive cough  - CT angio chest with new RUL ground glass opacities, no recent CT chest for comparison; no evidence of PE but limited by motion; known L sided severe atelectasis    Plan  - RVP negative, check sputum culture for hospital acquired PNA  - ID consulted for new RUL findings, appreciate recs to start cefepime x 5 days (day 1 today) 11/11 -  - Encourage incentive spirometry for atelectasis

## 2023-01-11 NOTE — PROGRESS NOTE ADULT - PROBLEM SELECTOR PLAN 1
- progressively worsening pain, erythema, edema, and warmth at the R knee joint for about 1 month  - recent partial excision of R patella on 11/10 with SCC on biopsy but unclear margins  - current presentation more consistent with progression of known cancer than septic arthritis  - MRI R knee w/wo contrast showing R patellar fracture with OM vs osteonecrosis, moderate complicated effusion--clinical suspicion is greatest for osteonecrosis from metastatic disease   - s/p tap of R knee but small sample collected; gram stain negative with no PMNs or bacteria    Plan  - Appreciate Ortho recs:     - s/p patellectomy, quadricepsplasty, and synovectomy on 1/7 in OR     - wound vac in place over R knee, drained 40 cc yesterday     - continue to monitor drain output, ortho will remove Provena prior to discharge     - started on ancef 2 g q8h today for prophylaxis against post-op infection; will contact Ortho for abx course (day 2 today)     - recommending outpt DVT ppx with xarelto until f/u with Ortho (Wells score for PE 4.0, moderate risk)  - Pain control:      - Tylenol 650 mg q6h for mild pain     - Oxycodone 5 mg q6h PRN for moderate pain     - Oxycodone 10 mg q6h PRN for severe pain        - c/w bowel regimen to prevent opioid induced constipation (miralax daily)  - Coordinate home PT and wound care for knee on D/C  - Outpatient f/u with Dr. Carter within 1 week after D/C - progressively worsening pain, erythema, edema, and warmth at the R knee joint for about 1 month  - recent partial excision of R patella on 11/10 with SCC on biopsy but unclear margins  - current presentation more consistent with progression of known cancer than septic arthritis  - MRI R knee w/wo contrast showing R patellar fracture with OM vs osteonecrosis, moderate complicated effusion--clinical suspicion is greatest for osteonecrosis from metastatic disease   - s/p tap of R knee but small sample collected; gram stain negative with no PMNs or bacteria    Plan  - Appreciate Ortho recs:     - s/p patellectomy, quadricepsplasty, and synovectomy on 1/7 in OR     - Ortho will remove Provena vacuum on day of discharge     - s/p ancef 2 g q8h from 1/9-1/10 for prophylaxis against post-op infection, discontinued when drain removed  - Pain control:      - Tylenol 650 mg q6h for mild pain     - Oxycodone 5 mg q6h PRN for moderate pain --> morphine 0.5 mg q6h PRN while NPO     - Oxycodone 10 mg q6h PRN for severe pain --> morphine 1.0 mg q6h PRN while NPO     - c/w bowel regimen to prevent opioid induced constipation (miralax daily)  - Coordinate home PT and wound care for knee on D/C  - Outpatient f/u with Dr. Carter within 1 week after D/C  - DVT ppx with xarelto 10 mg x 30 days until f/u with Ortho (Wells score for PE 4.0, moderate risk)

## 2023-01-11 NOTE — PROGRESS NOTE ADULT - ASSESSMENT
Impression:     #PEG tube leakage  - Initially consulted on 1/2 for PEG tube dislodgment, it was exchanged to a 20 French Luis Balloon Peg which was functioning well until yesterday when nurse noticed resistance w/ medications and leakage around the PEG tube site.   - on my evaluation - patient had mild erythema around the PEG site w/ granulation tissue, no evidence of pus or tube feeds leakage, able to flush the tube w/ no resistance.   - Will obtain XR esophagram.     Recommendations:   - Please obtain feeding tube study.   - Hold off on tube feeds for now until the tube study.     GI will continue to follow.     All recommendations are tentative until note is attested by an attending.     Deng Salazar, PGY-4  Gastroenterology/Hepatology Fellow  Available on [employer] Teams  [phone] (Short Range Pager)  [phone] (Long Range Pager)    After 5pm, please contact the on-call GI fellow. [phone] Impression:     #PEG tube leakage  - Initially consulted on 1/2 for PEG tube dislodgment, it was exchanged to a 20 French Luis Balloon Peg which was functioning well until yesterday when nurse noticed resistance w/ medications and leakage around the PEG tube site.   - on my evaluation - patient had mild erythema around the PEG site w/ granulation tissue, no evidence of pus or tube feeds leakage, able to flush the tube w/ no resistance.   - Will obtain XR tube study    Recommendations:   - Please obtain feeding tube study.   - Hold off on tube feeds for now until the tube study.     GI will continue to follow.     All recommendations are tentative until note is attested by an attending.     Deng Salazar, PGY-4  Gastroenterology/Hepatology Fellow  Available on [employer] Teams  [phone] (Short Range Pager)  [phone] (Long Range Pager)    After 5pm, please contact the on-call GI fellow. [phone]

## 2023-01-11 NOTE — PROGRESS NOTE ADULT - PROBLEM SELECTOR PLAN 8
- Diet: appreciate nutrition recs for bolus feeds with TwoCal HN (237 ml x 5 feeds daily)  - PT/OT recommending [facility], family prefers home services  - DVT ppx: Lovenox 40 mg SQ daily  - Dispo: likely home - home meds include amlodipine 5 mg, metoprolol succinate 50 mg   - BP 90s-100s/50s-60s in ED; now 120s/70s    Plan  - BP meds held due to concern for sepsis  - BP within acceptable range, will continue to hold  - hold BP meds on discharge as patient with persistent hypotension

## 2023-01-11 NOTE — PROGRESS NOTE ADULT - SUBJECTIVE AND OBJECTIVE BOX
ORTHOPAEDICS DAILY PROGRESS NOTE:     SUBJECTIVE/ROS:  Seen and examined at bedside. Pain controlled.    OBJECTIVE:  Vital Signs Last 24 Hrs  T(C): 36.6 (11 Jan 2023 01:00), Max: 36.8 (10 Alexander 2023 04:55)  T(F): 97.9 (11 Jan 2023 01:00), Max: 98.2 (10 Alexander 2023 04:55)  HR: 109 (11 Jan 2023 01:00) (108 - 118)  BP: 104/62 (11 Jan 2023 01:00) (102/60 - 107/62)  RR: 18 (11 Jan 2023 01:00) (17 - 18)  SpO2: 97% (11 Jan 2023 01:00) (97% - 99%)  Parameters below as of 11 Jan 2023 01:00  Patient On (Oxygen Delivery Method): room air    LABS:                                     8.8    32.56 )-----------( 569      ( 10 Alexander 2023 06:54 )             28.0   01-10    138  |  105  |  29<H>  ----------------------------<  127<H>  3.8   |  22  |  0.67    Ca    8.5      10 Alexander 2023 21:26  Phos  3.5     01-10  Mg     2.10     01-10    TPro  7.0  /  Alb  2.7<L>  /  TBili  0.4  /  DBili  x   /  AST  30  /  ALT  27  /  AlkPhos  333<H>  01-10      PHYSICAL EXAM:  Gen: NAD, resting in bed  Resp: unlabored breathing  RLE: dressing/KI c/d/i, prevena holding suction        +EHL/FHL/TA/GS         SILT Owens/Saph/Tib/DP/SP        +DP

## 2023-01-11 NOTE — PROGRESS NOTE ADULT - SUBJECTIVE AND OBJECTIVE BOX
Gastroenterology/Hepatology Progress Note      Interval Events:     GI re-consulted for PEG tube leakage.     As per nurses, unable to flush the medications down the tube. Wife at bedside, patient complained of mild pain around the peg tube site, but no abd pain, nausea, vomiting, fevers or chills.     Allergies:  No Known Allergies      Hospital Medications:  acetaminophen    Suspension .. 650 milliGRAM(s) Oral every 6 hours PRN  albuterol    90 MICROgram(s) HFA Inhaler 2 Puff(s) Inhalation every 6 hours PRN  clotrimazole 1% Cream 1 Application(s) Topical every 12 hours  dextrose 5% + lactated ringers. 1000 milliLiter(s) IV Continuous <Continuous>  diatrizoate meglumine/diatrizoate sodium. 30 milliLiter(s) Oral once  enoxaparin Injectable 40 milliGRAM(s) SubCutaneous every 24 hours  gabapentin 300 milliGRAM(s) Oral three times a day PRN  levothyroxine Injectable 112 MICROGram(s) IV Push <User Schedule>  magnesium oxide 400 milliGRAM(s) Oral two times a day with meals  morphine  - Injectable 1 milliGRAM(s) IV Push every 6 hours PRN  morphine  - Injectable 0.5 milliGRAM(s) IV Push every 6 hours PRN  ondansetron Injectable 4 milliGRAM(s) IV Push every 6 hours PRN  oxyCODONE    IR 10 milliGRAM(s) Oral every 6 hours PRN  oxyCODONE    IR 5 milliGRAM(s) Oral every 6 hours PRN  pantoprazole   Suspension 40 milliGRAM(s) Oral daily  polyethylene glycol 3350 17 Gram(s) Oral daily  sodium chloride 2 Gram(s) Oral every 8 hours      ROS: 14 point ROS negative unless otherwise state in subjective    PHYSICAL EXAM:   Vital Signs:  Vital Signs Last 24 Hrs  T(C): 36.6 (2023 06:14), Max: 36.8 (10 Alexander 2023 21:14)  T(F): 97.9 (2023 06:14), Max: 98.2 (10 Alexander 2023 21:14)  HR: 121 (2023 06:14) (108 - 121)  BP: 109/72 (2023 06:14) (104/62 - 109/72)  BP(mean): --  RR: 18 (2023 06:14) (18 - 18)  SpO2: 99% (2023 06:14) (97% - 99%)    Parameters below as of 2023 06:14  Patient On (Oxygen Delivery Method): room air      Daily     Daily     GENERAL:  No acute distress, elderly male, lying in bed.   HEENT:  NCAT, no scleral icterus  CHEST: no resp distress  ABDOMEN:  Soft, erythema and granulation tissue around the peg tube site w/ tube feeds, able to flush the tube w/ sterile water w/ no resistance, non tender, mildly distended, no palpable masses.   EXTREMITIES:  No LE edema b/l  NEURO:  Alert and oriented x 3, no tremors    LABS:                        8.7    30.25 )-----------( 581      ( 2023 10:33 )             28.0     Mean Cell Volume: 84.6 fL (23 @ 10:33)        140  |  109<H>  |  30<H>  ----------------------------<  133<H>  4.1   |  20<L>  |  0.72    Ca    8.8      2023 06:55  Phos  2.0       Mg     2.10         TPro  6.6  /  Alb  2.6<L>  /  TBili  0.4  /  DBili  x   /  AST  20  /  ALT  20  /  AlkPhos  286<H>      LIVER FUNCTIONS - ( 2023 06:55 )  Alb: 2.6 g/dL / Pro: 6.6 g/dL / ALK PHOS: 286 U/L / ALT: 20 U/L / AST: 20 U/L / GGT: x             Urinalysis Basic - ( 2023 12:55 )    Color: Yellow / Appearance: Clear / S.024 / pH: x  Gluc: x / Ketone: Negative  / Bili: Negative / Urobili: 3 mg/dL   Blood: x / Protein: 100 mg/dL / Nitrite: Negative   Leuk Esterase: Negative / RBC: 4 /HPF / WBC 3 /HPF   Sq Epi: x / Non Sq Epi: 2 /HPF / Bacteria: Negative    Imaging:    XRay abdomen    IMPRESSION:  PEG tube appears to overlie the mid abdomen, though no contrast injected   through the tube. No evidence of contrast seen outside of the bowel.  Nonobstructive bowel gas pattern.       Gastroenterology/Hepatology Progress Note    Interval Events:   GI re-consulted for PEG tube leakage.     As per nurses, unable to flush the medications down the tube. Wife at bedside, patient complained of mild pain around the peg tube site, but no abd pain, nausea, vomiting, fevers or chills.     Allergies:  No Known Allergies      Hospital Medications:  acetaminophen    Suspension .. 650 milliGRAM(s) Oral every 6 hours PRN  albuterol    90 MICROgram(s) HFA Inhaler 2 Puff(s) Inhalation every 6 hours PRN  clotrimazole 1% Cream 1 Application(s) Topical every 12 hours  dextrose 5% + lactated ringers. 1000 milliLiter(s) IV Continuous <Continuous>  diatrizoate meglumine/diatrizoate sodium. 30 milliLiter(s) Oral once  enoxaparin Injectable 40 milliGRAM(s) SubCutaneous every 24 hours  gabapentin 300 milliGRAM(s) Oral three times a day PRN  levothyroxine Injectable 112 MICROGram(s) IV Push <User Schedule>  magnesium oxide 400 milliGRAM(s) Oral two times a day with meals  morphine  - Injectable 1 milliGRAM(s) IV Push every 6 hours PRN  morphine  - Injectable 0.5 milliGRAM(s) IV Push every 6 hours PRN  ondansetron Injectable 4 milliGRAM(s) IV Push every 6 hours PRN  oxyCODONE    IR 10 milliGRAM(s) Oral every 6 hours PRN  oxyCODONE    IR 5 milliGRAM(s) Oral every 6 hours PRN  pantoprazole   Suspension 40 milliGRAM(s) Oral daily  polyethylene glycol 3350 17 Gram(s) Oral daily  sodium chloride 2 Gram(s) Oral every 8 hours      ROS: 14 point ROS negative unless otherwise state in subjective    PHYSICAL EXAM:   Vital Signs:  Vital Signs Last 24 Hrs  T(C): 36.6 (2023 06:14), Max: 36.8 (10 Alexander 2023 21:14)  T(F): 97.9 (2023 06:14), Max: 98.2 (10 Alexander 2023 21:14)  HR: 121 (2023 06:14) (108 - 121)  BP: 109/72 (2023 06:14) (104/62 - 109/72)  BP(mean): --  RR: 18 (2023 06:14) (18 - 18)  SpO2: 99% (2023 06:14) (97% - 99%)    Parameters below as of 2023 06:14  Patient On (Oxygen Delivery Method): room air      Daily     Daily     GENERAL:  No acute distress, elderly male, lying in bed.   HEENT:  NCAT, no scleral icterus  CHEST: no resp distress  ABDOMEN:  Soft, erythema and granulation tissue around the peg tube site w/ tube feeds, able to flush the tube w/ sterile water w/ no resistance, non tender, mildly distended, no palpable masses.   EXTREMITIES:  No LE edema b/l  NEURO:  Alert and oriented x 3, no tremors    LABS:                        8.7    30.25 )-----------( 581      ( 2023 10:33 )             28.0     Mean Cell Volume: 84.6 fL (23 @ 10:33)        140  |  109<H>  |  30<H>  ----------------------------<  133<H>  4.1   |  20<L>  |  0.72    Ca    8.8      2023 06:55  Phos  2.0       Mg     2.10         TPro  6.6  /  Alb  2.6<L>  /  TBili  0.4  /  DBili  x   /  AST  20  /  ALT  20  /  AlkPhos  286<H>      LIVER FUNCTIONS - ( 2023 06:55 )  Alb: 2.6 g/dL / Pro: 6.6 g/dL / ALK PHOS: 286 U/L / ALT: 20 U/L / AST: 20 U/L / GGT: x             Urinalysis Basic - ( 2023 12:55 )    Color: Yellow / Appearance: Clear / S.024 / pH: x  Gluc: x / Ketone: Negative  / Bili: Negative / Urobili: 3 mg/dL   Blood: x / Protein: 100 mg/dL / Nitrite: Negative   Leuk Esterase: Negative / RBC: 4 /HPF / WBC 3 /HPF   Sq Epi: x / Non Sq Epi: 2 /HPF / Bacteria: Negative    Imaging:    XRay abdomen    IMPRESSION:  PEG tube appears to overlie the mid abdomen, though no contrast injected   through the tube. No evidence of contrast seen outside of the bowel.  Nonobstructive bowel gas pattern.

## 2023-01-11 NOTE — PROVIDER CONTACT NOTE (OTHER) - ACTION/TREATMENT ORDERED:
MD made aware, as per MD hold 10pm feedings, believes aspiration PNA is possible, IV tylenol and blood cultures ordered

## 2023-01-12 NOTE — PROGRESS NOTE ADULT - PROBLEM SELECTOR PLAN 6
- diagnosed about 8 yrs ago  - follows with Dr. 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

## 2023-01-12 NOTE — PROGRESS NOTE ADULT - PROBLEM SELECTOR PLAN 2
- patient with new dyspnea with speech, tachypnea to 20, wheezing on exam, tachycardia to 110s x few days  - DDx includes HAP vs atelectasis from decreased ambulation vs PE       - denies worsening cough, hemoptysis, LE pain/swelling; pt at high risk for clots due to malignancy and immobilization, Wells score for PE 4.0       - has been afebrile but WBCs increasing; with chronic productive cough  - CT angio chest with new RUL ground glass opacities, no recent CT chest for comparison; no evidence of PE but limited by motion; known L sided severe atelectasis    Plan  - RVP negative, check sputum culture for hospital acquired PNA  - ID consulted for new RUL findings, appreciate recs to start cefepime x 5 days (day 1 today) 11/11 -  - Encourage incentive spirometry for atelectasis - patient with new dyspnea with speech, tachypnea to 20, wheezing on exam, tachycardia to 110s x few days  - DDx includes HAP vs atelectasis from decreased ambulation vs PE       - denies worsening cough, hemoptysis, LE pain/swelling; pt at high risk for clots due to malignancy and immobilization, Wells score for PE 4.0       - has been afebrile but WBCs increasing; with chronic productive cough  - CT angio chest with new RUL ground glass opacities, no recent CT chest for comparison; no evidence of PE but limited by motion; known L sided severe atelectasis  - sputum cx with few GNRs, mod GPC, rare yeast (prelim); RVP neg    Plan  - ID consulted for new RUL findings, appreciate recs to start cefepime x 5 days for HAP (day 1 today as only 1 dose on 11/11); 11/12-11/16 expected  - Encourage incentive spirometry for atelectasis

## 2023-01-12 NOTE — PROGRESS NOTE ADULT - PROBLEM SELECTOR PLAN 9
- Diet: appreciate nutrition recs for bolus feeds with TwoCal HN (237 ml x 5 feeds daily) --> now NPO pending abd XR with gastrograffin  - PT/OT recommending [facility], family prefers home services  - DVT ppx: Lovenox 40 mg SQ daily  - Dispo: likely home    Updated wife at bedside and son Omkar via her phone - Diet: continuous feeds x 20 h  - PT/OT recommending [facility], family prefers home services  - DVT ppx: Lovenox 40 mg SQ daily  - Dispo: likely home    Updated wife and son Omkar at bedside

## 2023-01-12 NOTE — PROGRESS NOTE ADULT - SUBJECTIVE AND OBJECTIVE BOX
Damaris Aguiar, PGY1  TEAMS or Pager [phone]     Patient is a 67y old Male who presents with a chief complaint of R knee pain (10 Alexander 2023 00:48)    SUBJECTIVE/INTERVAL EVENTS: Overnight, patient had a fever of 100.4 so blood cultures were collected and IV tylenol administered. Patient seen and examined at bedside. He reports 10/10 knee pain that is improved but not completely relieved by pain medications. Patient with dyspnea on speaking and chronic productive cough. Denies chest pain, hemoptysis, SOB at rest. He denies abdominal pain, nausea, or vomiting.    MEDICATIONS  (STANDING):  ceFAZolin   IVPB 2000 milliGRAM(s) IV Intermittent every 8 hours  clotrimazole 1% Cream 1 Application(s) Topical every 12 hours  enoxaparin Injectable 40 milliGRAM(s) SubCutaneous every 24 hours  levothyroxine Injectable 112 MICROGram(s) IV Push <User Schedule>  magnesium oxide 400 milliGRAM(s) Oral two times a day with meals  pantoprazole   Suspension 40 milliGRAM(s) Oral daily  polyethylene glycol 3350 17 Gram(s) Oral daily  sodium chloride 2 Gram(s) Oral every 8 hours  sodium phosphate 30 milliMole(s)/500 mL IVPB 30 milliMole(s) IV Intermittent once    MEDICATIONS  (PRN):  acetaminophen    Suspension .. 650 milliGRAM(s) Oral every 6 hours PRN Temp greater or equal to 38C (100.4F), Mild Pain (1 - 3)  albuterol    90 MICROgram(s) HFA Inhaler 2 Puff(s) Inhalation every 6 hours PRN Shortness of Breath and/or Wheezing  gabapentin 300 milliGRAM(s) Oral three times a day PRN neuropathic pain  ondansetron Injectable 4 milliGRAM(s) IV Push every 6 hours PRN Nausea and/or Vomiting  oxyCODONE    IR 5 milliGRAM(s) Oral every 4 hours PRN Severe Pain (7 - 10)  oxyCODONE    IR 2.5 milliGRAM(s) Oral every 4 hours PRN Moderate Pain (4 - 6)  oxyCODONE    IR 10 milliGRAM(s) Oral every 6 hours PRN Severe Pain (7 - 10)  oxyCODONE    IR 5 milliGRAM(s) Oral every 6 hours PRN Moderate Pain (4 - 6)      VITAL SIGNS:  T(F): 98.2 (01-10-23 @ 04:55), Max: 99.2 (01-09-23 @ 21:34)  HR: 118 (01-10-23 @ 04:55) (100 - 118)  BP: 107/62 (01-10-23 @ 04:55) (107/62 - 150/70)  RR: 18 (01-10-23 @ 04:55) (17 - 18)  SpO2: 99% (01-10-23 @ 04:55) (98% - 100%)    I&O's Summary    08 Jan 2023 07:01  -  09 Jan 2023 07:00  --------------------------------------------------------  IN: 1815 mL / OUT: 440 mL / NET: 1375 mL    09 Jan 2023 07:01  -  10 Alexander 2023 06:18  --------------------------------------------------------  IN: 2209 mL / OUT: 357 mL / NET: 1852 mL      Daily     Daily     PHYSICAL EXAM:  Gen: Alert, NAD  HEENT: NCAT, conjunctiva clear, sclera anicteric, white exudates in the oropharynx, dry mucous membranes  Neck: Supple, no JVD  CV: RRR, S1S2, no m/r/g  Resp: diffuse expiratory wheezing anteriorly, dyspnea with speech, no accessory muscle use  Abd: Soft, nontender, nondistended, normal bowel sounds, PEG tube in LUQ mildly TTP and with white creamy leakage at stoma  Ext: no edema, no clubbing or cyanosis  - RLE: in immobilizer, DP 2+, warm toes, plantar/dorsiflexion 4+/5 but limited by pain  Neuro: AOx3, CN2-12 grossly intact, ANDREWS  SKIN: warm, perfused    LABS:                        8.8    27.06 )-----------( 607      ( 09 Jan 2023 07:50 )             28.5     Hgb Trend: 8.8<--, 9.1<--, 9.1<--, 10.0<--, 7.9<--  01-09    139  |  104  |  25<H>  ----------------------------<  166<H>  4.6   |  22  |  0.68    Ca    8.8      09 Jan 2023 19:20  Phos  2.3     01-09  Mg     1.50     01-09    TPro  6.7  /  Alb  2.8<L>  /  TBili  0.3  /  DBili  0.2  /  AST  37  /  ALT  42<H>  /  AlkPhos  321<H>  01-09    Creatinine Trend: 0.68<--, 0.64<--, 0.71<--, 0.76<--, 0.74<--, 0.76<--  LIVER FUNCTIONS - ( 09 Jan 2023 07:50 )  Alb: 2.8 g/dL / Pro: 6.7 g/dL / ALK PHOS: 321 U/L / ALT: 42 U/L / AST: 37 U/L / GGT: x                     CAPILLARY BLOOD GLUCOSE      RADIOLOGY & ADDITIONAL TESTS: Reviewed    Imaging Personally Reviewed:    Consultant(s) Notes Reviewed:  Orthopedics, GI, ID    Care Discussed with Consultants/Other Providers:  Orthopedics, GI, ID   Damaris Aguiar, PGY1  TEAMS or Pager [phone]     Patient is a 67y old Male who presents with a chief complaint of R knee pain (10 Alexander 2023 00:48)    SUBJECTIVE/INTERVAL EVENTS: Overnight, patient had a fever of 100.4 so blood cultures were collected and IV tylenol administered. Patient seen and examined at bedside. He reports 10/10 knee pain that is improved but not completely relieved by pain medications. He required oxycodone 10 mg x 5, morphine 1 mg x 1, and gabapentin 300 mg x 1 in last 24 hours. Patient with improvement in dyspnea today. Still endorses chronic productive cough. Denies chest pain, hemoptysis, SOB at rest. He denies abdominal pain, nausea, or vomiting. Tube feeds were restarted last night then held this morning for 1 feed as patient reported feeling full quickly.    MEDICATIONS  (STANDING):  ceFAZolin   IVPB 2000 milliGRAM(s) IV Intermittent every 8 hours  clotrimazole 1% Cream 1 Application(s) Topical every 12 hours  enoxaparin Injectable 40 milliGRAM(s) SubCutaneous every 24 hours  levothyroxine Injectable 112 MICROGram(s) IV Push <User Schedule>  magnesium oxide 400 milliGRAM(s) Oral two times a day with meals  pantoprazole   Suspension 40 milliGRAM(s) Oral daily  polyethylene glycol 3350 17 Gram(s) Oral daily  sodium chloride 2 Gram(s) Oral every 8 hours  sodium phosphate 30 milliMole(s)/500 mL IVPB 30 milliMole(s) IV Intermittent once    MEDICATIONS  (PRN):  acetaminophen    Suspension .. 650 milliGRAM(s) Oral every 6 hours PRN Temp greater or equal to 38C (100.4F), Mild Pain (1 - 3)  albuterol    90 MICROgram(s) HFA Inhaler 2 Puff(s) Inhalation every 6 hours PRN Shortness of Breath and/or Wheezing  gabapentin 300 milliGRAM(s) Oral three times a day PRN neuropathic pain  ondansetron Injectable 4 milliGRAM(s) IV Push every 6 hours PRN Nausea and/or Vomiting  oxyCODONE    IR 5 milliGRAM(s) Oral every 4 hours PRN Severe Pain (7 - 10)  oxyCODONE    IR 2.5 milliGRAM(s) Oral every 4 hours PRN Moderate Pain (4 - 6)  oxyCODONE    IR 10 milliGRAM(s) Oral every 6 hours PRN Severe Pain (7 - 10)  oxyCODONE    IR 5 milliGRAM(s) Oral every 6 hours PRN Moderate Pain (4 - 6)      VITAL SIGNS:  T(F): 98.2 (01-10-23 @ 04:55), Max: 99.2 (01-09-23 @ 21:34)  HR: 118 (01-10-23 @ 04:55) (100 - 118)  BP: 107/62 (01-10-23 @ 04:55) (107/62 - 150/70)  RR: 18 (01-10-23 @ 04:55) (17 - 18)  SpO2: 99% (01-10-23 @ 04:55) (98% - 100%)    I&O's Summary    08 Jan 2023 07:01  -  09 Jan 2023 07:00  --------------------------------------------------------  IN: 1815 mL / OUT: 440 mL / NET: 1375 mL    09 Jan 2023 07:01  -  10 Alexander 2023 06:18  --------------------------------------------------------  IN: 2209 mL / OUT: 357 mL / NET: 1852 mL      Daily     Daily     PHYSICAL EXAM:  Gen: Alert, NAD  HEENT: NCAT, conjunctiva clear, sclera anicteric, white exudates in the oropharynx, dry mucous membranes  Neck: Supple, no JVD  CV: RRR, S1S2, no m/r/g  Resp: decreased breath sounds on L side, some dyspnea with speech, crackles at bilateral bases, no accessory muscle use  Abd: Soft, nontender, moderately distended, normal bowel sounds, PEG tube in LUQ mildly TTP  Ext: no edema, no clubbing or cyanosis  - RLE: in immobilizer, DP 2+, warm toes, plantar/dorsiflexion 4+/5 but limited by pain  Neuro: AOx3, CN2-12 grossly intact, ANDREWS  SKIN: warm, perfused    LABS:                        8.8    27.06 )-----------( 607      ( 09 Jan 2023 07:50 )             28.5     Hgb Trend: 8.8<--, 9.1<--, 9.1<--, 10.0<--, 7.9<--  01-09    139  |  104  |  25<H>  ----------------------------<  166<H>  4.6   |  22  |  0.68    Ca    8.8      09 Jan 2023 19:20  Phos  2.3     01-09  Mg     1.50     01-09    TPro  6.7  /  Alb  2.8<L>  /  TBili  0.3  /  DBili  0.2  /  AST  37  /  ALT  42<H>  /  AlkPhos  321<H>  01-09    Creatinine Trend: 0.68<--, 0.64<--, 0.71<--, 0.76<--, 0.74<--, 0.76<--  LIVER FUNCTIONS - ( 09 Jan 2023 07:50 )  Alb: 2.8 g/dL / Pro: 6.7 g/dL / ALK PHOS: 321 U/L / ALT: 42 U/L / AST: 37 U/L / GGT: x                     CAPILLARY BLOOD GLUCOSE      RADIOLOGY & ADDITIONAL TESTS: Reviewed    Imaging Personally Reviewed:    Consultant(s) Notes Reviewed:  Orthopedics, GI, ID    Care Discussed with Consultants/Other Providers:  Orthopedics, GI, ID

## 2023-01-12 NOTE — PROGRESS NOTE ADULT - ATTENDING COMMENTS
Patient seen and examined, care plan discussed with house staff as above.    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 Ongoing sinus tachycardia and WBC in the 20k range (since admission, up trending on 1/10). Currently being treated for suspected refeeding syndrome, with ongoing hypophos.     Primary Onc is Dr. Gi Thomas in [place], he was first diagnosed w SCC in 2015, unclear what his prior chemo was. Need records [ ].    F/u final culture data from OR. [ NGTD] S/p Ancef (1/7 - 1/10) post OR. F/u w Ortho. Will proceed w Xarelto 10 mg daily x 30 days for dvt ppx. UA from Jan 7 showed budding yeast of unclear significance. F/u repeat [ ].    Wheezing, worsening cough and MAN when speaking full sentences on 1/10-- f/u RVP [neg], and CTA chest on 1/10 with new infiltrate in RUL concerning for aspiration; debri in esophagus, known chest wall mass visualized again on the L with atelectasis. Unable to compare to prior CT done at [facility] (no images available or report in chart, but notes suggest there was no RUL mass previously). Will d/w ID [ ], rec's HAP coverage w cefepime (1/11-1/16) to complete 5 days, f/u final sputum cultures [ ]. Leaking noted around PEG site on 1/11, GI consulted. TF on HOLD (1/11-1/12).

## 2023-01-12 NOTE — PROGRESS NOTE ADULT - PROBLEM SELECTOR PLAN 4
- Phos 1.8 on 1/7, s/p oral repletion x 2 packets  - Phos 1.0 > 1.6 on 1/8 in context of pt NPO for procedure on 1/7  - concern for refeeding syndrome, diet restarted on 1/8    Plan  - Phos 2.0 today, repleting with IV KPhos  - recheck phosphorus in afternoon, replete to goal > 2.5 - Phos 1.8 on 1/7, s/p oral repletion x 2 packets  - Phos 1.0 > 1.6 on 1/8 in context of pt NPO for procedure on 1/7  - concern for refeeding syndrome, diet restarted on 1/8    Plan  - Phos 2.1 today, repleting with IV KPhos  - recheck phosphorus in afternoon, replete to goal > 2.5

## 2023-01-12 NOTE — PROGRESS NOTE ADULT - SUBJECTIVE AND OBJECTIVE BOX
Gastroenterology/Hepatology Progress Note      Interval Events:     As per nurse, able to do tube feeds and flush the tube w/ no leakage around the site. Pt. feels full too quickly after the tube feeds. So its being held for few hours. Denied abd pain, nausea, vomiting, fevers and chills.     Allergies:  No Known Allergies      Hospital Medications:  acetaminophen    Suspension .. 650 milliGRAM(s) Oral every 6 hours PRN  albuterol    90 MICROgram(s) HFA Inhaler 2 Puff(s) Inhalation every 6 hours PRN  cefepime   IVPB      cefepime   IVPB 2000 milliGRAM(s) IV Intermittent every 8 hours  clotrimazole 1% Cream 1 Application(s) Topical every 12 hours  enoxaparin Injectable 40 milliGRAM(s) SubCutaneous every 24 hours  gabapentin 300 milliGRAM(s) Oral three times a day PRN  levothyroxine Injectable 112 MICROGram(s) IV Push <User Schedule>  magnesium oxide 400 milliGRAM(s) Oral two times a day with meals  ondansetron Injectable 4 milliGRAM(s) IV Push every 6 hours PRN  oxyCODONE    IR 10 milliGRAM(s) Oral every 6 hours PRN  oxyCODONE    IR 5 milliGRAM(s) Oral every 6 hours PRN  pantoprazole   Suspension 40 milliGRAM(s) Oral daily  polyethylene glycol 3350 17 Gram(s) Oral daily  sodium chloride 2 Gram(s) Oral every 8 hours  sodium phosphate 30 milliMole(s)/500 mL IVPB 30 milliMole(s) IV Intermittent once      ROS: 14 point ROS negative unless otherwise state in subjective    PHYSICAL EXAM:   Vital Signs:  Vital Signs Last 24 Hrs  T(C): 37.5 (2023 05:53), Max: 38 (2023 22:32)  T(F): 99.5 (2023 05:53), Max: 100.4 (2023 22:32)  HR: 118 (2023 05:53) (92 - 129)  BP: 118/63 (2023 05:53) (108/62 - 124/64)  BP(mean): --  RR: 18 (2023 05:53) (17 - 20)  SpO2: 100% (2023 05:53) (97% - 100%)    Parameters below as of 2023 05:53  Patient On (Oxygen Delivery Method): room air      Daily     Daily       GENERAL:  No acute distress, elderly male, lying in bed.   HEENT:  NCAT, no scleral icterus  CHEST: no resp distress  ABDOMEN:  Soft, erythema and granulation tissue around the peg tube site w/ tube feeds, able to flush the tube w/ sterile water w/ no resistance, non tender, mildly distended, no palpable masses. (unchanged from yesterday)  EXTREMITIES:  No LE edema b/l  NEURO:  Alert and oriented x 3, no tremors    LABS:                        8.5    26.89 )-----------( 579      ( 2023 05:59 )             27.9     Mean Cell Volume: 86.1 fL (23 @ 05:59)        142  |  111<H>  |  29<H>  ----------------------------<  144<H>  4.3   |  20<L>  |  0.75    Ca    8.9      2023 05:59  Phos  2.1       Mg     2.10         TPro  6.6  /  Alb  2.6<L>  /  TBili  0.4  /  DBili  x   /  AST  20  /  ALT  20  /  AlkPhos  286<H>      LIVER FUNCTIONS - ( 2023 06:55 )  Alb: 2.6 g/dL / Pro: 6.6 g/dL / ALK PHOS: 286 U/L / ALT: 20 U/L / AST: 20 U/L / GGT: x             Urinalysis Basic - ( 2023 12:55 )    Color: Yellow / Appearance: Clear / S.024 / pH: x  Gluc: x / Ketone: Negative  / Bili: Negative / Urobili: 3 mg/dL   Blood: x / Protein: 100 mg/dL / Nitrite: Negative   Leuk Esterase: Negative / RBC: 4 /HPF / WBC 3 /HPF   Sq Epi: x / Non Sq Epi: 2 /HPF / Bacteria: Negative            Imaging:    XRay abdomen    IMPRESSION:  PEG tube appears to overlie the mid abdomen, though no contrast injected   through the tube. No evidence of contrast seen outside of the bowel.  Nonobstructive bowel gas pattern.

## 2023-01-12 NOTE — PROGRESS NOTE ADULT - SUBJECTIVE AND OBJECTIVE BOX
ORTHOPAEDICS DAILY PROGRESS NOTE:     SUBJECTIVE/ROS:  Seen and examined at bedside. Pain controlled. 1004. fever yesterday, treating for PNA per ID.    OBJECTIVE:  Vital Signs Last 24 Hrs  T(C): 37.2 (12 Jan 2023 01:31), Max: 38 (11 Jan 2023 22:32)  T(F): 98.9 (12 Jan 2023 01:31), Max: 100.4 (11 Jan 2023 22:32)  HR: 101 (12 Jan 2023 01:31) (92 - 129)  BP: 108/62 (12 Jan 2023 01:31) (108/62 - 124/64)  RR: 20 (12 Jan 2023 01:31) (17 - 20)  SpO2: 100% (12 Jan 2023 01:31) (97% - 100%)  Parameters below as of 12 Jan 2023 01:31  Patient On (Oxygen Delivery Method): room air      LABS:                          8.7    30.25 )-----------( 581      ( 11 Jan 2023 10:33 )             28.0   01-11    142  |  110<H>  |  26<H>  ----------------------------<  117<H>  4.3   |  19<L>  |  0.68    Ca    8.7      11 Jan 2023 18:05  Phos  4.3     01-11  Mg     1.80     01-11    TPro  6.6  /  Alb  2.6<L>  /  TBili  0.4  /  DBili  x   /  AST  20  /  ALT  20  /  AlkPhos  286<H>  01-11      PHYSICAL EXAM:  Gen: NAD, resting in bed  Resp: unlabored breathing  RLE: dressing/KI c/d/i        +EHL/FHL/TA/GS         SILT Owens/Saph/Tib/DP/SP        +DP

## 2023-01-12 NOTE — PROGRESS NOTE ADULT - ATTENDING COMMENTS
67-year-old male with PMH of metastatic esophageal cancer, hypothyroidism, GERD, HTN who was transferred from [facility] for Orthopedics consult for possible right knee malignancy versus septic arthritis.   He received a biopsy of his right patella on 11/10/22 with pathology positive for squamous cell carcinoma.  Patient s/p PEG tube exchange at bedside with balloon PEG on 1/2/2023, with difficulty flushing tube and possible drainage one day prior.  Currently receiving feeds via tube but with satiety status post bolus feeds.  Patient may have opiate-induced gastroparesis.  Check residuals.  Patient may tolerate continuous feeds more than bolus feeds.  Keep head of bed elevated.  Consider PRN promotility agent (metoclopramide) as needed.

## 2023-01-12 NOTE — PROGRESS NOTE ADULT - ASSESSMENT
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 Although septic arthritis initially was a consideration, the patient had no evident fevers or infectious signs, and his pain was progressive and slow, so antibiotics were discontinued. He was noted on MRI of the knee to have findings concerning for metastatic disease and is s/p surgical resection of his right patella on 1/7. He reports slight improvement in pain after procedure but is still requiring PRN oxycodone 10 mg multiple times per day. He is now pending disposition with home PT and wound care for R knee. Orthopedic Surgery following, removed wound vacuum today and will remove prevena drain on day of discharge. Course complicated by high PEG residuals on 1/10, now NPO and pending gastrograffin tube study.

## 2023-01-12 NOTE — PROGRESS NOTE ADULT - ASSESSMENT
Impression:     #PEG tube leakage  - Initially consulted on 1/2 for PEG tube dislodgment, it was exchanged to a 20 French Luis Balloon Peg which was functioning well until yesterday when nurse noticed resistance w/ medications and leakage around the PEG tube site.   - on my evaluation - patient had mild erythema around the PEG site w/ granulation tissue, no evidence of pus or tube feeds leakage, able to flush the tube w/ no resistance.   - Currently able to use tube feeds, pt. feels full after the feeds, possible opoid induced gastroparesis. Can consider Reglan.     Recommendations:   - okay to use PEG for feeding, please run the tube feeds continuously at a slower rate.   - can consider starting him on Reglan 10mg TID, if QTc wnl.   - keep bumper clean and dry, recommend NO gauze underneath the bumper   - aspiration precautions, elevate head end of the bed  - close observation to prevent dislodgement, can use a binder for protection to prevent the patient from pulling out the PEG  - supportive care as per primary team.     Thank you for the consult. Please call us back if you have any questions or concerns.     All recommendations are tentative until the note is attested by an attending.     Deng Salazar, PGY-4  Gastroenterology/Hepatology Fellow  Available on [employer] Teams  [phone] (Short Range Pager)  [phone] (Long Range Pager)    After 5pm, please contact the on-call GI fellow. [phone]     Impression:     #PEG tube leakage  - Initially consulted on 1/2 for PEG tube dislodgment, it was exchanged to a 20 French Luis Balloon Peg which was functioning well until yesterday when nurse noticed resistance w/ medications and leakage around the PEG tube site.   - on my evaluation - patient had mild erythema around the PEG site w/ granulation tissue, no evidence of pus or tube feeds leakage, able to flush the tube w/ no resistance.   - Currently able to use tube feeds, pt. feels full after the feeds, possible opoid-induced gastroparesis. Can consider Reglan.     Recommendations:   - okay to use PEG for feeding, please run the tube feeds continuously at a slower rate.   - can consider starting him on Reglan 10mg TID, if QTc wnl.   - keep bumper clean and dry, recommend NO gauze underneath the bumper   - aspiration precautions, elevate head end of the bed  - close observation to prevent dislodgement, can use a binder for protection to prevent the patient from pulling out the PEG  - supportive care as per primary team.     Thank you for the consult. Please call us back if you have any questions or concerns.     All recommendations are tentative until the note is attested by an attending.     Deng Salazar, PGY-4  Gastroenterology/Hepatology Fellow  Available on [employer] Teams  [phone] (Short Range Pager)  [phone] (Long Range Pager)    After 5pm, please contact the on-call GI fellow. [phone]

## 2023-01-12 NOTE — PROGRESS NOTE ADULT - PROBLEM SELECTOR PLAN 8
- home meds include amlodipine 5 mg, metoprolol succinate 50 mg   - BP 90s-100s/50s-60s in ED; now 120s/70s    Plan  - BP meds held due to concern for sepsis  - BP within acceptable range, will continue to hold  - hold BP meds on discharge as patient with persistent hypotension

## 2023-01-12 NOTE — PROGRESS NOTE ADULT - PROBLEM SELECTOR PLAN 3
- high residuals overnight (>200 cc)  - mild pain around PEG and white leaking around tube, concerning for tube dislodged vs granulation tissue    Plan  - GI consulted for PEG dysfunction, tube newly placed on 1/2  - Abdominal XR with gastrograffin via tube to evaluate for leakage  - NPO pending tube study  - D5-LR at 100 cc/h for mIVF while NPO - high residuals overnight (>200 cc)  - mild pain around PEG and white leaking around tube, concerning for tube dislodged vs granulation tissue    Plan  - GI consulted for PEG dysfunction, tube newly placed on 1/2  - Abdominal XR with gastrograffin via tube shows no leakage  - resume tube feeds at lower continuous rate, appreciate nutrition recs for max 54 cc/h  - start metoclopramide for promotility  - D/C mIVF, restart free water flushes 150 cc q8h

## 2023-01-12 NOTE — PROGRESS NOTE ADULT - NUTRITIONAL ASSESSMENT
This patient has been assessed with a concern for Malnutrition and has been determined to have a diagnosis/diagnoses of Severe protein-calorie malnutrition.    This patient is being managed with:   Diet NPO with Tube Feed-  Tube Feeding Modality: Gastrostomy  TwoCal HN (TWOCALHNRTH)  Total Volume for 24 Hours (mL): 1896  Bolus  Total Volume of Bolus (mL):  237  Total # of Feeds: 5  Tube Feed Frequency: Every 3 hours   Tube Feed Start Time: 06:00  Bolus Feed Rate (mL per Hour): 237   Bolus Feed Duration (in Hours): 1  Bolus   Total Volume per Flush (mL): 150   Frequency: Every 8 Hours   Total Daily Volume of Flush (mL): 450    Start Time: 06:00  Entered: Jan 11 2023  8:14AM

## 2023-01-12 NOTE — PROGRESS NOTE ADULT - PROBLEM SELECTOR PLAN 5
- Na 122 on 1/5, increased to 134 on 1/9, now resolving  - Serum osmolarity calculated as 262 (hypotonic), patient euvolemic on exam  - Elevated urine osmolarity and urine sodium are suggestive of inappropriate concentration of urine   - Patient with known pleural metastasis, likely 2/2 SIADH  - Less likely, but differential includes hypothyroidism (TSH 11.98); despite mild elevation in tsh, clinical picture is not consistent with this (no bradycardia, no hypotension); adrenal insufficieny unlikely as AM cortisol WNL    Plan  - C/w salt tabs 2 g TID via PEG tube  - Recheck BMP tomorrow AM, trend Na daily  - slowly add back free water flushes to PEG feeds (150 ml q8h)

## 2023-01-12 NOTE — CHART NOTE - NSCHARTNOTEFT_GEN_A_CORE
Pt seen for malnutrition follow up.     Medical Course:  - Per chart, pt is 67 year old male PMH metastatic esophageal cancer s/p PEG, hypothyroidism, GERD, HTN transferred from [facility] for orthopedics consult.  - Orthopedics following. MRI knee findings concerning for metastatic disease. Now s/p surgical resection of right patella (1/7).     Nutrition Course:  - Pt was previously ordered for bolus feeds of TwoCal HN via  mL bolus 5x daily. Pt had reported feeling full after administration and therefore was declining feeds. Additionally with high residuals (1/10) per chart, difficulty flushing medications. Tube feeding was held and GI was consulted. Pt was transitioned to continuous feeds via pump and started on Reglan for possible opioid induced gastroparesis.   - TwoCal HN visibly running via pump @ 10 mL/hr at time of visit. Pt denies GI distress, states "everything is alright". Current nutrition prescription provides 960 mL formula, 1920 kcal, 80.16 gm protein, 1122 mL free water (672 mL from formula).   - Hyponatremia has improved, continues on salt tabs 2 gm q8 hrs. Persistent hypophosphatemia noted. Additionally with hyperglycemia, no HbA1c available at this time.   - Last BM 1/11, fecal  incontinence per flowsheets. Pt ordered for magnesium oxide 400 mg BID and Miralax 17 gm qD.      Diet Prescription:   - NPO with Tube Feed: TwoCal HN via Gastrostomy initiated at 10 mL/hr increased by 10 mL q4 hrs to goal rate 40 mL/hr x24 hrs + 150 mL bolus q8 hrs    Pertinent Medications:   - cefepime IV, levothyroxine IV, magnesium oxide, metoclopramide, pantoprazole Suspension, polyethylene glycol, sodium chloride, ondansetron IV PRN    Pertinent Labs:   - (1/12) Na 142 mmol/L Glu 144 mg/dL<H> K+ 4.3 mmol/L Cr 0.75 mg/dL BUN 29 mg/dL<H> Phos 2.1 mg/dL<L>     Weight: (1/7 dosing) 146.3 lbs / 66.4 kg, (1/5 bed scale obtained at time of visit, unable to tare) 146.7 lbs / 66.7 kg, (1/4 dosing) 146.3 lbs / 66.4 kg   Height: 67 in / 170.18 cm  IBW: 148 lbs / 67.3 kg +/-10%  BMI: 22.9 kg/m^2 (at most recent weight)    Physical Assessment, per flowsheets:  Edema/Pressure Injury: none noted    Estimated Needs:   [X] No changes since previous assessment, based on dosing weight 146.3 lbs / 66.4 kg   1089-4586 kcal daily @30-35 kcal/kg, 92..24 gm protein daily @1.4-1.6 gm/kg     Previous Nutrition Diagnosis: [X] Severe malnutrition in the context of acute illness, remains appropriate  New Nutrition Diagnosis: [X] not applicable     Interventions:   1) Recommend TwoCal HN at a goal rate of 54 mL/hr x20 hrs to provide 1080 mL formula, 2160 kcal, 90.18 gm protein, 756 mL free water daily (meets 32.5 kcal/kg, 1.36 gm protein/kg @ dosing weight 66.4 kg). Additional provision of free water per medical discretion.  2) Obtain HbA1c.  3) Obtain weekly weights.   4) Continue to monitor electrolytes (K+, Mg, P) and replete to within desired limits as clinically indicated.     Monitor & Evaluate:  Tolerance to EN, nutrition related lab values, weight trends, BMs/GI distress, hydration status, skin integrity.  Cyndee Andrade RDN, CDN #[id number]  Also available on [employer] Teams. Pt seen for malnutrition follow up.     Medical Course:  - Per chart, pt is 67 year old male PMH metastatic esophageal cancer s/p PEG, hypothyroidism, GERD, HTN transferred from [facility] for orthopedics consult.  - Orthopedics following. MRI knee findings concerning for metastatic disease. Now s/p surgical resection of right patella (1/7).     Nutrition Course:  - Pt was previously ordered for bolus feeds of TwoCal HN via  mL bolus 5x daily. Pt had reported feeling full after administration and therefore was declining feeds. Additionally with high residuals (1/10) per chart, difficulty flushing medications. Tube feeding was held and GI was consulted. Pt was transitioned to continuous feeds via pump and started on Reglan for possible opioid induced gastroparesis.   - TwoCal HN visibly running via pump @ 10 mL/hr at time of visit. Pt denies GI distress, states "everything is alright". Current nutrition prescription provides 960 mL formula, 1920 kcal, 80.16 gm protein, 1122 mL free water (672 mL from formula).   - Hyponatremia has improved, continues on salt tabs 2 gm q8 hrs. Persistent hypophosphatemia noted. Additionally with hyperglycemia, no HbA1c available at this time.   - Last BM 1/11, fecal  incontinence per flowsheets. Pt ordered for magnesium oxide 400 mg BID and Miralax 17 gm qD.      Diet Prescription:   - NPO with Tube Feed: TwoCal HN via Gastrostomy initiated at 10 mL/hr increased by 10 mL q4 hrs to goal rate 40 mL/hr x24 hrs + 150 mL bolus q8 hrs    Pertinent Medications:   - cefepime IV, levothyroxine IV, magnesium oxide, metoclopramide, pantoprazole Suspension, polyethylene glycol, sodium chloride, ondansetron IV PRN    Pertinent Labs:   - (1/12) Na 142 mmol/L Glu 144 mg/dL<H> K+ 4.3 mmol/L Cr 0.75 mg/dL BUN 29 mg/dL<H> Phos 2.1 mg/dL<L>     Weight: (1/7 dosing) 146.3 lbs / 66.4 kg, (1/5 bed scale obtained at time of visit, unable to tare) 146.7 lbs / 66.7 kg, (1/4 dosing) 146.3 lbs / 66.4 kg   Height: 67 in / 170.18 cm  IBW: 148 lbs / 67.3 kg +/-10%  BMI: 22.9 kg/m^2 (at most recent weight)    Physical Assessment, per flowsheets:  Edema/Pressure Injury: none noted    Estimated Needs:   [X] No changes since previous assessment, based on dosing weight 146.3 lbs / 66.4 kg   1002-0253 kcal daily @30-35 kcal/kg, 92..24 gm protein daily @1.4-1.6 gm/kg     Previous Nutrition Diagnosis: [X] Severe malnutrition in the context of acute illness, remains appropriate  New Nutrition Diagnosis: [X] not applicable      Interventions:   1) Recommend TwoCal HN at a goal rate of 54 mL/hr x20 hrs to provide 1080 mL formula, 2160 kcal, 90.18 gm protein, 756 mL free water daily (meets 32.5 kcal/kg, 1.36 gm protein/kg @ dosing weight 66.4 kg). Additional provision of free water per medical discretion.  2) Obtain HbA1c.  3) Obtain weekly weights.   4) Continue to monitor electrolytes (K+, Mg, P) and replete to within desired limits as clinically indicated.     Monitor & Evaluate:  Tolerance to EN, nutrition related lab values, weight trends, BMs/GI distress, hydration status, skin integrity.  Cyndee Andrade RDN, CDN #[id number]  Also available on [employer] Teams.

## 2023-01-12 NOTE — PROGRESS NOTE ADULT - PROBLEM SELECTOR PLAN 1
- progressively worsening pain, erythema, edema, and warmth at the R knee joint for about 1 month  - recent partial excision of R patella on 11/10 with SCC on biopsy but unclear margins  - current presentation more consistent with progression of known cancer than septic arthritis  - MRI R knee w/wo contrast showing R patellar fracture with OM vs osteonecrosis, moderate complicated effusion--clinical suspicion is greatest for osteonecrosis from metastatic disease   - s/p tap of R knee but small sample collected; gram stain negative with no PMNs or bacteria    Plan  - Appreciate Ortho recs:     - s/p patellectomy, quadricepsplasty, and synovectomy on 1/7 in OR     - Ortho will remove Provena vacuum on day of discharge     - s/p ancef 2 g q8h from 1/9-1/10 for prophylaxis against post-op infection, discontinued when drain removed  - Pain control:      - Tylenol 650 mg q6h for mild pain     - Oxycodone 5 mg q6h PRN for moderate pain --> morphine 0.5 mg q6h PRN while NPO     - Oxycodone 10 mg q6h PRN for severe pain --> morphine 1.0 mg q6h PRN while NPO     - c/w bowel regimen to prevent opioid induced constipation (miralax daily)  - Coordinate home PT and wound care for knee on D/C  - Outpatient f/u with Dr. Carter within 1 week after D/C  - DVT ppx with xarelto 10 mg x 30 days until f/u with Ortho (Wells score for PE 4.0, moderate risk) - progressively worsening pain, erythema, edema, and warmth at the R knee joint for about 1 month  - recent partial excision of R patella on 11/10 with SCC on biopsy but unclear margins  - current presentation more consistent with progression of known cancer than septic arthritis  - MRI R knee w/wo contrast showing R patellar fracture with OM vs osteonecrosis, moderate complicated effusion--clinical suspicion is greatest for osteonecrosis from metastatic disease   - s/p tap of R knee but small sample collected; gram stain negative with no PMNs or bacteria    Plan  - Appreciate Ortho recs:     - s/p patellectomy, quadricepsplasty, and synovectomy on 1/7 in OR     - Ortho will remove Provena vacuum on day of discharge     - s/p ancef 2 g q8h from 1/9-1/10 for prophylaxis against post-op infection, discontinued when drain removed  - Pain control:      - Tylenol 650 mg q6h for mild pain, xxycodone 5 mg q6h PRN for moderate, oxycodone 10 mg q6h PRN for severe     - c/w bowel regimen to prevent opioid induced constipation (miralax daily)  - Coordinate home PT and wound care for knee on D/C  - Outpatient f/u with Dr. Carter within 1 week after D/C  - DVT ppx with xarelto 10 mg x 30 days until f/u with Ortho (Wells score for PE 4.0, moderate risk)

## 2023-01-13 NOTE — PROGRESS NOTE ADULT - PROBLEM SELECTOR PLAN 4
- Phos 1.8 on 1/7, s/p oral repletion x 2 packets  - Phos 1.0 > 1.6 on 1/8 in context of pt NPO for procedure on 1/7  - concern for refeeding syndrome, diet restarted on 1/8    Plan  - Phos 2.1 today, repleting with IV KPhos  - recheck phosphorus in afternoon, replete to goal > 2.5 - Phos 1.8 on 1/7, s/p oral repletion x 2 packets  - Phos 1.0 >1.6 on 1/8 in context of pt NPO for procedure on 1/7  - concern for refeeding syndrome, diet restarted on 1/8    Plan  - Phos 3.6 today, has been within goal x 2 days  - recheck phosphorus in afternoon, replete to goal > 2.5

## 2023-01-13 NOTE — PROGRESS NOTE ADULT - PROBLEM SELECTOR PLAN 3
- high residuals overnight (>200 cc)  - mild pain around PEG and white leaking around tube, concerning for tube dislodged vs granulation tissue    Plan  - GI consulted for PEG dysfunction, tube newly placed on 1/2  - Abdominal XR with gastrograffin via tube shows no leakage  - resume tube feeds at lower continuous rate, appreciate nutrition recs for max 54 cc/h  - start metoclopramide for promotility  - D/C mIVF, restart free water flushes 150 cc q8h - high residuals overnight (>200 cc)  - mild pain around PEG and white leaking around tube, concerning for tube dislodged vs granulation tissue    Plan  - GI consulted for PEG dysfunction, tube newly placed on 1/2  - Abdominal XR with gastrograffin via tube shows no leakage  - resume tube feeds at lower continuous rate, appreciate nutrition recs for max 54 cc/h x 20h  - start metoclopramide for promotility  - D/C mIVF, restart free water flushes 150 cc q8h

## 2023-01-13 NOTE — PROGRESS NOTE ADULT - PROBLEM SELECTOR PLAN 9
- Diet: continuous feeds x 20 h  - PT/OT recommending [facility], family prefers home services  - DVT ppx: Lovenox 40 mg SQ daily  - Dispo: likely home    Updated wife and son Omkar at bedside - Diet: continuous feeds x 20 h  - PT/OT recommending [facility], family prefers home services  - DVT ppx: Lovenox 40 mg SQ daily  - Dispo: likely home    Updated wife at bedside

## 2023-01-13 NOTE — PROGRESS NOTE ADULT - SUBJECTIVE AND OBJECTIVE BOX
Damaris Aguiar, PGY1  TEAMS or Pager [phone]     Patient is a 67y old Male who presents with a chief complaint of R knee pain (10 Alexander 2023 00:48)    SUBJECTIVE/INTERVAL EVENTS: Overnight, patient had a fever of 100.8 which resolved with IV tylenol. Patient seen and examined at bedside. He reports 10/10 knee pain that is improved but not completely relieved by pain medications. Patient with improvement in dyspnea today. Still endorses chronic productive cough. Denies chest pain, hemoptysis, SOB at rest. He denies abdominal pain, nausea, or vomiting. Tube feeds now running continuously x 20 hours    MEDICATIONS  (STANDING):  ceFAZolin   IVPB 2000 milliGRAM(s) IV Intermittent every 8 hours  clotrimazole 1% Cream 1 Application(s) Topical every 12 hours  enoxaparin Injectable 40 milliGRAM(s) SubCutaneous every 24 hours  levothyroxine Injectable 112 MICROGram(s) IV Push <User Schedule>  magnesium oxide 400 milliGRAM(s) Oral two times a day with meals  pantoprazole   Suspension 40 milliGRAM(s) Oral daily  polyethylene glycol 3350 17 Gram(s) Oral daily  sodium chloride 2 Gram(s) Oral every 8 hours  sodium phosphate 30 milliMole(s)/500 mL IVPB 30 milliMole(s) IV Intermittent once    MEDICATIONS  (PRN):  acetaminophen    Suspension .. 650 milliGRAM(s) Oral every 6 hours PRN Temp greater or equal to 38C (100.4F), Mild Pain (1 - 3)  albuterol    90 MICROgram(s) HFA Inhaler 2 Puff(s) Inhalation every 6 hours PRN Shortness of Breath and/or Wheezing  gabapentin 300 milliGRAM(s) Oral three times a day PRN neuropathic pain  ondansetron Injectable 4 milliGRAM(s) IV Push every 6 hours PRN Nausea and/or Vomiting  oxyCODONE    IR 5 milliGRAM(s) Oral every 4 hours PRN Severe Pain (7 - 10)  oxyCODONE    IR 2.5 milliGRAM(s) Oral every 4 hours PRN Moderate Pain (4 - 6)  oxyCODONE    IR 10 milliGRAM(s) Oral every 6 hours PRN Severe Pain (7 - 10)  oxyCODONE    IR 5 milliGRAM(s) Oral every 6 hours PRN Moderate Pain (4 - 6)      VITAL SIGNS:  T(F): 98.2 (01-10-23 @ 04:55), Max: 99.2 (01-09-23 @ 21:34)  HR: 118 (01-10-23 @ 04:55) (100 - 118)  BP: 107/62 (01-10-23 @ 04:55) (107/62 - 150/70)  RR: 18 (01-10-23 @ 04:55) (17 - 18)  SpO2: 99% (01-10-23 @ 04:55) (98% - 100%)    I&O's Summary    08 Jan 2023 07:01  -  09 Jan 2023 07:00  --------------------------------------------------------  IN: 1815 mL / OUT: 440 mL / NET: 1375 mL    09 Jan 2023 07:01  -  10 Alexander 2023 06:18  --------------------------------------------------------  IN: 2209 mL / OUT: 357 mL / NET: 1852 mL      Daily     Daily     PHYSICAL EXAM:  Gen: Alert, NAD  HEENT: NCAT, conjunctiva clear, sclera anicteric, white exudates in the oropharynx, dry mucous membranes  Neck: Supple, no JVD  CV: RRR, S1S2, no m/r/g  Resp: decreased breath sounds on L side, some dyspnea with speech, crackles at bilateral bases, no accessory muscle use  Abd: Soft, nontender, moderately distended, normal bowel sounds, PEG tube in LUQ mildly TTP  Ext: no edema, no clubbing or cyanosis  - RLE: in immobilizer, DP 2+, warm toes, plantar/dorsiflexion 4+/5 but limited by pain  Neuro: AOx3, CN2-12 grossly intact, ANDREWS  SKIN: warm, perfused    LABS:                        8.8    27.06 )-----------( 607      ( 09 Jan 2023 07:50 )             28.5     Hgb Trend: 8.8<--, 9.1<--, 9.1<--, 10.0<--, 7.9<--  01-09    139  |  104  |  25<H>  ----------------------------<  166<H>  4.6   |  22  |  0.68    Ca    8.8      09 Jan 2023 19:20  Phos  2.3     01-09  Mg     1.50     01-09    TPro  6.7  /  Alb  2.8<L>  /  TBili  0.3  /  DBili  0.2  /  AST  37  /  ALT  42<H>  /  AlkPhos  321<H>  01-09    Creatinine Trend: 0.68<--, 0.64<--, 0.71<--, 0.76<--, 0.74<--, 0.76<--  LIVER FUNCTIONS - ( 09 Jan 2023 07:50 )  Alb: 2.8 g/dL / Pro: 6.7 g/dL / ALK PHOS: 321 U/L / ALT: 42 U/L / AST: 37 U/L / GGT: x                     CAPILLARY BLOOD GLUCOSE      RADIOLOGY & ADDITIONAL TESTS: Reviewed    Imaging Personally Reviewed:    Consultant(s) Notes Reviewed:  Orthopedics, GI, ID    Care Discussed with Consultants/Other Providers:  Orthopedics, GI, ID

## 2023-01-13 NOTE — PROGRESS NOTE ADULT - ASSESSMENT
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 Although septic arthritis initially was a consideration, the patient had no evident fevers or infectious signs, and his pain was progressive and slow, so antibiotics were discontinued. He was noted on MRI of the knee to have findings concerning for metastatic disease and is s/p surgical resection of his right patella on 1/7. He reports slight improvement in pain after procedure but is still requiring PRN oxycodone 10 mg multiple times per day. He is now pending disposition with home PT and wound care for R knee. Orthopedic Surgery following, removed wound vacuum today and will remove prevena drain on day of discharge. Course complicated by high PEG residuals on 1/10, now NPO and pending gastrograffin tube study.

## 2023-01-13 NOTE — PROGRESS NOTE ADULT - PROBLEM SELECTOR PLAN 7
- takes synthroid 150 mcg PO daily (home med)    Plan  - c/w synthroid 112 mcg IV daily  - TSH 11.98 but free T4 WNL  - patient denies new symptoms of hypothyroidism including constipation, hypotension, dry skin, brittle hair  - recommend outpatient follow-up as TSH may be inaccurate in context of current hospitalization and could instead be suggestive of sick euthyroid syndrome - takes synthroid 150 mcg PO daily at home  - TSH 11.98 but free T4 WNL    Plan  - transition from IV synthroid 112 mcg to PO synthroid 150 mg (home med)  - patient denies new symptoms of hypothyroidism including constipation, hypotension, dry skin, brittle hair  - recommend outpatient follow-up as TSH may be inaccurate in context of current hospitalization and could instead be suggestive of sick euthyroid syndrome

## 2023-01-13 NOTE — PROGRESS NOTE ADULT - ASSESSMENT
67M metastatic esophageal SCC.   Involving the right patella s/p radical resection 11/10, now patellectomy 1/7.   No clear superimposed infection.     Chronic cough with acute Enterobacter HAP 1/10. Improving on Cefepime.     Suggest  -Cefepime day 2   -if fever free, could discharge over the weekend on Levaquin 750mg daily to finish 1/18 for 7-days     Discussed with medicine     Red Streeter MD   Infectious Disease   Available on TEAMS. After 5PM and on weekends please page fellow on call or call [phone]

## 2023-01-13 NOTE — PROGRESS NOTE ADULT - PROBLEM SELECTOR PLAN 1
- progressively worsening pain, erythema, edema, and warmth at the R knee joint for about 1 month  - recent partial excision of R patella on 11/10 with SCC on biopsy but unclear margins  - current presentation more consistent with progression of known cancer than septic arthritis  - MRI R knee w/wo contrast showing R patellar fracture with OM vs osteonecrosis, moderate complicated effusion--clinical suspicion is greatest for osteonecrosis from metastatic disease   - s/p tap of R knee but small sample collected; gram stain negative with no PMNs or bacteria    Plan  - Appreciate Ortho recs:     - s/p patellectomy, quadricepsplasty, and synovectomy on 1/7 in OR     - Ortho will remove Provena vacuum on day of discharge     - s/p ancef 2 g q8h from 1/9-1/10 for prophylaxis against post-op infection, discontinued when drain removed  - Pain control:      - Tylenol 650 mg q6h for mild pain, xxycodone 5 mg q6h PRN for moderate, oxycodone 10 mg q6h PRN for severe     - c/w bowel regimen to prevent opioid induced constipation (miralax daily)  - Coordinate home PT and wound care for knee on D/C  - Outpatient f/u with Dr. Carter within 1 week after D/C  - DVT ppx with xarelto 10 mg x 30 days until f/u with Ortho (Wells score for PE 4.0, moderate risk) - progressively worsening pain, erythema, edema, and warmth at the R knee joint for about 1 month  - recent partial excision of R patella on 11/10 with SCC on biopsy but unclear margins  - current presentation more consistent with progression of known cancer than septic arthritis  - MRI R knee w/wo contrast showing R patellar fracture with OM vs osteonecrosis, moderate complicated effusion--clinical suspicion is greatest for osteonecrosis from metastatic disease   - s/p tap of R knee but small sample collected; gram stain negative with no PMNs or bacteria    Plan  - Appreciate Ortho recs:     - s/p patellectomy, quadricepsplasty, and synovectomy on 1/7 in OR     - Ortho removed Provena vacuum on 1/13     - s/p ancef 2 g q8h from 1/9-1/10 for prophylaxis against post-op infection  - Pain control (PRNs):      - Tylenol 650 mg q6h for mild, oxycodone 5 mg q6h for moderate, oxycodone 10 mg q6h for severe     - c/w bowel regimen to prevent opioid induced constipation (miralax daily)  - Coordinate home PT and wound care for knee on D/C  - Outpatient f/u with Dr. Carter within 1 week after D/C  - DVT ppx with xarelto 10 mg x 30 days until f/u with Ortho (Wells score for PE 4.0, moderate risk)

## 2023-01-13 NOTE — PROGRESS NOTE ADULT - ATTENDING COMMENTS
Patient seen and examined, care plan discussed with house staff as above.    67yoM w Stage IV esophageal SCC with mets ([facility]st wall, bone[facility], initially thought septic arthritis (cultures showing NGTD), now believed to be metastatic disease s/p surgical resection of R patella, quadricepsplasty and synovectomy on 1/7. Ongoing sinus tachycardia and WBC in the 20k range (since admission, up trending on 1/10). Currently being treated for suspected refeeding syndrome, with ongoing hypophos.     Primary Onc is Dr. Gi Thomas in [place], he was first diagnosed w SCC in 2015, unclear what his prior chemo was. Need records [ ].    F/u final culture data from OR. [NGTD] S/p Ancef (1/7 - 1/10) post OR. F/u w Ortho. Will proceed w Xarelto 10 mg daily x 30 days for dvt ppx. UA from Jan 7 showed budding yeast of unclear significance. F/u repeat [1/11 showed no yeast ].    Wheezing, worsening cough and MAN when speaking full sentences on 1/10-- f/u RVP [neg], and CTA chest on 1/10 with new infiltrate in RUL concerning for aspiration; debri in esophagus, known chest wall mass visualized again on the L with atelectasis. Unable to compare to prior CT done at [facility] (no images available or report in chart, but notes suggest there was no RUL mass previously). Will d/w ID [ ], rec's HAP coverage w cefepime (1/11-1/16) to complete 5 days, f/u final sputum cultures [ ]. Leaking noted around PEG site on 1/11, GI consulted. TF on HOLD (1/11-1/12), resumed at continuous rate.

## 2023-01-13 NOTE — PROGRESS NOTE ADULT - ASSESSMENT
A/P: 67yMale s/p R patellectomy/quadicepsplasty on 1/7.    - Pain control  - WBAT RLE in KI  - DVT ppx: lovenox  - PT/OT/OOB  - FU OR cx/path  - FU outpt with Dr. Carter in 1 week, please call office for appt

## 2023-01-13 NOTE — PROGRESS NOTE ADULT - PROBLEM SELECTOR PLAN 5
- Na 122 on 1/5, increased to 134 on 1/9, now resolving  - Serum osmolarity calculated as 262 (hypotonic), patient euvolemic on exam  - Elevated urine osmolarity and urine sodium are suggestive of inappropriate concentration of urine   - Patient with known pleural metastasis, likely 2/2 SIADH  - Less likely, but differential includes hypothyroidism (TSH 11.98); despite mild elevation in tsh, clinical picture is not consistent with this (no bradycardia, no hypotension); adrenal insufficieny unlikely as AM cortisol WNL    Plan  - C/w salt tabs 2 g TID via PEG tube  - Recheck BMP tomorrow AM, trend Na daily  - slowly add back free water flushes to PEG feeds (150 ml q8h) - Na 122 on 1/5, increased to 134 on 1/9, now resolving  - Serum osmolarity calculated as 262 (hypotonic), patient euvolemic on exam  - Elevated urine osmolarity and urine sodium are suggestive of inappropriate concentration of urine   - Patient with known pleural metastasis, likely 2/2 SIADH  - Less likely, but differential includes hypothyroidism (TSH 11.98); despite mild elevation in tsh, clinical picture is not consistent with this (no bradycardia, no hypotension); adrenal insufficieny unlikely as AM cortisol WNL    Plan  - Decrease salt tabs to 2 g BID via PEG tube as sodium in high 140s  - Recheck BMP tomorrow AM, trend Na daily  - c/w free water flushes 150 ml q8h

## 2023-01-13 NOTE — PROGRESS NOTE ADULT - PROBLEM SELECTOR PLAN 2
- patient with new dyspnea with speech, tachypnea to 20, wheezing on exam, tachycardia to 110s x few days  - DDx includes HAP vs atelectasis from decreased ambulation vs PE       - denies worsening cough, hemoptysis, LE pain/swelling; pt at high risk for clots due to malignancy and immobilization, Wells score for PE 4.0       - has been afebrile but WBCs increasing; with chronic productive cough  - CT angio chest with new RUL ground glass opacities, no recent CT chest for comparison; no evidence of PE but limited by motion; known L sided severe atelectasis  - sputum cx with few GNRs, mod GPC, rare yeast (prelim); RVP neg    Plan  - ID consulted for new RUL findings, appreciate recs to start cefepime x 5 days for HAP (day 1 today as only 1 dose on 11/11); 11/12-11/16 expected  - Encourage incentive spirometry for atelectasis - patient with new dyspnea with speech, tachypnea to 20, wheezing on exam, tachycardia to 110s x few days  - DDx includes HAP vs atelectasis from decreased ambulation vs PE       - denies worsening cough, hemoptysis, LE pain/swelling; pt at high risk for clots due to malignancy and immobilization, Wells score for PE 4.0       - has been afebrile but WBCs increasing; with chronic productive cough  - CT angio chest with new RUL ground glass opacities, no recent CT chest for comparison; no evidence of PE but limited by motion; known L sided severe atelectasis  - sputum cx with enterobacter cloacae; RVP neg    Plan  - ID consulted for new RUL findings, appreciate recs to start cefepime x 5 days for HAP (day 2 today as only 1 dose on 11/11); 11/12-11/16 expected  - Encourage incentive spirometry for atelectasis

## 2023-01-13 NOTE — PROGRESS NOTE ADULT - SUBJECTIVE AND OBJECTIVE BOX
Follow Up: PNA    Interval History/ROS: Febrile last few days but no chills. Feels fine, same as always except sputum is less. No diarrhea. Wife at bedside.     Allergies  No Known Allergies        ANTIMICROBIALS:  cefepime   IVPB    cefepime   IVPB 2000 every 8 hours      OTHER MEDS:  acetaminophen    Suspension .. 650 milliGRAM(s) Oral every 6 hours PRN  albuterol    90 MICROgram(s) HFA Inhaler 2 Puff(s) Inhalation every 6 hours PRN  clotrimazole 1% Cream 1 Application(s) Topical every 12 hours  enoxaparin Injectable 40 milliGRAM(s) SubCutaneous every 24 hours  gabapentin 300 milliGRAM(s) Oral three times a day PRN  influenza  Vaccine (HIGH DOSE) 0.7 milliLiter(s) IntraMuscular once  magnesium oxide 400 milliGRAM(s) Oral two times a day with meals  metoclopramide 10 milliGRAM(s) Oral three times a day  ondansetron Injectable 4 milliGRAM(s) IV Push every 6 hours PRN  oxyCODONE    IR 10 milliGRAM(s) Oral every 6 hours PRN  oxyCODONE    IR 5 milliGRAM(s) Oral every 6 hours PRN  pantoprazole   Suspension 40 milliGRAM(s) Oral daily  polyethylene glycol 3350 17 Gram(s) Oral daily  sodium chloride 2 Gram(s) Oral every 12 hours      Vital Signs Last 24 Hrs  T(C): 36.9 (13 Jan 2023 14:25), Max: 38.2 (12 Jan 2023 21:54)  T(F): 98.4 (13 Jan 2023 14:25), Max: 100.8 (12 Jan 2023 21:54)  HR: 103 (13 Jan 2023 14:25) (102 - 126)  BP: 100/66 (13 Jan 2023 14:25) (100/66 - 107/59)  BP(mean): --  RR: 18 (13 Jan 2023 14:25) (18 - 19)  SpO2: 99% (13 Jan 2023 14:25) (96% - 100%)    Parameters below as of 13 Jan 2023 14:25  Patient On (Oxygen Delivery Method): room air        Physical Exam:  General: non toxic, alert  Cardio: regular rate   Respiratory: nonlabored trach collar, grossly clear, no wheezing or significant rhonchi   abd: nondistended, soft nontender   Musculoskeletal: right knee brace   vascular: no phlebitis   Skin: no rash                          7.5    21.82 )-----------( 540      ( 13 Jan 2023 06:10 )             24.7       01-13    143  |  111<H>  |  28<H>  ----------------------------<  114<H>  3.8   |  20<L>  |  0.81    Ca    8.6      13 Jan 2023 06:10  Phos  2.8     01-13  Mg     2.10     01-13    TPro  6.5  /  Alb  2.5<L>  /  TBili  0.7  /  DBili  x   /  AST  24  /  ALT  23  /  AlkPhos  261<H>  01-13          MICROBIOLOGY:  Culture - Blood (collected 01-12-23 @ 00:12)  Source: .Blood Blood-Peripheral  Preliminary Report (01-13-23 @ 04:01):    No growth to date.    Culture - Blood (collected 01-12-23 @ 00:00)  Source: .Blood Blood-Peripheral  Preliminary Report (01-13-23 @ 04:01):    No growth to date.    Culture - Sputum (collected 01-11-23 @ 15:39)  Source: .Sputum Sputum  Gram Stain (01-11-23 @ 23:29):    Rare polymorphonuclear leukocytes per low power field    Rare Squamous epithelial cells per low power field    Few Gram Negative Rods per oil power field    Moderate Gram positive cocci in pairs per oil power field    Rare Yeast like cells per oil power field  Final Report (01-13-23 @ 15:51):    Moderate Enterobacter cloacae complex    Normal Respiratory Susan present  Organism: Enterobacter cloacae complex (01-13-23 @ 15:51)  Organism: Enterobacter cloacae complex (01-13-23 @ 15:51)      -  Amikacin: S <=16      -  Amoxicillin/Clavulanic Acid: R >16/8      -  Ampicillin: R >16 These ampicillin results predict results for amoxicillin      -  Ampicillin/Sulbactam: R >16/8 Enterobacter, Klebsiella aerogenes, Citrobacter, and Serratia may develop resistance during prolonged therapy (3-4 days)      -  Aztreonam: R >16      -  Cefazolin: R >16 Enterobacter, Klebsiella aerogenes, Citrobacter, and Serratia may develop resistance during prolonged therapy (3-4 days)      -  Cefepime: S 4      -  Cefoxitin: R >16      -  Ceftazidime/Avibactam: S <=4      -  Ceftolozane/tazobactam: R >8      -  Ceftriaxone: R >32 Enterobacter, Klebsiella aerogenes, Citrobacter, and Serratia may develop resistance during prolonged therapy      -  Ciprofloxacin: S <=0.25      -  Ertapenem: I 1      -  Gentamicin: S <=2      -  Imipenem: S <=1      -  Levofloxacin: S <=0.5      -  Meropenem: S <=1      -  Piperacillin/Tazobactam: R >64      -  Tobramycin: S <=2      -  Trimethoprim/Sulfamethoxazole: S <=0.5/9.5      Method Type: RACHAEL    Culture - Surgical Swab (collected 01-07-23 @ 08:30)  Source: Drainage #2 RIGHT KNEE  Final Report (01-12-23 @ 17:38):    No growth at 5 days    Culture - Fungal, Other (collected 01-07-23 @ 08:30)  Source: .Other #2 RIGHT KNEE  Preliminary Report (01-11-23 @ 15:03):    Culture is being performed. Fungal cultures are held for 4 weeks.    Culture - Acid Fast - Tissue w/Smear (collected 01-07-23 @ 08:20)  Source: .Tissue RIGHT KNEE  Preliminary Report (01-11-23 @ 15:07):    Culture is being performed.    Culture - Fungal, Tissue (collected 01-07-23 @ 08:20)  Source: .Tissue RIGHT KNEE  Preliminary Report (01-11-23 @ 15:03):    Culture is being performed. Fungal cultures are held for 4 weeks.    Culture - Tissue with Gram Stain (collected 01-07-23 @ 08:20)  Source: .Tissue  Gram Stain (01-07-23 @ 20:44):    No polymorphonuclear cells seen    No organisms seen  Final Report (01-12-23 @ 21:53):    No growth at 5 days    Rapid RVP Result: NotDetec (01-10 @ 12:13)      RADIOLOGY:  Images below reviewed personally  CT Angio Chest PE Protocol w/ IV Cont (01.10.23 @ 18:24)   No pulmonary embolism detected.  Large mass in the left hemithorax invading the chest wall and involving   the left third through fifth ribs, which may be secondary to metastatic   disease.  Patchy right upper lobe groundglass opacities. While nonspecific, primary   considerations include infection and asymmetric pulmonary edema.  Small left pleural effusion.

## 2023-01-13 NOTE — PROGRESS NOTE ADULT - NUTRITIONAL ASSESSMENT
This patient has been assessed with a concern for Malnutrition and has been determined to have a diagnosis/diagnoses of Severe protein-calorie malnutrition.    This patient is being managed with:   Diet NPO with Tube Feed-  Tube Feeding Modality: Gastrostomy  TwoCal HN (TWOCALHNRTH)  Total Volume for 24 Hours (mL): 1080  Continuous  Starting Tube Feed Rate {mL per Hour}: 10  Increase Tube Feed Rate by (mL): 10     Every 4 hours  Until Goal Tube Feed Rate (mL per Hour): 54  Tube Feed Duration (in Hours): 20  Tube Feed Start Time: 14:00  Bolus   Total Volume per Flush (mL): 150   Frequency: Every 8 Hours   Total Daily Volume of Flush (mL): 450    Start Time: 06:00  Entered: Jan 12 2023  5:36PM

## 2023-01-13 NOTE — PROGRESS NOTE ADULT - PROBLEM SELECTOR PLAN 6
- diagnosed about 8 yrs ago  - follows with Dr. Gi Thomas ([place])  - complicated by dysphagia with all meds/nutrition via PEG  - CXR with near complete opacification of L lung, stable from previous imaging and likely represents pleural metastases    Plan  - On 3rd line chemo with single agent irinotecan (started 6/27/22, s/p 3 cycles, most recently on 8/1/22)  - Contacted outpatient oncologist, most recent progress note sent to [facility] (family requested 2nd opinion, considering transfer)  - Continue goals of care conversation with family: currently interested in resuming palliative chemo on D/C  - C/w bolus tube feeds, strict NPO with all meds via PEG  - C/w pantoprazole 40 mg daily for GERD, gabapentin 300 mg TID PRN for pain, MgOH and KCl for supplementation

## 2023-01-13 NOTE — PROGRESS NOTE ADULT - SUBJECTIVE AND OBJECTIVE BOX
ORTHOPAEDICS DAILY PROGRESS NOTE:     SUBJECTIVE/ROS:  Seen and examined at bedside. Pain controlled. 100.8 fever yesterday, treating for PNA per ID.    OBJECTIVE:  Vital Signs Last 24 Hrs  T(C): 37.6 (12 Jan 2023 23:21), Max: 38.2 (12 Jan 2023 21:54)  T(F): 99.7 (12 Jan 2023 23:21), Max: 100.8 (12 Jan 2023 21:54)  HR: 116 (12 Jan 2023 23:21) (116 - 126)  BP: 105/60 (12 Jan 2023 23:21) (104/63 - 118/63)  RR: 18 (12 Jan 2023 23:21) (18 - 18)  SpO2: 99% (12 Jan 2023 23:21) (96% - 100%)  Parameters below as of 12 Jan 2023 23:21  Patient On (Oxygen Delivery Method): room air      LABS:                                     8.5    26.89 )-----------( 579      ( 12 Jan 2023 05:59 )             27.9   01-12    145  |  112<H>  |  27<H>  ----------------------------<  120<H>  4.0   |  20<L>  |  0.73    Ca    8.6      12 Jan 2023 17:44  Phos  4.3     01-12  Mg     1.90     01-12    TPro  6.6  /  Alb  2.6<L>  /  TBili  0.4  /  DBili  x   /  AST  20  /  ALT  20  /  AlkPhos  286<H>  01-11        PHYSICAL EXAM:  Gen: NAD, resting in bed  Resp: unlabored breathing  RLE: dressing/KI c/d/i        +EHL/FHL/TA/GS         SILT Owens/Saph/Tib/DP/SP        +DP

## 2023-01-14 NOTE — PROGRESS NOTE ADULT - ATTENDING COMMENTS
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 He was noted on MRI of the knee to have findings concerning for metastatic disease and is s/p surgical resection of his right patella on 1/7. He reports slight improvement in pain after procedure but is still requiring PRN oxycodone 10 mg multiple times per day. He is now pending disposition with home PT and wound care for R knee. Orthopedic Surgery following, removed wound vacuums on 1/13. Patient now being treated for RUL enterobacter HAP seen on CT angio with cefepime (1/12-1/16). Patient still spiking fevers but WBCs now trending downward. Blood Cx pending.

## 2023-01-14 NOTE — PROGRESS NOTE ADULT - ASSESSMENT
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 Although septic arthritis initially was a consideration, the patient had no evident fevers or infectious signs, and his pain was progressive and slow, so antibiotics were discontinued. He was noted on MRI of the knee to have findings concerning for metastatic disease and is s/p surgical resection of his right patella on 1/7. He reports slight improvement in pain after procedure but is still requiring PRN oxycodone 10 mg multiple times per day. He is now pending disposition with home PT and wound care for R knee. Orthopedic Surgery following, removed wound vacuum today and will remove prevena drain on day of discharge. Course complicated by high PEG residuals on 1/10, now NPO and pending gastrograffin tube study. 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 Although septic arthritis initially was a consideration, the patient had no evident fevers or infectious signs, and his pain was progressive and slow, so antibiotics were discontinued. He was noted on MRI of the knee to have findings concerning for metastatic disease and is s/p surgical resection of his right patella on 1/7. He reports slight improvement in pain after procedure but is still requiring PRN oxycodone 10 mg multiple times per day. He is now pending disposition with home PT and wound care for R knee. Orthopedic Surgery following, removed wound vacuums on 1/13. Patient now being treated for enterobacter HAP seen on CT angio with cefepime (1/12-1/16).

## 2023-01-14 NOTE — PROGRESS NOTE ADULT - PROBLEM SELECTOR PLAN 2
- patient with new dyspnea with speech, tachypnea to 20, wheezing on exam, tachycardia to 110s x few days  - DDx includes HAP vs atelectasis from decreased ambulation vs PE       - denies worsening cough, hemoptysis, LE pain/swelling; pt at high risk for clots due to malignancy and immobilization, Wells score for PE 4.0       - has been afebrile but WBCs increasing; with chronic productive cough  - CT angio chest with new RUL ground glass opacities, no recent CT chest for comparison; no evidence of PE but limited by motion; known L sided severe atelectasis  - sputum cx with enterobacter cloacae; RVP neg    Plan  - ID consulted for new RUL findings, appreciate recs to start cefepime x 5 days for HAP (day 2 today as only 1 dose on 11/11); 11/12-11/16 expected  - Encourage incentive spirometry for atelectasis - patient with new dyspnea with speech, tachypnea to 20, wheezing on exam, tachycardia to 110s x few days  - DDx includes HAP vs atelectasis from decreased ambulation vs PE       - denies worsening cough, hemoptysis, LE pain/swelling; pt at high risk for clots due to malignancy and immobilization, Wells score for PE 4.0       - has been afebrile but WBCs increasing; with chronic productive cough  - CT angio chest with new RUL ground glass opacities, no recent CT chest for comparison; no evidence of PE but limited by motion; known L sided severe atelectasis  - sputum cx with enterobacter cloacae; RVP neg    Plan  - ID consulted for new RUL findings, appreciate recs     - c/w IV cefepime x 5 days for HAP; 1/12-1/16 expected    - Per sputum sensitivities, patient can be discharged on PO levaquin if afebrile  - As patient has low grade fevers, will continue to treat with IV cefepime  - Incentive spirometry for atelectasis

## 2023-01-14 NOTE — PROGRESS NOTE ADULT - PROBLEM SELECTOR PLAN 3
- high residuals overnight (>200 cc)  - mild pain around PEG and white leaking around tube, concerning for tube dislodged vs granulation tissue    Plan  - GI consulted for PEG dysfunction, tube newly placed on 1/2  - Abdominal XR with gastrograffin via tube shows no leakage  - resume tube feeds at lower continuous rate, appreciate nutrition recs for max 54 cc/h x 20h  - start metoclopramide for promotility  - D/C mIVF, restart free water flushes 150 cc q8h - high residuals overnight (>200 cc)  - mild pain around PEG and white leaking around tube, concerning for tube dislodged vs granulation tissue    Plan  - GI consulted for PEG dysfunction, tube newly placed on 1/2  - Abdominal XR with gastrograffin via tube shows no leakage  - c/w continuous tube feeds at 54 cc/h x 20h  - c/w metoclopramide for promotility

## 2023-01-14 NOTE — PROGRESS NOTE ADULT - PROBLEM SELECTOR PLAN 9
- Diet: continuous feeds x 20 h  - PT/OT recommending [facility], family prefers home services  - DVT ppx: Lovenox 40 mg SQ daily  - Dispo: likely home    Updated wife at bedside

## 2023-01-14 NOTE — PROGRESS NOTE ADULT - PROBLEM SELECTOR PLAN 7
- takes synthroid 150 mcg PO daily at home  - TSH 11.98 but free T4 WNL    Plan  - transition from IV synthroid 112 mcg to PO synthroid 150 mg (home med)  - patient denies new symptoms of hypothyroidism including constipation, hypotension, dry skin, brittle hair  - recommend outpatient follow-up as TSH may be inaccurate in context of current hospitalization and could instead be suggestive of sick euthyroid syndrome - takes synthroid 150 mcg PO daily at home  - TSH 11.98 but free T4 WNL    Plan  - c/w PO synthroid 150 mg (home med), please administer during 4 hour gap in tube feeds  - patient denies new symptoms of hypothyroidism including constipation, hypotension, dry skin, brittle hair  - recommend outpatient follow-up as TSH may be inaccurate in context of current hospitalization and could instead be suggestive of sick euthyroid syndrome

## 2023-01-14 NOTE — PROGRESS NOTE ADULT - PROBLEM SELECTOR PLAN 5
- Na 122 on 1/5, increased to 134 on 1/9, now resolving  - Serum osmolarity calculated as 262 (hypotonic), patient euvolemic on exam  - Elevated urine osmolarity and urine sodium are suggestive of inappropriate concentration of urine   - Patient with known pleural metastasis, likely 2/2 SIADH  - Less likely, but differential includes hypothyroidism (TSH 11.98); despite mild elevation in tsh, clinical picture is not consistent with this (no bradycardia, no hypotension); adrenal insufficieny unlikely as AM cortisol WNL    Plan  - Decrease salt tabs to 2 g BID via PEG tube as sodium in high 140s  - Recheck BMP tomorrow AM, trend Na daily  - c/w free water flushes 150 ml q8h - Na 122 on 1/5, increased to 134 on 1/9, now resolving  - Serum osmolarity calculated as 262 (hypotonic), patient euvolemic on exam  - Elevated urine osmolarity and urine sodium are suggestive of inappropriate concentration of urine   - Patient with known pleural metastasis, likely 2/2 SIADH    Plan  - c/w salt tabs 2 g BID via PEG tube  - Recheck BMP tomorrow AM, trend Na daily  - c/w free water flushes 150 ml q8h

## 2023-01-14 NOTE — PROGRESS NOTE ADULT - SUBJECTIVE AND OBJECTIVE BOX
ORTHOPAEDICS DAILY PROGRESS NOTE:       SUBJECTIVE/ROS: POD 7. Seen and examined. Pain well controlled.          MEDICATIONS  (STANDING):  cefepime   IVPB      cefepime   IVPB 2000 milliGRAM(s) IV Intermittent every 8 hours  clotrimazole 1% Cream 1 Application(s) Topical every 12 hours  enoxaparin Injectable 40 milliGRAM(s) SubCutaneous every 24 hours  influenza  Vaccine (HIGH DOSE) 0.7 milliLiter(s) IntraMuscular once  levothyroxine 150 MICROGram(s) Oral daily  magnesium oxide 400 milliGRAM(s) Oral two times a day with meals  metoclopramide 10 milliGRAM(s) Oral three times a day  pantoprazole   Suspension 40 milliGRAM(s) Oral daily  polyethylene glycol 3350 17 Gram(s) Oral daily  sodium chloride 2 Gram(s) Oral every 12 hours    MEDICATIONS  (PRN):  acetaminophen    Suspension .. 650 milliGRAM(s) Oral every 6 hours PRN Temp greater or equal to 38C (100.4F), Mild Pain (1 - 3)  albuterol    90 MICROgram(s) HFA Inhaler 2 Puff(s) Inhalation every 6 hours PRN Shortness of Breath and/or Wheezing  gabapentin 300 milliGRAM(s) Oral three times a day PRN neuropathic pain  ondansetron Injectable 4 milliGRAM(s) IV Push every 6 hours PRN Nausea and/or Vomiting  oxyCODONE    IR 5 milliGRAM(s) Oral every 6 hours PRN Moderate Pain (4 - 6)  oxyCODONE    IR 10 milliGRAM(s) Oral every 6 hours PRN Severe Pain (7 - 10)      OBJECTIVE:    Vital Signs Last 24 Hrs  T(C): 37.3 (13 Jan 2023 22:04), Max: 37.3 (13 Jan 2023 22:04)  T(F): 99.1 (13 Jan 2023 22:04), Max: 99.1 (13 Jan 2023 22:04)  HR: 109 (13 Jan 2023 22:04) (102 - 109)  BP: 113/62 (13 Jan 2023 22:04) (100/66 - 124/65)  BP(mean): --  RR: 17 (13 Jan 2023 22:04) (17 - 19)  SpO2: 97% (13 Jan 2023 22:04) (97% - 100%)    Parameters below as of 13 Jan 2023 22:04  Patient On (Oxygen Delivery Method): room air        I&O's Detail    12 Jan 2023 07:01  -  13 Jan 2023 07:00  --------------------------------------------------------  IN:    Enteral Tube Flush: 90 mL    Enteral Tube Flush: 450 mL    IV PiggyBack: 150 mL    TwoCal HN: 320 mL  Total IN: 1010 mL    OUT:    Voided (mL): 475 mL  Total OUT: 475 mL    Total NET: 535 mL      13 Jan 2023 07:01  -  14 Jan 2023 00:07  --------------------------------------------------------  IN:    Enteral Tube Flush: 30 mL    Enteral Tube Flush: 300 mL    TwoCal HN: 108 mL  Total IN: 438 mL    OUT:    Voided (mL): 100 mL  Total OUT: 100 mL    Total NET: 338 mL          Daily     Daily     LABS:                        7.5    21.82 )-----------( 540      ( 13 Jan 2023 06:10 )             24.7     01-13    143  |  111<H>  |  28<H>  ----------------------------<  114<H>  3.8   |  20<L>  |  0.81    Ca    8.6      13 Jan 2023 06:10  Phos  2.8     01-13  Mg     2.10     01-13    TPro  6.5  /  Alb  2.5<L>  /  TBili  0.7  /  DBili  x   /  AST  24  /  ALT  23  /  AlkPhos  261<H>  01-13                  PHYSICAL EXAM:  Gen: NAD, resting in bed  Resp: unlabored breathing  RLE: dressing/KI c/d/i        +EHL/FHL/TA/GS         SILT Owens/Saph/Tib/DP/SP        +DP

## 2023-01-14 NOTE — PROGRESS NOTE ADULT - PROBLEM SELECTOR PLAN 8
- home meds include amlodipine 5 mg, metoprolol succinate 50 mg   - BP 90s-100s/50s-60s in ED; now 120s/70s    Plan  - BP meds held due to concern for sepsis  - BP within acceptable range, will continue to hold  - hold BP meds on discharge as patient with persistent hypotension - home meds include amlodipine 5 mg, metoprolol succinate 50 mg   - BP 90s-100s/50s-60s in ED; now 120s/70s    Plan  - BP meds held due to concern for sepsis  - hold BP meds on discharge as patient with persistent hypotension

## 2023-01-14 NOTE — PROGRESS NOTE ADULT - PROBLEM SELECTOR PLAN 6
- diagnosed about 8 yrs ago  - follows with Dr. Gi Thomas ([place])  - complicated by dysphagia with all meds/nutrition via PEG  - CXR with near complete opacification of L lung, stable from previous imaging and likely represents pleural metastases    Plan  - On 3rd line chemo with single agent irinotecan (started 6/27/22, s/p 3 cycles, most recently on 8/1/22)  - Contacted outpatient oncologist, most recent progress note sent to [facility] (family requested 2nd opinion, considering transfer)  - Continue goals of care conversation with family: currently interested in resuming palliative chemo on D/C  - C/w bolus tube feeds, strict NPO with all meds via PEG  - C/w pantoprazole 40 mg daily for GERD, gabapentin 300 mg TID PRN for pain, MgOH and KCl for supplementation

## 2023-01-14 NOTE — PROGRESS NOTE ADULT - PROBLEM SELECTOR PLAN 4
- Phos 1.8 on 1/7, s/p oral repletion x 2 packets  - Phos 1.0 >1.6 on 1/8 in context of pt NPO for procedure on 1/7  - concern for refeeding syndrome, diet restarted on 1/8    Plan  - Phos 3.6 today, has been within goal x 2 days  - recheck phosphorus in afternoon, replete to goal > 2.5 - Phos 1.0 >1.6 on 1/8 in context of pt NPO for procedure on 1/7  - ongoing hypophosphatemia noted despite repletion  - concern for refeeding syndrome, diet held recently for PEG dysfunction    Plan  - recheck phosphorus in afternoon, replete to goal > 2.5

## 2023-01-14 NOTE — PROGRESS NOTE ADULT - SUBJECTIVE AND OBJECTIVE BOX
Damaris Aguiar, PGY1  TEAMS or Pager [phone]     Patient is a 67y old Male who presents with a chief complaint of R knee pain (10 Alexander 2023 00:48)    SUBJECTIVE/INTERVAL EVENTS: Overnight, patient had a fever of 100.8 which resolved with IV tylenol. Patient seen and examined at bedside. He reports 10/10 knee pain that is improved but not completely relieved by pain medications. Patient with improvement in dyspnea today. Still endorses chronic productive cough. Denies chest pain, hemoptysis, SOB at rest. He denies abdominal pain, nausea, or vomiting. Tube feeds now running continuously x 20 hours    MEDICATIONS  (STANDING):  ceFAZolin   IVPB 2000 milliGRAM(s) IV Intermittent every 8 hours  clotrimazole 1% Cream 1 Application(s) Topical every 12 hours  enoxaparin Injectable 40 milliGRAM(s) SubCutaneous every 24 hours  levothyroxine Injectable 112 MICROGram(s) IV Push <User Schedule>  magnesium oxide 400 milliGRAM(s) Oral two times a day with meals  pantoprazole   Suspension 40 milliGRAM(s) Oral daily  polyethylene glycol 3350 17 Gram(s) Oral daily  sodium chloride 2 Gram(s) Oral every 8 hours  sodium phosphate 30 milliMole(s)/500 mL IVPB 30 milliMole(s) IV Intermittent once    MEDICATIONS  (PRN):  acetaminophen    Suspension .. 650 milliGRAM(s) Oral every 6 hours PRN Temp greater or equal to 38C (100.4F), Mild Pain (1 - 3)  albuterol    90 MICROgram(s) HFA Inhaler 2 Puff(s) Inhalation every 6 hours PRN Shortness of Breath and/or Wheezing  gabapentin 300 milliGRAM(s) Oral three times a day PRN neuropathic pain  ondansetron Injectable 4 milliGRAM(s) IV Push every 6 hours PRN Nausea and/or Vomiting  oxyCODONE    IR 5 milliGRAM(s) Oral every 4 hours PRN Severe Pain (7 - 10)  oxyCODONE    IR 2.5 milliGRAM(s) Oral every 4 hours PRN Moderate Pain (4 - 6)  oxyCODONE    IR 10 milliGRAM(s) Oral every 6 hours PRN Severe Pain (7 - 10)  oxyCODONE    IR 5 milliGRAM(s) Oral every 6 hours PRN Moderate Pain (4 - 6)      VITAL SIGNS:  T(F): 98.2 (01-10-23 @ 04:55), Max: 99.2 (01-09-23 @ 21:34)  HR: 118 (01-10-23 @ 04:55) (100 - 118)  BP: 107/62 (01-10-23 @ 04:55) (107/62 - 150/70)  RR: 18 (01-10-23 @ 04:55) (17 - 18)  SpO2: 99% (01-10-23 @ 04:55) (98% - 100%)    I&O's Summary    08 Jan 2023 07:01  -  09 Jan 2023 07:00  --------------------------------------------------------  IN: 1815 mL / OUT: 440 mL / NET: 1375 mL    09 Jan 2023 07:01  -  10 Alexander 2023 06:18  --------------------------------------------------------  IN: 2209 mL / OUT: 357 mL / NET: 1852 mL      Daily     Daily     PHYSICAL EXAM:  Gen: Alert, NAD  HEENT: NCAT, conjunctiva clear, sclera anicteric, white exudates in the oropharynx, dry mucous membranes  Neck: Supple, no JVD  CV: RRR, S1S2, no m/r/g  Resp: decreased breath sounds on L side, some dyspnea with speech, crackles at bilateral bases, no accessory muscle use  Abd: Soft, nontender, moderately distended, normal bowel sounds, PEG tube in LUQ mildly TTP  Ext: no edema, no clubbing or cyanosis  - RLE: in immobilizer, DP 2+, warm toes, plantar/dorsiflexion 4+/5 but limited by pain  Neuro: AOx3, CN2-12 grossly intact, ANDREWS  SKIN: warm, perfused    LABS:                        8.8    27.06 )-----------( 607      ( 09 Jan 2023 07:50 )             28.5     Hgb Trend: 8.8<--, 9.1<--, 9.1<--, 10.0<--, 7.9<--  01-09    139  |  104  |  25<H>  ----------------------------<  166<H>  4.6   |  22  |  0.68    Ca    8.8      09 Jan 2023 19:20  Phos  2.3     01-09  Mg     1.50     01-09    TPro  6.7  /  Alb  2.8<L>  /  TBili  0.3  /  DBili  0.2  /  AST  37  /  ALT  42<H>  /  AlkPhos  321<H>  01-09    Creatinine Trend: 0.68<--, 0.64<--, 0.71<--, 0.76<--, 0.74<--, 0.76<--  LIVER FUNCTIONS - ( 09 Jan 2023 07:50 )  Alb: 2.8 g/dL / Pro: 6.7 g/dL / ALK PHOS: 321 U/L / ALT: 42 U/L / AST: 37 U/L / GGT: x                     CAPILLARY BLOOD GLUCOSE      RADIOLOGY & ADDITIONAL TESTS: Reviewed    Imaging Personally Reviewed:    Consultant(s) Notes Reviewed:  Orthopedics, GI, ID    Care Discussed with Consultants/Other Providers:  Orthopedics, GI, ID   Damaris Aguiar, PGY1  TEAMS or Pager [phone]     Patient is a 67y old Male who presents with a chief complaint of R knee pain (10 Alexander 2023 00:48)    SUBJECTIVE/INTERVAL EVENTS: Overnight, patient had a fever of 100.4 which resolved with IV tylenol. RVP, blood cultures, and MRSA swab recollected. Patient seen and examined at bedside. He reports knee pain is at baseline and improved with PRN pain medications. Patient with ongoing improvement in dyspnea. Still endorses chronic productive cough. Discussed importance of getting out of bed into chair. Denies chest pain, hemoptysis, SOB at rest. He denies abdominal pain, nausea, or vomiting. He has been tolerating continuous tube feeds.    MEDICATIONS  (STANDING):  ceFAZolin   IVPB 2000 milliGRAM(s) IV Intermittent every 8 hours  clotrimazole 1% Cream 1 Application(s) Topical every 12 hours  enoxaparin Injectable 40 milliGRAM(s) SubCutaneous every 24 hours  levothyroxine Injectable 112 MICROGram(s) IV Push <User Schedule>  magnesium oxide 400 milliGRAM(s) Oral two times a day with meals  pantoprazole   Suspension 40 milliGRAM(s) Oral daily  polyethylene glycol 3350 17 Gram(s) Oral daily  sodium chloride 2 Gram(s) Oral every 8 hours  sodium phosphate 30 milliMole(s)/500 mL IVPB 30 milliMole(s) IV Intermittent once    MEDICATIONS  (PRN):  acetaminophen    Suspension .. 650 milliGRAM(s) Oral every 6 hours PRN Temp greater or equal to 38C (100.4F), Mild Pain (1 - 3)  albuterol    90 MICROgram(s) HFA Inhaler 2 Puff(s) Inhalation every 6 hours PRN Shortness of Breath and/or Wheezing  gabapentin 300 milliGRAM(s) Oral three times a day PRN neuropathic pain  ondansetron Injectable 4 milliGRAM(s) IV Push every 6 hours PRN Nausea and/or Vomiting  oxyCODONE    IR 5 milliGRAM(s) Oral every 4 hours PRN Severe Pain (7 - 10)  oxyCODONE    IR 2.5 milliGRAM(s) Oral every 4 hours PRN Moderate Pain (4 - 6)  oxyCODONE    IR 10 milliGRAM(s) Oral every 6 hours PRN Severe Pain (7 - 10)  oxyCODONE    IR 5 milliGRAM(s) Oral every 6 hours PRN Moderate Pain (4 - 6)    VITAL SIGNS:  T(C): 36.6 (01-14-23 @ 09:08), Max: 38 (01-14-23 @ 02:13)  T(F): 97.9 (01-14-23 @ 09:08), Max: 100.4 (01-14-23 @ 02:13)  HR: 100 (01-14-23 @ 09:08) (100 - 119)  BP: 107/74 (01-14-23 @ 09:08) (100/66 - 124/65)  RR: 18 (01-14-23 @ 09:08) (17 - 19)  SpO2: 100% (01-14-23 @ 09:08) (97% - 100%)  Wt(kg): --    I&O's Detail    13 Jan 2023 07:01  -  14 Jan 2023 07:00  --------------------------------------------------------  IN:    Enteral Tube Flush: 450 mL    Enteral Tube Flush: 90 mL    TwoCal HN: 594 mL  Total IN: 1134 mL    OUT:    Voided (mL): 650 mL  Total OUT: 650 mL    Total NET: 484 mL      Daily     Daily     PHYSICAL EXAM:  Gen: Alert, NAD  HEENT: NCAT, conjunctiva clear, sclera anicteric, white exudates in the oropharynx, dry mucous membranes  Neck: Supple, no JVD  CV: RRR, S1S2, no m/r/g  Resp: decreased breath sounds on L side, intermittent crackles at R base, no accessory muscle use, breathing comfortably on RA  Abd: Soft, nontender, moderately distended, normal bowel sounds, PEG tube in LUQ mildly TTP  Ext: no edema, no clubbing or cyanosis  - RLE: in immobilizer, DP 2+, warm toes, plantar/dorsiflexion 4+/5 but limited by pain  Neuro: AOx3, CN2-12 grossly intact, ANDREWS  SKIN: warm, perfused    LABS:                        8.8    27.06 )-----------( 607      ( 09 Jan 2023 07:50 )             28.5     Hgb Trend: 8.8<--, 9.1<--, 9.1<--, 10.0<--, 7.9<--  01-09    139  |  104  |  25<H>  ----------------------------<  166<H>  4.6   |  22  |  0.68    Ca    8.8      09 Jan 2023 19:20  Phos  2.3     01-09  Mg     1.50     01-09    TPro  6.7  /  Alb  2.8<L>  /  TBili  0.3  /  DBili  0.2  /  AST  37  /  ALT  42<H>  /  AlkPhos  321<H>  01-09    Creatinine Trend: 0.68<--, 0.64<--, 0.71<--, 0.76<--, 0.74<--, 0.76<--  LIVER FUNCTIONS - ( 09 Jan 2023 07:50 )  Alb: 2.8 g/dL / Pro: 6.7 g/dL / ALK PHOS: 321 U/L / ALT: 42 U/L / AST: 37 U/L / GGT: x           CAPILLARY BLOOD GLUCOSE    RADIOLOGY & ADDITIONAL TESTS: Reviewed    Imaging Personally Reviewed: CT angio chest    Consultant(s) Notes Reviewed:  Orthopedics, GI, ID, Ortho    Care Discussed with Consultants/Other Providers:  Orthopedics, GI, ID

## 2023-01-14 NOTE — PROGRESS NOTE ADULT - PROBLEM SELECTOR PLAN 1
- progressively worsening pain, erythema, edema, and warmth at the R knee joint for about 1 month  - recent partial excision of R patella on 11/10 with SCC on biopsy but unclear margins  - current presentation more consistent with progression of known cancer than septic arthritis  - MRI R knee w/wo contrast showing R patellar fracture with OM vs osteonecrosis, moderate complicated effusion--clinical suspicion is greatest for osteonecrosis from metastatic disease   - s/p tap of R knee but small sample collected; gram stain negative with no PMNs or bacteria    Plan  - Appreciate Ortho recs:     - s/p patellectomy, quadricepsplasty, and synovectomy on 1/7 in OR     - Ortho removed Provena vacuum on 1/13     - s/p ancef 2 g q8h from 1/9-1/10 for prophylaxis against post-op infection  - Pain control (PRNs):      - Tylenol 650 mg q6h for mild, oxycodone 5 mg q6h for moderate, oxycodone 10 mg q6h for severe     - c/w bowel regimen to prevent opioid induced constipation (miralax daily)  - Coordinate home PT and wound care for knee on D/C  - Outpatient f/u with Dr. Carter within 1 week after D/C  - DVT ppx with xarelto 10 mg x 30 days until f/u with Ortho (Wells score for PE 4.0, moderate risk)

## 2023-01-15 NOTE — PROGRESS NOTE ADULT - PROBLEM SELECTOR PLAN 3
- high residuals overnight (>200 cc)  - mild pain around PEG and white leaking around tube, concerning for tube dislodged vs granulation tissue    Plan  - GI consulted for PEG dysfunction, tube newly placed on 1/2  - Abdominal XR with gastrograffin via tube shows no leakage  - c/w continuous tube feeds at 54 cc/h x 20h  - c/w metoclopramide for promotility

## 2023-01-15 NOTE — PROGRESS NOTE ADULT - PROBLEM SELECTOR PLAN 2
- patient with new dyspnea with speech, tachypnea to 20, wheezing on exam, tachycardia to 110s x few days  - DDx includes HAP vs atelectasis from decreased ambulation vs PE       - denies worsening cough, hemoptysis, LE pain/swelling; pt at high risk for clots due to malignancy and immobilization, Wells score for PE 4.0       - has been afebrile but WBCs increasing; with chronic productive cough  - CT angio chest with new RUL ground glass opacities, no recent CT chest for comparison; no evidence of PE but limited by motion; known L sided severe atelectasis  - sputum cx with enterobacter cloacae; RVP neg    Plan  - ID consulted for new RUL findings, appreciate recs     - c/w IV cefepime x 5 days for HAP; 1/12-1/16 expected    - Per sputum sensitivities, patient can be discharged on PO levaquin if afebrile  - As patient has low grade fevers, will continue to treat with IV cefepime  - Incentive spirometry for atelectasis

## 2023-01-15 NOTE — PROGRESS NOTE ADULT - PROBLEM SELECTOR PLAN 7
- takes synthroid 150 mcg PO daily at home  - TSH 11.98 but free T4 WNL    Plan  - c/w PO synthroid 150 mg (home med), please administer during 4 hour gap in tube feeds  - patient denies new symptoms of hypothyroidism including constipation, hypotension, dry skin, brittle hair  - recommend outpatient follow-up as TSH may be inaccurate in context of current hospitalization and could instead be suggestive of sick euthyroid syndrome

## 2023-01-15 NOTE — PROGRESS NOTE ADULT - SUBJECTIVE AND OBJECTIVE BOX
ORTHO PROGRESS NOTE     Pt seen and examined at bedside, denies SOB, CP, Dizziness. N/V/D /HA.  No significant overnight events. Pain well controlled.     Vital Signs Last 24 Hrs  T(C): 37.4 (15 Alexander 2023 02:00), Max: 37.4 (15 Alexander 2023 02:00)  T(F): 99.3 (15 Alexander 2023 02:00), Max: 99.3 (15 Alexander 2023 02:00)  HR: 104 (15 Alexander 2023 02:40) (100 - 116)  BP: 119/56 (15 Alexander 2023 02:00) (107/74 - 123/63)  BP(mean): --  RR: 18 (15 Alexander 2023 02:00) (16 - 18)  SpO2: 97% (15 Alexander 2023 02:00) (97% - 100%)    Parameters below as of 15 Alexander 2023 02:00  Patient On (Oxygen Delivery Method): room air        Gen: No apparent distress, alert    RLE:  Dressing C/D/I;   in knee immobilizer                +DF/PF. moving toes          SILT, wwp at foot          compartments soft    Labs:  CBC Full  -  ( 14 Jan 2023 06:30 )  WBC Count : 21.02 K/uL  RBC Count : 2.96 M/uL  Hemoglobin : 7.7 g/dL  Hematocrit : 25.8 %  Platelet Count - Automated : 549 K/uL  Mean Cell Volume : 87.2 fL  Mean Cell Hemoglobin : 26.0 pg  Mean Cell Hemoglobin Concentration : 29.8 gm/dL  Auto Neutrophil # : 18.11 K/uL  Auto Lymphocyte # : 0.94 K/uL  Auto Monocyte # : 0.90 K/uL  Auto Eosinophil # : 0.74 K/uL  Auto Basophil # : 0.09 K/uL  Auto Neutrophil % : 86.2 %  Auto Lymphocyte % : 4.5 %  Auto Monocyte % : 4.3 %  Auto Eosinophil % : 3.5 %  Auto Basophil % : 0.4 %      01-14    140  |  109<H>  |  28<H>  ----------------------------<  135<H>  4.3   |  19<L>  |  0.71    Ca    9.0      14 Jan 2023 16:00  Phos  3.9     01-14  Mg     1.80     01-14    TPro  6.9  /  Alb  2.6<L>  /  TBili  0.5  /  DBili  x   /  AST  17  /  ALT  22  /  AlkPhos  260<H>  01-14      A/P: 67yMale s/p R patellectomy/quadicepsplasty on 1/7.    - Pain control  - WBAT RLE in KI  - DVT ppx: lovenox  - PT/OT/OOB  - FU OR cx/path  - FU outpt with Dr. Carter in 1 week, please call office for appt

## 2023-01-15 NOTE — PROGRESS NOTE ADULT - ASSESSMENT
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 Although septic arthritis initially was a consideration, the patient had no evident fevers or infectious signs, and his pain was progressive and slow, so antibiotics were discontinued. He was noted on MRI of the knee to have findings concerning for metastatic disease and is s/p surgical resection of his right patella on 1/7. He reports slight improvement in pain after procedure but is still requiring PRN oxycodone 10 mg multiple times per day. He is now pending disposition with home PT and wound care for R knee. Orthopedic Surgery following, removed wound vacuums on 1/13. Patient now being treated for enterobacter HAP seen on CT angio with cefepime (1/12-1/16). Pain control PRN.

## 2023-01-15 NOTE — PROGRESS NOTE ADULT - ATTENDING COMMENTS
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 He was noted on MRI of the knee to have findings concerning for metastatic disease and is s/p surgical resection of his right patella on 1/7. He reports slight improvement in pain after procedure but is still requiring PRN oxycodone 10 mg multiple times per day. Added standing Tramadol q8hr. He is now pending disposition with home PT and wound care for R knee. Orthopedic Surgery following, removed wound vacuums on 1/13. Patient now being treated for RUL enterobacter HAP seen on CT angio with cefepime (1/12-1/16).

## 2023-01-15 NOTE — PROGRESS NOTE ADULT - PROBLEM SELECTOR PLAN 4
- Phos 1.0 >1.6 on 1/8 in context of pt NPO for procedure on 1/7  - ongoing hypophosphatemia noted despite repletion  - concern for refeeding syndrome, diet held recently for PEG dysfunction    Plan  - recheck phosphorus in afternoon, replete to goal > 2.5

## 2023-01-15 NOTE — PROGRESS NOTE ADULT - PROBLEM SELECTOR PLAN 6
- diagnosed about 8 yrs ago  - follows with Dr. 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

## 2023-01-15 NOTE — PROGRESS NOTE ADULT - PROBLEM SELECTOR PLAN 1
- progressively worsening pain, erythema, edema, and warmth at the R knee joint for about 1 month  - recent partial excision of R patella on 11/10 with SCC on biopsy but unclear margins  - current presentation more consistent with progression of known cancer than septic arthritis  - MRI R knee w/wo contrast showing R patellar fracture with OM vs osteonecrosis, moderate complicated effusion--clinical suspicion is greatest for osteonecrosis from metastatic disease   - s/p tap of R knee but small sample collected; gram stain negative with no PMNs or bacteria    Plan  - Appreciate Ortho recs:     - s/p patellectomy, quadricepsplasty, and synovectomy on 1/7 in OR     - Ortho removed Provena vacuum on 1/13     - s/p ancef 2 g q8h from 1/9-1/10 for prophylaxis against post-op infection  - Pain control (PRNs):      - Tylenol 650 mg q6h for mild, oxycodone 5 mg q6h for moderate, oxycodone 10 mg q6h for severe     - c/w bowel regimen to prevent opioid induced constipation (miralax daily)  - Coordinate home PT and wound care for knee on D/C  - Outpatient f/u with Dr. Carter within 1 week after D/C  - DVT ppx with xarelto 10 mg x 30 days until f/u with Ortho (Wells score for PE 4.0, moderate risk)    -Start toradol 25mg Standing q8

## 2023-01-15 NOTE — PROGRESS NOTE ADULT - PROBLEM SELECTOR PLAN 5
- Na 122 on 1/5, increased to 134 on 1/9, now resolving  - Serum osmolarity calculated as 262 (hypotonic), patient euvolemic on exam  - Elevated urine osmolarity and urine sodium are suggestive of inappropriate concentration of urine   - Patient with known pleural metastasis, likely 2/2 SIADH    Plan  - c/w salt tabs 2 g BID via PEG tube  - Recheck BMP tomorrow AM, trend Na daily  - c/w free water flushes 150 ml q8h

## 2023-01-15 NOTE — PROGRESS NOTE ADULT - SUBJECTIVE AND OBJECTIVE BOX
***********************************************  Lazaro Montoya MD  Internal Medicine   PGY2  ***********************************************      PROGRESS NOTE:     Patient is a 67y old  Male who presents with a chief complaint of R knee pain (15 Alexander 2023 04:54)      SUBJECTIVE / OVERNIGHT EVENTS:    OVERNIGHT:       Patient examined at bedside        MEDICATIONS  (STANDING):  cefepime   IVPB      cefepime   IVPB 2000 milliGRAM(s) IV Intermittent every 8 hours  clotrimazole 1% Cream 1 Application(s) Topical every 12 hours  enoxaparin Injectable 40 milliGRAM(s) SubCutaneous every 24 hours  influenza  Vaccine (HIGH DOSE) 0.7 milliLiter(s) IntraMuscular once  levothyroxine 150 MICROGram(s) Oral daily  magnesium oxide 400 milliGRAM(s) Oral two times a day with meals  metoclopramide 10 milliGRAM(s) Oral three times a day  pantoprazole   Suspension 40 milliGRAM(s) Oral daily  polyethylene glycol 3350 17 Gram(s) Oral daily  sodium chloride 2 Gram(s) Oral every 12 hours    MEDICATIONS  (PRN):  acetaminophen    Suspension .. 650 milliGRAM(s) Oral every 6 hours PRN Temp greater or equal to 38C (100.4F), Mild Pain (1 - 3)  albuterol    90 MICROgram(s) HFA Inhaler 2 Puff(s) Inhalation every 6 hours PRN Shortness of Breath and/or Wheezing  gabapentin 300 milliGRAM(s) Oral three times a day PRN neuropathic pain  ondansetron Injectable 4 milliGRAM(s) IV Push every 6 hours PRN Nausea and/or Vomiting  oxyCODONE    IR 10 milliGRAM(s) Oral every 6 hours PRN Severe Pain (7 - 10)  oxyCODONE    IR 5 milliGRAM(s) Oral every 6 hours PRN Moderate Pain (4 - 6)      CAPILLARY BLOOD GLUCOSE        I&O's Summary    14 Jan 2023 07:01  -  15 Alexander 2023 07:00  --------------------------------------------------------  IN: 2596 mL / OUT: 1750 mL / NET: 846 mL        PHYSICAL EXAM:  Vital Signs Last 24 Hrs  T(C): 36.7 (15 Alexander 2023 05:32), Max: 37.4 (15 Alexander 2023 02:00)  T(F): 98.1 (15 Alexander 2023 05:32), Max: 99.3 (15 Alexander 2023 02:00)  HR: 110 (15 Alexander 2023 05:32) (100 - 116)  BP: 116/60 (15 Alexander 2023 05:32) (107/74 - 123/63)  BP(mean): --  RR: 18 (15 Alexander 2023 05:32) (16 - 18)  SpO2: 99% (15 Alexander 2023 05:32) (97% - 100%)    Parameters below as of 15 Alexander 2023 05:32  Patient On (Oxygen Delivery Method): room air        CONSTITUTIONAL: NAD; well-developed  HEENT: PERRL, clear conjunctiva  RESPIRATORY: Normal respiratory effort; lungs are clear to auscultation bilaterally; No Crackles/Rhonchi/Wheezing  CARDIOVASCULAR: Regular rate and rhythm, normal S1 and S2, no murmur/rub/gallop; No lower extremity edema; Peripheral pulses are 2+ bilaterally  ABDOMEN: Nontender to palpation, normoactive bowel sounds, no rebound/guarding; No hepatosplenomegaly  MUSCULOSKELETAL: no clubbing or cyanosis of digits; no joint swelling or tenderness to palpation  EXTREMITY: Lower extremities Non-tender to palpation; non-erythematous B/L  NEURO: A&Ox3; no focal deficits   PSYCH: normal mood; Affect appropirate    LABS:                        7.7    21.02 )-----------( 549      ( 14 Jan 2023 06:30 )             25.8     01-14    140  |  109<H>  |  28<H>  ----------------------------<  135<H>  4.3   |  19<L>  |  0.71    Ca    9.0      14 Jan 2023 16:00  Phos  3.9     01-14  Mg     1.80     01-14    TPro  6.9  /  Alb  2.6<L>  /  TBili  0.5  /  DBili  x   /  AST  17  /  ALT  22  /  AlkPhos  260<H>  01-14              Culture - Blood (collected 14 Jan 2023 03:05)  Source: .Blood Blood-Peripheral  Preliminary Report (15 Alexander 2023 07:02):    No growth to date.    Culture - Blood (collected 14 Jan 2023 02:50)  Source: .Blood Blood-Peripheral  Preliminary Report (15 Alexander 2023 07:02):    No growth to date.        RADIOLOGY & ADDITIONAL TESTS:  Results Reviewed:   Imaging Personally Reviewed:  Electrocardiogram Personally Reviewed:    COORDINATION OF CARE:  Care Discussed with Consultants/Other Providers [Y/N]:  Prior or Outpatient Records Reviewed [Y/N]:   ***********************************************  Lazaro Montoya MD  Internal Medicine   PGY2  ***********************************************      PROGRESS NOTE:     Patient is a 67y old  Male who presents with a chief complaint of R knee pain (15 Alexander 2023 04:54)      SUBJECTIVE / OVERNIGHT EVENTS:    OVERNIGHT:   No overnight events     Patient examined at bedside complaining of R knee pain. He is receiving his pain meds around the clock. Receiving tube feeds. No SOB/CP        MEDICATIONS  (STANDING):  cefepime   IVPB      cefepime   IVPB 2000 milliGRAM(s) IV Intermittent every 8 hours  clotrimazole 1% Cream 1 Application(s) Topical every 12 hours  enoxaparin Injectable 40 milliGRAM(s) SubCutaneous every 24 hours  influenza  Vaccine (HIGH DOSE) 0.7 milliLiter(s) IntraMuscular once  levothyroxine 150 MICROGram(s) Oral daily  magnesium oxide 400 milliGRAM(s) Oral two times a day with meals  metoclopramide 10 milliGRAM(s) Oral three times a day  pantoprazole   Suspension 40 milliGRAM(s) Oral daily  polyethylene glycol 3350 17 Gram(s) Oral daily  sodium chloride 2 Gram(s) Oral every 12 hours    MEDICATIONS  (PRN):  acetaminophen    Suspension .. 650 milliGRAM(s) Oral every 6 hours PRN Temp greater or equal to 38C (100.4F), Mild Pain (1 - 3)  albuterol    90 MICROgram(s) HFA Inhaler 2 Puff(s) Inhalation every 6 hours PRN Shortness of Breath and/or Wheezing  gabapentin 300 milliGRAM(s) Oral three times a day PRN neuropathic pain  ondansetron Injectable 4 milliGRAM(s) IV Push every 6 hours PRN Nausea and/or Vomiting  oxyCODONE    IR 10 milliGRAM(s) Oral every 6 hours PRN Severe Pain (7 - 10)  oxyCODONE    IR 5 milliGRAM(s) Oral every 6 hours PRN Moderate Pain (4 - 6)      CAPILLARY BLOOD GLUCOSE        I&O's Summary    14 Jan 2023 07:01  -  15 Alexander 2023 07:00  --------------------------------------------------------  IN: 2596 mL / OUT: 1750 mL / NET: 846 mL        PHYSICAL EXAM:  Vital Signs Last 24 Hrs  T(C): 36.7 (15 Alexander 2023 05:32), Max: 37.4 (15 Alexander 2023 02:00)  T(F): 98.1 (15 Alexander 2023 05:32), Max: 99.3 (15 Alexander 2023 02:00)  HR: 110 (15 Alexander 2023 05:32) (100 - 116)  BP: 116/60 (15 Alexander 2023 05:32) (107/74 - 123/63)  BP(mean): --  RR: 18 (15 Alexander 2023 05:32) (16 - 18)  SpO2: 99% (15 Alexander 2023 05:32) (97% - 100%)    Parameters below as of 15 Alexander 2023 05:32  Patient On (Oxygen Delivery Method): room air        Gen: Alert, NAD  HEENT: NCAT, conjunctiva clear, sclera anicteric, white exudates in the oropharynx, dry mucous membranes  Neck: Supple, no JVD  CV: RRR, S1S2, no m/r/g  Resp: decreased breath sounds on L side, intermittent crackles at R base, no accessory muscle use, breathing comfortably on RA  Abd: Soft, nontender, moderately distended, normal bowel sounds, PEG tube in LUQ mildly TTP  Ext: no edema, no clubbing or cyanosis  - RLE: in immobilizer, DP 2+, warm toes, plantar/dorsiflexion 4+/5 but limited by pain  Neuro: AOx3, CN2-12 grossly intact, ANDREWS  SKIN: warm, perfused    LABS:                        7.7    21.02 )-----------( 549      ( 14 Jan 2023 06:30 )             25.8     01-14    140  |  109<H>  |  28<H>  ----------------------------<  135<H>  4.3   |  19<L>  |  0.71    Ca    9.0      14 Jan 2023 16:00  Phos  3.9     01-14  Mg     1.80     01-14    TPro  6.9  /  Alb  2.6<L>  /  TBili  0.5  /  DBili  x   /  AST  17  /  ALT  22  /  AlkPhos  260<H>  01-14              Culture - Blood (collected 14 Jan 2023 03:05)  Source: .Blood Blood-Peripheral  Preliminary Report (15 Alexander 2023 07:02):    No growth to date.    Culture - Blood (collected 14 Jan 2023 02:50)  Source: .Blood Blood-Peripheral  Preliminary Report (15 Alexander 2023 07:02):    No growth to date.        RADIOLOGY & ADDITIONAL TESTS:  Results Reviewed:   Imaging Personally Reviewed:  Electrocardiogram Personally Reviewed:    COORDINATION OF CARE:  Care Discussed with Consultants/Other Providers [Y/N]:  Prior or Outpatient Records Reviewed [Y/N]:

## 2023-01-15 NOTE — PROGRESS NOTE ADULT - PROBLEM SELECTOR PLAN 8
- home meds include amlodipine 5 mg, metoprolol succinate 50 mg   - BP 90s-100s/50s-60s in ED; now 120s/70s    Plan  - BP meds held due to concern for sepsis  - hold BP meds on discharge as patient with persistent hypotension

## 2023-01-15 NOTE — PROGRESS NOTE ADULT - PROBLEM SELECTOR PLAN 9
- Diet: continuous feeds x 20 h  - PT/OT recommending [facility], family prefers home services  - DVT ppx: Lovenox 40 mg SQ daily  - Dispo: likely home    Full code   Updated wife at bedside

## 2023-01-16 NOTE — PROGRESS NOTE ADULT - ASSESSMENT
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 Although septic arthritis initially was a consideration, the patient had no evident fevers or infectious signs, and his pain was progressive and slow, so antibiotics were discontinued. He was noted on MRI of the knee to have findings concerning for metastatic disease and is s/p surgical resection of his right patella on 1/7. He reports slight improvement in pain after procedure but is still requiring PRN oxycodone 10 mg multiple times per day. He is now pending disposition with home PT and wound care for R knee. Orthopedic Surgery following, removed wound vacuums on 1/13. Patient now being treated for enterobacter HAP seen on CT angio with cefepime (1/12-1/16). Pain control PRN.

## 2023-01-16 NOTE — CHART NOTE - NSCHARTNOTEFT_GEN_A_CORE
Oncology chart note     67-year-old male PMH of metastatic esophageal SCC diagnosed about 8 yrs ago s/p chemo and radiation on PEG tube feeding, s/p biopsy of his right patella on 11/10/22 with pathology positive for squamous cell carcinoma (Dr. Carter), hypothyroidism, GERD, HTN,  who was transferred from [facility] on 1/1/2023 for Orthopedics consult for possible right knee malignancy versus septic arthritis. Oncology consulted since pt and family would like to request 2nd opinion for cancer treatment/care at [facility].     He follows with outpatient oncologist Dr. Gi Thomas in [place]. As per chart note, pt completed 7 cycles of nivolumab in 3/2022 due to relapsed esophageal SCC and had radiation to large left lung mass with erosion to ribs/chest wall which was completed in early 6/2022. Thereafter he started single agent irinotecan on 6/27/22 and s/p 3 cycles with the latest treatment on 8/1/22.     After this admission,   -new PEG placed at ER; has been tolerating feeding well.   - s/p surgical resection of his right patella on 1/7/23.  removed wound vacuums on 1/13.  now pending disposition with home PT and wound care for R knee. Orthopedic Surgery following. Pain control PRN.  -now being treated for enterobacter HAP seen on CT angio with cefepime (1/12-1/16). He was afebrile but labs significant for leukocytosis to 30k.  --palliative care consulted  -Onc team discussed with pt and wife, son multiple times during the admission: pt is willing to continue following up with his med onc Dr. Thomas but wife prefers to request 2nd opinion. Email already sent to [facility] and staff called wife.  Wife said that she will call back [facility] once pt is discharged from [facility] to set up the appointment.     No plan for chemo as inpt. The rest of care per primary team and ortho team. Pt and family already contacted with [facility] and can call back once discharged. Onc team will sign off. Please contact if having more questions.     Claritza Pardo PGY5   hem & onc fellow [phone]

## 2023-01-16 NOTE — PROGRESS NOTE ADULT - ATTENDING COMMENTS
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 He was noted on MRI of the knee to have findings concerning for metastatic disease and is s/p surgical resection of his right patella on 1/7. He reports slight improvement in pain after procedure but is still requiring PRN oxycodone 10 mg multiple times per day. Added standing Tramadol q8hr. He is now pending disposition with home PT and wound care for R knee. Orthopedic Surgery following, removed wound vacuums on 1/13. Patient now being treated for RUL enterobacter HAP seen on CT angio with cefepime (1/12-1/16). Hypophosphatemia has been in issue since he has been dependent on IV phosphorus repletion. Consult nutrition in a.m. for options on a different tube feed formulation that may be higher in phosphorus.

## 2023-01-16 NOTE — PROGRESS NOTE ADULT - PROBLEM SELECTOR PLAN 4
- Phos 1.0 >1.6 on 1/8 in context of pt NPO for procedure on 1/7  - ongoing hypophosphatemia noted despite repletion  - concern for refeeding syndrome, diet held recently for PEG dysfunction    Plan  - recheck phosphorus in afternoon, replete to goal > 2.5 - Phos 1.0 >1.6 on 1/8 in context of pt NPO for procedure on 1/7  - ongoing hypophosphatemia noted despite repletion  - concern for refeeding syndrome, diet held recently for PEG dysfunction    Plan  - Will begin standing phosphate packet repletion as patient is consistently becoming hypophosphoric. Will most likely need a stable outpatient regimen of phosphate repletion in regards to dispo planning

## 2023-01-16 NOTE — PROGRESS NOTE ADULT - SUBJECTIVE AND OBJECTIVE BOX
Internal Medicine   Donald Johnson | PGY-2    OVERNIGHT EVENTS:      SUBJECTIVE:       MEDICATIONS  (STANDING):  cefepime   IVPB      cefepime   IVPB 2000 milliGRAM(s) IV Intermittent every 8 hours  clotrimazole 1% Cream 1 Application(s) Topical every 12 hours  enoxaparin Injectable 40 milliGRAM(s) SubCutaneous every 24 hours  influenza  Vaccine (HIGH DOSE) 0.7 milliLiter(s) IntraMuscular once  levothyroxine 150 MICROGram(s) Enteral Tube daily  magnesium oxide 400 milliGRAM(s) Oral two times a day with meals  metoclopramide 10 milliGRAM(s) Oral three times a day  pantoprazole   Suspension 40 milliGRAM(s) Oral daily  polyethylene glycol 3350 17 Gram(s) Oral daily  sodium chloride 2 Gram(s) Oral every 12 hours  traMADol 25 milliGRAM(s) Oral every 8 hours    MEDICATIONS  (PRN):  albuterol    90 MICROgram(s) HFA Inhaler 2 Puff(s) Inhalation every 6 hours PRN Shortness of Breath and/or Wheezing  gabapentin 300 milliGRAM(s) Oral three times a day PRN neuropathic pain  ondansetron Injectable 4 milliGRAM(s) IV Push every 6 hours PRN Nausea and/or Vomiting  oxyCODONE    IR 10 milliGRAM(s) Oral every 6 hours PRN Severe Pain (7 - 10)  oxyCODONE    IR 5 milliGRAM(s) Oral every 6 hours PRN Moderate Pain (4 - 6)        T(F): 99.7 (01-16-23 @ 05:11), Max: 100.2 (01-15-23 @ 14:32)  HR: 105 (01-16-23 @ 05:11) (105 - 110)  BP: 107/60 (01-16-23 @ 05:11) (106/66 - 114/67)  BP(mean): --  RR: 18 (01-16-23 @ 05:11) (18 - 19)  SpO2: 96% (01-16-23 @ 05:11) (96% - 100%)    PHYSICAL EXAM:     GENERAL: NAD, lying in bed comfortably  HEAD:  Atraumatic, Normocephalic  EYES: EOMI, PERRLA, conjunctiva and sclera clear, no nystagmus noted  ENT: Moist mucous membranes,   NECK: Supple, No JVD, trachea midline  CHEST/LUNG: Clear to auscultation bilaterally; No rales, rhonchi, wheezing, or rubs. Unlabored respirations  HEART: Regular rate and rhythm; No murmurs, rubs, or gallops, normal S1/S2  ABDOMEN: normal bowel sounds; Soft, nontender, nondistended, no organomegaly   EXTREMITIES:  2+ Peripheral Pulses, brisk capillary refill. No clubbing, cyanosis, or edema  MSK: No gross deformities noted   Neurological:  A&Ox3, no focal deficits   SKIN: No rashes or lesions  PSYCH: Normal mood, affect     TELEMETRY:    LABS:                        8.4    23.08 )-----------( 628      ( 15 Alexander 2023 06:23 )             28.7     01-15    136  |  105  |  28<H>  ----------------------------<  119<H>  4.3   |  19<L>  |  0.80    Ca    9.7      15 Alexander 2023 22:00  Phos  2.5     01-15  Mg     2.00     01-15    TPro  7.3  /  Alb  2.8<L>  /  TBili  0.6  /  DBili  x   /  AST  18  /  ALT  23  /  AlkPhos  288<H>  01-15            Creatinine Trend: 0.80<--, 0.76<--, 0.80<--, 0.71<--, 0.81<--, 0.81<--  I&O's Summary    15 Alexander 2023 07:01  -  16 Jan 2023 07:00  --------------------------------------------------------  IN: 450 mL / OUT: 475 mL / NET: -25 mL      BNP    RADIOLOGY & ADDITIONAL STUDIES:             Internal Medicine   Donald Johnson | PGY-2    OVERNIGHT EVENTS: No overnight events      SUBJECTIVE: Patient feels clinically improved and more energetic. Denies any baseline changes from cough, sputum production, dyspnea, fever or chills.      MEDICATIONS  (STANDING):  cefepime   IVPB      cefepime   IVPB 2000 milliGRAM(s) IV Intermittent every 8 hours  clotrimazole 1% Cream 1 Application(s) Topical every 12 hours  enoxaparin Injectable 40 milliGRAM(s) SubCutaneous every 24 hours  influenza  Vaccine (HIGH DOSE) 0.7 milliLiter(s) IntraMuscular once  levothyroxine 150 MICROGram(s) Enteral Tube daily  magnesium oxide 400 milliGRAM(s) Oral two times a day with meals  metoclopramide 10 milliGRAM(s) Oral three times a day  pantoprazole   Suspension 40 milliGRAM(s) Oral daily  polyethylene glycol 3350 17 Gram(s) Oral daily  sodium chloride 2 Gram(s) Oral every 12 hours  traMADol 25 milliGRAM(s) Oral every 8 hours    MEDICATIONS  (PRN):  albuterol    90 MICROgram(s) HFA Inhaler 2 Puff(s) Inhalation every 6 hours PRN Shortness of Breath and/or Wheezing  gabapentin 300 milliGRAM(s) Oral three times a day PRN neuropathic pain  ondansetron Injectable 4 milliGRAM(s) IV Push every 6 hours PRN Nausea and/or Vomiting  oxyCODONE    IR 10 milliGRAM(s) Oral every 6 hours PRN Severe Pain (7 - 10)  oxyCODONE    IR 5 milliGRAM(s) Oral every 6 hours PRN Moderate Pain (4 - 6)        T(F): 99.7 (01-16-23 @ 05:11), Max: 100.2 (01-15-23 @ 14:32)  HR: 105 (01-16-23 @ 05:11) (105 - 110)  BP: 107/60 (01-16-23 @ 05:11) (106/66 - 114/67)  BP(mean): --  RR: 18 (01-16-23 @ 05:11) (18 - 19)  SpO2: 96% (01-16-23 @ 05:11) (96% - 100%)    PHYSICAL EXAM:     Gen: Alert, NAD  HEENT: NCAT, conjunctiva clear, sclera anicteric, white exudates in the oropharynx, dry mucous membranes  Neck: Supple, no JVD  CV: RRR, S1S2, no m/r/g  Resp: decreased breath sounds on L side, intermittent crackles at R base, no accessory muscle use, breathing comfortably on RA  Abd: Soft, nontender, moderately distended, normal bowel sounds, PEG tube in LUQ mildly TTP  Ext: no edema, no clubbing or cyanosis  - RLE: in immobilizer, DP 2+, warm toes, plantar/dorsiflexion 4+/5 but limited by pain  Neuro: AOx3, CN2-12 grossly intact, ANDREWS  TELEMETRY:    LABS:                        8.4    23.08 )-----------( 628      ( 15 Alexander 2023 06:23 )             28.7     01-15    136  |  105  |  28<H>  ----------------------------<  119<H>  4.3   |  19<L>  |  0.80    Ca    9.7      15 Alexander 2023 22:00  Phos  2.5     01-15  Mg     2.00     01-15    TPro  7.3  /  Alb  2.8<L>  /  TBili  0.6  /  DBili  x   /  AST  18  /  ALT  23  /  AlkPhos  288<H>  01-15            Creatinine Trend: 0.80<--, 0.76<--, 0.80<--, 0.71<--, 0.81<--, 0.81<--  I&O's Summary    15 Alexander 2023 07:01  -  16 Jan 2023 07:00  --------------------------------------------------------  IN: 450 mL / OUT: 475 mL / NET: -25 mL      BNP    RADIOLOGY & ADDITIONAL STUDIES:

## 2023-01-16 NOTE — PROGRESS NOTE ADULT - PROBLEM SELECTOR PLAN 2
- patient with new dyspnea with speech, tachypnea to 20, wheezing on exam, tachycardia to 110s x few days  - DDx includes HAP vs atelectasis from decreased ambulation vs PE       - denies worsening cough, hemoptysis, LE pain/swelling; pt at high risk for clots due to malignancy and immobilization, Wells score for PE 4.0       - has been afebrile but WBCs increasing; with chronic productive cough  - CT angio chest with new RUL ground glass opacities, no recent CT chest for comparison; no evidence of PE but limited by motion; known L sided severe atelectasis  - sputum cx with enterobacter cloacae; RVP neg    Plan  - ID consulted for new RUL findings, appreciate recs     - c/w IV cefepime x 5 days for HAP; 1/12-1/16 expected    - Per sputum sensitivities, patient can be discharged on PO levaquin if afebrile  - As patient has low grade fevers, will continue to treat with IV cefepime  - Incentive spirometry for atelectasis - patient with new dyspnea with speech, tachypnea to 20, wheezing on exam, tachycardia to 110s x few days  - DDx includes HAP vs atelectasis from decreased ambulation vs PE       - denies worsening cough, hemoptysis, LE pain/swelling; pt at high risk for clots due to malignancy and immobilization, Wells score for PE 4.0       - has been afebrile but WBCs increasing; with chronic productive cough  - CT angio chest with new RUL ground glass opacities, no recent CT chest for comparison; no evidence of PE but limited by motion; known L sided severe atelectasis  - sputum cx with enterobacter cloacae; RVP neg    Plan  - ID consulted for new RUL findings, appreciate recs     - c/w IV cefepime/antibiotics x 7 days for HAP; (1/12-1/18) expected    - Per sputum sensitivities, patient can be discharged on PO levaquin if afebrile  - As patient has low grade fevers, will continue to treat with IV cefepime  - Incentive spirometry for atelectasis

## 2023-01-16 NOTE — PROGRESS NOTE ADULT - PROBLEM SELECTOR PLAN 6
- diagnosed about 8 yrs ago  - follows with Dr. Gi Thomas ([place])  - complicated by dysphagia with all meds/nutrition via PEG  - CXR with near complete opacification of L lung, stable from previous imaging and likely represents pleural metastases    Plan  - On 3rd line chemo with single agent irinotecan (started 6/27/22, s/p 3 cycles, most recently on 8/1/22)  - Contacted outpatient oncologist, most recent progress note sent to [facility] (family requested 2nd opinion, considering transfer)  - Continue goals of care conversation with family: currently interested in resuming palliative chemo on D/C  - C/w bolus tube feeds, strict NPO with all meds via PEG  - C/w pantoprazole 40 mg daily for GERD, gabapentin 300 mg TID PRN for pain, MgOH and KCl for supplementation

## 2023-01-17 NOTE — PROGRESS NOTE ADULT - SUBJECTIVE AND OBJECTIVE BOX
Internal Medicine   Donald Johnson | PGY-2    OVERNIGHT EVENTS:      SUBJECTIVE:       MEDICATIONS  (STANDING):  cefepime   IVPB      cefepime   IVPB 2000 milliGRAM(s) IV Intermittent every 8 hours  clotrimazole 1% Cream 1 Application(s) Topical every 12 hours  enoxaparin Injectable 40 milliGRAM(s) SubCutaneous every 24 hours  influenza  Vaccine (HIGH DOSE) 0.7 milliLiter(s) IntraMuscular once  levothyroxine 150 MICROGram(s) Enteral Tube daily  magnesium oxide 400 milliGRAM(s) Oral two times a day with meals  pantoprazole   Suspension 40 milliGRAM(s) Oral daily  polyethylene glycol 3350 17 Gram(s) Oral daily  potassium phosphate / sodium phosphate Powder (PHOS-NaK) 1 Packet(s) Oral two times a day  sodium chloride 2 Gram(s) Oral every 12 hours  traMADol 25 milliGRAM(s) Oral every 8 hours    MEDICATIONS  (PRN):  albuterol    90 MICROgram(s) HFA Inhaler 2 Puff(s) Inhalation every 6 hours PRN Shortness of Breath and/or Wheezing  gabapentin 300 milliGRAM(s) Oral three times a day PRN neuropathic pain  ondansetron Injectable 4 milliGRAM(s) IV Push every 6 hours PRN Nausea and/or Vomiting  oxyCODONE    IR 10 milliGRAM(s) Oral every 6 hours PRN Severe Pain (7 - 10)  oxyCODONE    IR 5 milliGRAM(s) Oral every 6 hours PRN Moderate Pain (4 - 6)        T(F): 99.4 (01-17-23 @ 05:00), Max: 99.4 (01-17-23 @ 05:00)  HR: 121 (01-17-23 @ 05:00) (121 - 126)  BP: 101/58 (01-17-23 @ 05:00) (99/55 - 112/54)  BP(mean): --  RR: 18 (01-17-23 @ 05:00) (18 - 19)  SpO2: 97% (01-17-23 @ 05:00) (97% - 100%)    PHYSICAL EXAM:     GENERAL: NAD, lying in bed comfortably  HEAD:  Atraumatic, Normocephalic  EYES: EOMI, PERRLA, conjunctiva and sclera clear, no nystagmus noted  ENT: Moist mucous membranes,   NECK: Supple, No JVD, trachea midline  CHEST/LUNG: Clear to auscultation bilaterally; No rales, rhonchi, wheezing, or rubs. Unlabored respirations  HEART: Regular rate and rhythm; No murmurs, rubs, or gallops, normal S1/S2  ABDOMEN: normal bowel sounds; Soft, nontender, nondistended, no organomegaly   EXTREMITIES:  2+ Peripheral Pulses, brisk capillary refill. No clubbing, cyanosis, or edema  MSK: No gross deformities noted   Neurological:  A&Ox3, no focal deficits   SKIN: No rashes or lesions  PSYCH: Normal mood, affect     TELEMETRY:    LABS:                        8.2    26.46 )-----------( 633      ( 17 Jan 2023 05:00 )             27.3     01-17    137  |  105  |  31<H>  ----------------------------<  136<H>  4.1   |  18<L>  |  0.82    Ca    9.4      17 Jan 2023 05:00  Phos  2.5     01-17  Mg     1.90     01-17    TPro  7.6  /  Alb  2.9<L>  /  TBili  0.5  /  DBili  x   /  AST  22  /  ALT  23  /  AlkPhos  309<H>  01-16            Creatinine Trend: 0.82<--, 0.86<--, 0.83<--, 0.80<--, 0.76<--, 0.80<--  I&O's Summary    16 Jan 2023 07:01  -  17 Jan 2023 07:00  --------------------------------------------------------  IN: 1348 mL / OUT: 650 mL / NET: 698 mL      BNP    RADIOLOGY & ADDITIONAL STUDIES:             Internal Medicine   Donald Johnson | PGY-2    OVERNIGHT EVENTS: No overnight events      SUBJECTIVE: No complaints. Denies fevers and chills. Denies worsening dyspnea from baseline. Denies any involuntary twitching      MEDICATIONS  (STANDING):  cefepime   IVPB      cefepime   IVPB 2000 milliGRAM(s) IV Intermittent every 8 hours  clotrimazole 1% Cream 1 Application(s) Topical every 12 hours  enoxaparin Injectable 40 milliGRAM(s) SubCutaneous every 24 hours  influenza  Vaccine (HIGH DOSE) 0.7 milliLiter(s) IntraMuscular once  levothyroxine 150 MICROGram(s) Enteral Tube daily  magnesium oxide 400 milliGRAM(s) Oral two times a day with meals  pantoprazole   Suspension 40 milliGRAM(s) Oral daily  polyethylene glycol 3350 17 Gram(s) Oral daily  potassium phosphate / sodium phosphate Powder (PHOS-NaK) 1 Packet(s) Oral two times a day  sodium chloride 2 Gram(s) Oral every 12 hours  traMADol 25 milliGRAM(s) Oral every 8 hours    MEDICATIONS  (PRN):  albuterol    90 MICROgram(s) HFA Inhaler 2 Puff(s) Inhalation every 6 hours PRN Shortness of Breath and/or Wheezing  gabapentin 300 milliGRAM(s) Oral three times a day PRN neuropathic pain  ondansetron Injectable 4 milliGRAM(s) IV Push every 6 hours PRN Nausea and/or Vomiting  oxyCODONE    IR 10 milliGRAM(s) Oral every 6 hours PRN Severe Pain (7 - 10)  oxyCODONE    IR 5 milliGRAM(s) Oral every 6 hours PRN Moderate Pain (4 - 6)        T(F): 99.4 (01-17-23 @ 05:00), Max: 99.4 (01-17-23 @ 05:00)  HR: 121 (01-17-23 @ 05:00) (121 - 126)  BP: 101/58 (01-17-23 @ 05:00) (99/55 - 112/54)  BP(mean): --  RR: 18 (01-17-23 @ 05:00) (18 - 19)  SpO2: 97% (01-17-23 @ 05:00) (97% - 100%)    PHYSICAL EXAM:     Gen: Alert, NAD  HEENT: NCAT, conjunctiva clear, sclera anicteric, white exudates in the oropharynx, dry mucous membranes  Neck: Supple, no JVD  CV: RRR, S1S2, no m/r/g  Resp: decreased breath sounds on L side, intermittent crackles at R base, no accessory muscle use, breathing comfortably on RA  Abd: Soft, nontender, moderately distended, normal bowel sounds, PEG tube in LUQ mildly TTP  Ext: no edema, no clubbing or cyanosis  - RLE: in immobilizer, DP 2+, warm toes, plantar/dorsiflexion 4+/5 but limited by pain  Neuro: AOx3, CN2-12 grossly intact, ANDREWS    TELEMETRY:    LABS:                        8.2    26.46 )-----------( 633      ( 17 Jan 2023 05:00 )             27.3     01-17    137  |  105  |  31<H>  ----------------------------<  136<H>  4.1   |  18<L>  |  0.82    Ca    9.4      17 Jan 2023 05:00  Phos  2.5     01-17  Mg     1.90     01-17    TPro  7.6  /  Alb  2.9<L>  /  TBili  0.5  /  DBili  x   /  AST  22  /  ALT  23  /  AlkPhos  309<H>  01-16            Creatinine Trend: 0.82<--, 0.86<--, 0.83<--, 0.80<--, 0.76<--, 0.80<--  I&O's Summary    16 Jan 2023 07:01  -  17 Jan 2023 07:00  --------------------------------------------------------  IN: 1348 mL / OUT: 650 mL / NET: 698 mL      BNP    RADIOLOGY & ADDITIONAL STUDIES:

## 2023-01-17 NOTE — PROGRESS NOTE ADULT - PROBLEM SELECTOR PLAN 2
- patient with new dyspnea with speech, tachypnea to 20, wheezing on exam, tachycardia to 110s x few days  - DDx includes HAP vs atelectasis from decreased ambulation vs PE       - denies worsening cough, hemoptysis, LE pain/swelling; pt at high risk for clots due to malignancy and immobilization, Wells score for PE 4.0       - has been afebrile but WBCs increasing; with chronic productive cough  - CT angio chest with new RUL ground glass opacities, no recent CT chest for comparison; no evidence of PE but limited by motion; known L sided severe atelectasis  - sputum cx with enterobacter cloacae; RVP neg    Plan  - ID consulted for new RUL findings, appreciate recs     - c/w IV cefepime/antibiotics x 7 days for HAP; (1/12-1/18) expected    - Per sputum sensitivities, patient can be discharged on PO levaquin if afebrile  - As patient has low grade fevers, will continue to treat with IV cefepime  - Incentive spirometry for atelectasis Family was concerned that patient was "twitching" his fingers and feet. Patient denies any movements but per family patient at baseline "twitches" but feel that it worsened on 1/16. Unclear etiology as electrolytes are normal. May be due to sleep deprivation but patient recently started on metoclopramide. Cefepime was also started but seems more alert rather than hypoactive which tends to characterize cefepime induced encephalopathy.  - Does not fit clinically as akathisia but will hold metoclopramide  - Cont to monitor clinically

## 2023-01-17 NOTE — PROGRESS NOTE ADULT - PROBLEM SELECTOR PLAN 5
- Na 122 on 1/5, increased to 134 on 1/9, now resolving  - Serum osmolarity calculated as 262 (hypotonic), patient euvolemic on exam  - Elevated urine osmolarity and urine sodium are suggestive of inappropriate concentration of urine   - Patient with known pleural metastasis, likely 2/2 SIADH    Plan  - c/w salt tabs 2 g BID via PEG tube  - Recheck BMP tomorrow AM, trend Na daily  - c/w free water flushes 150 ml q8h - Phos 1.0 >1.6 on 1/8 in context of pt NPO for procedure on 1/7  - ongoing hypophosphatemia noted despite repletion  - concern for refeeding syndrome, diet held recently for PEG dysfunction; may also be secondary to magnesium repletion as this can interfere with absorption of phosphorus    Plan  - Will begin standing phosphate packet repletion as patient is consistently becoming hypophosphoric. Will most likely need a stable outpatient regimen of phosphate repletion in regards to dispo planning

## 2023-01-17 NOTE — PROGRESS NOTE ADULT - ATTENDING COMMENTS
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 He was noted on MRI of the knee to have findings concerning for metastatic disease and is s/p surgical resection of his right patella on 1/7. He reports slight improvement in pain after procedure but is still requiring PRN oxycodone 10 mg multiple times per day. Added standing Tramadol q8hr. He is now pending disposition with home PT and wound care for R knee. Orthopedic Surgery following, removed wound vacuums on 1/13. Patient now being treated for RUL enterobacter HAP seen on CT angio with cefepime (1/12-1/18). Hypophosphatemia has been in issue since he has been dependent on IV phosphorus repletion.

## 2023-01-17 NOTE — PROGRESS NOTE ADULT - PROBLEM SELECTOR PLAN 3
- high residuals overnight (>200 cc)  - mild pain around PEG and white leaking around tube, concerning for tube dislodged vs granulation tissue    Plan  - GI consulted for PEG dysfunction, tube newly placed on 1/2  - Abdominal XR with gastrograffin via tube shows no leakage  - c/w continuous tube feeds at 54 cc/h x 20h  - c/w metoclopramide for promotility - high residuals overnight (>200 cc)  - mild pain around PEG and white leaking around tube, concerning for tube dislodged vs granulation tissue    Plan  - GI consulted for PEG dysfunction, tube newly placed on 1/2  - Abdominal XR with gastrograffin via tube shows no leakage  - c/w continuous tube feeds at 54 cc/h x 20h  - was trialed on metoclopramide but family was concerned that patient was "twitching" his fingers and feet. Unclear etiology as electrolytes are normal. May be due to sleep deprivation but metoclopramide held for possible akathisia. - patient with new dyspnea with speech, tachypnea to 20, wheezing on exam, tachycardia to 110s x few days  - DDx includes HAP vs atelectasis from decreased ambulation vs PE       - denies worsening cough, hemoptysis, LE pain/swelling; pt at high risk for clots due to malignancy and immobilization, Wells score for PE 4.0       - has been afebrile but WBCs increasing; with chronic productive cough  - CT angio chest with new RUL ground glass opacities, no recent CT chest for comparison; no evidence of PE but limited by motion; known L sided severe atelectasis  - sputum cx with enterobacter cloacae; RVP neg    Plan  - ID consulted for new RUL findings, appreciate recs     - c/w IV cefepime/antibiotics x 7 days for HAP; (1/12-1/18) expected    - Per sputum sensitivities, patient can be discharged on PO levaquin if afebrile  - As patient has low grade fevers, will continue to treat with IV cefepime  - Incentive spirometry for atelectasis

## 2023-01-17 NOTE — CHART NOTE - NSCHARTNOTEFT_GEN_A_CORE
Pt seen for malnutrition follow up.     Medical Course:  - Per chart, pt is 67 year old male PMH metastatic esophageal cancer s/p PEG, hypothyroidism, GERD, HTN transferred from [facility] for orthopedics consult.  - Orthopedics following. MRI knee findings concerning for metastatic disease. Now s/p surgical resection of right patella (1/7).   - Pt now bring treated for enterobacter HAP seen on CT angio.     Nutrition Course:  - Pt continues on continuous feeds of TwoCal HN via PEG, visibly running at goal rate 54 mL/hr via pump at time of visit.   - Pt was trialed on Reglan for possible opioid induced gastroparesis, but family was concerned regarding "twitching" therefore medication was discontinued.   - Spoke with pt's spouse at bedside. Communicates concern for apparent weight loss and inadequate tube feeding provision. Discussed provision of concentrated formula with high calorie/high protein enteral infusion. Spouse without preference for bolus or continuous feeds following discharge.   - Persistent hypophosphatemia requiring repletion. Pt continues on salt tabs 2 gm q12 hrs. Noted with prediabetic HbA1c.   - Last BM 1/13, fecal  incontinence per flowsheets. Pt ordered for magnesium oxide 400 mg BID and Miralax 17 gm qD.      Diet Prescription:   - NPO with Tube Feed: TwoCal HN via Gastrostomy initiated at 10 mL/hr increased by 10 mL q4 hrs to goal rate 54 mL/hr x20 hrs + 150 mL bolus q8 hrs    Pertinent Medications:   - cefepime IV, levothyroxine, magnesium oxide, pantoprazole Suspension, polyethylene glycol, potassium phosphate / sodium phosphate Powder, sodium chloride, ondansetron IV PRN    Pertinent Labs:   - (1/17) Na 137 mmol/L Glu 136 mg/dL<H> K+ 4.1 mmol/L Cr 0.82 mg/dL BUN 31 mg/dL<H> Phos 2.5 mg/dL   - (1/13) HbA1c 5.8%<H>    Weight: (1/17 bed scale obtained at time of visit, unable to tare) 154 lbs / 70.0 kg, (1/7 dosing) 146.3 lbs / 66.4 kg, (1/5 bed scale obtained at time of visit, unable to tare) 146.7 lbs / 66.7 kg, (1/4 dosing) 146.3 lbs / 66.4 kg   Height: 67 in / 170.18 cm  IBW: 148 lbs / 67.3 kg +/-10%  BMI: 22.9 kg/m^2 (at most recent dosing weight)    Physical Assessment, per flowsheets:  Edema/Pressure Injury: none noted    Estimated Needs:   [X] No changes since previous assessment, based on dosing weight 146.3 lbs / 66.4 kg   5778-4029 kcal daily @30-35 kcal/kg, 92..24 gm protein daily @1.4-1.6 gm/kg     Previous Nutrition Diagnosis: [X] Severe malnutrition in the context of acute illness, remains appropriate  New Nutrition Diagnosis: [X] not applicable      Interventions:   1) Recommend continue TwoCal HN at a goal rate of 54 mL/hr x20 hrs to provide 1080 mL formula, 2160 kcal, 90.18 gm protein, 756 mL free water daily (meets 32.5 kcal/kg, 1.36 gm protein/kg @ dosing weight 66.4 kg). Additional provision of free water per medical discretion.  2) Continue to monitor electrolytes (K+, Mg, P) and replete to within desired limits as clinically indicated.   3) Obtain weekly weights via tared bed scale.    Monitor & Evaluate:  Tolerance to EN, nutrition related lab values, weight trends, BMs/GI distress, hydration status, skin integrity.  Cyndee Andrade RDN, CDN #[id number]  Also available on [employer] Teams. Pt seen for nutrition consult- Tube Feeding.     Medical Course:  - Per chart, pt is 67 year old male PMH metastatic esophageal cancer s/p PEG, hypothyroidism, GERD, HTN transferred from [facility] for orthopedics consult.  - MRI knee findings concerning for metastatic disease. Now s/p surgical resection of right patella (1/7). Orthopedics following.   - Pt now bring treated for enterobacter HAP seen on CT angio.     Nutrition Course:  - Pt continues on continuous feeds of TwoCal HN via PEG, visibly running at goal rate 54 mL/hr via pump at time of visit.   - Pt was trialed on Reglan for possible opioid induced gastroparesis, but family was concerned regarding "twitching" therefore medication was discontinued.   - Spoke with pt's spouse at bedside. Communicates concern for apparent weight loss upon visualization. Discussed provision of concentrated formula with high calorie/high protein enteral infusion. Spouse without preference for bolus or continuous feeds following discharge.   - Persistent hypophosphatemia requiring repletion. Pt continues on salt tabs 2 gm q12 hrs. Noted with prediabetic HbA1c.   - Last BM 1/13, fecal  incontinence per flowsheets. Pt ordered for magnesium oxide 400 mg BID and Miralax 17 gm qD.      Diet Prescription:   - NPO with Tube Feed: TwoCal HN via Gastrostomy initiated at 10 mL/hr increased by 10 mL q4 hrs to goal rate 54 mL/hr x20 hrs + 150 mL bolus q8 hrs    Pertinent Medications:   - cefepime IV, levothyroxine, magnesium oxide, pantoprazole Suspension, polyethylene glycol, potassium phosphate / sodium phosphate Powder, sodium chloride, ondansetron IV PRN    Pertinent Labs:   - (1/17) Na 137 mmol/L Glu 136 mg/dL<H> K+ 4.1 mmol/L Cr 0.82 mg/dL BUN 31 mg/dL<H> Phos 2.5 mg/dL   - (1/13) HbA1c 5.8%<H>    Weight: (1/17 bed scale obtained at time of visit, unable to tare) 154 lbs / 70.0 kg, (1/7 dosing) 146.3 lbs / 66.4 kg, (1/5 bed scale obtained at time of visit, unable to tare) 146.7 lbs / 66.7 kg, (1/4 dosing) 146.3 lbs / 66.4 kg   Height: 67 in / 170.18 cm  IBW: 148 lbs / 67.3 kg +/-10%  BMI: 22.9 kg/m^2 (at most recent dosing weight)    Physical Assessment, per flowsheets:  Edema/Pressure Injury: none noted    Estimated Needs:   [X] No changes since previous assessment, based on dosing weight 146.3 lbs / 66.4 kg   1054-0591 kcal daily @30-35 kcal/kg, 92..24 gm protein daily @1.4-1.6 gm/kg     Previous Nutrition Diagnosis: [X] Severe malnutrition in the context of acute illness, remains appropriate  New Nutrition Diagnosis: [X] not applicable      Interventions:   1) Recommend continue TwoCal HN at a goal rate of 54 mL/hr x20 hrs to provide 1080 mL formula, 2160 kcal, 90.18 gm protein, 756 mL free water daily (meets 32.5 kcal/kg, 1.36 gm protein/kg @ dosing weight 66.4 kg). Additional provision of free water per medical discretion.  2) Continue to monitor electrolytes (K+, Mg, P) and replete to within desired limits as clinically indicated.   3) Obtain weekly weights via tared bed scale.    Monitor & Evaluate:  Tolerance to EN, nutrition related lab values, weight trends, BMs/GI distress, hydration status, skin integrity.  Cyndee Andrade RDN, CDN #[id number]  Also available on [employer] Teams.

## 2023-01-17 NOTE — PROGRESS NOTE ADULT - PROBLEM SELECTOR PLAN 8
- home meds include amlodipine 5 mg, metoprolol succinate 50 mg   - BP 90s-100s/50s-60s in ED; now 120s/70s    Plan  - BP meds held due to concern for sepsis  - hold BP meds on discharge as patient with persistent hypotension - takes synthroid 150 mcg PO daily at home  - TSH 11.98 but free T4 WNL    Plan  - c/w PO synthroid 150 mg (home med), please administer during 4 hour gap in tube feeds  - patient denies new symptoms of hypothyroidism including constipation, hypotension, dry skin, brittle hair  - recommend outpatient follow-up as TSH may be inaccurate in context of current hospitalization and could instead be suggestive of sick euthyroid syndrome

## 2023-01-17 NOTE — PROGRESS NOTE ADULT - PROBLEM SELECTOR PLAN 9
- Diet: continuous feeds x 20 h  - PT/OT recommending [facility], family prefers home services  - DVT ppx: Lovenox 40 mg SQ daily  - Dispo: likely home    Full code   Updated wife at bedside - home meds include amlodipine 5 mg, metoprolol succinate 50 mg   - BP 90s-100s/50s-60s in ED; now 120s/70s    Plan  - BP meds held due to concern for sepsis  - hold BP meds on discharge as patient with persistent hypotension

## 2023-01-17 NOTE — PROGRESS NOTE ADULT - PROBLEM SELECTOR PLAN 4
- Phos 1.0 >1.6 on 1/8 in context of pt NPO for procedure on 1/7  - ongoing hypophosphatemia noted despite repletion  - concern for refeeding syndrome, diet held recently for PEG dysfunction; may also be secondary to magnesium repletion as this can interfere with absorption of phosphorus    Plan  - Will begin standing phosphate packet repletion as patient is consistently becoming hypophosphoric. Will most likely need a stable outpatient regimen of phosphate repletion in regards to dispo planning - high residuals overnight (>200 cc)  - mild pain around PEG and white leaking around tube, concerning for tube dislodged vs granulation tissue    Plan  - GI consulted for PEG dysfunction, tube newly placed on 1/2  - Abdominal XR with gastrograffin via tube shows no leakage  - c/w continuous tube feeds at 54 cc/h x 20h  - was trialed on metoclopramide but family was concerned that patient was "twitching" his fingers and feet. Unclear etiology as electrolytes are normal. May be due to sleep deprivation but metoclopramide held for possible akathisia.

## 2023-01-17 NOTE — PROGRESS NOTE ADULT - PROBLEM SELECTOR PLAN 7
- takes synthroid 150 mcg PO daily at home  - TSH 11.98 but free T4 WNL    Plan  - c/w PO synthroid 150 mg (home med), please administer during 4 hour gap in tube feeds  - patient denies new symptoms of hypothyroidism including constipation, hypotension, dry skin, brittle hair  - recommend outpatient follow-up as TSH may be inaccurate in context of current hospitalization and could instead be suggestive of sick euthyroid syndrome - diagnosed about 8 yrs ago  - follows with Dr. Gi Thomas ([place])  - complicated by dysphagia with all meds/nutrition via PEG  - CXR with near complete opacification of L lung, stable from previous imaging and likely represents pleural metastases    Plan  - On 3rd line chemo with single agent irinotecan (started 6/27/22, s/p 3 cycles, most recently on 8/1/22)  - Contacted outpatient oncologist, most recent progress note sent to [facility] (family requested 2nd opinion, considering transfer)  - Continue goals of care conversation with family: currently interested in resuming palliative chemo on D/C  - C/w bolus tube feeds, strict NPO with all meds via PEG  - C/w pantoprazole 40 mg daily for GERD, gabapentin 300 mg TID PRN for pain, MgOH and KCl for supplementation

## 2023-01-17 NOTE — PROGRESS NOTE ADULT - PROBLEM SELECTOR PLAN 6
- diagnosed about 8 yrs ago  - follows with Dr. Gi Thomas ([place])  - complicated by dysphagia with all meds/nutrition via PEG  - CXR with near complete opacification of L lung, stable from previous imaging and likely represents pleural metastases    Plan  - On 3rd line chemo with single agent irinotecan (started 6/27/22, s/p 3 cycles, most recently on 8/1/22)  - Contacted outpatient oncologist, most recent progress note sent to [facility] (family requested 2nd opinion, considering transfer)  - Continue goals of care conversation with family: currently interested in resuming palliative chemo on D/C  - C/w bolus tube feeds, strict NPO with all meds via PEG  - C/w pantoprazole 40 mg daily for GERD, gabapentin 300 mg TID PRN for pain, MgOH and KCl for supplementation - Na 122 on 1/5, increased to 134 on 1/9, now resolving  - Serum osmolarity calculated as 262 (hypotonic), patient euvolemic on exam  - Elevated urine osmolarity and urine sodium are suggestive of inappropriate concentration of urine   - Patient with known pleural metastasis, likely 2/2 SIADH    Plan  - c/w salt tabs 2 g BID via PEG tube  - Recheck BMP tomorrow AM, trend Na daily  - c/w free water flushes 150 ml q8h

## 2023-01-17 NOTE — PROGRESS NOTE ADULT - ASSESSMENT
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 Although septic arthritis initially was a consideration, the patient had no evident fevers or infectious signs, and his pain was progressive and slow, so antibiotics were discontinued. He was noted on MRI of the knee to have findings concerning for metastatic disease and is s/p surgical resection of his right patella on 1/7. He reports slight improvement in pain after procedure but is still requiring PRN oxycodone 10 mg multiple times per day. He is now pending disposition with home PT and wound care for R knee. Orthopedic Surgery following, removed wound vacuums on 1/13. Patient now being treated for enterobacter HAP seen on CT angio with cefepime (1/12-1/16). Pain control PRN.

## 2023-01-18 NOTE — PROGRESS NOTE ADULT - ATTENDING COMMENTS
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 He was noted on MRI of the knee to have findings concerning for metastatic disease and is s/p surgical resection of his right patella on 1/7. He reports slight improvement in pain after procedure but is still requiring PRN oxycodone 10 mg multiple times per day. Added standing Tramadol q8hr. He is now pending disposition with home PT and wound care for R knee. Orthopedic Surgery following, removed wound vacuums on 1/13. Patient now being treated for RUL enterobacter HAP seen on CT angio with cefepime (1/12-1/18).     Hypophosphatemia has been in issue since he has been dependent on IV phosphorus repletion. F/u 25 hydroxyvitamin d [ ], PTH [low at 15], mild hypercalcemia noted when corrected for albumin, PTHrp [ ]. Will HOLD mag supplementation and d/c protonix as this may be lowering phos levels.     Clinically significantly improved on 1/18, more alert, conversive, able to sit in chair. Continue working with PT, pending phos stabilization will prep for d/c home on 1/19.

## 2023-01-18 NOTE — PROGRESS NOTE ADULT - PROBLEM SELECTOR PLAN 7
- diagnosed about 8 yrs ago  - follows with Dr. Gi Thomas ([place])  - complicated by dysphagia with all meds/nutrition via PEG  - CXR with near complete opacification of L lung, stable from previous imaging and likely represents pleural metastases    Plan  - On 3rd line chemo with single agent irinotecan (started 6/27/22, s/p 3 cycles, most recently on 8/1/22)  - Contacted outpatient oncologist, most recent progress note sent to [facility] (family requested 2nd opinion, considering transfer)  - Continue goals of care conversation with family: currently interested in resuming palliative chemo on D/C  - C/w bolus tube feeds, strict NPO with all meds via PEG  - C/w pantoprazole 40 mg daily for GERD, gabapentin 300 mg TID PRN for pain, MgOH and KCl for supplementation - diagnosed about 8 yrs ago  - follows with Dr. Gi Thomas ([place])  - complicated by dysphagia with all meds/nutrition via PEG  - CXR with near complete opacification of L lung, stable from previous imaging and likely represents pleural metastases    Plan  - On 3rd line chemo with single agent irinotecan (started 6/27/22, s/p 3 cycles, most recently on 8/1/22)  - Contacted outpatient oncologist, most recent progress note sent to [facility] (family requested 2nd opinion, considering transfer)  - Continue goals of care conversation with family: currently interested in resuming palliative chemo on D/C  - C/w continuous feeds given high residuals on bolus feeds  - C/w pantoprazole 40 mg daily for GERD, gabapentin 300 mg TID PRN for pain, MgOH and KCl for supplementation

## 2023-01-18 NOTE — PROGRESS NOTE ADULT - PROBLEM SELECTOR PLAN 2
Family was concerned that patient was "twitching" his fingers and feet. Patient denies any movements but per family patient at baseline "twitches" but feel that it worsened on 1/16. Unclear etiology as electrolytes are normal. May be due to sleep deprivation but patient recently started on metoclopramide. Cefepime was also started but seems more alert rather than hypoactive which tends to characterize cefepime induced encephalopathy.  - Does not fit clinically as akathisia but will hold metoclopramide  - Cont to monitor clinically

## 2023-01-18 NOTE — PROGRESS NOTE ADULT - PROBLEM SELECTOR PLAN 6
- Na 122 on 1/5, increased to 134 on 1/9, now resolving  - Serum osmolarity calculated as 262 (hypotonic), patient euvolemic on exam  - Elevated urine osmolarity and urine sodium are suggestive of inappropriate concentration of urine   - Patient with known pleural metastasis, likely 2/2 SIADH    Plan  - c/w salt tabs 2 g BID via PEG tube  - Recheck BMP tomorrow AM, trend Na daily  - c/w free water flushes 150 ml q8h - Na 122 on 1/5, increased to 134 on 1/9, now resolving  - Serum osmolarity calculated as 262 (hypotonic), patient euvolemic on exam  - Elevated urine osmolarity and urine sodium are suggestive of inappropriate concentration of urine   - Patient with known pleural metastasis, likely 2/2 SIADH    Plan  - c/w salt tabs 2 g BID via PEG tube  - Recheck BMP AM, trend Na daily  - c/w free water flushes 150 ml q8h

## 2023-01-18 NOTE — PROGRESS NOTE ADULT - PROBLEM SELECTOR PLAN 4
- high residuals overnight (>200 cc)  - mild pain around PEG and white leaking around tube, concerning for tube dislodged vs granulation tissue    Plan  - GI consulted for PEG dysfunction, tube newly placed on 1/2  - Abdominal XR with gastrograffin via tube shows no leakage  - c/w continuous tube feeds at 54 cc/h x 20h  - was trialed on metoclopramide but family was concerned that patient was "twitching" his fingers and feet. Unclear etiology as electrolytes are normal. May be due to sleep deprivation but metoclopramide held for possible akathisia.

## 2023-01-18 NOTE — PROGRESS NOTE ADULT - ASSESSMENT
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 Although septic arthritis initially was a consideration, the patient had no evident fevers or infectious signs, and his pain was progressive and slow, so antibiotics were discontinued. He was noted on MRI of the knee to have findings concerning for metastatic disease and is s/p surgical resection of his right patella on 1/7. He reports slight improvement in pain after procedure but is still requiring PRN oxycodone 10 mg multiple times per day. He is now pending disposition with home PT and wound care for R knee. Orthopedic Surgery following, removed wound vacuums on 1/13. Patient now being treated for enterobacter HAP seen on CT angio with cefepime (1/12-1/16). Pain control PRN.

## 2023-01-18 NOTE — PROGRESS NOTE ADULT - SUBJECTIVE AND OBJECTIVE BOX
Internal Medicine   Donald Johnson | PGY-2    OVERNIGHT EVENTS:      SUBJECTIVE:       MEDICATIONS  (STANDING):  cefepime   IVPB      cefepime   IVPB 2000 milliGRAM(s) IV Intermittent every 8 hours  clotrimazole 1% Cream 1 Application(s) Topical every 12 hours  enoxaparin Injectable 40 milliGRAM(s) SubCutaneous every 24 hours  influenza  Vaccine (HIGH DOSE) 0.7 milliLiter(s) IntraMuscular once  levothyroxine 150 MICROGram(s) Enteral Tube daily  pantoprazole   Suspension 40 milliGRAM(s) Oral daily  polyethylene glycol 3350 17 Gram(s) Oral daily  potassium phosphate / sodium phosphate Powder (PHOS-NaK) 2 Packet(s) Oral two times a day  sodium chloride 2 Gram(s) Oral every 12 hours    MEDICATIONS  (PRN):  albuterol    90 MICROgram(s) HFA Inhaler 2 Puff(s) Inhalation every 6 hours PRN Shortness of Breath and/or Wheezing  gabapentin 300 milliGRAM(s) Oral three times a day PRN neuropathic pain  ondansetron Injectable 4 milliGRAM(s) IV Push every 6 hours PRN Nausea and/or Vomiting  oxyCODONE    IR 10 milliGRAM(s) Oral every 6 hours PRN Severe Pain (7 - 10)  oxyCODONE    IR 5 milliGRAM(s) Oral every 6 hours PRN Moderate Pain (4 - 6)        T(F): 97.7 (01-18-23 @ 05:30), Max: 98.8 (01-17-23 @ 15:15)  HR: 120 (01-18-23 @ 05:30) (117 - 120)  BP: 108/66 (01-18-23 @ 05:30) (106/65 - 112/58)  BP(mean): --  RR: 20 (01-18-23 @ 05:30) (17 - 20)  SpO2: 100% (01-18-23 @ 05:30) (98% - 100%)    PHYSICAL EXAM:     GENERAL: NAD, lying in bed comfortably  HEAD:  Atraumatic, Normocephalic  EYES: EOMI, PERRLA, conjunctiva and sclera clear, no nystagmus noted  ENT: Moist mucous membranes,   NECK: Supple, No JVD, trachea midline  CHEST/LUNG: Clear to auscultation bilaterally; No rales, rhonchi, wheezing, or rubs. Unlabored respirations  HEART: Regular rate and rhythm; No murmurs, rubs, or gallops, normal S1/S2  ABDOMEN: normal bowel sounds; Soft, nontender, nondistended, no organomegaly   EXTREMITIES:  2+ Peripheral Pulses, brisk capillary refill. No clubbing, cyanosis, or edema  MSK: No gross deformities noted   Neurological:  A&Ox3, no focal deficits   SKIN: No rashes or lesions  PSYCH: Normal mood, affect     TELEMETRY:    LABS:                        8.2    26.46 )-----------( 633      ( 17 Jan 2023 05:00 )             27.3     01-17    140  |  108<H>  |  32<H>  ----------------------------<  138<H>  4.6   |  22  |  0.82    Ca    9.7      17 Jan 2023 16:11  Phos  2.1     01-17  Mg     2.00     01-17              Creatinine Trend: 0.82<--, 0.82<--, 0.86<--, 0.83<--, 0.80<--, 0.76<--  I&O's Summary    17 Jan 2023 07:01  -  18 Jan 2023 07:00  --------------------------------------------------------  IN: 948 mL / OUT: 900 mL / NET: 48 mL      BNP    RADIOLOGY & ADDITIONAL STUDIES:             Internal Medicine   Donald Johnson | PGY-2    OVERNIGHT EVENTS: No overnight events      SUBJECTIVE: Patient feels well with no fevers, chills or dyspnea/cough worse than baseline. Denies involuntary twitching although wife at bedside notes involuntary twitching      MEDICATIONS  (STANDING):  cefepime   IVPB      cefepime   IVPB 2000 milliGRAM(s) IV Intermittent every 8 hours  clotrimazole 1% Cream 1 Application(s) Topical every 12 hours  enoxaparin Injectable 40 milliGRAM(s) SubCutaneous every 24 hours  influenza  Vaccine (HIGH DOSE) 0.7 milliLiter(s) IntraMuscular once  levothyroxine 150 MICROGram(s) Enteral Tube daily  pantoprazole   Suspension 40 milliGRAM(s) Oral daily  polyethylene glycol 3350 17 Gram(s) Oral daily  potassium phosphate / sodium phosphate Powder (PHOS-NaK) 2 Packet(s) Oral two times a day  sodium chloride 2 Gram(s) Oral every 12 hours    MEDICATIONS  (PRN):  albuterol    90 MICROgram(s) HFA Inhaler 2 Puff(s) Inhalation every 6 hours PRN Shortness of Breath and/or Wheezing  gabapentin 300 milliGRAM(s) Oral three times a day PRN neuropathic pain  ondansetron Injectable 4 milliGRAM(s) IV Push every 6 hours PRN Nausea and/or Vomiting  oxyCODONE    IR 10 milliGRAM(s) Oral every 6 hours PRN Severe Pain (7 - 10)  oxyCODONE    IR 5 milliGRAM(s) Oral every 6 hours PRN Moderate Pain (4 - 6)        T(F): 97.7 (01-18-23 @ 05:30), Max: 98.8 (01-17-23 @ 15:15)  HR: 120 (01-18-23 @ 05:30) (117 - 120)  BP: 108/66 (01-18-23 @ 05:30) (106/65 - 112/58)  BP(mean): --  RR: 20 (01-18-23 @ 05:30) (17 - 20)  SpO2: 100% (01-18-23 @ 05:30) (98% - 100%)    PHYSICAL EXAM:     Gen: Alert, NAD  HEENT: NCAT, conjunctiva clear, sclera anicteric, white exudates in the oropharynx, dry mucous membranes  Neck: Supple, no JVD  CV: RRR, S1S2, no m/r/g  Resp: decreased breath sounds on L side, intermittent crackles at R base, no accessory muscle use, breathing comfortably on RA. Lung exam seems improved with just mild expiratory wheezing at RUL  Abd: Soft, nontender, moderately distended, normal bowel sounds, PEG tube in LUQ mildly TTP  Ext: no edema, no clubbing or cyanosis  - RLE: in immobilizer, DP 2+, warm toes, plantar/dorsiflexion 4+/5 but limited by pain  Neuro: AOx3, CN2-12 grossly intact, ANDREWS    TELEMETRY:    LABS:                        8.2    26.46 )-----------( 633      ( 17 Jan 2023 05:00 )             27.3     01-17    140  |  108<H>  |  32<H>  ----------------------------<  138<H>  4.6   |  22  |  0.82    Ca    9.7      17 Jan 2023 16:11  Phos  2.1     01-17  Mg     2.00     01-17              Creatinine Trend: 0.82<--, 0.82<--, 0.86<--, 0.83<--, 0.80<--, 0.76<--  I&O's Summary    17 Jan 2023 07:01  -  18 Jan 2023 07:00  --------------------------------------------------------  IN: 948 mL / OUT: 900 mL / NET: 48 mL      BNP    RADIOLOGY & ADDITIONAL STUDIES:

## 2023-01-18 NOTE — PROGRESS NOTE ADULT - PROBLEM SELECTOR PLAN 3
- patient with new dyspnea with speech, tachypnea to 20, wheezing on exam, tachycardia to 110s x few days  - DDx includes HAP vs atelectasis from decreased ambulation vs PE       - denies worsening cough, hemoptysis, LE pain/swelling; pt at high risk for clots due to malignancy and immobilization, Wells score for PE 4.0       - has been afebrile but WBCs increasing; with chronic productive cough  - CT angio chest with new RUL ground glass opacities, no recent CT chest for comparison; no evidence of PE but limited by motion; known L sided severe atelectasis  - sputum cx with enterobacter cloacae; RVP neg    Plan  - ID consulted for new RUL findings, appreciate recs     - c/w IV cefepime/antibiotics x 7 days for HAP; (1/12-1/18) expected    - Per sputum sensitivities, patient can be discharged on PO levaquin if afebrile  - As patient has low grade fevers, will continue to treat with IV cefepime  - Incentive spirometry for atelectasis

## 2023-01-18 NOTE — PROGRESS NOTE ADULT - PROBLEM SELECTOR PLAN 5
- Phos 1.0 >1.6 on 1/8 in context of pt NPO for procedure on 1/7  - ongoing hypophosphatemia noted despite repletion  - concern for refeeding syndrome, diet held recently for PEG dysfunction; may also be secondary to magnesium repletion as this can interfere with absorption of phosphorus    Plan  - Will begin standing phosphate packet repletion as patient is consistently becoming hypophosphoric. Will most likely need a stable outpatient regimen of phosphate repletion in regards to dispo planning - Phos 1.0 >1.6 on 1/8 in context of pt NPO for procedure on 1/7  - ongoing hypophosphatemia noted despite repletion  - concern for refeeding syndrome, diet held recently for PEG dysfunction; may also be secondary to magnesium repletion as this can interfere with absorption of phosphorus    Plan  - Will use standing phosphate packet repletion as patient is consistently becoming hypophosphoric. Will most likely need a stable outpatient regimen of phosphate repletion in regards to dispo planning  - PTH borderline normal  - Will hold magnesium oral supplementation and monitor magnesium

## 2023-01-18 NOTE — PROGRESS NOTE ADULT - PROBLEM SELECTOR PLAN 1
- progressively worsening pain, erythema, edema, and warmth at the R knee joint for about 1 month  - recent partial excision of R patella on 11/10 with SCC on biopsy but unclear margins  - current presentation more consistent with progression of known cancer than septic arthritis  - MRI R knee w/wo contrast showing R patellar fracture with OM vs osteonecrosis, moderate complicated effusion--clinical suspicion is greatest for osteonecrosis from metastatic disease   - s/p tap of R knee but small sample collected; gram stain negative with no PMNs or bacteria    Plan  - Appreciate Ortho recs:     - s/p patellectomy, quadricepsplasty, and synovectomy on 1/7 in OR     - Ortho removed Provena vacuum on 1/13     - s/p ancef 2 g q8h from 1/9-1/10 for prophylaxis against post-op infection  - Pain control (PRNs):      - Tylenol 650 mg q6h for mild, oxycodone 5 mg q6h for moderate, oxycodone 10 mg q6h for severe     - c/w bowel regimen to prevent opioid induced constipation (miralax daily)  - Coordinate home PT and wound care for knee on D/C  - Outpatient f/u with Dr. Carter within 1 week after D/C  - DVT ppx with xarelto 10 mg x 30 days until f/u with Ortho (Wells score for PE 4.0, moderate risk)    -Start toradol 25mg Standing q8 - progressively worsening pain, erythema, edema, and warmth at the R knee joint for about 1 month  - recent partial excision of R patella on 11/10 with SCC on biopsy but unclear margins  - current presentation more consistent with progression of known cancer than septic arthritis  - MRI R knee w/wo contrast showing R patellar fracture with OM vs osteonecrosis, moderate complicated effusion--clinical suspicion is greatest for osteonecrosis from metastatic disease   - s/p tap of R knee but small sample collected; gram stain negative with no PMNs or bacteria    Plan  - Appreciate Ortho recs:     - s/p patellectomy, quadricepsplasty, and synovectomy on 1/7 in OR     - Ortho removed Provena vacuum on 1/13     - s/p ancef 2 g q8h from 1/9-1/10 for prophylaxis against post-op infection  - Pain control (PRNs):      - Tylenol 650 mg q6h for mild, oxycodone 5 mg q6h for moderate, oxycodone 10 mg q6h for severe     - c/w bowel regimen to prevent opioid induced constipation (miralax daily)  - Coordinate home PT and wound care for knee on D/C  - Outpatient f/u with Dr. Carter within 1 week after D/C  - DVT ppx with xarelto 10 mg x 30 days until f/u with Ortho (Wells score for PE 4.0, moderate risk)    - toradol 25mg Standing q8

## 2023-01-18 NOTE — CHART NOTE - NSCHARTNOTEFT_GEN_A_CORE
Patient is confined to a single room with no access to bathroom and requires bedside commode. Diagnosis: debility secondary to metastatic esophageal cancer

## 2023-01-19 NOTE — PROGRESS NOTE ADULT - PROBLEM SELECTOR PLAN 5
- Phos 1.0 >1.6 on 1/8 in context of pt NPO for procedure on 1/7  - ongoing hypophosphatemia noted despite repletion  - concern for refeeding syndrome, diet held recently for PEG dysfunction; may also be secondary to magnesium repletion as this can interfere with absorption of phosphorus    Plan  - Will use standing phosphate packet repletion as patient is consistently becoming hypophosphoric. Will most likely need a stable outpatient regimen of phosphate repletion in regards to dispo planning  - PTH borderline normal  - Will hold magnesium oral supplementation and monitor magnesium  - PTH related peptide sent as this may be paraneoplastic syndrome  - Urine lytes, ph sent to assess for RTA as this could be atypical fanconi syndrome and to calculate phosphorus/creatinine ratio  - Patient noted to have a gamma gap. Will send protein electrophoresis and immunofixation as paraproteinemia is an etiology of RTA type 1 and type 2 - Phos 1.0 >1.6 on 1/8 in context of pt NPO for procedure on 1/7  - ongoing hypophosphatemia noted despite repletion  - concern for refeeding syndrome, diet held recently for PEG dysfunction; may also be secondary to magnesium repletion as this can interfere with absorption of phosphorus    Plan  - Will use standing phosphate packet repletion as patient is consistently becoming hypophosphoric. Will most likely need a stable outpatient regimen of phosphate repletion in regards to dispo planning  - PTH borderline normal  - Will hold magnesium oral supplementation and monitor magnesium  - PTH related peptide sent as this may be paraneoplastic syndrome  - Urine lytes, ph sent to assess for RTA as this could be atypical fanconi syndrome and to calculate phosphorus/creatinine ratio. FePO4 is 60% indicating renal wasting of phosphorus  - Patient noted to have a gamma gap. Will send protein electrophoresis and immunofixation as paraproteinemia is an etiology of RTA type 1 and type 2

## 2023-01-19 NOTE — PROGRESS NOTE ADULT - ASSESSMENT
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 Although septic arthritis initially was a consideration, the patient had no evident fevers or infectious signs, and his pain was progressive and slow, so antibiotics were discontinued. He was noted on MRI of the knee to have findings concerning for metastatic disease and is s/p surgical resection of his right patella on 1/7. He reports slight improvement in pain after procedure but is still requiring PRN oxycodone 10 mg multiple times per day. He is now pending disposition with home PT and wound care for R knee. Orthopedic Surgery following, removed wound vacuums on 1/13. Patient now being treated for enterobacter HAP seen on CT angio with cefepime (1/12-1/16). Pain control PRN.

## 2023-01-19 NOTE — PROGRESS NOTE ADULT - PROBLEM SELECTOR PLAN 7
- diagnosed about 8 yrs ago  - follows with Dr. Gi Thomas ([place])  - complicated by dysphagia with all meds/nutrition via PEG  - CXR with near complete opacification of L lung, stable from previous imaging and likely represents pleural metastases    Plan  - On 3rd line chemo with single agent irinotecan (started 6/27/22, s/p 3 cycles, most recently on 8/1/22)  - Contacted outpatient oncologist, most recent progress note sent to [facility] (family requested 2nd opinion, considering transfer)  - Continue goals of care conversation with family: currently interested in resuming palliative chemo on D/C  - C/w continuous feeds given high residuals on bolus feeds  - C/w pantoprazole 40 mg daily for GERD, gabapentin 300 mg TID PRN for pain, MgOH and KCl for supplementation

## 2023-01-19 NOTE — PROGRESS NOTE ADULT - ATTENDING COMMENTS
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 He was noted on MRI of the knee to have findings concerning for metastatic disease and is s/p surgical resection of his right patella on 1/7. He reports slight improvement in pain after procedure but is still requiring PRN oxycodone 10 mg multiple times per day. Added standing Tramadol q8hr. He is now pending disposition with home PT and wound care for R knee. Orthopedic Surgery following, removed wound vacuums on 1/13. Patient now being treated for RUL enterobacter HAP seen on CT angio with cefepime (1/12-1/18).     Hypophosphatemia has been an issue since he has been dependent on IV phosphorus repletion. F/u 25 hydroxyvitamin d [ nml], PTH [low at 15], mild hypercalcemia noted when corrected for albumin, PTHrp [ ]. Will HOLD mag supplementation and d/c protonix as this may be lowering phos levels. Fanconi syndrome also a consideration, though less likely. Certain chemo agents can cause it (ie cisplatin), unclear what agents he has been on on in the past, though most recently has been taking irinotecan.    Clinically significantly improved on 1/18, more alert, conversive, able to sit in chair. Continue working with PT, pending phos stabilization, gradually improving, now on oral supplementation, will prep for d/c home on 1/20. Has f/u appt on 1/23 8am w Oncology, will need repeat BMP, mag phos at that time.

## 2023-01-19 NOTE — PROGRESS NOTE ADULT - PROBLEM SELECTOR PLAN 6
- Na 122 on 1/5, increased to 134 on 1/9, now resolving  - Serum osmolarity calculated as 262 (hypotonic), patient euvolemic on exam  - Elevated urine osmolarity and urine sodium are suggestive of inappropriate concentration of urine   - Patient with known pleural metastasis, likely 2/2 SIADH    Plan  - c/w salt tabs 2 g BID via PEG tube  - Recheck BMP AM, trend Na daily  - c/w free water flushes 150 ml q8h - Na 122 on 1/5, increased to 134 on 1/9, now resolving  - Serum osmolarity calculated as 262 (hypotonic), patient euvolemic on exam  - Elevated urine osmolarity and urine sodium are suggestive of inappropriate concentration of urine   - Patient with known pleural metastasis, likely 2/2 SIADH    Plan  - c/w salt tabs 2 g BID --> daily on 1/19 via PEG tube  - Recheck BMP AM, trend Na daily  - c/w free water flushes 150 ml q8h

## 2023-01-19 NOTE — PROGRESS NOTE ADULT - PROBLEM SELECTOR PLAN 1
- progressively worsening pain, erythema, edema, and warmth at the R knee joint for about 1 month  - recent partial excision of R patella on 11/10 with SCC on biopsy but unclear margins  - current presentation more consistent with progression of known cancer than septic arthritis  - MRI R knee w/wo contrast showing R patellar fracture with OM vs osteonecrosis, moderate complicated effusion--clinical suspicion is greatest for osteonecrosis from metastatic disease   - s/p tap of R knee but small sample collected; gram stain negative with no PMNs or bacteria    Plan  - Appreciate Ortho recs:     - s/p patellectomy, quadricepsplasty, and synovectomy on 1/7 in OR     - Ortho removed Provena vacuum on 1/13     - s/p ancef 2 g q8h from 1/9-1/10 for prophylaxis against post-op infection  - Pain control (PRNs):      - Tylenol 650 mg q6h for mild, oxycodone 5 mg q6h for moderate, oxycodone 10 mg q6h for severe     - c/w bowel regimen to prevent opioid induced constipation (miralax daily)  - Coordinate home PT and wound care for knee on D/C  - Outpatient f/u with Dr. Carter within 1 week after D/C  - DVT ppx with xarelto 10 mg x 30 days until f/u with Ortho (Wells score for PE 4.0, moderate risk)    - toradol 25mg Standing q8

## 2023-01-19 NOTE — PROGRESS NOTE ADULT - PROBLEM SELECTOR PROBLEM 2
Bactrim Pregnancy And Lactation Text: This medication is Pregnancy Category D and is known to cause fetal risk.  It is also excreted in breast milk. Dapsone Counseling: I discussed with the patient the risks of dapsone including but not limited to hemolytic anemia, agranulocytosis, rashes, methemoglobinemia, kidney failure, peripheral neuropathy, headaches, GI upset, and liver toxicity.  Patients who start dapsone require monitoring including baseline LFTs and weekly CBCs for the first month, then every month thereafter.  The patient verbalized understanding of the proper use and possible adverse effects of dapsone.  All of the patient's questions and concerns were addressed. Benzoyl Peroxide Pregnancy And Lactation Text: This medication is Pregnancy Category C. It is unknown if benzoyl peroxide is excreted in breast milk. High Dose Vitamin A Counseling: Side effects reviewed, pt to contact office should one occur. Involuntary movements Tazorac Pregnancy And Lactation Text: This medication is not safe during pregnancy. It is unknown if this medication is excreted in breast milk. Topical Sulfur Applications Pregnancy And Lactation Text: This medication is Pregnancy Category C and has an unknown safety profile during pregnancy. It is unknown if this topical medication is excreted in breast milk. Dapsone Pregnancy And Lactation Text: This medication is Pregnancy Category C and is not considered safe during pregnancy or breast feeding. Azithromycin Pregnancy And Lactation Text: This medication is considered safe during pregnancy and is also secreted in breast milk. Minocycline Pregnancy And Lactation Text: This medication is Pregnancy Category D and not consider safe during pregnancy. It is also excreted in breast milk. Bactrim Counseling:  I discussed with the patient the risks of sulfa antibiotics including but not limited to GI upset, allergic reaction, drug rash, diarrhea, dizziness, photosensitivity, and yeast infections.  Rarely, more serious reactions can occur including but not limited to aplastic anemia, agranulocytosis, methemoglobinemia, blood dyscrasias, liver or kidney failure, lung infiltrates or desquamative/blistering drug rashes. Topical Clindamycin Counseling: Patient counseled that this medication may cause skin irritation or allergic reactions.  In the event of skin irritation, the patient was advised to reduce the amount of the drug applied or use it less frequently.   The patient verbalized understanding of the proper use and possible adverse effects of clindamycin.  All of the patient's questions and concerns were addressed. Erythromycin Pregnancy And Lactation Text: This medication is Pregnancy Category B and is considered safe during pregnancy. It is also excreted in breast milk. Include Pregnancy/Lactation Warning?: No Topical Retinoid Pregnancy And Lactation Text: This medication is Pregnancy Category C. It is unknown if this medication is excreted in breast milk. Azithromycin Counseling:  I discussed with the patient the risks of azithromycin including but not limited to GI upset, allergic reaction, drug rash, diarrhea, and yeast infections. Spironolactone Pregnancy And Lactation Text: This medication can cause feminization of the male fetus and should be avoided during pregnancy. The active metabolite is also found in breast milk. Topical Clindamycin Pregnancy And Lactation Text: This medication is Pregnancy Category B and is considered safe during pregnancy. It is unknown if it is excreted in breast milk. Birth Control Pills Pregnancy And Lactation Text: This medication should be avoided if pregnant and for the first 30 days post-partum. Erythromycin Counseling:  I discussed with the patient the risks of erythromycin including but not limited to GI upset, allergic reaction, drug rash, diarrhea, increase in liver enzymes, and yeast infections. Tazorac Counseling:  Patient advised that medication is irritating and drying.  Patient may need to apply sparingly and wash off after an hour before eventually leaving it on overnight.  The patient verbalized understanding of the proper use and possible adverse effects of tazorac.  All of the patient's questions and concerns were addressed. Isotretinoin Pregnancy And Lactation Text: This medication is Pregnancy Category X and is considered extremely dangerous during pregnancy. It is unknown if it is excreted in breast milk. High Dose Vitamin A Pregnancy And Lactation Text: High dose vitamin A therapy is contraindicated during pregnancy and breast feeding. Doxycycline Counseling:  Patient counseled regarding possible photosensitivity and increased risk for sunburn.  Patient instructed to avoid sunlight, if possible.  When exposed to sunlight, patients should wear protective clothing, sunglasses, and sunscreen.  The patient was instructed to call the office immediately if the following severe adverse effects occur:  hearing changes, easy bruising/bleeding, severe headache, or vision changes.  The patient verbalized understanding of the proper use and possible adverse effects of doxycycline.  All of the patient's questions and concerns were addressed. Topical Sulfur Applications Counseling: Topical Sulfur Counseling: Patient counseled that this medication may cause skin irritation or allergic reactions.  In the event of skin irritation, the patient was advised to reduce the amount of the drug applied or use it less frequently.   The patient verbalized understanding of the proper use and possible adverse effects of topical sulfur application.  All of the patient's questions and concerns were addressed. Topical Retinoid counseling:  Patient advised to apply a pea-sized amount only at bedtime and wait 30 minutes after washing their face before applying.  If too drying, patient may add a non-comedogenic moisturizer. The patient verbalized understanding of the proper use and possible adverse effects of retinoids.  All of the patient's questions and concerns were addressed. Benzoyl Peroxide Counseling: Patient counseled that medicine may cause skin irritation and bleach clothing.  In the event of skin irritation, the patient was advised to reduce the amount of the drug applied or use it less frequently.   The patient verbalized understanding of the proper use and possible adverse effects of benzoyl peroxide.  All of the patient's questions and concerns were addressed. Spironolactone Counseling: Patient advised regarding risks of diarrhea, abdominal pain, hyperkalemia, birth defects (for female patients), liver toxicity and renal toxicity. The patient may need blood work to monitor liver and kidney function and potassium levels while on therapy. The patient verbalized understanding of the proper use and possible adverse effects of spironolactone.  All of the patient's questions and concerns were addressed. Detail Level: Detailed Minocycline Counseling: Patient advised regarding possible photosensitivity and discoloration of the teeth, skin, lips, tongue and gums.  Patient instructed to avoid sunlight, if possible.  When exposed to sunlight, patients should wear protective clothing, sunglasses, and sunscreen.  The patient was instructed to call the office immediately if the following severe adverse effects occur:  hearing changes, easy bruising/bleeding, severe headache, or vision changes.  The patient verbalized understanding of the proper use and possible adverse effects of minocycline.  All of the patient's questions and concerns were addressed. Doxycycline Pregnancy And Lactation Text: This medication is Pregnancy Category D and not consider safe during pregnancy. It is also excreted in breast milk but is considered safe for shorter treatment courses. Isotretinoin Counseling: Patient should get monthly blood tests, not donate blood, not drive at night if vision affected, not share medication, and not undergo elective surgery for 6 months after tx completed. Side effects reviewed, pt to contact office should one occur. Birth Control Pills Counseling: Birth Control Pill Counseling: I discussed with the patient the potential side effects of OCPs including but not limited to increased risk of stroke, heart attack, thrombophlebitis, deep venous thrombosis, hepatic adenomas, breast changes, GI upset, headaches, and depression.  The patient verbalized understanding of the proper use and possible adverse effects of OCPs. All of the patient's questions and concerns were addressed. Tetracycline Counseling: Patient counseled regarding possible photosensitivity and increased risk for sunburn.  Patient instructed to avoid sunlight, if possible.  When exposed to sunlight, patients should wear protective clothing, sunglasses, and sunscreen.  The patient was instructed to call the office immediately if the following severe adverse effects occur:  hearing changes, easy bruising/bleeding, severe headache, or vision changes.  The patient verbalized understanding of the proper use and possible adverse effects of tetracycline.  All of the patient's questions and concerns were addressed. Patient understands to avoid pregnancy while on therapy due to potential birth defects.

## 2023-01-19 NOTE — PROGRESS NOTE ADULT - SUBJECTIVE AND OBJECTIVE BOX
Internal Medicine   Donald Johnson | PGY-2    OVERNIGHT EVENTS:      SUBJECTIVE:       MEDICATIONS  (STANDING):  clotrimazole 1% Cream 1 Application(s) Topical every 12 hours  enoxaparin Injectable 40 milliGRAM(s) SubCutaneous every 24 hours  influenza  Vaccine (HIGH DOSE) 0.7 milliLiter(s) IntraMuscular once  levothyroxine 150 MICROGram(s) Enteral Tube daily  polyethylene glycol 3350 17 Gram(s) Oral daily  potassium phosphate / sodium phosphate Powder (PHOS-NaK) 2 Packet(s) Oral two times a day  potassium phosphate / sodium phosphate Powder (PHOS-NaK) 2 Packet(s) Oral every 8 hours  sodium chloride 2 Gram(s) Oral every 12 hours    MEDICATIONS  (PRN):  albuterol    90 MICROgram(s) HFA Inhaler 2 Puff(s) Inhalation every 6 hours PRN Shortness of Breath and/or Wheezing  gabapentin 300 milliGRAM(s) Oral three times a day PRN neuropathic pain  ondansetron Injectable 4 milliGRAM(s) IV Push every 6 hours PRN Nausea and/or Vomiting  oxyCODONE    IR 10 milliGRAM(s) Oral every 6 hours PRN Severe Pain (7 - 10)  oxyCODONE    IR 5 milliGRAM(s) Oral every 6 hours PRN Moderate Pain (4 - 6)        T(F): 98.4 (01-19-23 @ 05:16), Max: 99.4 (01-18-23 @ 21:50)  HR: 125 (01-19-23 @ 05:16) (88 - 127)  BP: 115/63 (01-19-23 @ 05:16) (105/61 - 115/63)  BP(mean): --  RR: 20 (01-19-23 @ 05:16) (19 - 20)  SpO2: 100% (01-19-23 @ 05:16) (100% - 100%)    PHYSICAL EXAM:     GENERAL: NAD, lying in bed comfortably  HEAD:  Atraumatic, Normocephalic  EYES: EOMI, PERRLA, conjunctiva and sclera clear, no nystagmus noted  ENT: Moist mucous membranes,   NECK: Supple, No JVD, trachea midline  CHEST/LUNG: Clear to auscultation bilaterally; No rales, rhonchi, wheezing, or rubs. Unlabored respirations  HEART: Regular rate and rhythm; No murmurs, rubs, or gallops, normal S1/S2  ABDOMEN: normal bowel sounds; Soft, nontender, nondistended, no organomegaly   EXTREMITIES:  2+ Peripheral Pulses, brisk capillary refill. No clubbing, cyanosis, or edema  MSK: No gross deformities noted   Neurological:  A&Ox3, no focal deficits   SKIN: No rashes or lesions  PSYCH: Normal mood, affect     TELEMETRY:    LABS:                        8.5    27.97 )-----------( 741      ( 19 Jan 2023 06:10 )             28.7     01-18    141  |  109<H>  |  34<H>  ----------------------------<  138<H>  4.5   |  20<L>  |  0.82    Ca    9.5      18 Jan 2023 18:13  Phos  2.3     01-18  Mg     2.00     01-18              Creatinine Trend: 0.82<--, 0.82<--, 0.82<--, 0.82<--, 0.86<--, 0.83<--  I&O's Summary    18 Jan 2023 07:01  -  19 Jan 2023 07:00  --------------------------------------------------------  IN: 948 mL / OUT: 0 mL / NET: 948 mL      BNP    RADIOLOGY & ADDITIONAL STUDIES:             Internal Medicine   Donald Johnson | PGY-2    OVERNIGHT EVENTS: No overnight events      SUBJECTIVE: Patient feels well. Denies fever, chills, dyspnea, chest pain.       MEDICATIONS  (STANDING):  clotrimazole 1% Cream 1 Application(s) Topical every 12 hours  enoxaparin Injectable 40 milliGRAM(s) SubCutaneous every 24 hours  influenza  Vaccine (HIGH DOSE) 0.7 milliLiter(s) IntraMuscular once  levothyroxine 150 MICROGram(s) Enteral Tube daily  polyethylene glycol 3350 17 Gram(s) Oral daily  potassium phosphate / sodium phosphate Powder (PHOS-NaK) 2 Packet(s) Oral two times a day  potassium phosphate / sodium phosphate Powder (PHOS-NaK) 2 Packet(s) Oral every 8 hours  sodium chloride 2 Gram(s) Oral every 12 hours    MEDICATIONS  (PRN):  albuterol    90 MICROgram(s) HFA Inhaler 2 Puff(s) Inhalation every 6 hours PRN Shortness of Breath and/or Wheezing  gabapentin 300 milliGRAM(s) Oral three times a day PRN neuropathic pain  ondansetron Injectable 4 milliGRAM(s) IV Push every 6 hours PRN Nausea and/or Vomiting  oxyCODONE    IR 10 milliGRAM(s) Oral every 6 hours PRN Severe Pain (7 - 10)  oxyCODONE    IR 5 milliGRAM(s) Oral every 6 hours PRN Moderate Pain (4 - 6)        T(F): 98.4 (01-19-23 @ 05:16), Max: 99.4 (01-18-23 @ 21:50)  HR: 125 (01-19-23 @ 05:16) (88 - 127)  BP: 115/63 (01-19-23 @ 05:16) (105/61 - 115/63)  BP(mean): --  RR: 20 (01-19-23 @ 05:16) (19 - 20)  SpO2: 100% (01-19-23 @ 05:16) (100% - 100%)    PHYSICAL EXAM:     Gen: Alert, NAD  HEENT: NCAT, conjunctiva clear, sclera anicteric, white exudates in the oropharynx, dry mucous membranes  Neck: Supple, no JVD  CV: RRR, S1S2, no m/r/g  Resp: decreased breath sounds on L side, no accessory muscle use, breathing comfortably on RA. lung exam improved today with clear lung sounds in RUL  Abd: Soft, nontender, moderately distended, normal bowel sounds, PEG tube in LUQ mildly TTP  Ext: no edema, no clubbing or cyanosis  - RLE: in immobilizer, DP 2+, warm toes, plantar/dorsiflexion 4+/5 but limited by pain  Neuro: AOx3, CN2-12 grossly intact, ANDREWS      TELEMETRY:    LABS:                        8.5    27.97 )-----------( 741      ( 19 Jan 2023 06:10 )             28.7     01-18    141  |  109<H>  |  34<H>  ----------------------------<  138<H>  4.5   |  20<L>  |  0.82    Ca    9.5      18 Jan 2023 18:13  Phos  2.3     01-18  Mg     2.00     01-18              Creatinine Trend: 0.82<--, 0.82<--, 0.82<--, 0.82<--, 0.86<--, 0.83<--  I&O's Summary    18 Jan 2023 07:01  -  19 Jan 2023 07:00  --------------------------------------------------------  IN: 948 mL / OUT: 0 mL / NET: 948 mL      BNP    RADIOLOGY & ADDITIONAL STUDIES:

## 2023-01-20 NOTE — PROGRESS NOTE ADULT - PROBLEM SELECTOR PLAN 5
- Phos 1.0 >1.6 on 1/8 in context of pt NPO for procedure on 1/7  - ongoing hypophosphatemia noted despite repletion  - concern for refeeding syndrome, diet held recently for PEG dysfunction; may also be secondary to magnesium repletion as this can interfere with absorption of phosphorus    Plan  - Will use standing phosphate packet repletion as patient is consistently becoming hypophosphoric. Will most likely need a stable outpatient regimen of phosphate repletion in regards to dispo planning  - PTH borderline normal  - Will hold magnesium oral supplementation and monitor magnesium  - PTH related peptide sent as this may be paraneoplastic syndrome  - Urine lytes, ph sent to assess for RTA as this could be atypical fanconi syndrome and to calculate phosphorus/creatinine ratio. FePO4 is 60% indicating renal wasting of phosphorus  - Patient noted to have a gamma gap. Will send protein electrophoresis and immunofixation as paraproteinemia is an etiology of RTA type 1 and type 2 - Phos 1.0 >1.6 on 1/8 in context of pt NPO for procedure on 1/7  - ongoing hypophosphatemia noted despite repletion  - concern for refeeding syndrome, diet held recently for PEG dysfunction; may also be secondary to magnesium repletion as this can interfere with absorption of phosphorus    Plan  - Will use standing phosphate packet repletion as patient is consistently becoming hypophosphoric. Will most likely need a stable outpatient regimen of phosphate repletion in regards to dispo planning  - PTH borderline normal  - Will hold magnesium oral supplementation and monitor magnesium  - PTH related peptide sent as this may be paraneoplastic syndrome  - Urine lytes, ph sent to assess for RTA as this could be atypical fanconi syndrome and to calculate phosphorus/creatinine ratio. FePO4 is 60% indicating renal wasting of phosphorus  - Patient noted to have a gamma gap. 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

## 2023-01-20 NOTE — PROGRESS NOTE ADULT - PROBLEM SELECTOR PLAN 8
- takes synthroid 150 mcg PO daily at home  - TSH 11.98 but free T4 WNL    Plan  - c/w PO synthroid 150 mg (home med), please administer during 4 hour gap in tube feeds  - patient denies new symptoms of hypothyroidism including constipation, hypotension, dry skin, brittle hair  - recommend outpatient follow-up as TSH may be inaccurate in context of current hospitalization and could instead be suggestive of sick euthyroid syndrome - takes synthroid 150 mcg PO daily at home  - TSH 11.98 but free T4 WNL    Plan  - c/w PO synthroid 150 mg (home med), please administer during 4 hour gap in tube feeds  - patient previously reported absence of new symptoms of hypothyroidism including constipation, hypotension, dry skin, brittle hair  - recommend outpatient follow-up as TSH may be inaccurate in context of current hospitalization and could instead be suggestive of sick euthyroid syndrome

## 2023-01-20 NOTE — PROGRESS NOTE ADULT - PROBLEM SELECTOR PLAN 1
- progressively worsening pain, erythema, edema, and warmth at the R knee joint for about 1 month  - recent partial excision of R patella on 11/10 with SCC on biopsy but unclear margins  - current presentation more consistent with progression of known cancer than septic arthritis  - MRI R knee w/wo contrast showing R patellar fracture with OM vs osteonecrosis, moderate complicated effusion--clinical suspicion is greatest for osteonecrosis from metastatic disease   - s/p tap of R knee but small sample collected; gram stain negative with no PMNs or bacteria    Plan  - Appreciate Ortho recs:     - s/p patellectomy, quadricepsplasty, and synovectomy on 1/7 in OR     - Ortho removed Provena vacuum on 1/13     - s/p ancef 2 g q8h from 1/9-1/10 for prophylaxis against post-op infection  - Pain control (PRNs):      - Tylenol 650 mg q6h for mild, oxycodone 5 mg q6h for moderate, oxycodone 10 mg q6h for severe     - c/w bowel regimen to prevent opioid induced constipation (miralax daily)  - Coordinate home PT and wound care for knee on D/C  - Outpatient f/u with Dr. Carter within 1 week after D/C  - DVT ppx with xarelto 10 mg x 30 days until f/u with Ortho (Wells score for PE 4.0, moderate risk)    - toradol 25mg Standing q8 - progressively worsening pain, erythema, edema, and warmth at the R knee joint for about 1 month  - recent partial excision of R patella on 11/10 with SCC on biopsy but unclear margins  - current presentation more consistent with progression of known cancer than septic arthritis  - MRI R knee w/wo contrast showing R patellar fracture with OM vs osteonecrosis, moderate complicated effusion--clinical suspicion is greatest for osteonecrosis from metastatic disease   - s/p tap of R knee but small sample collected; gram stain negative with no PMNs or bacteria    Plan  - Appreciate Ortho recs:     - s/p patellectomy, quadricepsplasty, and synovectomy on 1/7 in OR     - Ortho removed Provena vacuum on 1/13     - s/p ancef 2 g q8h from 1/9-1/10 for prophylaxis against post-op infection  - Pain control (PRNs):      - Tylenol 650 mg q6h for mild, oxycodone 5 mg q6h for moderate, oxycodone 10 mg q6h for severe     - c/w bowel regimen to prevent opioid induced constipation (miralax daily)     -Ordered fleet enema  - Coordinate home PT and wound care for knee on D/C  - Outpatient f/u with Dr. Carter within 1 week after D/C  - DVT ppx with xarelto 10 mg x 30 days until f/u with Ortho (Wells score for PE 4.0, moderate risk)    - toradol 25mg Standing q8

## 2023-01-20 NOTE — PROGRESS NOTE ADULT - ASSESSMENT
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 for evaluation and management of right knee pain and swelling. Although septic arthritis initially was a consideration, the patient had no evident fevers or infectious signs, and his pain was progressive and slow, so antibiotics were discontinued. He was noted on MRI of the knee to have findings concerning for metastatic disease and is s/p surgical resection of his right patella on 1/7. He reports slight improvement in pain after procedure but is still requiring PRN oxycodone 10 mg multiple times per day. He is now pending disposition with home PT and wound care for R knee. Orthopedic Surgery following, removed wound vacuums on 1/13. Patient now being treated for enterobacter HAP seen on CT angio with cefepime (1/12-1/16). Pain control PRN.

## 2023-01-20 NOTE — PROGRESS NOTE ADULT - PROBLEM SELECTOR PLAN 6
- Na 122 on 1/5, increased to 134 on 1/9, now resolving  - Serum osmolarity calculated as 262 (hypotonic), patient euvolemic on exam  - Elevated urine osmolarity and urine sodium are suggestive of inappropriate concentration of urine   - Patient with known pleural metastasis, likely 2/2 SIADH    Plan  - c/w salt tabs 2 g BID --> daily on 1/19 via PEG tube  - Recheck BMP AM, trend Na daily  - c/w free water flushes 150 ml q8h - Na 122 on 1/5, increased to 134 on 1/9, now resolving  - Serum osmolarity calculated as 262 (hypotonic), patient euvolemic on exam  - Elevated urine osmolarity and urine sodium are suggestive of inappropriate concentration of urine   - Patient with known pleural metastasis, likely 2/2 SIADH    Plan  - Na 151 and K 5 (non-hemolyzed); dc-ed salt tabs  - Recheck BMP AM, trend Na daily  - c/w free water flushes 250 mL TID

## 2023-01-20 NOTE — PROGRESS NOTE ADULT - PROBLEM SELECTOR PLAN 3
- patient with new dyspnea with speech, tachypnea to 20, wheezing on exam, tachycardia to 110s x few days  - DDx includes HAP vs atelectasis from decreased ambulation vs PE       - denies worsening cough, hemoptysis, LE pain/swelling; pt at high risk for clots due to malignancy and immobilization, Wells score for PE 4.0       - has been afebrile but WBCs increasing; with chronic productive cough  - CT angio chest with new RUL ground glass opacities, no recent CT chest for comparison; no evidence of PE but limited by motion; known L sided severe atelectasis  - sputum cx with enterobacter cloacae; RVP neg    Plan  - ID consulted for new RUL findings, appreciate recs     - c/w IV cefepime/antibiotics x 7 days for HAP; (1/12-1/18) expected    - Per sputum sensitivities, patient can be discharged on PO levaquin if afebrile  - As patient has low grade fevers, will continue to treat with IV cefepime  - Incentive spirometry for atelectasis - patient with new dyspnea with speech, tachypnea to 20, wheezing on exam, tachycardia to 110s x few days  - DDx includes HAP vs atelectasis from decreased ambulation vs PE       - denies worsening cough, hemoptysis, LE pain/swelling; pt at high risk for clots due to malignancy and immobilization, Wells score for PE 4.0       - has been afebrile but WBCs increasing; with chronic productive cough  - CT angio chest with new RUL ground glass opacities, no recent CT chest for comparison; no evidence of PE but limited by motion; known L sided severe atelectasis  - sputum cx with enterobacter cloacae; RVP neg    Plan  - ID consulted for new RUL findings, appreciate recs     - c/w IV cefepime/antibiotics x 7 days for HAP; (1/12-1/18) expected  - Incentive spirometry for atelectasis

## 2023-01-20 NOTE — PROGRESS NOTE ADULT - ATTENDING COMMENTS
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 He was noted on MRI of the knee to have findings concerning for metastatic disease and is s/p surgical resection of his right patella on 1/7. He reports slight improvement in pain after procedure but is still requiring PRN oxycodone 10 mg multiple times per day. Added standing Tramadol q8hr. He is now pending disposition with home PT and wound care for R knee. Orthopedic Surgery following, removed wound vacuums on 1/13. Patient now being treated for RUL enterobacter HAP seen on CT angio with cefepime (1/12-1/18).     Hypophosphatemia was an issue, dependent on IV phosphorus repletion. F/u 25 hydroxyvitamin d [ nml], PTH [low at 15], mild hypercalcemia noted when corrected for albumin, PTHrp [ ]. Will HOLD mag supplementation and d/c protonix as this may be lowering phos levels. Fanconi syndrome also a consideration, though less likely. Certain chemo agents can cause it (ie cisplatin), unclear what agents he has been on on in the past, though most recently has been taking irinotecan. Phos improved on 1/19, though likely due to dehydration.     Hypovolemic hypernatremia, likely 2/2 dehdration- free water flushes had been held while correcting his hyponatremia at the beginning of his admission. Required salt tabs, slowly downtitrated over the past 4 days, now OFF entirely on 1/20. Restart free water flushes at 250ml TID (1/20- ). Trend BMP this afternoon. Pt also complaining of severe abdominal pain, no BM in 48  hours, last stool charted was on 1/13. Enema 1/20.    Clinically significantly improved on 1/18, more alert, conversive, able to sit in chair. Continue working with PT, pending phos stabilization, gradually improving, now on oral supplementation, will prep for d/c home on 1/20--> pt more lethargic on 1/20, with hypernatremia as above, trend BMPs, hopeful d/c on 1/21, family aware. Has f/u appt on 1/23 8am w Oncology, will need repeat BMP, mag phos at that time.

## 2023-01-20 NOTE — PROGRESS NOTE ADULT - NSPROGADDITIONALINFOA_GEN_ALL_CORE
Greater than 50 minutes spent with patient and on patient's care plan.

## 2023-01-20 NOTE — PROGRESS NOTE ADULT - PROBLEM SELECTOR PLAN 7
- diagnosed about 8 yrs ago  - follows with Dr. Gi Thomas ([place])  - complicated by dysphagia with all meds/nutrition via PEG  - CXR with near complete opacification of L lung, stable from previous imaging and likely represents pleural metastases    Plan  - On 3rd line chemo with single agent irinotecan (started 6/27/22, s/p 3 cycles, most recently on 8/1/22)  - Contacted outpatient oncologist, most recent progress note sent to [facility] (family requested 2nd opinion, considering transfer)  - Continue goals of care conversation with family: currently interested in resuming palliative chemo on D/C  - C/w continuous feeds given high residuals on bolus feeds  - C/w pantoprazole 40 mg daily for GERD, gabapentin 300 mg TID PRN for pain, MgOH and KCl for supplementation

## 2023-01-20 NOTE — PROGRESS NOTE ADULT - SUBJECTIVE AND OBJECTIVE BOX
Patient is a 67y old  Male who presents with a chief complaint of R knee pain (20 Jan 2023 12:16)     INTERVAL HPI/OVERNIGHT EVENTS:  - No acute events overnight    SUBJECTIVE  - Patient seen and evaluated at bedside  - Patient reports presence of constipation-related abdominal pain and 10/10 R knee pain; had bloody sputum in a cup  - Patient reports absence of fevers, nausea, emesis, chest pain, dyspnea, urinary symptoms, or LE edema     MEDICATIONS  (STANDING):  clotrimazole 1% Cream 1 Application(s) Topical every 12 hours  influenza  Vaccine (HIGH DOSE) 0.7 milliLiter(s) IntraMuscular once  levothyroxine 150 MICROGram(s) Enteral Tube daily  polyethylene glycol 3350 17 Gram(s) Oral daily  potassium phosphate / sodium phosphate Powder (PHOS-NaK) 2 Packet(s) Oral every 8 hours    MEDICATIONS  (PRN):  albuterol    90 MICROgram(s) HFA Inhaler 2 Puff(s) Inhalation every 6 hours PRN Shortness of Breath and/or Wheezing  gabapentin 300 milliGRAM(s) Oral three times a day PRN neuropathic pain  ondansetron Injectable 4 milliGRAM(s) IV Push every 6 hours PRN Nausea and/or Vomiting  oxyCODONE    IR 10 milliGRAM(s) Oral every 6 hours PRN Severe Pain (7 - 10)  oxyCODONE    IR 5 milliGRAM(s) Oral every 6 hours PRN Moderate Pain (4 - 6)        REVIEW OF SYSTEMS: As indicated above; otherwise, negative    VITAL SIGNS:  T(F): 97.5 (01-20-23 @ 13:37), Max: 98.6 (01-20-23 @ 06:59)  HR: 116 (01-20-23 @ 13:37) (112 - 124)  BP: 102/66 (01-20-23 @ 13:37) (102/66 - 117/69)  RR: 18 (01-20-23 @ 13:37) (17 - 19)  SpO2: 100% (01-20-23 @ 13:37) (98% - 100%)    PHYSICAL EXAM:  General: NAD; appeared drowsy  Eyes: Mostly closed during enconter  ENMT: Dry mucous membranes  Neck: No obvious palpable pre-auricular, post-auricular, occipital, mandibular, submental, supra-clavicular, or infra-clavicular lymph nodes   Chest: Clear to auscultation bilaterally; no obvious rales, rhonchi, or wheezing  Heart: tachycardic rate and regular rhythm; intact S1 and S2; no obvious murmurs, rubs, or gallops appreciated  Abd: Soft, mild tender on palpation, nondistended  Nervous System: AAO and responds to questions slowly and hypophonic   Psych: Appropriate affect  Ext: no peripheral LE edema bilaterally    LABS:                        8.6    26.93 )-----------( 721      ( 20 Jan 2023 05:30 )             29.9     20 Jan 2023 19:40    151    |  117    |  50     ----------------------------<  133    4.4     |  23     |  1.07     Ca    9.7        20 Jan 2023 19:40  Phos  4.1       20 Jan 2023 19:40  Mg     2.30      20 Jan 2023 19:40      CAPILLARY BLOOD GLUCOSE        BLOOD CULTURE    RADIOLOGY & ADDITIONAL TESTS:    Imaging Personally Reviewed:  [X ] YES     Consultant(s) Notes Reviewed:  Yes    Care Discussed with Consultants/Other Providers: Yes Patient is a 67y old  Male who presents with a chief complaint of R knee pain (20 Jan 2023 12:16)     INTERVAL HPI/OVERNIGHT EVENTS:  - No acute events overnight    SUBJECTIVE  - Patient seen and evaluated at bedside  - Patient reports presence of constipation-related abdominal pain and 10/10 R knee pain; had bloody sputum in a cup  - Patient reports absence of fevers, nausea, emesis, chest pain, dyspnea, urinary symptoms, or LE edema     MEDICATIONS  (STANDING):  clotrimazole 1% Cream 1 Application(s) Topical every 12 hours  influenza  Vaccine (HIGH DOSE) 0.7 milliLiter(s) IntraMuscular once  levothyroxine 150 MICROGram(s) Enteral Tube daily  polyethylene glycol 3350 17 Gram(s) Oral daily  potassium phosphate / sodium phosphate Powder (PHOS-NaK) 2 Packet(s) Oral every 8 hours    MEDICATIONS  (PRN):  albuterol    90 MICROgram(s) HFA Inhaler 2 Puff(s) Inhalation every 6 hours PRN Shortness of Breath and/or Wheezing  gabapentin 300 milliGRAM(s) Oral three times a day PRN neuropathic pain  ondansetron Injectable 4 milliGRAM(s) IV Push every 6 hours PRN Nausea and/or Vomiting  oxyCODONE    IR 10 milliGRAM(s) Oral every 6 hours PRN Severe Pain (7 - 10)  oxyCODONE    IR 5 milliGRAM(s) Oral every 6 hours PRN Moderate Pain (4 - 6)        REVIEW OF SYSTEMS: As indicated above; otherwise, negative    VITAL SIGNS:  T(F): 97.5 (01-20-23 @ 13:37), Max: 98.6 (01-20-23 @ 06:59)  HR: 116 (01-20-23 @ 13:37) (112 - 124)  BP: 102/66 (01-20-23 @ 13:37) (102/66 - 117/69)  RR: 18 (01-20-23 @ 13:37) (17 - 19)  SpO2: 100% (01-20-23 @ 13:37) (98% - 100%)    PHYSICAL EXAM:  General: NAD; appeared drowsy  Eyes: Mostly closed during encounter  ENMT: Dry mucous membranes  Neck: No obvious palpable pre-auricular, post-auricular, occipital, mandibular, submental, supra-clavicular, or infra-clavicular lymph nodes   Chest: Clear to auscultation bilaterally; no obvious rales, rhonchi, or wheezing  Heart: tachycardic rate and regular rhythm; intact S1 and S2; no obvious murmurs, rubs, or gallops appreciated  Abd: Soft, mild tender on palpation, nondistended  Nervous System: AAO and responds to questions slowly and hypophonic   Psych: Appropriate affect  Ext: no peripheral LE edema bilaterally    LABS:                        8.6    26.93 )-----------( 721      ( 20 Jan 2023 05:30 )             29.9     20 Jan 2023 19:40    151    |  117    |  50     ----------------------------<  133    4.4     |  23     |  1.07     Ca    9.7        20 Jan 2023 19:40  Phos  4.1       20 Jan 2023 19:40  Mg     2.30      20 Jan 2023 19:40      CAPILLARY BLOOD GLUCOSE        BLOOD CULTURE    RADIOLOGY & ADDITIONAL TESTS:    Imaging Personally Reviewed:  [X ] YES     Consultant(s) Notes Reviewed:  Yes    Care Discussed with Consultants/Other Providers: Yes

## 2023-01-21 NOTE — PROGRESS NOTE ADULT - PROBLEM SELECTOR PLAN 3
- patient with new dyspnea with speech, tachypnea to 20, wheezing on exam, tachycardia to 110s x few days  - DDx includes HAP vs atelectasis from decreased ambulation vs PE       - denies worsening cough, hemoptysis, LE pain/swelling; pt at high risk for clots due to malignancy and immobilization, Wells score for PE 4.0       - has been afebrile but WBCs increasing; with chronic productive cough  - CT angio chest with new RUL ground glass opacities, no recent CT chest for comparison; no evidence of PE but limited by motion; known L sided severe atelectasis  - sputum cx with enterobacter cloacae; RVP neg    Plan  - ID consulted for new RUL findings, appreciate recs     - c/w IV cefepime/antibiotics x 7 days for HAP; (1/12-1/18) expected  - Incentive spirometry for atelectasis

## 2023-01-21 NOTE — PROGRESS NOTE ADULT - PROBLEM SELECTOR PLAN 8
- takes synthroid 150 mcg PO daily at home  - TSH 11.98 but free T4 WNL    Plan  - c/w PO synthroid 150 mg (home med), please administer during 4 hour gap in tube feeds  - patient previously reported absence of new symptoms of hypothyroidism including constipation, hypotension, dry skin, brittle hair  - recommend outpatient follow-up as TSH may be inaccurate in context of current hospitalization and could instead be suggestive of sick euthyroid syndrome

## 2023-01-21 NOTE — PROGRESS NOTE ADULT - ATTENDING COMMENTS
Patient seen and examined, care d/w HS7.    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 PT rec JOHN PAUL but family declining. Developed HAP s/p cefepime 1/16.  Also had hypophosphatemia on PO repletion.  Now w/ [place] and hypernatremia, unclear what is causing loses (tolerating TF, no diarrhea), increase free water via PEG, also IVF, given acute can correct quickly.  Monitor UOP.    Family at bedside, state fine with staying as do not want to be readmitted, also told onc has no plan for chemo in near future, want things to settle Patient seen and examined, care d/w HS7.    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 PT rec JOHN PAUL but family declining. Developed HAP s/p cefepime 1/16.  Also had hypophosphatemia on PO repletion.  Now w/ [place] and hypernatremia, unclear what is causing loses (tolerating TF, no diarrhea), increase free water via PEG, also IVF, given acute can correct quickly.  Monitor UOP.  Place on bolus feeds as that will be dc plan.  Educate on holding feeds 1 hour s/p synthroid to help absorption, will need OP TFTs re: TSH ~12, free T4 in range.     Family at bedside, state fine with staying as do not want to be readmitted, also told onc has no plan for chemo in near future, want things to settle

## 2023-01-21 NOTE — PROGRESS NOTE ADULT - PROBLEM SELECTOR PLAN 5
- Phos 1.0 >1.6 on 1/8 in context of pt NPO for procedure on 1/7  - ongoing hypophosphatemia noted despite repletion  - concern for refeeding syndrome, diet held recently for PEG dysfunction; may also be secondary to magnesium repletion as this can interfere with absorption of phosphorus    Plan  - Will use standing phosphate packet repletion as patient is consistently becoming hypophosphoric. Will most likely need a stable outpatient regimen of phosphate repletion in regards to dispo planning  - PTH borderline normal  - Will hold magnesium oral supplementation and monitor magnesium  - PTH related peptide sent as this may be paraneoplastic syndrome  - Urine lytes, ph sent to assess for RTA as this could be atypical fanconi syndrome and to calculate phosphorus/creatinine ratio. FePO4 is 60% indicating renal wasting of phosphorus  - Patient noted to have a gamma gap. 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

## 2023-01-21 NOTE — PROGRESS NOTE ADULT - SUBJECTIVE AND OBJECTIVE BOX
Internal Medicine   Donald Johnson | PGY-2    OVERNIGHT EVENTS:      SUBJECTIVE:       MEDICATIONS  (STANDING):  clotrimazole 1% Cream 1 Application(s) Topical every 12 hours  influenza  Vaccine (HIGH DOSE) 0.7 milliLiter(s) IntraMuscular once  levothyroxine 150 MICROGram(s) Enteral Tube daily  polyethylene glycol 3350 17 Gram(s) Oral daily  potassium phosphate / sodium phosphate Powder (PHOS-NaK) 2 Packet(s) Oral every 8 hours  rivaroxaban 10 milliGRAM(s) Enteral Tube daily    MEDICATIONS  (PRN):  albuterol    90 MICROgram(s) HFA Inhaler 2 Puff(s) Inhalation every 6 hours PRN Shortness of Breath and/or Wheezing  gabapentin 300 milliGRAM(s) Oral three times a day PRN neuropathic pain  ondansetron Injectable 4 milliGRAM(s) IV Push every 6 hours PRN Nausea and/or Vomiting  oxyCODONE    IR 10 milliGRAM(s) Oral every 6 hours PRN Severe Pain (7 - 10)  oxyCODONE    IR 5 milliGRAM(s) Oral every 6 hours PRN Moderate Pain (4 - 6)        T(F): 97.9 (01-20-23 @ 21:47), Max: 97.9 (01-20-23 @ 21:47)  HR: 117 (01-20-23 @ 21:47) (116 - 117)  BP: 113/69 (01-20-23 @ 21:47) (102/66 - 113/69)  BP(mean): --  RR: 18 (01-20-23 @ 21:47) (18 - 18)  SpO2: 100% (01-20-23 @ 21:47) (100% - 100%)    PHYSICAL EXAM:     GENERAL: NAD, lying in bed comfortably  HEAD:  Atraumatic, Normocephalic  EYES: EOMI, PERRLA, conjunctiva and sclera clear, no nystagmus noted  ENT: Moist mucous membranes,   NECK: Supple, No JVD, trachea midline  CHEST/LUNG: Clear to auscultation bilaterally; No rales, rhonchi, wheezing, or rubs. Unlabored respirations  HEART: Regular rate and rhythm; No murmurs, rubs, or gallops, normal S1/S2  ABDOMEN: normal bowel sounds; Soft, nontender, nondistended, no organomegaly   EXTREMITIES:  2+ Peripheral Pulses, brisk capillary refill. No clubbing, cyanosis, or edema  MSK: No gross deformities noted   Neurological:  A&Ox3, no focal deficits   SKIN: No rashes or lesions  PSYCH: Normal mood, affect     TELEMETRY:    LABS:                        8.6    26.93 )-----------( 721      ( 20 Jan 2023 05:30 )             29.9     01-20    151<H>  |  117<H>  |  50<H>  ----------------------------<  133<H>  4.4   |  23  |  1.07    Ca    9.7      20 Jan 2023 19:40  Phos  4.1     01-20  Mg     2.30     01-20              Creatinine Trend: 1.07<--, 1.12<--, 1.09<--, 0.84<--, 0.85<--, 0.82<--  I&O's Summary    20 Jan 2023 07:01  -  21 Jan 2023 07:00  --------------------------------------------------------  IN: 750 mL / OUT: 1250 mL / NET: -500 mL      BNP    RADIOLOGY & ADDITIONAL STUDIES:             Internal Medicine   Donald Johnson | PGY-2    OVERNIGHT EVENTS: No overnight events      SUBJECTIVE: Patient has no acute complaints. Denies fever, chills, dyspnea, chest pain. Denies abdominal and last BM yesterday. Denies worsening lethargy or confusion. Denies any dehydration different from baseline      MEDICATIONS  (STANDING):  clotrimazole 1% Cream 1 Application(s) Topical every 12 hours  influenza  Vaccine (HIGH DOSE) 0.7 milliLiter(s) IntraMuscular once  levothyroxine 150 MICROGram(s) Enteral Tube daily  polyethylene glycol 3350 17 Gram(s) Oral daily  potassium phosphate / sodium phosphate Powder (PHOS-NaK) 2 Packet(s) Oral every 8 hours  rivaroxaban 10 milliGRAM(s) Enteral Tube daily    MEDICATIONS  (PRN):  albuterol    90 MICROgram(s) HFA Inhaler 2 Puff(s) Inhalation every 6 hours PRN Shortness of Breath and/or Wheezing  gabapentin 300 milliGRAM(s) Oral three times a day PRN neuropathic pain  ondansetron Injectable 4 milliGRAM(s) IV Push every 6 hours PRN Nausea and/or Vomiting  oxyCODONE    IR 10 milliGRAM(s) Oral every 6 hours PRN Severe Pain (7 - 10)  oxyCODONE    IR 5 milliGRAM(s) Oral every 6 hours PRN Moderate Pain (4 - 6)        T(F): 97.9 (01-20-23 @ 21:47), Max: 97.9 (01-20-23 @ 21:47)  HR: 117 (01-20-23 @ 21:47) (116 - 117)  BP: 113/69 (01-20-23 @ 21:47) (102/66 - 113/69)  BP(mean): --  RR: 18 (01-20-23 @ 21:47) (18 - 18)  SpO2: 100% (01-20-23 @ 21:47) (100% - 100%)    PHYSICAL EXAM:     General: NAD; appeared drowsy  Eyes: Mostly closed during encounter  ENMT: Dry mucous membranes  Neck: No obvious palpable pre-auricular, post-auricular, occipital, mandibular, submental, supra-clavicular, or infra-clavicular lymph nodes   Chest: Clear to auscultation bilaterally; no obvious rales, rhonchi, or wheezing  Heart: tachycardic rate and regular rhythm; intact S1 and S2; no obvious murmurs, rubs, or gallops appreciated  Abd: Soft, mild tender on palpation, nondistended  Nervous System: AAO and responds to questions slowly and hypophonic   Psych: Appropriate affect  Ext: no peripheral LE edema bilaterally    TELEMETRY:    LABS:                        8.6    26.93 )-----------( 721      ( 20 Jan 2023 05:30 )             29.9     01-20    151<H>  |  117<H>  |  50<H>  ----------------------------<  133<H>  4.4   |  23  |  1.07    Ca    9.7      20 Jan 2023 19:40  Phos  4.1     01-20  Mg     2.30     01-20              Creatinine Trend: 1.07<--, 1.12<--, 1.09<--, 0.84<--, 0.85<--, 0.82<--  I&O's Summary    20 Jan 2023 07:01  -  21 Jan 2023 07:00  --------------------------------------------------------  IN: 750 mL / OUT: 1250 mL / NET: -500 mL      BNP    RADIOLOGY & ADDITIONAL STUDIES:

## 2023-01-21 NOTE — PROGRESS NOTE ADULT - ASSESSMENT
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 Although septic arthritis initially was a consideration, the patient had no evident fevers or infectious signs, and his pain was progressive and slow, so antibiotics were discontinued. He was noted on MRI of the knee to have findings concerning for metastatic disease and is s/p surgical resection of his right patella on 1/7. He reports slight improvement in pain after procedure but is still requiring PRN oxycodone 10 mg multiple times per day. He is now pending disposition with home PT and wound care for R knee. Orthopedic Surgery following, removed wound vacuums on 1/13. Patient now being treated for enterobacter HAP seen on CT angio with cefepime (1/12-1/16). Pain control PRN.

## 2023-01-21 NOTE — PROGRESS NOTE ADULT - PROBLEM SELECTOR PLAN 7
- diagnosed about 8 yrs ago  - follows with Dr. 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

## 2023-01-21 NOTE — PROGRESS NOTE ADULT - PROBLEM SELECTOR PLAN 6
- Na 122 on 1/5, increased to 134 on 1/9, now resolving  - Serum osmolarity calculated as 262 (hypotonic), patient euvolemic on exam  - Elevated urine osmolarity and urine sodium are suggestive of inappropriate concentration of urine   - Patient with known pleural metastasis, likely 2/2 SIADH    Plan  - Na 151 and K 5 (non-hemolyzed); dc-ed salt tabs  - Recheck BMP AM, trend Na daily  - c/w free water flushes 250 mL TID

## 2023-01-21 NOTE — PROGRESS NOTE ADULT - PROBLEM SELECTOR PLAN 1
- progressively worsening pain, erythema, edema, and warmth at the R knee joint for about 1 month  - recent partial excision of R patella on 11/10 with SCC on biopsy but unclear margins  - current presentation more consistent with progression of known cancer than septic arthritis  - MRI R knee w/wo contrast showing R patellar fracture with OM vs osteonecrosis, moderate complicated effusion--clinical suspicion is greatest for osteonecrosis from metastatic disease   - s/p tap of R knee but small sample collected; gram stain negative with no PMNs or bacteria    Plan  - Appreciate Ortho recs:     - s/p patellectomy, quadricepsplasty, and synovectomy on 1/7 in OR     - Ortho removed Provena vacuum on 1/13     - s/p ancef 2 g q8h from 1/9-1/10 for prophylaxis against post-op infection  - Pain control (PRNs):      - Tylenol 650 mg q6h for mild, oxycodone 5 mg q6h for moderate, oxycodone 10 mg q6h for severe     - c/w bowel regimen to prevent opioid induced constipation (miralax daily)     -Ordered fleet enema  - Coordinate home PT and wound care for knee on D/C  - Outpatient f/u with Dr. Carter within 1 week after D/C  - DVT ppx with xarelto 10 mg x 30 days until f/u with Ortho (Wells score for PE 4.0, moderate risk)    - toradol 25mg Standing q8

## 2023-01-22 NOTE — PROGRESS NOTE ADULT - NUTRITIONAL ASSESSMENT
This patient has been assessed with a concern for Malnutrition and has been determined to have a diagnosis/diagnoses of Severe protein-calorie malnutrition.    This patient is being managed with:   Diet NPO with Tube Feed-  Tube Feeding Modality: Gastrostomy  TwoCal HN (TWOCALHNRTH)  Total Volume for 24 Hours (mL): 2040  Bolus  Total Volume of Bolus (mL):  170  Total # of Feeds: 7  Tube Feed Frequency: Every 2 hours   Tube Feed Start Time: 06:00  Bolus   Total Volume per Flush (mL): 300   Frequency: Every 6 Hours    Start Time: 06:00  Entered: Jan 21 2023  5:22PM

## 2023-01-22 NOTE — PROGRESS NOTE ADULT - PROBLEM SELECTOR PLAN 7
- diagnosed about 8 yrs ago  - follows with Dr. 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 (family requested 2nd opinion, considering transfer)  - Continue goals of care conversation with family: currently interested in resuming palliative chemo on D/C  - C/w continuous feeds given high residuals on bolus feeds  - C/w pantoprazole 40 mg daily for GERD, gabapentin 300 mg TID PRN for pain, MgOH and KCl for supplementation

## 2023-01-22 NOTE — PROVIDER CONTACT NOTE (OTHER) - ACTION/TREATMENT ORDERED:
MD made aware and stated she will come to the bedside. MD made aware and stated she will come to the bedside. Provider stated that it is ok to stop the fluids while pending new IV access.

## 2023-01-22 NOTE — PROGRESS NOTE ADULT - ASSESSMENT
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 Although septic arthritis initially was a consideration, the patient had no evident fevers or infectious signs, and his pain was progressive and slow, so antibiotics were discontinued. He was noted on MRI of the knee to have findings concerning for metastatic disease and is s/p surgical resection of his right patella on 1/7. He reports slight improvement in pain after procedure but is still requiring PRN oxycodone 10 mg multiple times per day. He is now pending disposition with home PT and wound care for R knee. Orthopedic Surgery following, removed wound vacuums on 1/13. Patient now being treated for enterobacter HAP seen on CT angio with cefepime (1/12-1/16). Pain control PRN.

## 2023-01-22 NOTE — PROGRESS NOTE ADULT - PROBLEM SELECTOR PLAN 10
- Diet: continuous feeds x 20 h  - PT/OT recommending [facility], family prefers home services  - DVT ppx: Lovenox 40 mg SQ daily  - Dispo: likely home    Full code   Updated wife at bedside - Diet: continuous feeds x 20 h  - PT/OT recommending [facility], family prefers home services  - DVT ppx: Lovenox 40 mg SQ daily  - Dispo: likely home    Full code

## 2023-01-22 NOTE — PROGRESS NOTE ADULT - SUBJECTIVE AND OBJECTIVE BOX
Internal Medicine   Donald Johnson | PGY-2    OVERNIGHT EVENTS:      SUBJECTIVE:       MEDICATIONS  (STANDING):  clotrimazole 1% Cream 1 Application(s) Topical every 12 hours  dextrose 5%. 1000 milliLiter(s) (100 mL/Hr) IV Continuous <Continuous>  influenza  Vaccine (HIGH DOSE) 0.7 milliLiter(s) IntraMuscular once  levothyroxine 150 MICROGram(s) Enteral Tube daily  polyethylene glycol 3350 17 Gram(s) Oral two times a day  potassium phosphate / sodium phosphate Powder (PHOS-NaK) 2 Packet(s) Oral every 8 hours  rivaroxaban 10 milliGRAM(s) Enteral Tube daily  senna 2 Tablet(s) Oral at bedtime    MEDICATIONS  (PRN):  albuterol    90 MICROgram(s) HFA Inhaler 2 Puff(s) Inhalation every 6 hours PRN Shortness of Breath and/or Wheezing  gabapentin 300 milliGRAM(s) Oral three times a day PRN neuropathic pain  ondansetron Injectable 4 milliGRAM(s) IV Push every 6 hours PRN Nausea and/or Vomiting  oxyCODONE    IR 10 milliGRAM(s) Oral every 6 hours PRN Severe Pain (7 - 10)  oxyCODONE    IR 5 milliGRAM(s) Oral every 6 hours PRN Moderate Pain (4 - 6)        T(F): 98.2 (01-22-23 @ 06:15), Max: 98.9 (01-21-23 @ 15:37)  HR: 121 (01-22-23 @ 06:15) (120 - 140)  BP: 115/66 (01-22-23 @ 06:15) (108/63 - 120/76)  BP(mean): --  RR: 18 (01-22-23 @ 06:15) (17 - 18)  SpO2: 100% (01-22-23 @ 06:15) (100% - 100%)    PHYSICAL EXAM:     GENERAL: NAD, lying in bed comfortably  HEAD:  Atraumatic, Normocephalic  EYES: EOMI, PERRLA, conjunctiva and sclera clear, no nystagmus noted  ENT: Moist mucous membranes,   NECK: Supple, No JVD, trachea midline  CHEST/LUNG: Clear to auscultation bilaterally; No rales, rhonchi, wheezing, or rubs. Unlabored respirations  HEART: Regular rate and rhythm; No murmurs, rubs, or gallops, normal S1/S2  ABDOMEN: normal bowel sounds; Soft, nontender, nondistended, no organomegaly   EXTREMITIES:  2+ Peripheral Pulses, brisk capillary refill. No clubbing, cyanosis, or edema  MSK: No gross deformities noted   Neurological:  A&Ox3, no focal deficits   SKIN: No rashes or lesions  PSYCH: Normal mood, affect     TELEMETRY:    LABS:                        8.9    27.11 )-----------( 757      ( 21 Jan 2023 07:45 )             30.1     01-21    148<H>  |  113<H>  |  37<H>  ----------------------------<  118<H>  4.9   |  24  |  1.02    Ca    9.3      21 Jan 2023 17:35  Phos  4.1     01-21  Mg     2.10     01-21              Creatinine Trend: 1.02<--, 1.07<--, 1.07<--, 1.12<--, 1.09<--, 0.84<--  I&O's Summary    21 Jan 2023 07:01  -  22 Jan 2023 07:00  --------------------------------------------------------  IN: 2920 mL / OUT: 875 mL / NET: 2045 mL      BNP    RADIOLOGY & ADDITIONAL STUDIES:             Internal Medicine   Donald Johnson | PGY-2    OVERNIGHT EVENTS: Briefly tachy to 140 but self resolved with left LUE swelling at IV site. The IV site was removed      SUBJECTIVE: Patient says left upper extremity swelling and pain has improved after IV was removed. Otherwise has not complaints, fevers, or chills.       MEDICATIONS  (STANDING):  clotrimazole 1% Cream 1 Application(s) Topical every 12 hours  dextrose 5%. 1000 milliLiter(s) (100 mL/Hr) IV Continuous <Continuous>  influenza  Vaccine (HIGH DOSE) 0.7 milliLiter(s) IntraMuscular once  levothyroxine 150 MICROGram(s) Enteral Tube daily  polyethylene glycol 3350 17 Gram(s) Oral two times a day  potassium phosphate / sodium phosphate Powder (PHOS-NaK) 2 Packet(s) Oral every 8 hours  rivaroxaban 10 milliGRAM(s) Enteral Tube daily  senna 2 Tablet(s) Oral at bedtime    MEDICATIONS  (PRN):  albuterol    90 MICROgram(s) HFA Inhaler 2 Puff(s) Inhalation every 6 hours PRN Shortness of Breath and/or Wheezing  gabapentin 300 milliGRAM(s) Oral three times a day PRN neuropathic pain  ondansetron Injectable 4 milliGRAM(s) IV Push every 6 hours PRN Nausea and/or Vomiting  oxyCODONE    IR 10 milliGRAM(s) Oral every 6 hours PRN Severe Pain (7 - 10)  oxyCODONE    IR 5 milliGRAM(s) Oral every 6 hours PRN Moderate Pain (4 - 6)        T(F): 98.2 (01-22-23 @ 06:15), Max: 98.9 (01-21-23 @ 15:37)  HR: 121 (01-22-23 @ 06:15) (120 - 140)  BP: 115/66 (01-22-23 @ 06:15) (108/63 - 120/76)  BP(mean): --  RR: 18 (01-22-23 @ 06:15) (17 - 18)  SpO2: 100% (01-22-23 @ 06:15) (100% - 100%)    PHYSICAL EXAM:     General: NAD; appeared drowsy  Eyes: Mostly closed during encounter  ENMT: Dry mucous membranes  Neck: No obvious palpable pre-auricular, post-auricular, occipital, mandibular, submental, supra-clavicular, or infra-clavicular lymph nodes   Chest: Clear to auscultation bilaterally; no obvious rales, rhonchi, or wheezing  Heart: tachycardic rate and regular rhythm; intact S1 and S2; no obvious murmurs, rubs, or gallops appreciated  Abd: Soft, mild tender on palpation, nondistended  Nervous System: AAO and responds to questions slowly and hypophonic   Psych: Appropriate affect  Ext: no peripheral LE edema bilaterally    TELEMETRY:    LABS:                        8.9    27.11 )-----------( 757      ( 21 Jan 2023 07:45 )             30.1     01-21    148<H>  |  113<H>  |  37<H>  ----------------------------<  118<H>  4.9   |  24  |  1.02    Ca    9.3      21 Jan 2023 17:35  Phos  4.1     01-21  Mg     2.10     01-21              Creatinine Trend: 1.02<--, 1.07<--, 1.07<--, 1.12<--, 1.09<--, 0.84<--  I&O's Summary    21 Jan 2023 07:01  -  22 Jan 2023 07:00  --------------------------------------------------------  IN: 2920 mL / OUT: 875 mL / NET: 2045 mL      BNP    RADIOLOGY & ADDITIONAL STUDIES:             Internal Medicine   Donald Johnson | PGY-2    OVERNIGHT EVENTS: Briefly tachy to 140 but self resolved with left LUE swelling at IV site. The IV site was removed    SUBJECTIVE: Patient says left upper extremity swelling and pain has improved after IV was removed. Otherwise has not complaints, fevers, or chills.     MEDICATIONS  (STANDING):  clotrimazole 1% Cream 1 Application(s) Topical every 12 hours  dextrose 5%. 1000 milliLiter(s) (100 mL/Hr) IV Continuous <Continuous>  influenza  Vaccine (HIGH DOSE) 0.7 milliLiter(s) IntraMuscular once  levothyroxine 150 MICROGram(s) Enteral Tube daily  polyethylene glycol 3350 17 Gram(s) Oral two times a day  potassium phosphate / sodium phosphate Powder (PHOS-NaK) 2 Packet(s) Oral every 8 hours  rivaroxaban 10 milliGRAM(s) Enteral Tube daily  senna 2 Tablet(s) Oral at bedtime    MEDICATIONS  (PRN):  albuterol    90 MICROgram(s) HFA Inhaler 2 Puff(s) Inhalation every 6 hours PRN Shortness of Breath and/or Wheezing  gabapentin 300 milliGRAM(s) Oral three times a day PRN neuropathic pain  ondansetron Injectable 4 milliGRAM(s) IV Push every 6 hours PRN Nausea and/or Vomiting  oxyCODONE    IR 10 milliGRAM(s) Oral every 6 hours PRN Severe Pain (7 - 10)  oxyCODONE    IR 5 milliGRAM(s) Oral every 6 hours PRN Moderate Pain (4 - 6)    T(F): 98.2 (01-22-23 @ 06:15), Max: 98.9 (01-21-23 @ 15:37)  HR: 121 (01-22-23 @ 06:15) (120 - 140)  BP: 115/66 (01-22-23 @ 06:15) (108/63 - 120/76)  RR: 18 (01-22-23 @ 06:15) (17 - 18)  SpO2: 100% (01-22-23 @ 06:15) (100% - 100%)    PHYSICAL EXAM:     General: NAD; appeared drowsy  Eyes: Mostly closed during encounter  ENMT: Dry mucous membranes  Neck: No obvious palpable pre-auricular, post-auricular, occipital, mandibular, submental, supra-clavicular, or infra-clavicular lymph nodes   Chest: Clear to auscultation bilaterally; no obvious rales, rhonchi, or wheezing  Heart: tachycardic rate and regular rhythm; intact S1 and S2; no obvious murmurs, rubs, or gallops appreciated  Abd: Soft, mild tender on palpation, nondistended  Nervous System: AAO and responds to questions slowly and hypophonic   Psych: Appropriate affect  Ext: no peripheral LE edema bilaterally    TELEMETRY:    LABS:                        8.9    27.11 )-----------( 757      ( 21 Jan 2023 07:45 )             30.1     01-21    148<H>  |  113<H>  |  37<H>  ----------------------------<  118<H>  4.9   |  24  |  1.02    Ca    9.3      21 Jan 2023 17:35  Phos  4.1     01-21  Mg     2.10     01-21              Creatinine Trend: 1.02<--, 1.07<--, 1.07<--, 1.12<--, 1.09<--, 0.84<--  I&O's Summary    21 Jan 2023 07:01  -  22 Jan 2023 07:00  --------------------------------------------------------  IN: 2920 mL / OUT: 875 mL / NET: 2045 mL      BNP    RADIOLOGY & ADDITIONAL STUDIES:

## 2023-01-22 NOTE — PROGRESS NOTE ADULT - ATTENDING COMMENTS
Patient seen and examined, care d/w HS7.    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 PT rec [facility] but family declining even though seems hasn't taken step here (state they are aware and will figure it out).  Developed HAP s/p cefepime 1/16.  Also had hypophosphatemia on PO repletion.  Then 1/20  [place] (Cr was 0.75) and hypernatremia, unclear what is causing loses (tolerating TF, no diarrhea) now improved s/p increase free water via PEG + IVF, given acute can correct quickly--better, now will monitor off IVF on just TF water to assess if can maintain.  Monitor UOP.  Place on bolus feeds as that will be dc plan.  Educate on holding feeds 1 hour s/p synthroid to help absorption, will need OP TFTs re: TSH ~12, free T4 in range.     Family at bedside, state fine with staying as do not want to be readmitted, also told onc has no plan for chemo in near future, want things to settle out.  Attempted GOC with wife and son, state given advance nature of cancer and size of mass it is causing likely HR to be high (no PE, now Na better do HR less likely dehydration). They seem to understand but want to continue to pursue treatments.

## 2023-01-23 NOTE — PROVIDER CONTACT NOTE (OTHER) - SITUATION
Pt is febrile and tachycardic
Patient tachycardic and complaining of twitching BUEs/BLEs with some involuntary movements observed
Pt has high gastric residual from PEG tube
Pt's  bpm
Pt 
Pt hypotensive and tachycardic
Pt is febrile, and tachycardic
Temperature 100.3 oral 15 minutes post transfusion completion.
pt received oxycodone 10 mg at 0712.  pt is here to work with pt, however pt is refusing due to pain.
Pt PEG tube dislodged
Pt is febrile, and tachycardic
Pt continue to be tachycardic
Pt is Tachycardic
Pt is febrile and tachycardic
Pt temperature 100.4, 
Pts BP is 112/58
Recommended MD to come to bedside and assess patient's left arm with the IV. Patient's arm has mild swelling.

## 2023-01-23 NOTE — PROGRESS NOTE ADULT - ASSESSMENT
67M metastatic esophageal SCC.   Right patella lesion s/p radical resection 11/10, now patellectomy 1/7.   No clear superimposed infection.     Enterobacter HAP, completed 7 days Cefepime 1/18.     Fever again overnight, 100.8F without chills or malaise. Looks and feels about the same. Aspiration pneumonitis? Tumor fever? Knee surgical site infection?     Suggest  -monitor off antibiotics given clinical stability   -f/u blood cultures   -agree with RVP   -check another CXR too   -surgical site care     Discussed with medicine     Red Streeter MD   Infectious Disease   Available on TEAMS. After 5PM and on weekends please page fellow on call or call [phone]

## 2023-01-23 NOTE — PROGRESS NOTE ADULT - PROBLEM SELECTOR PLAN 1
New fevers 1/22 overnight  - check BCx, RVP  - recall ortho for evaluation of post-procedure R knee site

## 2023-01-23 NOTE — PROGRESS NOTE ADULT - ASSESSMENT
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 Although septic arthritis initially was a consideration, the patient had no evident fevers or infectious signs, and his pain was progressive and slow, so antibiotics were discontinued. He was noted on MRI of the knee to have findings concerning for metastatic disease and is s/p surgical resection of his right patella on 1/7. He reports slight improvement in pain after procedure but is still requiring PRN oxycodone 10 mg multiple times per day. He is now pending disposition with home PT and wound care for R knee. Orthopedic Surgery following, removed wound vacuums on 1/13. Patient now being treated for enterobacter HAP seen on CT angio with cefepime (1/12-1/16). Pain control PRN.

## 2023-01-23 NOTE — PROGRESS NOTE ADULT - SUBJECTIVE AND OBJECTIVE BOX
Follow Up:     Interval History/ROS:     Allergies  No Known Allergies        ANTIMICROBIALS:      OTHER MEDS:  albuterol    90 MICROgram(s) HFA Inhaler 2 Puff(s) Inhalation every 6 hours PRN  clotrimazole 1% Cream 1 Application(s) Topical every 12 hours  gabapentin 300 milliGRAM(s) Oral three times a day PRN  influenza  Vaccine (HIGH DOSE) 0.7 milliLiter(s) IntraMuscular once  levothyroxine 150 MICROGram(s) Enteral Tube daily  ondansetron Injectable 4 milliGRAM(s) IV Push every 6 hours PRN  oxyCODONE    IR 10 milliGRAM(s) Oral every 6 hours PRN  oxyCODONE    IR 5 milliGRAM(s) Oral every 6 hours PRN  polyethylene glycol 3350 17 Gram(s) Oral two times a day  potassium phosphate / sodium phosphate Powder (PHOS-NaK) 2 Packet(s) Oral every 8 hours  rivaroxaban 10 milliGRAM(s) Enteral Tube daily  senna 2 Tablet(s) Oral at bedtime      Vital Signs Last 24 Hrs  T(C): 36.4 (23 Jan 2023 12:55), Max: 38.2 (23 Jan 2023 02:16)  T(F): 97.6 (23 Jan 2023 12:55), Max: 100.8 (23 Jan 2023 02:16)  HR: 114 (23 Jan 2023 12:55) (109 - 125)  BP: 109/62 (23 Jan 2023 12:55) (107/59 - 115/60)  BP(mean): --  RR: 17 (23 Jan 2023 12:55) (17 - 18)  SpO2: 99% (23 Jan 2023 12:55) (99% - 100%)    Parameters below as of 23 Jan 2023 12:55  Patient On (Oxygen Delivery Method): room air        Physical Exam:  General: non toxic  Cardio: regular rate   Respiratory: nonlabored   abd: nondistended  Musculoskeletal: no focal joint swelling, no edema  vascular: no phlebitis   Skin: no rash                          7.7    25.71 )-----------( 582      ( 23 Jan 2023 06:20 )             25.7       01-23    135  |  99  |  33<H>  ----------------------------<  119<H>  3.8   |  22  |  0.96    Ca    8.1<L>      23 Jan 2023 06:20  Phos  3.7     01-23  Mg     1.80     01-23            MICROBIOLOGY:  Rapid RVP Result: NotDetec (01-23 @ 12:04)      RADIOLOGY:  Images below reviewed personally  US Duplex Venous Lower Ext Complete, Bilateral (01.13.23 @ 12:52)   No evidence of deep venous thrombosis in either lower extremity.  Right popliteal vein and calf veins not well visualized due to patient   discomfort.    Xray Abdomen 1 View PORTABLE -Urgent (Xray Abdomen 1 View PORTABLE -Urgent .) (01.11.23 @ 15:25)   No evidence of extraluminal contrast extravasation.

## 2023-01-23 NOTE — PROVIDER CONTACT NOTE (OTHER) - NAME OF MD/NP/PA/DO NOTIFIED:
MD Becker
MD Destiny Garcia
Ariel Dove
Alphonse Umana
Alphonse Umana
Donald Johnson MD
MD Gale
MD Gale
MD Dominguez
MD Dominguez
MD Gale
Dr Becker
Dr Cosme Ocampo
MD Becker
Nghia Umana
MD Becker
MD Becker
MD Gale [id number]
MD Destiny Garcia
MD Donald Johnson

## 2023-01-23 NOTE — PROVIDER CONTACT NOTE (OTHER) - DATE AND TIME:
06-Jan-2023 16:31
06-Jan-2023 20:00
10-Alexander-2023 22:00
22-Jan-2023 23:20
23-Jan-2023 09:36
22-Jan-2023 02:57
01-Jan-2023 00:30
11-Jan-2023 22:30
12-Jan-2023 21:59
16-Jan-2023 10:57
17-Jan-2023 15:43
23-Jan-2023 02:21
14-Jan-2023 02:11
23-Jan-2023 02:21
15-Alexander-2023 22:27
01-Jan-2023 22:45
04-Jan-2023 06:18
10-Alexander-2023 05:00
19-Jan-2023 05:20
22-Jan-2023 01:00

## 2023-01-23 NOTE — PROGRESS NOTE ADULT - PROBLEM SELECTOR PLAN 7
- Na 122 on 1/5, increased to 134 on 1/9, now resolving  - Serum osmolarity calculated as 262 (hypotonic), patient euvolemic on exam  - Elevated urine osmolarity and urine sodium are suggestive of inappropriate concentration of urine   - Patient with known pleural metastasis, likely 2/2 SIADH    Plan  - Na 151 and K 5 (non-hemolyzed); dc-ed salt tabs  - Recheck BMP AM, trend Na daily  - c/w free water flushes 300mL q6h

## 2023-01-23 NOTE — PROGRESS NOTE ADULT - PROBLEM SELECTOR PLAN 11
- Diet: continuous feeds x 20 h  - PT/OT recommending [facility], family prefers home services  - DVT ppx: Lovenox 40 mg SQ daily  - Dispo: likely home    Full code

## 2023-01-23 NOTE — PROVIDER CONTACT NOTE (OTHER) - ASSESSMENT
Patient's arm has mild swelling. Patient denies any pain or discomfort.
Pt , /63, afebrile 98.4 orally, saturating 100% on RA. Pt in no current distress. Denies chest pain or tightness.
Pt A&Ox4, denies chills or feeling hot, denies chest pain. Pt restarted on tube feedings today at 0700 with free water. Pt has frequent cough with sputum, adventitious lung sounds heard
Pt is otherwise stable
Pt remain alert and oriented asymptomatic
Pt remain alert and oriented responding at baseline. asymptomatic
Pt denies pain or discomfort, afebrile vitally stable, denies SOB
Pt remain alert and oriented responding at baseline. asymptomatic
Pt remain alert and oriented responding at baseline. asymptomatic
Patient asymptomatic.
Pt A&Ox4, vitally stable, free from s/s of acute distress, denies SOB. Before starting bolus feed gastric residual measured over 200mL. Pt last received 237mL at 1800 and 150mL free water at 1900
Pt remain alert and oriented asymptomatic
Patient is asymptomatic
Patient is asymptomatic
Pt A&O 4, Pt's  bpm. Pt otherwise vitally  stable
Pt is alert and oriented calm responding at baseline. denies chest pain, no signs of distress noted
Patient alert and oriented x 4 with hoarse voice. Asymptomatic
vs T 100.3 oral /57 , RR 17 o2sat 100% RA, Patient asymptomatic, no chills, no complaints. offered,
BP 96/51, , temp 98.7 orally, spo2 98% RR 18. Pt reports feeling tired. Denies, chest pain, SOB, dizziness, lightheadedness

## 2023-01-23 NOTE — PROGRESS NOTE ADULT - ATTENDING COMMENTS
67 y.o. M w/ a hx of HTN, hypothyroidism, metastatic SCC esophageal cancer with mets to chest wall and R patella c/b dysphagia s/p PEG hospitalized for R knee pain now s/p surgical resection of patella (1/7), course c/b Enterococcus HAP now s/p Cefepime, [place], hypernatremia, all improved.   Overnight, patient had a fever to 100.8, no localizing complaints. Continues to have pain in R knee. Discussed rehab with patient and wife, educated on benefits. Patient deferring decision to wife who is refusing.     # Fever: 100.8 no localizing s/s. Check RVP, CXR and Ortho to eval surgical site, follow up Bcx. Monitor off abx. ID following   # Enterococcus HAP: S/p course of Cefepime, completed on 1/16.   # Hypernatremia: Improved on IVF.   # Metastatic esophageal cancer: W/ R patellar involvement now s/p resection. Cont IP PT, refusing [facility]. Will eventually plan for Home PT. Tolerating bolus TF, continue. Patient and family interested in continued treatment.

## 2023-01-23 NOTE — PROGRESS NOTE ADULT - PROBLEM SELECTOR PLAN 8
- diagnosed about 8 yrs ago  - follows with Dr. Gi Thomas ([place])  - complicated by dysphagia with all meds/nutrition via PEG  - CXR with near complete opacification of L lung, stable from previous imaging and likely represents pleural metastases    Plan  - On 3rd line chemo with single agent irinotecan (started 6/27/22, s/p 3 cycles, most recently on 8/1/22)  - Contacted outpatient oncologist, most recent progress note sent to [facility] (family requested 2nd opinion, considering transfer)  - Continue goals of care conversation with family: currently interested in resuming palliative chemo on D/C  - C/w continuous feeds given high residuals on bolus feeds  - C/w pantoprazole 40 mg daily for GERD, gabapentin 300 mg TID PRN for pain, MgOH and KCl for supplementation

## 2023-01-23 NOTE — PROVIDER CONTACT NOTE (OTHER) - REASON
Pt 
Pt continue to be tachycardic
Pt hypotensive and tachycardic
Recommended MD to come to bedside and assess patient's left arm with the IV. Patient's arm has mild swelling.
Patient tachycardic and complaining of twitching BUEs/BLEs with involuntary movements
Pt PEG tube dislodged
Temperature
pain med order
Pt is Tachycardic
Pt is febrile and tachycardic
Pt is febrile, and tachycardic
Pt temperature 100.4, and tachycardic
Pts BP is 112/58
Pt is febrile and tachycardic
Tachycardia
Pt has high gastric residual from PEG tube
Pt is febrile, and tachycardic
Pt's  bpm

## 2023-01-23 NOTE — PROVIDER CONTACT NOTE (OTHER) - BACKGROUND
Patient admitted with other acute osteomyelitis. PMH of esophageal cancer, neuropathic pain, hypothyroidism, GERD, and HTN.
Patient admitted with acute osteomyelitis. PMH of lung metastases, esophageal cancer, hypothyroidism, GERD and HTN. Now with suspected hospital acquired PNA
Pt admitted for acute osteomyelitis of right knee. PMH of metastatic esophageal cancer. Pt has chronic PEG tube
Pt was admitted for PNA
Pt was admitted for acute osteomyelitis
Pt was admitted for acute osteomyelitis
Pt was admitted for acute osteomyelitis of right knee
Patient with DX of `Right knee osteomylitis, hx of HTN, hyponatremia.
Pt was admitted for acute osteomyelitis of right knee
Pt was admitted for acute osteomyelitis of right knee
Patient admitted with other acute osteomyelitis. PMH of esophogeal cancer, neuropathic pain, hypothyroidism, GERD, and HTN.
Patient admitted with acute osteomyelitis. PMH of lung metastases, esophageal cancer, hypothyroidism, GERD and HTN.
Patient with DX of right knee osteomylitis, hx of metastatic esophageal Ca.
Pt admitted for osteomyelitis.
Pt was admitted for acute osteomyelitis NPO with tube feed
Pt was admitted for acute osteomyelitis of right knee
Patient admitted with acute osteomyelitis. PMH of lung metastases, esophageal cancer, hypothyroidism, GERD and HTN.
Pt admitted for acute osteomyelitis of right knee. PMH of metastatic esophageal cancer. Pt has chronic PEG tube
Pt was admitted for acute osteomyelitis

## 2023-01-23 NOTE — PROGRESS NOTE ADULT - SUBJECTIVE AND OBJECTIVE BOX
Tra Sandhu MD  PGY-3 Department of Internal Medicine  Available on [employer] Teams      Patient is a 67y old  Male who presents with a chief complaint of R knee pain (22 Jan 2023 07:24)      OVERNIGHT EVENTS: T 100.8, tylneol and blood cultures drawn    SUBJECTIVE: Pt seen and examined. Denies chills, CP, SOB, Abdominal pain, N/V, Constipation, Diarrhea    MEDICATIONS  (STANDING):  clotrimazole 1% Cream 1 Application(s) Topical every 12 hours  influenza  Vaccine (HIGH DOSE) 0.7 milliLiter(s) IntraMuscular once  levothyroxine 150 MICROGram(s) Enteral Tube daily  polyethylene glycol 3350 17 Gram(s) Oral two times a day  potassium phosphate / sodium phosphate Powder (PHOS-NaK) 2 Packet(s) Oral every 8 hours  rivaroxaban 10 milliGRAM(s) Enteral Tube daily  senna 2 Tablet(s) Oral at bedtime    MEDICATIONS  (PRN):  albuterol    90 MICROgram(s) HFA Inhaler 2 Puff(s) Inhalation every 6 hours PRN Shortness of Breath and/or Wheezing  gabapentin 300 milliGRAM(s) Oral three times a day PRN neuropathic pain  ondansetron Injectable 4 milliGRAM(s) IV Push every 6 hours PRN Nausea and/or Vomiting  oxyCODONE    IR 10 milliGRAM(s) Oral every 6 hours PRN Severe Pain (7 - 10)  oxyCODONE    IR 5 milliGRAM(s) Oral every 6 hours PRN Moderate Pain (4 - 6)      I&O's Summary    22 Jan 2023 07:01  -  23 Jan 2023 07:00  --------------------------------------------------------  IN: 3670 mL / OUT: 1050 mL / NET: 2620 mL    23 Jan 2023 07:01  -  23 Jan 2023 14:56  --------------------------------------------------------  IN: 950 mL / OUT: 0 mL / NET: 950 mL        Vital Signs Last 24 Hrs  T(C): 37 (23 Jan 2023 04:30), Max: 38.2 (23 Jan 2023 02:16)  T(F): 98.6 (23 Jan 2023 04:30), Max: 100.8 (23 Jan 2023 02:16)  HR: 109 (23 Jan 2023 04:30) (109 - 125)  BP: 107/59 (23 Jan 2023 04:30) (107/59 - 115/60)  BP(mean): --  RR: 18 (23 Jan 2023 04:30) (17 - 18)  SpO2: 100% (23 Jan 2023 04:30) (100% - 100%)    Parameters below as of 23 Jan 2023 04:30  Patient On (Oxygen Delivery Method): room air        =================PHYSICAL EXAM=================    General: NAD  Eyes: Mostly closed during encounter  ENMT: Dry mucous membranes  Neck: No obvious palpable pre-auricular, post-auricular, occipital, mandibular, submental, supra-clavicular, or infra-clavicular lymph nodes   Chest: Clear to auscultation bilaterally; no obvious rales, rhonchi, or wheezing  Heart: tachycardic rate and regular rhythm; intact S1 and S2; no obvious murmurs, rubs, or gallops appreciated  Abd: Soft, mild tender on palpation, nondistended  Nervous System: AAO and responds to questions slowly and hypophonic   Psych: Appropriate affect  Ext: no peripheral LE edema bilaterally    =================================================    LABS:                        7.7    25.71 )-----------( 582      ( 23 Jan 2023 06:20 )             25.7     Auto Eosinophil # x     / Auto Eosinophil % x     / Auto Neutrophil # x     / Auto Neutrophil % x     / BANDS % x                            8.1    25.97 )-----------( 681      ( 22 Jan 2023 05:00 )             27.3     Auto Eosinophil # x     / Auto Eosinophil % x     / Auto Neutrophil # x     / Auto Neutrophil % x     / BANDS % x        01-23    135  |  99  |  33<H>  ----------------------------<  119<H>  3.8   |  22  |  0.96  01-22    137  |  102  |  33<H>  ----------------------------<  97  4.3   |  24  |  0.93  01-22    141  |  104  |  35<H>  ----------------------------<  86  4.2   |  21<L>  |  1.03    Ca    8.1<L>      23 Jan 2023 06:20  Mg     1.80     01-23  Phos  3.7     01-23                  RADIOLOGY & ADDITIONAL TESTS:    Imaging Personally Reviewed:    Consultant(s) Notes Reviewed:      Care Discussed with Consultants/Other Providers:

## 2023-01-24 NOTE — PROGRESS NOTE ADULT - PROBLEM SELECTOR PROBLEM 2
Pt has ear ache,body aches,pt has not noticed any fever, but does feel hot and has had sweats, cough, no appetite. Pt went to  yesterday, was given Coricidin 4 x per day. Pt says she was told it was a viral inf. Pt says they did strep test and it was negative. Pt has been sick x 3 days.  Pt says they told her it was not covid but did not do a covid test.    Allgy see chart  [facility]     cpb Right knee pain

## 2023-01-24 NOTE — PROGRESS NOTE ADULT - PROBLEM SELECTOR PLAN 2
- progressively worsening pain, erythema, edema, and warmth at the R knee joint for about 1 month  - recent partial excision of R patella on 11/10 with SCC on biopsy but unclear margins  - current presentation more consistent with progression of known cancer than septic arthritis  - MRI R knee w/wo contrast showing R patellar fracture with OM vs osteonecrosis, moderate complicated effusion--clinical suspicion is greatest for osteonecrosis from metastatic disease   - s/p tap of R knee but small sample collected; gram stain negative with no PMNs or bacteria    Plan  - Appreciate Ortho recs:     - s/p patellectomy, quadricepsplasty, and synovectomy on 1/7 in OR     - Ortho removed Provena vacuum on 1/13     - s/p ancef 2 g q8h from 1/9-1/10 for prophylaxis against post-op infection  - Pain control (PRNs):      - Tylenol 650 mg q6h for mild, oxycodone 5 mg q6h for moderate, oxycodone 10 mg q6h for severe     - c/w bowel regimen to prevent opioid induced constipation (miralax daily)     -Ordered fleet enema  - Coordinate home PT and wound care for knee on D/C  - Outpatient f/u with Dr. Carter within 1 week after D/C  - DVT ppx with xarelto 10 mg x 30 days until f/u with Ortho (Wells score for PE 4.0, moderate risk)    - toradol 25mg Standing q8 - MRI R knee w/wo contrast showing R patellar fracture with OM vs osteonecrosis, moderate complicated effusion--clinical suspicion is greatest for osteonecrosis from metastatic disease   - s/p tap of R knee but small sample collected; gram stain negative with no PMNs or bacteria  - s/p patellectomy, quadricepsplasty, and synovectomy on 1/7 in OR with ortho, removed Provena vacuum on 1/13, s/p ancef 2 g q8h from 1/9-1/10 for prophylaxis against post-op infection    Plan  - Outpatient f/u with Dr. Carter within 1 week after D/C  - DVT ppx with xarelto 10 mg x 30 days until f/u with Ortho (Wells score for PE 4.0, moderate risk)  - on oxycodone for pain control

## 2023-01-24 NOTE — CHART NOTE - NSCHARTNOTESELECT_GEN_ALL_CORE
Event note
Follow Up/Nutrition Services
istop/Event Note
oncology chart note
Commode/Event Note
Consult/Nutrition Services
Consult/Nutrition Services
Event Note
Follow Up/Nutrition Services
Follow Up/Nutrition Services
ISTOP/Event Note
Ortho/Event Note
Ortho/Event Note
Post op check
Risk Stratification Note/Event Note
abdominal xray--peg tube placement/Event Note
oncology chart note

## 2023-01-24 NOTE — PROGRESS NOTE ADULT - SUBJECTIVE AND OBJECTIVE BOX
Tra Sandhu MD  PGY-3 Department of Internal Medicine  Available on [employer] Teams      Patient is a 67y old  Male who presents with a chief complaint of R knee pain (23 Jan 2023 15:55)      OVERNIGHT EVENTS: No acute overnight events.    SUBJECTIVE: Pt seen and examined. Denies fevers, chills, CP, SOB, Abdominal pain, N/V, Constipation, Diarrhea    MEDICATIONS  (STANDING):  clotrimazole 1% Cream 1 Application(s) Topical every 12 hours  influenza  Vaccine (HIGH DOSE) 0.7 milliLiter(s) IntraMuscular once  levothyroxine 150 MICROGram(s) Enteral Tube daily  polyethylene glycol 3350 17 Gram(s) Oral two times a day  rivaroxaban 10 milliGRAM(s) Enteral Tube daily  senna 2 Tablet(s) Oral at bedtime    MEDICATIONS  (PRN):  albuterol    90 MICROgram(s) HFA Inhaler 2 Puff(s) Inhalation every 6 hours PRN Shortness of Breath and/or Wheezing  gabapentin 300 milliGRAM(s) Oral three times a day PRN neuropathic pain  ondansetron Injectable 4 milliGRAM(s) IV Push every 6 hours PRN Nausea and/or Vomiting  oxyCODONE    IR 10 milliGRAM(s) Oral every 4 hours PRN Severe Pain (7 - 10)  oxyCODONE    IR 5 milliGRAM(s) Oral every 6 hours PRN Moderate Pain (4 - 6)      I&O's Summary    23 Jan 2023 07:01  -  24 Jan 2023 07:00  --------------------------------------------------------  IN: 2150 mL / OUT: 400 mL / NET: 1750 mL        Vital Signs Last 24 Hrs  T(C): 36.9 (24 Jan 2023 06:05), Max: 37.1 (23 Jan 2023 22:47)  T(F): 98.4 (24 Jan 2023 06:05), Max: 98.8 (23 Jan 2023 22:47)  HR: 117 (24 Jan 2023 06:05) (114 - 128)  BP: 111/60 (24 Jan 2023 06:05) (104/55 - 111/60)  BP(mean): --  RR: 17 (24 Jan 2023 06:05) (17 - 19)  SpO2: 98% (24 Jan 2023 06:05) (97% - 99%)    Parameters below as of 24 Jan 2023 06:05  Patient On (Oxygen Delivery Method): room air        =================PHYSICAL EXAM=================    General: NAD  Eyes: Mostly closed during encounter  ENMT: Dry mucous membranes  Neck: No obvious palpable pre-auricular, post-auricular, occipital, mandibular, submental, supra-clavicular, or infra-clavicular lymph nodes   Chest: Clear to auscultation bilaterally; no obvious rales, rhonchi, or wheezing  Heart: tachycardic rate and regular rhythm; intact S1 and S2; no obvious murmurs, rubs, or gallops appreciated  Abd: Soft, mild tender on palpation, nondistended  Nervous System: AAO and responds to questions slowly and hypophonic   Psych: Appropriate affect  Ext: no peripheral LE edema bilaterally    =================================================    LABS:                        7.7    23.56 )-----------( 572      ( 24 Jan 2023 05:12 )             25.7     Auto Eosinophil # 0.38  / Auto Eosinophil % 1.6   / Auto Neutrophil # 20.03 / Auto Neutrophil % 85.1  / BANDS % x                            7.7    25.71 )-----------( 582      ( 23 Jan 2023 06:20 )             25.7     Auto Eosinophil # x     / Auto Eosinophil % x     / Auto Neutrophil # x     / Auto Neutrophil % x     / BANDS % x        01-24    141  |  105  |  28<H>  ----------------------------<  88  4.2   |  19<L>  |  0.85  01-23    135  |  99  |  33<H>  ----------------------------<  119<H>  3.8   |  22  |  0.96  01-22    137  |  102  |  33<H>  ----------------------------<  97  4.3   |  24  |  0.93    Ca    8.7      24 Jan 2023 05:12  Mg     1.90     01-24  Phos  3.1     01-24                  RADIOLOGY & ADDITIONAL TESTS:    Imaging Personally Reviewed:    Consultant(s) Notes Reviewed:      Care Discussed with Consultants/Other Providers:

## 2023-01-24 NOTE — DISCHARGE NOTE NURSING/CASE MANAGEMENT/SOCIAL WORK - NSDCFUADDAPPT_GEN_ALL_CORE_FT
1. Please follow up with your PCP  2. Please follow up with your oncologist. An appointment has been scheduled for you at 8 am on 1/23  3. Please follow up with  in regards to your knee surgery

## 2023-01-24 NOTE — PROGRESS NOTE ADULT - SUBJECTIVE AND OBJECTIVE BOX
Follow Up: Fever    Interval History/ROS: Upset that he hasn't been put back in bed yet, waiting for PT. Feels the same otherwise. No worsening cough, no diarrhea or dysuria.     Allergies  No Known Allergies        ANTIMICROBIALS:      OTHER MEDS:  albuterol    90 MICROgram(s) HFA Inhaler 2 Puff(s) Inhalation every 6 hours PRN  clotrimazole 1% Cream 1 Application(s) Topical every 12 hours  gabapentin 300 milliGRAM(s) Oral three times a day PRN  influenza  Vaccine (HIGH DOSE) 0.7 milliLiter(s) IntraMuscular once  levothyroxine 150 MICROGram(s) Enteral Tube daily  ondansetron Injectable 4 milliGRAM(s) IV Push every 6 hours PRN  oxyCODONE    IR 10 milliGRAM(s) Oral every 4 hours PRN  oxyCODONE    IR 5 milliGRAM(s) Oral every 6 hours PRN  polyethylene glycol 3350 17 Gram(s) Oral two times a day  rivaroxaban 10 milliGRAM(s) Enteral Tube daily  senna 2 Tablet(s) Oral at bedtime      Vital Signs Last 24 Hrs  T(C): 38.7 (24 Jan 2023 15:56), Max: 38.7 (24 Jan 2023 15:56)  T(F): 101.6 (24 Jan 2023 15:56), Max: 101.6 (24 Jan 2023 15:56)  HR: 133 (24 Jan 2023 15:20) (117 - 133)  BP: 120/56 (24 Jan 2023 15:20) (104/55 - 120/56)  BP(mean): --  RR: 17 (24 Jan 2023 15:20) (17 - 19)  SpO2: 100% (24 Jan 2023 15:20) (97% - 100%)    Parameters below as of 24 Jan 2023 15:20  Patient On (Oxygen Delivery Method): room air        Physical Exam:  General: non toxic, alert   Cardio: regular rate   Respiratory: nonlabored, fine rhonchi, stable   abd: nondistended, soft nontender   Musculoskeletal: right leg brace   vascular: no phlebitis   Skin: no rash                          7.7    23.56 )-----------( 572      ( 24 Jan 2023 05:12 )             25.7       01-24    141  |  105  |  28<H>  ----------------------------<  88  4.2   |  19<L>  |  0.85    Ca    8.7      24 Jan 2023 05:12  Phos  3.1     01-24  Mg     1.90     01-24            MICROBIOLOGY:  Culture - Blood (collected 01-23-23 @ 03:20)  Source: .Blood Blood-Peripheral  Preliminary Report (01-24-23 @ 10:01):    No growth to date.    Rapid RVP Result: NotDetec (01-23 @ 12:04)      RADIOLOGY:  Images below reviewed personally  Xray Chest 1 View- PORTABLE-Urgent (Xray Chest 1 View- PORTABLE-Urgent .) (01.23.23 @ 17:01)   Left upper lung mass which invades the chest wall, better appreciated on   prior CT chest.  No acute abnormality.

## 2023-01-24 NOTE — PROGRESS NOTE ADULT - PROBLEM SELECTOR PLAN 8
- diagnosed about 8 yrs ago  - follows with Dr. 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

## 2023-01-24 NOTE — PROGRESS NOTE ADULT - PROBLEM SELECTOR PLAN 1
Discharge Planning Assessment   Juan Manuel     Patient Name: Snow Noel  MRN: [id number]  Ruthie Shipley RN     New fevers 1/22 overnight  - check BCx, RVP  - recall ortho for evaluation of post-procedure R knee site New fevers 1/22 overnight  - RVP negative  - ortho re-evaluated R knee post-surg site, no c/f infection    ====PLAN====  - f/u BCx, CXR, ID recs

## 2023-01-24 NOTE — PROGRESS NOTE ADULT - ASSESSMENT
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 Although septic arthritis initially was a consideration, the patient had no evident fevers or infectious signs, and his pain was progressive and slow, so antibiotics were discontinued. He was noted on MRI of the knee to have findings concerning for metastatic disease and is s/p surgical resection of his right patella on 1/7. He reports slight improvement in pain after procedure but is still requiring PRN oxycodone 10 mg multiple times per day. He is now pending disposition with home PT and wound care for R knee. Orthopedic Surgery following, removed wound vacuums on 1/13. Patient now being treated for enterobacter HAP seen on CT angio with cefepime (1/12-1/16). Pain control PRN.

## 2023-01-24 NOTE — DISCHARGE NOTE NURSING/CASE MANAGEMENT/SOCIAL WORK - PATIENT PORTAL LINK FT
You can access the FollowMyHealth Patient Portal offered by [facility] by registering at the following website: [email or web address]. By joining [facility]’s FollowMyHealth portal, you will also be able to view your health information using other applications (apps) compatible with our system.

## 2023-01-24 NOTE — PROGRESS NOTE ADULT - PROBLEM SELECTOR PLAN 5
- high residuals overnight (>200 cc)  - mild pain around PEG and white leaking around tube, concerning for tube dislodged vs granulation tissue    Plan  - GI consulted for PEG dysfunction, tube newly placed on 1/2  - Abdominal XR with gastrograffin via tube shows no leakage  - c/w continuous tube feeds at 54 cc/h x 20h  - was trialed on metoclopramide but family was concerned that patient was "twitching" his fingers and feet. Unclear etiology as electrolytes are normal. May be due to sleep deprivation but metoclopramide held for possible akathisia. GI consulted for PEG dysfunction, tube newly placed on 1/2  - Abdominal XR with gastrograffin via tube shows no leakage  - c/w continuous tube feeds at 54 cc/h x 20h  - was trialed on metoclopramide but family was concerned that patient was "twitching" his fingers and feet. Unclear etiology as electrolytes are normal. May be due to sleep deprivation but metoclopramide held for possible akathisia.

## 2023-01-24 NOTE — PROGRESS NOTE ADULT - ATTENDING COMMENTS
67 y.o. M w/ a hx of HTN, hypothyroidism, metastatic SCC esophageal cancer with mets to chest wall and R patella c/b dysphagia s/p PEG hospitalized for R knee pain now s/p surgical resection of patella (1/7), course c/b Enterococcus HAP now s/p Cefepime, [place], hypernatremia, all improved.     Afebrile overnight. Worked with PT this AM. Reports pain in knee, inconsistent efficacy of pain medications. Encouraged patient to notify team if patient has insufficient pain control. RVP negative. No change in CXR, Per ortho- no s/s of surgical site infection. Monitor off abx. ID following   # Enterococcus HAP: S/p course of Cefepime, completed on 1/16.   # Hypernatremia: Improved on IVF.   # Metastatic esophageal cancer: W/ R patellar involvement now s/p resection. Cont IP PT, refusing [facility]. Will eventually plan for Home PT. Tolerating bolus TF, continue. Patient and family interested in continued treatment.       Dispo: Home likely tomorrow

## 2023-01-24 NOTE — CHART NOTE - NSCHARTNOTEFT_GEN_A_CORE
Orthopedics was notified by Medicine Team in regards to re-examining patient as they had low grade fever of 100.8 on 1/23/23. Pt was seen with wife and son at bedside, in NAD. Dressing was removed, incision appeared to be well healing, no drainage noted. In addition, no knee effusion noted. As patient is POD17 and still inpatient, Dr. Carter, orthopedic surgeon is recommending [facility] consult to see if patient can get inpatient radiation treatment x1 dose. Above was relayed to Medicine Resident Phoebe. Continue further workup per Medicine/Infectious Diseases. Pt should remain weight bearing as tolerated RLE in knee immobilizer. Please notify Orthopedics with any further questions. Orthopedics was notified by Medicine Team in regards to re-examining patient as they had low grade fever of 100.8 on 1/23/23. Pt was seen with wife and son at bedside, in NAD. Dressing was removed, incision appeared to be well healing, no drainage noted. In addition, no knee effusion noted. As patient is POD17 and still inpatient, Dr. Carter, orthopedic surgeon is recommending RadOnc consult to see if patient can get inpatient radiation treatment x1 dose. Above was relayed to Medicine Resident Phoebe. Continue further workup per Medicine/Infectious Diseases. Pt should remain weight bearing as tolerated RLE in knee immobilizer. Please notify Orthopedics with any further questions.    Addendum 13:20: Resident made Orthopedics aware of plan for discharge home with home care tomorrow if patient remains afebrile and if XRT should hold up discharge vs can be done outpatient. Per Ortho Attending, recommending RadOnc consult still be obtained inpatient and can decide, however if they can do one dose of XRT it should be done this week otherwise follow up outpatient.

## 2023-01-24 NOTE — DISCHARGE NOTE NURSING/CASE MANAGEMENT/SOCIAL WORK - NSDCPNINST_GEN_ALL_CORE
Patient alert and oriented x 4. Hoarse speech. Left upper quadrant PEG patent and intact. Right knee with some swelling. DAPHNE cho. Skin IAD/MAD

## 2023-01-24 NOTE — DISCHARGE NOTE NURSING/CASE MANAGEMENT/SOCIAL WORK - NSSCNAMETXT_GEN_ALL_CORE
[facility]. Nurse to visit on the day following discharge. Other appropriate services to be arranged thereafter.   Please contact the home care agency at the above phone number if you have not heard from them by approximately 12 noon on the day after your hospital discharge.

## 2023-01-24 NOTE — PROGRESS NOTE ADULT - PROBLEM SELECTOR PLAN 4
- patient with new dyspnea with speech, tachypnea to 20, wheezing on exam, tachycardia to 110s x few days  - DDx includes HAP vs atelectasis from decreased ambulation vs PE       - denies worsening cough, hemoptysis, LE pain/swelling; pt at high risk for clots due to malignancy and immobilization, Wells score for PE 4.0       - has been afebrile but WBCs increasing; with chronic productive cough  - CT angio chest with new RUL ground glass opacities, no recent CT chest for comparison; no evidence of PE but limited by motion; known L sided severe atelectasis  - sputum cx with enterobacter cloacae; RVP neg    Plan  - ID consulted for new RUL findings, appreciate recs     - c/w IV cefepime/antibiotics x 7 days for HAP; (1/12-1/18) expected  - Incentive spirometry for atelectasis RESOLVED. CTA: new RUL GGO, sputum cx with enterobacter cloacae; RVP neg  - s/p IV cefepime/antibiotics x 7 days for HAP; (1/12-1/18)  - Incentive spirometry for atelectasis

## 2023-01-24 NOTE — DISCHARGE NOTE NURSING/CASE MANAGEMENT/SOCIAL WORK - NSDCPEFALRISK_GEN_ALL_CORE
For information on Fall & Injury Prevention, visit: [email or web address] OR  [email or web address] OR  https://www.cdc.gov/steadi/patient.html

## 2023-01-24 NOTE — PROGRESS NOTE ADULT - PROBLEM SELECTOR PLAN 11
- Diet: continuous feeds x 20 h  - PT/OT recommending [facility], family prefers home services  - DVT ppx: Lovenox 40 mg SQ daily  - Dispo: likely home    Full code - Diet: continuous feeds x 20 h  - PT/OT recommending [facility], family prefers home services  - DVT ppx: Lovenox 40 mg SQ daily  - Dispo: likely home with home PT    Full code

## 2023-01-24 NOTE — CHART NOTE - NSCHARTNOTEFT_GEN_A_CORE
Pt seen for malnutrition follow up.    Medical Course:  - Per chart, pt is 67 year old male PMH metastatic esophageal cancer s/p PEG, hypothyroidism, GERD, HTN transferred from [facility] for orthopedics consult.  - MRI knee findings concerning for metastatic disease. Now s/p surgical resection of right patella (1/7). Orthopedics following.   - Pt febrile, tachycardic overnight.     Nutrition Course:  - Pt's wife and son present at bedside throughout interaction.   - Pt was transitioned from continuous feeds to bolus feeds; increased nutrition provision from prior order. Of note, current diet order indicates 6 feeds daily however total volume indicates 8 feeds daily.  - Pt tolerating regimen well without GI distress. Last BM 1/22 per flowsheets; ordered for senna 2 tablets qHS and Miralax 17 gm BID.   - Salt tabs and magnesium oxide have been discontinued. Persistent hypophosphatemia requiring standing repletion.     Diet Prescription:   NPO with Tube Feed: TwoCal HN via Gastrostomy  Total Volume for 24 Hours (mL): 1600  Total Volume of Bolus (mL):  200  Total # of Feeds: 6  Tube Feed Frequency: Every 3 hours  Tube Feed Start Time: 06:00  Bolus Total Volume per Flush (mL): 300   Frequency: Every 6 Hours    Pertinent Medications:   - levothyroxine, polyethylene glycol, senna, ondansetron IV PRN    Pertinent Labs:   - (1/24) Na 141 mmol/L Glu 88 mg/dL K+ 4.2 mmol/L Cr 0.85 mg/dL BUN 28 mg/dL<H> Phos 3.1 mg/dL  - (1/13) HbA1c 5.8%<H>    Weight: (1/24 bed scale obtained at time of visit, unable to tare) 151.6 lbs / 68.9 kg, (1/17 bed scale obtained at time of visit, unable to tare) 154 lbs / 70.0 kg, (1/7 dosing) 146.3 lbs / 66.4 kg, (1/5 bed scale obtained at time of visit, unable to tare) 146.7 lbs / 66.7 kg, (1/4 dosing) 146.3 lbs / 66.4 kg   Height: 67 in / 170.18 cm  IBW: 148 lbs / 67.3 kg +/-10%  BMI: 22.9 kg/m^2 (at most recent dosing weight)    Physical Assessment, per flowsheets:  Edema/Pressure Injury: none noted    Estimated Needs:   [X] No changes since previous assessment, based on dosing weight 146.3 lbs / 66.4 kg   5230-2271 kcal daily @30-35 kcal/kg, 92..24 gm protein daily @1.4-1.6 gm/kg     Previous Nutrition Diagnosis: [X] Severe malnutrition in the context of acute illness, remains appropriate  New Nutrition Diagnosis: [X] not applicable      Interventions:   1) Recommend TwoCal HN via  mL bolus q4 hrs (6 feeds daily) to provide 1200 mL formula, 2400 kcal, 100.2 gm protein, 840 mL free water daily (meets 36.1 kcal/kg, 1.51 gm protein/kg @ dosing weight 66.4 kg). Additional provision of free water per medical discretion.  2) Continue to monitor electrolytes (K+, Mg, P) and replete to within desired limits as clinically indicated.   3) Obtain weekly weights via tared bed scale.    Monitor & Evaluate:  Tolerance to EN, nutrition related lab values, weight trends, BMs/GI distress, hydration status, skin integrity.  Cyndee Andrade RDN, CDN #[id number]  Also available on [employer] Teams. Pt seen for malnutrition follow up.     Medical Course:  - Per chart, pt is 67 year old male PMH metastatic esophageal cancer s/p PEG, hypothyroidism, GERD, HTN transferred from [facility] for orthopedics consult.  - MRI knee findings concerning for metastatic disease. Now s/p surgical resection of right patella (1/7). Orthopedics following.   - Pt febrile, tachycardic overnight.     Nutrition Course:  - Pt's wife and son present at bedside throughout interaction.   - Pt was transitioned from continuous feeds to bolus feeds; increased nutrition provision from prior order. Of note, current diet order indicates 6 feeds daily however total volume indicates 8 feeds daily. Pt's wife reiterates concern regarding apparent weight loss.   - Pt tolerating regimen well without GI distress. Last BM 1/22 per flowsheets; ordered for senna 2 tablets qHS and Miralax 17 gm BID.   - Salt tabs and magnesium oxide have been discontinued. Persistent hypophosphatemia requiring standing repletion.     Diet Prescription:   NPO with Tube Feed: TwoCal HN via Gastrostomy  Total Volume for 24 Hours (mL): 1600  Total Volume of Bolus (mL):  200  Total # of Feeds: 6  Tube Feed Frequency: Every 3 hours  Tube Feed Start Time: 06:00  Bolus Total Volume per Flush (mL): 300   Frequency: Every 6 Hours    Pertinent Medications:   - levothyroxine, polyethylene glycol, senna, ondansetron IV PRN    Pertinent Labs:   - (1/24) Na 141 mmol/L Glu 88 mg/dL K+ 4.2 mmol/L Cr 0.85 mg/dL BUN 28 mg/dL<H> Phos 3.1 mg/dL  - (1/13) HbA1c 5.8%<H>    Weight: (1/24 bed scale obtained at time of visit, unable to tare) 151.6 lbs / 68.9 kg, (1/17 bed scale obtained at time of visit, unable to tare) 154 lbs / 70.0 kg, (1/7 dosing) 146.3 lbs / 66.4 kg, (1/5 bed scale obtained at time of visit, unable to tare) 146.7 lbs / 66.7 kg, (1/4 dosing) 146.3 lbs / 66.4 kg   Height: 67 in / 170.18 cm  IBW: 148 lbs / 67.3 kg +/-10%  BMI: 22.9 kg/m^2 (at most recent dosing weight)    Physical Assessment, per flowsheets:  Edema: 2+ right knee  Pressure Injury: none noted    Estimated Needs:   [X] No changes since previous assessment, based on dosing weight 146.3 lbs / 66.4 kg   5041-7641 kcal daily @30-35 kcal/kg, 92..24 gm protein daily @1.4-1.6 gm/kg     Previous Nutrition Diagnosis: [X] Severe malnutrition in the context of acute illness, remains appropriate  New Nutrition Diagnosis: [X] not applicable      Interventions:   1) Recommend TwoCal HN via  mL bolus q4 hrs (6 feeds daily) to provide 1200 mL formula, 2400 kcal, 100.2 gm protein, 840 mL free water daily (meets 36.1 kcal/kg, 1.51 gm protein/kg @ dosing weight 66.4 kg). Additional provision of free water per medical discretion.  2) Continue to monitor electrolytes (K+, Mg, P) and replete to within desired limits as clinically indicated.   3) Obtain weekly weights via tared bed scale.    Monitor & Evaluate:  Tolerance to EN, nutrition related lab values, weight trends, BMs/GI distress, hydration status, skin integrity.  Cyndee Andrade RDN, CDN #[id number]  Also available on [employer] Teams.

## 2023-01-24 NOTE — PROGRESS NOTE ADULT - ASSESSMENT
67M metastatic esophageal SCC with R patella lesion s/p radical resection 11/10, now patellectomy 1/7.     Enterobacter HAP, completed one week of Cefepime 1/18.     Recurring fevers. Favor tumor fever. Clinically not infectious. Repeat RVP, CXR and blood cultures negative. Surgical site reassuring per ortho.     Suggest  -monitor off antibiotics   -repeat two sets of blood cultures out of caution given high temp and presence of mediport   -if blood cultures are negative at 24hrs and no clinical change, I think he can go home     Discussed with medicine     Red Streeter MD   Infectious Disease   Available on TEAMS. After 5PM and on weekends please page fellow on call or call [phone]

## 2023-01-25 NOTE — CONSULT NOTE ADULT - CONSULT REASON
history of esophageal SCC; R patella excision with pathology showing SCC
PEG replacement
R knee pain
SCC bone mets

## 2023-01-25 NOTE — PROGRESS NOTE ADULT - ASSESSMENT
67M metastatic esophageal SCC with R patella lesion.   Radical resection 11/10 but persistent pain, now s/p patellectomy 1/7.     Enterobacter HAP, completed one week of Cefepime 1/18.     Recurring fevers. Favor tumor fever. Clinically not infectious. Repeat RVP, CXR and blood cultures negative. Surgical site reassuring per ortho.     Planned for radiation therapy before discharge.     Suggest  -monitor off antibiotics   -if repeat blood cultures are negative at 24hrs and no clinical change, no objection to discharge     I will be away tomorrow, back 1/27. If follow up is needed tomorrow please page on call fellow.     Red Streeter MD   Infectious Disease   Available on TEAMS. After 5PM and on weekends please page fellow on call or call [phone]

## 2023-01-25 NOTE — PROGRESS NOTE ADULT - SUBJECTIVE AND OBJECTIVE BOX
Tra Sandhu MD  PGY-3 Department of Internal Medicine  Available on [employer] Teams      Patient is a 67y old  Male who presents with a chief complaint of R knee pain (25 Jan 2023 08:53)      OVERNIGHT EVENTS: No acute overnight events.    SUBJECTIVE: Pt seen and examined. Denies fevers, chills, CP, SOB, Abdominal pain, N/V, Constipation, Diarrhea    MEDICATIONS  (STANDING):  clotrimazole 1% Cream 1 Application(s) Topical every 12 hours  influenza  Vaccine (HIGH DOSE) 0.7 milliLiter(s) IntraMuscular once  levothyroxine 150 MICROGram(s) Enteral Tube daily  polyethylene glycol 3350 17 Gram(s) Oral two times a day  rivaroxaban 10 milliGRAM(s) Enteral Tube daily  senna 2 Tablet(s) Oral at bedtime    MEDICATIONS  (PRN):  albuterol    90 MICROgram(s) HFA Inhaler 2 Puff(s) Inhalation every 6 hours PRN Shortness of Breath and/or Wheezing  gabapentin 300 milliGRAM(s) Oral three times a day PRN neuropathic pain  ondansetron Injectable 4 milliGRAM(s) IV Push every 6 hours PRN Nausea and/or Vomiting  oxyCODONE    IR 10 milliGRAM(s) Oral every 4 hours PRN Severe Pain (7 - 10)  oxyCODONE    IR 5 milliGRAM(s) Oral every 6 hours PRN Moderate Pain (4 - 6)      I&O's Summary    24 Jan 2023 07:01  -  25 Jan 2023 07:00  --------------------------------------------------------  IN: 2200 mL / OUT: 400 mL / NET: 1800 mL        Vital Signs Last 24 Hrs  T(C): 36.9 (25 Jan 2023 05:03), Max: 38.7 (24 Jan 2023 15:56)  T(F): 98.5 (25 Jan 2023 05:03), Max: 101.6 (24 Jan 2023 15:56)  HR: 111 (25 Jan 2023 05:03) (108 - 133)  BP: 106/61 (25 Jan 2023 05:03) (106/61 - 120/56)  BP(mean): --  RR: 17 (25 Jan 2023 05:03) (17 - 17)  SpO2: 99% (25 Jan 2023 05:03) (97% - 100%)    Parameters below as of 25 Jan 2023 05:03  Patient On (Oxygen Delivery Method): room air        =================PHYSICAL EXAM=================    GENERAL: Laying comfortably, NAD  EYES: EOMI, PERRL, no scleral icterus  NECK: No JVD  LUNG: Clear to auscultation bilaterally; No wheeze, crackles or rhonci  HEART: Regular rate and rhythm; No murmurs, rubs, or gallops  ABDOMEN: Soft, Nontender, Nondistended  EXTREMITIES:  No LE edema, 2+ Peripheral Pulses, No clubbing, cyanosis, or edema  PSYCH: AAOx3  NEUROLOGY: non-focal, strength 5/5 in all extremities, sensation intact  SKIN: No rashes or lesions    =================================================    LABS:                        7.7    23.56 )-----------( 572      ( 24 Jan 2023 05:12 )             25.7     Auto Eosinophil # 0.38  / Auto Eosinophil % 1.6   / Auto Neutrophil # 20.03 / Auto Neutrophil % 85.1  / BANDS % x        01-24    141  |  105  |  28<H>  ----------------------------<  88  4.2   |  19<L>  |  0.85    Ca    8.7      24 Jan 2023 05:12  Mg     1.90     01-24  Phos  3.1     01-24                  RADIOLOGY & ADDITIONAL TESTS:    Imaging Personally Reviewed:    Consultant(s) Notes Reviewed:      Care Discussed with Consultants/Other Providers:   Tra Sandhu MD  PGY-3 Department of Internal Medicine  Available on [employer] Teams      Patient is a 67y old  Male who presents with a chief complaint of R knee pain (25 Jan 2023 08:53)      OVERNIGHT EVENTS: No acute overnight events.    SUBJECTIVE: Pt seen and examined. Denies fevers, chills, CP, SOB, Abdominal pain, N/V, Constipation, Diarrhea    MEDICATIONS  (STANDING):  clotrimazole 1% Cream 1 Application(s) Topical every 12 hours  influenza  Vaccine (HIGH DOSE) 0.7 milliLiter(s) IntraMuscular once  levothyroxine 150 MICROGram(s) Enteral Tube daily  polyethylene glycol 3350 17 Gram(s) Oral two times a day  rivaroxaban 10 milliGRAM(s) Enteral Tube daily  senna 2 Tablet(s) Oral at bedtime    MEDICATIONS  (PRN):  albuterol    90 MICROgram(s) HFA Inhaler 2 Puff(s) Inhalation every 6 hours PRN Shortness of Breath and/or Wheezing  gabapentin 300 milliGRAM(s) Oral three times a day PRN neuropathic pain  ondansetron Injectable 4 milliGRAM(s) IV Push every 6 hours PRN Nausea and/or Vomiting  oxyCODONE    IR 10 milliGRAM(s) Oral every 4 hours PRN Severe Pain (7 - 10)  oxyCODONE    IR 5 milliGRAM(s) Oral every 6 hours PRN Moderate Pain (4 - 6)      I&O's Summary    24 Jan 2023 07:01  -  25 Jan 2023 07:00  --------------------------------------------------------  IN: 2200 mL / OUT: 400 mL / NET: 1800 mL        Vital Signs Last 24 Hrs  T(C): 36.9 (25 Jan 2023 05:03), Max: 38.7 (24 Jan 2023 15:56)  T(F): 98.5 (25 Jan 2023 05:03), Max: 101.6 (24 Jan 2023 15:56)  HR: 111 (25 Jan 2023 05:03) (108 - 133)  BP: 106/61 (25 Jan 2023 05:03) (106/61 - 120/56)  BP(mean): --  RR: 17 (25 Jan 2023 05:03) (17 - 17)  SpO2: 99% (25 Jan 2023 05:03) (97% - 100%)    Parameters below as of 25 Jan 2023 05:03  Patient On (Oxygen Delivery Method): room air        =================PHYSICAL EXAM=================    General: NAD  Eyes: Mostly closed during encounter  ENMT: Dry mucous membranes  Neck: No obvious palpable pre-auricular, post-auricular, occipital, mandibular, submental, supra-clavicular, or infra-clavicular lymph nodes   Chest: Clear to auscultation bilaterally; no obvious rales, rhonchi, or wheezing  Heart: tachycardic rate and regular rhythm; intact S1 and S2; no obvious murmurs, rubs, or gallops appreciated  Abd: Soft, mild tender on palpation, nondistended  Nervous System: AAO and responds to questions slowly and hypophonic   Psych: Appropriate affect  Ext: no peripheral LE edema bilaterally    =================================================    LABS:                        7.7    23.56 )-----------( 572      ( 24 Jan 2023 05:12 )             25.7     Auto Eosinophil # 0.38  / Auto Eosinophil % 1.6   / Auto Neutrophil # 20.03 / Auto Neutrophil % 85.1  / BANDS % x        01-24    141  |  105  |  28<H>  ----------------------------<  88  4.2   |  19<L>  |  0.85    Ca    8.7      24 Jan 2023 05:12  Mg     1.90     01-24  Phos  3.1     01-24                  RADIOLOGY & ADDITIONAL TESTS:    Imaging Personally Reviewed:    Consultant(s) Notes Reviewed:      Care Discussed with Consultants/Other Providers:

## 2023-01-25 NOTE — PROGRESS NOTE ADULT - PROBLEM SELECTOR PLAN 11
- Diet: continuous feeds x 20 h  - PT/OT recommending [facility], family prefers home services  - DVT ppx: Lovenox 40 mg SQ daily  - Dispo: likely home with home PT    Full code

## 2023-01-25 NOTE — PROGRESS NOTE ADULT - PROBLEM SELECTOR PLAN 4
RESOLVED. CTA: new RUL GGO, sputum cx with enterobacter cloacae; RVP neg  - s/p IV cefepime/antibiotics x 7 days for HAP; (1/12-1/18)  - Incentive spirometry for atelectasis

## 2023-01-25 NOTE — PROGRESS NOTE ADULT - PROBLEM SELECTOR PLAN 1
New fevers 1/22 and 1/24 overnight  - RVP negative  - ortho re-evaluated R knee post-surg site, no c/f infection    ====PLAN====  - f/u BCx - if negative 24hrs then patient will be discharged  - fevers likely 2/2 malignancy

## 2023-01-25 NOTE — PROGRESS NOTE ADULT - PROBLEM SELECTOR PLAN 2
- MRI R knee w/wo contrast showing R patellar fracture with OM vs osteonecrosis, moderate complicated effusion--clinical suspicion is greatest for osteonecrosis from metastatic disease   - s/p tap of R knee but small sample collected; gram stain negative with no PMNs or bacteria  - s/p patellectomy, quadricepsplasty, and synovectomy on 1/7 in OR with ortho, removed Provena vacuum on 1/13, s/p ancef 2 g q8h from 1/9-1/10 for prophylaxis against post-op infection    Plan  - Outpatient f/u with Dr. Carter within 1 week after D/C  - DVT ppx with xarelto 10 mg x 30 days until f/u with Ortho (Wells score for PE 4.0, moderate risk)  - on oxycodone for pain control

## 2023-01-25 NOTE — PROGRESS NOTE ADULT - NUTRITIONAL ASSESSMENT
This patient has been assessed with a concern for Malnutrition and has been determined to have a diagnosis/diagnoses of Severe protein-calorie malnutrition.    This patient is being managed with:   Diet NPO with Tube Feed-  Tube Feeding Modality: Gastrostomy  TwoCal HN (TWOCALHNRTH)  Total Volume for 24 Hours (mL): 1600  Bolus  Total Volume of Bolus (mL):  200  Total # of Feeds: 6  Tube Feed Frequency: Every 3 hours   Tube Feed Start Time: 06:00  Bolus   Total Volume per Flush (mL): 300   Frequency: Every 6 Hours    Start Time: 06:00  Entered: Jan 22 2023 12:31PM

## 2023-01-25 NOTE — PROGRESS NOTE ADULT - ATTENDING SUPERVISION STATEMENT
Resident
Fellow
Fellow
Resident

## 2023-01-25 NOTE — PROGRESS NOTE ADULT - ATTENDING COMMENTS
67 y.o. M w/ a hx of HTN, hypothyroidism, metastatic SCC esophageal cancer with mets to chest wall and R patella c/b dysphagia s/p PEG hospitalized for R knee pain now s/p surgical resection of patella (1/7), course c/b Enterococcus HAP now s/p Cefepime, [place], hypernatremia, all improved.     Febrile to 101 overnight, no complaints this AM. Bcx collected yesterday- still in lab. PE unchanged.     # Fever: Febrile yest, suspect malignancy associated fevers given lack of infectious s/s. No sx of surgical site infection. If 1/24 Bcx 24h negative, will d/c home today. ID following.   # Enterococcus HAP: S/p course of Cefepime, completed on 1/16.   # Hypernatremia: Resolved, Improved on IVF.   # Metastatic esophageal cancer: W/ R patellar involvement now s/p resection. Cont IP PT, refusing [facility], plan for Home PT. Tolerating bolus TF, continue. Patient and family interested in continued treatment.       Dispo: Home today at 6pm PENDING BCX. If Bcx return negative at 24 h tamie- can D/C home.

## 2023-01-25 NOTE — PROGRESS NOTE ADULT - SUBJECTIVE AND OBJECTIVE BOX
Follow Up: Fevers    Interval History/ROS: Afebrile. No chills, pain, diarrhea or dysuria. Says he's "good". Wants to sleep.     Allergies  No Known Allergies        ANTIMICROBIALS:      OTHER MEDS:  albuterol    90 MICROgram(s) HFA Inhaler 2 Puff(s) Inhalation every 6 hours PRN  clotrimazole 1% Cream 1 Application(s) Topical every 12 hours  gabapentin 300 milliGRAM(s) Oral three times a day PRN  influenza  Vaccine (HIGH DOSE) 0.7 milliLiter(s) IntraMuscular once  levothyroxine 150 MICROGram(s) Enteral Tube daily  ondansetron Injectable 4 milliGRAM(s) IV Push every 6 hours PRN  oxyCODONE    IR 10 milliGRAM(s) Oral every 4 hours PRN  oxyCODONE    IR 5 milliGRAM(s) Oral every 6 hours PRN  polyethylene glycol 3350 17 Gram(s) Oral two times a day  rivaroxaban 10 milliGRAM(s) Enteral Tube daily  senna 2 Tablet(s) Oral at bedtime      Vital Signs Last 24 Hrs  T(C): 37.3 (25 Jan 2023 14:15), Max: 38 (24 Jan 2023 18:34)  T(F): 99.1 (25 Jan 2023 14:15), Max: 100.4 (24 Jan 2023 18:34)  HR: 117 (25 Jan 2023 14:15) (108 - 117)  BP: 111/59 (25 Jan 2023 14:15) (106/61 - 113/60)  BP(mean): --  RR: 18 (25 Jan 2023 14:15) (17 - 18)  SpO2: 100% (25 Jan 2023 14:15) (97% - 100%)    Parameters below as of 25 Jan 2023 14:15  Patient On (Oxygen Delivery Method): room air        Physical Exam:  General: non toxic  Cardio: regular rate   Respiratory: nonlabored on room air, grossly clear  abd: nondistended, soft, nontender  Musculoskeletal: right leg brace                           8.0    25.14 )-----------( 609      ( 25 Jan 2023 13:49 )             27.3       01-25    138  |  107  |  28<H>  ----------------------------<  114<H>  4.6   |  23  |  0.88    Ca    9.3      25 Jan 2023 13:49  Phos  2.3     01-25  Mg     2.00     01-25            MICROBIOLOGY:  Culture - Blood (collected 01-23-23 @ 03:20)  Source: .Blood Blood-Peripheral  Preliminary Report (01-24-23 @ 10:01):    No growth to date.    Rapid RVP Result: NotDetec (01-23 @ 12:04)      RADIOLOGY:  Images below reviewed personally  Xray Chest 1 View- PORTABLE-Urgent (Xray Chest 1 View- PORTABLE-Urgent .) (01.23.23 @ 17:01)   Left upper lung mass which invades the chest wall, better appreciated on   prior CT chest.  No acute abnormality.

## 2023-01-25 NOTE — PROGRESS NOTE ADULT - PROBLEM SELECTOR PLAN 5
GI consulted for PEG dysfunction, tube newly placed on 1/2  - Abdominal XR with gastrograffin via tube shows no leakage  - c/w continuous tube feeds at 54 cc/h x 20h  - was trialed on metoclopramide but family was concerned that patient was "twitching" his fingers and feet. Unclear etiology as electrolytes are normal. May be due to sleep deprivation but metoclopramide held for possible akathisia.

## 2023-01-25 NOTE — PROGRESS NOTE ADULT - ASSESSMENT
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 Although septic arthritis initially was a consideration, the patient had no evident fevers or infectious signs, and his pain was progressive and slow, so antibiotics were discontinued. He was noted on MRI of the knee to have findings concerning for metastatic disease and is s/p surgical resection of his right patella on 1/7. He reports slight improvement in pain after procedure but is still requiring PRN oxycodone 10 mg multiple times per day. He is now pending disposition with home PT and wound care for R knee. Orthopedic Surgery following, removed wound vacuums on 1/13. Patient now being treated for enterobacter HAP seen on CT angio with cefepime (1/12-1/16). Pain control PRN.

## 2023-01-25 NOTE — PROGRESS NOTE ADULT - SUBJECTIVE AND OBJECTIVE BOX
ORTHO PROGRESS NOTE     Pt seen and examined at bedside, denies SOB, CP, Dizziness. N/V/D /HA.  Afebrile overnight events. Pain well controlled.    Vital Signs Last 24 Hrs  T(C): 36.9 (25 Jan 2023 05:03), Max: 38.7 (24 Jan 2023 15:56)  T(F): 98.5 (25 Jan 2023 05:03), Max: 101.6 (24 Jan 2023 15:56)  HR: 111 (25 Jan 2023 05:03) (108 - 133)  BP: 106/61 (25 Jan 2023 05:03) (106/61 - 120/56)  BP(mean): --  RR: 17 (25 Jan 2023 05:03) (17 - 17)  SpO2: 99% (25 Jan 2023 05:03) (97% - 100%)    Parameters below as of 25 Jan 2023 05:03  Patient On (Oxygen Delivery Method): room air        Gen: NAD, alert and oriented  Resp: Unlabored breathing  RLE: Dressing c/d/i in knee immobilizer       SILT DP/SP/ Rosio/Saph/tib       5/5 EHL 5/5 FHL 5/5 TA 5/5 Gastroc 5/5 IP        DP+,        soft compartments, no calf ttp,       Labs:  CBC Full  -  ( 24 Jan 2023 05:12 )  WBC Count : 23.56 K/uL  RBC Count : 2.90 M/uL  Hemoglobin : 7.7 g/dL  Hematocrit : 25.7 %  Platelet Count - Automated : 572 K/uL  Mean Cell Volume : 88.6 fL  Mean Cell Hemoglobin : 26.6 pg  Mean Cell Hemoglobin Concentration : 30.0 gm/dL  Auto Neutrophil # : 20.03 K/uL  Auto Lymphocyte # : 1.46 K/uL  Auto Monocyte # : 1.26 K/uL  Auto Eosinophil # : 0.38 K/uL  Auto Basophil # : 0.08 K/uL  Auto Neutrophil % : 85.1 %  Auto Lymphocyte % : 6.2 %  Auto Monocyte % : 5.3 %  Auto Eosinophil % : 1.6 %  Auto Basophil % : 0.3 %      01-24    141  |  105  |  28<H>  ----------------------------<  88  4.2   |  19<L>  |  0.85    Ca    8.7      24 Jan 2023 05:12  Phos  3.1     01-24  Mg     1.90     01-24        A/P: 67yMale s/p R patellectomy/quadicepsplasty on 1/7. Surgical incisions c/d/i.     Plan:  -Pain control  -Fever workup per ID  - FU rad conc c/s  - FU labs  - FU blood cx  - Rest of care per primary

## 2023-01-25 NOTE — CONSULT NOTE ADULT - SUBJECTIVE AND OBJECTIVE BOX
HPI:  67-year-old male with PMH of metastatic esophageal cancer, hypothyroidism, GERD, HTN who was transferred from [facility] for Orthopedics consult for possible right knee malignancy versus septic arthritis.     He received a biopsy of his right patella on 11/10/22 with pathology positive for squamous cell carcinoma (Dr. Carter). Per collateral from son, patient has been experiencing progressively worsening pain, erythema, edema, and warmth at the R knee joint for about 1 month since biopsy. On 12/30, patient became unable to walk 2/2 pain. Previously was able to ambulate with walker and brace on R knee. On 12/31, son notes patient was attempting to walk and had bleeding from the joint that resolved when he elevated the leg and held pressure.   He is now s/p surgery to the right knee since 1/7/2023 via orthopedics and adjuvant RT seems to be requested now.     Of note, patient with recent admission to [facility] from 12/23-12/31 with COVID and superimposed bacterial pneumonia. Patient was diagnosed with esophageal SCC about 8 years ago and is s/p chemo and radiation. In Nov 2019, he restarted chemotherapy for metastasis to his chest wall (last dose 2 months ago). He follows with outpatient oncologist Dr. Gi Thomas in [place]. He does not take anything by mouth, with all food and meds via PEG tube. Patient reports 10/10 pain currently at R knee and in throat. He is AOx4 but gives brief answers to questions.    Surgical Pathology Report (01.07.23 @ 08:30)    Surgical Pathology Report:   ACCESSION No:  [id number]  Patient:   ANURADHA MUÑOZ  Accession:                             [id number]    Collected Date/Time:                   1/7/2023 08:30 EST  Received Date/Time:                    1/7/2023 15:39 EST    Surgical Pathology Report - Auth (Verified)    Specimen(s) Submitted  1  1- Right knee bone and soft tissue    Final Diagnosis  Bone and soft tissue, right knee, debridement and and irrigation:  - Metastatic moderate to poorly differentiated squamous carcinoma  involving bone and soft tissue with prominent osteonecrosis    Verified by: RACHID MILLER MD Pathologist  (Electronic Signature)  Reported on: 01/17/23 12:57 EST, [facility], [address] [place] [address]  Phone: [phone]   Fax: [phone]  _________________________________________________________________          Heme onc noted below:     Patient was diagnosed with esophageal SCC about 8 years ago and is s/p chemo and radiation. In Nov 2019, he restarted chemotherapy for metastasis to his chest wall (last dose chemo about two and half months ago with his med oncologist). He follows with outpatient oncologist Dr. Gi Thomas at [facility] in [place]. He does not take anything by mouth, with all food and meds via PEG tube. He also received radiation therapy (about 8wks) with a radOnc at [place] for chest wall mass. Recommendations   -please request Medical record and image study report from his med onc Dr. Gi Thomas at [place]  -no plan for chemo as inpt; will refer pt to [facility] after d/c, staff may call pt for setting up an appointment.     Pre-Op Diagnosis, Post-Op Diagnosis and Procedure:   Pre-Op, Post-Op and Procedure Selector:  ·  PRE-OP DIAGNOSIS:  Esophageal neoplasm 07-Jan-2023 10:07:34  Rip Siu.  ·  POST-OP DIAGNOSIS:  Esophageal neoplasm 07-Jan-2023 10:07:40  Rip Siu.  ·  PROCEDURES:  Patellectomy, right 07-Jan-2023 10:07:01  Rip Siu.      Operative Findings:  · Operative Findings	esophageal neoplasia s/p patellectomy, quadricepsplasty, synovectomy        Allergies    No Known Allergies    Intolerances        ROS: [  ] Fever  [  ] Chills  [  ]Chest Pain [  ] SOB  [  ]Cough [  ] N/V  [  ] Diarrhea [  ]Constipation [  ]Other ROS:  [  ] ROS otherwise negative    PAST MEDICAL & SURGICAL HISTORY:  HTN (hypertension)    GERD (gastroesophageal reflux disease)    Hypothyroidism    Neuropathic pain    Disorder of bone, unspecified    H/O constipation    Esophageal cancer    Liver metastases    Lung metastases    S/P percutaneous endoscopic gastrostomy (PEG) tube placement        FAMILY HISTORY:  No pertinent family history in first degree relatives        MEDICATIONS  (STANDING):  clotrimazole 1% Cream 1 Application(s) Topical every 12 hours  influenza  Vaccine (HIGH DOSE) 0.7 milliLiter(s) IntraMuscular once  levothyroxine 150 MICROGram(s) Enteral Tube daily  polyethylene glycol 3350 17 Gram(s) Oral two times a day  rivaroxaban 10 milliGRAM(s) Enteral Tube daily  senna 2 Tablet(s) Oral at bedtime    MEDICATIONS  (PRN):  albuterol    90 MICROgram(s) HFA Inhaler 2 Puff(s) Inhalation every 6 hours PRN Shortness of Breath and/or Wheezing  gabapentin 300 milliGRAM(s) Oral three times a day PRN neuropathic pain  ondansetron Injectable 4 milliGRAM(s) IV Push every 6 hours PRN Nausea and/or Vomiting  oxyCODONE    IR 5 milliGRAM(s) Oral every 6 hours PRN Moderate Pain (4 - 6)  oxyCODONE    IR 10 milliGRAM(s) Oral every 4 hours PRN Severe Pain (7 - 10)      PHYSICAL EXAM  Vital Signs Last 24 Hrs  T(C): 36.9 (25 Jan 2023 05:03), Max: 38.7 (24 Jan 2023 15:56)  T(F): 98.5 (25 Jan 2023 05:03), Max: 101.6 (24 Jan 2023 15:56)  HR: 111 (25 Jan 2023 05:03) (108 - 133)  BP: 106/61 (25 Jan 2023 05:03) (106/61 - 120/56)  BP(mean): --  RR: 17 (25 Jan 2023 05:03) (17 - 17)  SpO2: 99% (25 Jan 2023 05:03) (97% - 100%)    Parameters below as of 25 Jan 2023 05:03  Patient On (Oxygen Delivery Method): room air        General: Well nourished, well developed, no acute distress  HEENT: NC/AT; EOMI, PERRL, sclera nonicteric; external ears normal; no rhinorrhea or epistaxis; mucous membranes moist; oropharynx clear and without erythema  CV: NR, RR; no appreciable r/m/g  Lungs: CTAB, no increased work of breathing  Abdomen: Bowel sounds present; soft, NTND  MSK: Vertebral spine non-tender to palpation  Neuro: AAOx3; cranial nerves II-XII intact; strength 5/5 in upper and lower extremities; sensation to light touch in tact bilaterally.  Psych: Full affect; mood congruent  Skin: no visible rashes on limited examination    IMAGING/LABS/PATHOLOGY: I have personally reviewed the relevant labs, pathology, and imaging as noted in the HPI.  In addition,                          7.7    23.56 )-----------( 572      ( 24 Jan 2023 05:12 )             25.7     01-24    141  |  105  |  28<H>  ----------------------------<  88  4.2   |  19<L>  |  0.85    Ca    8.7      24 Jan 2023 05:12  Phos  3.1     01-24  Mg     1.90     01-24          ASSESSMENT/PLAN    ANURADHA MÑUOZ is a 67y man with     We discussed the use of palliative radiation in this setting, namely to improve quality of life through the reduction of symptoms.  We talked about the risks, benefits, acute and long term side effects, as well as expected treatment outcomes.  He/She was given the opportunity to ask questions, which were answered to his/her apparent satisfaction.  He/She provided written consent to proceed with radiation therapy. We will arrange for inpatient/outpatient treatment. HPI:  67-year-old male with PMH of metastatic esophageal cancer, hypothyroidism, GERD, HTN who was transferred from [facility] for Orthopedics consult for possible right knee malignancy versus septic arthritis.     He received a biopsy of his right patella on 11/10/22 with pathology positive for squamous cell carcinoma (Dr. Carter). Per collateral from son, patient has been experiencing progressively worsening pain, erythema, edema, and warmth at the R knee joint for about 1 month since biopsy. On 12/30, patient became unable to walk 2/2 pain. Previously was able to ambulate with walker and brace on R knee. On 12/31, son notes patient was attempting to walk and had bleeding from the joint that resolved when he elevated the leg and held pressure.   He is now s/p surgery to the right knee since 1/7/2023 via orthopedics and adjuvant RT seems to be requested now.     Of note, patient with recent admission to [facility] from 12/23-12/31 with COVID and superimposed bacterial pneumonia. Patient was diagnosed with esophageal SCC about 8 years ago and is s/p chemo and radiation. In Nov 2019, he restarted chemotherapy for metastasis to his chest wall (last dose 2 months ago). He follows with outpatient oncologist Dr. Gi Thomas in [place]. He does not take anything by mouth, with all food and meds via PEG tube. Patient reports 10/10 pain currently at R knee and in throat. He is AOx4 but gives brief answers to questions.    Seen bedside, rhapsy voice, KPS 50, stated he was told he will go home shortly.   We discussed outpatient RT for this r Knee SCC.     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, [place] [address]  Phone: [phone]   Fax: [phone]  _________________________________________________________________    < from: MR Knee w/wo IV Cont, Right (01.05.23 @ 14:51) >  IMPRESSION:  1.  Ill-defined comminuted pathologic right patellar fracture including   transverse component through the midpole with approximately 2 cm   retraction of the dominant proximal fragment. Superimposed acute   osteomyelitis and/or osteonecrosis of the enhancing dominant distal   patellar fragment. Limited evaluation for residual osseous metastasis in   this setting.  2.  Moderate volume complex right knee joint effusion without acute on   chronic synovitis. Consider aspiration analysis.  3.  Additional lower grade and chronic findings as detailed in body   section of this report.    < end of copied text >        Heme onc noted below:     Patient was diagnosed with esophageal SCC about 8 years ago and is s/p chemo and radiation. In Nov 2019, he restarted chemotherapy for metastasis to his chest wall (last dose chemo about two and half months ago with his med oncologist). He follows with outpatient oncologist Dr. Gi Thomas at [facility] in [place]. He does not take anything by mouth, with all food and meds via PEG tube. He also received radiation therapy (about 8wks) with a radOnc at [place] for chest wall mass. Recommendations   -please request Medical record and image study report from his med onc Dr. Gi Thomas at [place]  -no plan for chemo as inpt; will refer pt to [facility] after d/c, staff may call pt for setting up an appointment.     Pre-Op Diagnosis, Post-Op Diagnosis and Procedure:   Pre-Op, Post-Op and Procedure Selector:  ·  PRE-OP DIAGNOSIS:  Esophageal neoplasm 07-Jan-2023 10:07:34  Rip Siu.  ·  POST-OP DIAGNOSIS:  Esophageal neoplasm 07-Jan-2023 10:07:40  Rip Siu.  ·  PROCEDURES:  Patellectomy, right 07-Jan-2023 10:07:01  Rip Siu.      Operative Findings:  · Operative Findings	esophageal neoplasia s/p patellectomy, quadricepsplasty, synovectomy        Allergies    No Known Allergies    Intolerances        ROS: [  ] Fever  [  ] Chills  [  ]Chest Pain [  ] SOB  [  ]Cough [  ] N/V  [  ] Diarrhea [  ]Constipation [  ]Other ROS:  [  ] ROS otherwise negative    PAST MEDICAL & SURGICAL HISTORY:  HTN (hypertension)    GERD (gastroesophageal reflux disease)    Hypothyroidism    Neuropathic pain    Disorder of bone, unspecified    H/O constipation    Esophageal cancer    Liver metastases    Lung metastases    S/P percutaneous endoscopic gastrostomy (PEG) tube placement        FAMILY HISTORY:  No pertinent family history in first degree relatives        MEDICATIONS  (STANDING):  clotrimazole 1% Cream 1 Application(s) Topical every 12 hours  influenza  Vaccine (HIGH DOSE) 0.7 milliLiter(s) IntraMuscular once  levothyroxine 150 MICROGram(s) Enteral Tube daily  polyethylene glycol 3350 17 Gram(s) Oral two times a day  rivaroxaban 10 milliGRAM(s) Enteral Tube daily  senna 2 Tablet(s) Oral at bedtime    MEDICATIONS  (PRN):  albuterol    90 MICROgram(s) HFA Inhaler 2 Puff(s) Inhalation every 6 hours PRN Shortness of Breath and/or Wheezing  gabapentin 300 milliGRAM(s) Oral three times a day PRN neuropathic pain  ondansetron Injectable 4 milliGRAM(s) IV Push every 6 hours PRN Nausea and/or Vomiting  oxyCODONE    IR 5 milliGRAM(s) Oral every 6 hours PRN Moderate Pain (4 - 6)  oxyCODONE    IR 10 milliGRAM(s) Oral every 4 hours PRN Severe Pain (7 - 10)      PHYSICAL EXAM  Vital Signs Last 24 Hrs  T(C): 36.9 (25 Jan 2023 05:03), Max: 38.7 (24 Jan 2023 15:56)  T(F): 98.5 (25 Jan 2023 05:03), Max: 101.6 (24 Jan 2023 15:56)  HR: 111 (25 Jan 2023 05:03) (108 - 133)  BP: 106/61 (25 Jan 2023 05:03) (106/61 - 120/56)  BP(mean): --  RR: 17 (25 Jan 2023 05:03) (17 - 17)  SpO2: 99% (25 Jan 2023 05:03) (97% - 100%)    Parameters below as of 25 Jan 2023 05:03  Patient On (Oxygen Delivery Method): room air        General: Well nourished, well developed, no acute distress, lethargic, rhaspy voice, dry mouth  HEENT: NC/AT; EOMI, PERRL, sclera nonicteric; external ears normal; no rhinorrhea or epistaxis; mucous membranes DRY; oropharynx clear and without erythema  CV: NR, RR; no appreciable r/m/g  Lungs: CTAB, no increased work of breathing  Abdomen: Bowel sounds present; soft, NTND  MSK: Vertebral spine non-tender to palpation  Neuro: AAOx3; cranial nerves II-XII intact; strength 5/5 in upper , R Knee in brace post operative with gauze bandages seen.   Psych: Full affect; mood congruent  Skin: no visible rashes on limited examination    IMAGING/LABS/PATHOLOGY: I have personally reviewed the relevant labs, pathology, and imaging as noted in the HPI.  In addition,                          7.7    23.56 )-----------( 572      ( 24 Jan 2023 05:12 )             25.7     01-24    141  |  105  |  28<H>  ----------------------------<  88  4.2   |  19<L>  |  0.85    Ca    8.7      24 Jan 2023 05:12  Phos  3.1     01-24  Mg     1.90     01-24          ASSESSMENT/PLAN    ANURADHA MUÑOZ is a 67y man with h/o esophageal cancer, Right knee pain s/p surgery for SCC by pathology report to right knee,   requested for adjuvant RT- plan to d/c to rehab vs home?- would plan for adjuvant RT as an outpatient at [facility].   Please call : [phone] when ready for discharge so we can plan outpatient RT.

## 2023-01-25 NOTE — PROGRESS NOTE ADULT - PROBLEM SELECTOR PLAN 8
- diagnosed about 8 yrs ago  - follows with Dr. Gi Thomas ([place])  - complicated by dysphagia with all meds/nutrition via PEG  - CXR with near complete opacification of L lung, stable from previous imaging and likely represents pleural metastases    Plan  - On 3rd line chemo with single agent irinotecan (started 6/27/22, s/p 3 cycles, most recently on 8/1/22)  - Contacted outpatient oncologist, most recent progress note sent to [facility] (family requested 2nd opinion, considering transfer)  - Continue goals of care conversation with family: currently interested in resuming palliative chemo on D/C  - C/w continuous feeds given high residuals on bolus feeds  - C/w pantoprazole 40 mg daily for GERD, gabapentin 300 mg TID PRN for pain, MgOH and KCl for supplementation

## 2023-01-26 NOTE — PROGRESS NOTE ADULT - PROBLEM SELECTOR PLAN 8
- diagnosed about 8 yrs ago  - follows with Dr. Gi Thomas ([place])  - complicated by dysphagia with all meds/nutrition via PEG  - CXR with near complete opacification of L lung, stable from previous imaging and likely represents pleural metastases    Plan  - On 3rd line chemo with single agent irinotecan (started 6/27/22, s/p 3 cycles, most recently on 8/1/22)  - Contacted outpatient oncologist, most recent progress note sent to [facility] (family requested 2nd opinion, considering transfer)  - Continue goals of care conversation with family: currently interested in resuming palliative chemo on D/C  - C/w continuous feeds given high residuals on bolus feeds  - C/w pantoprazole 40 mg daily for GERD, gabapentin 300 mg TID PRN for pain, MgOH and KCl for supplementation

## 2023-01-26 NOTE — PROGRESS NOTE ADULT - PROBLEM SELECTOR PROBLEM 10
HTN (hypertension)
HTN (hypertension)
Preventive measure
HTN (hypertension)
Preventive measure
HTN (hypertension)
Preventive measure

## 2023-01-26 NOTE — PROGRESS NOTE ADULT - ASSESSMENT
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 for evaluation and management of right knee pain and swelling. Although septic arthritis initially was a consideration, the patient had no evident fevers or infectious signs, and his pain was progressive and slow, so antibiotics were discontinued. He was noted on MRI of the knee to have findings concerning for metastatic disease and is s/p surgical resection of his right patella on 1/7. He reports slight improvement in pain after procedure but is still requiring PRN oxycodone 10 mg multiple times per day. He is now pending disposition with home PT and wound care for R knee. Orthopedic Surgery following, removed wound vacuums on 1/13. Patient now being treated for enterobacter HAP seen on CT angio with cefepime (1/12-1/16). Pain control PRN.

## 2023-01-26 NOTE — PROGRESS NOTE ADULT - PROBLEM SELECTOR PROBLEM 1
Fever
Right knee pain
Fever
Right knee pain
Fever
Right knee pain
Fever
Right knee pain

## 2023-01-26 NOTE — PROGRESS NOTE ADULT - SUBJECTIVE AND OBJECTIVE BOX
Tra Sandhu MD  PGY-3 Department of Internal Medicine  Available on [employer] Teams      Patient is a 67y old  Male who presents with a chief complaint of R knee pain (25 Jan 2023 18:02)      OVERNIGHT EVENTS: No acute overnight events.    SUBJECTIVE: Pt seen and examined. Denies fevers, chills, CP, SOB, Abdominal pain, N/V, Constipation, Diarrhea    MEDICATIONS  (STANDING):  clotrimazole 1% Cream 1 Application(s) Topical every 12 hours  influenza  Vaccine (HIGH DOSE) 0.7 milliLiter(s) IntraMuscular once  levothyroxine 150 MICROGram(s) Enteral Tube daily  polyethylene glycol 3350 17 Gram(s) Oral two times a day  rivaroxaban 10 milliGRAM(s) Enteral Tube daily  senna 2 Tablet(s) Oral at bedtime    MEDICATIONS  (PRN):  albuterol    90 MICROgram(s) HFA Inhaler 2 Puff(s) Inhalation every 6 hours PRN Shortness of Breath and/or Wheezing  gabapentin 300 milliGRAM(s) Oral three times a day PRN neuropathic pain  ondansetron Injectable 4 milliGRAM(s) IV Push every 6 hours PRN Nausea and/or Vomiting  oxyCODONE    IR 10 milliGRAM(s) Oral every 4 hours PRN Severe Pain (7 - 10)      I&O's Summary    25 Jan 2023 07:01  -  26 Jan 2023 07:00  --------------------------------------------------------  IN: 2490 mL / OUT: 1220 mL / NET: 1270 mL        Vital Signs Last 24 Hrs  T(C): 37 (26 Jan 2023 05:49), Max: 37.3 (25 Jan 2023 14:15)  T(F): 98.6 (26 Jan 2023 05:49), Max: 99.1 (25 Jan 2023 14:15)  HR: 117 (26 Jan 2023 05:49) (81 - 117)  BP: 112/64 (26 Jan 2023 05:49) (111/59 - 162/76)  BP(mean): --  RR: 17 (26 Jan 2023 05:49) (17 - 18)  SpO2: 99% (26 Jan 2023 05:49) (97% - 100%)    Parameters below as of 26 Jan 2023 05:49  Patient On (Oxygen Delivery Method): room air        =================PHYSICAL EXAM=================    GENERAL: Laying comfortably, NAD  EYES: EOMI, PERRL, no scleral icterus  NECK: No JVD  LUNG: Clear to auscultation bilaterally; No wheeze, crackles or rhonci  HEART: Regular rate and rhythm; No murmurs, rubs, or gallops  ABDOMEN: Soft, Nontender, Nondistended  EXTREMITIES:  No LE edema, 2+ Peripheral Pulses, No clubbing, cyanosis, or edema  PSYCH: AAOx3  NEUROLOGY: non-focal, strength 5/5 in all extremities, sensation intact  SKIN: No rashes or lesions    =================================================    LABS:                        8.0    25.14 )-----------( 609      ( 25 Jan 2023 13:49 )             27.3     Auto Eosinophil # 0.38  / Auto Eosinophil % 1.5   / Auto Neutrophil # 21.63 / Auto Neutrophil % 86.1  / BANDS % x        01-25    138  |  107  |  28<H>  ----------------------------<  114<H>  4.6   |  23  |  0.88    Ca    9.3      25 Jan 2023 13:49  Mg     2.00     01-25  Phos  2.3     01-25                  RADIOLOGY & ADDITIONAL TESTS:    Imaging Personally Reviewed:    Consultant(s) Notes Reviewed:      Care Discussed with Consultants/Other Providers:   Tra Sandhu MD  PGY-3 Department of Internal Medicine  Available on [employer] Teams      Patient is a 67y old  Male who presents with a chief complaint of R knee pain (25 Jan 2023 18:02)      OVERNIGHT EVENTS: No acute overnight events.    SUBJECTIVE: Pt seen and examined. Denies fevers, chills, CP, SOB, Abdominal pain, N/V, Constipation, Diarrhea    MEDICATIONS  (STANDING):  clotrimazole 1% Cream 1 Application(s) Topical every 12 hours  influenza  Vaccine (HIGH DOSE) 0.7 milliLiter(s) IntraMuscular once  levothyroxine 150 MICROGram(s) Enteral Tube daily  polyethylene glycol 3350 17 Gram(s) Oral two times a day  rivaroxaban 10 milliGRAM(s) Enteral Tube daily  senna 2 Tablet(s) Oral at bedtime    MEDICATIONS  (PRN):  albuterol    90 MICROgram(s) HFA Inhaler 2 Puff(s) Inhalation every 6 hours PRN Shortness of Breath and/or Wheezing  gabapentin 300 milliGRAM(s) Oral three times a day PRN neuropathic pain  ondansetron Injectable 4 milliGRAM(s) IV Push every 6 hours PRN Nausea and/or Vomiting  oxyCODONE    IR 10 milliGRAM(s) Oral every 4 hours PRN Severe Pain (7 - 10)      I&O's Summary    25 Jan 2023 07:01  -  26 Jan 2023 07:00  --------------------------------------------------------  IN: 2490 mL / OUT: 1220 mL / NET: 1270 mL        Vital Signs Last 24 Hrs  T(C): 37 (26 Jan 2023 05:49), Max: 37.3 (25 Jan 2023 14:15)  T(F): 98.6 (26 Jan 2023 05:49), Max: 99.1 (25 Jan 2023 14:15)  HR: 117 (26 Jan 2023 05:49) (81 - 117)  BP: 112/64 (26 Jan 2023 05:49) (111/59 - 162/76)  BP(mean): --  RR: 17 (26 Jan 2023 05:49) (17 - 18)  SpO2: 99% (26 Jan 2023 05:49) (97% - 100%)    Parameters below as of 26 Jan 2023 05:49  Patient On (Oxygen Delivery Method): room air        =================PHYSICAL EXAM=================    General: NAD  Eyes: Mostly closed during encounter  ENMT: Dry mucous membranes  Neck: No obvious palpable pre-auricular, post-auricular, occipital, mandibular, submental, supra-clavicular, or infra-clavicular lymph nodes   Chest: Clear to auscultation bilaterally; no obvious rales, rhonchi, or wheezing  Heart: tachycardic rate and regular rhythm; intact S1 and S2; no obvious murmurs, rubs, or gallops appreciated  Abd: Soft, mild tender on palpation, nondistended  Nervous System: AAO and responds to questions slowly and hypophonic   Psych: Appropriate affect  Ext: no peripheral LE edema bilaterally    =================================================    LABS:                        8.0    25.14 )-----------( 609      ( 25 Jan 2023 13:49 )             27.3     Auto Eosinophil # 0.38  / Auto Eosinophil % 1.5   / Auto Neutrophil # 21.63 / Auto Neutrophil % 86.1  / BANDS % x        01-25    138  |  107  |  28<H>  ----------------------------<  114<H>  4.6   |  23  |  0.88    Ca    9.3      25 Jan 2023 13:49  Mg     2.00     01-25  Phos  2.3     01-25                  RADIOLOGY & ADDITIONAL TESTS:    Imaging Personally Reviewed:    Consultant(s) Notes Reviewed:      Care Discussed with Consultants/Other Providers:

## 2023-01-26 NOTE — PROGRESS NOTE ADULT - PROBLEM SELECTOR PLAN 11
- Diet: continuous feeds x 20 h  - PT/OT recommending [facility], family prefers home services  - DVT ppx: Lovenox 40 mg SQ daily  - Dispo: likely home with home PT    Full code - Diet: continuous feeds x 20 h  - PT/OT recommending [facility], family prefers home services  - DVT ppx: Lovenox 40 mg SQ daily  - Dispo: home with home PT    Full code

## 2023-01-26 NOTE — PROGRESS NOTE ADULT - PROBLEM SELECTOR PLAN 7
- Na 122 on 1/5, increased to 134 on 1/9, now resolving  - Serum osmolarity calculated as 262 (hypotonic), patient euvolemic on exam  - Elevated urine osmolarity and urine sodium are suggestive of inappropriate concentration of urine   - Patient with known pleural metastasis, likely 2/2 SIADH    Plan  - Na 151 and K 5 (non-hemolyzed); dc-ed salt tabs  - Recheck BMP AM, trend Na daily  - c/w free water flushes 300mL q6h RESOLVED  - Na 122 on 1/5, increased to 134 on 1/9, now resolving  - Serum osmolarity calculated as 262 (hypotonic), patient euvolemic on exam  - Elevated urine osmolarity and urine sodium are suggestive of inappropriate concentration of urine   - Patient with known pleural metastasis, likely 2/2 SIADH

## 2023-01-26 NOTE — PROGRESS NOTE ADULT - PROVIDER SPECIALTY LIST ADULT
Orthopedics
Gastroenterology
Infectious Disease
Infectious Disease
Internal Medicine
Internal Medicine
Orthopedics
Gastroenterology
Infectious Disease
Orthopedics
Infectious Disease
Internal Medicine
Orthopedics
Internal Medicine
Internal Medicine
Infectious Disease
Internal Medicine

## 2023-01-26 NOTE — PROGRESS NOTE ADULT - REASON FOR ADMISSION
R knee pain

## 2023-01-26 NOTE — PROGRESS NOTE ADULT - PROBLEM SELECTOR PLAN 1
New fevers 1/22 and 1/24 overnight  - RVP negative  - ortho re-evaluated R knee post-surg site, no c/f infection    ====PLAN====  - f/u BCx - if negative 24hrs then patient will be discharged  - fevers likely 2/2 malignancy New fevers 1/22 and 1/24 overnight  - RVP negative  - ortho re-evaluated R knee post-surg site, no c/f infection  - BCx x2 negative, ID cleared for discharge  - fevers likely 2/2 malignancy

## 2023-02-02 NOTE — H&P PST ADULT - PROBLEM SELECTOR PROBLEM 7
Hypothyroidism Cimzia Pregnancy And Lactation Text: This medication crosses the placenta but can be considered safe in certain situations. Cimzia may be excreted in breast milk.

## 2023-02-05 NOTE — H&P ADULT - NSHPLABSRESULTS_GEN_ALL_CORE
LABS:                         7.8    27.90 )-----------( 638      ( 2023 09:15 )             26.6     02-    130<L>  |  102  |  13  ----------------------------<  92  3.7   |  19<L>  |  0.66    Ca    10.7<H>      2023 09:15    TPro  7.1  /  Alb  2.5<L>  /  TBili  0.4  /  DBili  x   /  AST  22  /  ALT  34  /  AlkPhos  245<H>  02-    PT/INR - ( 2023 09:15 )   PT: 16.1 sec;   INR: 1.38 ratio         PTT - ( 2023 09:15 )  PTT:26.7 sec  Urinalysis Basic - ( 2023 09:15 )    Color: Light Yellow / Appearance: Clear / S.016 / pH: x  Gluc: x / Ketone: Negative  / Bili: Negative / Urobili: <2 mg/dL   Blood: x / Protein: 30 mg/dL / Nitrite: Negative   Leuk Esterase: Negative / RBC: 0 /HPF / WBC 1 /HPF   Sq Epi: x / Non Sq Epi: 0 /HPF / Bacteria: Negative                RADIOLOGY, EKG & ADDITIONAL TESTS: Reviewed.

## 2023-02-05 NOTE — ED ADULT NURSE NOTE - CHIEF COMPLAINT QUOTE
Patient brought in by EMS as transfer from [facility] for septic knee. Pt. had mass removed from R knee in October, found to have septic knee today. Received 1500mg vancomycin and 2L NS at [place]. PMH esophageal CA, Lung CA, HTN, hypothyroid.

## 2023-02-05 NOTE — H&P ADULT - PROBLEM SELECTOR PLAN 1
-acute sepsis 2/2 cellulitis of right knee vs septic arthritis  -hx of squamous cell cancer stage 4 w/ patellectomy as well  -tx w/ vanc and zosyn pending culture data  -f/u blood cultures  -another 500 cc bolus ordered -acute sepsis 2/2 cellulitis of right knee vs septic arthritis  -hx of squamous cell cancer stage 4 w/ patellectomy as well  -tx w/ vanc and zosyn pending culture data  -f/u blood cultures  -another 500 cc bolus ordered  -if patient remains septic, c/w vanc but change zosyn to cefepime and flagyl (has hx of enterobacter cloacae pna could colonize) pending culture data

## 2023-02-05 NOTE — ED PROVIDER NOTE - CLINICAL SUMMARY MEDICAL DECISION MAKING FREE TEXT BOX
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 Carter presents here as a transfer from [facility] for orthopedic consult for concern for infection of the knee/septic arthritis.  History obtained from wife Patricia over the past 2 days patient has been having significant increase of his pain with swelling to the right lower extremity down to the foot, pus from surgical site of knee and fever.  Patient himself is complaining of severe knee pain.   labs, cultures, patient received vanco at [facility]  xray dvt study, ortho cs, admission.  pain control

## 2023-02-05 NOTE — ED ADULT TRIAGE NOTE - CHIEF COMPLAINT QUOTE
Patient brought in by EMS as transfer from [facility] for septic knee. Pt. had mass removed from R knee in October, found to have septic knee today. Received 1500mg vancomycin and 2L NS at [place]. PMH esophageal CA, Lung CA, HTN, hypothyroid.

## 2023-02-05 NOTE — H&P ADULT - ASSESSMENT
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 knee, hypothyroidism, GERD, recent RUL pna s/p cefepime, r. patellar mass excision 1 month ago who presents w/ right knee pain and swelling for the past 2 days.

## 2023-02-05 NOTE — ED PROVIDER NOTE - NS ED ATTENDING STATEMENT MOD
This was a shared visit with the ANJALI. I reviewed and verified the documentation and independently performed the documented:

## 2023-02-05 NOTE — ED ADULT NURSE NOTE - NSIMPLEMENTINTERV_GEN_ALL_ED
Implemented All Fall Risk Interventions:  [place] to call system. Call bell, personal items and telephone within reach. Instruct patient to call for assistance. Room bathroom lighting operational. Non-slip footwear when patient is off stretcher. Physically safe environment: no spills, clutter or unnecessary equipment. Stretcher in lowest position, wheels locked, appropriate side rails in place. Provide visual cue, wrist band, yellow gown, etc. Monitor gait and stability. Monitor for mental status changes and reorient to person, place, and time. Review medications for side effects contributing to fall risk. Reinforce activity limits and safety measures with patient and family.

## 2023-02-05 NOTE — H&P ADULT - PROBLEM SELECTOR PLAN 2
-esophageal cancer dx 8 years ago on irinotecan   -pt is full code  -has outpt f/u w/ Dr. Angelo [facility]

## 2023-02-05 NOTE — ED PROVIDER NOTE - ATTENDING APP SHARED VISIT CONTRIBUTION OF CARE
Seen and examined, pt. states worsening R knee pain and swelling for 2d. Pt. with hx of cancer and mets, including L lung mass, ? met to R patella, s/p surgery in Jan and dc'd 1/26. Since dc pt. has been at home, no surgical path seen on review of records here, all blood and urine cultures neg. Per wife pt. with inc. discharge at surgery site and had fever yest. Had neg. duplex of bilat ext. in hospital but has inc. swelling since then. Awake, anxious, not able to answer all hx questions which per wife is baseline, shallow resp., scant wheeze, inc. crackles to L side, heart tachy, no murmur, abd soft, Nt to palp, ext. with R knee swelling, tender at surgical scar, edema below knee to foot, Nt calf, +unequal girth, good distal pulses, +sensation.

## 2023-02-05 NOTE — ED PROVIDER NOTE - DIFFERENTIAL DIAGNOSIS
fever: pain of RLE:   septic joint, infection, dvt, consider pna given history and cough, with rhonchi on lung exam. Differential Diagnosis fever: pain of RLE:   septic joint, infection, cellulitis, dvt, consider pna given history and cough, with rhonchi on lung exam.

## 2023-02-05 NOTE — ED PROVIDER NOTE - PROGRESS NOTE DETAILS
Patient persistently tachycardic.  Had few small episodes of hemoptysis that was visualized.  CTA ordered to rule out PE versus pneumonia.  Patient given Zosyn.  Patient was already given vancomycin at [facility].  Patient seen by orthopedics knee arthrocentesis was performed although low suspicion for septic joint.

## 2023-02-05 NOTE — ED ADULT NURSE NOTE - OBJECTIVE STATEMENT
Patient BIBA from [facility] for right knee infection and evaluation of sepsis. Right knee with scar, appears swollen. Pt is AA&Ox4, in no acute distress, calm, cooperative. C/O right knee pain. Respirations even, unlabored on room air with occasional cough. Has rectal temp 100.0 F. Has stage 2 pressure ulcer right buttocks 3 in x 3 in. PMH esophageal CA, Lung CA, HTN, hypothyroid. 22G IV in place, right forearm, flushes well. Labs drawn and sent. Medicated as ordered. Patient BIBA from [facility] for right knee infection and evaluation of sepsis. Right knee with scar, appears swollen. Pt is AA&Ox4, in no acute distress, calm, cooperative. C/O right knee pain. Respirations even, unlabored on room air with occasional cough. Has rectal temp 100.0 F. Has stage 2 pressure ulcer right buttocks 3 in x 3 in. Denies CP, dyspnea, SOB, N/V. PMH esophageal CA, Lung CA, HTN, hypothyroid. 22G IV in place, right forearm, flushes well. Labs drawn and sent. Medicated as ordered.

## 2023-02-05 NOTE — H&P ADULT - PROBLEM SELECTOR PLAN 5
-c/w tylenol mild pain  -oxycodone 10 mg IR Q4 moderate pain  -c/w gabapentin 300 mg TID    #Reactive airway dx  -albuterol prn -c/w tylenol mild pain  -oxycodone 10 mg IR Q4 moderate pain  -c/w gabapentin 300 mg TID    #Reactive airway dx  -albuterol Q6

## 2023-02-05 NOTE — H&P ADULT - NSHPPHYSICALEXAM_GEN_ALL_CORE
PHYSICAL EXAM:  GENERAL: NAD, well-developed  HEAD:  Atraumatic, Normocephalic  EYES: EOMI, PERRLA, conjunctiva and sclera clear  NECK: Supple, No JVD  CHEST/LUNG: Clear to auscultation bilaterally; No wheeze  HEART: Regular rate and rhythm; No murmurs, rubs, or gallops  ABDOMEN: Soft, Nontender, Nondistended; Bowel sounds present PEG tube in place  EXTREMITIES:  2+ Peripheral Pulses, No clubbing, cyanosis.  Right knee erythema/drainage  PSYCH: AAOx3  NEUROLOGY: non-focal  SKIN: No rashes or lesions PHYSICAL EXAM:  GENERAL: NAD, well-developed  HEAD:  Atraumatic, Normocephalic  EYES: EOMI, PERRLA, conjunctiva and sclera clear  HEENT: hoarse voice noted  NECK: Supple, No JVD  CHEST/LUNG: mild wheezes auscultated throughout  HEART: Regular rate and rhythm; No murmurs, rubs, or gallops  ABDOMEN: Soft, Nontender, Nondistended; Bowel sounds present PEG tube in place  EXTREMITIES:  2+ Peripheral Pulses, No clubbing, cyanosis.  Right knee erythema/drainage  PSYCH: AAOx3  NEUROLOGY: non-focal  SKIN: No rashes or lesions

## 2023-02-05 NOTE — ED PROVIDER NOTE - PHYSICAL EXAMINATION
Vital signs reviewed.   CONSTITUTIONAL: chronically ill appearing with no acute distress.   HEAD: Normocephalic, atraumatic.  NECK/LYMPH: Supple; non-tender; no cervical lymphadenopathy.  CARD: Normal S1, S2; no murmurs, rubs, or gallops noted.  RESP: Normal chest excursion with respiration; Left lung with rhonchi and wheezes.   ABD/GI: soft, non-distended; non-tender; peg site clean and dry   EXT/MS: RLE: surgical incision scar without active drainage or bleeding, knee is TTP throughout with warmth , swelling, inability to range, with swelling noted down lower leg to foot. NVI distally.   SKIN: Normal for age and race; warm; dry;   NEURO: Awake, alert, oriented x 3, no gross deficits

## 2023-02-05 NOTE — ED PROVIDER NOTE - OBJECTIVE STATEMENT
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 Carter presents here as a transfer from [facility] for orthopedic consult for concern for infection of the knee/septic arthritis.  History obtained from wife Patricia over the past 2 days patient has been having significant increase of his pain with swelling to the right lower extremity down to the foot, pus from surgical site of knee and fever.  Patient himself is complaining of severe knee pain.  Denies chest pain, shortness of breath ( although cough is heard wife notes this is chronic), abdominal pain, diarrhea, urinary symptoms

## 2023-02-05 NOTE — CONSULT NOTE ADULT - SUBJECTIVE AND OBJECTIVE BOX
67yMale PMH R patella mass excision and patellectomy 1 mo ago c/o R knee pain for past 2 days. Per wife drainage observed from knee yesterday. Patient states that they cannot walk because of pain. Patient denies numbness or tingling in the RLE. Patient denies hx of trauma. Reports fevers of 100.7 at home. Initially seen at [facility] where pt received Vanco. Pt transferred to [facility] for for further management    ROS: 10 point review of systems otherwise negative unless noted in HPI    PMH:  HTN (hypertension)    GERD (gastroesophageal reflux disease)    Hypothyroidism    Neuropathic pain    Disorder of bone, unspecified    H/O constipation    Esophageal cancer    Liver metastases    Lung metastases      PSH:  S/P percutaneous endoscopic gastrostomy (PEG) tube placement      AH:    Meds: See med rec    T(C): 36.9 (02-05-23 @ 13:15)  HR: 130 (02-05-23 @ 13:15)  BP: 130/72 (02-05-23 @ 13:15)  RR: 22 (02-05-23 @ 13:15)  SpO2: 100% (02-05-23 @ 13:15)  Wt(kg): --    Gen: NAD  Resp: Unlabored breathing  RLE:  Incision c/d/i  SILT DP/SP/Rosio/Saph,   +EHL/FHL/TA/Gastroc,   +hip/ankle ROM,    knee ROM limited 2/2 pain,   DP+, soft compartments, no calf ttp.    Secondary:  No TTP over bony landmarks, SILT BL, ROM intact BL, distal pulses palpable.    Imaging:  XR demonstrating no acute fracture or dislocation      67M s/p R patellectomy/VY advancement p/w R knee pain. Currently afebrile but reported fevers at home. Will aspirate knee and f/u results    Plan:  -FU aspiration results  -FU labs  -FU Bcx  -pain control  -WBAT in [place]

## 2023-02-05 NOTE — H&P ADULT - HISTORY OF PRESENT ILLNESS
INCOMPLETE NOTE  Patient is a 67 year old M hx metastatic esophageal cancer (dx 8 years ago) to bone on irinotecan, r. knee, hypothyroidism, GERD, recent RUL pna s/p cefepime, r. patellar mass excision 1 month ago who presents w/ right knee pain and swelling for the past 2 days. He also notes having pus like drainage. He is having difficulty w/ ambulation as a result. Had a fever outpatient 100.7 F, leukocytosis in ED to 27k. Denies dysuria, polyuria, melena, hematochezia, h/a, LOC, visual changes, cp. Radiation onc was consulted last admission, pt to f/u outpt for RT adjuvant. In the ED right knee arthrocentesis was done, cultures were sent.    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 knee, hypothyroidism, GERD, recent RUL pna s/p cefepime, r. patellar mass excision 1 month ago who presents w/ right knee pain and swelling for the past 2 days. He also notes having pus like drainage. He is having difficulty w/ ambulation as a result. Had a fever outpatient 100.7 F, leukocytosis in ED to 27k. Denies dysuria, polyuria, melena, hematochezia, h/a, LOC, visual changes, cp. Radiation onc was consulted last admission, pt to f/u outpt for RT adjuvant. In the ED right knee arthrocentesis was done, cultures were sent.    ED course: given zosyn, Tm 37.9 C, , /58, RR 20 100%  CT PE shows improved RUL infiltrate

## 2023-02-06 NOTE — PROGRESS NOTE ADULT - ATTENDING COMMENTS
67M PMH metastatic St IV esophageal cancer to bone on irinotecan c/b dysphagia s/p PEG, hypothyroidism, R patellar mass excision and patellectomy 1 month ago p/w R knee pain and swelling. Found to have SIRS (tachy, leukocytosis) and c/f right knee septic arthritis s/p arthrocentesis on 2/5    Right knee with swelling, erythema, warmth and TTP  tachycardic  lungs CTA  PEG C/D/I  right knee Incisions C/D/I    Arthrocentesis performed by ortho 2/5 yielded significant amount of RBC, c/f hemarthrosis, cultures NGTD, ESR/CRP elevated  DC Vanc, c/w Zosyn for now. F/u cultures. RVP, UA and LE US negative, WBC downtrending however had persistent leukocytosis recent admission  Hgb downtrended to 7, repeat CBC with hgb at 7.1. Transfuse for HGB<7 , hold Xarelto (has been on this postop for DVT ppx)  CTA chest negative for PE and patchy RUL GGO decreased, unchanged large mass in left upper hemithorax involving left CW with erosive changes of left 3rd-5th ribs. Oxy PRN for pain control, obtain palliative input  Obtain collateral from outpatient Oncologist  Was planned to start RT as outpatient but never started. Will consult Rad Onc inpatient. Previous right patellar bx path with SCC  Wound care consult for back wound  TSH elevated, FT4 low. Confirmed that family administering synthroid appropriately (holding TFs 1 hr before and after). Rpt TFTs before DC  Family reported random twitching episodes. Also a concern previous admission and was thought to be due to sleep deprivation and reglan held  PT eval 67M PMH metastatic St IV SCC esophageal cancer (mets to bone on irinotecan) c/b dysphagia s/p PEG and SCC R patellar mass excision and patellectomy 1 month ago (on Xarelto for postop DVT ppx), hypothyroidism, p/w R knee pain and swelling. Found to have SIRS (tachy, leukocytosis) and c/f right knee septic arthritis s/p arthrocentesis on 2/5    Right knee with swelling, erythema, warmth and TTP  tachycardic  lungs CTA  PEG C/D/I  right knee Incisions C/D/I    Arthrocentesis performed by ortho 2/5 yielded significant amount of RBC, c/f hemarthrosis, cultures NGTD, ESR/CRP elevated  DC Vanc, c/w Zosyn for now. F/u cultures. RVP, UA and LE US negative, WBC downtrending however had persistent leukocytosis recent admission  Hgb downtrended to 7, repeat CBC with hgb at 7.1. Transfuse for HGB<7 , hold Xarelto (has been on this postop for DVT ppx)  CTA chest negative for PE and patchy RUL GGO decreased, unchanged large mass in left upper hemithorax involving left CW with erosive changes of left 3rd-5th ribs. Oxy PRN for pain control, obtain palliative input  Obtain collateral from outpatient Oncologist  Was planned to start RT as outpatient but never started. Will consult Rad Onc inpatient. Previous right patellar bx path with SCC  Wound care consult for back wound  TSH elevated, FT4 low. Confirmed that family administering synthroid appropriately (holding TFs 1 hr before and after). Rpt TFTs before DC  Family reported random twitching episodes. Also a concern previous admission and was thought to be due to sleep deprivation and reglan held  PT eval    Discussed with son and wife at bedside.

## 2023-02-06 NOTE — DIETITIAN INITIAL EVALUATION ADULT - ORAL INTAKE PTA/DIET HISTORY
Patient seen for assessment. Patient asleep during interview, but wife and son were at bed side for collateral. Wife reports patient continues on Two Prateek @ home and gives 6 cans/day for total volume 1,422 mL. Provides 2,844 kcal, 118.7 g pro and 995 mL of fluid (TF free water). Son reports patient w/ frequent constipation at home.

## 2023-02-06 NOTE — DIETITIAN INITIAL EVALUATION ADULT - OTHER INFO
67 year old male with a PMH of metastatic esophageal cancer (dx 8 years ago) to bone, hypothyroidism, GERD who presents w/ right knee pain and swelling for the past 2 days per chart.    Wife/son reporting no GI distress @ this time. On bowel regimen per chart. Wife reports weight has been stable at home. Per previous RD note (1/2) noted w/ weight 63.5 kg and is noted w/ h/o weight loss 2/2 frequent hospitalizations. ABW is 63 kg (2/5) per chart, however patient noted w/ +2 R foot/ankle and +3 R knee edema so weight changes expected, will monitor. No pressure injuries noted per RN flow sheet. Discussed Jevity 1.5 w/ wife/son given it has more fiber/free water, which they agreed to try.

## 2023-02-06 NOTE — DIETITIAN INITIAL EVALUATION ADULT - PERTINENT MEDS FT
MEDICATIONS  (STANDING):  chlorhexidine 2% Cloths 1 Application(s) Topical daily  gabapentin Solution 300 milliGRAM(s) Oral three times a day  heparin   Injectable 5000 Unit(s) SubCutaneous every 12 hours  levothyroxine 150 MICROGram(s) Oral daily  pantoprazole   Suspension 40 milliGRAM(s) Oral every 24 hours  piperacillin/tazobactam IVPB.. 3.375 Gram(s) IV Intermittent every 8 hours  polyethylene glycol 3350 17 Gram(s) Oral daily    MEDICATIONS  (PRN):  acetaminophen    Suspension .. 650 milliGRAM(s) Oral every 6 hours PRN Temp greater or equal to 38C (100.4F), Mild Pain (1 - 3)  albuterol/ipratropium for Nebulization 3 milliLiter(s) Nebulizer every 6 hours PRN Shortness of Breath and/or Wheezing  oxyCODONE    IR 10 milliGRAM(s) Oral every 4 hours PRN Moderate Pain (4 - 6)

## 2023-02-06 NOTE — DIETITIAN INITIAL EVALUATION ADULT - ENTERAL
Recommend bolus Jevity 1.5 via PEG @ 300 mL every 6 hrs. (4x daily total) for total volume 1,200 mL. Provides 1,800 kcal (26.8 kcal/kg), 76.6 g pro (1.1 g/kg) based on IBW 67.1 kg and 912 mL of fluid (TF free water), defer additional free water flushes to team. Begin bolus feeds @ 150 mL and increase to goal volume @ 300 mL as tolerated.

## 2023-02-06 NOTE — PROGRESS NOTE ADULT - PROBLEM SELECTOR PLAN 1
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  d/c'd vanc, c/w zosyn   XR tib-fib and knee unremarkable  -if patient remains septic, c/w vanc but change zosyn to cefepime and flagyl (has hx of enterobacter cloacae pna could colonize) pending culture data  - s/p knee aspiration with ortho  - f/u aspiration results and bcxs, MRSA SIRS 2/2 less likely cellulitis vs septic arthritis  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  d/c'd vanc, c/w zosyn   XR tib-fib and knee unremarkable  -if patient remains septic, c/w vanc but change zosyn to cefepime and flagyl (has hx of enterobacter cloacae pna could colonize) pending culture data  - s/p knee aspiration with ortho  - f/u aspiration cx, bcxs, MRSA SIRS 2/2 less likely cellulitis vs septic arthritis  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  d/c'd vanc, c/w zosyn   XR tib-fib and knee unremarkable  - s/p knee aspiration with ortho  - aspiration cx, bcxs ngtd  - f/u MRSA

## 2023-02-06 NOTE — PROGRESS NOTE ADULT - PROBLEM SELECTOR PLAN 5
-c/w tylenol mild pain  -oxycodone 10 mg IR Q4 moderate pain  -c/w gabapentin 300 mg TID    #Reactive airway dx  -albuterol Q6 -protonix 40 mg

## 2023-02-06 NOTE — PROGRESS NOTE ADULT - ASSESSMENT
67M hx metastatic esophageal cancer (dx 8 years ago) to bone on irinotecan, r. knee, hypothyroidism, GERD, recent RUL pna s/p cefepime, r. patellar mass excision 1 month ago who presents w/ right knee pain and swelling for the past 2 days.  67M hx metastatic esophageal cancer to bone on irinotecan, hypothyroidism, s/p R patellar mass excision 1 month ago p/w R knee pain and swelling admitted for  67M hx metastatic esophageal cancer to bone on irinotecan, hypothyroidism, s/p R patellar mass excision 1 month ago p/w R knee pain and swelling meeting SIRS criteria.

## 2023-02-06 NOTE — PATIENT PROFILE ADULT - FUNCTIONAL ASSESSMENT - BASIC MOBILITY 4.
Patient heels red bilaterally but are blanchable. Pillows placed under the patient's RLE and a boot was placed on the LLE. Patient reports tolerating these interventions well. Will continue to monitor patient's skin condition. 1 = Total assistance

## 2023-02-06 NOTE — CHART NOTE - NSCHARTNOTEFT_GEN_A_CORE
This report was requested by: Dario Manrique | Reference #: [id number]    Others' Prescriptions  Patient Name: Chris Farris Date: 1955  Address: [address] [place] [id number]: Male  Rx Written	Rx Dispensed	Drug	Quantity	Days Supply	Prescriber Name  01/26/2023	01/31/2023	oxycodone hcl (ir) 10 mg tab	42	7	Mathew, Merlin  Prescriber Daisy # [id number]  Payment Method Insurance  Dispenser [facility]  12/19/2022	12/29/2022	oxycodone-acetaminophen 5-325 mg tablet	120	30	FromGi gonzales  Prescriber Daisy # [id number]  Payment Method Insurance  Dispenser [facility]  11/10/2022	11/25/2022	oxycodone hcl (ir) 5 mg tablet	16	4	Lane Carter J, (MD)  Prescriber Daisy # [id number]  Payment Method Insurance  Dispenser [facility]  11/14/2022	11/25/2022	oxycodone-acetaminophen 5-325 mg tablet	120	30	FromGi gonzales  Prescriber Daisy # [id number]  Payment Method Insurance  Dispenser [facility]  10/14/2022	10/17/2022	oxycodone-acetaminophen 5-325 mg tablet	120	30	Gi Thomas  Prescriber Daisy # [id number]  Payment Method Insurance  Dispenser [facility]  08/29/2022	08/29/2022	oxycodone-acetaminophen 5-325 mg tablet	120	30	Gi Thomas  Prescriber Daisy # [id number]  Payment Method Insurance  Dispenser [facility]  06/27/2022	07/01/2022	oxycodone-acetaminophen 5-325 mg tablet	120	30	Gi Thomas  Prescriber Daisy # [id number]  Payment Method Insurance  Dispenser [facility]  05/03/2022	05/03/2022	oxycodone-acetaminophen 5-325 mg tablet	120	30	Gi Thomas  Prescriber Daisy # [id number]  Payment Method Insurance  Dispenser [facility]  02/18/2022	02/22/2022	oxycodone-acetaminophen 5-325 mg tablet	120	30	Yazan Rubalcava P, MD  Prescriber Daisy # [id number]  Payment Method Insurance  Dispenser [facility]    Patient Name: Chris Farris Date: 1955  Address: [facility] [place] [id number]: Male  Rx Written	Rx Dispensed	Drug	Quantity	Days Supply	Prescriber Name  01/11/2023	01/21/2023	oxycodone hcl (ir) 5 mg tablet	20	5	Lilly Whyte  Prescriber Daisy # [id number]  Payment Method Insurance  Dispenser [facility]  * - Drugs marked with an asterisk are compound drugs. If the compound drug is made up of more than one controlled substance, then each controlled substance will be a separate row in the table.

## 2023-02-06 NOTE — PROGRESS NOTE ADULT - PROBLEM SELECTOR PLAN 3
-c/w synthroid 150 mcg Hb 7.8 on admission (baseline 8-9)  possibly due to hemarthrosis of right knee vs AOCD iso malignancy  - trend CBC  - transfuse Hb <7

## 2023-02-06 NOTE — DIETITIAN INITIAL EVALUATION ADULT - PERTINENT LABORATORY DATA
02-06    133<L>  |  104  |  9   ----------------------------<  91  3.6   |  17<L>  |  0.73    Ca    10.5      06 Feb 2023 06:41  Phos  1.5     02-06  Mg     1.60     02-06    TPro  6.5  /  Alb  2.3<L>  /  TBili  0.4  /  DBili  x   /  AST  15  /  ALT  24  /  AlkPhos  214<H>  02-06  A1C with Estimated Average Glucose Result: 5.8 % (01-13-23 @ 06:10)

## 2023-02-06 NOTE — PROGRESS NOTE ADULT - PROBLEM SELECTOR PLAN 6
F-give 500 cc bolus  E-replete prn  N-tube feeds nutrition consult  heparin subQ -c/w tylenol mild pain  -oxycodone 10 mg IR Q4 moderate pain  -c/w gabapentin 300 mg TID    #Reactive airway dx  -albuterol Q6

## 2023-02-06 NOTE — PROGRESS NOTE ADULT - SUBJECTIVE AND OBJECTIVE BOX
***************************************************************  Acacia Linder MD (PGY-1)  Internal Medicine  Pager: [phone]  ***************************************************************    PROGRESS NOTE:     Patient is a 67y old  Male who presents with a chief complaint of r. knee pain (2023 22:08)      SUBJECTIVE / OVERNIGHT EVENTS: Patient seen and examined at bedside.  Reports right knee pain but denies fevers, chills, N/V/D, chest pain, SOB, and abdominal pain.    MEDICATIONS  (STANDING):  chlorhexidine 2% Cloths 1 Application(s) Topical daily  gabapentin Solution 300 milliGRAM(s) Oral three times a day  heparin   Injectable 5000 Unit(s) SubCutaneous every 12 hours  levothyroxine 150 MICROGram(s) Oral daily  pantoprazole   Suspension 40 milliGRAM(s) Oral every 24 hours  piperacillin/tazobactam IVPB.. 3.375 Gram(s) IV Intermittent every 8 hours  polyethylene glycol 3350 17 Gram(s) Oral daily  potassium phosphate IVPB 30 milliMole(s) IV Intermittent once  sodium chloride 0.9%. 1000 milliLiter(s) (100 mL/Hr) IV Continuous <Continuous>  vancomycin  IVPB 1000 milliGRAM(s) IV Intermittent every 12 hours    MEDICATIONS  (PRN):  acetaminophen    Suspension .. 650 milliGRAM(s) Oral every 6 hours PRN Temp greater or equal to 38C (100.4F), Mild Pain (1 - 3)  albuterol/ipratropium for Nebulization 3 milliLiter(s) Nebulizer every 6 hours PRN Shortness of Breath and/or Wheezing  oxyCODONE    IR 10 milliGRAM(s) Oral every 4 hours PRN Moderate Pain (4 - 6)      CAPILLARY BLOOD GLUCOSE        I&O's Summary      PHYSICAL EXAM:  Vital Signs Last 24 Hrs  T(C): 37.3 (2023 05:30), Max: 37.9 (2023 22:00)  T(F): 99.1 (2023 05:30), Max: 100.3 (2023 22:00)  HR: 121 (2023 05:30) (116 - 130)  BP: 129/63 (2023 05:30) (119/61 - 136/58)  BP(mean): 71 (2023 22:00) (71 - 90)  RR: 18 (2023 05:30) (16 - 22)  SpO2: 100% (2023 05:30) (94% - 100%)    Parameters below as of 2023 05:30  Patient On (Oxygen Delivery Method): room air    GENERAL: NAD  HEENT: hoarse voice noted  NECK: Supple, No JVD  CHEST/LUNG: mild wheezes throughout  HEART: Regular rate and rhythm; No murmurs, rubs, or gallops  ABDOMEN: Soft, Nontender, Nondistended; Bowel sounds present PEG tube in place  EXTREMITIES:  2+ Peripheral Pulses, No clubbing, cyanosis. Right knee erythema/drainage  NEUROLOGY: AAOx3, non-focal  SKIN: No rashes or lesions    LABS:                        7.0    24.20 )-----------( 568      ( 2023 06:41 )             23.7     02-06    133<L>  |  104  |  9   ----------------------------<  91  3.6   |  17<L>  |  0.73    Ca    10.5      2023 06:41  Phos  1.5     02-06  Mg     1.60     02-06    TPro  6.5  /  Alb  2.3<L>  /  TBili  0.4  /  DBili  x   /  AST  15  /  ALT  24  /  AlkPhos  214<H>  02-06    PT/INR - ( 2023 06:41 )   PT: 16.1 sec;   INR: 1.38 ratio         PTT - ( 2023 06:41 )  PTT:26.1 sec      Urinalysis Basic - ( 2023 09:15 )    Color: Light Yellow / Appearance: Clear / S.016 / pH: x  Gluc: x / Ketone: Negative  / Bili: Negative / Urobili: <2 mg/dL   Blood: x / Protein: 30 mg/dL / Nitrite: Negative   Leuk Esterase: Negative / RBC: 0 /HPF / WBC 1 /HPF   Sq Epi: x / Non Sq Epi: 0 /HPF / Bacteria: Negative        Culture - Body Fluid with Gram Stain (collected 2023 13:33)  Source: .Body Fluid Synovial Fluid  Gram Stain (2023 17:30):    No polymorphonuclear leukocytes per low power field    No organisms seen per oil power field    by cytocentrifuge        RADIOLOGY & ADDITIONAL TESTS:  Results Reviewed:   Imaging Personally Reviewed:  Electrocardiogram Personally Reviewed:    COORDINATION OF CARE:  Care Discussed with Consultants/Other Providers [Y/N]:  Prior or Outpatient Records Reviewed [Y/N]:   ***************************************************************  Acacia Linder MD (PGY-1)  Internal Medicine  Pager: [phone]  ***************************************************************    PROGRESS NOTE:     Patient is a 67y old  Male who presents with a chief complaint of r. knee pain (2023 22:08)      SUBJECTIVE / OVERNIGHT EVENTS: Patient seen and examined at bedside.  Reports right knee pain but denies fevers, chills, N/V/D, chest pain, SOB, and abdominal pain.    MEDICATIONS  (STANDING):  chlorhexidine 2% Cloths 1 Application(s) Topical daily  gabapentin Solution 300 milliGRAM(s) Oral three times a day  heparin   Injectable 5000 Unit(s) SubCutaneous every 12 hours  levothyroxine 150 MICROGram(s) Oral daily  pantoprazole   Suspension 40 milliGRAM(s) Oral every 24 hours  piperacillin/tazobactam IVPB.. 3.375 Gram(s) IV Intermittent every 8 hours  polyethylene glycol 3350 17 Gram(s) Oral daily  potassium phosphate IVPB 30 milliMole(s) IV Intermittent once  sodium chloride 0.9%. 1000 milliLiter(s) (100 mL/Hr) IV Continuous <Continuous>  vancomycin  IVPB 1000 milliGRAM(s) IV Intermittent every 12 hours    MEDICATIONS  (PRN):  acetaminophen    Suspension .. 650 milliGRAM(s) Oral every 6 hours PRN Temp greater or equal to 38C (100.4F), Mild Pain (1 - 3)  albuterol/ipratropium for Nebulization 3 milliLiter(s) Nebulizer every 6 hours PRN Shortness of Breath and/or Wheezing  oxyCODONE    IR 10 milliGRAM(s) Oral every 4 hours PRN Moderate Pain (4 - 6)      CAPILLARY BLOOD GLUCOSE        I&O's Summary      PHYSICAL EXAM:  Vital Signs Last 24 Hrs  T(C): 37.3 (2023 05:30), Max: 37.9 (2023 22:00)  T(F): 99.1 (2023 05:30), Max: 100.3 (2023 22:00)  HR: 121 (2023 05:30) (116 - 130)  BP: 129/63 (2023 05:30) (119/61 - 136/58)  BP(mean): 71 (2023 22:00) (71 - 90)  RR: 18 (2023 05:30) (16 - 22)  SpO2: 100% (2023 05:30) (94% - 100%)    Parameters below as of 2023 05:30  Patient On (Oxygen Delivery Method): room air    GENERAL: NAD  HEENT: hoarse voice noted  NECK: Supple, No JVD  CHEST/LUNG: mild wheezes throughout  HEART: Regular rate and rhythm; No murmurs, rubs, or gallops  ABDOMEN: Soft, Nontender, Nondistended; Bowel sounds present PEG tube in place  EXTREMITIES:  2+ Peripheral Pulses, No clubbing, cyanosis. + Right knee erythema and tpp, incisions cdi, SILT, ROM limited 2/2 pain  NEUROLOGY: AAOx3, non-focal  SKIN: No rashes or lesions    LABS:                        7.0    24.20 )-----------( 568      ( 2023 06:41 )             23.7     02-06    133<L>  |  104  |  9   ----------------------------<  91  3.6   |  17<L>  |  0.73    Ca    10.5      2023 06:41  Phos  1.5     02-06  Mg     1.60     02-06    TPro  6.5  /  Alb  2.3<L>  /  TBili  0.4  /  DBili  x   /  AST  15  /  ALT  24  /  AlkPhos  214<H>  02-06    PT/INR - ( 2023 06:41 )   PT: 16.1 sec;   INR: 1.38 ratio         PTT - ( 2023 06:41 )  PTT:26.1 sec      Urinalysis Basic - ( 2023 09:15 )    Color: Light Yellow / Appearance: Clear / S.016 / pH: x  Gluc: x / Ketone: Negative  / Bili: Negative / Urobili: <2 mg/dL   Blood: x / Protein: 30 mg/dL / Nitrite: Negative   Leuk Esterase: Negative / RBC: 0 /HPF / WBC 1 /HPF   Sq Epi: x / Non Sq Epi: 0 /HPF / Bacteria: Negative        Culture - Body Fluid with Gram Stain (collected 2023 13:33)  Source: .Body Fluid Synovial Fluid  Gram Stain (2023 17:30):    No polymorphonuclear leukocytes per low power field    No organisms seen per oil power field    by cytocentrifuge        RADIOLOGY & ADDITIONAL TESTS:  Results Reviewed:   Imaging Personally Reviewed:  Electrocardiogram Personally Reviewed:    COORDINATION OF CARE:  Care Discussed with Consultants/Other Providers [Y/N]:  Prior or Outpatient Records Reviewed [Y/N]:   ***************************************************************  Acacai Linder MD (PGY-1)  Internal Medicine  Pager: [phone]  ***************************************************************    PROGRESS NOTE:     Patient is a 67y old  Male who presents with a chief complaint of r. knee pain (2023 22:08)      SUBJECTIVE / OVERNIGHT EVENTS: Patient seen and examined at bedside.  Reports right knee pain but denies fevers, chills, N/V/D, chest pain, SOB, and abdominal pain.    MEDICATIONS  (STANDING):  chlorhexidine 2% Cloths 1 Application(s) Topical daily  gabapentin Solution 300 milliGRAM(s) Oral three times a day  heparin   Injectable 5000 Unit(s) SubCutaneous every 12 hours  levothyroxine 150 MICROGram(s) Oral daily  pantoprazole   Suspension 40 milliGRAM(s) Oral every 24 hours  piperacillin/tazobactam IVPB.. 3.375 Gram(s) IV Intermittent every 8 hours  polyethylene glycol 3350 17 Gram(s) Oral daily  potassium phosphate IVPB 30 milliMole(s) IV Intermittent once  sodium chloride 0.9%. 1000 milliLiter(s) (100 mL/Hr) IV Continuous <Continuous>  vancomycin  IVPB 1000 milliGRAM(s) IV Intermittent every 12 hours    MEDICATIONS  (PRN):  acetaminophen    Suspension .. 650 milliGRAM(s) Oral every 6 hours PRN Temp greater or equal to 38C (100.4F), Mild Pain (1 - 3)  albuterol/ipratropium for Nebulization 3 milliLiter(s) Nebulizer every 6 hours PRN Shortness of Breath and/or Wheezing  oxyCODONE    IR 10 milliGRAM(s) Oral every 4 hours PRN Moderate Pain (4 - 6)      CAPILLARY BLOOD GLUCOSE        I&O's Summary      PHYSICAL EXAM:  Vital Signs Last 24 Hrs  T(C): 37.3 (2023 05:30), Max: 37.9 (2023 22:00)  T(F): 99.1 (2023 05:30), Max: 100.3 (2023 22:00)  HR: 121 (2023 05:30) (116 - 130)  BP: 129/63 (2023 05:30) (119/61 - 136/58)  BP(mean): 71 (2023 22:00) (71 - 90)  RR: 18 (2023 05:30) (16 - 22)  SpO2: 100% (2023 05:30) (94% - 100%)    Parameters below as of 2023 05:30  Patient On (Oxygen Delivery Method): room air    GENERAL: NAD  HEENT: hoarse voice noted  NECK: Supple, No JVD  CHEST/LUNG: mild wheezes throughout  HEART: Regular rate and rhythm; No murmurs, rubs, or gallops  ABDOMEN: Soft, Nontender, Nondistended; Bowel sounds present PEG tube in place  EXTREMITIES:  2+ Peripheral Pulses, No clubbing, cyanosis. + Right knee erythema and TTP, incisions cdi, SILT, ROM limited 2/2 pain  NEUROLOGY: AAOx3, non-focal  SKIN: No rashes or lesions    LABS:                        7.0    24.20 )-----------( 568      ( 2023 06:41 )             23.7     02-06    133<L>  |  104  |  9   ----------------------------<  91  3.6   |  17<L>  |  0.73    Ca    10.5      2023 06:41  Phos  1.5     02-06  Mg     1.60     02-06    TPro  6.5  /  Alb  2.3<L>  /  TBili  0.4  /  DBili  x   /  AST  15  /  ALT  24  /  AlkPhos  214<H>  02-06    PT/INR - ( 2023 06:41 )   PT: 16.1 sec;   INR: 1.38 ratio         PTT - ( 2023 06:41 )  PTT:26.1 sec      Urinalysis Basic - ( 2023 09:15 )    Color: Light Yellow / Appearance: Clear / S.016 / pH: x  Gluc: x / Ketone: Negative  / Bili: Negative / Urobili: <2 mg/dL   Blood: x / Protein: 30 mg/dL / Nitrite: Negative   Leuk Esterase: Negative / RBC: 0 /HPF / WBC 1 /HPF   Sq Epi: x / Non Sq Epi: 0 /HPF / Bacteria: Negative        Culture - Body Fluid with Gram Stain (collected 2023 13:33)  Source: .Body Fluid Synovial Fluid  Gram Stain (2023 17:30):    No polymorphonuclear leukocytes per low power field    No organisms seen per oil power field    by cytocentrifuge        RADIOLOGY & ADDITIONAL TESTS:  Results Reviewed:   Imaging Personally Reviewed:  Electrocardiogram Personally Reviewed:    COORDINATION OF CARE:  Care Discussed with Consultants/Other Providers [Y/N]:  Prior or Outpatient Records Reviewed [Y/N]:

## 2023-02-06 NOTE — PATIENT PROFILE ADULT - FALL HARM RISK - HARM RISK INTERVENTIONS
Assistance with ambulation/Assistance OOB with selected safe patient handling equipment/Communicate Risk of Fall with Harm to all staff/Discuss with provider need for PT consult/Monitor gait and stability/Reinforce activity limits and safety measures with patient and family/Tailored Fall Risk Interventions/Visual Cue: Yellow wristband and red socks/Bed in lowest position, wheels locked, appropriate side rails in place/Call bell, personal items and telephone in reach/Instruct patient to call for assistance before getting out of bed or chair/Non-slip footwear when patient is out of bed/[place] to call system/Physically safe environment - no spills, clutter or unnecessary equipment/Purposeful Proactive Rounding/Room/bathroom lighting operational, light cord in reach

## 2023-02-06 NOTE — PROGRESS NOTE ADULT - PROBLEM SELECTOR PLAN 2
-esophageal cancer dx 8 years ago on irinotecan   -pt is full code  -has outpt f/u w/ Dr. Angelo [facility] -esophageal cancer dx 8 years ago on irinotecan   CTA decreased RUL ground glass opacities and unchanged large mass in left upper hemithorax invading left chest   wall with erosive changes of left 3rd through 5th ribs  -pt is full code  -has outpt f/u with Dr. Gi Thomas, obtain records -esophageal cancer dx 8 years ago on irinotecan   CTA decreased RUL ground glass opacities and unchanged large mass in left upper hemithorax invading left chest   wall with erosive changes of left 3rd through 5th ribs  -pt is full code  -has outpt f/u with Dr. Gi Thomas, obtain records  - rad onc consulted

## 2023-02-07 NOTE — ADVANCED PRACTICE NURSE CONSULT - ASSESSMENT
General: A&Ox4, turns two assist, functional quadriplegia Lidoderm patch to right knee wound s/p arthrocentesis followed by Ortho, incontinent of urine and stool. Skin warm, dry, scattered areas of hyperpigmentation and hypopigmentation, Blanchable erythema on bilateral heels. Pain upon turning or movement.      LUQ PEG- with small dried serous drainage to peritubular skin. No skin breakdown noted at this time.     Right buttock mixed etiology Stage 2 pressure injury and frictional injury measuring [id number] exposing pink moist agranular. (As per wife at bedside patient with h/o PI to site that would " heal and reopen"). Wound bed friable moderate sanguinous drainage, no odor. Periwound skin with hyperpigmentation and erythema consistent with IAD with suspected candidiasis. No edema, no induration, no increased warmth. Goals of care: Protect from frictional forces, moisture management, continue to offload.          General: A&Ox4, turns two assist, functional quadriplegia Lidoderm patch to right knee wound s/p arthrocentesis followed by Ortho, incontinent of urine and stool. Skin warm, dry, scattered areas of hyperpigmentation and hypopigmentation, Blanchable erythema on bilateral heels. Pain upon turning or movement.      LUQ PEG- with small dried serous drainage to peritubular skin. No skin breakdown noted at this time.     Right buttock mixed etiology Stage 2 pressure injury and frictional injury measuring [id number] exposing pink moist agranular. (As per wife at bedside patient with h/o PI to site that would " heal and reopen"). Wound bed friable moderate sanguinous drainage, no odor. Periwound skin with hyperpigmentation and erythema extending to sacral fold and bilateral buttocks consistent with IAD with suspected candidiasis. No edema, no induration, no increased warmth. Goals of care: Protect from frictional forces, moisture management, continue to offload.          General: A&Ox4, turns two assist, functional quadriplegia Lidoderm patch to right knee wound s/p arthrocentesis followed by Ortho, incontinent of urine and stool. Skin warm, dry, scattered areas of hyperpigmentation and hypopigmentation, Blanchable erythema on bilateral heels. Pain upon turning or movement.      LUQ PEG- with small dried serous drainage to peritubular skin. No skin breakdown noted at this time.     Right buttock mixed etiology Stage 2 pressure injury and frictional injury measuring [id number] exposing pink moist agranular. (As per wife at bedside patient with h/o PI to site that would " heal and reopen"). Wound bed friable moderate sanguinous drainage, no odor. Periwound skin with hyperpigmentation and erythema extending to sacral fold and bilateral buttocks consistent with MAD with suspected candidiasis. No edema, no induration, no increased warmth. Goals of care: Protect from frictional forces, moisture management, continue to offload.

## 2023-02-07 NOTE — PROGRESS NOTE ADULT - PROBLEM SELECTOR PLAN 1
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  s/p arthrocentesis 2/5 with RBCs c/f hemarthrosis  ESR/CRP elevated  RVP, UA and LE US negative, WBC downtrending however had persistent leukocytosis recent admission  d/c'd vanc, c/w zosyn   XR tib-fib and knee unremarkable  - aspiration cx, bcxs ngtd  - MRSA neg  - f/u ortho recs 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

## 2023-02-07 NOTE — PROGRESS NOTE ADULT - PROBLEM SELECTOR PLAN 4
-c/w synthroid 150 mcg -c/w tylenol mild pain  -oxycodone 5 q4 prn for moderate pain, oxy 10 q4 prn for severe pain  -c/w gabapentin 300 mg TID    #Reactive airway dx  -albuterol Q6

## 2023-02-07 NOTE — PROGRESS NOTE ADULT - ASSESSMENT
67M s/p R patellectomy/VY advancement p/w R knee pain. Currently afebrile but reported fevers at home. Will aspirate knee and f/u results    Plan:  -FU aspiration results  -FU labs  -FU Bcx  -pain control  -WBAT in [place]

## 2023-02-07 NOTE — PROGRESS NOTE ADULT - ASSESSMENT
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

## 2023-02-07 NOTE — PROGRESS NOTE ADULT - ATTENDING COMMENTS
67M PMH metastatic [facility] SCC esophageal cancer (mets to bone on irinotecan) c/b dysphagia s/p PEG and SCC R patellar mass excision and patellectomy 1 month ago (on Xarelto for postop DVT ppx), hypothyroidism, p/w R knee pain and swelling. Found to have SIRS (tachy, leukocytosis) and c/f right knee septic arthritis s/p arthrocentesis on 2/5    Right knee with swelling, erythema, warmth and TTP, dry incisions  tachycardic    Arthrocentesis performed by ortho 2/5 yielded significant amount of RBC, c/f hemarthrosis, cultures NGTD, ESR/CRP elevated  c/w Zosyn for now. Had persistent leukocytosis recent admission as well, WBC downtrending  Add Tylenol ATC for pain control, oxy ATC added by palliative along with PRNs, ice compress to right knee, lidocaine patch  Hgb downtrending to 6.7, 1 U PRBC ordered, repeat post transfusion CBC. If not responding or right knee swelling worsening, will consider repeat imaging of right knee. Ortho aware. Hold Xarelto (has been on this postop for DVT ppx)  Mild hypercalcemia noted, IVF started x 24 hrs  Obtain collateral from outpatient Oncologist  Was planned to start RT as outpatient but never started. Will consult Rad Onc inpatient. Previous right patellar bx path with SCC  Wound care consult for back wound  TSH elevated, FT4 low. Confirmed that family administering synthroid appropriately (holding TFs 1 hr before and after). Rpt TFTs before DC  Family reported random twitching episodes. Also a concern previous admission and was thought to be due to sleep deprivation and reglan held  PT eval    Discussed with wife at bedside.

## 2023-02-07 NOTE — PROGRESS NOTE ADULT - SUBJECTIVE AND OBJECTIVE BOX
***************************************************************  Acacia Linder MD (PGY-1)  Internal Medicine  Pager: [phone]  ***************************************************************    PROGRESS NOTE:     Patient is a 67y old  Male who presents with a chief complaint of r. knee pain (2023 06:15)      SUBJECTIVE / OVERNIGHT EVENTS: Patient seen and examined at bedside. No acute overnight events. Patient reports knee pain worse with movement but denies fevers, chills, N/V/D, chest pain, SOB, and abdominal pain.    MEDICATIONS  (STANDING):  chlorhexidine 2% Cloths 1 Application(s) Topical daily  gabapentin Solution 300 milliGRAM(s) Oral three times a day  heparin   Injectable 5000 Unit(s) SubCutaneous every 12 hours  levothyroxine 150 MICROGram(s) Oral daily  pantoprazole   Suspension 40 milliGRAM(s) Oral every 24 hours  piperacillin/tazobactam IVPB.. 3.375 Gram(s) IV Intermittent every 8 hours  polyethylene glycol 3350 17 Gram(s) Oral daily    MEDICATIONS  (PRN):  acetaminophen    Suspension .. 650 milliGRAM(s) Oral every 6 hours PRN Temp greater or equal to 38C (100.4F), Mild Pain (1 - 3)  albuterol/ipratropium for Nebulization 3 milliLiter(s) Nebulizer every 6 hours PRN Shortness of Breath and/or Wheezing  oxyCODONE    IR 10 milliGRAM(s) Oral every 4 hours PRN Moderate Pain (4 - 6)      CAPILLARY BLOOD GLUCOSE        I&O's Summary    2023 07:01  -  2023 07:00  --------------------------------------------------------  IN: 1800 mL / OUT: 1450 mL / NET: 350 mL        PHYSICAL EXAM:  Vital Signs Last 24 Hrs  T(C): 36.6 (2023 05:20), Max: 37.2 (2023 13:36)  T(F): 97.8 (2023 05:20), Max: 99 (2023 13:36)  HR: 115 (2023 05:20) (115 - 118)  BP: 122/61 (2023 05:20) (100/60 - 122/61)  BP(mean): --  RR: 17 (2023 05:20) (17 - 17)  SpO2: 98% (2023 05:20) (98% - 98%)    Parameters below as of 2023 05:20  Patient On (Oxygen Delivery Method): room air      GENERAL: NAD  HEENT: hoarse voice noted  NECK: Supple, No JVD  CHEST/LUNG: CTAB  HEART: Tachycardic with regular rhythm; No murmurs, rubs, or gallops  ABDOMEN: Soft, Nontender, Nondistended; Bowel sounds present PEG tube in place cdi  EXTREMITIES:  2+ Peripheral Pulses, No clubbing, cyanosis. + R knee erythema, edema, and TTP, incisions cdi, SILT, ROM limited 2/2 pain  NEUROLOGY: AAOx3, non-focal  SKIN: No rashes or lesions        LABS:                        7.1    20.67 )-----------( 591      ( 2023 17:00 )             23.7     02-06    133<L>  |  104  |  9   ----------------------------<  91  3.6   |  17<L>  |  0.73    Ca    10.5      2023 06:41  Phos  1.5     02-06  Mg     1.60     02-06    TPro  6.5  /  Alb  2.3<L>  /  TBili  0.4  /  DBili  x   /  AST  15  /  ALT  24  /  AlkPhos  214<H>  02-06    PT/INR - ( 2023 06:41 )   PT: 16.1 sec;   INR: 1.38 ratio         PTT - ( 2023 06:41 )  PTT:26.1 sec      Urinalysis Basic - ( 2023 09:15 )    Color: Light Yellow / Appearance: Clear / S.016 / pH: x  Gluc: x / Ketone: Negative  / Bili: Negative / Urobili: <2 mg/dL   Blood: x / Protein: 30 mg/dL / Nitrite: Negative   Leuk Esterase: Negative / RBC: 0 /HPF / WBC 1 /HPF   Sq Epi: x / Non Sq Epi: 0 /HPF / Bacteria: Negative        Culture - Body Fluid with Gram Stain (collected 2023 13:33)  Source: .Body Fluid Synovial Fluid  Gram Stain (2023 17:30):    No polymorphonuclear leukocytes per low power field    No organisms seen per oil power field    by cytocentrifuge  Preliminary Report (2023 08:30):    No growth    Culture - Urine (collected 2023 13:15)  Source: Clean Catch Clean Catch (Midstream)  Final Report (2023 14:21):    No growth    Culture - Blood (collected 2023 09:25)  Source: .Blood Blood-Peripheral  Preliminary Report (2023 15:02):    No growth to date.    Culture - Blood (collected 2023 09:15)  Source: .Blood Blood-Peripheral  Preliminary Report (2023 15:02):    No growth to date.        RADIOLOGY & ADDITIONAL TESTS:  Results Reviewed:   Imaging Personally Reviewed:  Electrocardiogram Personally Reviewed:    COORDINATION OF CARE:  Care Discussed with Consultants/Other Providers [Y/N]:  Prior or Outpatient Records Reviewed [Y/N]:   ***************************************************************  Acacia Linder MD (PGY-1)  Internal Medicine  Pager: [phone]  ***************************************************************    PROGRESS NOTE:     Patient is a 67y old  Male who presents with a chief complaint of r. knee pain (2023 06:15)      SUBJECTIVE / OVERNIGHT EVENTS: Patient seen and examined at bedside. No acute overnight events. Patient continues to have R knee pain worse with movement but denies fevers, chills, N/V/D, chest pain, SOB, and abdominal pain.    MEDICATIONS  (STANDING):  chlorhexidine 2% Cloths 1 Application(s) Topical daily  gabapentin Solution 300 milliGRAM(s) Oral three times a day  heparin   Injectable 5000 Unit(s) SubCutaneous every 12 hours  levothyroxine 150 MICROGram(s) Oral daily  pantoprazole   Suspension 40 milliGRAM(s) Oral every 24 hours  piperacillin/tazobactam IVPB.. 3.375 Gram(s) IV Intermittent every 8 hours  polyethylene glycol 3350 17 Gram(s) Oral daily    MEDICATIONS  (PRN):  acetaminophen    Suspension .. 650 milliGRAM(s) Oral every 6 hours PRN Temp greater or equal to 38C (100.4F), Mild Pain (1 - 3)  albuterol/ipratropium for Nebulization 3 milliLiter(s) Nebulizer every 6 hours PRN Shortness of Breath and/or Wheezing  oxyCODONE    IR 10 milliGRAM(s) Oral every 4 hours PRN Moderate Pain (4 - 6)      CAPILLARY BLOOD GLUCOSE        I&O's Summary    2023 07:01  -  2023 07:00  --------------------------------------------------------  IN: 1800 mL / OUT: 1450 mL / NET: 350 mL        PHYSICAL EXAM:  Vital Signs Last 24 Hrs  T(C): 36.6 (2023 05:20), Max: 37.2 (2023 13:36)  T(F): 97.8 (2023 05:20), Max: 99 (2023 13:36)  HR: 115 (2023 05:20) (115 - 118)  BP: 122/61 (2023 05:20) (100/60 - 122/61)  BP(mean): --  RR: 17 (2023 05:20) (17 - 17)  SpO2: 98% (2023 05:20) (98% - 98%)    Parameters below as of 2023 05:20  Patient On (Oxygen Delivery Method): room air      GENERAL: NAD  HEENT: hoarse voice noted  NECK: Supple, No JVD  CHEST/LUNG: CTAB  HEART: Tachycardic with regular rhythm; No murmurs, rubs, or gallops  ABDOMEN: Soft, Nontender, Nondistended; Bowel sounds present PEG tube in place cdi  EXTREMITIES:  2+ Peripheral Pulses, No clubbing, cyanosis. + R knee erythema, edema, and TTP, incisions cdi, SILT, ROM limited 2/2 pain  NEUROLOGY: AAOx3, non-focal  SKIN: No rashes or lesions        LABS:                        7.1    20.67 )-----------( 591      ( 2023 17:00 )             23.7     02-06    133<L>  |  104  |  9   ----------------------------<  91  3.6   |  17<L>  |  0.73    Ca    10.5      2023 06:41  Phos  1.5     02-06  Mg     1.60     02-06    TPro  6.5  /  Alb  2.3<L>  /  TBili  0.4  /  DBili  x   /  AST  15  /  ALT  24  /  AlkPhos  214<H>  02-06    PT/INR - ( 2023 06:41 )   PT: 16.1 sec;   INR: 1.38 ratio         PTT - ( 2023 06:41 )  PTT:26.1 sec      Urinalysis Basic - ( 2023 09:15 )    Color: Light Yellow / Appearance: Clear / S.016 / pH: x  Gluc: x / Ketone: Negative  / Bili: Negative / Urobili: <2 mg/dL   Blood: x / Protein: 30 mg/dL / Nitrite: Negative   Leuk Esterase: Negative / RBC: 0 /HPF / WBC 1 /HPF   Sq Epi: x / Non Sq Epi: 0 /HPF / Bacteria: Negative        Culture - Body Fluid with Gram Stain (collected 2023 13:33)  Source: .Body Fluid Synovial Fluid  Gram Stain (2023 17:30):    No polymorphonuclear leukocytes per low power field    No organisms seen per oil power field    by cytocentrifuge  Preliminary Report (2023 08:30):    No growth    Culture - Urine (collected 2023 13:15)  Source: Clean Catch Clean Catch (Midstream)  Final Report (2023 14:21):    No growth    Culture - Blood (collected 2023 09:25)  Source: .Blood Blood-Peripheral  Preliminary Report (2023 15:02):    No growth to date.    Culture - Blood (collected 2023 09:15)  Source: .Blood Blood-Peripheral  Preliminary Report (2023 15:02):    No growth to date.        RADIOLOGY & ADDITIONAL TESTS:  Results Reviewed:   Imaging Personally Reviewed:  Electrocardiogram Personally Reviewed:    COORDINATION OF CARE:  Care Discussed with Consultants/Other Providers [Y/N]:  Prior or Outpatient Records Reviewed [Y/N]:

## 2023-02-07 NOTE — ADVANCED PRACTICE NURSE CONSULT - RECOMMEDATIONS
Topical recommendations:     LUQ PEG- Cleanse q shift with NS, apply liquid barrier film beneath mauri disc.  If redness noted under mauri disc bumper apply thin foam  dressing without border (Mepilex Lite)- cut to mid dressing with a 'Y' shape.   Secondary securement to abdomen taping in 'H' fashion with Steri-strips.     Bilateral heels- apply LBF daily, continue to elevate with offloading boots.     Right buttock - Cleanse with NS, pat dry. Apply Liquid barrier film to periwound skin (allow to dry). Apply silicone foam with border. Change daily. Once foam applied, Cleanse with soap and water, pat dry. Apply Joao antifungal moisture barrier cream twice a day or PRN with episodes of incontinence.     Continue low air loss bed therapy,  heel elevation with offloading boots, turn & reposition q2h with Z-flow fluidized pillow, continue moisture management with barrier creams as specified above & single breathable pad, continue measures to decrease friction/shear.   Plan discussed with patient and wife at bedside- educated on topical wound therapy to optimize wound healing. Questions answered.     Recommendations and findings discussed with medical resident Dr Linder.     Please contact Wound/Ostomy Care Service Line if we can be of further assistance (ext [phone]).

## 2023-02-07 NOTE — CONSULT NOTE ADULT - SUBJECTIVE AND OBJECTIVE BOX
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 knee, hypothyroidism, GERD, recent RUL pna s/p cefepime, r. patellar mass excision 1 month ago who presents w/ right knee pain and swelling for the past 2 days. He also notes having pus like drainage. He is having difficulty w/ ambulation as a result. Had a fever outpatient 100.7 F, leukocytosis in ED to 27k. Denies dysuria, polyuria, melena, hematochezia, h/a, LOC, visual changes, cp. Radiation onc was consulted last admission, pt to f/u outpt for RT adjuvant. In the ED right knee arthrocentesis was done, cultures were sent.    ED course: given zosyn, Tm 37.9 C, , /58, RR 20 100%  CT PE shows improved RUL infiltrate   (05 Feb 2023 22:08)    PERTINENT PM/SXH:   HTN (hypertension)    GERD (gastroesophageal reflux disease)    Hypothyroidism    Neuropathic pain    Disorder of bone, unspecified    H/O constipation    Esophageal cancer    Liver metastases    Lung metastases      S/P percutaneous endoscopic gastrostomy (PEG) tube placement      FAMILY HISTORY:  No pertinent family history in first degree relatives      Family Hx substance abuse [ ]yes [ ]no  ITEMS NOT CHECKED ARE NOT PRESENT    SOCIAL HISTORY:   Significant other/partner[x ]  Children[x ]  Holiness/Spirituality:  Substance hx:  [ ]   Tobacco hx:  [ ]   Alcohol hx: [ ]   Home Opioid hx:  [ ] I-Stop Reference No:  Living Situation: [ x]Home  [ ]Long term care  [ ]Rehab [ ]Other    ADVANCE DIRECTIVES:    DNR/MOLST  [ ]  Living Will  [ ]   DECISION MAKER(s):  [ ] Health Care Proxy(s)  [ x] Surrogate(s)  [ ] Guardian           Name(s): Phone Number(s):  wife Patricia Espitia   BASELINE (I)ADL(s) (prior to admission):  [place]: [ ]Total  [ ]x Moderate [ x]Dependent    Allergies    No Known Allergies    Intolerances    MEDICATIONS  (STANDING):  acetaminophen    Suspension .. 650 milliGRAM(s) Oral every 6 hours  chlorhexidine 2% Cloths 1 Application(s) Topical daily  gabapentin Solution 300 milliGRAM(s) Oral three times a day  heparin   Injectable 5000 Unit(s) SubCutaneous every 12 hours  levothyroxine 150 MICROGram(s) Oral daily  lidocaine   4% Patch 1 Patch Transdermal daily  oxyCODONE    IR 5 milliGRAM(s) Oral every 6 hours  pantoprazole   Suspension 40 milliGRAM(s) Oral every 24 hours  piperacillin/tazobactam IVPB.. 3.375 Gram(s) IV Intermittent every 8 hours  polyethylene glycol 3350 17 Gram(s) Oral two times a day  senna Syrup 10 milliLiter(s) Oral daily  sodium chloride 0.9%. 1000 milliLiter(s) (100 mL/Hr) IV Continuous <Continuous>    MEDICATIONS  (PRN):  albuterol/ipratropium for Nebulization 3 milliLiter(s) Nebulizer every 6 hours PRN Shortness of Breath and/or Wheezing  naloxone Injectable 0.3 milliGRAM(s) IV Push every 3 minutes PRN opioid induced AMS  oxyCODONE    IR 10 milliGRAM(s) Oral every 4 hours PRN Severe Pain (7 - 10)  oxyCODONE    IR 5 milliGRAM(s) Oral every 4 hours PRN Moderate Pain (4 - 6)    PRESENT SYMPTOMS: [ ]Unable to self-report  [ ] CPOT [ ] PAINADs [ ] RDOS  Source if other than patient:  [ ]Family   [ ]Team     Pain: [ x]yes [ ]no  QOL impact - Extensive   Location -        right knee           Aggravating factors - movement  Quality -  dull persistently achy, throbbing/angry appearing swollen , hot to touch   Radiation - no  Timing-  persistent   Severity (0-10 scale):  10+++  Minimal acceptable level (0-10 scale): <5    CPOT:    https://www.[email or web address]/getattachment/[email or web address]/Critical-Care-Pain-Observation-Tool-(CPOT)    PAIN AD Score:   http://geriatrictoolkit.[email or web address]/painad.pdf (press ctrl +  left click to view)    Dyspnea:                           [ ]Mild [ x]Moderate [ ]Severe      RDOS:  0 to 2  minimal or no respiratory distress   3  mild distress  3  4 to 6 moderate distress  >7 severe distress  https://homecareinformation.net/handouts/hen/Respiratory_Distress_Observation_Scale.pdf (Ctrl +  left click to view)     Anxiety:                             [ ]Mild [x ]Moderate [ ]Severe  Fatigue:                             [ ]Mild [ x]Moderate [ x]Severe  Nausea:                             [ ]Mild [ ]Moderate [ ]Severe  Loss of appetite:              [ ]Mild [x ]Moderate [ ]Severe  Constipation:                    [ ]Mild [ ]Moderate [ ]Severe    PCSSQ [Palliative Care Spiritual Screening Question]   Severity (0-10):  2  Score of 4 or > indicate consideration of Chaplaincy referral.  Chaplaincy Referral: [ ] yes [ ] refused [ ] following  xx deferred     Caregiver [place]? : [ ] yes [ x] no  Social work referral [ ] Patient & Family Centered Care Referral [ ]     Anticipatory Grief Present?: [ ] yes [ x] no  Social work referral [ ]  Patient & Family Centered Care Referral [ ]       Other Symptoms:  [ x]All other review of systems negative     Palliative Performance Status Version 2:    40   %    http://npcrc.org/files/news/palliative_performance_scale_ppsv2.pdf  PHYSICAL EXAM:  Vital Signs Last 24 Hrs  T(C): 36.3 (07 Feb 2023 13:00), Max: 36.6 (07 Feb 2023 05:20)  T(F): 97.3 (07 Feb 2023 13:00), Max: 97.8 (07 Feb 2023 05:20)  HR: 115 (07 Feb 2023 13:00) (107 - 118)  BP: 109/60 (07 Feb 2023 13:00) (109/60 - 122/61)  BP(mean): --  RR: 17 (07 Feb 2023 13:00) (17 - 18)  SpO2: 100% (07 Feb 2023 13:00) (98% - 100%)    Parameters below as of 07 Feb 2023 13:00  Patient On (Oxygen Delivery Method): room air     I&O's Summary    06 Feb 2023 07:01  -  07 Feb 2023 07:00  --------------------------------------------------------  IN: 1800 mL / OUT: 1450 mL / NET: 350 mL      GENERAL: [ ]Cachexia    [ x]Alert  x[ ]Oriented x   4[ ]Lethargic  [ ]Unarousable  x[ ]Verbal  [ ]Non-Verbal  Behavioral:   [x ] Anxiety  [ ] Delirium [ ] Agitation [ ] Other  HEENT:  [ ]Normal   [ ]Dry mouth   [ ]ET Tube/Trach  [x ]Oral lesions  PULMONARY:   [ ]Clear [ ]Tachypnea  [ x]Audible excessive secretions   [ ]Rhonchi        [ ]Right [ ]Left [ ]Bilateral  [x ]Crackles        [ ]Right [ ]Left [ x]Bilateral  [ ]Wheezing     [ ]Right [ ]Left [ ]Bilateral  [ ]Diminished breath sounds [ ]right [ ]left [ ]bilateral  CARDIOVASCULAR:    [ ]Regular [ x]Irregular [ ]Tachy  [ ][place] [ ]Murmur [ ]Other  GASTROINTESTINAL:  [x ]Soft  [ ]Distended   [x ]+BS  [ ]Non tender [ ]Tender  [ ]Other [ x]PEG [ ]OGT/ NGT  Last BM:  GENITOURINARY:  [x ]Normal [ ] Incontinent   [ ]Oliguria/Anuria   [ ][place]  MUSCULOSKELETAL:   [ ]Normal   [ x]Weakness  x[ ]Bed/Wheelchair bound [ ]Edema  NEUROLOGIC:   [ x]No focal deficits  [ ]Cognitive impairment  [ ]Dysphagia [ ]Dysarthria [ ]Paresis [ ]Other   SKIN:   [ ]Normal  [ ]Rash  [x ]Other  [ ]Pressure ulcer(s)       Present on admission [ ]y [ ]n    CRITICAL CARE:  [ ] Shock Present  [ x]Septic [ ]Cardiogenic [ ]Neurologic [ ]Hypovolemic  [ ]  Vasopressors [ ]  Inotropes   [ ]Respiratory failure present [ ]Mechanical ventilation [ ]Non-invasive ventilatory support [ ]High flow    [ ]Acute  [ ]Chronic [ ]Hypoxic  [ ]Hypercarbic [ ]Other  [ ]Other organ failure     LABS:                        6.7    19.64 )-----------( 585      ( 07 Feb 2023 05:42 )             22.2   02-07    134<L>  |  103  |  15  ----------------------------<  119<H>  3.7   |  18<L>  |  0.79    Ca    10.6<H>      07 Feb 2023 05:42  Phos  1.1     02-07  Mg     1.50     02-07    TPro  6.5  /  Alb  2.8<L>  /  TBili  0.2  /  DBili  x   /  AST  15  /  ALT  23  /  AlkPhos  213<H>  02-07  PT/INR - ( 06 Feb 2023 06:41 )   PT: 16.1 sec;   INR: 1.38 ratio         PTT - ( 06 Feb 2023 06:41 )  PTT:26.1 sec      RADIOLOGY & ADDITIONAL STUDIES:    < from: CT Angio Chest PE Protocol w/ IV Cont (02.05.23 @ 19:05) >    ACC: [id number] EXAM:  CT ANGIO CHEST PULM ART WAWIC   ORDERED BY: CHUCK CARBAJAL     PROCEDURE DATE:  02/05/2023          INTERPRETATION:  CLINICAL INFORMATION: Right lower extremity pain and   swelling. 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 Evaluate for pulmonary embolism and/or   pneumonia.    COMPARISON: CT chest 1/10/2023.    CONTRAST/COMPLICATIONS:  IV Contrast: Omnipaque 350  90 cc administered   10 cc discarded  Oral Contrast: NONE  Complications: None reported at time of study completion    PROCEDURE:  CT Angiogram of the chest was obtained with intravenous contrast. Three   dimensional maximum intensity projection (MIP) images were generated.    FINDINGS:    PULMONARY ANGIOGRAM: No pulmonary embolism in the main, left main, right   main, lobar, or segmental pulmonary arteries. Limited evaluation of the   subsegmental pulmonary arteries due to motion artifact.    LYMPH NODES: Redemonstrated enlarged lymph node in the azygoesophageal   recess measuring 1.8 x 1.3 cm (2:49). Few subcentimeter left   supraclavicular lymph nodes (2:17).    HEART/VASCULATURE: Heart size is normal. No pericardial effusion. Right   chest port with distal catheter tip in the SVC.    AIRWAYS/LUNGS/PLEURA: Central airways are patent. Patchy right upper lobe   ground glass opacities are decreased as compared with 1/10/2023. Large   mass in the left upper hemithorax invading to the chest wall with erosive   changes of the left third, fourth, and fifth ribs appears similar as   compared with 1/10/2023 measuring 10.6 x 7.3 x 11.5 cm. Associated   compressive atelectasis of the left upper and lower lobes. Small   bilateral pleural effusions.    UPPER ABDOMEN: Hyperdensity within the gallbladder lumen, possibly   secondary to biliary excretion of contrast. Small hiatal hernia.   Gastrostomy tube with tip in the stomach. Unchanged mild left adrenal   nodular thickening.    BONES/SOFT TISSUES: Left upper hemithorax mass invading the left chest   wall with destructive changes of the left third through fifth ribs as   described above.    IMPRESSION:    No pulmonary embolism to the level of the segmental pulmonary arteries.    Patchy right upper lobe ground glass opacities are decreased as compared   with1/10/2023.    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    --- End of Report ---            < end of copied text >  < from: Xray Knee 3 Views, Right (02.05.23 @ 12:06) >    ACC: [id number] EXAM:  XR KNEE 3 VIEWS RT   ORDERED BY: CHUCK CARBAJAL     ACC: [id number] EXAM:  XR TIB FIB AP LAT 2 VIEWS RT   ORDERED BY: CHUCK CARBAJAL     PROCEDURE DATE:  02/05/2023          INTERPRETATION:  CLINICAL INFORMATION: Metastatic esophageal carcinoma.   Status post patellectomy on 1/7/2023. Pain and swelling.    COMPARISON: Radiographs dated 1/1/2023 and 1/2/2023. MRI dated 1/5/2023.    TECHNIQUE: 3 views of the right knee, 2 views of the right lower leg    FINDINGS:  Status post interval patellectomy. No significant postsurgical soft   tissue swelling or tracking emphysema.  No acute displaced fracture or dislocation. Moderate medial femorotibial   compartment arthrosis.  Disuse osteoporosis.    IMPRESSION:  Status post interval right patellectomy. No acute osseous abnormality.    --- End of Report ---            < end of copied text >      PROTEIN CALORIE MALNUTRITION PRESENT: [ ]mild [ ]moderate [ ]severe [ ]underweight [ ]morbid obesity  https://www.andeal.org/vault/2440/web/files/ONC/Table_Clinical%20Characteristics%20to%20Document%20Malnutrition-White%20JV%20et%20al%374876.pdf    Height (cm): 170.2 (02-05-23 @ 06:07), 170.2 (01-07-23 @ 06:50), 157.5 (11-10-22 @ 06:53)  Weight (kg): 63 (02-05-23 @ 21:53), 66.4 (01-07-23 @ 06:50), 69.4 (11-10-22 @ 06:53)  BMI (kg/m2): 21.7 (02-05-23 @ 21:53), 22.9 (02-05-23 @ 06:07), 22.9 (01-07-23 @ 06:50)    [ ]PPSV2 < or = to 30% [ ]significant weight loss  [ ]poor nutritional intake  [ ]anasarca[ ]Artificial Nutrition      Other REFERRALS:  [ ]Hospice  [ ]Child Life  [ ]Social Work  [x ]Case management [ ]Holistic Therapy

## 2023-02-07 NOTE — ADVANCED PRACTICE NURSE CONSULT - ASSESSMENT
Right arm cleansed with CHG. Under ultrasound guidance, placed Arrow Endurance Extended Dwell Peripheral Catheter System 20G / 6cm into Right Median Vein. Brisk  blood return, flushed with 20mls normal saline. Minimal blood loss. Patient tolerated procedure well. CHG dressing placed. All sharps accounted for. LOT#[id number] , REF# [id number]

## 2023-02-07 NOTE — CONSULT NOTE ADULT - PROBLEM SELECTOR RECOMMENDATION 3
SCC esophageal cancer dx 8 years ago  Patient known to [facility] , [facility]   Outpatient f/u with onc Dr Gi Thomas, manages his pain RX as well   RT onc Dr Brittany Reza  (first RT was scheduled for 2/7  CTA with unchanged large mass in upper left hemithorax invading left chest wall with erosive changes of 3-4 ribs

## 2023-02-07 NOTE — CONSULT NOTE ADULT - PROBLEM SELECTOR RECOMMENDATION 2
PPS 40% needs assistance with all basic needs  Prior to surgery patient was able to ambulate and moderately independent of ADL's  Patient currently in too much pain to participate in PT

## 2023-02-07 NOTE — ADVANCED PRACTICE NURSE CONSULT - REASON FOR CONSULT
Patient seen on skin care rounds after wound care referral received for assessment of skin impairment and recommendations of topical management of sacral skin impairment. Chart reviewed: WBC19.64, h/h 6.7/22.2, Platelets 585, INR 1.38, Serum albumin 2.8, bmio 21.8, Efe 14, wife interviewed at bedside. Patient H/O of metastatic SCC esophageal cancer to bone on irinotecan, hypothyroidism, s/p R patellar mass excision 1 month ago p/w R knee pain and swelling found to have SIRS c/f septic arthritis s/p R knee arthrocentesis followed by Ortho, seen by Palliative care.

## 2023-02-07 NOTE — PROGRESS NOTE ADULT - PROBLEM SELECTOR PLAN 3
Hb 7.8 on admission (baseline 8-9)  possibly due to hemarthrosis of right knee vs AOCD iso malignancy  - trend CBC  - transfuse Hb <7 Hb 7.8 on admission (baseline 8-9)  possibly due to hemarthrosis of right knee vs AOCD iso malignancy  - trend CBC  - transfuse Hb <7  - s/p 1u prbc

## 2023-02-07 NOTE — PROGRESS NOTE ADULT - PROBLEM SELECTOR PLAN 2
-SCC esophageal cancer dx 8 years ago on irinotecan   CTA decreased RUL ground glass opacities and unchanged large mass in left upper hemithorax invading left chest   wall with erosive changes of left 3rd through 5th ribs  Previous right patellar bx path with SCC  -pt is full code  -has outpt f/u with Dr. 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 Gi Thomas  -outpatient rad onc Dr. Brittany Reza (was scheduled for first RT 2/7)  -rad onc consulted

## 2023-02-07 NOTE — PROGRESS NOTE ADULT - SUBJECTIVE AND OBJECTIVE BOX
Orthopedic Surgery Progress Note     S: Patient seen and examined today. No acute events overnight. Pain is well controlled. Denies f/c, chest pain, shortness of breath, dizziness.    MEDICATIONS  (STANDING):  chlorhexidine 2% Cloths 1 Application(s) Topical daily  gabapentin Solution 300 milliGRAM(s) Oral three times a day  heparin   Injectable 5000 Unit(s) SubCutaneous every 12 hours  levothyroxine 150 MICROGram(s) Oral daily  pantoprazole   Suspension 40 milliGRAM(s) Oral every 24 hours  piperacillin/tazobactam IVPB.. 3.375 Gram(s) IV Intermittent every 8 hours  polyethylene glycol 3350 17 Gram(s) Oral daily    MEDICATIONS  (PRN):  acetaminophen    Suspension .. 650 milliGRAM(s) Oral every 6 hours PRN Temp greater or equal to 38C (100.4F), Mild Pain (1 - 3)  albuterol/ipratropium for Nebulization 3 milliLiter(s) Nebulizer every 6 hours PRN Shortness of Breath and/or Wheezing  oxyCODONE    IR 10 milliGRAM(s) Oral every 4 hours PRN Moderate Pain (4 - 6)      Physical Exam:  Gen: NAD  Resp: Unlabored breathing  RLE:  Incision c/d/i  SILT DP/SP/Rosio/Saph,   +EHL/FHL/TA/Gastroc,   +hip/ankle ROM,    knee ROM limited 2/2 pain,   DP+, soft compartments, no calf ttp.    Vital Signs Last 24 Hrs  T(C): 36.5 (06 Feb 2023 21:31), Max: 37.2 (06 Feb 2023 13:36)  T(F): 97.7 (06 Feb 2023 21:31), Max: 99 (06 Feb 2023 13:36)  HR: 118 (06 Feb 2023 21:31) (115 - 118)  BP: 111/56 (06 Feb 2023 21:31) (100/60 - 111/56)  BP(mean): --  RR: 17 (06 Feb 2023 21:31) (17 - 17)  SpO2: 98% (06 Feb 2023 21:31) (98% - 98%)    Parameters below as of 06 Feb 2023 21:31  Patient On (Oxygen Delivery Method): room air        02-06-23 @ 07:01  -  02-07-23 @ 06:15  --------------------------------------------------------  IN: 1800 mL / OUT: 1450 mL / NET: 350 mL                    LABS:                        7.1    20.67 )-----------( 591      ( 06 Feb 2023 17:00 )             23.7     02-06    133<L>  |  104  |  9   ----------------------------<  91  3.6   |  17<L>  |  0.73    Ca    10.5      06 Feb 2023 06:41  Phos  1.5     02-06  Mg     1.60     02-06    TPro  6.5  /  Alb  2.3<L>  /  TBili  0.4  /  DBili  x   /  AST  15  /  ALT  24  /  AlkPhos  214<H>  02-06

## 2023-02-08 NOTE — PHYSICAL THERAPY INITIAL EVALUATION ADULT - ACTIVE RANGE OF MOTION EXAMINATION, REHAB EVAL
right ankle PF WNL; right ankle DF impaired with pain; unable to actively manipulate right knee or hip joint due to pain/Left UE Active ROM was WNL (within normal limits)/Right UE Active ROM was WNL (within normal limits)/LLE Active ROM was WNL (within normal limits)/deficits as listed below

## 2023-02-08 NOTE — PROGRESS NOTE ADULT - SUBJECTIVE AND OBJECTIVE BOX
Orthopedic Surgery Progress Note     S: Patient seen and examined today. No acute events overnight. Pain stable    MEDICATIONS  (STANDING):  chlorhexidine 2% Cloths 1 Application(s) Topical daily  gabapentin Solution 300 milliGRAM(s) Oral three times a day  heparin   Injectable 5000 Unit(s) SubCutaneous every 12 hours  levothyroxine 150 MICROGram(s) Oral daily  pantoprazole   Suspension 40 milliGRAM(s) Oral every 24 hours  piperacillin/tazobactam IVPB.. 3.375 Gram(s) IV Intermittent every 8 hours  polyethylene glycol 3350 17 Gram(s) Oral daily    MEDICATIONS  (PRN):  acetaminophen    Suspension .. 650 milliGRAM(s) Oral every 6 hours PRN Temp greater or equal to 38C (100.4F), Mild Pain (1 - 3)  albuterol/ipratropium for Nebulization 3 milliLiter(s) Nebulizer every 6 hours PRN Shortness of Breath and/or Wheezing  oxyCODONE    IR 10 milliGRAM(s) Oral every 4 hours PRN Moderate Pain (4 - 6)      Physical Exam:    Vital Signs Last 24 Hrs  T(C): 37.1 (07 Feb 2023 21:07), Max: 37.1 (07 Feb 2023 21:07)  T(F): 98.8 (07 Feb 2023 21:07), Max: 98.8 (07 Feb 2023 21:07)  HR: 103 (07 Feb 2023 21:07) (103 - 115)  BP: 102/59 (07 Feb 2023 21:07) (102/59 - 112/60)  BP(mean): --  RR: 18 (07 Feb 2023 23:54) (17 - 18)  SpO2: 100% (07 Feb 2023 21:07) (100% - 100%)    Parameters below as of 07 Feb 2023 21:07  Patient On (Oxygen Delivery Method): room air      Gen: NAD  Resp: Unlabored breathing  RLE:  Incision c/d/i  SILT DP/SP/Rosio/Saph,   +EHL/FHL/TA/Gastroc,   +hip/ankle ROM,    knee ROM limited 2/2 pain,   DP+, soft compartments, no calf ttp.

## 2023-02-08 NOTE — PROGRESS NOTE ADULT - PROBLEM SELECTOR PLAN 4
-c/w tylenol mild pain  -oxycodone 5 q4 prn for moderate pain, oxy 10 q4 prn for severe pain  -c/w gabapentin 300 mg TID    #Reactive airway dx  -albuterol Q6

## 2023-02-08 NOTE — PROGRESS NOTE ADULT - ATTENDING COMMENTS
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 Found to have SIRS (tachy, leukocytosis) and c/f right knee septic arthritis s/p arthrocentesis on 2/5 not yielding any organisms however +hemarthrosis.    Right knee with swelling, erythema, less warmth and TTP, dry incisions    Right knee cultures NGTD, ESR/CRP elevated, suspect inflammation in setting of hemarthrosis and malignancy  c/w Zosyn for now. Had persistent leukocytosis recent admission as well, WBC downtrending but likely reactive, doubt infection  C/w oxy ATC with PRNs, ice compress to right knee, lidocaine patch, elevate RLE, palliative following for pain management  H/H improved today from 6.7->8.1 after 1 U PRBC. Continue to hold Hold Xarelto (has been on this postop for DVT ppx)  Monitor Ca, c/w IVF, PTH low  Was planned to start RT as outpatient but never started. previous right patellar bx path with SCC, Rad Onc with no present plans for RT inpatient  TSH elevated, FT4 low. Rpt TFTs before DC  PT recs: likely rehab but family does not want rehab and would prefer to take patient home at this time    Discussed with wife and son at bedside. 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 Found to have SIRS (tachy, leukocytosis) and c/f right knee septic arthritis s/p arthrocentesis on 2/5 not yielding any organisms however +hemarthrosis.    Right knee with swelling, erythema, less warmth and TTP, dry incisions  B/l expiratory wheezing    Right knee cultures NGTD, ESR/CRP elevated, suspect inflammation in setting of hemarthrosis and malignancy  c/w Zosyn for now. Had persistent leukocytosis recent admission as well, WBC downtrending but likely reactive, doubt infection  C/w oxy ATC with PRNs, ice compress to right knee, lidocaine patch, elevate RLE, palliative following for pain management  H/H improved today from 6.7->8.1 after 1 U PRBC. Continue to hold Hold Xarelto (has been on this postop for DVT ppx)  Nebs for wheezing: imaging with unchanged large mass in the left upper hemithorax  Monitor Ca, c/w IVF, PTH low  Was planned to start RT as outpatient but never started. previous right patellar bx path with SCC, Rad Onc with no present plans for RT inpatient  TSH elevated, FT4 low. Rpt TFTs before DC  PT recs: likely rehab but family does not want rehab and would prefer to take patient home at this time    Discussed with wife and son at bedside.

## 2023-02-08 NOTE — PROGRESS NOTE ADULT - PROBLEM SELECTOR PLAN 2
-SCC esophageal cancer dx 8 years ago on irinotecan   CTA decreased RUL ground glass opacities and unchanged large mass in left upper hemithorax invading left chest   wall with erosive changes of left 3rd through 5th ribs  Previous right patellar bx path with SCC  -pt is full code  -has outpt f/u with onc Dr. Gi Thomas  -outpatient rad onc Dr. Brittany Reza (was scheduled for first RT 2/7)  -rad onc consulted -SCC esophageal cancer dx 8 years ago on irinotecan   CTA decreased RUL ground glass opacities and unchanged large mass in left upper hemithorax invading left chest   wall with erosive changes of left 3rd through 5th ribs  Previous right patellar bx path with SCC  -pt is full code  -has outpt f/u with onc Dr. Gi Thomas  -outpatient rad onc Dr. Brittany Reza (was scheduled for first RT 2/7)  -rad onc: no RT inpatient with open infected wound

## 2023-02-08 NOTE — PROGRESS NOTE ADULT - ASSESSMENT
67M s/p R patellectomy/VY advancement p/w R knee pain. Currently afebrile but reported fevers at home. Will aspirate knee and f/u results    Plan:  - No acute orthopaedic surgical intervention  -FU aspiration results, so far negaitve  -FU Bcx, so far negative  -pain control  -WBAT in     Orthopaedic Surgery  [facility] [id number]  [facility]        [id number]  [facility]  [id number]/ [phone]

## 2023-02-08 NOTE — CHART NOTE - NSCHARTNOTEFT_GEN_A_CORE
Called by case management regarding tube feed formula for discharge. Discussed tube feed formulas and was decided to use Two Prateek for discharge since patient was on it previously and tolerated it well besides having constipation at times. Discussed w/ wife/son about possibly needing more free water flushes since formula is calorie dense w/ little fiber and to f/u w/ team prior to d/c. Refer below for recommendations. Will continue to monitor and f/u per protocol.    Recommendation  - Adjust tube feed to bolus Two Prateek HN @ 225 mL every 6 hrs. (4x daily total) for total volume 900 mL. Provides 1,800 kcal (26.8 kcal/kg), 75.2 g pro (1.1 g/kg) based on IBW 67.1 kg and 630 mL of fluid (TF free water), defer additional free water flushes to team.

## 2023-02-08 NOTE — PHYSICAL THERAPY INITIAL EVALUATION ADULT - ADDITIONAL COMMENTS
Pt lives on the second floor of a house with his wife and son; there are 5 steps + 1 flight to stairs to negotiate to enter. Prior to patellectomy ~1 month ago, pt was independent without assistive device. Since discharge home s/p patellectomy, pt has been minimally ambulatory with home PT and required assistance. Has home health aide 36hrs/wk.

## 2023-02-08 NOTE — CHART NOTE - NSCHARTNOTEFT_GEN_A_CORE
67-year-old male with PMH of metastatic esophageal cancer, hypothyroidism, GERD, HTN who was transferred from [facility] for Orthopedics consult for possible right knee malignancy versus septic arthritis.     He received a biopsy of his right patella on 11/10/22 with pathology positive for squamous cell carcinoma (Dr. Carter). Per collateral from son, patient has been experiencing progressively worsening pain, erythema, edema, and warmth at the R knee joint for about 1 month since biopsy. On 12/30, patient became unable to walk 2/2 pain. Previously was able to ambulate with walker and brace on R knee. On 12/31, son notes patient was attempting to walk and had bleeding from the joint that resolved when he elevated the leg and held pressure.   He is now s/p surgery to the right knee since 1/7/2023 via orthopedics.  Was discharged from the hospital on 1/26/23- last documented notes.     Returned to the ED for fevers on 2/5/23, , and effusion of right knee.    Found to have septic arthritis, open wound, pus discharge, now undergoing wound care to sacrum, and see by orthopedics again who deemed   no intervention for the right knee.      per chart:   -outpatient rad onc Dr. Brittany Reza (was scheduled for first RT 2/7)- her office in [facility] outside of [facility].       CBC:   Complete Blood Count + Automated Diff (02.08.23 @ 07:30)    IANC: 15.07: IANC (instrument absolute neutrophil count) is based on the instrument  calculation which may differ from ANC (manual absolute neutrophil count)  since it is based on the calculation from a manual differential. K/uL    Nucleated RBC #: 0.00 K/uL    WBC Count: 18.37 K/uL    RBC Count: 2.92 M/uL    Hemoglobin: 8.1 g/dL    Hematocrit: 26.7 %          Case d/w Dr. Haro.    Prefer to hold RT inpatient until this infection/wound resolves.  Does not want to offer RT to an open infected wound.   Can follow up with Rad Onc Dr. Reza once stable enough to be seen.   If there is a preference for treatment at [facility], we can arrange.

## 2023-02-08 NOTE — PHYSICAL THERAPY INITIAL EVALUATION ADULT - MANUAL MUSCLE TESTING RESULTS, REHAB EVAL
In supine, pt able to perform left LE ankle pumps, knee flexion to extension, and straight leg raise; right ankle DF 1/5 with pain; unable to actively manipulate right knee/hip due to pain

## 2023-02-08 NOTE — PROGRESS NOTE ADULT - PROBLEM SELECTOR PLAN 7
dvt ppx: heparin subQ  diet: TF Jevity dvt ppx: heparin subQ  diet: TF Jevity  dispo: PT likely rehab

## 2023-02-08 NOTE — PHYSICAL THERAPY INITIAL EVALUATION ADULT - PASSIVE RANGE OF MOTION EXAMINATION, REHAB EVAL
right ankle PF WNL; right ankle DF impaired with pain; unable to assess right knee or hip due to 10/10 pain/Left UE Passive ROM was WNL (within normal limits)/Right UE Passive ROM was WNL (within normal limits)/Left LE Passive ROM was WNL (within normal limits)/deficits as listed below

## 2023-02-08 NOTE — PHYSICAL THERAPY INITIAL EVALUATION ADULT - IMPAIRMENTS FOUND, PT EVAL
gross motor/joint integrity and mobility/muscle strength/neuromotor development and sensory integration/ROM

## 2023-02-08 NOTE — PHYSICAL THERAPY INITIAL EVALUATION ADULT - DIAGNOSIS, PT EVAL
Limited assessment due to 10/10 right knee pain; impairments in right LE active and passive ROM due to pain

## 2023-02-08 NOTE — PROGRESS NOTE ADULT - PROBLEM SELECTOR PLAN 1
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  s/p arthrocentesis 2/5 with RBCs c/f hemarthrosis  ESR/CRP elevated  RVP, UA and LE US negative, WBC downtrending however had persistent leukocytosis recent admission  d/c'd vanc, c/w zosyn   XR tib-fib and knee unremarkable  - aspiration cx, bcxs ngtd  - MRSA neg  - palliative recs: Oxy IR 5mg q6 ATC, Oxy IR 5mg q4 prn mild mod pain, Oxy IR 10mg q4 prn severe pain  - f/u ortho recs 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  s/p arthrocentesis 2/5 with RBCs c/f hemarthrosis, less likely septic arthritis given neg cx  ESR/CRP elevated  RVP, UA and LE US negative, WBC downtrending however had persistent leukocytosis recent admission  d/c'd vanc, c/w zosyn   XR tib-fib and knee unremarkable  - aspiration cx, bcxs ngtd  - MRSA neg  - palliative recs: Oxy IR 5mg q6 ATC, Oxy IR 5mg q4 prn mild mod pain, Oxy IR 10mg q4 prn severe pain  - ortho recs: no additional imaging or interventions at this time

## 2023-02-08 NOTE — PROGRESS NOTE ADULT - SUBJECTIVE AND OBJECTIVE BOX
***************************************************************  Acacia iLnder MD (PGY-1)  Internal Medicine  Pager: [phone]  ***************************************************************    PROGRESS NOTE:     Patient is a 67y old  Male who presents with a chief complaint of r. knee pain (08 Feb 2023 06:23)      SUBJECTIVE / OVERNIGHT EVENTS: Patient seen and examined at bedside. No acute overnight events. Patient continues to have R knee pain worse with movement but denies fevers, chills, N/V/D, chest pain, SOB, and abdominal pain.      MEDICATIONS  (STANDING):  acetaminophen    Suspension .. 650 milliGRAM(s) Oral every 6 hours  chlorhexidine 2% Cloths 1 Application(s) Topical daily  gabapentin Solution 300 milliGRAM(s) Oral three times a day  levothyroxine 150 MICROGram(s) Oral daily  lidocaine   4% Patch 1 Patch Transdermal daily  oxyCODONE    IR 5 milliGRAM(s) Oral every 6 hours  pantoprazole   Suspension 40 milliGRAM(s) Oral every 24 hours  piperacillin/tazobactam IVPB.. 3.375 Gram(s) IV Intermittent every 8 hours  polyethylene glycol 3350 17 Gram(s) Oral two times a day  senna Syrup 10 milliLiter(s) Oral daily  sodium chloride 0.9%. 1000 milliLiter(s) (100 mL/Hr) IV Continuous <Continuous>    MEDICATIONS  (PRN):  albuterol/ipratropium for Nebulization 3 milliLiter(s) Nebulizer every 6 hours PRN Shortness of Breath and/or Wheezing  naloxone Injectable 0.3 milliGRAM(s) IV Push every 3 minutes PRN opioid induced AMS  oxyCODONE    IR 10 milliGRAM(s) Oral every 4 hours PRN Severe Pain (7 - 10)  oxyCODONE    IR 5 milliGRAM(s) Oral every 4 hours PRN Moderate Pain (4 - 6)      CAPILLARY BLOOD GLUCOSE        I&O's Summary    07 Feb 2023 07:01  -  08 Feb 2023 07:00  --------------------------------------------------------  IN: 2500 mL / OUT: 1150 mL / NET: 1350 mL        PHYSICAL EXAM:  Vital Signs Last 24 Hrs  T(C): 37 (08 Feb 2023 05:30), Max: 37.1 (07 Feb 2023 21:07)  T(F): 98.6 (08 Feb 2023 05:30), Max: 98.8 (07 Feb 2023 21:07)  HR: 110 (08 Feb 2023 05:30) (103 - 115)  BP: 108/62 (08 Feb 2023 05:30) (102/59 - 112/60)  BP(mean): --  RR: 18 (08 Feb 2023 05:30) (17 - 18)  SpO2: 100% (08 Feb 2023 05:30) (100% - 100%)    Parameters below as of 08 Feb 2023 05:30  Patient On (Oxygen Delivery Method): room air        GENERAL: NAD  HEENT: hoarse voice noted  NECK: Supple, No JVD  CHEST/LUNG: CTAB  HEART: Tachycardic with regular rhythm; No murmurs, rubs, or gallops  ABDOMEN: Soft, Nontender, Nondistended; Bowel sounds present PEG tube in place cdi  EXTREMITIES:  2+ Peripheral Pulses, No clubbing, cyanosis. + R knee erythema, edema, and TTP, incisions cdi, SILT, ROM limited 2/2 pain  NEUROLOGY: AAOx3, non-focal  SKIN: No rashes or lesions      LABS:                        8.7    22.50 )-----------( 574      ( 07 Feb 2023 16:49 )             27.4     02-07    134<L>  |  103  |  15  ----------------------------<  119<H>  3.7   |  18<L>  |  0.79    Ca    10.6<H>      07 Feb 2023 05:42  Phos  1.1     02-07  Mg     1.50     02-07    TPro  6.5  /  Alb  2.8<L>  /  TBili  0.2  /  DBili  x   /  AST  15  /  ALT  23  /  AlkPhos  213<H>  02-07              Culture - Body Fluid with Gram Stain (collected 05 Feb 2023 13:33)  Source: .Body Fluid Synovial Fluid  Gram Stain (05 Feb 2023 17:30):    No polymorphonuclear leukocytes per low power field    No organisms seen per oil power field    by cytocentrifuge  Preliminary Report (06 Feb 2023 08:30):    No growth    Culture - Urine (collected 05 Feb 2023 13:15)  Source: Clean Catch Clean Catch (Midstream)  Final Report (06 Feb 2023 14:21):    No growth    Culture - Blood (collected 05 Feb 2023 09:25)  Source: .Blood Blood-Peripheral  Preliminary Report (06 Feb 2023 15:02):    No growth to date.    Culture - Blood (collected 05 Feb 2023 09:15)  Source: .Blood Blood-Peripheral  Preliminary Report (06 Feb 2023 15:02):    No growth to date.        RADIOLOGY & ADDITIONAL TESTS:  Results Reviewed:   Imaging Personally Reviewed:  Electrocardiogram Personally Reviewed:    COORDINATION OF CARE:  Care Discussed with Consultants/Other Providers [Y/N]:  Prior or Outpatient Records Reviewed [Y/N]:   ***************************************************************  Acacia Linder MD (PGY-1)  Internal Medicine  Pager: [phone]  ***************************************************************    PROGRESS NOTE:     Patient is a 67y old  Male who presents with a chief complaint of r. knee pain (08 Feb 2023 06:23)      SUBJECTIVE / OVERNIGHT EVENTS: Patient seen and examined at bedside. No acute overnight events. Patient continues to have R knee pain that he states feels a little improved with ice, lidocaine and oxy. Still not able to bend knee or ambulate. He denies fevers, chills, N/V/D, chest pain, SOB, numbness, tingling, and abdominal pain.      MEDICATIONS  (STANDING):  acetaminophen    Suspension .. 650 milliGRAM(s) Oral every 6 hours  chlorhexidine 2% Cloths 1 Application(s) Topical daily  gabapentin Solution 300 milliGRAM(s) Oral three times a day  levothyroxine 150 MICROGram(s) Oral daily  lidocaine   4% Patch 1 Patch Transdermal daily  oxyCODONE    IR 5 milliGRAM(s) Oral every 6 hours  pantoprazole   Suspension 40 milliGRAM(s) Oral every 24 hours  piperacillin/tazobactam IVPB.. 3.375 Gram(s) IV Intermittent every 8 hours  polyethylene glycol 3350 17 Gram(s) Oral two times a day  senna Syrup 10 milliLiter(s) Oral daily  sodium chloride 0.9%. 1000 milliLiter(s) (100 mL/Hr) IV Continuous <Continuous>    MEDICATIONS  (PRN):  albuterol/ipratropium for Nebulization 3 milliLiter(s) Nebulizer every 6 hours PRN Shortness of Breath and/or Wheezing  naloxone Injectable 0.3 milliGRAM(s) IV Push every 3 minutes PRN opioid induced AMS  oxyCODONE    IR 10 milliGRAM(s) Oral every 4 hours PRN Severe Pain (7 - 10)  oxyCODONE    IR 5 milliGRAM(s) Oral every 4 hours PRN Moderate Pain (4 - 6)      CAPILLARY BLOOD GLUCOSE        I&O's Summary    07 Feb 2023 07:01  -  08 Feb 2023 07:00  --------------------------------------------------------  IN: 2500 mL / OUT: 1150 mL / NET: 1350 mL        PHYSICAL EXAM:  Vital Signs Last 24 Hrs  T(C): 37 (08 Feb 2023 05:30), Max: 37.1 (07 Feb 2023 21:07)  T(F): 98.6 (08 Feb 2023 05:30), Max: 98.8 (07 Feb 2023 21:07)  HR: 110 (08 Feb 2023 05:30) (103 - 115)  BP: 108/62 (08 Feb 2023 05:30) (102/59 - 112/60)  BP(mean): --  RR: 18 (08 Feb 2023 05:30) (17 - 18)  SpO2: 100% (08 Feb 2023 05:30) (100% - 100%)    Parameters below as of 08 Feb 2023 05:30  Patient On (Oxygen Delivery Method): room air        GENERAL: NAD  HEENT: hoarse voice noted  NECK: Supple, No JVD  CHEST/LUNG: scattered wheezing b/l  HEART: Tachycardic with regular rhythm; No murmurs, rubs, or gallops  ABDOMEN: Soft, Nontender, Nondistended; Bowel sounds present PEG tube in place cdi  EXTREMITIES:  2+ Peripheral Pulses, No clubbing, cyanosis. + R knee erythema, edema, and TTP, incisions cdi, SILT, ROM limited 2/2 pain  NEUROLOGY: AAOx3, non-focal  SKIN: No rashes or lesions      LABS:                        8.7    22.50 )-----------( 574      ( 07 Feb 2023 16:49 )             27.4     02-07    134<L>  |  103  |  15  ----------------------------<  119<H>  3.7   |  18<L>  |  0.79    Ca    10.6<H>      07 Feb 2023 05:42  Phos  1.1     02-07  Mg     1.50     02-07    TPro  6.5  /  Alb  2.8<L>  /  TBili  0.2  /  DBili  x   /  AST  15  /  ALT  23  /  AlkPhos  213<H>  02-07              Culture - Body Fluid with Gram Stain (collected 05 Feb 2023 13:33)  Source: .Body Fluid Synovial Fluid  Gram Stain (05 Feb 2023 17:30):    No polymorphonuclear leukocytes per low power field    No organisms seen per oil power field    by cytocentrifuge  Preliminary Report (06 Feb 2023 08:30):    No growth    Culture - Urine (collected 05 Feb 2023 13:15)  Source: Clean Catch Clean Catch (Midstream)  Final Report (06 Feb 2023 14:21):    No growth    Culture - Blood (collected 05 Feb 2023 09:25)  Source: .Blood Blood-Peripheral  Preliminary Report (06 Feb 2023 15:02):    No growth to date.    Culture - Blood (collected 05 Feb 2023 09:15)  Source: .Blood Blood-Peripheral  Preliminary Report (06 Feb 2023 15:02):    No growth to date.        RADIOLOGY & ADDITIONAL TESTS:  Results Reviewed:   Imaging Personally Reviewed:  Electrocardiogram Personally Reviewed:    COORDINATION OF CARE:  Care Discussed with Consultants/Other Providers [Y/N]:  Prior or Outpatient Records Reviewed [Y/N]:

## 2023-02-08 NOTE — PHYSICAL THERAPY INITIAL EVALUATION ADULT - PERTINENT HX OF CURRENT PROBLEM, REHAB EVAL
Pt is a 67 year old male presenting with right knee pain and swelling for the past 2 days associated with pus like drainage and subjective fevers at home. In the ED, right knee arthrocentesis was done. Pt had patellar mass excision ~1 month ago. Right knee XR showed s/p interval right patellectomy with no acute osseous abnormality. Bilateral LE duplex negative for DVT. Pt admitted for acute sepsis secondary to cellulitis vs. septic arthritis.

## 2023-02-08 NOTE — PROGRESS NOTE ADULT - PROBLEM SELECTOR PLAN 3
Hb 7.8 on admission (baseline 8-9)  possibly due to hemarthrosis of right knee vs AOCD iso malignancy  - trend CBC  - transfuse Hb <7  - s/p 1u prbc

## 2023-02-09 NOTE — PROGRESS NOTE ADULT - PROBLEM SELECTOR PLAN 2
SCC esophageal cancer dx 8 years ago  Patient known to [facility] , [place]   Outpatient f/u with onc Dr Gi Thomas, manages his pain RX as well   RT onc Dr Brittany Reza  (first RT was scheduled for 2/7  CTA with unchanged large mass in upper left hemithorax invading left chest wall with erosive changes of 3-4 ribs.  - Ortho follow-  recs right leg amputation- pt not in agreement. No other ortho intervention at this time  - Rad/Onc to see the pt today

## 2023-02-09 NOTE — PROGRESS NOTE ADULT - PROBLEM SELECTOR PLAN 2
-SCC esophageal cancer dx 8 years ago on irinotecan   CTA decreased RUL ground glass opacities and unchanged large mass in left upper hemithorax invading left chest   wall with erosive changes of left 3rd through 5th ribs  Previous right patellar bx path with SCC  -pt is full code  -has outpt f/u with onc Dr. Gi Thomas  -outpatient rad onc Dr. Brittany Reza (was scheduled for first RT 2/7)  -rad onc: no RT inpatient with open infected wound -SCC esophageal cancer dx 8 years ago on irinotecan   CTA decreased RUL ground glass opacities and unchanged large mass in left upper hemithorax invading left chest   wall with erosive changes of left 3rd through 5th ribs  Previous right patellar bx path with SCC  -has outpt f/u with onc Dr. Gi Thomas  -outpatient rad onc Dr. Brittany Reza (was scheduled for first RT 2/7)  -rad onc: no RT inpatient with open infected wound

## 2023-02-09 NOTE — DISCHARGE NOTE PROVIDER - CARE PROVIDERS DIRECT ADDRESSES
,[email or web address],DirectAddress_Unknown

## 2023-02-09 NOTE — PROGRESS NOTE ADULT - ASSESSMENT
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 knee, hypothyroidism, GERD, recent RUL pna s/p cefepime, r. patellar mass excision 1 month ago who presents w/ right knee pain and swelling for the past 2 days. He also notes having pus like drainage. He is having difficulty w/ ambulation as a result. Had a fever outpatient 100.7 F, leukocytosis in ED to 27k. Denies dysuria, polyuria, melena, hematochezia, h/a, LOC, visual changes, cp. Radiation onc was consulted last admission, pt to f/u outpt for RT adjuvant. In the ED right knee arthrocentesis was done, cultures were sent.  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 knee, hypothyroidism, GERD, recent RUL pna s/p cefepime, r. patellar mass excision 1 month ago who presents w/ right knee pain and swelling for the past 2 days. He also notes having pus like drainage. He is having difficulty w/ ambulation as a result. Had a fever outpatient 100.7 F, leukocytosis in ED to 27k. Denies dysuria, polyuria, melena, hematochezia, h/a, LOC, visual changes, cp. Radiation onc was consulted last admission, pt to f/u outpt for RT adjuvant. In the ED right knee arthrocentesis was done, cultures were sent. Palliative care consulted for pain management

## 2023-02-09 NOTE — DISCHARGE NOTE PROVIDER - HOSPITAL COURSE
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 knee, hypothyroidism, GERD, recent RUL pna s/p cefepime, r. patellar mass excision 1 month ago who presents w/ right knee pain and swelling for the past 2 days. He also notes having pus like drainage. He is having difficulty w/ ambulation as a result. Had a fever outpatient 100.7 F, leukocytosis in ED to 27k. Denies dysuria, polyuria, melena, hematochezia, h/a, LOC, visual changes, cp. Radiation onc was consulted last admission, pt to f/u outpt for RT adjuvant. In the ED right knee arthrocentesis was done, cultures were sent. Patient given zosyn. CT PE shows improved RUL infiltrate. Patient was admitted and found to meet SIRS criteria with tachycardia and leukocytosis with c/f right knee septic arthritis s/p arthrocenteson 2/5. Cultures however were negative, however ESR/CRP elevated which was likely in setting of hemarthrosis and malignancy. Patient was monitored off ABx with no infection found. Per palliative, patient was switched from oxy to methadone 2.5mg q12. H/H stable after 1 unit of prbcs with Xarelto held (patient was on xarelto for postop dvt ppx). Patient was given duonebs for wheezing with imaging revealing an unchanged large mass in the left upper hemithorax. Prior right patellar bx path demonstrated SCC, per rad onc, no plans for RT inpatient. Per patient's orthopedic surgeon, Dr. Carter, patient was offered a palliative amputation for which he declined.    Today, the patient is afebrile and hemodynamically stable for discharge home. He will follow up with his primary care provider and radiation oncology when he leaves the hospital.   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 knee, hypothyroidism, GERD, recent RUL pna s/p cefepime, r. patellar mass excision 1 month ago who presents w/ right knee pain and swelling for the past 2 days. He also notes having pus like drainage. He is having difficulty w/ ambulation as a result. Had a fever outpatient 100.7 F, leukocytosis in ED to 27k. Denies dysuria, polyuria, melena, hematochezia, h/a, LOC, visual changes, cp. Radiation onc was consulted last admission, pt to f/u outpt for RT adjuvant. In the ED right knee arthrocentesis was done, cultures were sent. Patient given zosyn. CT PE shows improved RUL infiltrate. Patient was admitted and found to meet SIRS criteria with tachycardia and leukocytosis with c/f right knee septic arthritis s/p arthrocenteson 2/5. Cultures however were negative, however ESR/CRP elevated which was likely in setting of hemarthrosis and malignancy. Patient was monitored off ABx with no infection found. Per palliative, patient was switched from oxy to methadone 2.5mg q12. H/H stable after 1 unit of prbcs with Xarelto held (patient was on xarelto for postop dvt ppx). Patient was given duonebs for wheezing with imaging revealing an unchanged large mass in the left upper hemithorax. Prior right patellar bx path demonstrated SCC, per rad onc, no plans for RT inpatient. Per patient's orthopedic surgeon, Dr. Carter, patient was offered a palliative amputation for which he declined.    Today, the patient is afebrile and hemodynamically stable for discharge home. He will follow up with his primary care provider, orthopedic surgeon, oncology, and radiation oncology when he leaves the hospital.   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 knee, hypothyroidism, GERD, recent RUL pna s/p cefepime, r. patellar mass excision 1 month ago who presents w/ right knee pain and swelling for the past 2 days. He also notes having pus like drainage. He is having difficulty w/ ambulation as a result. Had a fever outpatient 100.7 F, leukocytosis in ED to 27k. Denies dysuria, polyuria, melena, hematochezia, h/a, LOC, visual changes, cp. Radiation onc was consulted last admission, pt to f/u outpt for RT adjuvant. In the ED right knee arthrocentesis was done, cultures were sent. Patient given zosyn. CT PE shows improved RUL infiltrate. Patient was admitted and found to meet SIRS criteria with tachycardia and leukocytosis with c/f right knee septic arthritis s/p arthrocenteson 2/5. Cultures however were negative, however ESR/CRP elevated which was likely in setting of hemarthrosis and malignancy. Patient was monitored off ABx with no infection found. Per palliative, patient was switched from oxy to methadone 2.5mg q12. H/H stable after 1 unit of prbcs with Xarelto held (patient was on xarelto for postop dvt ppx). Patient was given duonebs for wheezing with imaging revealing an unchanged large mass in the left upper hemithorax. Prior right patellar bx path demonstrated SCC, per rad onc, no plans for RT inpatient. Per patient's orthopedic surgeon, Dr. Carter, patient was offered a palliative amputation for which he declined.    Today, the patient is afebrile and hemodynamically stable for discharge home. He will follow up with his primary care provider, orthopedic surgeon, oncology, palliative care, and radiation oncology when he leaves the hospital.   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 Found to have SIRS (tachy, leukocytosis) and initially c/f right knee septic arthritis s/p arthrocentesis on 2/5 not yielding any organisms however +hemarthrosis c/b acute blood loss anemia.    Patient was given short course with IV Zosyn but discontinued when patient remained afebrile and cultures yielded no growth. Per palliative, patient was switched from oxy to methadone 2.5mg q12. H/H stable after 1 unit of prbcs with Xarelto held (patient was on xarelto for postop dvt ppx). Patient was given duonebs for wheezing with imaging revealing an unchanged large mass in the left upper hemithorax. Prior right patellar bx path demonstrated SCC, per rad onc, no plans for RT inpatient. Per patient's orthopedic surgeon, Dr. Carter, patient was offered a palliative amputation which he declined.    Today, the patient is afebrile and hemodynamically stable for discharge home. He will follow up with his primary care provider, orthopedic surgeon, oncology, palliative care, and radiation oncology when he leaves the hospital.   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 Found to have SIRS (tachy, leukocytosis) and initially c/f right knee septic arthritis s/p arthrocentesis on 2/5 not yielding any organisms however +hemarthrosis c/b acute blood loss anemia.  Patient was given short course with IV Zosyn but discontinued when patient remained afebrile and cultures yielded no growth. Per palliative, patient was switched from oxy to methadone 2.5mg q12. H/H stable after 1 unit of prbcs with Xarelto held (patient was on xarelto for postop dvt ppx). Patient was given duonebs for wheezing with imaging revealing an unchanged large mass in the left upper hemithorax. Prior right patellar bx path demonstrated SCC, per rad onc, no plans for RT inpatient. Per patient's orthopedic surgeon, Dr. Carter, patient was offered a palliative amputation which he declined.    Today, the patient is afebrile and hemodynamically stable for discharge home. He will follow up with his primary care provider, orthopedic surgeon, oncology, palliative care, and radiation oncology when he leaves the hospital.   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 Found to have SIRS (tachy, leukocytosis) and initially c/f right knee septic arthritis s/p arthrocentesis on 2/5 not yielding any organisms however +hemarthrosis c/b acute blood loss anemia.  Patient was given short course with IV Zosyn but discontinued when patient remained afebrile and cultures yielded no growth. Per palliative, patient was switched from oxy to methadone 2.5mg q12 and started on oxycodone prns. H/H stable after 1 unit of prbcs with Xarelto held (patient was on xarelto for postop dvt ppx). Xarelto will be held on discharge due to recent hemarthrosis. Patient was given duonebs for wheezing with imaging revealing an unchanged large mass in the left upper hemithorax. Prior right patellar bx path demonstrated SCC, per rad onc, no plans for RT inpatient. Per patient's orthopedic surgeon, Dr. Carter, patient was offered a palliative amputation which he declined. Patient's course later complicated by newly developing hypercalcemia of malignancy that was not appropriately responsive to fluids or calcitonin. Dental eval required prior to starting bisphosphonate so patient underwent tooth extraction. Bisphosphonate can be trialed ___ after extraction. Later in course, patient developed fever and sinus tachycardia to 160s after episode of vomiting. Fever and tachycardia was responsive to tylenol and Zosyn was started for presumed aspiration pneumonia with CXR showing perihilar opacity; will be transitioned to Augmentin to complete a 7-day-course ending 2/19.     Today, the patient is afebrile and hemodynamically stable for discharge home. He will follow up with his primary care provider, orthopedic surgeon, oncology, palliative care, and radiation oncology when he leaves the hospital.   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 Found to have SIRS (tachy, leukocytosis) and initially c/f right knee septic arthritis s/p arthrocentesis on 2/5 not yielding any organisms however +hemarthrosis c/b acute blood loss anemia.  Patient was given short course with IV Zosyn but discontinued when patient remained afebrile and cultures yielded no growth. Per palliative, patient was switched from oxy to methadone 2.5mg q12 and started on oxycodone prns. H/H stable after 1 unit of prbcs with Xarelto held (patient was on xarelto for postop dvt ppx). Xarelto will be held on discharge due to recent hemarthrosis. Patient was given duonebs for wheezing with imaging revealing an unchanged large mass in the left upper hemithorax. Prior right patellar bx path demonstrated SCC, per rad onc, no plans for RT inpatient. Per patient's orthopedic surgeon, Dr. Carter, patient was offered a palliative amputation which he declined. Patient's course later complicated by newly developing hypercalcemia of malignancy that was not appropriately responsive to fluids or calcitonin (trialed x 2). Dental eval required prior to starting bisphosphonate so patient underwent tooth extraction. Later in course, patient developed fever and sinus tachycardia to 160s after episode of vomiting. Fever and tachycardia was responsive to tylenol and Zosyn was started for presumed aspiration pneumonia with CXR showing perihilar opacity. Continued to spike fevers so ID consulted and Zosyn transitioned to meropenem. Endocrinology consulted for symptomatic hypercalcemic (possible NSVT 2/2 to hypercalcemia) and recommended given bisphosphonate prior to dental clearance. Given one dose with ____ response.     Today, the patient is afebrile and hemodynamically stable for discharge home. He will follow up with his primary care provider, orthopedic surgeon, oncology, palliative care, and radiation oncology when he leaves the hospital.   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 Found to have SIRS (tachy, leukocytosis) and initially c/f right knee septic arthritis s/p arthrocentesis on 2/5 not yielding any organisms however +hemarthrosis c/b acute blood loss anemia.  Patient was given short course with IV Zosyn but discontinued when patient remained afebrile and cultures yielded no growth. Per palliative, patient was switched from oxy to methadone 2.5mg q12 and started on oxycodone prns. H/H stable after 1 unit of prbcs with Xarelto held (patient was on xarelto for postop dvt ppx). Xarelto will be held on discharge due to recent hemarthrosis. Patient was given duonebs for wheezing with imaging revealing an unchanged large mass in the left upper hemithorax. Prior right patellar bx path demonstrated SCC, per rad onc, no plans for RT inpatient. Per patient's orthopedic surgeon, Dr. Carter, patient was offered a palliative amputation which he declined. Patient's course later complicated by newly developing hypercalcemia of malignancy that was not appropriately responsive to fluids or calcitonin (trialed x 2). Dental eval required prior to starting bisphosphonate so patient underwent tooth extraction. Later in course, patient developed fever and sinus tachycardia to 160s after episode of vomiting. Fever and tachycardia was responsive to tylenol and Zosyn was started for presumed aspiration pneumonia with CXR showing perihilar opacity. Continued to spike fevers so ID consulted and Zosyn transitioned to meropenem continued for long, protracted course ( ) due to lack of clinical improvement. Endocrinology consulted for symptomatic hypercalcemic (possible NSVT 2/2 to hypercalcemia) and recommended given bisphosphonate prior to dental clearance. Given one dose with no significant improvement in calcium levels. Increased fluids as well without much improvement. Fluids later transitioned to free water flushes. As per endo recs, one dose denosumab given and calcium levels monitored.  ..   Today, the patient is afebrile and hemodynamically stable for discharge home. He will follow up with his primary care provider, orthopedic surgeon, oncology, palliative care, and radiation oncology when he leaves the hospital.   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 Found to have SIRS (tachy, leukocytosis) and initially c/f right knee septic arthritis s/p arthrocentesis on 2/5 not yielding any organisms however +hemarthrosis c/b acute blood loss anemia.  Patient was given short course with IV Zosyn but discontinued when patient remained afebrile and cultures yielded no growth. Per palliative, patient was switched from oxy to methadone 2.5mg q12 and started on oxycodone prns. H/H stable after 1 unit of prbcs with Xarelto held (patient was on xarelto for postop dvt ppx). Xarelto will be held on discharge due to recent hemarthrosis. Patient was given duonebs for wheezing with imaging revealing an unchanged large mass in the left upper hemithorax. Prior right patellar bx path demonstrated SCC, per rad onc, no plans for RT inpatient. Per patient's orthopedic surgeon, Dr. Carter, patient was offered a palliative amputation which he declined. Patient's course later complicated by newly developing hypercalcemia of malignancy that was not appropriately responsive to fluids or calcitonin (trialed x 2). Dental eval required prior to starting bisphosphonate so patient underwent tooth extraction. Later in course, patient developed fever and sinus tachycardia to 160s after episode of vomiting. Fever and tachycardia was responsive to tylenol and Zosyn was started for presumed aspiration pneumonia with CXR showing perihilar opacity. Continued to spike fevers so ID consulted and Zosyn transitioned to meropenem continued for long, protracted course due to lack of clinical improvement. Pt was then found to be bacteremic with Enterococcus so ID recommended changed meropenem to Vancomycin. Endocrinology consulted for symptomatic hypercalcemic (possible NSVT 2/2 to hypercalcemia) and recommended given bisphosphonate prior to dental clearance. Given one dose with no significant improvement in calcium levels. Increased fluids as well without much improvement. Fluids later transitioned to free water flushes. As per endo recs, one dose denosumab given and calcium levels monitored.  ..   He will follow up with his primary care provider, orthopedic surgeon, oncology, palliative care, and radiation oncology when he leaves the hospital.

## 2023-02-09 NOTE — PROGRESS NOTE ADULT - PROBLEM SELECTOR PLAN 1
Was taking Oxycodone IR 5-10 mg per Peg PRN , patient reports was not taking more than 2-3 per day  Currently ,  over 24 hours patient has taken 30mg total of Oxy IR  Reluctant to ask for pain meds doesn't want to be groggy however still endorses pain - most likely will need a long acting medication   - Liberated ATC Oxycodone 5mg PO Via PEG to Q4hrs   - c/w Oxy IR 5 mg q 4 hours prn for mild mod pain  - c/w Oxy IR 10 mg q 4 hours prn severe pain  - PRN Narcan  - Bowel regimen while on opioids Was taking Oxycodone IR 5-10 mg per Peg PRN , patient reports was not taking more than 2-3 per day  Reluctant to ask for pain meds doesn't want to be groggy however still endorses pain  - Initiated Methadone solution 2.5mg BID for long acting pain control (pt used ~32.7mg PO Oxycodone within 24hrs)  - f/u EKG - ordered today  - c/w Oxy IR 5 mg q 4 hours prn for mild mod pain  - c/w Oxy IR 10 mg q 4 hours prn severe pain  - PRN Narcan  - Bowel regimen while on opioids

## 2023-02-09 NOTE — PROGRESS NOTE ADULT - ATTENDING COMMENTS
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 Found to have SIRS (tachy, leukocytosis) and c/f right knee septic arthritis s/p arthrocentesis on 2/5 not yielding any organisms however +hemarthrosis.    Right knee cultures NGTD, ESR/CRP elevated, suspect inflammation in setting of hemarthrosis and malignancy  Monitor off ABx, Had persistent leukocytosis recent admission as well, likely reactive, no infection found  Awaiting right knee immobilizer  Switch oxy ATC to methadone 2.5mg q12 per palliative  H/H stable after 1 U PRBC. Continue to hold Hold Xarelto (has been on this postop for DVT ppx)  Nebs for wheezing: imaging with unchanged large mass in the left upper hemithorax  Monitor Ca, dc IVF, PTH appropriately low. If calcium rising off IVF, will consider bisphosphonate  Was planned to start RT as outpatient but never started. Previous right patellar bx path with SCC, Rad Onc with no present plans for RT inpatient  TSH elevated, FT4 low. Rpt TFTs before DC  PT recs: likely rehab but family does not want rehab and would prefer to take patient home at this time    Discussed with wife and son at bedside. DC planning home possibly tmw

## 2023-02-09 NOTE — PROGRESS NOTE ADULT - SUBJECTIVE AND OBJECTIVE BOX
***************************************************************  Acacia Linder MD (PGY-1)  Internal Medicine  Pager: [phone]  ***************************************************************    PROGRESS NOTE:     Patient is a 67y old  Male who presents with a chief complaint of r. knee pain (09 Feb 2023 05:07)      SUBJECTIVE / OVERNIGHT EVENTS: Patient seen and examined at bedside. No acute overnight events. This morning, patient reports similar right knee pain compared to yesterday. Denies fevers, chills, N/V/D, chest pain, SOB, and abdominal pain.    MEDICATIONS  (STANDING):  acetaminophen    Suspension .. 650 milliGRAM(s) Oral every 6 hours  chlorhexidine 2% Cloths 1 Application(s) Topical daily  gabapentin Solution 300 milliGRAM(s) Oral three times a day  levothyroxine 150 MICROGram(s) Oral daily  lidocaine   4% Patch 1 Patch Transdermal daily  oxyCODONE    IR 5 milliGRAM(s) Oral every 6 hours  pantoprazole   Suspension 40 milliGRAM(s) Oral every 24 hours  piperacillin/tazobactam IVPB.. 3.375 Gram(s) IV Intermittent every 8 hours  polyethylene glycol 3350 17 Gram(s) Oral two times a day  senna Syrup 10 milliLiter(s) Oral daily  sodium chloride 0.9%. 1000 milliLiter(s) (100 mL/Hr) IV Continuous <Continuous>    MEDICATIONS  (PRN):  albuterol/ipratropium for Nebulization 3 milliLiter(s) Nebulizer every 6 hours PRN Shortness of Breath and/or Wheezing  naloxone Injectable 0.3 milliGRAM(s) IV Push every 3 minutes PRN opioid induced AMS  oxyCODONE    IR 10 milliGRAM(s) Oral every 4 hours PRN Severe Pain (7 - 10)  oxyCODONE    IR 5 milliGRAM(s) Oral every 4 hours PRN Moderate Pain (4 - 6)      CAPILLARY BLOOD GLUCOSE        I&O's Summary    08 Feb 2023 07:01  -  09 Feb 2023 07:00  --------------------------------------------------------  IN: 675 mL / OUT: 1100 mL / NET: -425 mL        PHYSICAL EXAM:  Vital Signs Last 24 Hrs  T(C): 36.2 (09 Feb 2023 04:58), Max: 36.8 (08 Feb 2023 21:26)  T(F): 97.1 (09 Feb 2023 04:58), Max: 98.3 (08 Feb 2023 21:26)  HR: 104 (09 Feb 2023 04:58) (100 - 113)  BP: 112/58 (09 Feb 2023 04:58) (112/58 - 136/61)  BP(mean): --  RR: 17 (09 Feb 2023 04:58) (17 - 18)  SpO2: 100% (09 Feb 2023 04:58) (98% - 100%)    Parameters below as of 09 Feb 2023 04:58  Patient On (Oxygen Delivery Method): room air          GENERAL: NAD  HEENT: hoarse voice noted  NECK: Supple, No JVD  CHEST/LUNG: scattered wheezing b/l  HEART: Tachycardic with regular rhythm; No murmurs, rubs, or gallops  ABDOMEN: Soft, Nontender, Nondistended; Bowel sounds present PEG tube in place cdi  EXTREMITIES:  2+ Peripheral Pulses, No clubbing, cyanosis. + R knee erythema, edema, and TTP, incisions cdi, SILT, ROM limited 2/2 pain  NEUROLOGY: AAOx3, non-focal  SKIN: No rashes or lesions    LABS:                        8.0    24.00 )-----------( 574      ( 09 Feb 2023 06:00 )             26.0     02-09    132<L>  |  105  |  17  ----------------------------<  109<H>  4.6   |  17<L>  |  0.73    Ca    10.1      09 Feb 2023 06:00  Phos  2.1     02-09  Mg     1.70     02-09    TPro  6.6  /  Alb  2.5<L>  /  TBili  0.3  /  DBili  x   /  AST  23  /  ALT  25  /  AlkPhos  217<H>  02-09                RADIOLOGY & ADDITIONAL TESTS:  Results Reviewed:   Imaging Personally Reviewed:  Electrocardiogram Personally Reviewed:    COORDINATION OF CARE:  Care Discussed with Consultants/Other Providers [Y/N]:  Prior or Outpatient Records Reviewed [Y/N]:   ***************************************************************  Acacia Linder MD (PGY-1)  Internal Medicine  Pager: [phone]  ***************************************************************    PROGRESS NOTE:     Patient is a 67y old  Male who presents with a chief complaint of r. knee pain (09 Feb 2023 05:07)      SUBJECTIVE / OVERNIGHT EVENTS: Patient seen and examined at bedside. No acute overnight events. This morning, patient reports normal BMs yesterday but still has right knee pain. Denies fevers, chills, N/V/D, chest pain, SOB, and abdominal pain.    MEDICATIONS  (STANDING):  acetaminophen    Suspension .. 650 milliGRAM(s) Oral every 6 hours  chlorhexidine 2% Cloths 1 Application(s) Topical daily  gabapentin Solution 300 milliGRAM(s) Oral three times a day  levothyroxine 150 MICROGram(s) Oral daily  lidocaine   4% Patch 1 Patch Transdermal daily  oxyCODONE    IR 5 milliGRAM(s) Oral every 6 hours  pantoprazole   Suspension 40 milliGRAM(s) Oral every 24 hours  piperacillin/tazobactam IVPB.. 3.375 Gram(s) IV Intermittent every 8 hours  polyethylene glycol 3350 17 Gram(s) Oral two times a day  senna Syrup 10 milliLiter(s) Oral daily  sodium chloride 0.9%. 1000 milliLiter(s) (100 mL/Hr) IV Continuous <Continuous>    MEDICATIONS  (PRN):  albuterol/ipratropium for Nebulization 3 milliLiter(s) Nebulizer every 6 hours PRN Shortness of Breath and/or Wheezing  naloxone Injectable 0.3 milliGRAM(s) IV Push every 3 minutes PRN opioid induced AMS  oxyCODONE    IR 10 milliGRAM(s) Oral every 4 hours PRN Severe Pain (7 - 10)  oxyCODONE    IR 5 milliGRAM(s) Oral every 4 hours PRN Moderate Pain (4 - 6)      CAPILLARY BLOOD GLUCOSE        I&O's Summary    08 Feb 2023 07:01  -  09 Feb 2023 07:00  --------------------------------------------------------  IN: 675 mL / OUT: 1100 mL / NET: -425 mL        PHYSICAL EXAM:  Vital Signs Last 24 Hrs  T(C): 36.2 (09 Feb 2023 04:58), Max: 36.8 (08 Feb 2023 21:26)  T(F): 97.1 (09 Feb 2023 04:58), Max: 98.3 (08 Feb 2023 21:26)  HR: 104 (09 Feb 2023 04:58) (100 - 113)  BP: 112/58 (09 Feb 2023 04:58) (112/58 - 136/61)  BP(mean): --  RR: 17 (09 Feb 2023 04:58) (17 - 18)  SpO2: 100% (09 Feb 2023 04:58) (98% - 100%)    Parameters below as of 09 Feb 2023 04:58  Patient On (Oxygen Delivery Method): room air          GENERAL: NAD  HEENT: hoarse voice noted  NECK: Supple, No JVD  CHEST/LUNG: scattered wheezing b/l  HEART: Tachycardic with regular rhythm; No murmurs, rubs, or gallops  ABDOMEN: Soft, Nontender, Nondistended; Bowel sounds present, + PEG tube in place cdi  EXTREMITIES:  2+ Peripheral Pulses, No clubbing, cyanosis. + R knee erythema, edema, and TTP, incisions cdi, SILT, ROM limited 2/2 pain  NEUROLOGY: AAOx3, non-focal  SKIN: No rashes or lesions    LABS:                        8.0    24.00 )-----------( 574      ( 09 Feb 2023 06:00 )             26.0     02-09    132<L>  |  105  |  17  ----------------------------<  109<H>  4.6   |  17<L>  |  0.73    Ca    10.1      09 Feb 2023 06:00  Phos  2.1     02-09  Mg     1.70     02-09    TPro  6.6  /  Alb  2.5<L>  /  TBili  0.3  /  DBili  x   /  AST  23  /  ALT  25  /  AlkPhos  217<H>  02-09                RADIOLOGY & ADDITIONAL TESTS:  Results Reviewed:   Imaging Personally Reviewed:  Electrocardiogram Personally Reviewed:    COORDINATION OF CARE:  Care Discussed with Consultants/Other Providers [Y/N]:  Prior or Outpatient Records Reviewed [Y/N]:

## 2023-02-09 NOTE — DISCHARGE NOTE PROVIDER - NSFOLLOWUPCLINICS_GEN_ALL_ED_FT
[facility] Geriatric and Palliative [facility]  Geriatrics  [address]  [place] [address]  Phone: [phone]  Fax:   Follow Up Time: 2 weeks

## 2023-02-09 NOTE — PROGRESS NOTE ADULT - SUBJECTIVE AND OBJECTIVE BOX
Orthopedic Surgery Progress Note     S: Patient seen and examined today. No acute events overnight. Pain stable        Physical Exam:    Vital Signs Last 24 Hrs  T(C): 36.2 (09 Feb 2023 04:58), Max: 37 (08 Feb 2023 05:30)  T(F): 97.1 (09 Feb 2023 04:58), Max: 98.6 (08 Feb 2023 05:30)  HR: 104 (09 Feb 2023 04:58) (100 - 113)  BP: 112/58 (09 Feb 2023 04:58) (108/62 - 136/61)  BP(mean): --  RR: 17 (09 Feb 2023 04:58) (17 - 18)  SpO2: 100% (09 Feb 2023 04:58) (98% - 100%)    Parameters below as of 09 Feb 2023 04:58  Patient On (Oxygen Delivery Method): room air          Gen: NAD  Resp: Unlabored breathing  RLE:  Incision c/d/i  SILT DP/SP/Rosio/Saph,   +EHL/FHL/TA/Gastroc,   +hip/ankle ROM,    knee ROM limited 2/2 pain,   DP+, soft compartments, no calf ttp.

## 2023-02-09 NOTE — PROGRESS NOTE ADULT - SUBJECTIVE AND OBJECTIVE BOX
[facility] Geriatrics and Palliative Care  Meseret Lopes Palliative Care Nurse Practitioner  Contact Info: Page [phone] (Including Nights/Weekends), Message on Microsoft Teams (Meseret Lopes), or leave VM at Palliative Office [phone] (non-urgent)    SUBJECTIVE AND OBJECTIVE:  Indication for Geriatrics and Palliative Care Services/INTERVAL HPI:    OVERNIGHT EVENTS:    DNR on chart:  Allergies    No Known Allergies    Intolerances    MEDICATIONS  (STANDING):  acetaminophen    Suspension .. 650 milliGRAM(s) Oral every 6 hours  chlorhexidine 2% Cloths 1 Application(s) Topical daily  gabapentin Solution 300 milliGRAM(s) Oral three times a day  levothyroxine 150 MICROGram(s) Oral daily  lidocaine   4% Patch 1 Patch Transdermal daily  oxyCODONE    IR 5 milliGRAM(s) Oral every 4 hours  pantoprazole   Suspension 40 milliGRAM(s) Oral every 24 hours  polyethylene glycol 3350 17 Gram(s) Oral two times a day  senna Syrup 10 milliLiter(s) Oral daily    MEDICATIONS  (PRN):  albuterol/ipratropium for Nebulization 3 milliLiter(s) Nebulizer every 6 hours PRN Shortness of Breath and/or Wheezing  naloxone Injectable 0.3 milliGRAM(s) IV Push every 3 minutes PRN opioid induced AMS  oxyCODONE    IR 10 milliGRAM(s) Oral every 4 hours PRN Severe Pain (7 - 10)  oxyCODONE    IR 5 milliGRAM(s) Oral every 4 hours PRN Moderate Pain (4 - 6)        -------------------------------------------------------------------------------------------------------  ITEMS UNCHECKED ARE NOT PRESENT    PRESENT SYMPTOMS: [ ]Unable to self-report - see [ ] CPOT [ ] PAINADS [ ] RDOS  Source if other than patient:  [ ]Family   [ ]Team     Pain: [ x]yes [ ]no  QOL impact - Extensive   Location -        right knee           Aggravating factors - movement  Quality -  dull persistently achy, throbbing/angry appearing swollen , hot to touch   Radiation - no  Timing-  persistent   Severity (0-10 scale):  10+++  Minimal acceptable level (0-10 scale): <5    CPOT:    https://www.[facility].org/getattachment/[email or web address]/Critical-Care-Pain-Observation-Tool-(CPOT)    PAINAD Score: See PAINAD tool and score below       RDOS: See RDOS tool and score below   0 to 2  minimal or no respiratory distress   3  mild distress  4 to 6 moderate distress  >7 severe distress    Dyspnea:                           [ ]Mild [ ]Moderate [ ]Severe  Anxiety:                             [ ]Mild [ ]Moderate [ ]Severe  Fatigue:                             [ ]Mild [ ]Moderate [ ]Severe  Nausea:                             [ ]Mild [ ]Moderate [ ]Severe  Loss of appetite:              [ ]Mild [ ]Moderate [ ]Severe  Constipation:                    [ ]Mild [ ]Moderate [ ]Severe  Other Symptoms:  [X ]All other review of systems negative     Home Medications for Symptoms if present:    I Stop Reference no:     -------------------------------------------------------------------------------------------------------  PCSSQ[Palliative Care Spiritual Screening Question]   Severity (0-10):  Score of 4 or > indicate consideration of Chaplaincy referral.  Chaplaincy Referral: [ ] yes [ ] refused [X ] following [ ] Deferred     Caregiver [place]? : [ ] yes [ ] no [ ] Deferred [X ] Declined             Social work referral [ ] Patient & Family Centered Care Referral [ ]     Anticipatory Grief present?:  [ ] yes [ ] no  [X ] Deferred                  Social work referral [ ] Chaplaincy Referral [ ]    -------------------------------------------------------------------------------------------------------  PHYSICAL EXAM:  Vital Signs Last 24 Hrs  T(C): 36.2 (09 Feb 2023 04:58), Max: 36.8 (08 Feb 2023 21:26)  T(F): 97.1 (09 Feb 2023 04:58), Max: 98.3 (08 Feb 2023 21:26)  HR: 104 (09 Feb 2023 04:58) (104 - 113)  BP: 112/58 (09 Feb 2023 04:58) (112/58 - 136/61)  BP(mean): --  RR: 17 (09 Feb 2023 04:58) (17 - 18)  SpO2: 100% (09 Feb 2023 04:58) (98% - 100%)    Parameters below as of 09 Feb 2023 09:45  Patient On (Oxygen Delivery Method): room air     I&O's Summary    08 Feb 2023 07:01  -  09 Feb 2023 07:00  --------------------------------------------------------  IN: 675 mL / OUT: 1100 mL / NET: -425 mL       GENERAL:   [ ]Cachexia  [XAlert  [ X]Oriented x4   [ ]Lethargic  [ ]Unarousable  [X ]Verbal  [ ]Non-Verbal    Behavioral:   [ ]Anxiety  [ ]Delirium [ ]Agitation [ ]Other    HEENT:  [X ]Normal   [ ]Dry mouth   [ ]ET Tube/Trach  [ ]Oral lesions    PULMONARY:   [ X]Clear [ ]Tachypnea  [ ]Audible excessive secretions   [ ]Rhonchi        [ ]Right [ ]Left [ ]Bilateral  [ ]Crackles        [ ]Right [ ]Left [ ]Bilateral  [ ]Wheezing     [ ]Right [ ]Left [ ]Bilateral  [ ]Diminished BS [ ] Right [ ]Left [ ]Bilateral    CARDIOVASCULAR:    [X ]Regular [ ]Irregular [ ]Tachy  [ ][place]wick [ ]Murmur [ ]Other    GASTROINTESTINAL:  [X ]Soft  [ ]Distended   [X ]+BS  [X ]Non tender [ ]Tender  [ ]Other [X ]PEG [ ]OGT/ NGT   Last BM: 2/8    GENITOURINARY:  [ X]Normal [ ]Incontinent   [ ]Oliguria/Anuria   [ ]Oswald    MUSCULOSKELETAL:   [ ]Normal   [ X]Weakness  [ ]Bed/Wheelchair bound [ ]Edema    NEUROLOGIC:   [X ]No focal deficits  [ ] Cognitive impairment  [ ] Dysphagia [ ]Dysarthria [ ] Paresis [ ]Other     SKIN:   [ ]Normal  [ ]Rash  [x ]Other R knee surgical wound   [ ]Pressure ulcer(s) [ ]y [ ]n present on admission    -------------------------------------------------------------------------------------------------------  CRITICAL CARE:  [ ]Shock Present  [ ]Septic [ ]Cardiogenic [ ]Neurologic [ ]Hypovolemic  [ ]Vasopressors [ ]Inotropes  [ ]Respiratory failure present [ ]Mechanical Ventilation [ ]Non-invasive ventilatory support [ ]High-Flow   [ ]Acute  [ ]Chronic [ ]Hypoxic  [ ]Hypercarbic [ ]Other  [ ]Other organ failure     -------------------------------------------------------------------------------------------------------  LABS:                        8.0    24.00 )-----------( 574      ( 09 Feb 2023 06:00 )             26.0   02-09    132<L>  |  105  |  17  ----------------------------<  109<H>  4.6   |  17<L>  |  0.73    Ca    10.1      09 Feb 2023 06:00  Phos  2.1     02-09  Mg     1.70     02-09    TPro  6.6  /  Alb  2.5<L>  /  TBili  0.3  /  DBili  x   /  AST  23  /  ALT  25  /  AlkPhos  217<H>  02-09        -------------------------------------------------------------------------------------------------------  RADIOLOGY & ADDITIONAL STUDIES: < from: CT Angio Chest PE Protocol w/ IV Cont (02.05.23 @ 19:05) >    IMPRESSION:    No pulmonary embolism to the level of the segmental pulmonary arteries.    Patchy right upper lobe ground glass opacities are decreased as compared   with1/10/2023.    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    --- End of Report ---    < end of copied text >    -------------------------------------------------------------------------------------------------------  Protein Calorie Malnutrition Present: [ ]mild [ ]moderate [ ]severe [ ]underweight [ ]morbid obesity  https://www.andeal.org/vault/9465/web/files/ONC/Table_Clinical%20Characteristics%20to%20Document%20Malnutrition-White%20JV%20et%20al%202012.pdf    Height (cm): 170.2 (02-05-23 @ 06:07), 170.2 (01-07-23 @ 06:50), 157.5 (11-10-22 @ 06:53)  Weight (kg): 63 (02-05-23 @ 21:53), 66.4 (01-07-23 @ 06:50), 69.4 (11-10-22 @ 06:53)  BMI (kg/m2): 21.7 (02-05-23 @ 21:53), 22.9 (02-05-23 @ 06:07), 22.9 (01-07-23 @ 06:50)    Palliative Performance Status Version 2:   See PPSv2 tool and score below       [ ]PPSV2 < or = 30%  [ ]significant weight loss [ ]poor nutritional intake [ ]anasarca[ ]Artificial Nutrition    Other REFERRALS:  [ ]Hospice  [ ]Child Life  [ ]Social Work  [ ]Case management [ ]Holistic Therapy     -------------------------------------------------------------------------------------------------------  Goals of Care Document: [facility] Geriatrics and Palliative Care  Meseret Lopes Palliative Care Nurse Practitioner  Contact Info: Page [phone] (Including Nights/Weekends), Message on Microsoft Teams (Meseret Lopes), or leave VM at Palliative Office [phone] (non-urgent)    SUBJECTIVE AND OBJECTIVE:  Indication for Geriatrics and Palliative Care Services/INTERVAL HPI:    OVERNIGHT EVENTS:    DNR on chart:  Allergies    No Known Allergies    Intolerances    MEDICATIONS  (STANDING):  acetaminophen    Suspension .. 650 milliGRAM(s) Oral every 6 hours  chlorhexidine 2% Cloths 1 Application(s) Topical daily  gabapentin Solution 300 milliGRAM(s) Oral three times a day  levothyroxine 150 MICROGram(s) Oral daily  lidocaine   4% Patch 1 Patch Transdermal daily  oxyCODONE    IR 5 milliGRAM(s) Oral every 4 hours  pantoprazole   Suspension 40 milliGRAM(s) Oral every 24 hours  polyethylene glycol 3350 17 Gram(s) Oral two times a day  senna Syrup 10 milliLiter(s) Oral daily    MEDICATIONS  (PRN):  albuterol/ipratropium for Nebulization 3 milliLiter(s) Nebulizer every 6 hours PRN Shortness of Breath and/or Wheezing  naloxone Injectable 0.3 milliGRAM(s) IV Push every 3 minutes PRN opioid induced AMS  oxyCODONE    IR 10 milliGRAM(s) Oral every 4 hours PRN Severe Pain (7 - 10)  oxyCODONE    IR 5 milliGRAM(s) Oral every 4 hours PRN Moderate Pain (4 - 6)        -------------------------------------------------------------------------------------------------------  ITEMS UNCHECKED ARE NOT PRESENT    PRESENT SYMPTOMS: [ ]Unable to self-report - see [ ] CPOT [ ] PAINADS [ ] RDOS  Source if other than patient:  [ ]Family   [ ]Team     Pain: [ x]yes [ ]no  QOL impact - Extensive   Location -        right knee           Aggravating factors - movement  Quality -  dull persistently achy, throbbing/angry appearing swollen , hot to touch   Radiation - no  Timing-  persistent   Severity (0-10 scale):  10+++  Minimal acceptable level (0-10 scale): <5    CPOT:    https://www.[facility].org/getattachment/[email or web address]/Critical-Care-Pain-Observation-Tool-(CPOT)    PAINAD Score: See PAINAD tool and score below       RDOS: See RDOS tool and score below   0 to 2  minimal or no respiratory distress   3  mild distress  4 to 6 moderate distress  >7 severe distress    Dyspnea:                           [ ]Mild [ ]Moderate [ ]Severe  Anxiety:                             [ ]Mild [ ]Moderate [ ]Severe  Fatigue:                             [ ]Mild [ ]Moderate [ ]Severe  Nausea:                             [ ]Mild [ ]Moderate [ ]Severe  Loss of appetite:              [ ]Mild [ ]Moderate [ ]Severe  Constipation:                    [ ]Mild [ ]Moderate [ ]Severe  Other Symptoms:  [X ]All other review of systems negative     Home Medications for Symptoms if present:    I Stop Reference no:     -------------------------------------------------------------------------------------------------------  PCSSQ[Palliative Care Spiritual Screening Question]   Severity (0-10):  Score of 4 or > indicate consideration of Chaplaincy referral.  Chaplaincy Referral: [ ] yes [ ] refused [X ] following [ ] Deferred     Caregiver [place]? : [ ] yes [ ] no [ ] Deferred [X ] Declined             Social work referral [ ] Patient & Family Centered Care Referral [ ]     Anticipatory Grief present?:  [ ] yes [ ] no  [X ] Deferred                  Social work referral [ ] Chaplaincy Referral [ ]    -------------------------------------------------------------------------------------------------------  PHYSICAL EXAM:  Vital Signs Last 24 Hrs  T(C): 36.2 (09 Feb 2023 04:58), Max: 36.8 (08 Feb 2023 21:26)  T(F): 97.1 (09 Feb 2023 04:58), Max: 98.3 (08 Feb 2023 21:26)  HR: 104 (09 Feb 2023 04:58) (104 - 113)  BP: 112/58 (09 Feb 2023 04:58) (112/58 - 136/61)  BP(mean): --  RR: 17 (09 Feb 2023 04:58) (17 - 18)  SpO2: 100% (09 Feb 2023 04:58) (98% - 100%)    Parameters below as of 09 Feb 2023 09:45  Patient On (Oxygen Delivery Method): room air     I&O's Summary    08 Feb 2023 07:01  -  09 Feb 2023 07:00  --------------------------------------------------------  IN: 675 mL / OUT: 1100 mL / NET: -425 mL       GENERAL:   [ ]Cachexia  [XAlert  [ X]Oriented x4   [ ]Lethargic  [ ]Unarousable  [X ]Verbal  [ ]Non-Verbal    Behavioral:   [ ]Anxiety  [ ]Delirium [ ]Agitation [ ]Other    HEENT:  [X ]Normal   [ ]Dry mouth   [ ]ET Tube/Trach  [ ]Oral lesions    PULMONARY:   [ X]Clear [ ]Tachypnea  [ ]Audible excessive secretions   [ ]Rhonchi        [ ]Right [ ]Left [ ]Bilateral  [ ]Crackles        [ ]Right [ ]Left [ ]Bilateral  [ ]Wheezing     [ ]Right [ ]Left [ ]Bilateral  [ ]Diminished BS [ ] Right [ ]Left [ ]Bilateral    CARDIOVASCULAR:    [X ]Regular [ ]Irregular [ ]Tachy  [ ][place]adwick [ ]Murmur [ ]Other    GASTROINTESTINAL:  [X ]Soft  [ ]Distended   [X ]+BS  [X ]Non tender [ ]Tender  [ ]Other [X ]PEG [ ]OGT/ NGT   Last BM: 2/8    GENITOURINARY:  [ X]Normal [ ]Incontinent   [ ]Oliguria/Anuria   [ ]Oswald    MUSCULOSKELETAL:   [ ]Normal   [ X]Weakness  [ ]Bed/Wheelchair bound [ ]Edema    NEUROLOGIC:   [X ]No focal deficits  [ ] Cognitive impairment  [ ] Dysphagia [ ]Dysarthria [ ] Paresis [ ]Other     SKIN:   [ ]Normal  [ ]Rash  [x ]Other R knee surgical wound   [ ]Pressure ulcer(s) [ ]y [ ]n present on admission    -------------------------------------------------------------------------------------------------------  CRITICAL CARE:  [ ]Shock Present  [ ]Septic [ ]Cardiogenic [ ]Neurologic [ ]Hypovolemic  [ ]Vasopressors [ ]Inotropes  [ ]Respiratory failure present [ ]Mechanical Ventilation [ ]Non-invasive ventilatory support [ ]High-Flow   [ ]Acute  [ ]Chronic [ ]Hypoxic  [ ]Hypercarbic [ ]Other  [ ]Other organ failure     -------------------------------------------------------------------------------------------------------  LABS:                        8.0    24.00 )-----------( 574      ( 09 Feb 2023 06:00 )             26.0   02-09    132<L>  |  105  |  17  ----------------------------<  109<H>  4.6   |  17<L>  |  0.73    Ca    10.1      09 Feb 2023 06:00  Phos  2.1     02-09  Mg     1.70     02-09    TPro  6.6  /  Alb  2.5<L>  /  TBili  0.3  /  DBili  x   /  AST  23  /  ALT  25  /  AlkPhos  217<H>  02-09    -------------------------------------------------------------------------------------------------------    RADIOLOGY & ADDITIONAL STUDIES: < from: CT Angio Chest PE Protocol w/ IV Cont (02.05.23 @ 19:05) >    IMPRESSION:    No pulmonary embolism to the level of the segmental pulmonary arteries.    Patchy right upper lobe ground glass opacities are decreased as compared   with1/10/2023.    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    --- End of Report ---    < end of copied text >    -------------------------------------------------------------------------------------------------------  Protein Calorie Malnutrition Present: [ ]mild [ ]moderate [ ]severe [ ]underweight [ ]morbid obesity  https://www.andeal.org/vault/6830/web/files/ONC/Table_Clinical%20Characteristics%20to%20Document%20Malnutrition-White%20JV%20et%20al%202012.pdf    Height (cm): 170.2 (02-05-23 @ 06:07), 170.2 (01-07-23 @ 06:50), 157.5 (11-10-22 @ 06:53)  Weight (kg): 63 (02-05-23 @ 21:53), 66.4 (01-07-23 @ 06:50), 69.4 (11-10-22 @ 06:53)  BMI (kg/m2): 21.7 (02-05-23 @ 21:53), 22.9 (02-05-23 @ 06:07), 22.9 (01-07-23 @ 06:50)    Palliative Performance Status Version 2:   See PPSv2 tool and score below       [ ]PPSV2 < or = 30%  [ ]significant weight loss [ ]poor nutritional intake [ ]anasarca[ ]Artificial Nutrition    Other REFERRALS:  [ ]Hospice  [ ]Child Life  [ ]Social Work  [ ]Case management [ ]Holistic Therapy     -------------------------------------------------------------------------------------------------------  Goals of Care Document:

## 2023-02-09 NOTE — DISCHARGE NOTE PROVIDER - NSDCFUADDAPPT_GEN_ALL_CORE_FT
Please follow up with your primary care provider in 1 week.    Please follow up your radiation oncologist Dr. Brittany Reza in 1 week.    Please follow up with your oncologist Dr. Gi Thomas in 1 week.    Please follow up with your orthopedic surgeon in 2 weeks.     Please follow up with your primary care provider in 1 week.    Please follow up your radiation oncologist Dr. Brittany Reza in 1 week.    Please follow up with your oncologist Dr. Gi Thomas in 1 week.    Please follow up with your orthopedic surgeon Dr. Lane Carter in 2 weeks.     Please follow up with your primary care provider in 1 week.    Please follow up your radiation oncologist Dr. Brittany Reza in 1 week.    Please follow up with your oncologist Dr. Gi Thomas in 1 week.    Please follow up with palliative care in 2 weeks.    Please follow up with your orthopedic surgeon Dr. Lane Carter in 2 weeks.     Please follow up with your primary care provider in 1 week.    Please follow up your radiation oncologist Dr. Brittany Reza in 1 week.    Please follow up with your oncologist Dr. Gi Thomas in 1 week.    Please follow up with palliative care with Dr. Darby Matisa in 2 weeks.    Please follow up with your orthopedic surgeon Dr. Lane Carter in 2 weeks.

## 2023-02-09 NOTE — PROGRESS NOTE ADULT - ASSESSMENT
67M s/p R patellectomy/VY advancement p/w R knee pain.       Plan:  - No acute orthopaedic surgical intervention  -FU aspiration results, so far negative  -FU Bcx, so far negative  -pain control  -WBAT in KI  -Care per primary team    Orthopaedic Surgery  [facility] [place] [id number]  [facility]        [id number]  [facility]  [id number]/ [phone]

## 2023-02-09 NOTE — CHART NOTE - NSCHARTNOTEFT_GEN_A_CORE
Patient seen and examined at bedside with Dr. Carter. No evidence of infection. Likely tumor recurrence. Discussed with the patient nonoperative and operative options, which at this point is an amputation only. Patient has no interest in such a procedure at this time. Now, our recommendations are for a knee immobilizer while out of bed for comfort, no need for knee immobilization in bed. We also defer to oncology for chemotherapy and/or radiation for further management. Additionally, for the wound, it can be dressed with bacitracin and a dry dressing.

## 2023-02-09 NOTE — PROGRESS NOTE ADULT - PROBLEM SELECTOR PLAN 3
PPS 40% needs assistance with all basic needs  Prior to surgery patient was able to ambulate and moderately independent of ADL's  Patient currently in too much pain to participate in PT  Ortho recs right leg amputation- pt not in agreement

## 2023-02-09 NOTE — DISCHARGE NOTE PROVIDER - PROVIDER TOKENS
PROVIDER:[TOKEN:[[id number]],FOLLOWUP:[2 weeks],ESTABLISHEDPATIENT:[T]] PROVIDER:[TOKEN:[[id number]],FOLLOWUP:[2 weeks],ESTABLISHEDPATIENT:[T]],PROVIDER:[TOKEN:[[id number]],FOLLOWUP:[2 weeks]]

## 2023-02-09 NOTE — DISCHARGE NOTE PROVIDER - NSDCCPCAREPLAN_GEN_ALL_CORE_FT
PRINCIPAL DISCHARGE DIAGNOSIS  Diagnosis: Hemarthrosis  Assessment and Plan of Treatment: You were seen in the hospital for right knee pain and swelling and found to have hemarthrosis. Hemarthrosis happens when something causes bleeding that leaks into your synovial membrane or the cavity inside one of your joints. It will cause swelling, and might make it hard to use your joint. Hemarthrosis can be caused by injuries, health conditions, as a side effect of medication and after joint surgeries. Usually, you can treat symptoms of hemarthrosis at home while you’re waiting for your joint to heal. You can use the RICE method. RICE stands for Rest, Ice, Compression and Elevation.   Rest: Avoid putting any weight on your affected joint.  Ice: Don’t apply ice directly to your joint. Instead, apply cold packs wrapped in a towel for no more than 20 minutes at a time, several times a day.  Compression: You can wrap an elastic bandage around your joint. Make sure the bandage isn’t wrapped too tightly. Make sure the bandage doesn’t hurt.  Elevation: Rest with your affected joint higher than the level of your heart.  Please continue taking all of your medications as prescribed. Please follow up with your primary care provider and radiation oncology within 1 week. If you develop fever, chills, shortness of breath, or worsening pain, please go to the nearest emergency department.       PRINCIPAL DISCHARGE DIAGNOSIS  Diagnosis: Hemarthrosis  Assessment and Plan of Treatment: You were seen in the hospital for right knee pain and swelling and found to have hemarthrosis. Hemarthrosis happens when something causes bleeding that leaks into your synovial membrane or the cavity inside one of your joints. It will cause swelling, and might make it hard to use your joint. Hemarthrosis can be caused by injuries, health conditions, as a side effect of medication and after joint surgeries. STOP taking Xarelto. Usually, you can treat symptoms of hemarthrosis at home while you’re waiting for your joint to heal. You can use the RICE method. RICE stands for Rest, Ice, Compression and Elevation.   Rest: Avoid putting any weight on your affected joint.  Ice: Don’t apply ice directly to your joint. Instead, apply cold packs wrapped in a towel for no more than 20 minutes at a time, several times a day.  Compression: You can wrap an elastic bandage around your joint. Make sure the bandage isn’t wrapped too tightly. Make sure the bandage doesn’t hurt.  Elevation: Rest with your affected joint higher than the level of your heart.  Followup with your radiation oncologist and oncologist in 1 week.  Please continue taking all of your medications as prescribed. Please follow up with your primary care provider and radiation oncology within 1 week. If you develop fever, chills, shortness of breath, or worsening pain, please go to the nearest emergency department.       PRINCIPAL DISCHARGE DIAGNOSIS  Diagnosis: Hemarthrosis  Assessment and Plan of Treatment: You were seen in the hospital for right knee pain and swelling and found to have hemarthrosis. Hemarthrosis happens when something causes bleeding that leaks into your synovial membrane or the cavity inside one of your joints. It will cause swelling, and might make it hard to use your joint. Hemarthrosis can be caused by injuries, health conditions, as a side effect of medication and after joint surgeries. STOP taking Xarelto. Usually, you can treat symptoms of hemarthrosis at home while you’re waiting for your joint to heal. You should keep your knee joint immobilized whenever you are out of bed to ensure healing. For your knee pain, we consulted palliative care who recommended methadone every 12 hours. They also recommended oxycodone as needed for moderate-severe pain that is not controlled by the methadone. Hold these medications if you become too drowsy or your breathing is impaired.   Please continue taking all of your medications as prescribed. Please follow up with your primary care provider, oncology, and radiation oncology within 1 week. If you develop fever, chills, shortness of breath, or worsening pain, please go to the nearest emergency department.      SECONDARY DISCHARGE DIAGNOSES  Diagnosis: Hypercalcemia  Assessment and Plan of Treatment: Your calcium levels were noted to be high, likely because of your cancer. They did not go down appropriately in response to IV fluids and calcitonin so we considered starting you on another medication called a bisphosphonate. We had to get a dental eval prior to initating this medication as it can have negative effects on the teeth and the jaw. The dentists recommended a tooth extraction, which you underwent. As per them, you can trial a bisphosphonate with your oncologist in ....    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 We consulted radiation oncology to see if they wanted to treat you while you were in the hospital but they decided not to. You should follow up with your oncologist and radiation oncologist within one week.    Diagnosis: SIRS (systemic inflammatory response syndrome)  Assessment and Plan of Treatment: Later in your hospital stay, you had a fever and your heart rate became really fast. This happened after you had some episodes of vomiting, which likely led you to aspirate some of your stomach contents into your lungs, possibly causing pneumonia. Imaging of your chest showed that you may have had pneumonia due to this incident. We started you on IV antibiotics, which will be transitioned to an oral antibiotic that you can take until 12/19 to complete a 7-day-course of antibiotics.     PRINCIPAL DISCHARGE DIAGNOSIS  Diagnosis: Hemarthrosis  Assessment and Plan of Treatment: You were seen in the hospital for right knee pain and swelling and found to have hemarthrosis. Hemarthrosis happens when something causes bleeding that leaks into your synovial membrane or the cavity inside one of your joints. It will cause swelling, and might make it hard to use your joint. Hemarthrosis can be caused by injuries, health conditions, as a side effect of medication and after joint surgeries. STOP taking Xarelto. Usually, you can treat symptoms of hemarthrosis at home while you’re waiting for your joint to heal. You should keep your knee joint immobilized whenever you are out of bed to ensure healing. For your knee pain, we consulted palliative care who recommended methadone every 12 hours. They also recommended oxycodone as needed for moderate-severe pain that is not controlled by the methadone. Hold these medications if you become too drowsy or your breathing is impaired.   Please continue taking all of your medications as prescribed. Please follow up with your primary care provider, oncology, and radiation oncology within 1 week. If you develop fever, chills, shortness of breath, or worsening pain, please go to the nearest emergency department.      SECONDARY DISCHARGE DIAGNOSES  Diagnosis: Hypercalcemia  Assessment and Plan of Treatment: Your calcium levels were noted to be high, likely because of your cancer. They did not go down appropriately in response to IV fluids and calcitonin so we considered starting you on another medication called a bisphosphonate. We had to get a dental eval prior to initating this medication as it can have negative effects on the teeth and the jaw. The dentists recommended a tooth extraction, which you underwent. You were given one dose of a bisphosphonate, which was ineffective. Then you were trialed on denosumab. Repeated doses of denosumab in outpatient setting will need to be coordinated by your oncologist.    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 We consulted radiation oncology to see if they wanted to treat you while you were in the hospital but they decided not to. You should follow up with your oncologist and radiation oncologist within one week.    Diagnosis: SIRS (systemic inflammatory response syndrome)  Assessment and Plan of Treatment: Later in your hospital stay, you had a fever and your heart rate became really fast. This happened after you had some episodes of vomiting, which likely led you to aspirate some of your stomach contents into your lungs, possibly causing pneumonia. Imaging of your chest showed that you may have had pneumonia due to this incident. We started you on IV antibiotics, which were continued for a long course as you were not making clinical improvement.

## 2023-02-09 NOTE — DISCHARGE NOTE PROVIDER - NSDCCPTREATMENT_GEN_ALL_CORE_FT
PRINCIPAL PROCEDURE  Procedure: CTA chest w/w/o contrast  Findings and Treatment: FINDINGS:  PULMONARY ANGIOGRAM: No pulmonary embolism in the main, left main, right   main, lobar, or segmental pulmonary arteries. Limited evaluatio  < end of copied text >  n of the   subsegmental pulmonary arteries due to motion artifact.  LYMPH NODES: Redemonstrated enlarged lymph node in the azygoesophageal   recess measuring 1.8 x 1.3 cm (2:49). Few subcentimeter left   supraclavicular lymph nodes (2:17).  HEART/VASCULATURE: Heart size is normal. No pericardial effusion. Right   chest port with distal catheter tip in the SVC.  AIRWAYS/LUNGS/PLEURA: Central airways are patent. Patchy right upper lobe   ground glass opacities are decreased as compared with 1/10/2023. Large   mass in the left upper hemithorax invading to the chest wall with erosive   changes of the left third, fourth, and fifth ribs appears similar as   compared with 1/10/2023 measuring 10.6 x 7.3 x 11.5 cm. Associated   compressive atelectasis of the left upper and lower lobes. Small   bilateral pleural effusions.  UPPER ABDOMEN: Hyperdensity within the gallbladder lumen, possibly   secondary to biliary excretion of contrast. Small hiatal hernia.   Gastrostomy tube with tip in the stomach. Unchanged mild left adrenal   nodular thickening.  BONES/SOFT TISSUES: Left upper hemithorax mass invading the left chest   wall with destructive changes of the left third through fifth ribs as   described above.  < from: CT Angio Chest PE Protocol w/ IV Cont (02.05.23 @ 19:05) >

## 2023-02-09 NOTE — PROGRESS NOTE ADULT - PROBLEM SELECTOR PLAN 1
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  s/p arthrocentesis 2/5 with RBCs c/f hemarthrosis, less likely septic arthritis given neg cx  ESR/CRP elevated  RVP, UA and LE US negative, WBC downtrending however had persistent leukocytosis recent admission  d/c'd vanc, c/w zosyn   XR tib-fib and knee unremarkable  - aspiration cx, bcxs ngtd  - MRSA neg  - palliative recs: Oxy IR 5mg q6 ATC, Oxy IR 5mg q4 prn mild mod pain, Oxy IR 10mg q4 prn severe pain  - ortho recs: no additional imaging or interventions at this time h/o chronic leuko and tachycardia, squamous cell cancer stage 4 w/ patellectomy  s/p arthrocentesis 2/5 with RBCs c/f hemarthrosis, less likely septic arthritis given neg cx  ESR/CRP elevated  RVP, UA, LE US negative, WBC downtrending however had persistent leukocytosis recent admission  d/c'd vanc and zosyn   XR tib-fib and knee unremarkable  - aspiration cx, bcxs ngtd, MRSA neg  - palliative recs: methadone 2.5mg bid, Oxy IR 5mg q4 prn mild mod pain, Oxy IR 10mg q4 prn severe pain  - ortho recs: no additional imaging or interventions at this time  - outpatient ortho Dr. Carter: no infection, likely tumor growth, pt declining palliative amputation at this time

## 2023-02-09 NOTE — PROGRESS NOTE ADULT - ASSESSMENT
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

## 2023-02-09 NOTE — DISCHARGE NOTE PROVIDER - CARE PROVIDER_API CALL
Lane Carter)  Orthopaedic Surgery  [address], [address]  [place] [address]  Phone: [phone]  Fax: [phone]  Established Patient  Follow Up Time: 2 weeks   Lane Carter)  Orthopaedic Surgery  [address], [address]  [place] [address]  Phone: [phone]  Fax: [phone]  Established Patient  Follow Up Time: 2 weeks    Darby Matias)  Internal Medicine  [address], [address]  [place] [address]  Phone: [phone]  Fax: [phone]  Follow Up Time: 2 weeks

## 2023-02-09 NOTE — DISCHARGE NOTE PROVIDER - NSDCMRMEDTOKEN_GEN_ALL_CORE_FT
albuterol 0.63 mg/3 mL (0.021%) inhalation solution: 3 milliliter(s) inhaled 3 times a day  calamine topical lotion: Apply topically to affected area 1 to 2 times a day, As Needed  esomeprazole 20 mg oral delayed release capsule: 1 cap(s) by gastrostomy tube once a day  gabapentin 300 mg oral capsule: 1 cap(s) by gastrostomy tube 3 times a day, As Needed  levothyroxine 150 mcg (0.15 mg) oral tablet: 1 tab(s) by gastrostomy tube once a day  Linzess 290 mcg oral capsule: 1 cap(s) by gastrostomy tube once a day  oxyCODONE 10 mg oral tablet: 1 tab(s) by gastrostomy tube every 4 hours, As Needed -Severe Pain (7 - 10) MDD:60mg   polyethylene glycol 3350 oral powder for reconstitution: 17 gram(s) by gastrostomy tube once a day   Port care and flushes monthly x 1 year: Port care and flushes monthly x 1 year  rivaroxaban 10 mg oral tablet: 1 tab(s) by gastrostomy tube once a day   senna leaf extract oral tablet: 2 tab(s) by gastrostomy tube once a day (at bedtime)   albuterol 0.63 mg/3 mL (0.021%) inhalation solution: 3 milliliter(s) inhaled 3 times a day  calamine topical lotion: Apply topically to affected area 1 to 2 times a day, As Needed  esomeprazole 20 mg oral delayed release capsule: 1 cap(s) by gastrostomy tube once a day  gabapentin 300 mg oral capsule: 1 cap(s) by gastrostomy tube 3 times a day, As Needed  levothyroxine 150 mcg (0.15 mg) oral tablet: 1 tab(s) by gastrostomy tube once a day  Linzess 290 mcg oral capsule: 1 cap(s) by gastrostomy tube once a day  methadone 10 mg/5 mL oral solution: 1.25 milliliter(s) orally every 12 hours  oxyCODONE 10 mg oral tablet: 1 tab(s) by gastrostomy tube every 4 hours, As Needed -Severe Pain (7 - 10) MDD:60mg   polyethylene glycol 3350 oral powder for reconstitution: 17 gram(s) by gastrostomy tube once a day   Port care and flushes monthly x 1 year: Port care and flushes monthly x 1 year  rivaroxaban 10 mg oral tablet: 1 tab(s) by gastrostomy tube once a day   senna leaf extract oral tablet: 2 tab(s) by gastrostomy tube once a day (at bedtime)   albuterol 0.63 mg/3 mL (0.021%) inhalation solution: 3 milliliter(s) inhaled 3 times a day  calamine topical lotion: Apply topically to affected area 1 to 2 times a day, As Needed  esomeprazole 20 mg oral delayed release capsule: 1 cap(s) by gastrostomy tube once a day  gabapentin 300 mg oral capsule: 1 cap(s) by gastrostomy tube 3 times a day, As Needed  levothyroxine 150 mcg (0.15 mg) oral tablet: 1 tab(s) by gastrostomy tube once a day  Linzess 290 mcg oral capsule: 1 cap(s) by gastrostomy tube once a day  methadone 10 mg/5 mL oral solution: 1.25 milliliter(s) orally every 12 hours  oxyCODONE 10 mg oral tablet: 1 tab(s) by gastrostomy tube every 4 hours, As Needed -Severe Pain (7 - 10) MDD:60mg   polyethylene glycol 3350 oral powder for reconstitution: 17 gram(s) by gastrostomy tube once a day   Port care and flushes monthly x 1 year: Port care and flushes monthly x 1 year  senna leaf extract oral tablet: 2 tab(s) by gastrostomy tube once a day (at bedtime)   albuterol 0.63 mg/3 mL (0.021%) inhalation solution: 3 milliliter(s) inhaled 3 times a day  calamine topical lotion: Apply topically to affected area 1 to 2 times a day, As Needed  esomeprazole 20 mg oral delayed release capsule: 1 cap(s) by gastrostomy tube once a day  gabapentin 300 mg oral capsule: 1 cap(s) by gastrostomy tube 3 times a day, As Needed  levothyroxine 150 mcg (0.15 mg) oral tablet: 1 tab(s) by gastrostomy tube once a day  Linzess 290 mcg oral capsule: 1 cap(s) by gastrostomy tube once a day  methadone 10 mg/5 mL oral solution: 1.25 milliliter(s) orally every 12 hours  methadone 10 mg/5 mL oral solution: 1.25 milliliter(s) orally 2 times a day MDD:MDD: 7.5mg  oxyCODONE 10 mg oral tablet: 1 tab(s) by gastrostomy tube every 4 hours, As Needed -Severe Pain (7 - 10) MDD:60mg   polyethylene glycol 3350 oral powder for reconstitution: 17 gram(s) by gastrostomy tube once a day   Port care and flushes monthly x 1 year: Port care and flushes monthly x 1 year  senna leaf extract oral tablet: 2 tab(s) by gastrostomy tube once a day (at bedtime)   albuterol 0.63 mg/3 mL (0.021%) inhalation solution: 3 milliliter(s) inhaled 3 times a day  calamine topical lotion: Apply topically to affected area 1 to 2 times a day, As Needed  esomeprazole 20 mg oral delayed release capsule: 1 cap(s) by gastrostomy tube once a day  gabapentin 300 mg oral capsule: 1 cap(s) by gastrostomy tube 3 times a day, As Needed  ipratropium-albuterol 0.5 mg-2.5 mg/3 mL inhalation solution: 3 milliliter(s) inhaled every 6 hours  levothyroxine 150 mcg (0.15 mg) oral tablet: 1 tab(s) by gastrostomy tube once a day  Linzess 290 mcg oral capsule: 1 cap(s) by gastrostomy tube once a day  methadone 10 mg/5 mL oral solution: 1.25 milliliter(s) orally 2 times a day MDD:MDD: 7.5mg  oxyCODONE 10 mg oral tablet: 1 tab(s) orally every 4 hours, As needed, Severe Pain (7 - 10)  oxyCODONE 5 mg oral tablet: 1 tab(s) orally every 4 hours, As needed, Moderate Pain (4 - 6)  polyethylene glycol 3350 oral powder for reconstitution: 17 gram(s) by gastrostomy tube once a day   Port care and flushes monthly x 1 year: Port care and flushes monthly x 1 year  senna leaf extract oral tablet: 2 tab(s) by gastrostomy tube once a day (at bedtime)

## 2023-02-10 NOTE — PROGRESS NOTE ADULT - SUBJECTIVE AND OBJECTIVE BOX
***************************************************************  Acacia Linder MD (PGY-1)  Internal Medicine  Pager: [phone]  ***************************************************************    PROGRESS NOTE:     Patient is a 67y old  Male who presents with a chief complaint of r. knee pain (09 Feb 2023 17:54)      SUBJECTIVE / OVERNIGHT EVENTS: Patient seen and examined at bedside. No acute overnight events. Patient reports right knee pain. Denies fevers, chills, N/V/D, chest pain, SOB, and abdominal pain.    MEDICATIONS  (STANDING):  acetaminophen    Suspension .. 650 milliGRAM(s) Oral every 6 hours  albuterol/ipratropium for Nebulization 3 milliLiter(s) Nebulizer every 6 hours  chlorhexidine 2% Cloths 1 Application(s) Topical daily  gabapentin Solution 300 milliGRAM(s) Oral three times a day  levothyroxine 150 MICROGram(s) Oral daily  lidocaine   4% Patch 1 Patch Transdermal daily  methadone   Solution 2.5 milliGRAM(s) Oral every 12 hours  pantoprazole   Suspension 40 milliGRAM(s) Oral every 24 hours  polyethylene glycol 3350 17 Gram(s) Oral two times a day  senna Syrup 10 milliLiter(s) Oral daily    MEDICATIONS  (PRN):  naloxone Injectable 0.3 milliGRAM(s) IV Push every 3 minutes PRN opioid induced AMS  oxyCODONE    IR 10 milliGRAM(s) Oral every 4 hours PRN Severe Pain (7 - 10)  oxyCODONE    IR 5 milliGRAM(s) Oral every 4 hours PRN Moderate Pain (4 - 6)      CAPILLARY BLOOD GLUCOSE        I&O's Summary    09 Feb 2023 07:01  -  10 Feb 2023 07:00  --------------------------------------------------------  IN: 450 mL / OUT: 400 mL / NET: 50 mL        PHYSICAL EXAM:  Vital Signs Last 24 Hrs  T(C): 37.2 (10 Feb 2023 05:15), Max: 37.2 (10 Feb 2023 05:15)  T(F): 99 (10 Feb 2023 05:15), Max: 99 (10 Feb 2023 05:15)  HR: 114 (10 Feb 2023 05:15) (100 - 116)  BP: 114/58 (10 Feb 2023 05:15) (113/51 - 117/55)  BP(mean): --  RR: 18 (10 Feb 2023 05:15) (17 - 18)  SpO2: 100% (10 Feb 2023 05:15) (96% - 100%)    Parameters below as of 10 Feb 2023 05:15  Patient On (Oxygen Delivery Method): room air      GENERAL: NAD  HEENT: hoarse voice noted  NECK: Supple, No JVD  CHEST/LUNG: scattered wheezing b/l  HEART: Tachycardic with regular rhythm; No murmurs, rubs, or gallops  ABDOMEN: Soft, Nontender, Nondistended; Bowel sounds present, + PEG tube in place cdi  EXTREMITIES:  2+ Peripheral Pulses, No clubbing, cyanosis. + R knee erythema, edema, and TTP, incisions cdi, SILT, ROM limited 2/2 pain  NEUROLOGY: AAOx3, non-focal  SKIN: No rashes or lesions    LABS:                        8.0    24.33 )-----------( 578      ( 10 Feb 2023 06:30 )             26.0     02-10    130<L>  |  100  |  19  ----------------------------<  142<H>  4.3   |  19<L>  |  0.74    Ca    10.5      10 Feb 2023 06:30  Phos  1.9     02-10  Mg     1.80     02-10    TPro  6.7  /  Alb  2.5<L>  /  TBili  0.2  /  DBili  x   /  AST  24  /  ALT  30  /  AlkPhos  210<H>  02-10                RADIOLOGY & ADDITIONAL TESTS:  Results Reviewed:   Imaging Personally Reviewed:  Electrocardiogram Personally Reviewed:    COORDINATION OF CARE:  Care Discussed with Consultants/Other Providers [Y/N]:  Prior or Outpatient Records Reviewed [Y/N]:   ***************************************************************  Acacia Linder MD (PGY-1)  Internal Medicine  Pager: [phone]  ***************************************************************    PROGRESS NOTE:     Patient is a 67y old  Male who presents with a chief complaint of r. knee pain (09 Feb 2023 17:54)      SUBJECTIVE / OVERNIGHT EVENTS: Patient seen and examined at bedside. No acute overnight events. Patient reports improved right knee pain. Denies fevers, chills, N/V/D, chest pain, SOB, and abdominal pain.    MEDICATIONS  (STANDING):  acetaminophen    Suspension .. 650 milliGRAM(s) Oral every 6 hours  albuterol/ipratropium for Nebulization 3 milliLiter(s) Nebulizer every 6 hours  chlorhexidine 2% Cloths 1 Application(s) Topical daily  gabapentin Solution 300 milliGRAM(s) Oral three times a day  levothyroxine 150 MICROGram(s) Oral daily  lidocaine   4% Patch 1 Patch Transdermal daily  methadone   Solution 2.5 milliGRAM(s) Oral every 12 hours  pantoprazole   Suspension 40 milliGRAM(s) Oral every 24 hours  polyethylene glycol 3350 17 Gram(s) Oral two times a day  senna Syrup 10 milliLiter(s) Oral daily    MEDICATIONS  (PRN):  naloxone Injectable 0.3 milliGRAM(s) IV Push every 3 minutes PRN opioid induced AMS  oxyCODONE    IR 10 milliGRAM(s) Oral every 4 hours PRN Severe Pain (7 - 10)  oxyCODONE    IR 5 milliGRAM(s) Oral every 4 hours PRN Moderate Pain (4 - 6)      CAPILLARY BLOOD GLUCOSE        I&O's Summary    09 Feb 2023 07:01  -  10 Feb 2023 07:00  --------------------------------------------------------  IN: 450 mL / OUT: 400 mL / NET: 50 mL        PHYSICAL EXAM:  Vital Signs Last 24 Hrs  T(C): 37.2 (10 Feb 2023 05:15), Max: 37.2 (10 Feb 2023 05:15)  T(F): 99 (10 Feb 2023 05:15), Max: 99 (10 Feb 2023 05:15)  HR: 114 (10 Feb 2023 05:15) (100 - 116)  BP: 114/58 (10 Feb 2023 05:15) (113/51 - 117/55)  BP(mean): --  RR: 18 (10 Feb 2023 05:15) (17 - 18)  SpO2: 100% (10 Feb 2023 05:15) (96% - 100%)    Parameters below as of 10 Feb 2023 05:15  Patient On (Oxygen Delivery Method): room air      GENERAL: NAD  HEENT: hoarse voice noted  NECK: Supple, No JVD  CHEST/LUNG: scattered wheezing b/l  HEART: Tachycardic with regular rhythm; No murmurs, rubs, or gallops  ABDOMEN: Soft, Nontender, Nondistended; Bowel sounds present, + PEG tube in place cdi  EXTREMITIES:  2+ Peripheral Pulses, No clubbing, cyanosis. + R knee erythema, edema with TTP, incisions cdi, ROM limited 2/2 pain  NEUROLOGY: AAOx3, non-focal  SKIN: No rashes or lesions    LABS:                        8.0    24.33 )-----------( 578      ( 10 Feb 2023 06:30 )             26.0     02-10    130<L>  |  100  |  19  ----------------------------<  142<H>  4.3   |  19<L>  |  0.74    Ca    10.5      10 Feb 2023 06:30  Phos  1.9     02-10  Mg     1.80     02-10    TPro  6.7  /  Alb  2.5<L>  /  TBili  0.2  /  DBili  x   /  AST  24  /  ALT  30  /  AlkPhos  210<H>  02-10                RADIOLOGY & ADDITIONAL TESTS:  Results Reviewed:   Imaging Personally Reviewed:  Electrocardiogram Personally Reviewed:    COORDINATION OF CARE:  Care Discussed with Consultants/Other Providers [Y/N]:  Prior or Outpatient Records Reviewed [Y/N]:

## 2023-02-10 NOTE — PROGRESS NOTE ADULT - ATTENDING COMMENTS
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 Found to have SIRS (tachy, leukocytosis) and c/f right knee septic arthritis s/p arthrocentesis on 2/5 not yielding any organisms however +hemarthrosis.    Right knee cultures NGTD, ESR/CRP elevated, suspect inflammation in setting of hemarthrosis and malignancy  Monitor off ABx, Had persistent leukocytosis recent admission as well, likely reactive, no infection found and H/H now remaining stable  Awaiting right knee immobilizer  C/w methadone 2.5mg q12 per palliative (will send script to [facility]). Will need palliative followup for continued methadone refills  Was planned to start RT as outpatient but never started. Previous right patellar bx path with SCC, Rad Onc with no present plans for RT inpatient  Rpt TFTs with normal FT4, c/w current dose of synthroid    PT recs: rehab but family wants to take home.     DC planning: Home services to be reinstated tmw 2/11 and transport to be set up by CM    Discussed with wife.

## 2023-02-10 NOTE — DISCHARGE NOTE NURSING/CASE MANAGEMENT/SOCIAL WORK - NSDCFUADDAPPT_GEN_ALL_CORE_FT
Please follow up with your primary care provider in 1 week.    Please follow up your radiation oncologist Dr. Brittany Reza in 1 week.    Please follow up with your oncologist Dr. Gi Thomas in 1 week.    Please follow up with your orthopedic surgeon Dr. Lane Carter in 2 weeks.     [facility] [place] cdpap aide services reinstated for 2/11/23.     Please follow up with your primary care provider in 1 week.    Please follow up your radiation oncologist Dr. Brittany Reza in 1 week.    Please follow up with your oncologist Dr. Gi Thomas in 1 week.    Please follow up with your orthopedic surgeon Dr. Lane Carter in 2 weeks.

## 2023-02-10 NOTE — DISCHARGE NOTE NURSING/CASE MANAGEMENT/SOCIAL WORK - PATIENT PORTAL LINK FT
You can access the FollowMyHealth Patient Portal offered by [facility] by registering at the following website: [email or web address]. By joining [facility]’s FollowMyHealth portal, you will also be able to view your health information using other applications (apps) compatible with our system.

## 2023-02-10 NOTE — CHART NOTE - NSCHARTNOTEFT_GEN_A_CORE
Pt seen and examined this afternoon, shared that pain is well controlled and plan is for DC home tomorrow. Please send scripts for Methadone 2.5mg BID & PRN Oxycodone 5/10mg Q4hrs. Patient will need out patient follow up with Dr. Darby Matias or Dr. Blas, Jaylin @ [address] [place] [address]. Palliative to sign off.   Thanks

## 2023-02-10 NOTE — DISCHARGE NOTE NURSING/CASE MANAGEMENT/SOCIAL WORK - NSDCPEFALRISK_GEN_ALL_CORE
For information on Fall & Injury Prevention, visit: [email or web address] OR  [email or web address] OR  https://www.cdc.gov/steadi/patient.html

## 2023-02-10 NOTE — PROGRESS NOTE ADULT - PROBLEM SELECTOR PLAN 2
-SCC esophageal cancer dx 8 years ago on irinotecan   CTA decreased RUL ground glass opacities and unchanged large mass in left upper hemithorax invading left chest   wall with erosive changes of left 3rd through 5th ribs  Previous right patellar bx path with SCC  -has outpt f/u with onc Dr. Gi Thomas  -outpatient rad onc Dr. Brittany Reza (was scheduled for first RT 2/7)  -rad onc: no RT inpatient with open infected wound -SCC esophageal cancer dx 8 years ago on irinotecan   CTA decreased RUL ground glass opacities and unchanged large mass in left upper hemithorax invading left chest   wall with erosive changes of left 3rd through 5th ribs  Previous right patellar bx path with SCC  -has outpt f/u with onc Dr. Gi Thomas  -outpatient rad onc Dr. Brittany Reza   -rad onc: no RT inpatient with open infected wound

## 2023-02-10 NOTE — PROGRESS NOTE ADULT - PROBLEM SELECTOR PLAN 1
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 Carter: no infection, likely tumor growth, pt declining palliative amputation at this time

## 2023-02-11 NOTE — PROGRESS NOTE ADULT - PROBLEM SELECTOR PLAN 3
Hb 7.8 on admission (baseline 8-9)  possibly due to hemarthrosis of right knee vs AOCD iso malignancy  - trend CBC  - transfuse Hb <7  - s/p 1u prbc elev serum Ca 11.9  - EKG reviewed no ischemic changes  - 1L NS bolus, followed by 100cc/h maintenance

## 2023-02-11 NOTE — PROGRESS NOTE ADULT - PROBLEM SELECTOR PLAN 2
-SCC esophageal cancer dx 8 years ago on irinotecan   CTA decreased RUL ground glass opacities and unchanged large mass in left upper hemithorax invading left chest   wall with erosive changes of left 3rd through 5th ribs  Previous right patellar bx path with SCC  -has outpt f/u with onc Dr. Gi Thomas  -outpatient rad onc Dr. Brittany Reza   -rad onc: no RT inpatient with open infected wound

## 2023-02-11 NOTE — PROGRESS NOTE ADULT - ATTENDING COMMENTS
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 Found to have SIRS (tachy, leukocytosis) and c/f right knee septic arthritis s/p arthrocentesis on 2/5 not yielding any organisms however +hemarthrosis.    #Hemarthrosis - Right knee cultures NGTD, ESR/CRP elevated, suspect inflammation in setting of hemarthrosis and malignancy. Monitor off ABx, Had persistent leukocytosis recent admission as well, likely reactive, no infection found and H/H now remaining stable. Awaiting right knee immobilizer    #Met SCC Esophageal Ca - Was planned to start RT as outpatient but never started. Previous right patellar bx path with SCC, Rad Onc with no present plans for RT inpatient    #Cancer pain - C/w methadone 2.5mg q12 per palliative. Will need palliative followup for continued methadone refills    #Hypercalcemia - Ca = 11.8, new from previously, suspect due to malignancy, low PTH, normal Vit D. Will start on IVF. If remains elevated will likely need bisphosphonate therapy.     PT recs: rehab but family wants to take home. Dispo on hold due to hypercalcemia.     Discussed w/ HS Dr. Linder

## 2023-02-11 NOTE — PROGRESS NOTE ADULT - PROBLEM SELECTOR PLAN 4
-c/w tylenol mild pain  -oxycodone 5 q4 prn for moderate pain, oxy 10 q4 prn for severe pain  -c/w gabapentin 300 mg TID    #Reactive airway dx  -albuterol Q6 Hb 7.8 on admission (baseline 8-9)  possibly due to hemarthrosis of right knee vs AOCD iso malignancy  - trend CBC  - transfuse Hb <7  - s/p 1u prbc

## 2023-02-11 NOTE — PROGRESS NOTE ADULT - SUBJECTIVE AND OBJECTIVE BOX
***************************************************************  Acacia Linder MD (PGY-1)  Internal Medicine  Pager: [phone]  ***************************************************************    PROGRESS NOTE:     Patient is a 67y old  Male who presents with a chief complaint of r. knee pain (10 Feb 2023 07:53)      SUBJECTIVE / OVERNIGHT EVENTS: Patient seen and examined at bedside. No acute overnight events. This morning, the patient reports improved knee pian, denies fevers, chills, N/V/D, chest pain, SOB, and abdominal pain.    MEDICATIONS  (STANDING):  acetaminophen    Suspension .. 650 milliGRAM(s) Oral every 6 hours  albuterol/ipratropium for Nebulization 3 milliLiter(s) Nebulizer every 6 hours  chlorhexidine 2% Cloths 1 Application(s) Topical daily  gabapentin Solution 300 milliGRAM(s) Oral three times a day  levothyroxine 150 MICROGram(s) Oral daily  lidocaine   4% Patch 1 Patch Transdermal daily  methadone   Solution 2.5 milliGRAM(s) Oral every 12 hours  pantoprazole   Suspension 40 milliGRAM(s) Oral every 24 hours  polyethylene glycol 3350 17 Gram(s) Oral two times a day  senna Syrup 10 milliLiter(s) Oral daily  sodium phosphate 30 milliMole(s)/500 mL IVPB 30 milliMole(s) IV Intermittent once    MEDICATIONS  (PRN):  naloxone Injectable 0.3 milliGRAM(s) IV Push every 3 minutes PRN opioid induced AMS  oxyCODONE    IR 10 milliGRAM(s) Oral every 4 hours PRN Severe Pain (7 - 10)  oxyCODONE    IR 5 milliGRAM(s) Oral every 4 hours PRN Moderate Pain (4 - 6)      CAPILLARY BLOOD GLUCOSE        I&O's Summary      PHYSICAL EXAM:  Vital Signs Last 24 Hrs  T(C): 36.7 (11 Feb 2023 05:34), Max: 36.8 (10 Feb 2023 21:38)  T(F): 98.1 (11 Feb 2023 05:34), Max: 98.2 (10 Feb 2023 21:38)  HR: 117 (11 Feb 2023 05:34) (103 - 117)  BP: 115/63 (11 Feb 2023 05:34) (115/63 - 133/65)  BP(mean): --  RR: 18 (11 Feb 2023 05:34) (18 - 18)  SpO2: 100% (11 Feb 2023 05:34) (98% - 100%)    Parameters below as of 11 Feb 2023 05:34  Patient On (Oxygen Delivery Method): room air        GENERAL: NAD  HEENT: hoarse voice noted  NECK: Supple, No JVD  CHEST/LUNG: scattered wheezing b/l  HEART: Tachycardic with regular rhythm; No murmurs, rubs, or gallops  ABDOMEN: Soft, Nontender, Nondistended; Bowel sounds present, + PEG tube in place cdi  EXTREMITIES:  2+ Peripheral Pulses, No clubbing, cyanosis. + R knee erythema, edema with TTP, incisions cdi, ROM limited 2/2 pain  NEUROLOGY: AAOx3, non-focal  SKIN: No rashes or lesions    LABS:                        8.0    24.33 )-----------( 578      ( 10 Feb 2023 06:30 )             26.0     02-10    130<L>  |  100  |  19  ----------------------------<  142<H>  4.3   |  19<L>  |  0.74    Ca    10.5      10 Feb 2023 06:30  Phos  1.9     02-10  Mg     1.80     02-10    TPro  6.7  /  Alb  2.5<L>  /  TBili  0.2  /  DBili  x   /  AST  24  /  ALT  30  /  AlkPhos  210<H>  02-10                RADIOLOGY & ADDITIONAL TESTS:  Results Reviewed:   Imaging Personally Reviewed:  Electrocardiogram Personally Reviewed:    COORDINATION OF CARE:  Care Discussed with Consultants/Other Providers [Y/N]:  Prior or Outpatient Records Reviewed [Y/N]:

## 2023-02-12 NOTE — PROGRESS NOTE ADULT - SUBJECTIVE AND OBJECTIVE BOX
Patient is a 67y old  Male who presents with a chief complaint of r. knee pain (11 Feb 2023 07:39)      SUBJECTIVE / OVERNIGHT EVENTS:          MEDICATIONS  (STANDING):  acetaminophen    Suspension .. 650 milliGRAM(s) Oral every 6 hours  chlorhexidine 2% Cloths 1 Application(s) Topical daily  gabapentin Solution 300 milliGRAM(s) Oral three times a day  levothyroxine 150 MICROGram(s) Oral daily  lidocaine   4% Patch 1 Patch Transdermal daily  methadone   Solution 2.5 milliGRAM(s) Oral every 12 hours  pantoprazole   Suspension 40 milliGRAM(s) Oral every 24 hours  polyethylene glycol 3350 17 Gram(s) Oral two times a day  senna Syrup 10 milliLiter(s) Oral daily  sodium chloride 0.9%. 1000 milliLiter(s) (100 mL/Hr) IV Continuous <Continuous>    MEDICATIONS  (PRN):  naloxone Injectable 0.3 milliGRAM(s) IV Push every 3 minutes PRN opioid induced AMS  oxyCODONE    IR 10 milliGRAM(s) Oral every 4 hours PRN Severe Pain (7 - 10)  oxyCODONE    IR 5 milliGRAM(s) Oral every 4 hours PRN Moderate Pain (4 - 6)      Vital Signs Last 24 Hrs  T(C): 36.9 (12 Feb 2023 05:30), Max: 37.1 (11 Feb 2023 21:11)  T(F): 98.4 (12 Feb 2023 05:30), Max: 98.7 (11 Feb 2023 21:11)  HR: 117 (12 Feb 2023 05:30) (107 - 119)  BP: 131/60 (12 Feb 2023 05:30) (113/60 - 131/60)  BP(mean): --  RR: 17 (12 Feb 2023 05:30) (17 - 17)  SpO2: 100% (12 Feb 2023 05:30) (99% - 100%)    Parameters below as of 12 Feb 2023 05:30  Patient On (Oxygen Delivery Method): room air          PHYSICAL EXAM  GENERAL: NAD, well-developed  HEAD:  Atraumatic, Normocephalic  EYES: EOMI, PERRLA, conjunctiva and sclera clear  NECK: Supple, No JVD  CHEST/LUNG: Clear to auscultation bilaterally; No wheeze  HEART: Regular rate and rhythm; No murmurs, rubs, or gallops  ABDOMEN: Soft, Nontender, Nondistended; Bowel sounds present  EXTREMITIES:  2+ Peripheral Pulses, No clubbing, cyanosis, or edema  PSYCH: AAOx3  SKIN: No rashes or lesions    CAPILLARY BLOOD GLUCOSE        I&O's Summary    11 Feb 2023 07:01  -  12 Feb 2023 07:00  --------------------------------------------------------  IN: 570 mL / OUT: 0 mL / NET: 570 mL        LABS:                        9.0    24.31 )-----------( 610      ( 11 Feb 2023 07:20 )             29.6     02-11    133<L>  |  102  |  27<H>  ----------------------------<  137<H>  4.3   |  19<L>  |  0.79    Ca    11.8<H>      11 Feb 2023 07:20  Phos  1.8     02-11  Mg     2.00     02-11    TPro  7.6  /  Alb  3.0<L>  /  TBili  0.2  /  DBili  x   /  AST  18  /  ALT  29  /  AlkPhos  226<H>  02-11              RADIOLOGY & ADDITIONAL TESTS:     MICROBIOLOGY:    ANTIMICROBIALS:    CONSULTS: Patient is a 67y old  Male who presents with a chief complaint of r. knee pain (11 Feb 2023 07:39)      SUBJECTIVE / OVERNIGHT EVENTS:    Seen and examined at the bedside this am. Endorsing mild abdominal discomfort. Knee pain improved with pain meds but still having pain.       MEDICATIONS  (STANDING):  acetaminophen    Suspension .. 650 milliGRAM(s) Oral every 6 hours  chlorhexidine 2% Cloths 1 Application(s) Topical daily  gabapentin Solution 300 milliGRAM(s) Oral three times a day  levothyroxine 150 MICROGram(s) Oral daily  lidocaine   4% Patch 1 Patch Transdermal daily  methadone   Solution 2.5 milliGRAM(s) Oral every 12 hours  pantoprazole   Suspension 40 milliGRAM(s) Oral every 24 hours  polyethylene glycol 3350 17 Gram(s) Oral two times a day  senna Syrup 10 milliLiter(s) Oral daily  sodium chloride 0.9%. 1000 milliLiter(s) (100 mL/Hr) IV Continuous <Continuous>    MEDICATIONS  (PRN):  naloxone Injectable 0.3 milliGRAM(s) IV Push every 3 minutes PRN opioid induced AMS  oxyCODONE    IR 10 milliGRAM(s) Oral every 4 hours PRN Severe Pain (7 - 10)  oxyCODONE    IR 5 milliGRAM(s) Oral every 4 hours PRN Moderate Pain (4 - 6)      Vital Signs Last 24 Hrs  T(C): 36.9 (12 Feb 2023 05:30), Max: 37.1 (11 Feb 2023 21:11)  T(F): 98.4 (12 Feb 2023 05:30), Max: 98.7 (11 Feb 2023 21:11)  HR: 117 (12 Feb 2023 05:30) (107 - 119)  BP: 131/60 (12 Feb 2023 05:30) (113/60 - 131/60)  BP(mean): --  RR: 17 (12 Feb 2023 05:30) (17 - 17)  SpO2: 100% (12 Feb 2023 05:30) (99% - 100%)    Parameters below as of 12 Feb 2023 05:30  Patient On (Oxygen Delivery Method): room air          PHYSICAL EXAM  GENERAL: NAD  HEENT: hoarse voice noted  NECK: Supple, No JVD  CHEST/LUNG: scattered wheezing b/l  HEART: Tachycardic with regular rhythm; No murmurs, rubs, or gallops  ABDOMEN: Soft, Nontender, Nondistended; Bowel sounds present, + PEG tube in place cdi  EXTREMITIES:  2+ Peripheral Pulses, No clubbing, cyanosis. + R knee erythema, edema with TTP, incisions cdi, ROM limited 2/2 pain  NEUROLOGY: AAOx3, non-focal  SKIN: No rashes or lesions    CAPILLARY BLOOD GLUCOSE        I&O's Summary    11 Feb 2023 07:01  -  12 Feb 2023 07:00  --------------------------------------------------------  IN: 570 mL / OUT: 0 mL / NET: 570 mL        LABS:                        9.0    24.31 )-----------( 610      ( 11 Feb 2023 07:20 )             29.6     02-11    133<L>  |  102  |  27<H>  ----------------------------<  137<H>  4.3   |  19<L>  |  0.79    Ca    11.8<H>      11 Feb 2023 07:20  Phos  1.8     02-11  Mg     2.00     02-11    TPro  7.6  /  Alb  3.0<L>  /  TBili  0.2  /  DBili  x   /  AST  18  /  ALT  29  /  AlkPhos  226<H>  02-11              RADIOLOGY & ADDITIONAL TESTS:     MICROBIOLOGY:    ANTIMICROBIALS:    CONSULTS:

## 2023-02-12 NOTE — PROGRESS NOTE ADULT - PROBLEM SELECTOR PLAN 3
elev serum Ca 11.9  - EKG reviewed no ischemic changes  - 1L NS bolus, followed by 100cc/h maintenance elev serum Ca 11.9  - EKG reviewed no ischemic changes  - 1L NS bolus, followed by 100cc/h maintenance  -S/p calcitonin on 2/13-2/14 and IVFs  -monitor icals

## 2023-02-12 NOTE — PROGRESS NOTE ADULT - ATTENDING COMMENTS
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 Found to have SIRS (tachy, leukocytosis) and c/f right knee septic arthritis s/p arthrocentesis on 2/5 not yielding any organisms however +hemarthrosis.    #Hemarthrosis - Right knee cultures NGTD, ESR/CRP elevated, suspect inflammation in setting of hemarthrosis and malignancy. Monitor off ABx, Had persistent leukocytosis recent admission as well, likely reactive, no infection found and H/H now remaining stable. Awaiting right knee immobilizer    #Met SCC Esophageal Ca - Was planned to start RT as outpatient but never started. Previous right patellar bx path with SCC, Rad Onc with no present plans for RT inpatient    #Cancer pain - C/w methadone 2.5mg q12 per palliative. Will need palliative followup for continued methadone refills    #Hypercalcemia - Ca = 11.8, new from previously, suspect due to malignancy, low PTH, normal Vit D. Downtrended on IV but still remains elevated. Will start on calcitonin today. If remains elevated, likely needs bisphosphonate therapy, will need dental clearance prior to starting as patient with poor dentition.     PT recs: rehab but family wants to take home. Dispo on hold due to hypercalcemia.     Discussed w/ HS Dr. Manrique

## 2023-02-12 NOTE — PROGRESS NOTE ADULT - PROBLEM SELECTOR PLAN 8
dvt ppx: heparin subQ  diet: TF Jevity  dispo: PT likely rehab dvt ppx: lovenox   diet: Pro justo  dispo: home

## 2023-02-13 NOTE — PROGRESS NOTE ADULT - SUBJECTIVE AND OBJECTIVE BOX
Sultan Chapito MD  PGY 1 Department of Internal Medicine        Patient is a 67y old  Male who presents with a chief complaint of r. knee pain (12 Feb 2023 07:32)      SUBJECTIVE / OVERNIGHT EVENTS: Pt seen and examined. No acute overnight events. Denies fevers, chills, CP, SOB, Abdominal pain, N/V, Constipation, Diarrhea        MEDICATIONS  (STANDING):  acetaminophen    Suspension .. 650 milliGRAM(s) Oral every 6 hours  calcitonin Injectable 250 International Unit(s) IntraMuscular every 12 hours  chlorhexidine 2% Cloths 1 Application(s) Topical daily  enoxaparin Injectable 40 milliGRAM(s) SubCutaneous every 24 hours  gabapentin Solution 300 milliGRAM(s) Oral three times a day  levothyroxine 150 MICROGram(s) Oral daily  lidocaine   4% Patch 1 Patch Transdermal daily  methadone   Solution 2.5 milliGRAM(s) Oral every 12 hours  pantoprazole   Suspension 40 milliGRAM(s) Oral every 24 hours  polyethylene glycol 3350 17 Gram(s) Oral two times a day  senna Syrup 10 milliLiter(s) Oral daily  sodium chloride 0.9%. 1000 milliLiter(s) (100 mL/Hr) IV Continuous <Continuous>    MEDICATIONS  (PRN):  naloxone Injectable 0.3 milliGRAM(s) IV Push every 3 minutes PRN opioid induced AMS  oxyCODONE    IR 10 milliGRAM(s) Oral every 4 hours PRN Severe Pain (7 - 10)  oxyCODONE    IR 5 milliGRAM(s) Oral every 4 hours PRN Moderate Pain (4 - 6)      I&O's Summary    11 Feb 2023 07:01  -  12 Feb 2023 07:00  --------------------------------------------------------  IN: 570 mL / OUT: 0 mL / NET: 570 mL        Vital Signs Last 24 Hrs  T(C): 36.4 (12 Feb 2023 20:43), Max: 36.9 (12 Feb 2023 05:30)  T(F): 97.6 (12 Feb 2023 20:43), Max: 98.4 (12 Feb 2023 05:30)  HR: 112 (12 Feb 2023 20:43) (112 - 117)  BP: 119/63 (12 Feb 2023 20:43) (114/61 - 131/60)  BP(mean): --  RR: 18 (12 Feb 2023 20:43) (17 - 18)  SpO2: 98% (12 Feb 2023 20:43) (96% - 100%)    Parameters below as of 12 Feb 2023 20:43  Patient On (Oxygen Delivery Method): room air        CAPILLARY BLOOD GLUCOSE          PHYSICAL EXAM:  GENERAL: NAD  HEENT: hoarse voice noted  NECK: Supple, No JVD  CHEST/LUNG: scattered wheezing b/l  HEART: Tachycardic with regular rhythm; No murmurs, rubs, or gallops  ABDOMEN: Soft, Nontender, Nondistended; Bowel sounds present, + PEG tube in place cdi  EXTREMITIES:  2+ Peripheral Pulses, No clubbing, cyanosis. + R knee erythema, edema with TTP, incisions cdi, ROM limited 2/2 pain  NEUROLOGY: AAOx3, non-focal  SKIN: No rashes or lesions       LABS:                        8.6    24.85 )-----------( 669      ( 12 Feb 2023 07:02 )             27.9     Auto Eosinophil # 0.29  / Auto Eosinophil % 1.2   / Auto Neutrophil # 21.73 / Auto Neutrophil % 87.3  / BANDS % x                            9.0    24.31 )-----------( 610      ( 11 Feb 2023 07:20 )             29.6     Auto Eosinophil # 0.17  / Auto Eosinophil % 0.7   / Auto Neutrophil # 21.20 / Auto Neutrophil % 87.2  / BANDS % x        02-12    133<L>  |  103  |  24<H>  ----------------------------<  118<H>  4.1   |  20<L>  |  0.72  02-11    133<L>  |  102  |  27<H>  ----------------------------<  137<H>  4.3   |  19<L>  |  0.79    Ca    11.3<H>      12 Feb 2023 07:02  Mg     2.00     02-12  Phos  2.4     02-12  TPro  7.2  /  Alb  2.9<L>  /  TBili  0.2  /  DBili  x   /  AST  27  /  ALT  35  /  AlkPhos  235<H>  02-12  TPro  7.6  /  Alb  3.0<L>  /  TBili  0.2  /  DBili  x   /  AST  18  /  ALT  29  /  AlkPhos  226<H>  02-11                  RADIOLOGY & ADDITIONAL TESTS:    Imaging Personally Reviewed:    Consultant(s) Notes Reviewed:      Care Discussed with Consultants/Other Providers:   Sultan Chapito MD  PGY 1 Department of Internal Medicine        Patient is a 67y old  Male who presents with a chief complaint of r. knee pain (12 Feb 2023 07:32)      SUBJECTIVE / OVERNIGHT EVENTS: Pt seen and examined. No acute overnight events. Denies fevers, chills, CP, SOB, Abdominal pain, N/V, Constipation, Diarrhea. Family also at bedside, noting that patient had new onset vomiting overnight. Patient did not feel nauseous but spontaneously vomited. Denying abdominal pain but as per family, nurse stated he had some abdominal pain overnight. Otherwise right knee pain is improved. After vomiting, patient has had increase in cough and secretions.         MEDICATIONS  (STANDING):  acetaminophen    Suspension .. 650 milliGRAM(s) Oral every 6 hours  calcitonin Injectable 250 International Unit(s) IntraMuscular every 12 hours  chlorhexidine 2% Cloths 1 Application(s) Topical daily  enoxaparin Injectable 40 milliGRAM(s) SubCutaneous every 24 hours  gabapentin Solution 300 milliGRAM(s) Oral three times a day  levothyroxine 150 MICROGram(s) Oral daily  lidocaine   4% Patch 1 Patch Transdermal daily  methadone   Solution 2.5 milliGRAM(s) Oral every 12 hours  pantoprazole   Suspension 40 milliGRAM(s) Oral every 24 hours  polyethylene glycol 3350 17 Gram(s) Oral two times a day  senna Syrup 10 milliLiter(s) Oral daily  sodium chloride 0.9%. 1000 milliLiter(s) (100 mL/Hr) IV Continuous <Continuous>    MEDICATIONS  (PRN):  naloxone Injectable 0.3 milliGRAM(s) IV Push every 3 minutes PRN opioid induced AMS  oxyCODONE    IR 10 milliGRAM(s) Oral every 4 hours PRN Severe Pain (7 - 10)  oxyCODONE    IR 5 milliGRAM(s) Oral every 4 hours PRN Moderate Pain (4 - 6)      I&O's Summary    11 Feb 2023 07:01  -  12 Feb 2023 07:00  --------------------------------------------------------  IN: 570 mL / OUT: 0 mL / NET: 570 mL        Vital Signs Last 24 Hrs  T(C): 36.4 (12 Feb 2023 20:43), Max: 36.9 (12 Feb 2023 05:30)  T(F): 97.6 (12 Feb 2023 20:43), Max: 98.4 (12 Feb 2023 05:30)  HR: 112 (12 Feb 2023 20:43) (112 - 117)  BP: 119/63 (12 Feb 2023 20:43) (114/61 - 131/60)  BP(mean): --  RR: 18 (12 Feb 2023 20:43) (17 - 18)  SpO2: 98% (12 Feb 2023 20:43) (96% - 100%)    Parameters below as of 12 Feb 2023 20:43  Patient On (Oxygen Delivery Method): room air        CAPILLARY BLOOD GLUCOSE          PHYSICAL EXAM:  GENERAL: NAD  HEENT: hoarse voice noted  NECK: Supple, No JVD  CHEST/LUNG: scattered wheezing and rhonchi b/l  HEART: Tachycardic with regular rhythm; No murmurs, rubs, or gallops  ABDOMEN: Soft, Nontender, Nondistended; Bowel sounds present, + PEG tube in place cdi  EXTREMITIES:  2+ Peripheral Pulses, No clubbing, cyanosis. + R knee erythema, edema with TTP, incisions cdi, ROM limited 2/2 pain  NEUROLOGY: AAOx3, non-focal  SKIN: No rashes or lesions       LABS:                        8.6    24.85 )-----------( 669      ( 12 Feb 2023 07:02 )             27.9     Auto Eosinophil # 0.29  / Auto Eosinophil % 1.2   / Auto Neutrophil # 21.73 / Auto Neutrophil % 87.3  / BANDS % x                            9.0    24.31 )-----------( 610      ( 11 Feb 2023 07:20 )             29.6     Auto Eosinophil # 0.17  / Auto Eosinophil % 0.7   / Auto Neutrophil # 21.20 / Auto Neutrophil % 87.2  / BANDS % x        02-12    133<L>  |  103  |  24<H>  ----------------------------<  118<H>  4.1   |  20<L>  |  0.72  02-11    133<L>  |  102  |  27<H>  ----------------------------<  137<H>  4.3   |  19<L>  |  0.79    Ca    11.3<H>      12 Feb 2023 07:02  Mg     2.00     02-12  Phos  2.4     02-12  TPro  7.2  /  Alb  2.9<L>  /  TBili  0.2  /  DBili  x   /  AST  27  /  ALT  35  /  AlkPhos  235<H>  02-12  TPro  7.6  /  Alb  3.0<L>  /  TBili  0.2  /  DBili  x   /  AST  18  /  ALT  29  /  AlkPhos  226<H>  02-11                  RADIOLOGY & ADDITIONAL TESTS:    Imaging Personally Reviewed:    Consultant(s) Notes Reviewed:      Care Discussed with Consultants/Other Providers:   Sultan Chapito MD  PGY 1 Department of Internal Medicine        Patient is a 67y old  Male who presents with a chief complaint of r. knee pain (12 Feb 2023 07:32)      SUBJECTIVE / OVERNIGHT EVENTS: Pt seen and examined. No acute overnight events. Denies fevers, chills, CP, SOB, Abdominal pain, N/V, Constipation, Diarrhea. Family also at bedside, noting that patient had new onset vomiting overnight. Patient did not feel nauseous but spontaneously vomited. Denying abdominal pain but as per family, nurse stated he had some abdominal pain overnight. Otherwise right knee pain is improved. After vomiting, patient has had increase in cough and secretions.     Saw patient later due to tachycardia to 160s. Ekg showing sinus tachy. Rectal temp 102.9. Infectious workup and IV tylenol ordered.        MEDICATIONS  (STANDING):  acetaminophen    Suspension .. 650 milliGRAM(s) Oral every 6 hours  calcitonin Injectable 250 International Unit(s) IntraMuscular every 12 hours  chlorhexidine 2% Cloths 1 Application(s) Topical daily  enoxaparin Injectable 40 milliGRAM(s) SubCutaneous every 24 hours  gabapentin Solution 300 milliGRAM(s) Oral three times a day  levothyroxine 150 MICROGram(s) Oral daily  lidocaine   4% Patch 1 Patch Transdermal daily  methadone   Solution 2.5 milliGRAM(s) Oral every 12 hours  pantoprazole   Suspension 40 milliGRAM(s) Oral every 24 hours  polyethylene glycol 3350 17 Gram(s) Oral two times a day  senna Syrup 10 milliLiter(s) Oral daily  sodium chloride 0.9%. 1000 milliLiter(s) (100 mL/Hr) IV Continuous <Continuous>    MEDICATIONS  (PRN):  naloxone Injectable 0.3 milliGRAM(s) IV Push every 3 minutes PRN opioid induced AMS  oxyCODONE    IR 10 milliGRAM(s) Oral every 4 hours PRN Severe Pain (7 - 10)  oxyCODONE    IR 5 milliGRAM(s) Oral every 4 hours PRN Moderate Pain (4 - 6)      I&O's Summary    11 Feb 2023 07:01  -  12 Feb 2023 07:00  --------------------------------------------------------  IN: 570 mL / OUT: 0 mL / NET: 570 mL        Vital Signs Last 24 Hrs  T(C): 36.4 (12 Feb 2023 20:43), Max: 36.9 (12 Feb 2023 05:30)  T(F): 97.6 (12 Feb 2023 20:43), Max: 98.4 (12 Feb 2023 05:30)  HR: 112 (12 Feb 2023 20:43) (112 - 117)  BP: 119/63 (12 Feb 2023 20:43) (114/61 - 131/60)  BP(mean): --  RR: 18 (12 Feb 2023 20:43) (17 - 18)  SpO2: 98% (12 Feb 2023 20:43) (96% - 100%)    Parameters below as of 12 Feb 2023 20:43  Patient On (Oxygen Delivery Method): room air        CAPILLARY BLOOD GLUCOSE          PHYSICAL EXAM:  GENERAL: NAD  HEENT: hoarse voice noted  NECK: Supple, No JVD  CHEST/LUNG: scattered wheezing and rhonchi b/l  HEART: Tachycardic with regular rhythm; No murmurs, rubs, or gallops  ABDOMEN: Soft, Nontender, Nondistended; Bowel sounds present, + PEG tube in place cdi  EXTREMITIES:  2+ Peripheral Pulses, No clubbing, cyanosis. + R knee erythema, edema with TTP, incisions cdi, ROM limited 2/2 pain  NEUROLOGY: AAOx3, non-focal  SKIN: No rashes or lesions       LABS:                        8.6    24.85 )-----------( 669      ( 12 Feb 2023 07:02 )             27.9     Auto Eosinophil # 0.29  / Auto Eosinophil % 1.2   / Auto Neutrophil # 21.73 / Auto Neutrophil % 87.3  / BANDS % x                            9.0    24.31 )-----------( 610      ( 11 Feb 2023 07:20 )             29.6     Auto Eosinophil # 0.17  / Auto Eosinophil % 0.7   / Auto Neutrophil # 21.20 / Auto Neutrophil % 87.2  / BANDS % x        02-12    133<L>  |  103  |  24<H>  ----------------------------<  118<H>  4.1   |  20<L>  |  0.72  02-11    133<L>  |  102  |  27<H>  ----------------------------<  137<H>  4.3   |  19<L>  |  0.79    Ca    11.3<H>      12 Feb 2023 07:02  Mg     2.00     02-12  Phos  2.4     02-12  TPro  7.2  /  Alb  2.9<L>  /  TBili  0.2  /  DBili  x   /  AST  27  /  ALT  35  /  AlkPhos  235<H>  02-12  TPro  7.6  /  Alb  3.0<L>  /  TBili  0.2  /  DBili  x   /  AST  18  /  ALT  29  /  AlkPhos  226<H>  02-11                  RADIOLOGY & ADDITIONAL TESTS:    Imaging Personally Reviewed:    Consultant(s) Notes Reviewed:      Care Discussed with Consultants/Other Providers:

## 2023-02-13 NOTE — ADVANCED PRACTICE NURSE CONSULT - ASSESSMENT
Left arm cleansed with CHG. Under ultrasound guidance, placed Arrow Endurance Extended Dwell Peripheral Catheter System 20G / 6cm into Left Cephalic Vein. Brisk  blood return, flushed with 20mls normal saline. Minimal blood loss. Patient tolerated procedure well. CHG dressing placed. All sharps accounted for.

## 2023-02-13 NOTE — PROGRESS NOTE ADULT - PROBLEM SELECTOR PLAN 9
dvt ppx: lovenox   diet: Pro justo  dispo: home dvt ppx: lovenox   diet: TFs  dispo: PT rec rehab but family wants home

## 2023-02-13 NOTE — PROGRESS NOTE ADULT - PROBLEM SELECTOR PLAN 1
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 Carter: no infection, likely tumor growth, pt declining palliative amputation at this time baseline tachy in 110s likely in setting of medical disease cancer but newly tachy to 160s 2/13  baseline leukocytosis in 20k range  newly febrile 2/13  MRSA neg at admission  unclear source; initial concern for septic arthritis but tap neg for infection  -BCx  -UA/Ucx  -CXR to look for pneumonia given concern for aspiration  -RVP  -empiric Zosyn

## 2023-02-13 NOTE — PROGRESS NOTE ADULT - PROBLEM SELECTOR PLAN 3
elev serum Ca 11.9  - EKG reviewed no ischemic changes  - 1L NS bolus, followed by 100cc/h maintenance  -S/p calcitonin on 2/13-2/14 and IVFs  -monitor icals elev serum Ca 11.9  PTH below normal, Phos low -> most likely hyperca of malignancy  ionized justo elevated, not responding much to IVFs.   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 Carter: no infection, likely tumor growth, pt declining palliative amputation at this time

## 2023-02-13 NOTE — PROGRESS NOTE ADULT - PROBLEM SELECTOR PLAN 4
Hb 7.8 on admission (baseline 8-9)  possibly due to hemarthrosis of right knee vs AOCD iso malignancy  - trend CBC  - transfuse Hb <7  - s/p 1u prbc WBC elevated above 20K, appears to be at baseline  no fevers, chills  chronic cough as per family but recently worsened after vomiting episode  -consider CXR if worsening resp symptoms  -monitor off Abx -SCC esophageal cancer dx 8 years ago on irinotecan   CTA decreased RUL ground glass opacities and unchanged large mass in left upper hemithorax invading left chest   wall with erosive changes of left 3rd through 5th ribs  Previous right patellar bx path with SCC  -has outpt f/u with onc Dr. Gi Thomas  -outpatient rad onc Dr. Brittany Reza   -rad onc: no RT inpatient with open infected wound

## 2023-02-13 NOTE — PROGRESS NOTE ADULT - ATTENDING COMMENTS
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 Found to have SIRS (tachy, leukocytosis) and c/f right knee septic arthritis s/p arthrocentesis on 2/5 not yielding any organisms however +hemarthrosis.    #SIRS criteria: fever, tachycardic, leukocytosis: considering infection vs malignant related fever vs clot (though LE negative for DVT on 2/5): will check infectious w/u, given nursing concerns of aspiration, will start empiric abx. Tylenol    #Hemarthrosis - Right knee cultures NGTD, ESR/CRP elevated, suspect inflammation in setting of hemarthrosis and malignancy. Had persistent leukocytosis recent admission as well, likely reactive, no infection found and H/H now remaining stable. Right knee immobilizer while out of bed    #Met SCC Esophageal Ca - Was planned to start RT as outpatient but never started. Previous right patellar bx path with SCC, Rad Onc with no present plans for RT inpatient    #Cancer pain - C/w methadone 2.5mg q12 per palliative. Will need palliative followup for continued methadone refills    #Hypercalcemia - Ca = 11.8, new from previously, suspect due to malignancy, low PTH, normal Vit D. Downtrended on IV but still remains elevated. Uptitrating calcitonin given inadequate response. Consider bisphosphonate therapy if remained elevated despite higher dose of calcitonin. Will need dental clearance prior to starting as patient with poor dentition.     PT recs: rehab but family wants to take home. Dispo on hold due to hypercalcemia.     Discussed w/ HS Dr. Manrique. 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 Found to have SIRS (tachy, leukocytosis) and c/f right knee septic arthritis s/p arthrocentesis on 2/5 not yielding any organisms however +hemarthrosis.    #SIRS criteria: fever, tachycardic, leukocytosis: considering infection vs malignant related fever vs clot (though LE negative for DVT on 2/5): will check infectious w/u, given nursing concerns of aspiration, will start empiric abx. Tylenol    #Hemarthrosis - Right knee cultures NGTD, ESR/CRP elevated, suspect inflammation in setting of hemarthrosis and malignancy. Had persistent leukocytosis recent admission as well, likely reactive, no infection found and H/H now remaining stable. Right knee immobilizer while out of bed    #Met SCC Esophageal Ca - Was planned to start RT as outpatient but never started. Previous right patellar bx path with SCC, Rad Onc with no present plans for RT inpatient    #Cancer pain - C/w methadone 2.5mg q12 per palliative. Will need palliative followup for continued methadone refills    #Hypercalcemia - Ca = 11.8, new from previously, suspect due to malignancy, low PTH, normal Vit D. Downtrended on IV but still remains elevated. Uptitrating calcitonin given inadequate response. Consider bisphosphonate therapy if remained elevated despite higher dose of calcitonin. Will need dental clearance prior to starting as patient with poor dentition.     PT recs: rehab but family wants to take home. Dispo on hold due to hypercalcemia and new fever.    Discussed w/ HS Dr. Uriarte.

## 2023-02-13 NOTE — PROGRESS NOTE ADULT - PROBLEM SELECTOR PLAN 6
-c/w synthroid 150 mcg Hb 7.8 on admission (baseline 8-9)  possibly due to hemarthrosis of right knee vs AOCD iso malignancy  - trend CBC  - transfuse Hb <7  - s/p 1u prbc

## 2023-02-13 NOTE — PROGRESS NOTE ADULT - PROBLEM SELECTOR PLAN 5
-c/w tylenol mild pain  -oxycodone 5 q4 prn for moderate pain, oxy 10 q4 prn for severe pain  -c/w gabapentin 300 mg TID    #Reactive airway dx  -albuterol Q6 -c/w tylenol mild pain  -oxycodone 5 q4 prn for moderate pain, oxy 10 q4 prn for severe pain  -c/w gabapentin 300 mg TID    #Reactive airway dx  Wheezing and rhonchi on exam with wet cough; concern for aspiration  -albuterol Q6 prn  -might benefit from IS and chest PT -c/w tylenol mild pain  -oxycodone 5 q4 prn for moderate pain, oxy 10 q4 prn for severe pain  -c/w gabapentin 300 mg TID    #Reactive airway dx  Wheezing and rhonchi on exam with wet cough; concern for aspiration  -due to tachycardia will try Xopenex instead of duoneb  -IS and chest PT

## 2023-02-13 NOTE — PROGRESS NOTE ADULT - PROBLEM SELECTOR PLAN 2
[Time Spent: ___ minutes] : I have spent [unfilled] minutes of time on the encounter. -SCC esophageal cancer dx 8 years ago on irinotecan   CTA decreased RUL ground glass opacities and unchanged large mass in left upper hemithorax invading left chest   wall with erosive changes of left 3rd through 5th ribs  Previous right patellar bx path with SCC  -has outpt f/u with onc Dr. Gi Thomas  -outpatient rad onc Dr. Brittany Reza   -rad onc: no RT inpatient with open infected wound elev serum Ca 11.9  PTH below normal, Phos low -> most likely hyperca of malignancy  ionized justo elevated, not responding much to IVFs.   mild to moderate hypercalcemia, unclear if patient's new onset abdominal pain and vomiting related  - EKG reviewed no ischemic changes  - 1L NS bolus, followed by 100cc/h maintenance  - S/p calcitonin on 2/13-2/14 and IVFs (might need to DC IVF if CXR overloaded)  - monitor icals  - might benefit from bisphosphonate if not responsive to calcitonin but would need dental eval elev serum Ca 11.9  PTH below normal, Phos low -> most likely hyperca of malignancy  ionized justo elevated, not responding much to IVFs.   mild to moderate hypercalcemia, unclear if patient's new onset abdominal pain and vomiting related  - EKG reviewed no ischemic changes  - 1L NS bolus, followed by 100cc/h maintenance -> will dc fluids as has been getting continuous fluids and lungs sound congested -> f/u official CXR read  - S/p calcitonin on 2/13-2/14 and IVFs; will increase calcitonin dose next two doses  - monitor icals  - might benefit from bisphosphonate if not responsive to calcitonin but would need dental eval

## 2023-02-14 NOTE — PROGRESS NOTE ADULT - PROBLEM SELECTOR PLAN 2
elev serum Ca 11.9  PTH below normal, Phos low -> most likely hyperca of malignancy  ionized justo elevated, not responding much to IVFs.   mild to moderate hypercalcemia, unclear if patient's new onset abdominal pain and vomiting related  - EKG reviewed no ischemic changes  - 1L NS bolus, followed by 100cc/h maintenance -> will dc fluids as has been getting continuous fluids and lungs sound congested -> f/u official CXR read  - S/p calcitonin on 2/13-2/14 and IVFs; will increase calcitonin dose next two doses  - monitor icals  - might benefit from bisphosphonate if not responsive to calcitonin but would need dental eval -> would need imaging (CT maxillofacial) prior to extraction, which would be necessary prior to clearance elev serum Ca 11.9  PTH below normal, Phos low -> most likely hyperca of malignancy  ionized justo elevated, not responding much to IVFs.   mild to moderate hypercalcemia, unclear if patient's new onset abdominal pain and vomiting related  - EKG reviewed no ischemic changes  - 1L NS bolus, followed by 100cc/h maintenance -> will dc fluids as has been getting continuous fluids and lungs sound congested -> f/u official CXR read  - S/p calcitonin on 2/13-2/14 and IVFs -> not much improvement in ionized calcium  - monitor icals  - might benefit from bisphosphonate if not responsive to calcitonin but would need dental eval -> would need imaging (CT maxillofacial) prior to extraction, which would be necessary prior to clearance elev serum Ca 11.9  PTH below normal, Phos low -> most likely hyperca of malignancy  ionized justo elevated, not responding much to IVFs.   mild to moderate hypercalcemia, unclear if patient's new onset abdominal pain and vomiting related  - EKG reviewed no ischemic changes  - 1L NS bolus, followed by 100cc/h maintenance -> will dc fluids as has been getting continuous fluids and lungs sound congested -> f/u official CXR read  - S/p calcitonin on 2/13-2/14 and IVFs -> not much improvement in ionized calcium  - monitor icals  - might benefit from bisphosphonate if not responsive to calcitonin but would need dental eval -> would need imaging (CT maxillofacial) prior to extraction, which would be necessary prior to clearance -> dental extraction scheduled for 2/16

## 2023-02-14 NOTE — PROGRESS NOTE ADULT - PROBLEM SELECTOR PLAN 5
-c/w tylenol mild pain  -oxycodone 5 q4 prn for moderate pain, oxy 10 q4 prn for severe pain  -c/w gabapentin 300 mg TID    #Reactive airway dx  Wheezing and rhonchi on exam with wet cough; concern for aspiration  -due to tachycardia will try Xopenex instead of duoneb  -IS and chest PT

## 2023-02-14 NOTE — PROGRESS NOTE ADULT - ATTENDING COMMENTS
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 Found to have SIRS (tachy, leukocytosis) and c/f right knee septic arthritis s/p arthrocentesis on 2/5 not yielding any organisms however +hemarthrosis.    Patient seen and examined at bedside with wife. Reports feeling much better today. Pain improving. Comfortable appearing. More awake and has more energy today. Some coarse breathsound on exam.    #SIRS criteria: fever, tachycardic, leukocytosis: considering infection vs malignant related fever vs clot (though LE negative for DVT on 2/5): will f/u infectious w/u, given nursing concerns of aspiration, c/w empiric abx. Tylenol. check sputum cx    #Hemarthrosis - Right knee cultures NGTD, ESR/CRP elevated, suspect inflammation in setting of hemarthrosis and malignancy. Had persistent leukocytosis recent admission as well, likely reactive, no infection found and H/H now remaining stable. Right knee immobilizer while out of bed    #Met SCC Esophageal Ca - Was planned to start RT as outpatient but never started. Previous right patellar bx path with SCC, Rad Onc with no present plans for RT inpatient    #Cancer pain - C/w methadone 2.5mg q12 per palliative. Will need palliative followup for continued methadone refills    #Hypercalcemia - Ca = 11.8, new from previously, suspect due to malignancy, low PTH, normal Vit D. Downtrended on IV but still remains elevated. Uptitrating calcitonin given inadequate response. Consider bisphosphonate therapy if remained elevated despite higher dose of calcitonin. Dental clearance prior to starting as patient with poor dentition in process     PT recs: rehab but family wants to take home. Dispo on hold due to hypercalcemia and new fever.    Discussed w/ HS Dr. Uriarte.

## 2023-02-14 NOTE — PROGRESS NOTE ADULT - PROBLEM SELECTOR PLAN 1
baseline tachy in 110s likely in setting of medical disease cancer but newly tachy to 160s 2/13  baseline leukocytosis in 20k range  newly febrile 2/13  MRSA neg at admission  unclear source; initial concern for septic arthritis but tap neg for infection; possible aspiration pneumonia  -BCx  -UA/Ucx  -CXR to look for pneumonia given concern for aspiration -> right perihilar opacity; consider sputum Cx  -RVP  -empiric Zosyn baseline tachy in 110s likely in setting of medical disease cancer but newly tachy to 160s 2/13  baseline leukocytosis in 20k range -> increased to 35k 2/14  newly febrile 2/13  MRSA neg at admission  unclear source; initial concern for septic arthritis but tap neg for infection; possible aspiration pneumonia given CXR findings  -BCx  -UA/Ucx  -CXR to look for pneumonia given concern for aspiration -> right perihilar opacity; consider sputum Cx  -RVP  -empiric Zosyn  -trend WBC/monitor fever curve

## 2023-02-14 NOTE — PROGRESS NOTE ADULT - SUBJECTIVE AND OBJECTIVE BOX
Sultan Chapito MD  PGY 1 Department of Internal Medicine        Patient is a 67y old  Male who presents with a chief complaint of r. knee pain (2023 04:01)      SUBJECTIVE / OVERNIGHT EVENTS: Pt seen and examined. No acute overnight events. Denies fevers, chills, CP, SOB, Abdominal pain, N/V, Constipation, Diarrhea        MEDICATIONS  (STANDING):  acetaminophen    Suspension .. 650 milliGRAM(s) Oral every 6 hours  calcitonin Injectable 400 International Unit(s) IntraMuscular every 12 hours  chlorhexidine 2% Cloths 1 Application(s) Topical daily  enoxaparin Injectable 40 milliGRAM(s) SubCutaneous every 24 hours  gabapentin Solution 300 milliGRAM(s) Oral three times a day  levothyroxine 150 MICROGram(s) Oral daily  lidocaine   4% Patch 1 Patch Transdermal daily  methadone   Solution 2.5 milliGRAM(s) Oral every 12 hours  pantoprazole   Suspension 40 milliGRAM(s) Oral every 24 hours  piperacillin/tazobactam IVPB.. 3.375 Gram(s) IV Intermittent every 8 hours  polyethylene glycol 3350 17 Gram(s) Oral two times a day  senna Syrup 10 milliLiter(s) Oral daily    MEDICATIONS  (PRN):  levalbuterol Inhalation 0.63 milliGRAM(s) Inhalation every 6 hours PRN sob  naloxone Injectable 0.3 milliGRAM(s) IV Push every 3 minutes PRN opioid induced AMS  ondansetron Injectable 4 milliGRAM(s) IV Push every 8 hours PRN Nausea and/or Vomiting  oxyCODONE    IR 10 milliGRAM(s) Oral every 4 hours PRN Severe Pain (7 - 10)  oxyCODONE    IR 5 milliGRAM(s) Oral every 4 hours PRN Moderate Pain (4 - 6)      I&O's Summary    2023 07:01  -  2023 07:00  --------------------------------------------------------  IN: 900 mL / OUT: 200 mL / NET: 700 mL    2023 07:01  -  2023 04:13  --------------------------------------------------------  IN: 1650 mL / OUT: 400 mL / NET: 1250 mL        Vital Signs Last 24 Hrs  T(C): 36.7 (2023 20:54), Max: 39.4 (2023 10:05)  T(F): 98 (2023 20:54), Max: 102.9 (2023 10:05)  HR: 112 (2023 20:54) (108 - 160)  BP: 103/65 (2023 20:54) (103/65 - 129/60)  BP(mean): --  RR: 19 (2023 20:54) (17 - 20)  SpO2: 96% (2023 20:54) (96% - 99%)    Parameters below as of 2023 20:54  Patient On (Oxygen Delivery Method): room air        CAPILLARY BLOOD GLUCOSE          PHYSICAL EXAM:  GENERAL: NAD  HEENT: hoarse voice noted  NECK: Supple, No JVD  CHEST/LUNG: scattered wheezing and rhonchi b/l  HEART: Tachycardic with regular rhythm; No murmurs, rubs, or gallops  ABDOMEN: Soft, Nontender, Nondistended; Bowel sounds present, + PEG tube in place cdi  EXTREMITIES:  2+ Peripheral Pulses, No clubbing, cyanosis. + R knee erythema, edema with TTP, incisions cdi, ROM limited 2/2 pain  NEUROLOGY: AAOx3, non-focal  SKIN: No rashes or lesions     LABS:                        9.3    25.87 )-----------( 750      ( 2023 06:45 )             31.0     Auto Eosinophil # x     / Auto Eosinophil % x     / Auto Neutrophil # x     / Auto Neutrophil % x     / BANDS % x                            8.6    24.85 )-----------( 669      ( 2023 07:02 )             27.9     Auto Eosinophil # 0.29  / Auto Eosinophil % 1.2   / Auto Neutrophil # 21.73 / Auto Neutrophil % 87.3  / BANDS % x        02-13    136  |  105  |  29<H>  ----------------------------<  132<H>  4.4   |  19<L>  |  0.72      133<L>  |  103  |  24<H>  ----------------------------<  118<H>  4.1   |  20<L>  |  0.72    Ca    11.5<H>      2023 06:45  Mg     2.00       Phos  2.2       TPro  8.0  /  Alb  3.1<L>  /  TBili  0.2  /  DBili  x   /  AST  22  /  ALT  32  /  AlkPhos  248<H>    TPro  7.2  /  Alb  2.9<L>  /  TBili  0.2  /  DBili  x   /  AST  27  /  ALT  35  /  AlkPhos  235<H>            Urinalysis Basic - ( 2023 11:05 )    Color: Yellow / Appearance: Clear / S.024 / pH: x  Gluc: x / Ketone: Negative  / Bili: Negative / Urobili: <2 mg/dL   Blood: x / Protein: 30 mg/dL / Nitrite: Negative   Leuk Esterase: Negative / RBC: 6 /HPF / WBC 2 /HPF   Sq Epi: x / Non Sq Epi: 1 /HPF / Bacteria: Negative            RADIOLOGY & ADDITIONAL TESTS:    Imaging Personally Reviewed:    Consultant(s) Notes Reviewed:      Care Discussed with Consultants/Other Providers:   Sultan Chapito MD  PGY 1 Department of Internal Medicine        Patient is a 67y old  Male who presents with a chief complaint of r. knee pain (2023 04:01)      SUBJECTIVE / OVERNIGHT EVENTS: Pt seen and examined. No acute overnight events. Denies fevers, chills, CP, SOB, Abdominal pain, N/V, Constipation, Diarrhea. No further episodes of vomiting. Feels better than yesterday. Cough still present but improved. Has not required pain prns        MEDICATIONS  (STANDING):  acetaminophen    Suspension .. 650 milliGRAM(s) Oral every 6 hours  calcitonin Injectable 400 International Unit(s) IntraMuscular every 12 hours  chlorhexidine 2% Cloths 1 Application(s) Topical daily  enoxaparin Injectable 40 milliGRAM(s) SubCutaneous every 24 hours  gabapentin Solution 300 milliGRAM(s) Oral three times a day  levothyroxine 150 MICROGram(s) Oral daily  lidocaine   4% Patch 1 Patch Transdermal daily  methadone   Solution 2.5 milliGRAM(s) Oral every 12 hours  pantoprazole   Suspension 40 milliGRAM(s) Oral every 24 hours  piperacillin/tazobactam IVPB.. 3.375 Gram(s) IV Intermittent every 8 hours  polyethylene glycol 3350 17 Gram(s) Oral two times a day  senna Syrup 10 milliLiter(s) Oral daily    MEDICATIONS  (PRN):  levalbuterol Inhalation 0.63 milliGRAM(s) Inhalation every 6 hours PRN sob  naloxone Injectable 0.3 milliGRAM(s) IV Push every 3 minutes PRN opioid induced AMS  ondansetron Injectable 4 milliGRAM(s) IV Push every 8 hours PRN Nausea and/or Vomiting  oxyCODONE    IR 10 milliGRAM(s) Oral every 4 hours PRN Severe Pain (7 - 10)  oxyCODONE    IR 5 milliGRAM(s) Oral every 4 hours PRN Moderate Pain (4 - 6)      I&O's Summary    2023 07:01  -  2023 07:00  --------------------------------------------------------  IN: 900 mL / OUT: 200 mL / NET: 700 mL    2023 07:01  -  2023 04:13  --------------------------------------------------------  IN: 1650 mL / OUT: 400 mL / NET: 1250 mL        Vital Signs Last 24 Hrs  T(C): 36.7 (2023 20:54), Max: 39.4 (2023 10:05)  T(F): 98 (2023 20:54), Max: 102.9 (2023 10:05)  HR: 112 (:54) (108 - 160)  BP: 103/65 (2023 20:54) (103/65 - 129/60)  BP(mean): --  RR: 19 (2023 20:54) (17 - 20)  SpO2: 96% (:54) (96% - 99%)    Parameters below as of 2023 20:54  Patient On (Oxygen Delivery Method): room air        CAPILLARY BLOOD GLUCOSE          PHYSICAL EXAM:  GENERAL: NAD  HEENT: hoarse voice noted  NECK: Supple, No JVD  CHEST/LUNG: lung exam much improved today with decrease in rhonchi  HEART: Tachycardic with regular rhythm; No murmurs, rubs, or gallops  ABDOMEN: Soft, Nontender, Nondistended; Bowel sounds present, + PEG tube in place cdi  EXTREMITIES:  2+ Peripheral Pulses, No clubbing, cyanosis. + R knee erythema, edema with TTP, incisions cdi, ROM limited 2/2 pain  NEUROLOGY: AAOx3, non-focal  SKIN: No rashes or lesions     LABS:                        9.3    25.87 )-----------( 750      ( 2023 06:45 )             31.0     Auto Eosinophil # x     / Auto Eosinophil % x     / Auto Neutrophil # x     / Auto Neutrophil % x     / BANDS % x                            8.6    24.85 )-----------( 669      ( 2023 07:02 )             27.9     Auto Eosinophil # 0.29  / Auto Eosinophil % 1.2   / Auto Neutrophil # 21.73 / Auto Neutrophil % 87.3  / BANDS % x            136  |  105  |  29<H>  ----------------------------<  132<H>  4.4   |  19<L>  |  0.72      133<L>  |  103  |  24<H>  ----------------------------<  118<H>  4.1   |  20<L>  |  0.72    Ca    11.5<H>      2023 06:45  Mg     2.00       Phos  2.2       TPro  8.0  /  Alb  3.1<L>  /  TBili  0.2  /  DBili  x   /  AST  22  /  ALT  32  /  AlkPhos  248<H>    TPro  7.2  /  Alb  2.9<L>  /  TBili  0.2  /  DBili  x   /  AST  27  /  ALT  35  /  AlkPhos  235<H>            Urinalysis Basic - ( 2023 11:05 )    Color: Yellow / Appearance: Clear / S.024 / pH: x  Gluc: x / Ketone: Negative  / Bili: Negative / Urobili: <2 mg/dL   Blood: x / Protein: 30 mg/dL / Nitrite: Negative   Leuk Esterase: Negative / RBC: 6 /HPF / WBC 2 /HPF   Sq Epi: x / Non Sq Epi: 1 /HPF / Bacteria: Negative            RADIOLOGY & ADDITIONAL TESTS:    Imaging Personally Reviewed:    Consultant(s) Notes Reviewed:      Care Discussed with Consultants/Other Providers:

## 2023-02-15 NOTE — CHART NOTE - NSCHARTNOTEFT_GEN_A_CORE
Preoperative clearance for dental extraction on 2/16 under local anesthesia  Difficult to assess METS due to patient's knee impairing mobility but RCRi score 0, 3.9 % 30-day risk of death, MI, or cardiac arrest  Low risk patient for low risk procedure.

## 2023-02-15 NOTE — PROGRESS NOTE ADULT - PROBLEM SELECTOR PLAN 2
elev serum Ca 11.9  PTH below normal, Phos low -> most likely hyperca of malignancy  ionized justo elevated, not responding much to IVFs.   mild to moderate hypercalcemia, unclear if patient's new onset abdominal pain and vomiting related  - EKG reviewed no ischemic changes  - 1L NS bolus, followed by 100cc/h maintenance -> will dc fluids as has been getting continuous fluids and lungs sound congested -> f/u official CXR read  - S/p calcitonin on 2/13-2/14 and IVFs -> not much improvement in ionized calcium  - monitor icals  - might benefit from bisphosphonate if not responsive to calcitonin but would need dental eval -> would need imaging (CT maxillofacial) prior to extraction, which would be necessary prior to clearance -> dental extraction scheduled for 2/16

## 2023-02-15 NOTE — PROGRESS NOTE ADULT - PROBLEM SELECTOR PLAN 1
baseline tachy in 110s likely in setting of medical disease cancer but newly tachy to 160s 2/13  baseline leukocytosis in 20k range -> increased to 35k 2/14  newly febrile 2/13  MRSA neg at admission  unclear source; initial concern for septic arthritis but tap neg for infection; possible aspiration pneumonia given CXR findings  -BCx  -UA/Ucx  -CXR to look for pneumonia given concern for aspiration -> right perihilar opacity; consider sputum Cx  -RVP  -empiric Zosyn  -trend WBC/monitor fever curve baseline tachy in 110s likely in setting of medical disease cancer but newly tachy to 160s 2/13  baseline leukocytosis in 20k range -> increased to 35k 2/14  newly febrile 2/13  MRSA neg at admission  unclear source; initial concern for septic arthritis but tap neg for infection; possible aspiration pneumonia given CXR findings  -BCx -> NGTD  -UA/Ucx neg  -CXR to look for pneumonia given concern for aspiration -> right perihilar opacity; consider sputum Cx  -RVP  -empiric Zosyn  -trend WBC/monitor fever curve

## 2023-02-15 NOTE — PROGRESS NOTE ADULT - SUBJECTIVE AND OBJECTIVE BOX
Sultan Chapito MD  PGY 1 Department of Internal Medicine        Patient is a 67y old  Male who presents with a chief complaint of r. knee pain (2023 04:13)      SUBJECTIVE / OVERNIGHT EVENTS: Pt seen and examined. No acute overnight events. Denies fevers, chills, CP, SOB, Abdominal pain, N/V, Constipation, Diarrhea        MEDICATIONS  (STANDING):  acetaminophen    Suspension .. 650 milliGRAM(s) Oral every 6 hours  chlorhexidine 2% Cloths 1 Application(s) Topical daily  enoxaparin Injectable 40 milliGRAM(s) SubCutaneous every 24 hours  gabapentin Solution 300 milliGRAM(s) Oral three times a day  levothyroxine 150 MICROGram(s) Oral daily  lidocaine   4% Patch 1 Patch Transdermal daily  methadone   Solution 2.5 milliGRAM(s) Oral every 12 hours  pantoprazole   Suspension 40 milliGRAM(s) Oral every 24 hours  piperacillin/tazobactam IVPB.. 3.375 Gram(s) IV Intermittent every 8 hours  polyethylene glycol 3350 17 Gram(s) Oral two times a day  senna Syrup 10 milliLiter(s) Oral daily    MEDICATIONS  (PRN):  levalbuterol Inhalation 0.63 milliGRAM(s) Inhalation every 6 hours PRN sob  naloxone Injectable 0.3 milliGRAM(s) IV Push every 3 minutes PRN opioid induced AMS  ondansetron Injectable 4 milliGRAM(s) IV Push every 8 hours PRN Nausea and/or Vomiting      I&O's Summary    2023 07:01  -  2023 07:00  --------------------------------------------------------  IN: 2250 mL / OUT: 850 mL / NET: 1400 mL    2023 07:01  -  15 2023 05:20  --------------------------------------------------------  IN: 875 mL / OUT: 800 mL / NET: 75 mL        Vital Signs Last 24 Hrs  T(C): 37.2 (2023 21:04), Max: 37.2 (2023 21:04)  T(F): 98.9 (2023 21:04), Max: 98.9 (2023 21:04)  HR: 111 (2023 21:04) (102 - 111)  BP: 104/56 (2023 21:04) (103/60 - 108/56)  BP(mean): --  RR: 17 (2023 21:04) (17 - 18)  SpO2: 94% (2023 21:04) (94% - 100%)    Parameters below as of 2023 21:04  Patient On (Oxygen Delivery Method): room air        CAPILLARY BLOOD GLUCOSE          PHYSICAL EXAM:  GENERAL: NAD  HEENT: hoarse voice noted  NECK: Supple, No JVD  CHEST/LUNG: lung exam much improved today with decrease in rhonchi  HEART: Tachycardic with regular rhythm; No murmurs, rubs, or gallops  ABDOMEN: Soft, Nontender, Nondistended; Bowel sounds present, + PEG tube in place cdi  EXTREMITIES:  2+ Peripheral Pulses, No clubbing, cyanosis. + R knee erythema, edema with TTP, incisions cdi, ROM limited 2/2 pain  NEUROLOGY: AAOx3, non-focal  SKIN: No rashes or lesions      LABS:                        8.6    37.25 )-----------( 686      ( 2023 05:45 )             28.2     Auto Eosinophil # 0.28  / Auto Eosinophil % 0.8   / Auto Neutrophil # 33.63 / Auto Neutrophil % 90.2  / BANDS % x                            9.3    25.87 )-----------( 750      ( 2023 06:45 )             31.0     Auto Eosinophil # x     / Auto Eosinophil % x     / Auto Neutrophil # x     / Auto Neutrophil % x     / BANDS % x        02-14    137  |  106  |  29<H>  ----------------------------<  101<H>  3.8   |  18<L>  |  0.81  02-13    136  |  105  |  29<H>  ----------------------------<  132<H>  4.4   |  19<L>  |  0.72    Ca    11.1<H>      2023 05:45  Mg     2.00       Phos  2.1       TPro  7.4  /  Alb  2.9<L>  /  TBili  0.3  /  DBili  x   /  AST  21  /  ALT  29  /  AlkPhos  245<H>    TPro  8.0  /  Alb  3.1<L>  /  TBili  0.2  /  DBili  x   /  AST  22  /  ALT  32  /  AlkPhos  248<H>            Urinalysis Basic - ( 2023 11:05 )    Color: Yellow / Appearance: Clear / S.024 / pH: x  Gluc: x / Ketone: Negative  / Bili: Negative / Urobili: <2 mg/dL   Blood: x / Protein: 30 mg/dL / Nitrite: Negative   Leuk Esterase: Negative / RBC: 6 /HPF / WBC 2 /HPF   Sq Epi: x / Non Sq Epi: 1 /HPF / Bacteria: Negative            RADIOLOGY & ADDITIONAL TESTS:    Imaging Personally Reviewed:    Consultant(s) Notes Reviewed:      Care Discussed with Consultants/Other Providers:   Sultan Chapito MD  PGY 1 Department of Internal Medicine        Patient is a 67y old  Male who presents with a chief complaint of r. knee pain (2023 04:13)      SUBJECTIVE / OVERNIGHT EVENTS: Pt seen and examined. No acute overnight events. Denies fevers, chills, CP, SOB, Abdominal pain, N/V, Constipation, Diarrhea. Feels status quo.        MEDICATIONS  (STANDING):  acetaminophen    Suspension .. 650 milliGRAM(s) Oral every 6 hours  chlorhexidine 2% Cloths 1 Application(s) Topical daily  enoxaparin Injectable 40 milliGRAM(s) SubCutaneous every 24 hours  gabapentin Solution 300 milliGRAM(s) Oral three times a day  levothyroxine 150 MICROGram(s) Oral daily  lidocaine   4% Patch 1 Patch Transdermal daily  methadone   Solution 2.5 milliGRAM(s) Oral every 12 hours  pantoprazole   Suspension 40 milliGRAM(s) Oral every 24 hours  piperacillin/tazobactam IVPB.. 3.375 Gram(s) IV Intermittent every 8 hours  polyethylene glycol 3350 17 Gram(s) Oral two times a day  senna Syrup 10 milliLiter(s) Oral daily    MEDICATIONS  (PRN):  levalbuterol Inhalation 0.63 milliGRAM(s) Inhalation every 6 hours PRN sob  naloxone Injectable 0.3 milliGRAM(s) IV Push every 3 minutes PRN opioid induced AMS  ondansetron Injectable 4 milliGRAM(s) IV Push every 8 hours PRN Nausea and/or Vomiting      I&O's Summary    2023 07:01  -  2023 07:00  --------------------------------------------------------  IN: 2250 mL / OUT: 850 mL / NET: 1400 mL    2023 07:01  -  15 2023 05:20  --------------------------------------------------------  IN: 875 mL / OUT: 800 mL / NET: 75 mL        Vital Signs Last 24 Hrs  T(C): 37.2 (2023 21:04), Max: 37.2 (2023 21:04)  T(F): 98.9 (2023 21:04), Max: 98.9 (2023 21:04)  HR: 111 (2023 21:04) (102 - 111)  BP: 104/56 (2023 21:04) (103/60 - 108/56)  BP(mean): --  RR: 17 (2023 21:04) (17 - 18)  SpO2: 94% (2023 21:04) (94% - 100%)    Parameters below as of 2023 21:04  Patient On (Oxygen Delivery Method): room air        CAPILLARY BLOOD GLUCOSE          PHYSICAL EXAM:  GENERAL: NAD  HEENT: hoarse voice noted  NECK: Supple, No JVD  CHEST/LUNG: lung exam much improved today with decrease in rhonchi  HEART: Tachycardic with regular rhythm; No murmurs, rubs, or gallops  ABDOMEN: Soft, Nontender, Nondistended; Bowel sounds present, + PEG tube in place cdi  EXTREMITIES:  2+ Peripheral Pulses, No clubbing, cyanosis. + R knee erythema, edema with TTP, incisions cdi, ROM limited 2/2 pain  NEUROLOGY: AAOx3, non-focal  SKIN: No rashes or lesions      LABS:                        8.6    37.25 )-----------( 686      ( 2023 05:45 )             28.2     Auto Eosinophil # 0.28  / Auto Eosinophil % 0.8   / Auto Neutrophil # 33.63 / Auto Neutrophil % 90.2  / BANDS % x                            9.3    25.87 )-----------( 750      ( 2023 06:45 )             31.0     Auto Eosinophil # x     / Auto Eosinophil % x     / Auto Neutrophil # x     / Auto Neutrophil % x     / BANDS % x        02-14    137  |  106  |  29<H>  ----------------------------<  101<H>  3.8   |  18<L>  |  0.81  02-13    136  |  105  |  29<H>  ----------------------------<  132<H>  4.4   |  19<L>  |  0.72    Ca    11.1<H>      2023 05:45  Mg     2.00       Phos  2.1       TPro  7.4  /  Alb  2.9<L>  /  TBili  0.3  /  DBili  x   /  AST  21  /  ALT  29  /  AlkPhos  245<H>    TPro  8.0  /  Alb  3.1<L>  /  TBili  0.2  /  DBili  x   /  AST  22  /  ALT  32  /  AlkPhos  248<H>            Urinalysis Basic - ( 2023 11:05 )    Color: Yellow / Appearance: Clear / S.024 / pH: x  Gluc: x / Ketone: Negative  / Bili: Negative / Urobili: <2 mg/dL   Blood: x / Protein: 30 mg/dL / Nitrite: Negative   Leuk Esterase: Negative / RBC: 6 /HPF / WBC 2 /HPF   Sq Epi: x / Non Sq Epi: 1 /HPF / Bacteria: Negative            RADIOLOGY & ADDITIONAL TESTS:    Imaging Personally Reviewed:    Consultant(s) Notes Reviewed:      Care Discussed with Consultants/Other Providers:

## 2023-02-15 NOTE — PROGRESS NOTE ADULT - ATTENDING COMMENTS
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 Found to have SIRS (tachy, leukocytosis) and c/f right knee septic arthritis s/p arthrocentesis on 2/5 not yielding any organisms however +hemarthrosis.    Patient seen and examined at bedside with wife. Reports feeling ok today. Pain is ok. Comfortable appearing. R lung field CTA. R lung field with scattered rales.    #SIRS criteria: fever, tachycardic, leukocytosis: considering infection vs malignant related fever vs clot (though LE negative for DVT on 2/5): will f/u infectious w/u, given nursing concerns of aspiration, c/w empiric abx. Tylenol. check sputum cx    #Hemarthrosis - Right knee cultures NGTD, ESR/CRP elevated, suspect inflammation in setting of hemarthrosis and malignancy. Had persistent leukocytosis recent admission as well, likely reactive, no infection found and H/H now remaining stable. Right knee immobilizer while out of bed    #Met SCC Esophageal Ca - Was planned to start RT as outpatient but never started. Previous right patellar bx path with SCC, Rad Onc with no present plans for RT inpatient    #Cancer pain - C/w methadone 2.5mg q12 per palliative. Will need palliative followup for continued methadone refills    #Hypercalcemia - Ca = 11.8, new from previously, suspect due to malignancy, low PTH, normal Vit D. Downtrended on IV but still remains elevated. Uptitrating calcitonin given inadequate response. Consider bisphosphonate therapy if remained elevated despite higher dose of calcitonin. Dental clearance prior to starting as patient with poor dentition in process     PT recs: rehab but family wants to take home. Dispo on hold due to hypercalcemia and new fever.    Discussed w/ HS Dr. Uriarte. 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 Found to have SIRS (tachy, leukocytosis) and c/f right knee septic arthritis s/p arthrocentesis on 2/5 not yielding any organisms however +hemarthrosis.    Patient seen and examined at bedside with wife. Reports feeling ok today. Pain is ok. Comfortable appearing. R lung field CTA. R lung field with scattered rales.    #SIRS criteria: fever, tachycardic, leukocytosis: considering infection vs malignant related fever vs clot (though LE negative for DVT on 2/5): will f/u infectious w/u, given nursing concerns of aspiration, c/w empiric abx. Tylenol. check sputum cx. So far no additional fever    #Hemarthrosis - Right knee cultures NGTD, ESR/CRP elevated, suspect inflammation in setting of hemarthrosis and malignancy. Had persistent leukocytosis recent admission as well, likely reactive, no infection found and H/H now remaining stable. Right knee immobilizer while out of bed    #Met SCC Esophageal Ca - Was planned to start RT as outpatient but never started. Previous right patellar bx path with SCC, Rad Onc with no present plans for RT inpatient    #Cancer pain - C/w methadone 2.5mg q12 per palliative. Will need palliative followup for continued methadone refills    #Hypercalcemia - Ca = 11.8, new from previously, suspect due to malignancy, low PTH, normal Vit D. Downtrended on IV but still remains elevated. Uptitrating calcitonin given inadequate response. Consider bisphosphonate therapy if remained elevated despite higher dose of calcitonin. Dental clearance prior to starting as patient with poor dentition in process     PT recs: rehab but family wants to take home. Dispo on hold due to hypercalcemia and new fever.    Discussed w/ HS Dr. Uriarte.

## 2023-02-15 NOTE — PROGRESS NOTE ADULT - PROBLEM SELECTOR PLAN 9
dvt ppx: lovenox   diet: TFs  dispo: PT rec rehab but family wants home Preoperative clearance for dental extraction on 2/16 under local anesthesia  difficult to assess METS due to patient's knee impairing mobility but RCRi score 0, 3.9 % 30-day risk of death, MI, or cardiac arrest  Low risk patient for low risk procedure

## 2023-02-15 NOTE — CHART NOTE - NSCHARTNOTEFT_GEN_A_CORE
Source: Patient [ ]    Family [x ] Wife by bedside    other [x ] RN, Chart review    Current Diet : Diet, NPO with Tube Feed:   Tube Feeding Modality: Gastrostomy  TwoCal HN (TWOCALHNRTH)  Total Volume for 24 Hours (mL): 900  Bolus  Total Volume of Bolus (mL):  225  Total # of Feeds: 4  Tube Feed Frequency: Every 6 hours   Tube Feed Start Time: 08:00  (02-08-23 @ 16:09) [Active]    Height (cm): 170.2 (05 Feb 2023 06:07)  Weight (kg): 63 (05 Feb 2023 21:53), no updated weight to assess.  BMI (kg/m2): 21.7 (05 Feb 2023 21:53)    Nutrition Note: 67M hx metastatic SCC esophageal cancer to bone on irinotecan, hypothyroidism, s/p R patellar mass excision 1 month ago p/w R knee pain and swelling found to have SIRS c/f septic arthritis with neg cxs s/p R knee arthrocentesis, per chart.   Patient is sleeping during visit. Collateral obtained from RN and patient's wife by bedside. Wife denies any nausea, vomiting, diarrhea, constipation during visit. Last bowel movement 2/13/2023, per RN flow sheet. Currently on bowel regimen. On Zofran PRN. RN reports patient is tolerating tube feeding well. Current tube feeding order: Bolus TwoCalHN @ 225ml q6hrs (900ml in volume, 1800kcal, 75.2gm protein, 630ml free water from feed). Meeting patient's estimated kcal/protein needs. As per RN flow sheet, patient has 2+edema to right knee, stage II pressure injury to right buttocks. Recommend adding multivitamin, vitamin C and prosource no carb 1x/day (60kcal, 15gm protein) to promote wound healing. Noted patient has low phos-2.1(2/15), on sodium phosphate for repletion.     __________________ Pertinent Medications__________________   MEDICATIONS  (STANDING):  acetaminophen    Suspension .. 650 milliGRAM(s) Oral every 6 hours  chlorhexidine 2% Cloths 1 Application(s) Topical daily  enoxaparin Injectable 40 milliGRAM(s) SubCutaneous every 24 hours  gabapentin Solution 300 milliGRAM(s) Oral three times a day  levothyroxine 150 MICROGram(s) Oral daily  lidocaine   4% Patch 1 Patch Transdermal daily  methadone   Solution 2.5 milliGRAM(s) Oral every 12 hours  pantoprazole   Suspension 40 milliGRAM(s) Oral every 24 hours  piperacillin/tazobactam IVPB.. 3.375 Gram(s) IV Intermittent every 8 hours  polyethylene glycol 3350 17 Gram(s) Oral two times a day  senna Syrup 10 milliLiter(s) Oral daily    MEDICATIONS  (PRN):  levalbuterol Inhalation 0.63 milliGRAM(s) Inhalation every 6 hours PRN sob  naloxone Injectable 0.3 milliGRAM(s) IV Push every 3 minutes PRN opioid induced AMS  ondansetron Injectable 4 milliGRAM(s) IV Push every 8 hours PRN Nausea and/or Vomiting  oxyCODONE    IR 10 milliGRAM(s) Oral every 4 hours PRN Severe Pain (7 - 10)  oxyCODONE    IR 5 milliGRAM(s) Oral every 4 hours PRN Moderate Pain (4 - 6)      __________________ Pertinent Labs__________________   02-15 Na137 mmol/L Glu 137 mg/dL<H> K+ 3.7 mmol/L Cr  0.80 mg/dL BUN 33 mg/dL<H> 02-15 Phos 2.1 mg/dL<L> 02-15 Alb 2.8 g/dL<L>      Estimated Needs:   [x ] no change since previous assessment  Based on current weight: 63kg (2/5),   Estimated energy needs: 25-30kcal/kg/day= 1677-2013kcal/day  Estimated protein needs: 1.0-1.5gm/kg/day= 67-100gm/day      Previous Nutrition Diagnosis:   [x ] Increased Nutrient Needs  Nutrition Diagnosis is [x] ongoing    New Nutrition Diagnosis: [x] not applicable    Interventions:     Recommend  [x] Continue with current tube feeding regimen: Bolus TwoCalHN @ 225ml q6hrs (900ml in volume, 1800kcal, 75.2gm protein, 630ml free water from feed). Free water flushes per MD discretion.   [x] Recommend adding multivitamin, vitamin C and prosource no carb 1x/day (60kcal, 15gm protein) to promote wound healing.  [x] Monitor and replete electrolytes.   [x] Obtain weekly weight, to monitor weight trend.   [x ] RD remains available, consult nutrition services if needed.      Monitoring and Evaluation:   [x ] Tube feeding tolerance [x ] weights [ x] follow up per protocol  [x ] other: bowel movement, skin integrity, labs.

## 2023-02-16 NOTE — PROGRESS NOTE ADULT - SUBJECTIVE AND OBJECTIVE BOX
MEDICATIONS  (STANDING):  acetaminophen    Suspension .. 650 milliGRAM(s) Oral every 6 hours  ascorbic acid 500 milliGRAM(s) Oral daily  chlorhexidine 2% Cloths 1 Application(s) Topical daily  enoxaparin Injectable 40 milliGRAM(s) SubCutaneous every 24 hours  gabapentin Solution 300 milliGRAM(s) Oral three times a day  levothyroxine 150 MICROGram(s) Oral daily  lidocaine   4% Patch 1 Patch Transdermal daily  methadone   Solution 2.5 milliGRAM(s) Oral every 12 hours  multivitamin 1 Tablet(s) Oral daily  pantoprazole   Suspension 40 milliGRAM(s) Oral every 24 hours  piperacillin/tazobactam IVPB.. 3.375 Gram(s) IV Intermittent every 8 hours  polyethylene glycol 3350 17 Gram(s) Oral two times a day  senna Syrup 10 milliLiter(s) Oral daily    MEDICATIONS  (PRN):  levalbuterol Inhalation 0.63 milliGRAM(s) Inhalation every 6 hours PRN sob  naloxone Injectable 0.3 milliGRAM(s) IV Push every 3 minutes PRN opioid induced AMS  ondansetron Injectable 4 milliGRAM(s) IV Push every 8 hours PRN Nausea and/or Vomiting  oxyCODONE    IR 10 milliGRAM(s) Oral every 4 hours PRN Severe Pain (7 - 10)  oxyCODONE    IR 5 milliGRAM(s) Oral every 4 hours PRN Moderate Pain (4 - 6)      Allergies    No Known Allergies        Vital Signs Last 24 Hrs  T(C): 36.7 (16 Feb 2023 05:45), Max: 36.8 (15 Feb 2023 21:51)  T(F): 98 (16 Feb 2023 05:45), Max: 98.2 (15 Feb 2023 21:51)  HR: 115 (16 Feb 2023 05:45) (106 - 116)  BP: 130/64 (16 Feb 2023 05:45) (106/58 - 130/64)  BP(mean): --  RR: 17 (16 Feb 2023 05:45) (17 - 18)  SpO2: 97% (16 Feb 2023 05:45) (96% - 97%)    Parameters below as of 16 Feb 2023 05:45  Patient On (Oxygen Delivery Method): room air        LABS:                        8.8    28.59 )-----------( 773      ( 16 Feb 2023 07:28 )             28.6     02-16    141  |  109<H>  |  31<H>  ----------------------------<  99  4.2   |  19<L>  |  0.80    Ca    12.8<H>      16 Feb 2023 07:28  Phos  2.6     02-16  Mg     1.90     02-16    TPro  7.9  /  Alb  2.9<L>  /  TBili  0.3  /  DBili  x   /  AST  23  /  ALT  33  /  AlkPhos  272<H>  02-16    WBC Count: 28.59 K/uL *H* [3.80 - 10.50] (02-16 @ 07:28)  Platelet Count - Automated: 773 K/uL *H* [150 - 400] (02-16 @ 07:28)  INR: 1.38 ratio *H* [0.88 - 1.16] (02-16 @ 07:28)      Dental paged for dental optimization prior to bisphosphonates. Pt seen at [facility] on 2/13/2023 for evaluation.     Evaluation from 2/13/2023:    EOE:   (-) asymmetry  (-) swelling  (-) palpation    IOE: No vestibular fluctuance observed. Multiple carious root tips present.    Attempted FMX but unable to complete FMX due to pt being unable to bite and large lingual mandibular kaity.  Med Team assisted in attempt to have panoramic image taken by immobilizing pt's knee, but pt unable to sit in chair for panoramic image.    Plan: head/neck CT as per Med Team and full mouth extractions    Spoke with Dr. Herbert, asked for clearance prior to dental extractions under local anesthesia with epinephrine    Spoke to pt and pt's wife regarding recommendation to have full mouth extractions prior to initiation of bisphosphonate therapy. As per pt's wife, explained implications of procedure to son (risk of osteonecrosis if exos done after initiation of bisphosphonates) on the phone ([phone]) and explained that dental clearance for bisphosphonate therapy will not be given until carious root tips are extracted.     Answered all pt, pt's wife and pt's son's questions. Advised them to speak with Med Team regarding why bisphosphonate therapy is recommended.     Destiny Tobias, DMD #[id number]    ____________________________________________________________  2/16/2023    Pt presents for full mouth extractions. Clearance updated on [place]. CT obtained and reviewed.     12, 13, 14, 15 present on CBCT, but not clinically present.     Discussed entire procedural details. Risks, benefits and alternatives discussed. Risks including swelling, bleeding, bruising, and nerve damage discussed.  Questions answered. Informed consent obtained.      Patient identification confirmed. Procedure confirmed.  PMH/PSH/Meds/Allergies reviewed.   Vitals: 117/71, 106bpm    Time out. Side/Site Verification performed.    PROCEDURE: Extraction of #2, 3, 4, 5, 6, 7, 8, 9, 10, 11, 18, 19, 20, 21, 22, 23, 24, 25, 26, 27, 30, 32 without replacement under local anesthesia with epinephrine.   Administered 7 total carpules of 4% articaine with 1:100,000 epinephrine administered via local infiltration/PDL infiltration and 5.5 total carpules of 2% lidocaine with 1:100,000 epinephrine via local infiltration/PDL infiltration throughout duration of procedure from 9:15AM to 12:05PM. Mucoperiosteal elevator used to confirm anesthesia and separate gingival collars. Elevator used to luxate root tips/teeth. FTMPF created for root tips #1-6. Buccal trough created for tooth #22. Forceps used to deliver #2, 3, 4, 5, 6, 7, 11, 18, 19, 20, 21, 22, 23, 24, 25, 26, 27, 30, 32 without complications. Socket curretage, inspection and irrigated with saline. Closed with 3-0 chromic gut. Hemostasis achieved.   Assistance from oral surgery.     #8 and #9 extractions to be completed tomorrow so as not to exceed limit of local anesthetic.     Post-op instructions provided in written and verbal form, with patient pack. Discharged pt in stretcher with patient transport, satisfied and alert.      Next visit: extraction #8, 9 root tips and evaluate for extraction of #12, 13, 14, 15 planned for 2/17/23    Pt NOT yet dentally optimized for bisphosphonate treatment.     RECOMMENDATIONS:   1) Extractions for root tips #8 and #9 scheduled for 2/17/23  2) No smoking, spitting, or straw use  3) Post-op instructions provided in written form to patient  4) Soft, cool diet   5) Dental to follow up 2/16/2023

## 2023-02-16 NOTE — PROGRESS NOTE ADULT - PROBLEM SELECTOR PLAN 2
elev serum Ca 11.9  PTH below normal, Phos low -> most likely hyperca of malignancy  ionized justo elevated, not responding much to IVFs.   mild to moderate hypercalcemia, unclear if patient's new onset abdominal pain and vomiting related  - EKG reviewed no ischemic changes  - 1L NS bolus, followed by 100cc/h maintenance -> will dc fluids as has been getting continuous fluids and lungs sound congested -> f/u official CXR read  - S/p calcitonin on 2/13-2/14 and IVFs -> not much improvement in ionized calcium  - monitor icals  - might benefit from bisphosphonate if not responsive to calcitonin but would need dental eval -> would need imaging (CT maxillofacial) prior to extraction, which would be necessary prior to clearance -> dental extraction scheduled for 2/16 elev serum Ca 11.9  PTH below normal, Phos low -> most likely hyperca of malignancy  ionized justo elevated, not responding much to IVFs.   mild to moderate hypercalcemia, unclear if patient's new onset abdominal pain and vomiting related  - EKG reviewed no ischemic changes  - 1L NS bolus, followed by 100cc/h maintenance -> will dc fluids as has been getting continuous fluids and lungs sound congested -> f/u official CXR read -> perihilar opacity  - S/p calcitonin on 2/13-2/14 and IVFs -> not much improvement in ionized calcium  - monitor icals, uptrending -> will add FW flushes  - might benefit from bisphosphonate if not responsive to calcitonin but would need dental eval -> would need imaging (CT maxillofacial) prior to extraction, which would be necessary prior to clearance -> dental extraction scheduled for 2/16 (bisphosphonate may not be able to be started for an extended period of time? will have to ask dental time frame)

## 2023-02-16 NOTE — PROGRESS NOTE ADULT - PROBLEM SELECTOR PLAN 1
baseline tachy in 110s likely in setting of medical disease cancer but newly tachy to 160s 2/13  baseline leukocytosis in 20k range -> increased to 35k 2/14  newly febrile 2/13  MRSA neg at admission  unclear source; initial concern for septic arthritis but tap neg for infection; possible aspiration pneumonia given CXR findings  -BCx -> NGTD  -UA/Ucx neg  -CXR to look for pneumonia given concern for aspiration -> right perihilar opacity; consider sputum Cx  -RVP  -empiric Zosyn  -trend WBC/monitor fever curve baseline tachy in 110s likely in setting of medical disease cancer but newly tachy to 160s 2/13  baseline leukocytosis in 20k range -> increased to 35k 2/14  newly febrile 2/13  MRSA neg at admission  unclear source; initial concern for septic arthritis but tap neg for infection; possible aspiration pneumonia given CXR findings  -BCx -> NGTD  -UA/Ucx neg  -CXR to look for pneumonia given concern for aspiration -> right perihilar opacity; consider sputum Cx  -RVP neg  -empiric Zosyn -> transition to oral on dc to complete 7 day course (until 2/20)  -trend WBC/monitor fever curve

## 2023-02-16 NOTE — PROGRESS NOTE ADULT - PROBLEM SELECTOR PLAN 9
Preoperative clearance for dental extraction on 2/16 under local anesthesia  difficult to assess METS due to patient's knee impairing mobility but RCRi score 0, 3.9 % 30-day risk of death, MI, or cardiac arrest  Low risk patient for low risk procedure

## 2023-02-16 NOTE — PROGRESS NOTE ADULT - ATTENDING COMMENTS
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 Found to have SIRS (tachy, leukocytosis) and c/f right knee septic arthritis s/p arthrocentesis on 2/5 not yielding any organisms however +hemarthrosis.    Patient seen and examined at bedside. Reports feeling ok today. Pain is ok. Comfortable appearing.     #SIRS criteria: fever, tachycardic, leukocytosis: considering infection vs malignant related fever vs clot (though LE negative for DVT on 2/5): f/u infectious w/u, cultures NGTD. given nursing concerns of aspiration, c/w empiric abx. Tylenol. check sputum cx. So far no additional fever    #Hemarthrosis - Right knee cultures NGTD, ESR/CRP elevated, suspect inflammation in setting of hemarthrosis and malignancy. Had persistent leukocytosis recent admission as well, likely reactive, no infection found and H/H now remaining stable. Right knee immobilizer while out of bed    #Met SCC Esophageal Ca - Was planned to start RT as outpatient but never started. Previous right patellar bx path with SCC, Rad Onc with no present plans for RT inpatient    #Cancer pain - C/w methadone 2.5mg q12 per palliative. Will need palliative followup for continued methadone refills    #Hypercalcemia - Ca = 11.8, new from previously, suspect due to malignancy, low PTH, normal Vit D. Downtrended on IV but still remains elevated. Uptitrating calcitonin given inadequate response. Consider bisphosphonate therapy if remained elevated despite higher dose of calcitonin. Dental clearance prior to starting as patient with poor dentition in process     PT recs: rehab but family wants to take home. Dispo on hold due to hypercalcemia.    Discussed w/ HS Dr. Uriarte.

## 2023-02-16 NOTE — PROGRESS NOTE ADULT - SUBJECTIVE AND OBJECTIVE BOX
Please refer to previous dental consult note for additional information.     MEDICATIONS  (STANDING):  acetaminophen    Suspension .. 650 milliGRAM(s) Oral every 6 hours  ascorbic acid 500 milliGRAM(s) Oral daily  chlorhexidine 2% Cloths 1 Application(s) Topical daily  enoxaparin Injectable 40 milliGRAM(s) SubCutaneous every 24 hours  gabapentin Solution 300 milliGRAM(s) Oral three times a day  levothyroxine 150 MICROGram(s) Oral daily  lidocaine   4% Patch 1 Patch Transdermal daily  methadone   Solution 2.5 milliGRAM(s) Oral every 12 hours  multivitamin 1 Tablet(s) Oral daily  pantoprazole   Suspension 40 milliGRAM(s) Oral every 24 hours  piperacillin/tazobactam IVPB.. 3.375 Gram(s) IV Intermittent every 8 hours  polyethylene glycol 3350 17 Gram(s) Oral two times a day  senna Syrup 10 milliLiter(s) Oral daily    MEDICATIONS  (PRN):  levalbuterol Inhalation 0.63 milliGRAM(s) Inhalation every 6 hours PRN sob  naloxone Injectable 0.3 milliGRAM(s) IV Push every 3 minutes PRN opioid induced AMS  ondansetron Injectable 4 milliGRAM(s) IV Push every 8 hours PRN Nausea and/or Vomiting  oxyCODONE    IR 10 milliGRAM(s) Oral every 4 hours PRN Severe Pain (7 - 10)  oxyCODONE    IR 5 milliGRAM(s) Oral every 4 hours PRN Moderate Pain (4 - 6)      Allergies    No Known Allergies      Vital Signs Last 24 Hrs  T(C): 36.8 (16 Feb 2023 13:15), Max: 36.8 (15 Feb 2023 21:51)  T(F): 98.3 (16 Feb 2023 13:15), Max: 98.3 (16 Feb 2023 13:15)  HR: 110 (16 Feb 2023 13:15) (106 - 115)  BP: 127/62 (16 Feb 2023 13:15) (106/58 - 130/64)  BP(mean): --  RR: 17 (16 Feb 2023 13:15) (17 - 17)  SpO2: 97% (16 Feb 2023 13:15) (96% - 97%)    Parameters below as of 16 Feb 2023 13:15  Patient On (Oxygen Delivery Method): room air        LABS:                        8.8    28.59 )-----------( 773      ( 16 Feb 2023 07:28 )             28.6     02-16    141  |  109<H>  |  31<H>  ----------------------------<  99  4.2   |  19<L>  |  0.80    Ca    12.8<H>      16 Feb 2023 07:28  Phos  2.6     02-16  Mg     1.90     02-16    TPro  7.9  /  Alb  2.9<L>  /  TBili  0.3  /  DBili  x   /  AST  23  /  ALT  33  /  AlkPhos  272<H>  02-16    WBC Count: 28.59 K/uL *H* [3.80 - 10.50] (02-16 @ 07:28)  Platelet Count - Automated: 773 K/uL *H* [150 - 400] (02-16 @ 07:28)  INR: 1.38 ratio *H* [0.88 - 1.16] (02-16 @ 07:28)            Dental follow up with patient s/p dental extractions.     Pt asleep. Spoke to pt's nurse, Lilly. Pt doing well following procedure. Pt is reported to be hemostatic, with dry heme intraorally. Recommended wiping with sterile saline on gauze, or using monoject syringe. Ice pack, monoject syringe, gauze, and written post-op instructions provided to nursing station.       RECOMMENDATIONS:  1) Remaining dental extractions scheduled for 2/17/23 at 10AM.    Destiny Tobias, DMD #[id number]

## 2023-02-16 NOTE — PROGRESS NOTE ADULT - SUBJECTIVE AND OBJECTIVE BOX
Sultan Chapito MD  PGY 1 Department of Internal Medicine        Patient is a 67y old  Male who presents with a chief complaint of r. knee pain (15 Feb 2023 05:20)      SUBJECTIVE / OVERNIGHT EVENTS: Pt seen and examined. No acute overnight events. Denies fevers, chills, CP, SOB, Abdominal pain, N/V, Constipation, Diarrhea        MEDICATIONS  (STANDING):  acetaminophen    Suspension .. 650 milliGRAM(s) Oral every 6 hours  ascorbic acid 500 milliGRAM(s) Oral daily  chlorhexidine 2% Cloths 1 Application(s) Topical daily  enoxaparin Injectable 40 milliGRAM(s) SubCutaneous every 24 hours  gabapentin Solution 300 milliGRAM(s) Oral three times a day  levothyroxine 150 MICROGram(s) Oral daily  lidocaine   4% Patch 1 Patch Transdermal daily  methadone   Solution 2.5 milliGRAM(s) Oral every 12 hours  multivitamin 1 Tablet(s) Oral daily  pantoprazole   Suspension 40 milliGRAM(s) Oral every 24 hours  piperacillin/tazobactam IVPB.. 3.375 Gram(s) IV Intermittent every 8 hours  polyethylene glycol 3350 17 Gram(s) Oral two times a day  senna Syrup 10 milliLiter(s) Oral daily    MEDICATIONS  (PRN):  levalbuterol Inhalation 0.63 milliGRAM(s) Inhalation every 6 hours PRN sob  naloxone Injectable 0.3 milliGRAM(s) IV Push every 3 minutes PRN opioid induced AMS  ondansetron Injectable 4 milliGRAM(s) IV Push every 8 hours PRN Nausea and/or Vomiting  oxyCODONE    IR 10 milliGRAM(s) Oral every 4 hours PRN Severe Pain (7 - 10)  oxyCODONE    IR 5 milliGRAM(s) Oral every 4 hours PRN Moderate Pain (4 - 6)      I&O's Summary    14 Feb 2023 07:01  -  15 Feb 2023 07:00  --------------------------------------------------------  IN: 1775 mL / OUT: 1150 mL / NET: 625 mL    15 Feb 2023 07:01  -  16 Feb 2023 06:57  --------------------------------------------------------  IN: 1465 mL / OUT: 1150 mL / NET: 315 mL        Vital Signs Last 24 Hrs  T(C): 36.7 (16 Feb 2023 05:45), Max: 36.8 (15 Feb 2023 21:51)  T(F): 98 (16 Feb 2023 05:45), Max: 98.2 (15 Feb 2023 21:51)  HR: 115 (16 Feb 2023 05:45) (106 - 116)  BP: 130/64 (16 Feb 2023 05:45) (106/58 - 130/64)  BP(mean): --  RR: 17 (16 Feb 2023 05:45) (17 - 18)  SpO2: 97% (16 Feb 2023 05:45) (96% - 97%)    Parameters below as of 16 Feb 2023 05:45  Patient On (Oxygen Delivery Method): room air        CAPILLARY BLOOD GLUCOSE          PHYSICAL EXAM:  GENERAL: NAD  HEENT: hoarse voice noted  NECK: Supple, No JVD  CHEST/LUNG: lung exam much improved today with decrease in rhonchi  HEART: Tachycardic with regular rhythm; No murmurs, rubs, or gallops  ABDOMEN: Soft, Nontender, Nondistended; Bowel sounds present, + PEG tube in place cdi  EXTREMITIES:  2+ Peripheral Pulses, No clubbing, cyanosis. + R knee erythema, edema with TTP, incisions cdi, ROM limited 2/2 pain  NEUROLOGY: AAOx3, non-focal  SKIN: No rashes or lesions     LABS:                        8.6    29.45 )-----------( 713      ( 15 Feb 2023 06:14 )             28.6     Auto Eosinophil # 0.54  / Auto Eosinophil % 1.8   / Auto Neutrophil # 26.11 / Auto Neutrophil % 88.7  / BANDS % x        02-15    137  |  107  |  33<H>  ----------------------------<  137<H>  3.7   |  19<L>  |  0.80    Ca    11.7<H>      15 Feb 2023 06:14  Mg     1.90     02-15  Phos  2.1     02-15  TPro  7.3  /  Alb  2.8<L>  /  TBili  <0.2  /  DBili  x   /  AST  24  /  ALT  33  /  AlkPhos  250<H>  02-15                  RADIOLOGY & ADDITIONAL TESTS:    Imaging Personally Reviewed:    Consultant(s) Notes Reviewed:      Care Discussed with Consultants/Other Providers:   Sultan Chapito MD  PGY 1 Department of Internal Medicine        Patient is a 67y old  Male who presents with a chief complaint of r. knee pain (15 Feb 2023 05:20)      SUBJECTIVE / OVERNIGHT EVENTS: Pt seen and examined. No acute overnight events. Denies fevers, chills, CP, SOB, Abdominal pain, N/V, Constipation, Diarrhea. Feels fine overall. does not some knee pain. Counseled patient that he can ask for PRNs.         MEDICATIONS  (STANDING):  acetaminophen    Suspension .. 650 milliGRAM(s) Oral every 6 hours  ascorbic acid 500 milliGRAM(s) Oral daily  chlorhexidine 2% Cloths 1 Application(s) Topical daily  enoxaparin Injectable 40 milliGRAM(s) SubCutaneous every 24 hours  gabapentin Solution 300 milliGRAM(s) Oral three times a day  levothyroxine 150 MICROGram(s) Oral daily  lidocaine   4% Patch 1 Patch Transdermal daily  methadone   Solution 2.5 milliGRAM(s) Oral every 12 hours  multivitamin 1 Tablet(s) Oral daily  pantoprazole   Suspension 40 milliGRAM(s) Oral every 24 hours  piperacillin/tazobactam IVPB.. 3.375 Gram(s) IV Intermittent every 8 hours  polyethylene glycol 3350 17 Gram(s) Oral two times a day  senna Syrup 10 milliLiter(s) Oral daily    MEDICATIONS  (PRN):  levalbuterol Inhalation 0.63 milliGRAM(s) Inhalation every 6 hours PRN sob  naloxone Injectable 0.3 milliGRAM(s) IV Push every 3 minutes PRN opioid induced AMS  ondansetron Injectable 4 milliGRAM(s) IV Push every 8 hours PRN Nausea and/or Vomiting  oxyCODONE    IR 10 milliGRAM(s) Oral every 4 hours PRN Severe Pain (7 - 10)  oxyCODONE    IR 5 milliGRAM(s) Oral every 4 hours PRN Moderate Pain (4 - 6)      I&O's Summary    14 Feb 2023 07:01  -  15 Feb 2023 07:00  --------------------------------------------------------  IN: 1775 mL / OUT: 1150 mL / NET: 625 mL    15 Feb 2023 07:01  -  16 Feb 2023 06:57  --------------------------------------------------------  IN: 1465 mL / OUT: 1150 mL / NET: 315 mL        Vital Signs Last 24 Hrs  T(C): 36.7 (16 Feb 2023 05:45), Max: 36.8 (15 Feb 2023 21:51)  T(F): 98 (16 Feb 2023 05:45), Max: 98.2 (15 Feb 2023 21:51)  HR: 115 (16 Feb 2023 05:45) (106 - 116)  BP: 130/64 (16 Feb 2023 05:45) (106/58 - 130/64)  BP(mean): --  RR: 17 (16 Feb 2023 05:45) (17 - 18)  SpO2: 97% (16 Feb 2023 05:45) (96% - 97%)    Parameters below as of 16 Feb 2023 05:45  Patient On (Oxygen Delivery Method): room air        CAPILLARY BLOOD GLUCOSE          PHYSICAL EXAM:  GENERAL: NAD  HEENT: hoarse voice noted  NECK: Supple, No JVD  CHEST/LUNG: rhonchi and wheezes, stable  HEART: Tachycardic with regular rhythm; No murmurs, rubs, or gallops  ABDOMEN: Soft, Nontender, Nondistended; Bowel sounds present, + PEG tube in place cdi  EXTREMITIES:  2+ Peripheral Pulses, No clubbing, cyanosis. + R knee erythema, edema with TTP, incisions cdi, ROM limited 2/2 pain  NEUROLOGY: AAOx3, non-focal  SKIN: No rashes or lesions     LABS:                        8.6    29.45 )-----------( 713      ( 15 Feb 2023 06:14 )             28.6     Auto Eosinophil # 0.54  / Auto Eosinophil % 1.8   / Auto Neutrophil # 26.11 / Auto Neutrophil % 88.7  / BANDS % x        02-15    137  |  107  |  33<H>  ----------------------------<  137<H>  3.7   |  19<L>  |  0.80    Ca    11.7<H>      15 Feb 2023 06:14  Mg     1.90     02-15  Phos  2.1     02-15  TPro  7.3  /  Alb  2.8<L>  /  TBili  <0.2  /  DBili  x   /  AST  24  /  ALT  33  /  AlkPhos  250<H>  02-15                  RADIOLOGY & ADDITIONAL TESTS:    Imaging Personally Reviewed:    Consultant(s) Notes Reviewed:      Care Discussed with Consultants/Other Providers:

## 2023-02-17 NOTE — PROGRESS NOTE ADULT - SUBJECTIVE AND OBJECTIVE BOX
Sultan Chapito MD  PGY 1 Department of Internal Medicine        Patient is a 67y old  Male who presents with a chief complaint of r. knee pain (17 Feb 2023 02:36)      SUBJECTIVE / OVERNIGHT EVENTS: Pt seen and examined. No acute overnight events. Denies fevers, chills, CP, SOB, Abdominal pain, N/V, Constipation, Diarrhea        MEDICATIONS  (STANDING):  acetaminophen    Suspension .. 650 milliGRAM(s) Oral every 6 hours  albuterol/ipratropium for Nebulization 3 milliLiter(s) Nebulizer every 6 hours  albuterol/ipratropium for Nebulization 3 milliLiter(s) Nebulizer every 6 hours  ascorbic acid 500 milliGRAM(s) Oral daily  chlorhexidine 2% Cloths 1 Application(s) Topical daily  enoxaparin Injectable 40 milliGRAM(s) SubCutaneous every 24 hours  gabapentin Solution 300 milliGRAM(s) Oral three times a day  lactated ringers. 1000 milliLiter(s) (75 mL/Hr) IV Continuous <Continuous>  levothyroxine 150 MICROGram(s) Oral daily  lidocaine   4% Patch 1 Patch Transdermal daily  methadone   Solution 2.5 milliGRAM(s) Oral every 12 hours  multivitamin 1 Tablet(s) Oral daily  pantoprazole   Suspension 40 milliGRAM(s) Oral every 24 hours  piperacillin/tazobactam IVPB.. 3.375 Gram(s) IV Intermittent every 8 hours  polyethylene glycol 3350 17 Gram(s) Oral two times a day  senna Syrup 10 milliLiter(s) Oral daily  sodium chloride 3%  Inhalation 4 milliLiter(s) Inhalation every 12 hours    MEDICATIONS  (PRN):  levalbuterol Inhalation 0.63 milliGRAM(s) Inhalation every 6 hours PRN sob  naloxone Injectable 0.3 milliGRAM(s) IV Push every 3 minutes PRN opioid induced AMS  ondansetron Injectable 4 milliGRAM(s) IV Push every 8 hours PRN Nausea and/or Vomiting  oxyCODONE    IR 10 milliGRAM(s) Oral every 4 hours PRN Severe Pain (7 - 10)  oxyCODONE    IR 5 milliGRAM(s) Oral every 4 hours PRN Moderate Pain (4 - 6)      I&O's Summary    15 Feb 2023 07:01  -  16 Feb 2023 07:00  --------------------------------------------------------  IN: 1590 mL / OUT: 1450 mL / NET: 140 mL    16 Feb 2023 07:01  -  17 Feb 2023 05:15  --------------------------------------------------------  IN: 1050 mL / OUT: 0 mL / NET: 1050 mL        Vital Signs Last 24 Hrs  T(C): 37.7 (17 Feb 2023 03:46), Max: 37.7 (17 Feb 2023 03:46)  T(F): 99.8 (17 Feb 2023 03:46), Max: 99.8 (17 Feb 2023 03:46)  HR: 116 (17 Feb 2023 03:46) (110 - 128)  BP: 107/64 (17 Feb 2023 03:46) (96/61 - 130/64)  BP(mean): --  RR: 20 (17 Feb 2023 03:46) (17 - 30)  SpO2: 97% (17 Feb 2023 03:46) (97% - 98%)    Parameters below as of 17 Feb 2023 03:46  Patient On (Oxygen Delivery Method): nasal cannula  O2 Flow (L/min): 2      CAPILLARY BLOOD GLUCOSE          PHYSICAL EXAM:  GENERAL: NAD  HEENT: hoarse voice noted  NECK: Supple, No JVD  CHEST/LUNG: rhonchi and wheezes, stable  HEART: Tachycardic with regular rhythm; No murmurs, rubs, or gallops  ABDOMEN: Soft, Nontender, Nondistended; Bowel sounds present, + PEG tube in place cdi  EXTREMITIES:  2+ Peripheral Pulses, No clubbing, cyanosis. + R knee erythema, edema with TTP, incisions cdi, ROM limited 2/2 pain  NEUROLOGY: AAOx3, non-focal  SKIN: No rashes or lesions          LABS:                        8.5    42.82 )-----------( 754      ( 17 Feb 2023 04:30 )             29.1     Auto Eosinophil # x     / Auto Eosinophil % x     / Auto Neutrophil # x     / Auto Neutrophil % x     / BANDS % x                            9.5    37.60 )-----------( 774      ( 17 Feb 2023 00:45 )             32.8     Auto Eosinophil # x     / Auto Eosinophil % x     / Auto Neutrophil # x     / Auto Neutrophil % x     / BANDS % x                            8.8    28.59 )-----------( 773      ( 16 Feb 2023 07:28 )             28.6     Auto Eosinophil # 0.51  / Auto Eosinophil % 1.8   / Auto Neutrophil # 25.85 / Auto Neutrophil % 90.4  / BANDS % x        02-17    138  |  105  |  34<H>  ----------------------------<  146<H>  3.7   |  17<L>  |  1.07  02-16    141  |  109<H>  |  31<H>  ----------------------------<  99  4.2   |  19<L>  |  0.80  02-15    137  |  107  |  33<H>  ----------------------------<  137<H>  3.7   |  19<L>  |  0.80    Ca    13.1<HH>      17 Feb 2023 00:45  Mg     2.00     02-17  Phos  2.9     02-17  TPro  7.7  /  Alb  2.8<L>  /  TBili  0.3  /  DBili  x   /  AST  20  /  ALT  30  /  AlkPhos  262<H>  02-17  TPro  7.9  /  Alb  2.9<L>  /  TBili  0.3  /  DBili  x   /  AST  23  /  ALT  33  /  AlkPhos  272<H>  02-16  TPro  7.3  /  Alb  2.8<L>  /  TBili  <0.2  /  DBili  x   /  AST  24  /  ALT  33  /  AlkPhos  250<H>  02-15    PT/INR - ( 16 Feb 2023 07:28 )   PT: 16.1 sec;   INR: 1.38 ratio         PTT - ( 16 Feb 2023 07:28 )  PTT:28.1 sec              RADIOLOGY & ADDITIONAL TESTS:    Imaging Personally Reviewed:    Consultant(s) Notes Reviewed:      Care Discussed with Consultants/Other Providers:   Sultan Chapito MD  PGY 1 Department of Internal Medicine        Patient is a 67y old  Male who presents with a chief complaint of r. knee pain (17 Feb 2023 02:36)      SUBJECTIVE / OVERNIGHT EVENTS: Pt seen and examined. Patient was tachypneic to 30s, tachycardic to 150s (sinus on ECG). Treated with levalbuterol. VBG with elevated lactate, CXR with L chest mass. RRT called for increased WOB. Large amount of secretions w/ deep suctioning removing thick, reddish secretions. Rectal temp 101. 1g vanc given. Desat to 80s and then put on NC w/ response. Temporarily on BiPAP before transition back to NC. Solumedrol 80 mg IV given. Patient's vitals improved s/p RRT. Lactate elevated to 4 and Procal to 5.    MEDICATIONS  (STANDING):  acetaminophen    Suspension .. 650 milliGRAM(s) Oral every 6 hours  albuterol/ipratropium for Nebulization 3 milliLiter(s) Nebulizer every 6 hours  albuterol/ipratropium for Nebulization 3 milliLiter(s) Nebulizer every 6 hours  ascorbic acid 500 milliGRAM(s) Oral daily  chlorhexidine 2% Cloths 1 Application(s) Topical daily  enoxaparin Injectable 40 milliGRAM(s) SubCutaneous every 24 hours  gabapentin Solution 300 milliGRAM(s) Oral three times a day  lactated ringers. 1000 milliLiter(s) (75 mL/Hr) IV Continuous <Continuous>  levothyroxine 150 MICROGram(s) Oral daily  lidocaine   4% Patch 1 Patch Transdermal daily  methadone   Solution 2.5 milliGRAM(s) Oral every 12 hours  multivitamin 1 Tablet(s) Oral daily  pantoprazole   Suspension 40 milliGRAM(s) Oral every 24 hours  piperacillin/tazobactam IVPB.. 3.375 Gram(s) IV Intermittent every 8 hours  polyethylene glycol 3350 17 Gram(s) Oral two times a day  senna Syrup 10 milliLiter(s) Oral daily  sodium chloride 3%  Inhalation 4 milliLiter(s) Inhalation every 12 hours    MEDICATIONS  (PRN):  levalbuterol Inhalation 0.63 milliGRAM(s) Inhalation every 6 hours PRN sob  naloxone Injectable 0.3 milliGRAM(s) IV Push every 3 minutes PRN opioid induced AMS  ondansetron Injectable 4 milliGRAM(s) IV Push every 8 hours PRN Nausea and/or Vomiting  oxyCODONE    IR 10 milliGRAM(s) Oral every 4 hours PRN Severe Pain (7 - 10)  oxyCODONE    IR 5 milliGRAM(s) Oral every 4 hours PRN Moderate Pain (4 - 6)      I&O's Summary    15 Feb 2023 07:01  -  16 Feb 2023 07:00  --------------------------------------------------------  IN: 1590 mL / OUT: 1450 mL / NET: 140 mL    16 Feb 2023 07:01  -  17 Feb 2023 05:15  --------------------------------------------------------  IN: 1050 mL / OUT: 0 mL / NET: 1050 mL        Vital Signs Last 24 Hrs  T(C): 37.7 (17 Feb 2023 03:46), Max: 37.7 (17 Feb 2023 03:46)  T(F): 99.8 (17 Feb 2023 03:46), Max: 99.8 (17 Feb 2023 03:46)  HR: 116 (17 Feb 2023 03:46) (110 - 128)  BP: 107/64 (17 Feb 2023 03:46) (96/61 - 130/64)  BP(mean): --  RR: 20 (17 Feb 2023 03:46) (17 - 30)  SpO2: 97% (17 Feb 2023 03:46) (97% - 98%)    Parameters below as of 17 Feb 2023 03:46  Patient On (Oxygen Delivery Method): nasal cannula  O2 Flow (L/min): 2      CAPILLARY BLOOD GLUCOSE          PHYSICAL EXAM:  GENERAL: NAD  HEENT: hoarse voice noted  NECK: Supple, No JVD  CHEST/LUNG: rhonchi and wheezes, stable  HEART: Tachycardic with regular rhythm; No murmurs, rubs, or gallops  ABDOMEN: Soft, Nontender, Nondistended; Bowel sounds present, + PEG tube in place cdi  EXTREMITIES:  2+ Peripheral Pulses, No clubbing, cyanosis. + R knee erythema, edema with TTP, incisions cdi, ROM limited 2/2 pain  NEUROLOGY: AAOx3, non-focal  SKIN: No rashes or lesions          LABS:                        8.5    42.82 )-----------( 754      ( 17 Feb 2023 04:30 )             29.1     Auto Eosinophil # x     / Auto Eosinophil % x     / Auto Neutrophil # x     / Auto Neutrophil % x     / BANDS % x                            9.5    37.60 )-----------( 774      ( 17 Feb 2023 00:45 )             32.8     Auto Eosinophil # x     / Auto Eosinophil % x     / Auto Neutrophil # x     / Auto Neutrophil % x     / BANDS % x                            8.8    28.59 )-----------( 773      ( 16 Feb 2023 07:28 )             28.6     Auto Eosinophil # 0.51  / Auto Eosinophil % 1.8   / Auto Neutrophil # 25.85 / Auto Neutrophil % 90.4  / BANDS % x        02-17    138  |  105  |  34<H>  ----------------------------<  146<H>  3.7   |  17<L>  |  1.07  02-16    141  |  109<H>  |  31<H>  ----------------------------<  99  4.2   |  19<L>  |  0.80  02-15    137  |  107  |  33<H>  ----------------------------<  137<H>  3.7   |  19<L>  |  0.80    Ca    13.1<HH>      17 Feb 2023 00:45  Mg     2.00     02-17  Phos  2.9     02-17  TPro  7.7  /  Alb  2.8<L>  /  TBili  0.3  /  DBili  x   /  AST  20  /  ALT  30  /  AlkPhos  262<H>  02-17  TPro  7.9  /  Alb  2.9<L>  /  TBili  0.3  /  DBili  x   /  AST  23  /  ALT  33  /  AlkPhos  272<H>  02-16  TPro  7.3  /  Alb  2.8<L>  /  TBili  <0.2  /  DBili  x   /  AST  24  /  ALT  33  /  AlkPhos  250<H>  02-15    PT/INR - ( 16 Feb 2023 07:28 )   PT: 16.1 sec;   INR: 1.38 ratio         PTT - ( 16 Feb 2023 07:28 )  PTT:28.1 sec              RADIOLOGY & ADDITIONAL TESTS:    Imaging Personally Reviewed:    Consultant(s) Notes Reviewed:      Care Discussed with Consultants/Other Providers:   Sultan Chapito MD  PGY 1 Department of Internal Medicine        Patient is a 67y old  Male who presents with a chief complaint of r. knee pain (17 Feb 2023 02:36)      SUBJECTIVE / OVERNIGHT EVENTS: Pt seen and examined. Patient was tachypneic to 30s, tachycardic to 150s (sinus on ECG). Treated with levalbuterol. VBG with elevated lactate, CXR with L chest mass. RRT called for increased WOB. Large amount of secretions w/ deep suctioning removing thick, reddish secretions. Rectal temp 101. 1g vanc given. Desat to 80s and then put on NC w/ response. Temporarily on BiPAP before transition back to NC. Solumedrol 80 mg IV given. Patient's vitals improved s/p RRT. Lactate elevated to 4 and Procal to 5.    At time of encounter, patient appears less alert than his baseline and not able to verbalize as well as before. Was able to indicate his breathing still feels uncomfortable though denies pain.    MEDICATIONS  (STANDING):  acetaminophen    Suspension .. 650 milliGRAM(s) Oral every 6 hours  albuterol/ipratropium for Nebulization 3 milliLiter(s) Nebulizer every 6 hours  albuterol/ipratropium for Nebulization 3 milliLiter(s) Nebulizer every 6 hours  ascorbic acid 500 milliGRAM(s) Oral daily  chlorhexidine 2% Cloths 1 Application(s) Topical daily  enoxaparin Injectable 40 milliGRAM(s) SubCutaneous every 24 hours  gabapentin Solution 300 milliGRAM(s) Oral three times a day  lactated ringers. 1000 milliLiter(s) (75 mL/Hr) IV Continuous <Continuous>  levothyroxine 150 MICROGram(s) Oral daily  lidocaine   4% Patch 1 Patch Transdermal daily  methadone   Solution 2.5 milliGRAM(s) Oral every 12 hours  multivitamin 1 Tablet(s) Oral daily  pantoprazole   Suspension 40 milliGRAM(s) Oral every 24 hours  piperacillin/tazobactam IVPB.. 3.375 Gram(s) IV Intermittent every 8 hours  polyethylene glycol 3350 17 Gram(s) Oral two times a day  senna Syrup 10 milliLiter(s) Oral daily  sodium chloride 3%  Inhalation 4 milliLiter(s) Inhalation every 12 hours    MEDICATIONS  (PRN):  levalbuterol Inhalation 0.63 milliGRAM(s) Inhalation every 6 hours PRN sob  naloxone Injectable 0.3 milliGRAM(s) IV Push every 3 minutes PRN opioid induced AMS  ondansetron Injectable 4 milliGRAM(s) IV Push every 8 hours PRN Nausea and/or Vomiting  oxyCODONE    IR 10 milliGRAM(s) Oral every 4 hours PRN Severe Pain (7 - 10)  oxyCODONE    IR 5 milliGRAM(s) Oral every 4 hours PRN Moderate Pain (4 - 6)      I&O's Summary    15 Feb 2023 07:01  -  16 Feb 2023 07:00  --------------------------------------------------------  IN: 1590 mL / OUT: 1450 mL / NET: 140 mL    16 Feb 2023 07:01  -  17 Feb 2023 05:15  --------------------------------------------------------  IN: 1050 mL / OUT: 0 mL / NET: 1050 mL        Vital Signs Last 24 Hrs  T(C): 37.7 (17 Feb 2023 03:46), Max: 37.7 (17 Feb 2023 03:46)  T(F): 99.8 (17 Feb 2023 03:46), Max: 99.8 (17 Feb 2023 03:46)  HR: 116 (17 Feb 2023 03:46) (110 - 128)  BP: 107/64 (17 Feb 2023 03:46) (96/61 - 130/64)  BP(mean): --  RR: 20 (17 Feb 2023 03:46) (17 - 30)  SpO2: 97% (17 Feb 2023 03:46) (97% - 98%)    Parameters below as of 17 Feb 2023 03:46  Patient On (Oxygen Delivery Method): nasal cannula  O2 Flow (L/min): 2      CAPILLARY BLOOD GLUCOSE          PHYSICAL EXAM:  GENERAL: NAD  HEENT: hoarse voice noted  NECK: Supple, No JVD  CHEST/LUNG: rhonchi and wheezes, stable  HEART: Tachycardic with regular rhythm; No murmurs, rubs, or gallops  ABDOMEN: Soft, Nontender, Nondistended; Bowel sounds present, + PEG tube in place cdi  EXTREMITIES:  2+ Peripheral Pulses, No clubbing, cyanosis. + R knee erythema, edema with TTP, incisions cdi, ROM limited 2/2 pain  NEUROLOGY: AAOx3, non-focal  SKIN: No rashes or lesions          LABS:                        8.5    42.82 )-----------( 754      ( 17 Feb 2023 04:30 )             29.1     Auto Eosinophil # x     / Auto Eosinophil % x     / Auto Neutrophil # x     / Auto Neutrophil % x     / BANDS % x                            9.5    37.60 )-----------( 774      ( 17 Feb 2023 00:45 )             32.8     Auto Eosinophil # x     / Auto Eosinophil % x     / Auto Neutrophil # x     / Auto Neutrophil % x     / BANDS % x                            8.8    28.59 )-----------( 773      ( 16 Feb 2023 07:28 )             28.6     Auto Eosinophil # 0.51  / Auto Eosinophil % 1.8   / Auto Neutrophil # 25.85 / Auto Neutrophil % 90.4  / BANDS % x        02-17    138  |  105  |  34<H>  ----------------------------<  146<H>  3.7   |  17<L>  |  1.07  02-16    141  |  109<H>  |  31<H>  ----------------------------<  99  4.2   |  19<L>  |  0.80  02-15    137  |  107  |  33<H>  ----------------------------<  137<H>  3.7   |  19<L>  |  0.80    Ca    13.1<HH>      17 Feb 2023 00:45  Mg     2.00     02-17  Phos  2.9     02-17  TPro  7.7  /  Alb  2.8<L>  /  TBili  0.3  /  DBili  x   /  AST  20  /  ALT  30  /  AlkPhos  262<H>  02-17  TPro  7.9  /  Alb  2.9<L>  /  TBili  0.3  /  DBili  x   /  AST  23  /  ALT  33  /  AlkPhos  272<H>  02-16  TPro  7.3  /  Alb  2.8<L>  /  TBili  <0.2  /  DBili  x   /  AST  24  /  ALT  33  /  AlkPhos  250<H>  02-15    PT/INR - ( 16 Feb 2023 07:28 )   PT: 16.1 sec;   INR: 1.38 ratio         PTT - ( 16 Feb 2023 07:28 )  PTT:28.1 sec              RADIOLOGY & ADDITIONAL TESTS:    Imaging Personally Reviewed:    Consultant(s) Notes Reviewed:      Care Discussed with Consultants/Other Providers:   Sultan Chapito MD  PGY 1 Department of Internal Medicine        Patient is a 67y old  Male who presents with a chief complaint of r. knee pain (17 Feb 2023 02:36)      SUBJECTIVE / OVERNIGHT EVENTS: Pt seen and examined. Patient was tachypneic to 30s, tachycardic to 150s (sinus on ECG). Treated with levalbuterol. VBG with elevated lactate, CXR with L chest mass. RRT called for increased WOB. Large amount of secretions w/ deep suctioning removing thick, reddish secretions. Rectal temp 101. 1g vanc given. Desat to 80s and then put on NC w/ response. Temporarily on BiPAP before transition back to NC. Solumedrol 80 mg IV given. Patient's vitals improved s/p RRT. Lactate elevated to 4 and Procal to 5.    At time of encounter, patient appears less alert than his baseline and not able to verbalize as well as before. Was able to indicate his breathing still feels uncomfortable, noting congestion, though denies pain. As per nursing, he has improved through the night. No events on tele; sinus tachy now back at his b/l HR.    MEDICATIONS  (STANDING):  acetaminophen    Suspension .. 650 milliGRAM(s) Oral every 6 hours  albuterol/ipratropium for Nebulization 3 milliLiter(s) Nebulizer every 6 hours  albuterol/ipratropium for Nebulization 3 milliLiter(s) Nebulizer every 6 hours  ascorbic acid 500 milliGRAM(s) Oral daily  chlorhexidine 2% Cloths 1 Application(s) Topical daily  enoxaparin Injectable 40 milliGRAM(s) SubCutaneous every 24 hours  gabapentin Solution 300 milliGRAM(s) Oral three times a day  lactated ringers. 1000 milliLiter(s) (75 mL/Hr) IV Continuous <Continuous>  levothyroxine 150 MICROGram(s) Oral daily  lidocaine   4% Patch 1 Patch Transdermal daily  methadone   Solution 2.5 milliGRAM(s) Oral every 12 hours  multivitamin 1 Tablet(s) Oral daily  pantoprazole   Suspension 40 milliGRAM(s) Oral every 24 hours  piperacillin/tazobactam IVPB.. 3.375 Gram(s) IV Intermittent every 8 hours  polyethylene glycol 3350 17 Gram(s) Oral two times a day  senna Syrup 10 milliLiter(s) Oral daily  sodium chloride 3%  Inhalation 4 milliLiter(s) Inhalation every 12 hours    MEDICATIONS  (PRN):  levalbuterol Inhalation 0.63 milliGRAM(s) Inhalation every 6 hours PRN sob  naloxone Injectable 0.3 milliGRAM(s) IV Push every 3 minutes PRN opioid induced AMS  ondansetron Injectable 4 milliGRAM(s) IV Push every 8 hours PRN Nausea and/or Vomiting  oxyCODONE    IR 10 milliGRAM(s) Oral every 4 hours PRN Severe Pain (7 - 10)  oxyCODONE    IR 5 milliGRAM(s) Oral every 4 hours PRN Moderate Pain (4 - 6)      I&O's Summary    15 Feb 2023 07:01  -  16 Feb 2023 07:00  --------------------------------------------------------  IN: 1590 mL / OUT: 1450 mL / NET: 140 mL    16 Feb 2023 07:01  -  17 Feb 2023 05:15  --------------------------------------------------------  IN: 1050 mL / OUT: 0 mL / NET: 1050 mL        Vital Signs Last 24 Hrs  T(C): 37.7 (17 Feb 2023 03:46), Max: 37.7 (17 Feb 2023 03:46)  T(F): 99.8 (17 Feb 2023 03:46), Max: 99.8 (17 Feb 2023 03:46)  HR: 116 (17 Feb 2023 03:46) (110 - 128)  BP: 107/64 (17 Feb 2023 03:46) (96/61 - 130/64)  BP(mean): --  RR: 20 (17 Feb 2023 03:46) (17 - 30)  SpO2: 97% (17 Feb 2023 03:46) (97% - 98%)    Parameters below as of 17 Feb 2023 03:46  Patient On (Oxygen Delivery Method): nasal cannula  O2 Flow (L/min): 2      CAPILLARY BLOOD GLUCOSE          PHYSICAL EXAM:  GENERAL: NAD  HEENT: hoarse voice noted; some blood noted in mouth s/p dental procedure  NECK: Supple, No JVD  CHEST/LUNG: rhonchi and wheezes present as before, on 3L NC  HEART: Tachycardic with regular rhythm; No murmurs, rubs, or gallops  ABDOMEN: Soft, Nontender, Nondistended; Bowel sounds present, + PEG tube in place cdi  EXTREMITIES:  2+ Peripheral Pulses, No clubbing, cyanosis. + R knee erythema, edema with TTP, incisions cdi, ROM limited 2/2 pain  NEUROLOGY: nonfocal. orientation difficult to assess as patient's communicaiton limited today  SKIN: No rashes or lesions          LABS:                        8.5    42.82 )-----------( 754      ( 17 Feb 2023 04:30 )             29.1     Auto Eosinophil # x     / Auto Eosinophil % x     / Auto Neutrophil # x     / Auto Neutrophil % x     / BANDS % x                            9.5    37.60 )-----------( 774      ( 17 Feb 2023 00:45 )             32.8     Auto Eosinophil # x     / Auto Eosinophil % x     / Auto Neutrophil # x     / Auto Neutrophil % x     / BANDS % x                            8.8    28.59 )-----------( 773      ( 16 Feb 2023 07:28 )             28.6     Auto Eosinophil # 0.51  / Auto Eosinophil % 1.8   / Auto Neutrophil # 25.85 / Auto Neutrophil % 90.4  / BANDS % x        02-17    138  |  105  |  34<H>  ----------------------------<  146<H>  3.7   |  17<L>  |  1.07  02-16    141  |  109<H>  |  31<H>  ----------------------------<  99  4.2   |  19<L>  |  0.80  02-15    137  |  107  |  33<H>  ----------------------------<  137<H>  3.7   |  19<L>  |  0.80    Ca    13.1<HH>      17 Feb 2023 00:45  Mg     2.00     02-17  Phos  2.9     02-17  TPro  7.7  /  Alb  2.8<L>  /  TBili  0.3  /  DBili  x   /  AST  20  /  ALT  30  /  AlkPhos  262<H>  02-17  TPro  7.9  /  Alb  2.9<L>  /  TBili  0.3  /  DBili  x   /  AST  23  /  ALT  33  /  AlkPhos  272<H>  02-16  TPro  7.3  /  Alb  2.8<L>  /  TBili  <0.2  /  DBili  x   /  AST  24  /  ALT  33  /  AlkPhos  250<H>  02-15    PT/INR - ( 16 Feb 2023 07:28 )   PT: 16.1 sec;   INR: 1.38 ratio         PTT - ( 16 Feb 2023 07:28 )  PTT:28.1 sec              RADIOLOGY & ADDITIONAL TESTS:    Imaging Personally Reviewed:    Consultant(s) Notes Reviewed:      Care Discussed with Consultants/Other Providers:

## 2023-02-17 NOTE — PROGRESS NOTE ADULT - PROBLEM SELECTOR PLAN 1
baseline tachy in 110s likely in setting of medical disease cancer but newly tachy to 160s 2/13  baseline leukocytosis in 20k range -> increased to 35k 2/14  newly febrile 2/13  MRSA neg at admission  unclear source; initial concern for septic arthritis but tap neg for infection; possible aspiration pneumonia given CXR findings  -BCx -> NGTD  -UA/Ucx neg  -CXR to look for pneumonia given concern for aspiration -> right perihilar opacity; consider sputum Cx  -RVP neg  -empiric Zosyn -> transition to oral on dc to complete 7 day course (until 2/20)  -trend WBC/monitor fever curve baseline tachy in 110s likely in setting of medical disease cancer but newly tachy to 160s 2/13  baseline leukocytosis in 20k range -> increased to 35k 2/14  newly febrile 2/13  MRSA neg at admission  unclear source; initial concern for septic arthritis but tap neg for infection; possible aspiration pneumonia given CXR findings  PCT elevated to 5  Lactate now > 4  -BCx -> NGTD  -UA/Ucx neg  -CXR to look for pneumonia given concern for aspiration -> right perihilar opacity  -RVP neg  -empiric Zosyn -> transition to oral on dc to complete 7 day course (until 2/20)  -trend WBC/monitor fever curve -> febrile again and spike in WBC  -add vancomycin and do MRSA/MSSA  -collect sputum cx -> communicated with nursing  -will have to hold off on dental procedure due to clinical status  -consider ID c/s as unclear source; aspiration pneumonia vs post-obstructive vs malignancy  -CTA ordered to r/o PE at RRT

## 2023-02-17 NOTE — CONSULT NOTE ADULT - SUBJECTIVE AND OBJECTIVE BOX
CHIEF COMPLAINT:    HPI:    PAST MEDICAL & SURGICAL HISTORY:  HTN (hypertension)      GERD (gastroesophageal reflux disease)      Hypothyroidism      Neuropathic pain      Disorder of bone, unspecified      H/O constipation      Esophageal cancer      Lung metastases      S/P percutaneous endoscopic gastrostomy (PEG) tube placement          FAMILY HISTORY:  No pertinent family history in first degree relatives        SOCIAL HISTORY:  Smoking: __ packs x ___ years  EtOH Use:  Marital Status:  Occupation:  Recent Travel:  Country of Birth:  Advance Directives:    Allergies    No Known Allergies    Intolerances        HOME MEDICATIONS:    REVIEW OF SYSTEMS:  Constitutional:   Eyes:  ENT:  CV:  Resp:  GI:  :  MSK:  Integumentary:  Neurological:  Psychiatric:  Endocrine:  Hematologic/Lymphatic:  Allergic/Immunologic:  [ ] All other systems negative  [ ] Unable to assess ROS because ________    OBJECTIVE:  ICU Vital Signs Last 24 Hrs  T(C): 36.3 (16 Feb 2023 22:05), Max: 36.8 (16 Feb 2023 13:15)  T(F): 97.3 (16 Feb 2023 22:05), Max: 98.3 (16 Feb 2023 13:15)  HR: 120 (16 Feb 2023 22:05) (110 - 120)  BP: 111/62 (16 Feb 2023 22:05) (111/62 - 130/64)  BP(mean): --  ABP: --  ABP(mean): --  RR: 30 (16 Feb 2023 22:05) (17 - 30)  SpO2: 98% (16 Feb 2023 22:05) (97% - 98%)    O2 Parameters below as of 16 Feb 2023 22:05  Patient On (Oxygen Delivery Method): room air              02-15 @ 07:01  -  02-16 @ 07:00  --------------------------------------------------------  IN: 1590 mL / OUT: 1450 mL / NET: 140 mL    02-16 @ 07:01  -  02-17 @ 02:38  --------------------------------------------------------  IN: 825 mL / OUT: 0 mL / NET: 825 mL      CAPILLARY BLOOD GLUCOSE          PHYSICAL EXAM:  General:   HEENT:   Lymph Nodes:  Neck:   Respiratory:   Cardiovascular:   Abdomen:   Extremities:   Skin:   Neurological:  Psychiatry:    HOSPITAL MEDICATIONS:  MEDICATIONS  (STANDING):  acetaminophen    Suspension .. 650 milliGRAM(s) Oral every 6 hours  albuterol/ipratropium for Nebulization 3 milliLiter(s) Nebulizer every 6 hours  ascorbic acid 500 milliGRAM(s) Oral daily  chlorhexidine 2% Cloths 1 Application(s) Topical daily  enoxaparin Injectable 40 milliGRAM(s) SubCutaneous every 24 hours  gabapentin Solution 300 milliGRAM(s) Oral three times a day  levothyroxine 150 MICROGram(s) Oral daily  lidocaine   4% Patch 1 Patch Transdermal daily  methadone   Solution 2.5 milliGRAM(s) Oral every 12 hours  multivitamin 1 Tablet(s) Oral daily  pantoprazole   Suspension 40 milliGRAM(s) Oral every 24 hours  piperacillin/tazobactam IVPB.. 3.375 Gram(s) IV Intermittent every 8 hours  polyethylene glycol 3350 17 Gram(s) Oral two times a day  senna Syrup 10 milliLiter(s) Oral daily  sodium chloride 3%  Inhalation 4 milliLiter(s) Inhalation every 12 hours  sodium chloride 7% Inhalation 4 milliLiter(s) Inhalation every 12 hours    MEDICATIONS  (PRN):  levalbuterol Inhalation 0.63 milliGRAM(s) Inhalation every 6 hours PRN sob  naloxone Injectable 0.3 milliGRAM(s) IV Push every 3 minutes PRN opioid induced AMS  ondansetron Injectable 4 milliGRAM(s) IV Push every 8 hours PRN Nausea and/or Vomiting  oxyCODONE    IR 10 milliGRAM(s) Oral every 4 hours PRN Severe Pain (7 - 10)  oxyCODONE    IR 5 milliGRAM(s) Oral every 4 hours PRN Moderate Pain (4 - 6)      LABS:                        9.5    37.60 )-----------( 774      ( 17 Feb 2023 00:45 )             32.8     02-17    138  |  105  |  34<H>  ----------------------------<  146<H>  3.7   |  17<L>  |  1.07    Ca    13.1<HH>      17 Feb 2023 00:45  Phos  2.9     02-17  Mg     2.00     02-17    TPro  7.7  /  Alb  2.8<L>  /  TBili  0.3  /  DBili  x   /  AST  20  /  ALT  30  /  AlkPhos  262<H>  02-17    PT/INR - ( 16 Feb 2023 07:28 )   PT: 16.1 sec;   INR: 1.38 ratio         PTT - ( 16 Feb 2023 07:28 )  PTT:28.1 sec      Venous Blood Gas:  02-17 @ 00:45  7.29/40/44/19/65.3  VBG Lactate: 4.9  Venous Blood Gas:  02-16 @ 23:05  7.35/36/63/20/90.8  VBG Lactate: 3.7      MICROBIOLOGY:     RADIOLOGY:  [ ] Reviewed and interpreted by me    EKG: CHIEF COMPLAINT: R knee pain    HPI: "Patient is a 67 year old M hx metastatic esophageal cancer (dx 8 years ago) to bone on irinotecan, r. knee, hypothyroidism, GERD, recent RUL pna s/p cefepime, r. patellar mass excision 1 month ago who presents w/ right knee pain and swelling for the past 2 days. He also notes having pus like drainage. He is having difficulty w/ ambulation as a result. Had a fever outpatient 100.7 F, leukocytosis in ED to 27k. Denies dysuria, polyuria, melena, hematochezia, h/a, LOC, visual changes, cp. Radiation onc was consulted last admission, pt to f/u outpt for RT adjuvant. In the ED right knee arthrocentesis was done, cultures were sent.    ED course: given zosyn, Tm 37.9 C, , /58, RR 20 100%  CT PE shows improved RUL infiltrate  "    PAST MEDICAL & SURGICAL HISTORY:  HTN (hypertension)  GERD (gastroesophageal reflux disease)  Hypothyroidism  Neuropathic pain  Disorder of bone, unspecified  H/O constipation  Esophageal cancer  Lung metastases  S/P percutaneous endoscopic gastrostomy (PEG) tube placement    FAMILY HISTORY:  No pertinent family history in first degree relatives    SOCIAL HISTORY:  No tobacco, alcohol or drug use    Allergies  No Known Allergies    Intolerances        HOME MEDICATIONS:  * Patient Currently Takes Medications as of 05-Feb-2023 22:04 documented in Structured Notes  · 	polyethylene glycol 3350 oral powder for reconstitution: Last Dose Taken:  , 17 gram(s) by gastrostomy tube once a day   · 	rivaroxaban 10 mg oral tablet: Last Dose Taken:  , 1 tab(s) by gastrostomy tube once a day   · 	oxyCODONE 10 mg oral tablet: Last Dose Taken:  , 1 tab(s) by gastrostomy tube every 4 hours, As Needed -Severe Pain (7 - 10) MDD:60mg   · 	senna leaf extract oral tablet: Last Dose Taken:  , 2 tab(s) by gastrostomy tube once a day (at bedtime)  · 	albuterol 0.63 mg/3 mL (0.021%) inhalation solution: Last Dose Taken:  , 3 milliliter(s) inhaled 3 times a day  · 	calamine topical lotion: Last Dose Taken:  , Apply topically to affected area 1 to 2 times a day, As Needed  · 	esomeprazole 20 mg oral delayed release capsule: Last Dose Taken:  , 1 cap(s) by gastrostomy tube once a day  · 	levothyroxine 150 mcg (0.15 mg) oral tablet: Last Dose Taken:  , 1 tab(s) by gastrostomy tube once a day  · 	gabapentin 300 mg oral capsule: Last Dose Taken:  , 1 cap(s) by gastrostomy tube 3 times a day, As Needed  · 	Linzess 290 mcg oral capsule: Last Dose Taken:  , 1 cap(s) by gastrostomy tube once a day      REVIEW OF SYSTEMS:  Constitutional:   Eyes:  ENT:  CV:  Resp: +shortness of breath  GI:  :  MSK: + R knee pain  Integumentary:  Neurological:  Psychiatric:  Endocrine:  Hematologic/Lymphatic:  Allergic/Immunologic:  [X] All other systems negative      OBJECTIVE:  ICU Vital Signs Last 24 Hrs  T(C): 36.3 (16 Feb 2023 22:05), Max: 36.8 (16 Feb 2023 13:15)  T(F): 97.3 (16 Feb 2023 22:05), Max: 98.3 (16 Feb 2023 13:15)  HR: 120 (16 Feb 2023 22:05) (110 - 120)  BP: 111/62 (16 Feb 2023 22:05) (111/62 - 130/64)  BP(mean): --  ABP: --  ABP(mean): --  RR: 30 (16 Feb 2023 22:05) (17 - 30)  SpO2: 98% (16 Feb 2023 22:05) (97% - 98%)    O2 Parameters below as of 16 Feb 2023 22:05  Patient On (Oxygen Delivery Method): room air              02-15 @ 07:01  -  02-16 @ 07:00  --------------------------------------------------------  IN: 1590 mL / OUT: 1450 mL / NET: 140 mL    02-16 @ 07:01  -  02-17 @ 02:38  --------------------------------------------------------  IN: 825 mL / OUT: 0 mL / NET: 825 mL      CAPILLARY BLOOD GLUCOSE          PHYSICAL EXAM:  T(C): 36.3 (02-16-23 @ 22:05), Max: 36.8 (02-16-23 @ 13:15)  HR: 120 (02-16-23 @ 22:05) (110 - 120)  BP: 111/62 (02-16-23 @ 22:05) (111/62 - 130/64)  RR: 30 (02-16-23 @ 22:05) (17 - 30)  SpO2: 98% (02-16-23 @ 22:05) (97% - 98%)    PHYSICAL EXAM:  GENERAL: Appears lethargic, Resting in bed with BIPAP in place  HEAD:  Atraumatic, Normocephalic  EYES: EOMI, conjunctiva and sclera clear  ENMT: Moist mucous membranes  NECK: Supple, No JVD  CHEST/LUNG: +coarse breath sounds, +rhonchi  HEART: Regular rate and rhythm; No murmurs  ABDOMEN: Soft, Nontender, Nondistended; Bowel sounds present  EXTREMITIES:  2+ Peripheral Pulses, +R knee edema  LYMPH: No lymphadenopathy noted  SKIN: No rashes or lesions  NERVOUS SYSTEM:  AO    HOSPITAL MEDICATIONS:  MEDICATIONS  (STANDING):  acetaminophen    Suspension .. 650 milliGRAM(s) Oral every 6 hours  albuterol/ipratropium for Nebulization 3 milliLiter(s) Nebulizer every 6 hours  ascorbic acid 500 milliGRAM(s) Oral daily  chlorhexidine 2% Cloths 1 Application(s) Topical daily  enoxaparin Injectable 40 milliGRAM(s) SubCutaneous every 24 hours  gabapentin Solution 300 milliGRAM(s) Oral three times a day  levothyroxine 150 MICROGram(s) Oral daily  lidocaine   4% Patch 1 Patch Transdermal daily  methadone   Solution 2.5 milliGRAM(s) Oral every 12 hours  multivitamin 1 Tablet(s) Oral daily  pantoprazole   Suspension 40 milliGRAM(s) Oral every 24 hours  piperacillin/tazobactam IVPB.. 3.375 Gram(s) IV Intermittent every 8 hours  polyethylene glycol 3350 17 Gram(s) Oral two times a day  senna Syrup 10 milliLiter(s) Oral daily  sodium chloride 3%  Inhalation 4 milliLiter(s) Inhalation every 12 hours  sodium chloride 7% Inhalation 4 milliLiter(s) Inhalation every 12 hours    MEDICATIONS  (PRN):  levalbuterol Inhalation 0.63 milliGRAM(s) Inhalation every 6 hours PRN sob  naloxone Injectable 0.3 milliGRAM(s) IV Push every 3 minutes PRN opioid induced AMS  ondansetron Injectable 4 milliGRAM(s) IV Push every 8 hours PRN Nausea and/or Vomiting  oxyCODONE    IR 10 milliGRAM(s) Oral every 4 hours PRN Severe Pain (7 - 10)  oxyCODONE    IR 5 milliGRAM(s) Oral every 4 hours PRN Moderate Pain (4 - 6)      LABS:                        9.5    37.60 )-----------( 774      ( 17 Feb 2023 00:45 )             32.8     02-17    138  |  105  |  34<H>  ----------------------------<  146<H>  3.7   |  17<L>  |  1.07    Ca    13.1<HH>      17 Feb 2023 00:45  Phos  2.9     02-17  Mg     2.00     02-17    TPro  7.7  /  Alb  2.8<L>  /  TBili  0.3  /  DBili  x   /  AST  20  /  ALT  30  /  AlkPhos  262<H>  02-17    PT/INR - ( 16 Feb 2023 07:28 )   PT: 16.1 sec;   INR: 1.38 ratio         PTT - ( 16 Feb 2023 07:28 )  PTT:28.1 sec      Venous Blood Gas:  02-17 @ 00:45  7.29/40/44/19/65.3  VBG Lactate: 4.9  Venous Blood Gas:  02-16 @ 23:05  7.35/36/63/20/90.8  VBG Lactate: 3.7      MICROBIOLOGY:     RADIOLOGY:  [ ] Reviewed and interpreted by me    EKG:

## 2023-02-17 NOTE — PROVIDER CONTACT NOTE (OTHER) - ASSESSMENT
QU=198 on EKG, RR=38, SPO2=97% 3L nasal cannula -tachypnea BU=629 on EKG, RR=38, SPO2=97% 3L nasal cannula -tachypnea.aox4

## 2023-02-17 NOTE — PROGRESS NOTE ADULT - CONVERSATION DETAILS
Discussed code status with wife, son and patient at bedside. Explained role of intubation if his breathing were to worsen and chest compression if his heart were to stop. Explained the risk of not being able to come of ventilator if he were to go on it and unclear functional recovery after chest compression. Patient wants to take some time to discuss with his son and wife first prior to making a decision.

## 2023-02-17 NOTE — RAPID RESPONSE TEAM SUMMARY - NSSITUATIONBACKGROUNDRRT_GEN_ALL_CORE
Patient was tachypneic to 30s, tachycardic to 150s (sinus on ECG). Treated with levalbuterol. VBG with elevated lactate, CXR with L chest mass.   I called RRT for increase work of breathing and respiratory distress. At bedside, patient is gasping for air, large amount of secretions unable to cough out. Deep suctioning performed with large amount of thick, reddish secretions removed. Placed on duoneb without significant improvement in respiratory distress. Using accessory muscles and continues to have wheeze audible across room. Rectal temp febrile to 101, 1g IV tylenol given. Currently on zosyn. 1g vancomycin given. IVF for sepsis. spo2 80s initially on RA, improved to 95+ on nasal cannula.   Solumedrol 80mg IV given. Patient suctioned again and placed on bipap for work of breathing - not ideal given secretions, but trial noninvasive prior to intubation. Labs drawn. Morphine 3mg given for air hunger. Eventually patient more comfortable, RR improved from 40s to 30s, spo2 100% on 30% FiO2 on bipap.   MICU at bedside - currently not candidate.   Moved to telemetry floor.    Likely post obstructive pneumonia, less likely septic arthritis of knee.   [ ] see patient and discontinue bipap in an hour -> nasal cannula  [ ] continue duoneb q6h, hypertonic saline nebulizer bid, chest pt and deep suctioning q6h  [ ] will need GOC in day time - overnight family stated FULL CODE.   [ ] consult ID in am, f/u blood cultures  Patient was tachypneic to 30s, tachycardic to 150s (sinus on ECG). Treated with levalbuterol. VBG with elevated lactate, CXR with L chest mass.   I called RRT for increase work of breathing and respiratory distress. At bedside, patient is gasping for air, large amount of secretions unable to cough out. Deep suctioning performed with large amount of thick, reddish secretions removed. Placed on duoneb without significant improvement in respiratory distress. Using accessory muscles and continues to have wheeze audible across room. Rectal temp febrile to 101, 1g IV tylenol given. Currently on zosyn. 1g vancomycin given. IVF for sepsis. spo2 80s initially on RA, improved to 95+ on nasal cannula.   Solumedrol 80mg IV given. Patient suctioned again and placed on bipap for work of breathing - not ideal given secretions, but trial noninvasive prior to intubation. Labs drawn. Morphine 3mg given for air hunger. Eventually patient more comfortable, RR improved from 40s to 30s, spo2 100% on 30% FiO2 on bipap.   MICU at bedside - currently not candidate.   Moved to telemetry floor.    Likely sepsis 2/2 post obstructive pneumonia (elevated lactate, elevated procal, febrile, tachy), less likely septic arthritis of knee.   [ ] see patient and discontinue bipap in an hour -> nasal cannula  [ ] continue duoneb q6h, hypertonic saline nebulizer bid, chest pt and deep suctioning q6h  [ ] will need further GOC in day time - overnight family stated FULL CODE.   [ ] consult ID in am, f/u blood cultures     At risk of decompensating again.

## 2023-02-17 NOTE — PROGRESS NOTE ADULT - PROBLEM SELECTOR PLAN 2
elev serum Ca 11.9  PTH below normal, Phos low -> most likely hyperca of malignancy  ionized justo elevated, not responding much to IVFs.   mild to moderate hypercalcemia, unclear if patient's new onset abdominal pain and vomiting related  - EKG reviewed no ischemic changes  - 1L NS bolus, followed by 100cc/h maintenance -> will dc fluids as has been getting continuous fluids and lungs sound congested -> f/u official CXR read -> perihilar opacity  - S/p calcitonin on 2/13-2/14 and IVFs -> not much improvement in ionized calcium  - monitor icals, uptrending -> will add FW flushes  - might benefit from bisphosphonate if not responsive to calcitonin but would need dental eval -> would need imaging (CT maxillofacial) prior to extraction, which would be necessary prior to clearance -> dental extraction scheduled for 2/16 (bisphosphonate may not be able to be started for an extended period of time? will have to ask dental time frame) elev serum Ca 11.9  PTH below normal, Phos low -> most likely hyperca of malignancy  ionized justo elevated, not responding much to IVFs.   mild to moderate hypercalcemia, unclear if patient's new onset abdominal pain and vomiting related  - EKG reviewed no ischemic changes  - 1L NS bolus, followed by 100cc/h maintenance -> will dc fluids as has been getting continuous fluids and lungs sound congested -> f/u official CXR read -> perihilar opacity  - S/p calcitonin on 2/13-2/14 and IVFs -> not much improvement in ionized calcium  - monitor icals, uptrending -> will add FW flushes  - might benefit from bisphosphonate if not responsive to calcitonin but would need dental eval -> would need imaging (CT maxillofacial) prior to extraction, which would be necessary prior to clearance -> dental extraction 2/16 -> will need another extraction and can start bisphosphonate s/p 2 weeks

## 2023-02-17 NOTE — CHART NOTE - NSCHARTNOTEFT_GEN_A_CORE
Pt found to be desatting to the 80s and was place on 3L NC. Pt noted to have increased WOB and significant gurgling sounds. Respiratory was called for nasotracheal suctioning and treatement with xopenox/chest pt. CXR was obtained and looked roughly similar to previous. VBG showed lactate of 3.7. Pt was tachycardic to 140s and EKG was obtained which showed sinus tachycardia. Pt was afebrile and denied any pain at the time.  Pt was ordered for CTA to r/o PE.   Pt was re-examined and was significantly more tachypneic with RR in the 40s and HR in the 150s. RRT was called and pt was re-suctioned and given tx with duonebs and steroids. He was placed on BIPAP with improvement in respiratory rate. Pt was also found to be febrile and was given 1gram of Tylenol, dose of vancomycin, and IVF bolus. Repeat labs showed worsening WBC, lactate, and procalcitonin. MICU was consulted and pt was transferred to  for tele and continuous pulse ox. Pt was re-examined after rapid and had significant improvement in WOB and HR. He was placed back in 3L NC and was satting 97%. Pt placed on standing Duoneb     - Pts family was called and per goals of care conversation, family wanted the patient to be full code. They were okay with intubation if the patient required it. Family was updated throughout the night on the patient's status.

## 2023-02-17 NOTE — CONSULT NOTE ADULT - ATTENDING COMMENTS
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 MICU called to RRT for increased work of breathing and tachypnea in the setting of new fever.   sepsis possible 2/2 post ob PNA, now with new fever, worsening leukocytosis, elevated procalcitonin, pancx, vanco/zosyn, trend lactate  fever control  chets PT, frequent suctioning  patient not hypercapnic on blood gas, consider switching from bipap to HFNC given secretions  agree with moving patient to tele, continuos pulse oxim  palliative care eval  ongoing GOC, overall poor prognosis given advanced malignancy   reconsult as needed

## 2023-02-17 NOTE — PROVIDER CONTACT NOTE (OTHER) - ASSESSMENT
patient complains of shortness of breath. CA=711/61, Jd=119, temp=98.4 axillary, RR=40, Spo2=89% on room air.

## 2023-02-17 NOTE — PROVIDER CONTACT NOTE (OTHER) - ASSESSMENT
GE=358, SM=182/62, RR=30, SPO2=98% room air, temp=97.3 axillary- patient sounds very congested in throat and auscultation

## 2023-02-17 NOTE — PROVIDER CONTACT NOTE (OTHER) - ACTION/TREATMENT ORDERED:
Md aware and at bedside evaluating patient. VBG, chest xray, troponins, CT angio, nasal cannula, xopenex, and EKG ordered. Md aware and at bedside evaluating patient. JOHN Leija called to bedside. VBG, chest xray, suction, troponins, CT angio, nasal cannula, 0.3% sodium chloride, xopenex, and EKG ordered.

## 2023-02-17 NOTE — PROVIDER CONTACT NOTE (OTHER) - RECOMMENDATIONS
MD at bedside. Chest XRay, nasopharyngeal suction, supplemental o2 MD at bedside. Chest XRay, oronasopharyngeal suction, supplemental o2, nebulizer treatment

## 2023-02-17 NOTE — CONSULT NOTE ADULT - SUBJECTIVE AND OBJECTIVE BOX
Patient is a 67y old  Male who presents with a chief complaint of r. 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 knee, hypothyroidism, GERD, recent RUL pna s/p cefepime, r. patellar mass excision --> metastatic squamous cell carcinoma on 1/7/23 was admitted on 2/5/23  w/ right knee pain and swelling for the past 2 days. He also notes having pus like drainage. He is having difficulty w/ ambulation as a result. Had a fever outpatient 100.7 F, leukocytosis in ED to 27k. Denies dysuria, polyuria, melena, hematochezia, h/a, LOC, visual changes, cp. Radiation onc was consulted last admission, pt to f/u outpt for RT adjuvant. In the ED right knee arthrocentesis was done, cultures were sent.    ED course: given zosyn, Tm 37.9 C, , /58, RR 20 100%  CT PE shows improved RUL infiltrate   (05 Feb 2023 22:08)  Patient treated with abx - specified below  orthopedics following2/16 dental extraction - last night tacyhpnea, marked increase leukocytosis, increased oxygen requirement    Patient fatigued, congested, family at bedside      PAST MEDICAL & SURGICAL HISTORY:  HTN (hypertension)  GERD (gastroesophageal reflux disease)  Hypothyroidism  Neuropathic pain  Disorder of bone, unspecified    H/O constipation  Esophageal cancer  Lung metastases  S/P percutaneous endoscopic gastrostomy (PEG) tube placement      REVIEW OF SYSTEMS:  CONSTITUTIONAL: + weakness, feversNo  chills  EYES/ENT: No visual changes;  No vertigo or throat pain   NECK: No pain or stiffness  RESPIRATORY: + cough, No wheezing, hemoptysis;+ shortness of breath  CARDIOVASCULAR: No chest pain or palpitations  GASTROINTESTINAL: No abdominal or epigastric pain. No nausea, vomiting, or hematemesis; No diarrhea or constipation. No melena or hematochezia.  GENITOURINARY: No dysuria, frequency or hematuria  NEUROLOGICAL: No numbness or weakness  SKIN: No itching, burning, rashes, or lesions   continued right knee pain  All other review of systems is negative unless indicated above    Allergies  No Known Allergies    Antimicrobials:  piperacillin/tazobactam IVPB.. 3.375 Gram(s) IV Intermittent every 8 hours  vancomycin  IVPB 1000 milliGRAM(s) IV Intermittent every 12 hours    Vital Signs Last 24 Hrs  T(C): 36.4 (17 Feb 2023 12:35), Max: 37.7 (17 Feb 2023 03:46)  T(F): 97.6 (17 Feb 2023 12:35), Max: 99.8 (17 Feb 2023 03:46)  HR: 104 (17 Feb 2023 12:35) (102 - 155)  BP: 110/63 (17 Feb 2023 12:35) (96/61 - 115/65)  BP(mean): --  RR: 18 (17 Feb 2023 12:35) (18 - 40)  SpO2: 100% (17 Feb 2023 12:35) (89% - 100%)    Parameters below as of 17 Feb 2023 12:35  Patient On (Oxygen Delivery Method): nasal cannula  O2 Flow (L/min): 3      PHYSICAL EXAM:  General: ill appearing, marked fatigue NAD, Non-toxic  Neurology: A&Ox3, nonfocal  Respiratory: occasional cough, righ sidedrhonchi   - some transmitted breath sounds on Left  CV: RRR, S1S2, no murmurs, rubs or gallops  Abdominal: Soft, Non-tender, non-distended, normal bowel sounds  Extremities: RLE  edema, local tenderness  Line Sites: Clear  Skin: No rash                        8.5    42.82 )-----------( 754      ( 17 Feb 2023 04:30 )             29.1   WBC Count: 42.82 (02-17 @ 04:30)  6.9% Bands  WBC Count: 37.60 (02-17 @ 00:45)  WBC Count: 28.59 (02-16 @ 07:28)  WBC Count: 29.45 (02-15 @ 06:14)  WBC Count: 37.25 (02-14 @ 05:45)  WBC Count: 25.87 (02-13 @ 06:45)    02-17    138  |  104  |  32<H>  ----------------------------<  167<H>  3.4<L>   |  17<L>  |  1.07    Ca    12.7<H>      17 Feb 2023 04:30  Phos  3.4     02-17  Mg     2.00     02-17    TPro  7.3  /  Alb  2.9<L>  /  TBili  0.4  /  DBili  x   /  AST  18  /  ALT  30  /  AlkPhos  238<H>  02-17    l02.17.23 @ 04:30) Lactate, Blood: 4.7:  02.17.23 @ 00:45) Procalcitonin, Serum: 5.79    MICROBIOLOGY:  Clean Catch Clean Catch (Midstream)  02-13-23   <10,000 CFU/mL Normal Urogenital Susan  --  --      .Blood Blood-Peripheral  02-13-23   No growth to date.  --  --      .Blood Blood-Venous  02-13-23   No growth to date.  --  --      .Body Fluid Synovial Fluid  02-05-23   No growth at 5 days  --    No polymorphonuclear leukocytes per low power field  No organisms seen per oil power field  by cytocentrifuge      Clean Catch Clean Catch (Midstream)  02-05-23   No growth  --  --      .Blood Blood-Peripheral  02-05-23   No Growth Final  --  --      .Blood Blood-Peripheral  02-05-23   No Growth Final  --  --      .Blood Blood-Peripheral  01-24-23   No Growth Final  --  --      .Blood Blood-Venous  01-24-23   No Growth Final  --  --      .Blood Blood-Peripheral  01-23-23   No Growth Final  --  --    (02.06.23 @ 14:15)  MRSA PCR Result.: NotDetec:     Radiology:  images independently viewed  < from: Xray Chest 1 View-PORTABLE IMMEDIATE (Xray Chest 1 View-PORTABLE IMMEDIATE .) (02.16.23 @ 23:34) >  FINDINGS:  No interval change since the study of 3 days ago. Partial resection of   the left fourth rib with predominantly peripheral opacity overlying this   hemithorax.    The right lung is clear compatible port in place.    No effusion or congestion to suggestCHF.    No pneumothorax.    < end of copied text >  < from: CT Maxillofacial No Cont (02.14.23 @ 15:39) >  IMPRESSION: Multiple carious teeth and nonspecific periapical   radiolucency about the root of the right first premolar tooth.    < end of copied text >  < from: CT Angio Chest PE Protocol w/ IV Cont (02.05.23 @ 19:05) >  FINDINGS:    PULMONARY ANGIOGRAM: No pulmonary embolism in the main, left main, right   main, lobar, or segmental pulmonary arteries. Limited evaluation of the   subsegmental pulmonary arteries due to motion artifact.    LYMPH NODES: Redemonstrated enlarged lymph node in the azygoesophageal   recess measuring 1.8 x 1.3 cm (2:49). Few subcentimeter left   supraclavicular lymph nodes (2:17).    HEART/VASCULATURE: Heart size is normal. No pericardial effusion. Right   chest port with distal catheter tip in the SVC.    AIRWAYS/LUNGS/PLEURA: Central airways are patent. Patchy right upper lobe   ground glass opacities are decreased as compared with 1/10/2023. Large   mass in the left upper hemithorax invading to the chest wall with erosive   changes of the left third, fourth, and fifth ribs appears similar as   compared with 1/10/2023 measuring 10.6 x 7.3 x 11.5 cm. Associated   compressive atelectasis of the left upper and lower lobes. Small   bilateral pleural effusions.    UPPER ABDOMEN: Hyperdensity within the gallbladder lumen, possibly   secondary to biliary excretion of contrast. Small hiatal hernia.   Gastrostomy tube with tip in the stomach. Unchanged mild left adrenal   nodular thickening.    BONES/SOFT TISSUES: Left upper hemithorax mass invading the left chest   wall with destructive changes of the left third through fifth ribs as   described above.    IMPRESSION:    No pulmonary embolism to the level of the segmental pulmonary arteries.    Patchy right upper lobe ground glass opacities are decreased as compared   with1/10/2023.    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    < end of copied text >      Delonte Simms MD; Division of Infectious Disease; Pager: [phone]; nights and weekends: [phone]

## 2023-02-17 NOTE — PROVIDER CONTACT NOTE (OTHER) - RECOMMENDATIONS
MD Manrique and MAR at bedside. cardiac monitor, continuos pulse ox , chest PT ordered. waiting for bed assignment on telemetry. IVF and bumetanide ordered. MD Manrique and MAR at bedside. cardiac monitor, continuos pulse ox , chest PT ordered.

## 2023-02-17 NOTE — PROGRESS NOTE ADULT - ATTENDING COMMENTS
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 Found to have SIRS (tachy, leukocytosis) and c/f right knee septic arthritis s/p arthrocentesis on 2/5 not yielding any organisms however +hemarthrosis.    Patient seen and examined at bedside. Had RRT overnight for tachycardia and tachypneia. Febrile. Lot of secretion. Briefly on bipap now wean to NC. Reports feeling ok and denies pain. Per family at bedside, patient has a lot of thick sputum. Diffuse rhonchi noted on lung exam b/l, worse from prior.      #Acute respiratory failure with hypoxemia: possibly aspiration in the setting of multiple teeth extractions. RRT overnight and briefly on bipap now wean to NC. ID consult as below. Continue to wean O2    #SIRS criteria: fever, tachycardic, leukocytosis: considering infection vs malignant related fever vs clot (though LE negative for DVT on 2/5): repeat culture and MRSA sent overnight. ID consulted. Change from zosyn to meropenem. C/w vancomycin pending repeat MRSA    #Hemarthrosis - Right knee cultures NGTD, ESR/CRP elevated, suspect inflammation in setting of hemarthrosis and malignancy. Right knee immobilizer while out of bed    #Met SCC Esophageal Ca - Was planned to start RT as outpatient but never started. Previous right patellar bx path with SCC, Rad Onc with no present plans for RT inpatient    #Cancer pain - C/w methadone 2.5mg q12 per palliative. Will need palliative followup for continued methadone refills    #Hypercalcemia - Ca = 11.8, new from previously, suspect due to malignancy, low PTH, normal Vit D. Downtrended on IVF but still remains elevated. s/p calcitonin. Plan for bisphosphonate therapy once dental clears (potentially outpt). Dental clearance prior to starting as patient with poor dentition in process     PT recs: rehab but family wants to take home. Dispo on hold due to hypercalcemia.    Discussed w/ HS Dr. Uriarte.

## 2023-02-17 NOTE — PROVIDER CONTACT NOTE (OTHER) - ACTION/TREATMENT ORDERED:
Md aware and at bedside evaluating patient. JOHN Leija called to bedside. VBG, chest xray, suction, troponins, CT angio, nasal cannula, 0.3% sodium chloride, xopenex, and EKG ordered. Md at bedside evaluating patient. suction ordered , cardiac monitor, continuos pulse ox , chest PT ordered. waiting for bed assignment on telemetry. IVF and bumetanide ordered. monitoring patient

## 2023-02-18 NOTE — CHART NOTE - NSCHARTNOTEFT_GEN_A_CORE
Endocrine consulted for non pth mediated hypercalcemia likely 2/2 esophageal squamous cell carcinoma w/ bone mets.     Corrected CA today > 13   Received calcitonin x 4 doses 2/13-2/14  planned for dental extractions next week on thursday per primary team attending  currently on IVF 70cc/hr   pth appropriatley low   vitamin d 25 33 - adequate   Intact PTH: 5 pg/mL (02.08.23 @ 07:30)  PTH Related Peptide: <2.0 pmol/L (01.19.23 @ 06:10)  eGFR: 99 mL/min/1.73m2 (02.18.23 @ 06:42)    Recommendations:   - Calcitonin x 2 more doses  - weighing the risks of inadequately treated severe hypercalcemia vs the risk for ONJ, recommend Zometa 4mg IV x 1 dose   - aggressive IVF hydration as tolerated - if concern for fluid overload, may consider fluids with lasix & UOP monitoring   - monitor serum calcium w/ albumin at least daily   - repeat pthrp ( typically with squamous cell ca, pthrp mediated hypercalcemia is seen)   - check vitamin d 1, 25 dihydroxy level (currently on steroids, so if 1,25 dihydroxy vitamin d mediated, steroids should help with hypercalcemia)  - tele monitoring, daily ekg     recs discussed with Dr. Greenwood     formal consult to be done tomorrow     Suyapa Russell DO   Endocrine Fellow  For follow up questions, discharge recommendations, or new consults please call answering service at [phone] (weekdays), [phone] (nights/weekends). For nonurgent matters, please email [email or web address] or [email or web address]

## 2023-02-18 NOTE — PROGRESS NOTE ADULT - SUBJECTIVE AND OBJECTIVE BOX
Follow Up:  Fever, PNA     Interval History/ROS: Feels a little uncomfortable as has been spitting up blood and feels his breathing is still a little labored    REVIEW OF SYSTEMS  [  ] ROS unobtainable because:    [ x ] All other systems negative except as noted below    Constitutional:  [ ] fever [ ] chills  [ ] weight loss  [ ]night sweat  [ ]poor appetite/PO intake [ ]fatigue   Skin:  [ ] rash [ ] phlebitis	  Eyes: [ ] icterus [ ] pain  [ ] discharge	  ENMT: [ ] sore throat  [ ] thrush [ ] ulcers [ ] exudates [ ]anosmia  Respiratory: [ ] dyspnea [ ] hemoptysis [ ] cough [ ] sputum	  Cardiovascular:  [ ] chest pain [ ] palpitations [ ] edema	  Gastrointestinal:  [ ] nausea [ ] vomiting [ ] diarrhea [ ] constipation [ ] pain	  Genitourinary:  [ ] dysuria [ ] frequency [ ] hematuria [ ] discharge [ ] flank pain  [ ] incontinence  Musculoskeletal:  [ ] myalgias [ ] arthralgias [ ] arthritis  [ ] back pain  Neurological:  [ ] headache [ ] weakness [ ] seizures  [ ] confusion/altered mental status    Allergies  No Known Allergies        ANTIMICROBIALS:    meropenem  IVPB    meropenem  IVPB 1000 every 8 hours      ANTIMICROBIALS (past 90 days):  MEDICATIONS  (STANDING):    meropenem  IVPB   100 mL/Hr IV Intermittent (02-17-23 @ 16:09)    meropenem  IVPB   100 mL/Hr IV Intermittent (02-18-23 @ 05:56)   100 mL/Hr IV Intermittent (02-17-23 @ 22:31)    piperacillin/tazobactam IVPB.   200 mL/Hr IV Intermittent (02-13-23 @ 11:47)    piperacillin/tazobactam IVPB.-   25 mL/Hr IV Intermittent (02-13-23 @ 13:27)    piperacillin/tazobactam IVPB..   25 mL/Hr IV Intermittent (02-09-23 @ 05:41)   25 mL/Hr IV Intermittent (02-08-23 @ 21:43)   25 mL/Hr IV Intermittent (02-08-23 @ 13:09)   25 mL/Hr IV Intermittent (02-08-23 @ 05:28)   25 mL/Hr IV Intermittent (02-07-23 @ 22:33)   25 mL/Hr IV Intermittent (02-07-23 @ 13:34)   25 mL/Hr IV Intermittent (02-07-23 @ 05:19)   25 mL/Hr IV Intermittent (02-06-23 @ 21:19)   25 mL/Hr IV Intermittent (02-06-23 @ 14:05)   25 mL/Hr IV Intermittent (02-06-23 @ 05:42)   25 mL/Hr IV Intermittent (02-05-23 @ 22:18)    piperacillin/tazobactam IVPB..   25 mL/Hr IV Intermittent (02-17-23 @ 13:05)   25 mL/Hr IV Intermittent (02-17-23 @ 05:17)   25 mL/Hr IV Intermittent (02-16-23 @ 22:08)   25 mL/Hr IV Intermittent (02-16-23 @ 13:36)   25 mL/Hr IV Intermittent (02-16-23 @ 06:59)   25 mL/Hr IV Intermittent (02-15-23 @ 22:52)   25 mL/Hr IV Intermittent (02-15-23 @ 13:15)   25 mL/Hr IV Intermittent (02-15-23 @ 05:34)   25 mL/Hr IV Intermittent (02-14-23 @ 21:17)   25 mL/Hr IV Intermittent (02-14-23 @ 15:14)   25 mL/Hr IV Intermittent (02-14-23 @ 06:54)   25 mL/Hr IV Intermittent (02-13-23 @ 22:59)    piperacillin/tazobactam IVPB...   200 mL/Hr IV Intermittent (02-05-23 @ 14:32)    vancomycin  IVPB   250 mL/Hr IV Intermittent (02-17-23 @ 16:10)    vancomycin  IVPB   250 mL/Hr IV Intermittent (02-17-23 @ 01:08)    vancomycin  IVPB   250 mL/Hr IV Intermittent (02-06-23 @ 01:18)        OTHER MEDS: MEDICATIONS  (STANDING):  acetaminophen    Suspension .. 650 every 6 hours  enoxaparin Injectable 40 every 24 hours  gabapentin Solution 300 three times a day  levalbuterol Inhalation 0.63 every 6 hours  levothyroxine 150 daily  methadone   Solution 2.5 every 12 hours  ondansetron Injectable 4 every 8 hours PRN  oxyCODONE    IR 10 every 4 hours PRN  oxyCODONE    IR 5 every 4 hours PRN  pantoprazole   Suspension 40 every 24 hours  polyethylene glycol 3350 17 two times a day  senna Syrup 10 daily  sodium chloride 3%  Inhalation 4 every 12 hours      Vital Signs Last 24 Hrs  T(F): 98.6 (02-18-23 @ 05:50), Max: 102.9 (02-13-23 @ 10:05)    Vital Signs Last 24 Hrs  HR: 97 (02-18-23 @ 05:50) (94 - 109)  BP: 114/63 (02-18-23 @ 05:50) (104/60 - 116/62)  RR: 18 (02-18-23 @ 05:50)  SpO2: 97% (02-18-23 @ 05:50) (96% - 100%)  Wt(kg): --    EXAM:    GA: NAD  HEENT: oral cavity no lesion, blood in mouth  CV: nl S1/S2, no RMG  Lungs: congested, rhonchi and wheezes   Abd: BS+, soft, nontender, no rebounding pain, PEG+  Ext:  R knee erythema, edema with TTP, incisions cdi, ROM limited 2/2 pain. Mild pitting edema b/l LE  Neuro: No focal deficits  Skin: Intact  IV: no phlebitis    Labs:                        7.6    34.71 )-----------( 688      ( 18 Feb 2023 06:42 )             25.9     02-18    136  |  106  |  30<H>  ----------------------------<  76  4.4   |  18<L>  |  0.74    Ca    12.2<H>      18 Feb 2023 06:42  Phos  1.9     02-18  Mg     2.00     02-18    TPro  6.8  /  Alb  2.6<L>  /  TBili  <0.2  /  DBili  x   /  AST  16  /  ALT  21  /  AlkPhos  209<H>  02-18      WBC Trend:  WBC Count: 34.71 (02-18-23 @ 06:42)  WBC Count: 42.82 (02-17-23 @ 04:30)  WBC Count: 37.60 (02-17-23 @ 00:45)  WBC Count: 28.59 (02-16-23 @ 07:28)      Creatine Trend:  Creatinine, Serum: 0.74 (02-18)  Creatinine, Serum: 1.07 (02-17)  Creatinine, Serum: 1.07 (02-17)  Creatinine, Serum: 0.80 (02-16)      Liver Biochemical Testing Trend:  Alanine Aminotransferase (ALT/SGPT): 21 (02-18)  Alanine Aminotransferase (ALT/SGPT): 30 (02-17)  Alanine Aminotransferase (ALT/SGPT): 30 (02-17)  Alanine Aminotransferase (ALT/SGPT): 33 (02-16)  Alanine Aminotransferase (ALT/SGPT): 33 (02-15)  Aspartate Aminotransferase (AST/SGOT): 16 (02-18-23 @ 06:42)  Aspartate Aminotransferase (AST/SGOT): 18 (02-17-23 @ 04:30)  Aspartate Aminotransferase (AST/SGOT): 20 (02-17-23 @ 00:45)  Aspartate Aminotransferase (AST/SGOT): 23 (02-16-23 @ 07:28)  Aspartate Aminotransferase (AST/SGOT): 24 (02-15-23 @ 06:14)  Bilirubin Total, Serum: <0.2 (02-18)  Bilirubin Total, Serum: 0.4 (02-17)  Bilirubin Total, Serum: 0.3 (02-17)  Bilirubin Total, Serum: 0.3 (02-16)  Bilirubin Total, Serum: <0.2 (02-15)      Trend LDH  02-07-23 @ 05:42  180          MICROBIOLOGY:    MRSA PCR Result.: NotDetec (02-17-23 @ 08:25)  MRSA PCR Result.: NotDetec (02-06-23 @ 14:15)  MRSA PCR Result.: NotDetec (01-14-23 @ 02:40)  MRSA PCR Result.: NotDetec (01-11-23 @ 12:45)      Culture - Sputum (collected 17 Feb 2023 08:20)  Source: .Sputum Sputum    Culture - Blood (collected 17 Feb 2023 00:55)  Source: .Blood Blood-Peripheral  Preliminary Report:    No growth to date.    Culture - Blood (collected 17 Feb 2023 00:45)  Source: .Blood Blood-Venous  Preliminary Report:    No growth to date.    Culture - Urine (collected 13 Feb 2023 12:29)  Source: Clean Catch Clean Catch (Midstream)  Final Report:    <10,000 CFU/mL Normal Urogenital Susan    Culture - Blood (collected 13 Feb 2023 10:52)  Source: .Blood Blood-Peripheral  Preliminary Report:    No growth to date.    Culture - Blood (collected 13 Feb 2023 10:30)  Source: .Blood Blood-Venous  Preliminary Report:    No growth to date.    Culture - Body Fluid with Gram Stain (collected 05 Feb 2023 13:33)  Source: .Body Fluid Synovial Fluid  Preliminary Report:    No growth at 5 days    Culture - Urine (collected 05 Feb 2023 13:15)  Source: Clean Catch Clean Catch (Midstream)  Final Report:    No growth    Culture - Blood (collected 05 Feb 2023 09:25)  Source: .Blood Blood-Peripheral  Final Report:    No Growth Final    Culture - Blood (collected 05 Feb 2023 09:15)  Source: .Blood Blood-Peripheral  Final Report:    No Growth Final    Rapid RVP Result: NotDetec (02-13 @ 10:37)  Procalcitonin, Serum: 5.79 (02-17)  Procalcitonin, Serum: 5.68 (02-16)  Blood Gas Venous - Lactate: 4.5 (02-17 @ 04:30)    RADIOLOGY:  imaging below personally reviewed    < from: Xray Chest 1 View-PORTABLE IMMEDIATE (Xray Chest 1 View-PORTABLE IMMEDIATE .) (02.16.23 @ 23:34) >  Status post left thoracotomy for mass with opacification of the periphery of the left hemithorax but no interval change.   < end of copied text >      < from: CT Maxillofacial No Cont (02.14.23 @ 15:39) >   Multiple carious teeth and nonspecific periapical radiolucency about the root of the right first premolar tooth.   < end of copied text >    < from: Xray Chest 1 View- PORTABLE-Urgent (Xray Chest 1 View- PORTABLE-Urgent .) (02.13.23 @ 12:33) >  No significant change in left chest mass with atelectasis of the left lung.  Right perihilar patchy opacity.    < end of copied text >      < from: CT Angio Chest PE Protocol w/ IV Cont (02.05.23 @ 19:05) >  No pulmonary embolism to the level of the segmental pulmonary arteries. Patchy right upper lobe ground glass opacities are decreased as compared  with1/10/2023.  Unchanged large mass in the left upper hemithorax invading the left chest  wall with erosive changes of the left 3rd through 5th ribs.   < end of copied text >               Follow Up:  Fever, PNA     Interval History/ROS: Pt and wife at bedside report significant improvement in breathing. They report cough is chronic 2/2 the malignancy    REVIEW OF SYSTEMS  [  ] ROS unobtainable because:    [ x ] All other systems negative except as noted below    Constitutional:  [ ] fever [ ] chills  [ ] weight loss  [ ]night sweat  [ ]poor appetite/PO intake [ ]fatigue   Skin:  [ ] rash [ ] phlebitis	  Eyes: [ ] icterus [ ] pain  [ ] discharge	  ENMT: [ ] sore throat  [ ] thrush [ ] ulcers [ ] exudates [ ]anosmia  Respiratory: [ ] dyspnea [ ] hemoptysis [ ] cough [ ] sputum	  Cardiovascular:  [ ] chest pain [ ] palpitations [ ] edema	  Gastrointestinal:  [ ] nausea [ ] vomiting [ ] diarrhea [ ] constipation [ ] pain	  Genitourinary:  [ ] dysuria [ ] frequency [ ] hematuria [ ] discharge [ ] flank pain  [ ] incontinence  Musculoskeletal:  [ ] myalgias [ ] arthralgias [ ] arthritis  [ ] back pain  Neurological:  [ ] headache [ ] weakness [ ] seizures  [ ] confusion/altered mental status    Allergies  No Known Allergies        ANTIMICROBIALS:    meropenem  IVPB    meropenem  IVPB 1000 every 8 hours      ANTIMICROBIALS (past 90 days):  MEDICATIONS  (STANDING):    meropenem  IVPB   100 mL/Hr IV Intermittent (02-17-23 @ 16:09)    meropenem  IVPB   100 mL/Hr IV Intermittent (02-18-23 @ 05:56)   100 mL/Hr IV Intermittent (02-17-23 @ 22:31)    piperacillin/tazobactam IVPB.   200 mL/Hr IV Intermittent (02-13-23 @ 11:47)    piperacillin/tazobactam IVPB.-   25 mL/Hr IV Intermittent (02-13-23 @ 13:27)    piperacillin/tazobactam IVPB..   25 mL/Hr IV Intermittent (02-09-23 @ 05:41)   25 mL/Hr IV Intermittent (02-08-23 @ 21:43)   25 mL/Hr IV Intermittent (02-08-23 @ 13:09)   25 mL/Hr IV Intermittent (02-08-23 @ 05:28)   25 mL/Hr IV Intermittent (02-07-23 @ 22:33)   25 mL/Hr IV Intermittent (02-07-23 @ 13:34)   25 mL/Hr IV Intermittent (02-07-23 @ 05:19)   25 mL/Hr IV Intermittent (02-06-23 @ 21:19)   25 mL/Hr IV Intermittent (02-06-23 @ 14:05)   25 mL/Hr IV Intermittent (02-06-23 @ 05:42)   25 mL/Hr IV Intermittent (02-05-23 @ 22:18)    piperacillin/tazobactam IVPB..   25 mL/Hr IV Intermittent (02-17-23 @ 13:05)   25 mL/Hr IV Intermittent (02-17-23 @ 05:17)   25 mL/Hr IV Intermittent (02-16-23 @ 22:08)   25 mL/Hr IV Intermittent (02-16-23 @ 13:36)   25 mL/Hr IV Intermittent (02-16-23 @ 06:59)   25 mL/Hr IV Intermittent (02-15-23 @ 22:52)   25 mL/Hr IV Intermittent (02-15-23 @ 13:15)   25 mL/Hr IV Intermittent (02-15-23 @ 05:34)   25 mL/Hr IV Intermittent (02-14-23 @ 21:17)   25 mL/Hr IV Intermittent (02-14-23 @ 15:14)   25 mL/Hr IV Intermittent (02-14-23 @ 06:54)   25 mL/Hr IV Intermittent (02-13-23 @ 22:59)    piperacillin/tazobactam IVPB...   200 mL/Hr IV Intermittent (02-05-23 @ 14:32)    vancomycin  IVPB   250 mL/Hr IV Intermittent (02-17-23 @ 16:10)    vancomycin  IVPB   250 mL/Hr IV Intermittent (02-17-23 @ 01:08)    vancomycin  IVPB   250 mL/Hr IV Intermittent (02-06-23 @ 01:18)        OTHER MEDS: MEDICATIONS  (STANDING):  acetaminophen    Suspension .. 650 every 6 hours  enoxaparin Injectable 40 every 24 hours  gabapentin Solution 300 three times a day  levalbuterol Inhalation 0.63 every 6 hours  levothyroxine 150 daily  methadone   Solution 2.5 every 12 hours  ondansetron Injectable 4 every 8 hours PRN  oxyCODONE    IR 10 every 4 hours PRN  oxyCODONE    IR 5 every 4 hours PRN  pantoprazole   Suspension 40 every 24 hours  polyethylene glycol 3350 17 two times a day  senna Syrup 10 daily  sodium chloride 3%  Inhalation 4 every 12 hours      Vital Signs Last 24 Hrs  T(F): 98.6 (02-18-23 @ 05:50), Max: 102.9 (02-13-23 @ 10:05)    Vital Signs Last 24 Hrs  HR: 97 (02-18-23 @ 05:50) (94 - 109)  BP: 114/63 (02-18-23 @ 05:50) (104/60 - 116/62)  RR: 18 (02-18-23 @ 05:50)  SpO2: 97% (02-18-23 @ 05:50) (96% - 100%)  Wt(kg): --    EXAM:    GA: NAD  HEENT: oral cavity no lesion, blood in mouth  CV: nl S1/S2, no RMG  Lungs: congested, rhonchi and wheezes   Abd: BS+, soft, nontender, no rebounding pain, PEG+  Ext:  R knee erythema, edema with TTP, incisions cdi, ROM limited 2/2 pain. Mild pitting edema b/l LE  Neuro: No focal deficits  Skin: Intact  IV: no phlebitis    Labs:                        7.6    34.71 )-----------( 688      ( 18 Feb 2023 06:42 )             25.9     02-18    136  |  106  |  30<H>  ----------------------------<  76  4.4   |  18<L>  |  0.74    Ca    12.2<H>      18 Feb 2023 06:42  Phos  1.9     02-18  Mg     2.00     02-18    TPro  6.8  /  Alb  2.6<L>  /  TBili  <0.2  /  DBili  x   /  AST  16  /  ALT  21  /  AlkPhos  209<H>  02-18      WBC Trend:  WBC Count: 34.71 (02-18-23 @ 06:42)  WBC Count: 42.82 (02-17-23 @ 04:30)  WBC Count: 37.60 (02-17-23 @ 00:45)  WBC Count: 28.59 (02-16-23 @ 07:28)      Creatine Trend:  Creatinine, Serum: 0.74 (02-18)  Creatinine, Serum: 1.07 (02-17)  Creatinine, Serum: 1.07 (02-17)  Creatinine, Serum: 0.80 (02-16)      Liver Biochemical Testing Trend:  Alanine Aminotransferase (ALT/SGPT): 21 (02-18)  Alanine Aminotransferase (ALT/SGPT): 30 (02-17)  Alanine Aminotransferase (ALT/SGPT): 30 (02-17)  Alanine Aminotransferase (ALT/SGPT): 33 (02-16)  Alanine Aminotransferase (ALT/SGPT): 33 (02-15)  Aspartate Aminotransferase (AST/SGOT): 16 (02-18-23 @ 06:42)  Aspartate Aminotransferase (AST/SGOT): 18 (02-17-23 @ 04:30)  Aspartate Aminotransferase (AST/SGOT): 20 (02-17-23 @ 00:45)  Aspartate Aminotransferase (AST/SGOT): 23 (02-16-23 @ 07:28)  Aspartate Aminotransferase (AST/SGOT): 24 (02-15-23 @ 06:14)  Bilirubin Total, Serum: <0.2 (02-18)  Bilirubin Total, Serum: 0.4 (02-17)  Bilirubin Total, Serum: 0.3 (02-17)  Bilirubin Total, Serum: 0.3 (02-16)  Bilirubin Total, Serum: <0.2 (02-15)      Trend LDH  02-07-23 @ 05:42  180          MICROBIOLOGY:    MRSA PCR Result.: NotDetec (02-17-23 @ 08:25)  MRSA PCR Result.: NotDetec (02-06-23 @ 14:15)  MRSA PCR Result.: NotDetec (01-14-23 @ 02:40)  MRSA PCR Result.: NotDetec (01-11-23 @ 12:45)      Culture - Sputum (collected 17 Feb 2023 08:20)  Source: .Sputum Sputum    Culture - Blood (collected 17 Feb 2023 00:55)  Source: .Blood Blood-Peripheral  Preliminary Report:    No growth to date.    Culture - Blood (collected 17 Feb 2023 00:45)  Source: .Blood Blood-Venous  Preliminary Report:    No growth to date.    Culture - Urine (collected 13 Feb 2023 12:29)  Source: Clean Catch Clean Catch (Midstream)  Final Report:    <10,000 CFU/mL Normal Urogenital Susan    Culture - Blood (collected 13 Feb 2023 10:52)  Source: .Blood Blood-Peripheral  Preliminary Report:    No growth to date.    Culture - Blood (collected 13 Feb 2023 10:30)  Source: .Blood Blood-Venous  Preliminary Report:    No growth to date.    Culture - Body Fluid with Gram Stain (collected 05 Feb 2023 13:33)  Source: .Body Fluid Synovial Fluid  Preliminary Report:    No growth at 5 days    Culture - Urine (collected 05 Feb 2023 13:15)  Source: Clean Catch Clean Catch (Midstream)  Final Report:    No growth    Culture - Blood (collected 05 Feb 2023 09:25)  Source: .Blood Blood-Peripheral  Final Report:    No Growth Final    Culture - Blood (collected 05 Feb 2023 09:15)  Source: .Blood Blood-Peripheral  Final Report:    No Growth Final    Rapid RVP Result: NotDetec (02-13 @ 10:37)  Procalcitonin, Serum: 5.79 (02-17)  Procalcitonin, Serum: 5.68 (02-16)  Blood Gas Venous - Lactate: 4.5 (02-17 @ 04:30)    RADIOLOGY:  imaging below personally reviewed    < from: Xray Chest 1 View-PORTABLE IMMEDIATE (Xray Chest 1 View-PORTABLE IMMEDIATE .) (02.16.23 @ 23:34) >  Status post left thoracotomy for mass with opacification of the periphery of the left hemithorax but no interval change.   < end of copied text >      < from: CT Maxillofacial No Cont (02.14.23 @ 15:39) >   Multiple carious teeth and nonspecific periapical radiolucency about the root of the right first premolar tooth.   < end of copied text >    < from: Xray Chest 1 View- PORTABLE-Urgent (Xray Chest 1 View- PORTABLE-Urgent .) (02.13.23 @ 12:33) >  No significant change in left chest mass with atelectasis of the left lung.  Right perihilar patchy opacity.    < end of copied text >      < from: CT Angio Chest PE Protocol w/ IV Cont (02.05.23 @ 19:05) >  No pulmonary embolism to the level of the segmental pulmonary arteries. Patchy right upper lobe ground glass opacities are decreased as compared  with1/10/2023.  Unchanged large mass in the left upper hemithorax invading the left chest  wall with erosive changes of the left 3rd through 5th ribs.   < end of copied text >               Follow Up:  Fever, PNA     Interval History/ROS: Pt and wife at bedside report significant improvement in breathing. They report cough is chronic 2/2 the malignancy    REVIEW OF SYSTEMS  Afebrile, no abdominal pain, more awake per wife today, communicative.       Allergies  No Known Allergies        ANTIMICROBIALS:    meropenem  IVPB    meropenem  IVPB 1000 every 8 hours      ANTIMICROBIALS (past 90 days):  MEDICATIONS  (STANDING):    meropenem  IVPB   100 mL/Hr IV Intermittent (02-17-23 @ 16:09)    meropenem  IVPB   100 mL/Hr IV Intermittent (02-18-23 @ 05:56)   100 mL/Hr IV Intermittent (02-17-23 @ 22:31)    piperacillin/tazobactam IVPB.   200 mL/Hr IV Intermittent (02-13-23 @ 11:47)    piperacillin/tazobactam IVPB.-   25 mL/Hr IV Intermittent (02-13-23 @ 13:27)    piperacillin/tazobactam IVPB..   25 mL/Hr IV Intermittent (02-09-23 @ 05:41)   25 mL/Hr IV Intermittent (02-08-23 @ 21:43)   25 mL/Hr IV Intermittent (02-08-23 @ 13:09)   25 mL/Hr IV Intermittent (02-08-23 @ 05:28)   25 mL/Hr IV Intermittent (02-07-23 @ 22:33)   25 mL/Hr IV Intermittent (02-07-23 @ 13:34)   25 mL/Hr IV Intermittent (02-07-23 @ 05:19)   25 mL/Hr IV Intermittent (02-06-23 @ 21:19)   25 mL/Hr IV Intermittent (02-06-23 @ 14:05)   25 mL/Hr IV Intermittent (02-06-23 @ 05:42)   25 mL/Hr IV Intermittent (02-05-23 @ 22:18)    piperacillin/tazobactam IVPB..   25 mL/Hr IV Intermittent (02-17-23 @ 13:05)   25 mL/Hr IV Intermittent (02-17-23 @ 05:17)   25 mL/Hr IV Intermittent (02-16-23 @ 22:08)   25 mL/Hr IV Intermittent (02-16-23 @ 13:36)   25 mL/Hr IV Intermittent (02-16-23 @ 06:59)   25 mL/Hr IV Intermittent (02-15-23 @ 22:52)   25 mL/Hr IV Intermittent (02-15-23 @ 13:15)   25 mL/Hr IV Intermittent (02-15-23 @ 05:34)   25 mL/Hr IV Intermittent (02-14-23 @ 21:17)   25 mL/Hr IV Intermittent (02-14-23 @ 15:14)   25 mL/Hr IV Intermittent (02-14-23 @ 06:54)   25 mL/Hr IV Intermittent (02-13-23 @ 22:59)    piperacillin/tazobactam IVPB...   200 mL/Hr IV Intermittent (02-05-23 @ 14:32)    vancomycin  IVPB   250 mL/Hr IV Intermittent (02-17-23 @ 16:10)    vancomycin  IVPB   250 mL/Hr IV Intermittent (02-17-23 @ 01:08)    vancomycin  IVPB   250 mL/Hr IV Intermittent (02-06-23 @ 01:18)        OTHER MEDS: MEDICATIONS  (STANDING):  acetaminophen    Suspension .. 650 every 6 hours  enoxaparin Injectable 40 every 24 hours  gabapentin Solution 300 three times a day  levalbuterol Inhalation 0.63 every 6 hours  levothyroxine 150 daily  methadone   Solution 2.5 every 12 hours  ondansetron Injectable 4 every 8 hours PRN  oxyCODONE    IR 10 every 4 hours PRN  oxyCODONE    IR 5 every 4 hours PRN  pantoprazole   Suspension 40 every 24 hours  polyethylene glycol 3350 17 two times a day  senna Syrup 10 daily  sodium chloride 3%  Inhalation 4 every 12 hours      Vital Signs Last 24 Hrs  T(F): 98.6 (02-18-23 @ 05:50), Max: 102.9 (02-13-23 @ 10:05)    Vital Signs Last 24 Hrs  HR: 97 (02-18-23 @ 05:50) (94 - 109)  BP: 114/63 (02-18-23 @ 05:50) (104/60 - 116/62)  RR: 18 (02-18-23 @ 05:50)  SpO2: 97% (02-18-23 @ 05:50) (96% - 100%)  Wt(kg): --      EXAM:  GA: NAD  HEENT: oral cavity no lesion, blood in mouth  CV: nl S1/S2,  Lungs: congested, rhonchi and wheezes   Abd: BS+, soft, nontender, PEG+  Ext:  R knee erythema, ulceration, hyperpigmentation, edema with TTP, incisions cdi, ROM limited 2/2 pain. Mild pitting edema b/l LE  IV: no phlebitis, + port       Labs:                        7.6    34.71 )-----------( 688      ( 18 Feb 2023 06:42 )             25.9     02-18    136  |  106  |  30<H>  ----------------------------<  76  4.4   |  18<L>  |  0.74    Ca    12.2<H>      18 Feb 2023 06:42  Phos  1.9     02-18  Mg     2.00     02-18    TPro  6.8  /  Alb  2.6<L>  /  TBili  <0.2  /  DBili  x   /  AST  16  /  ALT  21  /  AlkPhos  209<H>  02-18      WBC Trend:  WBC Count: 34.71 (02-18-23 @ 06:42)  WBC Count: 42.82 (02-17-23 @ 04:30)  WBC Count: 37.60 (02-17-23 @ 00:45)  WBC Count: 28.59 (02-16-23 @ 07:28)      Creatine Trend:  Creatinine, Serum: 0.74 (02-18)  Creatinine, Serum: 1.07 (02-17)  Creatinine, Serum: 1.07 (02-17)  Creatinine, Serum: 0.80 (02-16)      Liver Biochemical Testing Trend:  Alanine Aminotransferase (ALT/SGPT): 21 (02-18)  Alanine Aminotransferase (ALT/SGPT): 30 (02-17)  Alanine Aminotransferase (ALT/SGPT): 30 (02-17)  Alanine Aminotransferase (ALT/SGPT): 33 (02-16)  Alanine Aminotransferase (ALT/SGPT): 33 (02-15)  Aspartate Aminotransferase (AST/SGOT): 16 (02-18-23 @ 06:42)  Aspartate Aminotransferase (AST/SGOT): 18 (02-17-23 @ 04:30)  Aspartate Aminotransferase (AST/SGOT): 20 (02-17-23 @ 00:45)  Aspartate Aminotransferase (AST/SGOT): 23 (02-16-23 @ 07:28)  Aspartate Aminotransferase (AST/SGOT): 24 (02-15-23 @ 06:14)  Bilirubin Total, Serum: <0.2 (02-18)  Bilirubin Total, Serum: 0.4 (02-17)  Bilirubin Total, Serum: 0.3 (02-17)  Bilirubin Total, Serum: 0.3 (02-16)  Bilirubin Total, Serum: <0.2 (02-15)      Trend LDH  02-07-23 @ 05:42  180          MICROBIOLOGY:    MRSA PCR Result.: NotDetec (02-17-23 @ 08:25)  MRSA PCR Result.: NotDetec (02-06-23 @ 14:15)  MRSA PCR Result.: NotDetec (01-14-23 @ 02:40)  MRSA PCR Result.: NotDetec (01-11-23 @ 12:45)      Culture - Sputum (collected 17 Feb 2023 08:20)  Source: .Sputum Sputum    Culture - Blood (collected 17 Feb 2023 00:55)  Source: .Blood Blood-Peripheral  Preliminary Report:    No growth to date.    Culture - Blood (collected 17 Feb 2023 00:45)  Source: .Blood Blood-Venous  Preliminary Report:    No growth to date.    Culture - Urine (collected 13 Feb 2023 12:29)  Source: Clean Catch Clean Catch (Midstream)  Final Report:    <10,000 CFU/mL Normal Urogenital Susan    Culture - Blood (collected 13 Feb 2023 10:52)  Source: .Blood Blood-Peripheral  Preliminary Report:    No growth to date.    Culture - Blood (collected 13 Feb 2023 10:30)  Source: .Blood Blood-Venous  Preliminary Report:    No growth to date.    Culture - Body Fluid with Gram Stain (collected 05 Feb 2023 13:33)  Source: .Body Fluid Synovial Fluid  Preliminary Report:    No growth at 5 days    Culture - Urine (collected 05 Feb 2023 13:15)  Source: Clean Catch Clean Catch (Midstream)  Final Report:    No growth    Culture - Blood (collected 05 Feb 2023 09:25)  Source: .Blood Blood-Peripheral  Final Report:    No Growth Final    Culture - Blood (collected 05 Feb 2023 09:15)  Source: .Blood Blood-Peripheral  Final Report:    No Growth Final    Rapid RVP Result: NotDetec (02-13 @ 10:37)  Procalcitonin, Serum: 5.79 (02-17)  Procalcitonin, Serum: 5.68 (02-16)  Blood Gas Venous - Lactate: 4.5 (02-17 @ 04:30)        RADIOLOGY:  imaging below personally reviewed    < from: Xray Chest 1 View-PORTABLE IMMEDIATE (Xray Chest 1 View-PORTABLE IMMEDIATE .) (02.16.23 @ 23:34) >  Status post left thoracotomy for mass with opacification of the periphery of the left hemithorax but no interval change.       < from: CT Maxillofacial No Cont (02.14.23 @ 15:39) >   Multiple carious teeth and nonspecific periapical radiolucency about the root of the right first premolar tooth.       < from: Xray Chest 1 View- PORTABLE-Urgent (Xray Chest 1 View- PORTABLE-Urgent .) (02.13.23 @ 12:33) >  No significant change in left chest mass with atelectasis of the left lung.  Right perihilar patchy opacity.      < from: CT Angio Chest PE Protocol w/ IV Cont (02.05.23 @ 19:05) >  No pulmonary embolism to the level of the segmental pulmonary arteries. Patchy right upper lobe ground glass opacities are decreased as compared  with1/10/2023.  Unchanged large mass in the left upper hemithorax invading the left chest  wall with erosive changes of the left 3rd through 5th ribs.

## 2023-02-18 NOTE — PROGRESS NOTE ADULT - PROBLEM SELECTOR PLAN 1
baseline tachy in 110s likely in setting of medical disease cancer but newly tachy to 160s 2/13  baseline leukocytosis in 20k range -> increased to 35k 2/14  newly febrile 2/13  MRSA neg at admission  unclear source; initial concern for septic arthritis but tap neg for infection; possible aspiration pneumonia given CXR findings  PCT elevated to 5  Lactate now > 4  -BCx -> NGTD  -UA/Ucx neg  -CXR to look for pneumonia given concern for aspiration -> right perihilar opacity  -RVP neg  -empiric Zosyn -> transition to oral on dc to complete 7 day course (until 2/20)  -trend WBC/monitor fever curve -> febrile again and spike in WBC  -add vancomycin and do MRSA/MSSA  -collect sputum cx -> communicated with nursing  -will have to hold off on dental procedure due to clinical status  -consider ID c/s as unclear source; aspiration pneumonia vs post-obstructive vs malignancy  -CTA ordered to r/o PE at RRT baseline tachy in 110s likely in setting of medical disease cancer but newly tachy to 160s 2/13  baseline leukocytosis in 20k range -> increased to 35k 2/14  newly febrile 2/13  MRSA neg at admission  unclear source; initial concern for septic arthritis but tap neg for infection; possible aspiration pneumonia given CXR findings  PCT elevated to 5  Lactate now > 4  -BCx -> NGTD  -UA/Ucx neg  -CXR to look for pneumonia given concern for aspiration -> right perihilar opacity  -RVP neg  -empiric Zosyn -> switched to uma due to fevers as per ID  -trend WBC/monitor fever curve -> febrile again and spike in WBC  -add vancomycin and do MRSA/MSSA -> dc vanc as MRSA neg  -collect sputum cx -> communicated with nursing -> oropharyngeal contamination ?  -will have to hold off on dental procedure due to clinical status  -ID c/s as unclear source; aspiration pneumonia vs post-obstructive vs malignancy  -CTA ordered to r/o PE at RRT

## 2023-02-18 NOTE — PROGRESS NOTE ADULT - PROBLEM SELECTOR PLAN 2
elev serum Ca 11.9  PTH below normal, Phos low -> most likely hyperca of malignancy  ionized justo elevated, not responding much to IVFs.   mild to moderate hypercalcemia, unclear if patient's new onset abdominal pain and vomiting related  - EKG reviewed no ischemic changes  - 1L NS bolus, followed by 100cc/h maintenance -> will dc fluids as has been getting continuous fluids and lungs sound congested -> f/u official CXR read -> perihilar opacity  - S/p calcitonin on 2/13-2/14 and IVFs -> not much improvement in ionized calcium  - monitor icals, uptrending -> will add FW flushes  - might benefit from bisphosphonate if not responsive to calcitonin but would need dental eval -> would need imaging (CT maxillofacial) prior to extraction, which would be necessary prior to clearance -> dental extraction 2/16 -> will need another extraction and can start bisphosphonate s/p 2 weeks elev serum Ca 11.9  PTH below normal, Phos low -> most likely hyperca of malignancy  ionized justo elevated, not responding much to IVFs.   mild to moderate hypercalcemia, unclear if patient's new onset abdominal pain and vomiting related  - EKG reviewed no ischemic changes  - 1L NS bolus, followed by 100cc/h maintenance -> will dc fluids as has been getting continuous fluids and lungs sound congested -> f/u official CXR read -> perihilar opacity -> can consider fluids in 10-12 hour periods  - S/p calcitonin on 2/13-2/14 and IVFs -> not much improvement in ionized calcium  - monitor icals, uptrending -> will add FW flushes  - might benefit from bisphosphonate if not responsive to calcitonin but would need dental eval -> would need imaging (CT maxillofacial) prior to extraction, which would be necessary prior to clearance -> dental extraction 2/16 -> will need another extraction and can start bisphosphonate s/p 2 weeks

## 2023-02-18 NOTE — PROGRESS NOTE ADULT - SUBJECTIVE AND OBJECTIVE BOX
Sultan Chapito MD  PGY 1 Department of Internal Medicine        Patient is a 67y old  Male who presents with a chief complaint of r. knee pain (17 Feb 2023 15:22)      SUBJECTIVE / OVERNIGHT EVENTS: Pt seen and examined. No acute overnight events. Denies fevers, chills, CP, SOB, Abdominal pain, N/V, Constipation, Diarrhea        MEDICATIONS  (STANDING):  acetaminophen    Suspension .. 650 milliGRAM(s) Oral every 6 hours  albuterol/ipratropium for Nebulization 3 milliLiter(s) Nebulizer every 6 hours  albuterol/ipratropium for Nebulization 3 milliLiter(s) Nebulizer every 6 hours  ascorbic acid 500 milliGRAM(s) Oral daily  chlorhexidine 2% Cloths 1 Application(s) Topical daily  enoxaparin Injectable 40 milliGRAM(s) SubCutaneous every 24 hours  gabapentin Solution 300 milliGRAM(s) Oral three times a day  lactated ringers. 1000 milliLiter(s) (75 mL/Hr) IV Continuous <Continuous>  levothyroxine 150 MICROGram(s) Oral daily  lidocaine   4% Patch 1 Patch Transdermal daily  meropenem  IVPB      meropenem  IVPB 1000 milliGRAM(s) IV Intermittent every 8 hours  methadone   Solution 2.5 milliGRAM(s) Oral every 12 hours  multivitamin 1 Tablet(s) Oral daily  pantoprazole   Suspension 40 milliGRAM(s) Oral every 24 hours  polyethylene glycol 3350 17 Gram(s) Oral two times a day  senna Syrup 10 milliLiter(s) Oral daily  sodium chloride 3%  Inhalation 4 milliLiter(s) Inhalation every 12 hours    MEDICATIONS  (PRN):  levalbuterol Inhalation 0.63 milliGRAM(s) Inhalation every 6 hours PRN sob  naloxone Injectable 0.3 milliGRAM(s) IV Push every 3 minutes PRN opioid induced AMS  ondansetron Injectable 4 milliGRAM(s) IV Push every 8 hours PRN Nausea and/or Vomiting  oxyCODONE    IR 10 milliGRAM(s) Oral every 4 hours PRN Severe Pain (7 - 10)  oxyCODONE    IR 5 milliGRAM(s) Oral every 4 hours PRN Moderate Pain (4 - 6)      I&O's Summary    16 Feb 2023 07:01  -  17 Feb 2023 07:00  --------------------------------------------------------  IN: 1050 mL / OUT: 0 mL / NET: 1050 mL    17 Feb 2023 07:01  -  18 Feb 2023 05:05  --------------------------------------------------------  IN: 1425 mL / OUT: 575 mL / NET: 850 mL        Vital Signs Last 24 Hrs  T(C): 36.8 (18 Feb 2023 03:00), Max: 37.3 (17 Feb 2023 05:40)  T(F): 98.2 (18 Feb 2023 03:00), Max: 99.2 (17 Feb 2023 05:40)  HR: 94 (18 Feb 2023 03:00) (94 - 109)  BP: 116/62 (18 Feb 2023 03:00) (104/60 - 116/62)  BP(mean): --  RR: 17 (18 Feb 2023 03:00) (17 - 20)  SpO2: 98% (18 Feb 2023 03:00) (96% - 100%)    Parameters below as of 18 Feb 2023 03:00  Patient On (Oxygen Delivery Method): nasal cannula  O2 Flow (L/min): 3      CAPILLARY BLOOD GLUCOSE      POCT Blood Glucose.: 206 mg/dL (17 Feb 2023 07:23)  POCT Blood Glucose.: 247 mg/dL (17 Feb 2023 06:08)      PHYSICAL EXAM:  GENERAL: NAD  HEENT: hoarse voice noted; some blood noted in mouth s/p dental procedure  NECK: Supple, No JVD  CHEST/LUNG: rhonchi and wheezes present as before, on 3L NC  HEART: Tachycardic with regular rhythm; No murmurs, rubs, or gallops  ABDOMEN: Soft, Nontender, Nondistended; Bowel sounds present, + PEG tube in place cdi  EXTREMITIES:  2+ Peripheral Pulses, No clubbing, cyanosis. + R knee erythema, edema with TTP, incisions cdi, ROM limited 2/2 pain  NEUROLOGY: nonfocal. orientation difficult to assess as patient's communicaiton limited today  SKIN: No rashes or lesions        LABS:                        8.5    42.82 )-----------( 754      ( 17 Feb 2023 04:30 )             29.1     Auto Eosinophil # 0.00  / Auto Eosinophil % 0.0   / Auto Neutrophil # 42.05 / Auto Neutrophil % 91.3  / BANDS % 6.9                          9.5    37.60 )-----------( 774      ( 17 Feb 2023 00:45 )             32.8     Auto Eosinophil # x     / Auto Eosinophil % x     / Auto Neutrophil # x     / Auto Neutrophil % x     / BANDS % x                            8.8    28.59 )-----------( 773      ( 16 Feb 2023 07:28 )             28.6     Auto Eosinophil # 0.51  / Auto Eosinophil % 1.8   / Auto Neutrophil # 25.85 / Auto Neutrophil % 90.4  / BANDS % x        02-17    138  |  104  |  32<H>  ----------------------------<  167<H>  3.4<L>   |  17<L>  |  1.07  02-17    138  |  105  |  34<H>  ----------------------------<  146<H>  3.7   |  17<L>  |  1.07  02-16    141  |  109<H>  |  31<H>  ----------------------------<  99  4.2   |  19<L>  |  0.80    Ca    12.7<H>      17 Feb 2023 04:30  Mg     2.00     02-17  Phos  3.4     02-17  TPro  7.3  /  Alb  2.9<L>  /  TBili  0.4  /  DBili  x   /  AST  18  /  ALT  30  /  AlkPhos  238<H>  02-17  TPro  7.7  /  Alb  2.8<L>  /  TBili  0.3  /  DBili  x   /  AST  20  /  ALT  30  /  AlkPhos  262<H>  02-17  TPro  7.9  /  Alb  2.9<L>  /  TBili  0.3  /  DBili  x   /  AST  23  /  ALT  33  /  AlkPhos  272<H>  02-16    PT/INR - ( 16 Feb 2023 07:28 )   PT: 16.1 sec;   INR: 1.38 ratio         PTT - ( 16 Feb 2023 07:28 )  PTT:28.1 sec        Lactate, Blood: 4.7 mmol/L (02-17 @ 04:30)        RADIOLOGY & ADDITIONAL TESTS:    Imaging Personally Reviewed:    Consultant(s) Notes Reviewed:      Care Discussed with Consultants/Other Providers:   Sultan Chapito MD  PGY 1 Department of Internal Medicine        Patient is a 67y old  Male who presents with a chief complaint of r. knee pain (17 Feb 2023 15:22)      SUBJECTIVE / OVERNIGHT EVENTS: Pt seen and examined. No acute overnight events. 10 beast NSVT, otherwise sinus tachycardia. Denies fevers, chills, CP, SOB, Abdominal pain, N/V, Constipation, Diarrhea. Feels a little uncomfortable as has been spitting up blood and feels his breathing is still a little labored. Informed nursing staff to avoid suctioning his mouth as it may dislodge clots.         MEDICATIONS  (STANDING):  acetaminophen    Suspension .. 650 milliGRAM(s) Oral every 6 hours  albuterol/ipratropium for Nebulization 3 milliLiter(s) Nebulizer every 6 hours  albuterol/ipratropium for Nebulization 3 milliLiter(s) Nebulizer every 6 hours  ascorbic acid 500 milliGRAM(s) Oral daily  chlorhexidine 2% Cloths 1 Application(s) Topical daily  enoxaparin Injectable 40 milliGRAM(s) SubCutaneous every 24 hours  gabapentin Solution 300 milliGRAM(s) Oral three times a day  lactated ringers. 1000 milliLiter(s) (75 mL/Hr) IV Continuous <Continuous>  levothyroxine 150 MICROGram(s) Oral daily  lidocaine   4% Patch 1 Patch Transdermal daily  meropenem  IVPB      meropenem  IVPB 1000 milliGRAM(s) IV Intermittent every 8 hours  methadone   Solution 2.5 milliGRAM(s) Oral every 12 hours  multivitamin 1 Tablet(s) Oral daily  pantoprazole   Suspension 40 milliGRAM(s) Oral every 24 hours  polyethylene glycol 3350 17 Gram(s) Oral two times a day  senna Syrup 10 milliLiter(s) Oral daily  sodium chloride 3%  Inhalation 4 milliLiter(s) Inhalation every 12 hours    MEDICATIONS  (PRN):  levalbuterol Inhalation 0.63 milliGRAM(s) Inhalation every 6 hours PRN sob  naloxone Injectable 0.3 milliGRAM(s) IV Push every 3 minutes PRN opioid induced AMS  ondansetron Injectable 4 milliGRAM(s) IV Push every 8 hours PRN Nausea and/or Vomiting  oxyCODONE    IR 10 milliGRAM(s) Oral every 4 hours PRN Severe Pain (7 - 10)  oxyCODONE    IR 5 milliGRAM(s) Oral every 4 hours PRN Moderate Pain (4 - 6)      I&O's Summary    16 Feb 2023 07:01  -  17 Feb 2023 07:00  --------------------------------------------------------  IN: 1050 mL / OUT: 0 mL / NET: 1050 mL    17 Feb 2023 07:01  -  18 Feb 2023 05:05  --------------------------------------------------------  IN: 1425 mL / OUT: 575 mL / NET: 850 mL        Vital Signs Last 24 Hrs  T(C): 36.8 (18 Feb 2023 03:00), Max: 37.3 (17 Feb 2023 05:40)  T(F): 98.2 (18 Feb 2023 03:00), Max: 99.2 (17 Feb 2023 05:40)  HR: 94 (18 Feb 2023 03:00) (94 - 109)  BP: 116/62 (18 Feb 2023 03:00) (104/60 - 116/62)  BP(mean): --  RR: 17 (18 Feb 2023 03:00) (17 - 20)  SpO2: 98% (18 Feb 2023 03:00) (96% - 100%)    Parameters below as of 18 Feb 2023 03:00  Patient On (Oxygen Delivery Method): nasal cannula  O2 Flow (L/min): 3      CAPILLARY BLOOD GLUCOSE      POCT Blood Glucose.: 206 mg/dL (17 Feb 2023 07:23)  POCT Blood Glucose.: 247 mg/dL (17 Feb 2023 06:08)      PHYSICAL EXAM:  GENERAL: NAD  HEENT: hoarse voice noted; some blood noted in mouth s/p dental procedure  NECK: Supple, No JVD  CHEST/LUNG: congested, rhonchi and wheezes present as before but worse today, on 3L NC  HEART: Tachycardic with regular rhythm; No murmurs, rubs, or gallops  ABDOMEN: Soft, Nontender, Nondistended; Bowel sounds present, + PEG tube in place cdi  EXTREMITIES:  2+ Peripheral Pulses, No clubbing, cyanosis. + R knee erythema, edema with TTP, incisions cdi, ROM limited 2/2 pain. Mild pitting edema b/l LEs, new  NEUROLOGY: nonfocal. AxOx3. more alert and awake today  SKIN: No rashes or lesions        LABS:                        8.5    42.82 )-----------( 754      ( 17 Feb 2023 04:30 )             29.1     Auto Eosinophil # 0.00  / Auto Eosinophil % 0.0   / Auto Neutrophil # 42.05 / Auto Neutrophil % 91.3  / BANDS % 6.9                          9.5    37.60 )-----------( 774      ( 17 Feb 2023 00:45 )             32.8     Auto Eosinophil # x     / Auto Eosinophil % x     / Auto Neutrophil # x     / Auto Neutrophil % x     / BANDS % x                            8.8    28.59 )-----------( 773      ( 16 Feb 2023 07:28 )             28.6     Auto Eosinophil # 0.51  / Auto Eosinophil % 1.8   / Auto Neutrophil # 25.85 / Auto Neutrophil % 90.4  / BANDS % x        02-17    138  |  104  |  32<H>  ----------------------------<  167<H>  3.4<L>   |  17<L>  |  1.07  02-17    138  |  105  |  34<H>  ----------------------------<  146<H>  3.7   |  17<L>  |  1.07  02-16    141  |  109<H>  |  31<H>  ----------------------------<  99  4.2   |  19<L>  |  0.80    Ca    12.7<H>      17 Feb 2023 04:30  Mg     2.00     02-17  Phos  3.4     02-17  TPro  7.3  /  Alb  2.9<L>  /  TBili  0.4  /  DBili  x   /  AST  18  /  ALT  30  /  AlkPhos  238<H>  02-17  TPro  7.7  /  Alb  2.8<L>  /  TBili  0.3  /  DBili  x   /  AST  20  /  ALT  30  /  AlkPhos  262<H>  02-17  TPro  7.9  /  Alb  2.9<L>  /  TBili  0.3  /  DBili  x   /  AST  23  /  ALT  33  /  AlkPhos  272<H>  02-16    PT/INR - ( 16 Feb 2023 07:28 )   PT: 16.1 sec;   INR: 1.38 ratio         PTT - ( 16 Feb 2023 07:28 )  PTT:28.1 sec        Lactate, Blood: 4.7 mmol/L (02-17 @ 04:30)        RADIOLOGY & ADDITIONAL TESTS:    Imaging Personally Reviewed:    Consultant(s) Notes Reviewed:      Care Discussed with Consultants/Other Providers:

## 2023-02-18 NOTE — PROGRESS NOTE ADULT - ATTENDING COMMENTS
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 Found to have SIRS (tachy, leukocytosis) and c/f right knee septic arthritis s/p arthrocentesis on 2/5 not yielding any organisms however +hemarthrosis.    Patient seen and examined at bedside. Had RRT 2/17 for tachycardia and tachypnea. Febrile. Lot of secretion. Briefly on bipap now wean to NC. Per family at bedside, patient has a lot of thick sputum. Diffuse rhonchi noted on lung exam b/l, worse from prior.      #Acute respiratory failure with hypoxemia: possibly aspiration in the setting of multiple teeth extractions.  ID consulted; appreciate recs. Continue to wean O2    #SIRS criteria: fever, tachycardic, leukocytosis: considering infection vs malignant related fever vs clot (though LE negative for DVT on 2/5): repeat culture and MRSA sent . MRSA neg. Vanco discontinued. Continue uma per ID recs.    #Hemarthrosis - Right knee cultures NGTD, ESR/CRP elevated, suspect inflammation in setting of hemarthrosis and malignancy. Right knee immobilizer while out of bed    #Met SCC Esophageal Ca - Was planned to start RT as outpatient but never started. Previous right patellar bx path with SCC, Rad Onc with no present plans for RT inpatient    #Cancer pain - C/w methadone 2.5mg q12 per palliative. Will need palliative followup for continued methadone refills    #Hypercalcemia : new , suspect due to malignancy, low PTH, normal Vit D. Downtrended on IVF but still remains elevated. s/p calcitonin. Plan for bisphosphonate therapy once dental clears. s/p extractions 2/16. Pending further extractions next week. Endo consulted given runs of V-tach overnight. Discussed wJanett rdz, will administer another round of calcitonin now. Pending further recs re: bisphosphonate therapy.     #V-Tach: Noted. Likely multi-factorial in etiology. Suspect some component likely in the setting of hypercalcemia. Mgt as above.     PT recs: rehab but family wants to take home. Dispo on hold due to hypercalcemia. 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 Found to have SIRS (tachy, leukocytosis) and c/f right knee septic arthritis s/p arthrocentesis on 2/5 not yielding any organisms however +hemarthrosis.    Patient seen and examined at bedside. Had RRT 2/17 for tachycardia and tachypnea. Febrile. Lot of secretion. Briefly on bipap now wean to NC. Per family at bedside, patient has a lot of thick sputum. Diffuse rhonchi noted on lung exam b/l, worse from prior.      #Acute respiratory failure with hypoxemia: possibly aspiration in the setting of multiple teeth extractions.  ID consulted; appreciate recs. Continue to wean O2    #SIRS criteria: fever, tachycardic, leukocytosis: considering infection vs malignant related fever vs clot (though LE negative for DVT on 2/5): repeat culture and MRSA sent . MRSA neg. Vanco discontinued. Continue uma per ID recs.    #Hemarthrosis - Right knee cultures NGTD, ESR/CRP elevated, suspect inflammation in setting of hemarthrosis and malignancy. Right knee immobilizer while out of bed    #Met SCC Esophageal Ca - Was planned to start RT as outpatient but never started. Previous right patellar bx path with SCC, Rad Onc with no present plans for RT inpatient    #Cancer pain - C/w methadone 2.5mg q12 per palliative. Will need palliative followup for continued methadone refills    #Hypercalcemia : new , suspect due to malignancy, low PTH, normal Vit D. Downtrended on IVF but still remains elevated. s/p calcitonin. Plan for bisphosphonate therapy once dental clears. s/p extractions 2/16. Pending further extractions next week. Endo consulted given runs of V-tach overnight. Discussed wJanett rdz, will administer another round of calcitonin now. Pending further recs re: bisphosphonate therapy.     #V-Tach: Noted. Likely multi-factorial in etiology. Suspect some component likely in the setting of hypercalcemia. Tele monitoring. Check Echo. Rest of Mgt as above.     PT recs: rehab but family wants to take home. Dispo on hold due to hypercalcemia.

## 2023-02-18 NOTE — PROGRESS NOTE ADULT - ASSESSMENT
Mr. Bneson is a 67M hx metastatic SCC esophageal cancer to bone on irinotecan, hypothyroidism, s/p R patellar mass excision 1 month ago p/w R knee pain and swelling found to have SIRS. initial concern for septic arthritis but tap neg for infection; possible aspiration pneumonia given CXR findings. Pt was treated with vanc and zosyn. Pt spiked a fever again on 2/17 with worsening respiratory status and ID consult. Currently on vanc and meropenem      WORKUP  Blood cx (2/5, 2/13 and 2/17): Negative  Urine cx (2/5, 2/13): Negative  Synovial fluid cx: negative  MRSA PCR Result.: NotDetec (02-17    DIAGNOSIS and IMPRESSION    Rt Knee Hemarthrosis s/p tap (2/5)  Persistent Leukocytosis  SCC esophageal cancer Invading Left chest wall on irinotecan and Radiation    RECOMMENDATIONS  Will f/u Sputum cx.       PT TO BE SEEN. PRELIM NOTE  PENDING RECS. PLEASE WAIT FOR FINAL RECS AFTER DISCUSSION WITH ATTENDINGArnoldo Rebollar MD, PGY5  ID fellow  [employer] Teams Preferred  After 5pm/weekends call [phone]   Mr. Benson is a 67M hx metastatic SCC esophageal cancer to bone on irinotecan, hypothyroidism, s/p R patellar mass excision 1 month ago p/w R knee pain and swelling found to have SIRS. initial concern for septic arthritis but tap neg for infection; possible aspiration pneumonia given CXR findings. Pt was treated with vanc and zosyn. Pt spiked a fever again on 2/17 with worsening respiratory status and ID consult. Currently on vanc and meropenem    WORKUP  Blood cx (2/5, 2/13 and 2/17): Negative  Urine cx (2/5, 2/13): Negative  Synovial fluid cx: negative  MRSA PCR Result.: NotDetec (02-17    DIAGNOSIS and IMPRESSION  ·	HAP  ·	Rt Knee Hemarthrosis s/p tap (2/5)  ·	Persistent Leukocytosis  ·	SCC esophageal cancer Invading Left chest wall on irinotecan and Radiation    Tmax of 99.8 with leukocytosis to 45k yesterday -> Currently afebrile with resolving leukocytosis  s/p Zosyn  2/5-2/9 and 2/13-2/17  Currently on  Vanc  2/17--> and Meropenem (2/17 - )    RECOMMENDATIONS  Will f/u Sputum cx.   Stop vancomycin  c/w Meropenem for now  Monitor respiratory status and monitor for fevers  Trend WBC    Pt seen and examined. Case d/w attending   Recommendation provided to primary team via MS Teams.      Nick Rebollar MD, PGY5  ID fellow  [employer] Teams Preferred  After 5pm/weekends call [phone]

## 2023-02-18 NOTE — PROGRESS NOTE ADULT - ATTENDING COMMENTS
67M hx metastatic SCC esophageal cancer to bone on irinotecan, hypothyroidism, s/p R patellar mass excision 1 month ago p/w R knee pain and swelling found to have SIRS. initial concern for septic arthritis but tap neg for infection; possible aspiration pneumonia given CXR findings. Pt was treated with vanc and zosyn. Pt spiked a fever again on 2/17 with worsening respiratory status and ID consult. Currently on vanc and meropenem    WORKUP  Blood cx (2/5, 2/13 and 2/17): Negative  Urine cx (2/5, 2/13): Negative  Synovial fluid cx: negative  MRSA PCR Result.: Aleksandra (02-17      DIAGNOSIS and IMPRESSION  HAP  Persistent Leukocytosis, tachycardia, lactic acidosis, sepsis/SIRS.       Tmax of 99.8 with leukocytosis to 45k yesterday -> Currently afebrile with resolving leukocytosis, per spouse clinically much improved.   s/p Zosyn  2/5-2/9 and 2/13-2/17  Currently on  Vanc  2/17--> and Meropenem (2/17 - )      RECOMMENDATIONS  Sputum cx. with enterobacter.   Stop vancomycin, MRSA PCR negative   c/w Meropenem for now  Trend WBC for leucocytosis, downtrending.     Farhad Tse  Please contact through MS Teams   If no response or past 5 pm/weekend call [phone].

## 2023-02-19 NOTE — CONSULT NOTE ADULT - ASSESSMENT
Complicated 68 yo male with metastatic esophageal carcinoma including known bone involvement presents with new onset hypercalcemia. Labs show supressed PTH and PTHRP suggesting cause of hypercalcemia to be local bone osteolysis from metastatic disease. DDx includes high 1,25 Vit D but highly unlikely.   I agree with the use of anti-resorptive Rx in this setting. Disc with family that risk of ONJ even with current dental issues is greatly outweighed by hypercalcemia of malignancy and bone mets.  Pt rec'd first dose of zoledronate yest. family advised that the calcium may take few days to lower.   h/o hypothyroidism, TSH mildly elevated. I would repeat in a week or two before changing dose.  Intermittently elevated BS on tube feeds. Suggest checking FS q6h, use low dose correction only. If pt stays on parenteral feeding q6h and BS stay high, consider low-dose NPH q6h.  
67M with  metastatic esophageal cancer (dx 8 years ago) to bone on irinotecan, r. knee, hypothyroidism, GERD, HTN recent RUL pna s/p cefepime, r. patellar mass excision --> metastatic squamous cell carcinoma on 1/7/23 was admitted on 2/5/23  w/ right knee pain and swelling for 2 days.    Recently hospitalized  underwent patellectomy for met SCC on 1/7/23  Course complicated by pneumonia attributed to Enterobacter treated with Cefepime 1/11 --> 1/18    Recent in patient Antibiotics  Vanco 1/1-->1/4;  2/5 2/13-->  Zosyn 1/1;  2/5, 2/6l 2/17 -->  Cefepime 1/1-->4; 1/11-->18  Cefazolin 1/7    Currently with sepsis - he has chest congestion, marked Leukocytosis with bandemia, abrupt increase in PCT, lactic acidosis,  s/p dental extraction 2/16  He large tumor burden and mass with scant residual left lung    Suggest  Continue Vanco  repeat MRSA PCR pending  STOP Zosyn   Meropenem 1 gm every 8 hours    discussed with hospitalist    ID will see over weekend  
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 MICU called to RRT for increased work of breathing and Tachypnea in the setting of new fever.     #Tachypnea / Increased work of breathing  #Tachycardia  #SIRS  RRT called for tachypnea, increased work of breathing, reported recent hypoxic episode and fever. Patient's increased work of breathing/tachypnea likely multifactorial due to sepsis and left lung pathology. Patient with known esophageal cancer with large mass in left upper hemithorax invading left chest wall with left lung atelectasis. During RRT patient responded well to suctioning and placed on BIPAP. Patient meets SIRS criteria, and with elevated lactate, though infectious workup and cultures have been unremarkable, empirically treating with zosyn, and vancomycin added at RRT. Patient currently on BIPAP 12/5, FiO2=30% with ScI4=338%.     Recommendations:  - Recommend transitioning from BIPAP to HFNC as tolerated to minimize risk of BIPAP use given secretions  - Agree with ongoing infectious workup and treating empirically with broad spectrum antibiotics  - recommend ongoing GOC discussions with patient and family in the setting of advanced disease  - telemetry monitoring for persisting tachycardia  - Not a MICU candidate at this time  - Call MICU with any new concerns    Case discussed with Dr. Avel Baig, PGY-3 
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

## 2023-02-19 NOTE — CONSULT NOTE ADULT - PROBLEM SELECTOR RECOMMENDATION 9
please see above
Was taking Oxycodone IR 5-10 mg per Peg PRN , patient reports was not taking more than 2-3 per day  Currently ,  over 24 hours patient has taken 20mg total of Oxy IR  Reluctant to ask for pain meds doesn't want to be groggy     We agreed on :  Oxy IR 5 mg q 6 hours ATC  Oxy IR 5 mg q 4 hours prn for mild mod pain  Oxy IR 10 mg q 4 hours prn severe pain  Avoid opioid induced constipation:  last BM today  Checked [facility]:  no records

## 2023-02-19 NOTE — PROGRESS NOTE ADULT - SUBJECTIVE AND OBJECTIVE BOX
Hannah Garibay MD  PGY 3 Department of Internal Medicine      Patient is a 67y old  Male who presents with a chief complaint of r. knee pain (18 Feb 2023 11:07)      SUBJECTIVE / OVERNIGHT EVENTS: Pt seen and examined. No acute overnight events.   Denies fevers, chills, CP/palpitations, SOB/cough, abdominal pain, N/V, constipation, or diarrhea.        MEDICATIONS  (STANDING):  acetaminophen    Suspension .. 650 milliGRAM(s) Oral every 6 hours  ascorbic acid 500 milliGRAM(s) Oral daily  calcitonin Injectable 250 International Unit(s) IntraMuscular every 12 hours  chlorhexidine 2% Cloths 1 Application(s) Topical daily  enoxaparin Injectable 40 milliGRAM(s) SubCutaneous every 24 hours  gabapentin Solution 300 milliGRAM(s) Oral three times a day  lactated ringers. 1000 milliLiter(s) (70 mL/Hr) IV Continuous <Continuous>  levalbuterol Inhalation 0.63 milliGRAM(s) Inhalation every 6 hours  levothyroxine 150 MICROGram(s) Oral daily  lidocaine   4% Patch 1 Patch Transdermal daily  meropenem  IVPB      meropenem  IVPB 1000 milliGRAM(s) IV Intermittent every 8 hours  methadone   Solution 2.5 milliGRAM(s) Oral every 12 hours  multivitamin 1 Tablet(s) Oral daily  pantoprazole   Suspension 40 milliGRAM(s) Oral every 24 hours  polyethylene glycol 3350 17 Gram(s) Oral two times a day  senna Syrup 10 milliLiter(s) Oral daily  sodium chloride 3%  Inhalation 4 milliLiter(s) Inhalation every 12 hours    MEDICATIONS  (PRN):  naloxone Injectable 0.3 milliGRAM(s) IV Push every 3 minutes PRN opioid induced AMS  ondansetron Injectable 4 milliGRAM(s) IV Push every 8 hours PRN Nausea and/or Vomiting  oxyCODONE    IR 10 milliGRAM(s) Oral every 4 hours PRN Severe Pain (7 - 10)  oxyCODONE    IR 5 milliGRAM(s) Oral every 4 hours PRN Moderate Pain (4 - 6)      I&O's Summary    17 Feb 2023 07:01  -  18 Feb 2023 07:00  --------------------------------------------------------  IN: 1762.5 mL / OUT: 1175 mL / NET: 587.5 mL    18 Feb 2023 07:01  -  19 Feb 2023 06:11  --------------------------------------------------------  IN: 0 mL / OUT: 1000 mL / NET: -1000 mL        Vital Signs Last 24 Hrs  T(C): 36.7 (19 Feb 2023 05:00), Max: 37.1 (18 Feb 2023 12:00)  T(F): 98 (19 Feb 2023 05:00), Max: 98.8 (18 Feb 2023 12:00)  HR: 105 (19 Feb 2023 05:00) (105 - 128)  BP: 114/60 (19 Feb 2023 05:00) (114/60 - 145/81)  BP(mean): --  RR: 18 (19 Feb 2023 05:00) (18 - 18)  SpO2: 99% (19 Feb 2023 05:00) (96% - 100%)    Parameters below as of 19 Feb 2023 05:00  Patient On (Oxygen Delivery Method): nasal cannula  O2 Flow (L/min): 3      CAPILLARY BLOOD GLUCOSE          PHYSICAL EXAM:  GENERAL: NAD,   HEAD:  Atraumatic, Normocephalic  EYES: EOMI, PERRL, conjunctiva and sclera clear  NECK: No JVD  CHEST/LUNG: Clear to auscultation bilaterally; No wheeze  HEART: Regular rate and rhythm; No murmurs, rubs, or gallops  ABDOMEN: Soft, Nontender, Nondistended; Bowel sounds present  EXTREMITIES:  2+ Peripheral Pulses, No clubbing, cyanosis, or edema  PSYCH: AAOx3  NEUROLOGY: non-focal  SKIN: No rashes or lesions       LABS:                        7.8    28.15 )-----------( 672      ( 19 Feb 2023 03:52 )             25.9     Auto Eosinophil # 0.94  / Auto Eosinophil % 3.3   / Auto Neutrophil # 24.43 / Auto Neutrophil % 86.8  / BANDS % x                            8.4    33.96 )-----------( 696      ( 18 Feb 2023 16:38 )             27.8     Auto Eosinophil # 0.53  / Auto Eosinophil % 1.6   / Auto Neutrophil # 30.69 / Auto Neutrophil % 90.4  / BANDS % x                            7.6    34.71 )-----------( 688      ( 18 Feb 2023 06:42 )             25.9     Auto Eosinophil # 0.10  / Auto Eosinophil % 0.3   / Auto Neutrophil # 31.79 / Auto Neutrophil % 91.6  / BANDS % x        02-19    139  |  107  |  28<H>  ----------------------------<  101<H>  4.1   |  22  |  0.67  02-18    136  |  106  |  30<H>  ----------------------------<  76  4.4   |  18<L>  |  0.74    Ca    11.5<H>      19 Feb 2023 03:52  Mg     1.60     02-19  Phos  2.4     02-19  TPro  6.4  /  Alb  2.6<L>  /  TBili  <0.2  /  DBili  x   /  AST  24  /  ALT  25  /  AlkPhos  188<H>  02-19  TPro  6.8  /  Alb  2.6<L>  /  TBili  <0.2  /  DBili  x   /  AST  16  /  ALT  21  /  AlkPhos  209<H>  02-18    PT/INR - ( 19 Feb 2023 03:52 )   PT: 15.2 sec;   INR: 1.31 ratio         PTT - ( 19 Feb 2023 03:52 )  PTT:27.3 sec        Lactate, Blood: 4.7 mmol/L (02-17 @ 04:30)         Hannah Garibay MD  PGY 3 Department of Internal Medicine      Patient is a 67y old  Male who presents with a chief complaint of r. knee pain (18 Feb 2023 11:07)      SUBJECTIVE / OVERNIGHT EVENTS: Pt seen and examined. No acute overnight events. No events on telemetry. This morning the pt states he is not having any difficulty breathing.     MEDICATIONS  (STANDING):  acetaminophen    Suspension .. 650 milliGRAM(s) Oral every 6 hours  ascorbic acid 500 milliGRAM(s) Oral daily  calcitonin Injectable 250 International Unit(s) IntraMuscular every 12 hours  chlorhexidine 2% Cloths 1 Application(s) Topical daily  enoxaparin Injectable 40 milliGRAM(s) SubCutaneous every 24 hours  gabapentin Solution 300 milliGRAM(s) Oral three times a day  lactated ringers. 1000 milliLiter(s) (70 mL/Hr) IV Continuous <Continuous>  levalbuterol Inhalation 0.63 milliGRAM(s) Inhalation every 6 hours  levothyroxine 150 MICROGram(s) Oral daily  lidocaine   4% Patch 1 Patch Transdermal daily  meropenem  IVPB      meropenem  IVPB 1000 milliGRAM(s) IV Intermittent every 8 hours  methadone   Solution 2.5 milliGRAM(s) Oral every 12 hours  multivitamin 1 Tablet(s) Oral daily  pantoprazole   Suspension 40 milliGRAM(s) Oral every 24 hours  polyethylene glycol 3350 17 Gram(s) Oral two times a day  senna Syrup 10 milliLiter(s) Oral daily  sodium chloride 3%  Inhalation 4 milliLiter(s) Inhalation every 12 hours    MEDICATIONS  (PRN):  naloxone Injectable 0.3 milliGRAM(s) IV Push every 3 minutes PRN opioid induced AMS  ondansetron Injectable 4 milliGRAM(s) IV Push every 8 hours PRN Nausea and/or Vomiting  oxyCODONE    IR 10 milliGRAM(s) Oral every 4 hours PRN Severe Pain (7 - 10)  oxyCODONE    IR 5 milliGRAM(s) Oral every 4 hours PRN Moderate Pain (4 - 6)      I&O's Summary    17 Feb 2023 07:01  -  18 Feb 2023 07:00  --------------------------------------------------------  IN: 1762.5 mL / OUT: 1175 mL / NET: 587.5 mL    18 Feb 2023 07:01  -  19 Feb 2023 06:11  --------------------------------------------------------  IN: 0 mL / OUT: 1000 mL / NET: -1000 mL        Vital Signs Last 24 Hrs  T(C): 36.7 (19 Feb 2023 05:00), Max: 37.1 (18 Feb 2023 12:00)  T(F): 98 (19 Feb 2023 05:00), Max: 98.8 (18 Feb 2023 12:00)  HR: 105 (19 Feb 2023 05:00) (105 - 128)  BP: 114/60 (19 Feb 2023 05:00) (114/60 - 145/81)  BP(mean): --  RR: 18 (19 Feb 2023 05:00) (18 - 18)  SpO2: 99% (19 Feb 2023 05:00) (96% - 100%)    Parameters below as of 19 Feb 2023 05:00  Patient On (Oxygen Delivery Method): nasal cannula  O2 Flow (L/min): 3      CAPILLARY BLOOD GLUCOSE          PHYSICAL EXAM:  GENERAL: NAD   HEAD:  Atraumatic, Normocephalic  EYES: EOMI, PERRL, conjunctiva and sclera clear  CHEST/LUNG: Bilateral rhonchi in bilateral lung fields  HEART: Regular rate and rhythm; No murmurs, rubs, or gallops  ABDOMEN: Soft, Nontender, Nondistended; Bowel sounds present  EXTREMITIES:  2+ Peripheral Pulses, No clubbing, cyanosis, or edema  PSYCH: AAOx3  NEUROLOGY: non-focal  SKIN: No rashes or lesions       LABS:                        7.8    28.15 )-----------( 672      ( 19 Feb 2023 03:52 )             25.9     Auto Eosinophil # 0.94  / Auto Eosinophil % 3.3   / Auto Neutrophil # 24.43 / Auto Neutrophil % 86.8  / BANDS % x                            8.4    33.96 )-----------( 696      ( 18 Feb 2023 16:38 )             27.8     Auto Eosinophil # 0.53  / Auto Eosinophil % 1.6   / Auto Neutrophil # 30.69 / Auto Neutrophil % 90.4  / BANDS % x                            7.6    34.71 )-----------( 688      ( 18 Feb 2023 06:42 )             25.9     Auto Eosinophil # 0.10  / Auto Eosinophil % 0.3   / Auto Neutrophil # 31.79 / Auto Neutrophil % 91.6  / BANDS % x        02-19    139  |  107  |  28<H>  ----------------------------<  101<H>  4.1   |  22  |  0.67  02-18    136  |  106  |  30<H>  ----------------------------<  76  4.4   |  18<L>  |  0.74    Ca    11.5<H>      19 Feb 2023 03:52  Mg     1.60     02-19  Phos  2.4     02-19  TPro  6.4  /  Alb  2.6<L>  /  TBili  <0.2  /  DBili  x   /  AST  24  /  ALT  25  /  AlkPhos  188<H>  02-19  TPro  6.8  /  Alb  2.6<L>  /  TBili  <0.2  /  DBili  x   /  AST  16  /  ALT  21  /  AlkPhos  209<H>  02-18    PT/INR - ( 19 Feb 2023 03:52 )   PT: 15.2 sec;   INR: 1.31 ratio         PTT - ( 19 Feb 2023 03:52 )  PTT:27.3 sec        Lactate, Blood: 4.7 mmol/L (02-17 @ 04:30)

## 2023-02-19 NOTE — PROGRESS NOTE ADULT - PROBLEM SELECTOR PLAN 1
baseline tachy in 110s likely in setting of medical disease cancer but newly tachy to 160s 2/13  baseline leukocytosis in 20k range -> increased to 35k 2/14  newly febrile 2/13  MRSA neg at admission  unclear source; initial concern for septic arthritis but tap neg for infection; possible aspiration pneumonia given CXR findings  PCT elevated to 5  Lactate now > 4  -BCx -> NGTD  -UA/Ucx neg  -CXR to look for pneumonia given concern for aspiration -> right perihilar opacity  -RVP neg  -empiric Zosyn -> switched to uma due to fevers as per ID  -trend WBC/monitor fever curve -> febrile again and spike in WBC  -add vancomycin and do MRSA/MSSA -> dc vanc as MRSA neg  -collect sputum cx -> communicated with nursing -> oropharyngeal contamination ?  -will have to hold off on dental procedure due to clinical status  -ID c/s as unclear source; aspiration pneumonia vs post-obstructive vs malignancy  -CTA ordered to r/o PE at RRT baseline tachy in 110s likely in setting of medical disease cancer but newly tachy to 160s 2/13  baseline leukocytosis in 20k range -> increased to 35k 2/14  newly febrile 2/13  MRSA neg at admission  unclear source; initial concern for septic arthritis but tap neg for infection; possible aspiration pneumonia given CXR findings  PCT elevated to 5  Lactate now > 4  -BCx -> NGTD  -UA/Ucx neg  -CXR to look for pneumonia given concern for aspiration -> right perihilar opacity  -RVP neg  -empiric Zosyn -> switched to uma due to fevers as per ID  -trend WBC/monitor fever curve -> febrile again and spike in WBC  -add vancomycin and do MRSA/MSSA -> dc vanc as MRSA neg  - sputum cx results pending  -will have to hold off on dental procedure due to clinical status  -ID c/s as unclear source; aspiration pneumonia vs post-obstructive vs malignancy  -CTA ordered to r/o PE at RRT

## 2023-02-19 NOTE — PROGRESS NOTE ADULT - PROBLEM SELECTOR PLAN 2
elev serum Ca 11.9  PTH below normal, Phos low -> most likely hyperca of malignancy  ionized justo elevated, not responding much to IVFs.   mild to moderate hypercalcemia, unclear if patient's new onset abdominal pain and vomiting related  - EKG reviewed no ischemic changes  - 1L NS bolus, followed by 100cc/h maintenance -> will dc fluids as has been getting continuous fluids and lungs sound congested -> f/u official CXR read -> perihilar opacity -> can consider fluids in 10-12 hour periods  - S/p calcitonin on 2/13-2/14 and IVFs -> not much improvement in ionized calcium  - monitor icals, uptrending -> will add FW flushes  - might benefit from bisphosphonate if not responsive to calcitonin but would need dental eval -> would need imaging (CT maxillofacial) prior to extraction, which would be necessary prior to clearance -> dental extraction 2/16 -> will need another extraction and can start bisphosphonate s/p 2 weeks

## 2023-02-19 NOTE — CONSULT NOTE ADULT - SUBJECTIVE AND OBJECTIVE BOX
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 knee, hypothyroidism, GERD, recent RUL pna s/p cefepime, r. patellar mass excision 1 month ago who presents w/ right knee pain and swelling for the past 2 days. He also notes having pus like drainage. He is having difficulty w/ ambulation as a result. Had a fever outpatient 100.7 F, leukocytosis in ED to 27k. Denies dysuria, polyuria, melena, hematochezia, h/a, LOC, visual changes, cp. Radiation onc was consulted last admission, pt to f/u outpt for RT adjuvant. In the ED right knee arthrocentesis was done, cultures were sent.  Endocrine : history from wife at bedside  Called to see pt for new hypercalcemia. No prior h/o calcium disorders, hyperpara, etc.   Known metastatic cancer to bone , at least ribs.  Corrected CA.> 13   Received calcitonin x 4 doses 2/13-2/14  planned for add'l  dental extractions next week on thursday per primary team attending  vitamin d 25 33 - adequate   Intact PTH: 5 pg/mL (02.08.23 @ 07:30)  PTH Related Peptide: <2.0 pmol/L (01.19.23 @ 06:10)  eGFR: 99 mL/min/1.73m2 (02.18.23 @ 06:42)  H/o hypothyroidism, prev followed by endocrine Caleb, on levothyroxine 150 via PEG TSH mildly high  on q6h parenteral feeds. BS 200s      PAST MEDICAL & SURGICAL HISTORY:  HTN (hypertension)  GERD (gastroesophageal reflux disease)  Hypothyroidism  Neuropathic pain  Disorder of bone, unspecified  H/O constipation  Esophageal cancer  Lung metastases  S/P percutaneous endoscopic gastrostomy (PEG) tube placement    FAMILY HISTORY:  No pertinent family history in first degree relatives      Social History: n/a    Outpatient Medications:    MEDICATIONS  (STANDING):  acetaminophen    Suspension .. 650 milliGRAM(s) Oral every 6 hours  ascorbic acid 500 milliGRAM(s) Oral daily  chlorhexidine 2% Cloths 1 Application(s) Topical daily  enoxaparin Injectable 40 milliGRAM(s) SubCutaneous every 24 hours  gabapentin Solution 300 milliGRAM(s) Oral three times a day  lactated ringers. 1000 milliLiter(s) (70 mL/Hr) IV Continuous <Continuous>  levalbuterol Inhalation 0.63 milliGRAM(s) Inhalation every 6 hours  levothyroxine 150 MICROGram(s) Oral daily  lidocaine   4% Patch 1 Patch Transdermal daily  meropenem  IVPB      meropenem  IVPB 1000 milliGRAM(s) IV Intermittent every 8 hours  methadone   Solution 2.5 milliGRAM(s) Oral every 12 hours  multivitamin 1 Tablet(s) Oral daily  pantoprazole   Suspension 40 milliGRAM(s) Oral every 24 hours  polyethylene glycol 3350 17 Gram(s) Oral two times a day  potassium phosphate IVPB 15 milliMole(s) IV Intermittent once  senna Syrup 10 milliLiter(s) Oral daily  sodium chloride 3%  Inhalation 4 milliLiter(s) Inhalation every 12 hours    MEDICATIONS  (PRN):  naloxone Injectable 0.3 milliGRAM(s) IV Push every 3 minutes PRN opioid induced AMS  ondansetron Injectable 4 milliGRAM(s) IV Push every 8 hours PRN Nausea and/or Vomiting  oxyCODONE    IR 10 milliGRAM(s) Oral every 4 hours PRN Severe Pain (7 - 10)  oxyCODONE    IR 5 milliGRAM(s) Oral every 4 hours PRN Moderate Pain (4 - 6)      Allergies    No Known Allergies    Intolerances      Review of Systems:   Family reports wt stable     PHYSICAL EXAM:  VITALS: T(C): 36.6 (02-19-23 @ 11:05)  T(F): 97.8 (02-19-23 @ 11:05), Max: 98.6 (02-18-23 @ 14:30)  HR: 123 (02-19-23 @ 11:05) (105 - 128)  BP: 145/75 (02-19-23 @ 11:05) (114/60 - 145/81)  RR:  (17 - 18)  SpO2:  (96% - 100%)  Wt(kg): --  GENERAL: ill-appearing male   EYES: No proptosis, no lid lag, anicteric  HEENT:  s/p recent dental extractions, kaity mandibulari inner low jaw  THYROID: Normal size, no palpable nodules  RESPIRATORY: bilat marked rhonchi  CARDIOVASCULAR: Regular rate and rhythm; No murmurs; no peripheral edema  GI: PEG tube in place  SKIN: Dry, intact, No rashes or lesions  MUSCULOSKELETAL:  s/p recent patellar excision R, bandage not removed       POCT Blood Glucose.: 206 mg/dL (02-17-23 @ 07:23)  POCT Blood Glucose.: 247 mg/dL (02-17-23 @ 06:08)                            7.8    28.15 )-----------( 672      ( 19 Feb 2023 03:52 )             25.9       02-19    139  |  107  |  28<H>  ----------------------------<  101<H>  4.1   |  22  |  0.67    eGFR: 102    Ca    11.5<H>      02-19  Mg     1.60     02-19  Phos  2.4     02-19    TPro  6.4  /  Alb  2.6<L>  /  TBili  <0.2  /  DBili  x   /  AST  24  /  ALT  25  /  AlkPhos  188<H>  02-19      Thyroid Function Tests:  02-10 @ 06:30 TSH 7.56 FreeT4 1.0 T3 -- Anti TPO -- Anti Thyroglobulin Ab -- TSI --  02-06 @ 06:41 TSH 6.55 FreeT4 0.8 T3 -- Anti TPO -- Anti Thyroglobulin Ab -- TSI --              Radiology:

## 2023-02-19 NOTE — PROGRESS NOTE ADULT - ATTENDING COMMENTS
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 Found to have SIRS (tachy, leukocytosis) and c/f right knee septic arthritis s/p arthrocentesis on 2/5 not yielding any organisms however +hemarthrosis.         #Acute respiratory failure with hypoxemia: possibly aspiration in the setting of multiple teeth extractions.  ID consulted; appreciate recs. Continue to wean O2    #SIRS criteria: fever, tachycardic, leukocytosis: considering infection vs malignant related fever vs clot (though LE negative for DVT on 2/5): repeat culture and MRSA sent . MRSA neg. Vanco discontinued. Continue uma per ID recs.    #Hemarthrosis - Right knee cultures NGTD, ESR/CRP elevated, suspect inflammation in setting of hemarthrosis and malignancy. Right knee immobilizer while out of bed    #Met SCC Esophageal Ca - Was planned to start RT as outpatient but never started. Previous right patellar bx path with SCC, Rad Onc with no present plans for RT inpatient    #Hypercalcemia : new , suspect due to malignancy, low PTH, normal Vit D. Downtrended on IVF but still remains elevated. s/p calcitonin. s/p extractions 2/16. Pending further extractions next week. Endo consulted given runs of V-tach 2/18.  Discussed wJanett rdz, second round of calcitonin administered 2/18. Also s/p zometa 2/18. Per endo, risks of untreated hypercalcemia outweigh lower risks of OJN.     #V-Tach: Noted. Likely multi-factorial in etiology. Suspect some component likely in the setting of hypercalcemia. Tele monitoring. Check Echo. Rest of Mgt as above.     #Cancer pain - C/w methadone 2.5mg q12 per palliative. Will need palliative followup for continued methadone refills

## 2023-02-20 NOTE — CHART NOTE - NSCHARTNOTEFT_GEN_A_CORE
Endocrine Follow up.     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     #Non-PTH Mediated Hypercalcemia 2/2 Malignancy   Labs show suppressed PTH and PTHrP suggesting cause of hypercalcemia to be local bone osteolysis from metastatic disease. DDx includes high 1,25 Vit D (high normal range) but highly unlikely (would have also seen improvement w/ steroids which patient has been on if this was the case).   Now s/p 4 doses of calcitonin 2/13-2/14 & 2 doses 2/18-2/19  Received Zometa 4mg IV x 1 dose on 2/18, however the calcium lowering effect of this will be delayed. Aware of current dental issues & plans for further extractions this week, however, reported to have had episodes of Vtach on Saturday per covering attending; risks of inadequately treated hypercalcemia of malignancy w/ bone mets outweigh risk for ONJ.   Recommendations:   - continue aggressive IVF hydration as tolerated - monitor for fluid overload, encourage PO hydration as able   - if concern for fluid overload, consider lasix w/ fluids & UOP monitoring   - monitor ca/albumin daily   - telemetry monitoring & daily EKG     #h/o hypothyroidism  Free Thyroxine, Serum: 1.0 ng/dL (02.10.23 @ 06:30)  Thyroid Stimulating Hormone, Serum: 7.56 uIU/mL (02.10.23 @ 06:30)  Recommendations:   - repeat TSH w/ Free Thyroxine level tomorrow w/ AM labs (ordered)   - currently on LT4 150mcg PO qAM, please hold TF for 1 hour before and after levothyroxine dose     #Hyperglycemia - likely steroid induced, now resolved vs iso Tube feeds   #Prediabetes  had received solumedrol 80mg on 2/17 (40mg iv x 2 doses )   a1c 5.8%   also on bolus tube feeds   glucose levels today well controlled   Recommendations:   - FS BG monitoring q6h   - if FS BG levels persistently > 150mg/dL and patient remains on tube feeds, recommend to consider carb consistent formulation & initiate admelog low dose correction scales q6h w/ hypoglycemia protocol   - bg goal 100 to 180mg/dl Endocrine Follow up.     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     #Non-PTH Mediated Hypercalcemia 2/2 Malignancy   Labs show suppressed PTH and PTHrP suggesting cause of hypercalcemia to be local bone osteolysis from metastatic disease. DDx includes high 1,25 Vit D (high normal range) but highly unlikely (would have also seen improvement w/ steroids which patient has been on if this was the case).   Now s/p 4 doses of calcitonin 2/13-2/14 & 2 doses 2/18-2/19  Received Zometa 4mg IV x 1 dose on 2/18, however the calcium lowering effect of this will be delayed. Aware of current dental issues & plans for further extractions this week, however, reported to have had episodes of Vtach on Saturday per covering attending; risks of inadequately treated hypercalcemia of malignancy w/ bone mets outweigh risk for ONJ.   2/20 corrected calcium 12.56, mildly improved likely from calcitonin dose on 2/18 & fluids   Recommendations:   - continue aggressive IVF hydration as tolerated - monitor for fluid overload, encourage PO hydration as able   - if concern for fluid overload, consider lasix w/ fluids & UOP monitoring   - monitor ca/albumin daily   - telemetry monitoring & daily EKG     #h/o hypothyroidism  Free Thyroxine, Serum: 1.0 ng/dL (02.10.23 @ 06:30)  Thyroid Stimulating Hormone, Serum: 7.56 uIU/mL (02.10.23 @ 06:30)  Recommendations:   - repeat TSH w/ Free Thyroxine level tomorrow w/ AM labs (ordered)   - currently on LT4 150mcg PO qAM, please hold TF for 1 hour before and after levothyroxine dose     #Hyperglycemia - likely steroid induced, now resolved vs iso Tube feeds   #Prediabetes  had received solumedrol 80mg on 2/17 (40mg iv x 2 doses )   a1c 5.8%   also on bolus tube feeds   glucose levels today well controlled   Recommendations:   - FS BG monitoring q6h   - if FS BG levels persistently > 150mg/dL and patient remains on tube feeds, recommend to consider carb consistent formulation & initiate admelog low dose correction scales q6h w/ hypoglycemia protocol   - bg goal 100 to 180mg/dl    Suyapa Russell, DO   Endocrine Fellow  For follow up questions, discharge recommendations, or new consults please call answering service at [phone] (weekdays), [phone] (nights/weekends). For nonurgent matters, please email [email or web address] or [email or web address]

## 2023-02-20 NOTE — PROGRESS NOTE ADULT - ATTENDING COMMENTS
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 Found to have SIRS (tachy, leukocytosis) and c/f right knee septic arthritis s/p arthrocentesis on 2/5 not yielding any organisms however +hemarthrosis.         #Acute respiratory failure with hypoxemia: possibly aspiration in the setting of multiple teeth extractions.  ID consulted; appreciate recs. Continue to wean O2    #SIRS criteria: fever, tachycardic, leukocytosis: considering infection vs malignant related fever vs clot (though LE negative for DVT on 2/5): repeat culture and MRSA sent . MRSA neg. Vanco discontinued. Continue uma per ID recs.    #Hemarthrosis - Right knee cultures NGTD, ESR/CRP elevated, suspect inflammation in setting of hemarthrosis and malignancy. Right knee immobilizer while out of bed    #Met SCC Esophageal Ca - Was planned to start RT as outpatient but never started. Previous right patellar bx path with SCC, Rad Onc with no present plans for RT inpatient    #Hypercalcemia : new , suspect due to malignancy, low PTH, normal Vit D. Downtrended on IVF but still remains elevated. s/p calcitonin. s/p extractions 2/16. Pending further extractions next week. Endo consulted given runs of V-tach 2/18.  Discussed w. endo, second round of calcitonin administered 2/18. Also s/p zometa 2/18. Per endo, risks of untreated hypercalcemia outweigh lower risks of OJN. C/w IVF. If w/ c/f volume overload will consider diuresis     #V-Tach: Noted. Likely multi-factorial in etiology. Suspect some component likely in the setting of hypercalcemia. Tele monitoring. Check Echo. Rest of Mgt as above.     #Cancer pain - C/w methadone 2.5mg q12 per palliative. Will need palliative followup for continued methadone refills.

## 2023-02-20 NOTE — PROGRESS NOTE ADULT - PROBLEM SELECTOR PLAN 2
elev serum Ca 11.9  PTH below normal, Phos low -> most likely hyperca of malignancy  ionized justo elevated, not responding much to IVFs.   mild to moderate hypercalcemia, unclear if patient's new onset abdominal pain and vomiting related  - EKG reviewed no ischemic changes  - 1L NS bolus, followed by 100cc/h maintenance -> will dc fluids as has been getting continuous fluids and lungs sound congested -> f/u official CXR read -> perihilar opacity -> can consider fluids in 10-12 hour periods  - S/p calcitonin on 2/13-2/14 and IVFs -> not much improvement in ionized calcium  - monitor icals, uptrending -> will add FW flushes  - might benefit from bisphosphonate if not responsive to calcitonin but would need dental eval -> would need imaging (CT maxillofacial) prior to extraction, which would be necessary prior to clearance -> dental extraction 2/16 -> will need another extraction and can start bisphosphonate s/p 2 weeks elev serum Ca 11.9  PTH below normal, Phos low -> most likely hyperca of malignancy  ionized justo elevated, not responding much to IVFs.   mild to moderate hypercalcemia, unclear if patient's new onset abdominal pain and vomiting related  - EKG reviewed no ischemic changes  - 1L NS bolus, followed by 100cc/h maintenance -> will dc fluids as has been getting continuous fluids and lungs sound congested -> f/u official CXR read -> perihilar opacity -> can consider fluids in 10-12 hour periods  - S/p calcitonin on 2/13-2/14 and IVFs -> not much improvement in ionized calcium  - monitor icals, uptrending -> will add FW flushes  - might benefit from bisphosphonate if not responsive to calcitonin but would need dental eval -> would need imaging (CT maxillofacial) prior to extraction, which would be necessary prior to clearance -> dental extraction 2/16 -> will need another extraction and can start bisphosphonate s/p 2 weeks  - 12/20: currently still on IV fluids at rate of 70mL/hr, s/p 2 more doses calcitonin given 2/18-2/19 and Endo input it is okay to start bisphosphonate, they spoke with family at bedside about jaw osteonecrosis risks and they agreed, first dose given zolendronate 2/19

## 2023-02-20 NOTE — PROGRESS NOTE ADULT - TIME BILLING
Patient discussion and evaluation, review of clinical data, formulation of assessment and plan

## 2023-02-20 NOTE — PROGRESS NOTE ADULT - PROBLEM SELECTOR PLAN 1
baseline tachy in 110s likely in setting of medical disease cancer but newly tachy to 160s 2/13  baseline leukocytosis in 20k range -> increased to 35k 2/14  newly febrile 2/13  MRSA neg at admission  unclear source; initial concern for septic arthritis but tap neg for infection; possible aspiration pneumonia given CXR findings  PCT elevated to 5  Lactate now > 4  -BCx -> NGTD  -UA/Ucx neg  -CXR to look for pneumonia given concern for aspiration -> right perihilar opacity  -RVP neg  -empiric Zosyn -> switched to uma due to fevers as per ID  -trend WBC/monitor fever curve -> febrile again and spike in WBC  -add vancomycin and do MRSA/MSSA -> dc vanc as MRSA neg  - sputum cx results pending  -will have to hold off on dental procedure due to clinical status  -ID c/s as unclear source; aspiration pneumonia vs post-obstructive vs malignancy  -CTA ordered to r/o PE at RRT baseline tachy in 110s likely in setting of medical disease cancer but newly tachy to 160s 2/13  baseline leukocytosis in 20k range -> increased to 35k 2/14  newly febrile 2/13  MRSA neg at admission  unclear source; initial concern for septic arthritis but tap neg for infection; possible aspiration pneumonia given CXR findings  PCT elevated to 5  Lactate now > 4  -BCx -> NGTD  -UA/Ucx neg  -CXR to look for pneumonia given concern for aspiration -> right perihilar opacity  -RVP neg  -empiric Zosyn -> switched to uma due to fevers as per ID  -trend WBC/monitor fever curve -> febrile again and spike in WBC  -add vancomycin and do MRSA/MSSA -> dc vanc as MRSA neg  - sputum cx results pending  -will have to hold off on dental procedure due to clinical status  -ID c/s as unclear source; aspiration pneumonia vs post-obstructive vs malignancy

## 2023-02-20 NOTE — PROGRESS NOTE ADULT - SUBJECTIVE AND OBJECTIVE BOX
Zee Rodriguez MD  EM/IM PGY-2  Pager [phone] or TEAMS  -----------------------------------------    ================== UNFINISHED NOTE =======================    INTERVAL HPI/OVERNIGHT EVENTS: No acute events overnight, vitals significant for -120 with normal BP and pt O2 stable >95% on 3L NC. All scheduled medications given, in the last 24 hrs for PRNs pt has received oxycodone 10mgx1.    SUBJECTIVE: Patient seen and examined at bedside.     VITAL SIGNS:  T(C): 37.2 (02-20-23 @ 04:15), Max: 37.2 (02-20-23 @ 04:15)  HR: 107 (02-20-23 @ 04:15) (107 - 123)  BP: 129/69 (02-20-23 @ 04:15) (107/68 - 145/75)  RR: 17 (02-20-23 @ 04:15) (16 - 18)  SpO2: 100% (02-20-23 @ 04:15) (99% - 100%)    PHYSICAL EXAM:        MEDICATIONS:  MEDICATIONS  (STANDING):  acetaminophen    Suspension .. 650 milliGRAM(s) Oral every 6 hours  ascorbic acid 500 milliGRAM(s) Oral daily  chlorhexidine 2% Cloths 1 Application(s) Topical daily  enoxaparin Injectable 40 milliGRAM(s) SubCutaneous every 24 hours  gabapentin Solution 300 milliGRAM(s) Oral three times a day  lactated ringers. 1000 milliLiter(s) (70 mL/Hr) IV Continuous <Continuous>  levalbuterol Inhalation 0.63 milliGRAM(s) Inhalation every 6 hours  levothyroxine 150 MICROGram(s) Oral daily  lidocaine   4% Patch 1 Patch Transdermal daily  meropenem  IVPB      meropenem  IVPB 1000 milliGRAM(s) IV Intermittent every 8 hours  methadone   Solution 2.5 milliGRAM(s) Oral every 12 hours  multivitamin 1 Tablet(s) Oral daily  pantoprazole   Suspension 40 milliGRAM(s) Oral every 24 hours  polyethylene glycol 3350 17 Gram(s) Oral two times a day  senna Syrup 10 milliLiter(s) Oral daily  sodium chloride 3%  Inhalation 4 milliLiter(s) Inhalation every 12 hours    MEDICATIONS  (PRN):  naloxone Injectable 0.3 milliGRAM(s) IV Push every 3 minutes PRN opioid induced AMS  ondansetron Injectable 4 milliGRAM(s) IV Push every 8 hours PRN Nausea and/or Vomiting  oxyCODONE    IR 10 milliGRAM(s) Oral every 4 hours PRN Severe Pain (7 - 10)  oxyCODONE    IR 5 milliGRAM(s) Oral every 4 hours PRN Moderate Pain (4 - 6)      LABS:                        7.8    28.15 )-----------( 672      ( 19 Feb 2023 03:52 )             25.9     02-19    139  |  107  |  28<H>  ----------------------------<  101<H>  4.1   |  22  |  0.67    Ca    11.5<H>      19 Feb 2023 03:52  Phos  2.4     02-19  Mg     1.60     02-19    TPro  6.4  /  Alb  2.6<L>  /  TBili  <0.2  /  DBili  x   /  AST  24  /  ALT  25  /  AlkPhos  188<H>  02-19    PT/INR - ( 19 Feb 2023 03:52 )   PT: 15.2 sec;   INR: 1.31 ratio         PTT - ( 19 Feb 2023 03:52 )  PTT:27.3 sec       Zee Rodriguez MD  EM/IM PGY-2  Pager [phone] or TEAMS  -----------------------------------------    INTERVAL HPI/OVERNIGHT EVENTS: No acute events overnight, vitals significant for -120 with normal BP and pt O2 stable >95% on 3L NC. All scheduled medications given, in the last 24 hrs for PRNs pt has received oxycodone 10mgx1.    SUBJECTIVE: Patient seen and examined at bedside. Says pain is reasonably well controlled, only minimal secretions, no fevers and no trouble breathing.    VITAL SIGNS:  T(C): 37.2 (02-20-23 @ 04:15), Max: 37.2 (02-20-23 @ 04:15)  HR: 107 (02-20-23 @ 04:15) (107 - 123)  BP: 129/69 (02-20-23 @ 04:15) (107/68 - 145/75)  RR: 17 (02-20-23 @ 04:15) (16 - 18)  SpO2: 100% (02-20-23 @ 04:15) (99% - 100%)    PHYSICAL EXAM:  GENERAL: Sitting comfortably in bed in no acute distress, severely hypophonc voice  NEURO: Alert and Oriented to person, place, date and situation. Pupils symmetric, No ptosis. No facial asymmetry, no tremor noted.  HEENT: No conjunctival injection or scleral icterus. edentulous, no clots or active bleeding from the mouth.  CARD: Normal rate and regular rhythm, no murmurs and no gallops appreciated.  RESP: significant rhonchi on inspiration and expiration with wheezing, no stridor.  ABD: Nondistended, Soft and nontender to palpation in all quadrants, no guarding, no rigidity. No masses appreciated.  EXT: No pedal edema. 2+DP pulses bilaterally. R knee tender to palpation with well healing scar, no erythema, no bruising, no warmth, same as prior days.        MEDICATIONS:  MEDICATIONS  (STANDING):  acetaminophen    Suspension .. 650 milliGRAM(s) Oral every 6 hours  ascorbic acid 500 milliGRAM(s) Oral daily  chlorhexidine 2% Cloths 1 Application(s) Topical daily  enoxaparin Injectable 40 milliGRAM(s) SubCutaneous every 24 hours  gabapentin Solution 300 milliGRAM(s) Oral three times a day  lactated ringers. 1000 milliLiter(s) (70 mL/Hr) IV Continuous <Continuous>  levalbuterol Inhalation 0.63 milliGRAM(s) Inhalation every 6 hours  levothyroxine 150 MICROGram(s) Oral daily  lidocaine   4% Patch 1 Patch Transdermal daily  meropenem  IVPB      meropenem  IVPB 1000 milliGRAM(s) IV Intermittent every 8 hours  methadone   Solution 2.5 milliGRAM(s) Oral every 12 hours  multivitamin 1 Tablet(s) Oral daily  pantoprazole   Suspension 40 milliGRAM(s) Oral every 24 hours  polyethylene glycol 3350 17 Gram(s) Oral two times a day  senna Syrup 10 milliLiter(s) Oral daily  sodium chloride 3%  Inhalation 4 milliLiter(s) Inhalation every 12 hours    MEDICATIONS  (PRN):  naloxone Injectable 0.3 milliGRAM(s) IV Push every 3 minutes PRN opioid induced AMS  ondansetron Injectable 4 milliGRAM(s) IV Push every 8 hours PRN Nausea and/or Vomiting  oxyCODONE    IR 10 milliGRAM(s) Oral every 4 hours PRN Severe Pain (7 - 10)  oxyCODONE    IR 5 milliGRAM(s) Oral every 4 hours PRN Moderate Pain (4 - 6)      LABS:                        7.8    28.15 )-----------( 672      ( 19 Feb 2023 03:52 )             25.9     02-19    139  |  107  |  28<H>  ----------------------------<  101<H>  4.1   |  22  |  0.67    Ca    11.5<H>      19 Feb 2023 03:52  Phos  2.4     02-19  Mg     1.60     02-19    TPro  6.4  /  Alb  2.6<L>  /  TBili  <0.2  /  DBili  x   /  AST  24  /  ALT  25  /  AlkPhos  188<H>  02-19    PT/INR - ( 19 Feb 2023 03:52 )   PT: 15.2 sec;   INR: 1.31 ratio         PTT - ( 19 Feb 2023 03:52 )  PTT:27.3 sec

## 2023-02-21 NOTE — PROGRESS NOTE ADULT - SUBJECTIVE AND OBJECTIVE BOX
Follow Up:  sepsis    Interval History/ROS:  pt fatigued, no sob,  wife at bedside notes improvement    Allergies  No Known Allergies    ANTIMICROBIALS:  meropenem  IVPB    meropenem  IVPB 1000 every 8 hours      OTHER MEDS:  MEDICATIONS  (STANDING):  acetaminophen    Suspension .. 650 every 6 hours  enoxaparin Injectable 40 every 24 hours  gabapentin Solution 300 three times a day  levalbuterol Inhalation 1.25 every 6 hours  levothyroxine 150 daily  methadone   Solution 2.5 every 12 hours  ondansetron Injectable 4 every 8 hours PRN  oxyCODONE    IR 10 every 4 hours PRN  oxyCODONE    IR 5 every 4 hours PRN  pantoprazole   Suspension 40 every 24 hours  polyethylene glycol 3350 17 two times a day  senna Syrup 10 daily  sodium chloride 3%  Inhalation 4 every 12 hours      Vital Signs Last 24 Hrs  T(C): 36.3 (21 Feb 2023 11:55), Max: 37.2 (21 Feb 2023 04:00)  T(F): 97.4 (21 Feb 2023 11:55), Max: 99 (21 Feb 2023 04:00)  HR: 108 (21 Feb 2023 11:55) (56 - 117)  BP: 133/71 (21 Feb 2023 11:55) (110/68 - 133/71)  BP(mean): --  RR: 18 (21 Feb 2023 11:55) (16 - 19)  SpO2: 98% (21 Feb 2023 11:55) (95% - 100%)    Parameters below as of 21 Feb 2023 11:55  Patient On (Oxygen Delivery Method): nasal cannula        PHYSICAL EXAM:  General: ill appearing NAD, Non-toxic  Neurology: fatigued nonfocal  Respiratory: Clear to auscultation bilaterally  CV: RRR, S1S2, no murmurs, rubs or gallops  Abdominal: Soft, Non-tender, non-distended, normal bowel sounds  Extremities: No edema,   Line Sites: Clear  Skin: No rash                        8.6    32.27 )-----------( 661      ( 21 Feb 2023 06:20 )             28.7   WBC Count: 32.27 (02-21 @ 06:20)  WBC Count: 28.46 (02-20 @ 07:00)  WBC Count: 28.15 (02-19 @ 03:52)  WBC Count: 33.96 (02-18 @ 16:38)  WBC Count: 34.71 (02-18 @ 06:42)  WBC Count: 42.82 (02-17 @ 04:30)  WBC Count: 37.60 (02-17 @ 00:45)    02-20    139  |  106  |  20  ----------------------------<  104<H>  3.9   |  20<L>  |  0.61    Ca    11.6<H>      20 Feb 2023 07:00  Phos  2.0     02-20    TPro  6.8  /  Alb  2.8<L>  /  TBili  <0.2  /  DBili  x   /  AST  22  /  ALT  21  /  AlkPhos  196<H>  02-20      MICROBIOLOGY:  .Sputum Sputum  02-17-23     Normal Respiratory Susan present  --  Enterobacter cloacae complex      .Blood Blood-Peripheral  02-17-23   No growth to date.  --  --      .Blood Blood-Venous  02-17-23   No growth to date.  --  --      Clean Catch Clean Catch (Midstream)  02-13-23   <10,000 CFU/mL Normal Urogenital Susan  --  --      .Blood Blood-Peripheral  02-13-23   No Growth Final  --  --      .Blood Blood-Venous  02-13-23   No Growth Final  --  --      .Body Fluid Synovial Fluid  02-05-23   No growth at 14 days.  --    No polymorphonuclear leukocytes per low power field  No organisms seen per oil power field  by cytocentrifuge      Clean Catch Clean Catch (Midstream)  02-05-23   No growth  --  --      .Blood Blood-Peripheral  02-05-23   No Growth Final  --  --      .Blood Blood-Peripheral  02-05-23   No Growth Final  --  --      .Blood Blood-Peripheral  01-24-23   No Growth Final  --  --      .Blood Blood-Venous  01-24-23   No Growth Final  --  --      .Blood Blood-Peripheral  01-23-23   No Growth Final  --  --      RADIOLOGY:  < from: Xray Chest 1 View- PORTABLE-Urgent (Xray Chest 1 View- PORTABLE-Urgent .) (02.18.23 @ 12:58) >  IMPRESSION:  Postoperative changes left lung volume loss similar to prior. Pleural   parenchymal opacity left hemithorax similar to prior .No definite acute   infiltrate pneumothorax or other new abnormality. Right chest port   reidentified in situ    < end of copied text >  < from: CT Maxillofacial No Cont (02.14.23 @ 15:39) >  IMPRESSION: Multiple carious teeth and nonspecific periapical   radiolucency about the root of the right first premolar tooth.    < end of copied text >      Delonte Simms MD; Division of Infectious Disease; Pager: [phone]; nights and weekends: [phone]

## 2023-02-21 NOTE — PROGRESS NOTE ADULT - PROBLEM SELECTOR PLAN 1
baseline tachy in 110s likely in setting of medical disease cancer but newly tachy to 160s 2/13  baseline leukocytosis in 20k range -> increased to 35k 2/14  newly febrile 2/13  MRSA neg at admission  unclear source; initial concern for septic arthritis but tap neg for infection; possible aspiration pneumonia given CXR findings  PCT elevated to 5  Lactate now > 4  -BCx -> NGTD  -UA/Ucx neg  -CXR to look for pneumonia given concern for aspiration -> right perihilar opacity  -RVP neg  -empiric Zosyn -> switched to uma due to fevers as per ID  -trend WBC/monitor fever curve -> febrile again and spike in WBC  -add vancomycin and do MRSA/MSSA -> dc vanc as MRSA neg  - sputum cx results pending  -will have to hold off on dental procedure due to clinical status  -ID c/s as unclear source; aspiration pneumonia vs post-obstructive vs malignancy baseline tachy in 110s likely in setting of medical disease cancer but newly tachy to 160s 2/13  baseline leukocytosis in 20k range -> increased to 35k 2/14  newly febrile 2/13  MRSA neg at admission  unclear source; initial concern for septic arthritis but tap neg for infection; possible aspiration pneumonia given CXR findings  PCT elevated to 5  Lactate now > 4  -BCx -> NGTD  -UA/Ucx neg  -CXR to look for pneumonia given concern for aspiration -> right perihilar opacity  -RVP neg  -empiric Zosyn -> switched to uma due to fevers as per ID  -trend WBC/monitor fever curve -> febrile again and spike in WBC  -add vancomycin and do MRSA/MSSA -> dc vanc as MRSA neg  - sputum cx results -> Enterobacter cloacae, f/u sensitivities  -ID c/s as unclear source; aspiration pneumonia vs post-obstructive vs malignancy baseline tachy in 110s likely in setting of medical disease cancer but newly tachy to 160s 2/13  baseline leukocytosis in 20k range -> increased to 35k 2/14  newly febrile 2/13  MRSA neg at admission  unclear source; initial concern for septic arthritis but tap neg for infection; possible aspiration pneumonia given CXR findings  PCT elevated to 5  Lactate elevation, downtrended  -BCx -> NGTD  -UA/Ucx neg  -CXR to look for pneumonia given concern for aspiration -> right perihilar opacity  -RVP neg  -empiric Zosyn -> switched to uma due to fevers as per ID  -trend WBC/monitor fever curve   -add vancomycin and do MRSA/MSSA -> dc vanc as MRSA neg  - sputum cx results -> Enterobacter cloacae, f/u sensitivities -> if ID ok, can transition to FQ  -ID c/s as unclear source; aspiration pneumonia vs post-obstructive vs malignancy

## 2023-02-21 NOTE — PROGRESS NOTE ADULT - ATTENDING COMMENTS
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 Found to have SIRS (tachy, leukocytosis) and c/f right knee septic arthritis s/p arthrocentesis on 2/5 not yielding any organisms however +hemarthrosis.    Patient seen and examined at bedside with wife. Reports "everything is fine." Per wife, cough is a little better. Lung exam noted for diffuse coarse breath sound.      #Acute respiratory failure with hypoxemia: possibly aspiration in the setting of multiple teeth extractions.  ID consulted; appreciate recs. Continue to wean O2    #SIRS criteria: fever, tachycardic, leukocytosis: considering infection vs malignant related fever vs clot (though LE negative for DVT on 2/5): repeat culture and MRSA sent . MRSA neg. Vanco discontinued. Continue uma per ID recs.    #Hemarthrosis - Right knee cultures NGTD, ESR/CRP elevated, suspect inflammation in setting of hemarthrosis and malignancy. Right knee immobilizer while out of bed    #Met SCC Esophageal Ca - Was planned to start RT as outpatient but never started. Previous right patellar bx path with SCC, Rad Onc with no present plans for RT inpatient    #Hypercalcemia : new , suspect due to malignancy, low PTH, normal Vit D. Downtrended on IVF but still remains elevated. s/p calcitonin. s/p extractions 2/16. Pending further extractions next week. Endo consulted given runs of V-tach 2/18.  Discussed w. kendell, second round of calcitonin administered 2/18. Also s/p zometa 2/18. Per endo, risks of untreated hypercalcemia outweigh lower risks of OJN. C/w IVF. If w/ c/f volume overload will consider diuresis     #V-Tach: Noted. Likely multi-factorial in etiology. Suspect some component likely in the setting of hypercalcemia. Tele monitoring. Check Echo. Rest of Mgt as above.     #Cancer pain - C/w methadone 2.5mg q12 per palliative. Will need palliative followup for continued methadone refills.      Discussed with HS.

## 2023-02-21 NOTE — PROGRESS NOTE ADULT - PROBLEM SELECTOR PLAN 9
Preoperative clearance for dental extraction on 2/16 under local anesthesia  difficult to assess METS due to patient's knee impairing mobility but RCRi score 0, 3.9 % 30-day risk of death, MI, or cardiac arrest  Low risk patient for low risk procedure dvt ppx: lovenox   diet: TFs  dispo: PT rec rehab but family wants home

## 2023-02-21 NOTE — PROGRESS NOTE ADULT - ASSESSMENT
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  Complex patient high level decision making.    1) Hypercalcemia of malignancy  Labs show suppressed PTH (6) and negative PTHRP suggesting cause of hypercalcemia to be local bone osteolysis from metastatic disease. DDx includes high 1,25 Vit D but less likely for this malignancy. 1,25 D resulted 78 upper normal range. 25OHD 33.4 wnl.  Disc with family that risk of ONJ even with current dental issues is greatly outweighed by hypercalcemia of malignancy and bone mets.  S/p calcitonin 4 doses plus 2 additional doses previously.  Pt received first dose of zoledronate 4mg IV on 2/18/23. family advised that the calcium may take few days to lower.   Corrected calcium today 12.6, would continue to trend daily to see if further improvement is noted post bisphosphonate.  maintain hydration status.    2) Hypothyroidism  TSH today 2/21 increased to 23.4 (prior 7.56) and Free T4 0.9 (prior 1.0)  Noted that levothyroxine is being given at same time as pantoprazole and other po meds.  Please time levothyroxine for 5 AM and other oral meds in AM for 8AM.  Ensure 1 hour at least between levothyroxine and tube feeds or other po meds, especially pantoprazole.  Continue levothyroxine 150 mcg PEG daily  recheck TSH and Free T4 in 1 week, 2/28/23.    3) Prediabetes  FS q6h, on bolus feeds but not having hyperglycemia  If needed can add low Admelog correction scale q6h  HbA1c 5.8%  no DM meds for dc.    Discussed with Dr. Ingris Shields MD  Division of Endocrinology  Pager: [phone]    If after 6PM or before 9AM, or on weekends/holidays, please call endocrine answering service for assistance ([phone]).  For nonurgent matters email [email or web address] for assistance.   66 yo male with metastatic esophageal carcinoma including known bone involvement presents with new onset hypercalcemia, as well as hypothyroidism and prediabetes.  Complex patient high level decision making.    1) Hypercalcemia of malignancy  Labs show suppressed PTH (6) and negative PTHRP suggesting cause of hypercalcemia to be local bone osteolysis from metastatic disease. DDx includes high 1,25 Vit D but less likely for this malignancy. 1,25 D resulted 78 upper normal range. 25OHD 33.4 wnl.  Disc with family that risk of ONJ even with current dental issues is greatly outweighed by hypercalcemia of malignancy and bone mets.  S/p calcitonin 4 doses plus 2 additional doses previously.  Pt received first dose of zoledronate 4mg IV on 2/18/23. family advised that the calcium may take few days to lower.   Corrected calcium today 12.6, would continue to trend daily to see if further improvement is noted post bisphosphonate.  maintain hydration status.    2) Hypothyroidism  TSH today 2/21 increased to 23.4 (prior 7.56) and Free T4 0.9 (prior 1.0)  Noted that levothyroxine is being given at same time as pantoprazole and other po meds.  Please time levothyroxine for 5 AM and other oral meds in AM for 8AM.  Ensure 1 hour at least between levothyroxine and tube feeds or other po meds, especially pantoprazole.  Ensure at least 4 hours between levothyroxine and multivitamin.  Continue levothyroxine 150 mcg PEG daily  recheck TSH and Free T4 in 1 week, 2/28/23.    3) Prediabetes  FS q6h, on bolus feeds but not having hyperglycemia  If needed can add low Admelog correction scale q6h  HbA1c 5.8%  no DM meds for dc.    Discussed with Dr. Ingris Shields MD  Division of Endocrinology  Pager: [phone]    If after 6PM or before 9AM, or on weekends/holidays, please call endocrine answering service for assistance ([phone]).  For nonurgent matters email [email or web address] for assistance.

## 2023-02-21 NOTE — PROGRESS NOTE ADULT - SUBJECTIVE AND OBJECTIVE BOX
Chief Complaint: Hypercalcemia of malignancy    History: patient resting comfortably  wife at bedside  no new issues noted    MEDICATIONS  (STANDING):  acetaminophen    Suspension .. 650 milliGRAM(s) Oral every 6 hours  ascorbic acid 500 milliGRAM(s) Oral daily  chlorhexidine 2% Cloths 1 Application(s) Topical daily  enoxaparin Injectable 40 milliGRAM(s) SubCutaneous every 24 hours  gabapentin Solution 300 milliGRAM(s) Oral three times a day  lactated ringers. 1000 milliLiter(s) (70 mL/Hr) IV Continuous <Continuous>  levalbuterol Inhalation 1.25 milliGRAM(s) Inhalation every 6 hours  levothyroxine 150 MICROGram(s) Oral <User Schedule>  lidocaine   4% Patch 1 Patch Transdermal daily  meropenem  IVPB      meropenem  IVPB 1000 milliGRAM(s) IV Intermittent every 8 hours  methadone   Solution 2.5 milliGRAM(s) Oral every 12 hours  multivitamin 1 Tablet(s) Oral daily  pantoprazole   Suspension 40 milliGRAM(s) Oral every 24 hours  polyethylene glycol 3350 17 Gram(s) Oral two times a day  senna Syrup 10 milliLiter(s) Oral daily  sodium chloride 3%  Inhalation 4 milliLiter(s) Inhalation every 12 hours    MEDICATIONS  (PRN):  naloxone Injectable 0.3 milliGRAM(s) IV Push every 3 minutes PRN opioid induced AMS  ondansetron Injectable 4 milliGRAM(s) IV Push every 8 hours PRN Nausea and/or Vomiting  oxyCODONE    IR 10 milliGRAM(s) Oral every 4 hours PRN Severe Pain (7 - 10)  oxyCODONE    IR 5 milliGRAM(s) Oral every 4 hours PRN Moderate Pain (4 - 6)      Allergies    No Known Allergies    Intolerances      Review of Systems:    ALL OTHER SYSTEMS REVIEWED AND NEGATIVE        PHYSICAL EXAM:  VITALS: T(C): 36.3 (02-21-23 @ 11:55)  T(F): 97.4 (02-21-23 @ 11:55), Max: 99 (02-21-23 @ 04:00)  HR: 108 (02-21-23 @ 11:55) (56 - 117)  BP: 133/71 (02-21-23 @ 11:55) (110/68 - 133/71)  RR:  (16 - 19)  SpO2:  (95% - 100%)  Wt(kg): --  GENERAL: NAD, appears comfortable  EYES: No proptosis,  anicteric  HEENT:  Atraumatic, Normocephalic, moist mucous membranes  RESPIRATORY: nonlabored respirations, no wheezing    CAPILLARY BLOOD GLUCOSE      POCT Blood Glucose.: 102 mg/dL (21 Feb 2023 11:31)  POCT Blood Glucose.: 108 mg/dL (21 Feb 2023 07:32)  POCT Blood Glucose.: 124 mg/dL (21 Feb 2023 02:13)  POCT Blood Glucose.: 117 mg/dL (20 Feb 2023 21:33)  POCT Blood Glucose.: 99 mg/dL (20 Feb 2023 16:36)      02-20    139  |  106  |  20  ----------------------------<  104<H>  3.9   |  20<L>  |  0.61    eGFR: 105    Ca    11.6<H>      02-20  Mg     1.60     02-19  Phos  2.0     02-20    TPro  6.8  /  Alb  2.8<L>  /  TBili  <0.2  /  DBili  x   /  AST  22  /  ALT  21  /  AlkPhos  196<H>  02-20      A1C with Estimated Average Glucose Result: 5.8 % (01-13-23 @ 06:10)      Thyroid Function Tests:  02-21 @ 06:20 TSH 23.86 FreeT4 0.9 T3 -- Anti TPO -- Anti Thyroglobulin Ab -- TSI --  02-10 @ 06:30 TSH 7.56 FreeT4 1.0 T3 -- Anti TPO -- Anti Thyroglobulin Ab -- TSI --

## 2023-02-21 NOTE — PROGRESS NOTE ADULT - PROBLEM SELECTOR PLAN 2
elev serum Ca 11.9  PTH below normal, Phos low -> most likely hyperca of malignancy  ionized justo elevated, not responding much to IVFs.   mild to moderate hypercalcemia, unclear if patient's new onset abdominal pain and vomiting related  - EKG reviewed no ischemic changes  - 1L NS bolus, followed by 100cc/h maintenance -> will dc fluids as has been getting continuous fluids and lungs sound congested -> f/u official CXR read -> perihilar opacity -> can consider fluids in 10-12 hour periods  - S/p calcitonin on 2/13-2/14 and IVFs -> not much improvement in ionized calcium  - monitor icals, uptrending -> will add FW flushes  - might benefit from bisphosphonate if not responsive to calcitonin but would need dental eval -> would need imaging (CT maxillofacial) prior to extraction, which would be necessary prior to clearance -> dental extraction 2/16 -> will need another extraction and can start bisphosphonate s/p 2 weeks  - 12/20: currently still on IV fluids at rate of 70mL/hr, s/p 2 more doses calcitonin given 2/18-2/19 and Endo input it is okay to start bisphosphonate, they spoke with family at bedside about jaw osteonecrosis risks and they agreed, first dose given zolendronate 2/19 elev serum Ca 11.9  PTH below normal, Phos low -> most likely hyperca of malignancy  ionized justo elevated, not responding much to IVFs.   mild to moderate hypercalcemia, unclear if patient's new onset abdominal pain and vomiting related  - EKG reviewed no ischemic changes  - 1L NS bolus, followed by 100cc/h maintenance -> will dc fluids as has been getting continuous fluids and lungs sound congested -> f/u official CXR read -> perihilar opacity -> can consider fluids in 10-12 hour periods  - S/p calcitonin on 2/13-2/14 and IVFs -> not much improvement in ionized calcium  - monitor icals  - might benefit from bisphosphonate if not responsive to calcitonin but would need dental eval -> would need imaging (CT maxillofacial) prior to extraction, which would be necessary prior to clearance -> dental extraction 2/16 -> will need another extraction (tentatively 2/23)  - 12/20: currently still on IV fluids at rate of 70mL/hr, s/p 2 more doses calcitonin given 2/18-2/19 and Endo input it is okay to start bisphosphonate, they spoke with family at bedside about jaw osteonecrosis risks and they agreed, first dose given zolendronate 2/19 -> might require a few days to see response

## 2023-02-21 NOTE — PROGRESS NOTE ADULT - PROBLEM SELECTOR PLAN 7
-c/w synthroid 150 mcg recheck TSH and free T4 as per endo  TSH still high, T4 normal  -c/w synthroid 150 mcg recheck TSH and free T4 as per endo  TSH still high, T4 normal but on low end  -c/w synthroid 150 mcg  -appreciate endo recs

## 2023-02-21 NOTE — PROGRESS NOTE ADULT - SUBJECTIVE AND OBJECTIVE BOX
Sultan Chapito MD  PGY 1 Department of Internal Medicine        Patient is a 67y old  Male who presents with a chief complaint of r. knee pain (20 Feb 2023 07:39)      SUBJECTIVE / OVERNIGHT EVENTS: Pt seen and examined. No acute overnight events. Denies fevers, chills, CP, SOB, Abdominal pain, N/V, Constipation, Diarrhea        MEDICATIONS  (STANDING):  acetaminophen    Suspension .. 650 milliGRAM(s) Oral every 6 hours  ascorbic acid 500 milliGRAM(s) Oral daily  chlorhexidine 2% Cloths 1 Application(s) Topical daily  enoxaparin Injectable 40 milliGRAM(s) SubCutaneous every 24 hours  gabapentin Solution 300 milliGRAM(s) Oral three times a day  lactated ringers. 1000 milliLiter(s) (70 mL/Hr) IV Continuous <Continuous>  levalbuterol Inhalation 1.25 milliGRAM(s) Inhalation every 6 hours  levothyroxine 150 MICROGram(s) Oral daily  lidocaine   4% Patch 1 Patch Transdermal daily  meropenem  IVPB      meropenem  IVPB 1000 milliGRAM(s) IV Intermittent every 8 hours  methadone   Solution 2.5 milliGRAM(s) Oral every 12 hours  multivitamin 1 Tablet(s) Oral daily  pantoprazole   Suspension 40 milliGRAM(s) Oral every 24 hours  polyethylene glycol 3350 17 Gram(s) Oral two times a day  senna Syrup 10 milliLiter(s) Oral daily  sodium chloride 3%  Inhalation 4 milliLiter(s) Inhalation every 12 hours    MEDICATIONS  (PRN):  naloxone Injectable 0.3 milliGRAM(s) IV Push every 3 minutes PRN opioid induced AMS  ondansetron Injectable 4 milliGRAM(s) IV Push every 8 hours PRN Nausea and/or Vomiting  oxyCODONE    IR 10 milliGRAM(s) Oral every 4 hours PRN Severe Pain (7 - 10)  oxyCODONE    IR 5 milliGRAM(s) Oral every 4 hours PRN Moderate Pain (4 - 6)      I&O's Summary    19 Feb 2023 07:01  -  20 Feb 2023 07:00  --------------------------------------------------------  IN: 1480 mL / OUT: 500 mL / NET: 980 mL    20 Feb 2023 07:01  -  21 Feb 2023 05:13  --------------------------------------------------------  IN: 1290 mL / OUT: 700 mL / NET: 590 mL        Vital Signs Last 24 Hrs  T(C): 37.1 (21 Feb 2023 00:05), Max: 37.1 (21 Feb 2023 00:05)  T(F): 98.8 (21 Feb 2023 00:05), Max: 98.8 (21 Feb 2023 00:05)  HR: 56 (21 Feb 2023 03:19) (56 - 127)  BP: 119/79 (21 Feb 2023 00:05) (105/63 - 125/89)  BP(mean): --  RR: 16 (21 Feb 2023 00:05) (16 - 19)  SpO2: 99% (21 Feb 2023 03:19) (95% - 100%)    Parameters below as of 21 Feb 2023 03:19  Patient On (Oxygen Delivery Method): nasal cannula        CAPILLARY BLOOD GLUCOSE  85 (20 Feb 2023 11:34)  140 (20 Feb 2023 07:36)      POCT Blood Glucose.: 124 mg/dL (21 Feb 2023 02:13)  POCT Blood Glucose.: 117 mg/dL (20 Feb 2023 21:33)  POCT Blood Glucose.: 99 mg/dL (20 Feb 2023 16:36)      PHYSICAL EXAM:  GENERAL: NAD  HEENT: hoarse voice noted; some blood noted in mouth s/p dental procedure  NECK: Supple, No JVD  CHEST/LUNG: congested, rhonchi and wheezes present as before but worse today, on 3L NC  HEART: Tachycardic with regular rhythm; No murmurs, rubs, or gallops  ABDOMEN: Soft, Nontender, Nondistended; Bowel sounds present, + PEG tube in place cdi  EXTREMITIES:  2+ Peripheral Pulses, No clubbing, cyanosis. + R knee erythema, edema with TTP, incisions cdi, ROM limited 2/2 pain. Mild pitting edema b/l LEs, new  NEUROLOGY: nonfocal. AxOx3. more alert and awake today  SKIN: No rashes or lesions        LABS:                        8.1    28.46 )-----------( 671      ( 20 Feb 2023 07:00 )             27.5     Auto Eosinophil # 0.73  / Auto Eosinophil % 2.6   / Auto Neutrophil # 24.47 / Auto Neutrophil % 85.9  / BANDS % x        02-20    139  |  106  |  20  ----------------------------<  104<H>  3.9   |  20<L>  |  0.61    Ca    11.6<H>      20 Feb 2023 07:00  Phos  2.0     02-20  TPro  6.8  /  Alb  2.8<L>  /  TBili  <0.2  /  DBili  x   /  AST  22  /  ALT  21  /  AlkPhos  196<H>  02-20    PT/INR - ( 20 Feb 2023 07:00 )   PT: 15.0 sec;   INR: 1.29 ratio         PTT - ( 20 Feb 2023 07:00 )  PTT:27.2 sec        Lactate, Blood: 4.7 mmol/L (02-17 @ 04:30)        RADIOLOGY & ADDITIONAL TESTS:    Imaging Personally Reviewed:    Consultant(s) Notes Reviewed:      Care Discussed with Consultants/Other Providers:   Sultan Chapito MD  PGY 1 Department of Internal Medicine        Patient is a 67y old  Male who presents with a chief complaint of r. knee pain (20 Feb 2023 07:39)      SUBJECTIVE / OVERNIGHT EVENTS: Pt seen and examined. No acute overnight events. Denies fevers, chills, CP, SOB, Abdominal pain, N/V, Constipation, Diarrhea. Cough is improved and patient feels ok. On tele, sinus tachy with one episode of brief PAT noted.         MEDICATIONS  (STANDING):  acetaminophen    Suspension .. 650 milliGRAM(s) Oral every 6 hours  ascorbic acid 500 milliGRAM(s) Oral daily  chlorhexidine 2% Cloths 1 Application(s) Topical daily  enoxaparin Injectable 40 milliGRAM(s) SubCutaneous every 24 hours  gabapentin Solution 300 milliGRAM(s) Oral three times a day  lactated ringers. 1000 milliLiter(s) (70 mL/Hr) IV Continuous <Continuous>  levalbuterol Inhalation 1.25 milliGRAM(s) Inhalation every 6 hours  levothyroxine 150 MICROGram(s) Oral daily  lidocaine   4% Patch 1 Patch Transdermal daily  meropenem  IVPB      meropenem  IVPB 1000 milliGRAM(s) IV Intermittent every 8 hours  methadone   Solution 2.5 milliGRAM(s) Oral every 12 hours  multivitamin 1 Tablet(s) Oral daily  pantoprazole   Suspension 40 milliGRAM(s) Oral every 24 hours  polyethylene glycol 3350 17 Gram(s) Oral two times a day  senna Syrup 10 milliLiter(s) Oral daily  sodium chloride 3%  Inhalation 4 milliLiter(s) Inhalation every 12 hours    MEDICATIONS  (PRN):  naloxone Injectable 0.3 milliGRAM(s) IV Push every 3 minutes PRN opioid induced AMS  ondansetron Injectable 4 milliGRAM(s) IV Push every 8 hours PRN Nausea and/or Vomiting  oxyCODONE    IR 10 milliGRAM(s) Oral every 4 hours PRN Severe Pain (7 - 10)  oxyCODONE    IR 5 milliGRAM(s) Oral every 4 hours PRN Moderate Pain (4 - 6)      I&O's Summary    19 Feb 2023 07:01  -  20 Feb 2023 07:00  --------------------------------------------------------  IN: 1480 mL / OUT: 500 mL / NET: 980 mL    20 Feb 2023 07:01  -  21 Feb 2023 05:13  --------------------------------------------------------  IN: 1290 mL / OUT: 700 mL / NET: 590 mL        Vital Signs Last 24 Hrs  T(C): 37.1 (21 Feb 2023 00:05), Max: 37.1 (21 Feb 2023 00:05)  T(F): 98.8 (21 Feb 2023 00:05), Max: 98.8 (21 Feb 2023 00:05)  HR: 56 (21 Feb 2023 03:19) (56 - 127)  BP: 119/79 (21 Feb 2023 00:05) (105/63 - 125/89)  BP(mean): --  RR: 16 (21 Feb 2023 00:05) (16 - 19)  SpO2: 99% (21 Feb 2023 03:19) (95% - 100%)    Parameters below as of 21 Feb 2023 03:19  Patient On (Oxygen Delivery Method): nasal cannula        CAPILLARY BLOOD GLUCOSE  85 (20 Feb 2023 11:34)  140 (20 Feb 2023 07:36)      POCT Blood Glucose.: 124 mg/dL (21 Feb 2023 02:13)  POCT Blood Glucose.: 117 mg/dL (20 Feb 2023 21:33)  POCT Blood Glucose.: 99 mg/dL (20 Feb 2023 16:36)      PHYSICAL EXAM:  GENERAL: NAD  HEENT: hoarse voice noted; mouth appears to be healing appropriately  NECK: Supple, No JVD  CHEST/LUNG: congested, rhonchi and wheezes present as before, on 3L NC  HEART: Tachycardic with regular rhythm; No murmurs, rubs, or gallops  ABDOMEN: Soft, Nontender, Nondistended; Bowel sounds present, + PEG tube in place cdi  EXTREMITIES:  2+ Peripheral Pulses, No clubbing, cyanosis. + R knee erythema, edema with TTP, incisions cdi, ROM limited 2/2 pain. No pitting edema noted  NEUROLOGY: nonfocal. AxOx3. more alert and awake today  SKIN: No rashes or lesions        LABS:                        8.1    28.46 )-----------( 671      ( 20 Feb 2023 07:00 )             27.5     Auto Eosinophil # 0.73  / Auto Eosinophil % 2.6   / Auto Neutrophil # 24.47 / Auto Neutrophil % 85.9  / BANDS % x        02-20    139  |  106  |  20  ----------------------------<  104<H>  3.9   |  20<L>  |  0.61    Ca    11.6<H>      20 Feb 2023 07:00  Phos  2.0     02-20  TPro  6.8  /  Alb  2.8<L>  /  TBili  <0.2  /  DBili  x   /  AST  22  /  ALT  21  /  AlkPhos  196<H>  02-20    PT/INR - ( 20 Feb 2023 07:00 )   PT: 15.0 sec;   INR: 1.29 ratio         PTT - ( 20 Feb 2023 07:00 )  PTT:27.2 sec        Lactate, Blood: 4.7 mmol/L (02-17 @ 04:30)        RADIOLOGY & ADDITIONAL TESTS:    Imaging Personally Reviewed:    Consultant(s) Notes Reviewed:      Care Discussed with Consultants/Other Providers:   Sultan Chapito MD  PGY 1 Department of Internal Medicine        Patient is a 67y old  Male who presents with a chief complaint of r. knee pain (20 Feb 2023 07:39)      SUBJECTIVE / OVERNIGHT EVENTS: Pt seen and examined. No acute overnight events. Denies fevers, chills, CP, SOB, Abdominal pain, N/V, Constipation, Diarrhea. Cough is improved and patient feels ok. On tele, sinus tachy with one episode of brief PAT noted. Repeat Ekg for QTc monitoring with QTc < 400.         MEDICATIONS  (STANDING):  acetaminophen    Suspension .. 650 milliGRAM(s) Oral every 6 hours  ascorbic acid 500 milliGRAM(s) Oral daily  chlorhexidine 2% Cloths 1 Application(s) Topical daily  enoxaparin Injectable 40 milliGRAM(s) SubCutaneous every 24 hours  gabapentin Solution 300 milliGRAM(s) Oral three times a day  lactated ringers. 1000 milliLiter(s) (70 mL/Hr) IV Continuous <Continuous>  levalbuterol Inhalation 1.25 milliGRAM(s) Inhalation every 6 hours  levothyroxine 150 MICROGram(s) Oral daily  lidocaine   4% Patch 1 Patch Transdermal daily  meropenem  IVPB      meropenem  IVPB 1000 milliGRAM(s) IV Intermittent every 8 hours  methadone   Solution 2.5 milliGRAM(s) Oral every 12 hours  multivitamin 1 Tablet(s) Oral daily  pantoprazole   Suspension 40 milliGRAM(s) Oral every 24 hours  polyethylene glycol 3350 17 Gram(s) Oral two times a day  senna Syrup 10 milliLiter(s) Oral daily  sodium chloride 3%  Inhalation 4 milliLiter(s) Inhalation every 12 hours    MEDICATIONS  (PRN):  naloxone Injectable 0.3 milliGRAM(s) IV Push every 3 minutes PRN opioid induced AMS  ondansetron Injectable 4 milliGRAM(s) IV Push every 8 hours PRN Nausea and/or Vomiting  oxyCODONE    IR 10 milliGRAM(s) Oral every 4 hours PRN Severe Pain (7 - 10)  oxyCODONE    IR 5 milliGRAM(s) Oral every 4 hours PRN Moderate Pain (4 - 6)      I&O's Summary    19 Feb 2023 07:01  -  20 Feb 2023 07:00  --------------------------------------------------------  IN: 1480 mL / OUT: 500 mL / NET: 980 mL    20 Feb 2023 07:01  -  21 Feb 2023 05:13  --------------------------------------------------------  IN: 1290 mL / OUT: 700 mL / NET: 590 mL        Vital Signs Last 24 Hrs  T(C): 37.1 (21 Feb 2023 00:05), Max: 37.1 (21 Feb 2023 00:05)  T(F): 98.8 (21 Feb 2023 00:05), Max: 98.8 (21 Feb 2023 00:05)  HR: 56 (21 Feb 2023 03:19) (56 - 127)  BP: 119/79 (21 Feb 2023 00:05) (105/63 - 125/89)  BP(mean): --  RR: 16 (21 Feb 2023 00:05) (16 - 19)  SpO2: 99% (21 Feb 2023 03:19) (95% - 100%)    Parameters below as of 21 Feb 2023 03:19  Patient On (Oxygen Delivery Method): nasal cannula        CAPILLARY BLOOD GLUCOSE  85 (20 Feb 2023 11:34)  140 (20 Feb 2023 07:36)      POCT Blood Glucose.: 124 mg/dL (21 Feb 2023 02:13)  POCT Blood Glucose.: 117 mg/dL (20 Feb 2023 21:33)  POCT Blood Glucose.: 99 mg/dL (20 Feb 2023 16:36)      PHYSICAL EXAM:  GENERAL: NAD  HEENT: hoarse voice noted; mouth appears to be healing appropriately  NECK: Supple, No JVD  CHEST/LUNG: congested, rhonchi and wheezes present as before, on 3L NC  HEART: Tachycardic with regular rhythm; No murmurs, rubs, or gallops  ABDOMEN: Soft, Nontender, Nondistended; Bowel sounds present, + PEG tube in place cdi  EXTREMITIES:  2+ Peripheral Pulses, No clubbing, cyanosis. + R knee erythema, edema with TTP, incisions cdi, ROM limited 2/2 pain. No pitting edema noted  NEUROLOGY: nonfocal. AxOx3. more alert and awake today  SKIN: No rashes or lesions        LABS:                        8.1    28.46 )-----------( 671      ( 20 Feb 2023 07:00 )             27.5     Auto Eosinophil # 0.73  / Auto Eosinophil % 2.6   / Auto Neutrophil # 24.47 / Auto Neutrophil % 85.9  / BANDS % x        02-20    139  |  106  |  20  ----------------------------<  104<H>  3.9   |  20<L>  |  0.61    Ca    11.6<H>      20 Feb 2023 07:00  Phos  2.0     02-20  TPro  6.8  /  Alb  2.8<L>  /  TBili  <0.2  /  DBili  x   /  AST  22  /  ALT  21  /  AlkPhos  196<H>  02-20    PT/INR - ( 20 Feb 2023 07:00 )   PT: 15.0 sec;   INR: 1.29 ratio         PTT - ( 20 Feb 2023 07:00 )  PTT:27.2 sec        Lactate, Blood: 4.7 mmol/L (02-17 @ 04:30)        RADIOLOGY & ADDITIONAL TESTS:    Imaging Personally Reviewed:    Consultant(s) Notes Reviewed:      Care Discussed with Consultants/Other Providers:

## 2023-02-21 NOTE — PROGRESS NOTE ADULT - ASSESSMENT
67M with  metastatic esophageal cancer (dx 8 years ago) to bone on irinotecan, r. knee, hypothyroidism, GERD, HTN recent RUL pna s/p cefepime, r. patellar mass excision --> metastatic squamous cell carcinoma on 1/7/23 was admitted on 2/5/23  w/ right knee pain and swelling for 2 days.    Recently hospitalized  underwent patellectomy for met SCC on 1/7/23  Course complicated by pneumonia attributed to Enterobacter treated with Cefepime 1/11 --> 1/18  hypercalcemia - calcitonin administered 2/18, 2/19, Zometa 2/18    Recent in patient Antibiotics  Vanco 1/1-->1/4;  2/5 2/13--> 2/17  Zosyn 1/1;  2/5, 2/6l 2/17 -->  Cefepime 1/1-->4; 1/11-->18  Cefazolin 1/7  Meropenem  2/17 -->    improved sepsis -  s/p dental extraction 2/16  He large tumor burden and mass with scant residual left lung  2/17 Neg MRSA PCR  SPutum with Enterobacter cloacea    Suggest  Continue  Meropenem 1 gm every 8 hours

## 2023-02-22 NOTE — PROGRESS NOTE ADULT - ASSESSMENT
67M with  metastatic esophageal cancer (dx 8 years ago) to bone on irinotecan, r. knee, hypothyroidism, GERD, HTN recent RUL pna s/p cefepime, r. patellar mass excision --> metastatic squamous cell carcinoma on 1/7/23 was admitted on 2/5/23  w/ right knee pain and swelling for 2 days.    Recently hospitalized  underwent patellectomy for met SCC on 1/7/23  Course complicated by pneumonia attributed to Enterobacter treated with Cefepime 1/11 --> 1/18  hypercalcemia - calcitonin administered 2/18, 2/19, Zometa 2/18    Recent in patient Antibiotics  Vanco 1/1-->1/4;  2/5 2/13--> 2/17  Zosyn 1/1;  2/5, 2/6l 2/17 -->  Cefepime 1/1-->4; 1/11-->18  Cefazolin 1/7  Meropenem  2/17 -->    improved sepsis -  encephalopathy  leukemoid reaction  hypercalcemia  s/p dental extraction 2/16 to reduce risk of OsteoNecrosis of Jaw  He large tumor burden and mass with scant residual left lung  2/17 Neg MRSA PCR  2/17 Sputum with Enterobacter cloacae:   no bacteremia documented    Suggest  Continue  Meropenem 1 gm every 8 hours  - 7 day course through 2/24 anticipated

## 2023-02-22 NOTE — PROGRESS NOTE ADULT - PROBLEM SELECTOR PLAN 2
elev serum Ca 11.9  PTH below normal, Phos low -> most likely hyperca of malignancy  ionized justo elevated, not responding much to IVFs.   mild to moderate hypercalcemia, unclear if patient's new onset abdominal pain and vomiting related  - EKG reviewed no ischemic changes  - 1L NS bolus, followed by 100cc/h maintenance -> will dc fluids as has been getting continuous fluids and lungs sound congested -> f/u official CXR read -> perihilar opacity -> can consider fluids in 10-12 hour periods  - S/p calcitonin on 2/13-2/14 and IVFs -> not much improvement in ionized calcium  - monitor icals   - might benefit from bisphosphonate if not responsive to calcitonin but would need dental eval -> would need imaging (CT maxillofacial) prior to extraction, which would be necessary prior to clearance -> dental extraction 2/16 -> will need another extraction (tentatively 2/23)  - 12/20: currently still on IV fluids at rate of 70mL/hr, s/p 2 more doses calcitonin given 2/18-2/19 and Endo input it is okay to start bisphosphonate, they spoke with family at bedside about jaw osteonecrosis risks and they agreed, first dose given zolendronate 2/19 -> might require a few days to see response -> slowly downtrending elev serum Ca 11.9  PTH below normal, Phos low -> most likely hyperca of malignancy  ionized justo elevated, not responding much to IVFs.   mild to moderate hypercalcemia, unclear if patient's new onset abdominal pain and vomiting related  - EKG reviewed no ischemic changes  - 1L NS bolus, followed by 100cc/h maintenance -> will dc fluids as has been getting continuous fluids and lungs sound congested -> f/u official CXR read -> perihilar opacity -> can consider fluids in 10-12 hour periods  - S/p calcitonin on 2/13-2/14 and IVFs -> not much improvement in ionized calcium  - monitor icals   - might benefit from bisphosphonate if not responsive to calcitonin but would need dental eval -> would need imaging (CT maxillofacial) prior to extraction, which would be necessary prior to clearance -> dental extraction 2/16 -> will need another extraction (tentatively 2/23) -> will clarify with dental if second procedure still warranted given bisphosphonate already given  - 12/20: currently still on IV fluids at rate of 70mL/hr, s/p 2 more doses calcitonin given 2/18-2/19 and Endo input it is okay to start bisphosphonate, they spoke with family at bedside about jaw osteonecrosis risks and they agreed, first dose given zolendronate 2/19 -> might require a few days to see response -> slowly downtrending

## 2023-02-22 NOTE — PROGRESS NOTE ADULT - SUBJECTIVE AND OBJECTIVE BOX
HPI: 66 yo male with metastatic esophageal carcinoma including known bone involvement presents with new onset hypercalcemia, as well as hypothyroidism and prediabetes.    Interval history:  corrected calcium 13 today  dental extraction possibly planned for tomorrow    MEDICATIONS  (STANDING):  ascorbic acid 500 milliGRAM(s) Oral <User Schedule>  chlorhexidine 2% Cloths 1 Application(s) Topical daily  enoxaparin Injectable 40 milliGRAM(s) SubCutaneous every 24 hours  gabapentin Solution 300 milliGRAM(s) Oral three times a day  lactated ringers. 1000 milliLiter(s) (70 mL/Hr) IV Continuous <Continuous>  levalbuterol Inhalation 1.25 milliGRAM(s) Inhalation every 12 hours  levothyroxine 150 MICROGram(s) Oral <User Schedule>  lidocaine   4% Patch 1 Patch Transdermal daily  meropenem  IVPB 1000 milliGRAM(s) IV Intermittent every 8 hours  meropenem  IVPB      methadone   Solution 2.5 milliGRAM(s) Oral <User Schedule>  multivitamin 1 Tablet(s) Oral <User Schedule>  pantoprazole   Suspension 40 milliGRAM(s) Oral <User Schedule>  polyethylene glycol 3350 17 Gram(s) Oral <User Schedule>  potassium phosphate / sodium phosphate Powder (PHOS-NaK) 2 Packet(s) Oral two times a day  senna Syrup 10 milliLiter(s) Oral <User Schedule>  sodium chloride 3%  Inhalation 4 milliLiter(s) Inhalation every 12 hours    MEDICATIONS  (PRN):  naloxone Injectable 0.3 milliGRAM(s) IV Push every 3 minutes PRN opioid induced AMS  ondansetron Injectable 4 milliGRAM(s) IV Push every 8 hours PRN Nausea and/or Vomiting  oxyCODONE    IR 10 milliGRAM(s) Oral every 4 hours PRN Severe Pain (7 - 10)  oxyCODONE    IR 5 milliGRAM(s) Oral every 4 hours PRN Moderate Pain (4 - 6)      Allergies    No Known Allergies    Intolerances        Review of Systems:  Constitutional: No fever, good appetite/po intake  Eyes: No blurry vision, diplopia  Neuro: No tremors  HEENT: No pain  Cardiovascular: No chest pain, palpitations  Respiratory: No SOB, no cough  GI: No nausea, vomiting,   : No dysuria, hematuria  Skin: no rash  Psych: no depression  Endocrine: no polyuria, polydipsia  Hem/lymph: no swelling  Osteoporosis: no fractures    ALL OTHER SYSTEMS REVIEWED AND NEGATIVE    PHYSICAL EXAM:  VITALS: T(C): 37.4 (02-22-23 @ 12:23)  T(F): 99.4 (02-22-23 @ 12:23), Max: 99.4 (02-22-23 @ 12:23)  HR: 116 (02-22-23 @ 12:23) (107 - 135)  BP: 132/71 (02-22-23 @ 12:23) (118/70 - 141/84)  RR:  (18 - 19)  SpO2:  (94% - 100%)  Wt(kg): --  GENERAL: NAD, chronically ill appearing  EYES: No proptosis, no lid lag, anicteric  HEENT:  Atraumatic, normocephalic, moist mucous membranes, +NC  RESPIRATORY: nonlabored respirations, no wheezing  PSYCH: Alert,ormal affect, normal mood    POCT Blood Glucose.: 111 mg/dL (02-22-23 @ 11:46)  POCT Blood Glucose.: 97 mg/dL (02-22-23 @ 07:05)  POCT Blood Glucose.: 112 mg/dL (02-21-23 @ 22:17)  POCT Blood Glucose.: 85 mg/dL (02-21-23 @ 17:00)  POCT Blood Glucose.: 102 mg/dL (02-21-23 @ 11:31)  POCT Blood Glucose.: 108 mg/dL (02-21-23 @ 07:32)  POCT Blood Glucose.: 124 mg/dL (02-21-23 @ 02:13)  POCT Blood Glucose.: 117 mg/dL (02-20-23 @ 21:33)  POCT Blood Glucose.: 99 mg/dL (02-20-23 @ 16:36)  POCT Blood Glucose.: 106 mg/dL (02-20-23 @ 03:51)  POCT Blood Glucose.: 131 mg/dL (02-19-23 @ 20:53)  POCT Blood Glucose.: 87 mg/dL (02-19-23 @ 16:37)                            8.0    33.32 )-----------( 623      ( 22 Feb 2023 06:27 )             27.3       02-22    134<L>  |  105  |  20  ----------------------------<  87  4.4   |  20<L>  |  0.62    eGFR: 105    Ca    11.8<H>      02-22  Mg     1.70     02-22  Phos  2.0     02-22    TPro  6.7  /  Alb  2.5<L>  /  TBili  0.2  /  DBili  x   /  AST  23  /  ALT  23  /  AlkPhos  213<H>  02-22      Thyroid Function Tests:  02-21 @ 06:20 TSH 23.86 FreeT4 0.9 T3 -- Anti TPO -- Anti Thyroglobulin Ab -- TSI --  02-10 @ 06:30 TSH 7.56 FreeT4 1.0 T3 -- Anti TPO -- Anti Thyroglobulin Ab -- TSI --          Radiology:

## 2023-02-22 NOTE — PROGRESS NOTE ADULT - PROBLEM SELECTOR PLAN 7
recheck TSH and free T4 as per endo  TSH still high, T4 normal but on low end  -c/w synthroid 150 mcg (different time than other meds and hold TFs one hour before and after)  -appreciate endo recs  -recheck on 2/28

## 2023-02-22 NOTE — PROGRESS NOTE ADULT - SUBJECTIVE AND OBJECTIVE BOX
Follow Up:  leukcoytosis    Interval History/ROS:  awake but confused,  family members present    Allergies  No Known Allergies    ANTIMICROBIALS:  meropenem  IVPB    meropenem  IVPB 1000 every 8 hours      OTHER MEDS:  MEDICATIONS  (STANDING):  enoxaparin Injectable 40 every 24 hours  gabapentin Solution 300 three times a day  levalbuterol Inhalation 1.25 every 12 hours  levothyroxine 150 <User Schedule>  methadone   Solution 2.5 <User Schedule>  ondansetron Injectable 4 every 8 hours PRN  oxyCODONE    IR 10 every 4 hours PRN  oxyCODONE    IR 5 every 4 hours PRN  pantoprazole   Suspension 40 <User Schedule>  polyethylene glycol 3350 17 <User Schedule>  senna Syrup 10 <User Schedule>  sodium chloride 3%  Inhalation 4 every 12 hours      Vital Signs Last 24 Hrs  T(C): 37.1 (22 Feb 2023 16:23), Max: 37.4 (22 Feb 2023 12:23)  T(F): 98.8 (22 Feb 2023 16:23), Max: 99.4 (22 Feb 2023 12:23)  HR: 120 (22 Feb 2023 16:23) (107 - 135)  BP: 135/80 (22 Feb 2023 16:23) (118/70 - 141/84)  BP(mean): --  RR: 18 (22 Feb 2023 16:23) (18 - 19)  SpO2: 100% (22 Feb 2023 16:23) (94% - 100%)    Parameters below as of 22 Feb 2023 16:23  Patient On (Oxygen Delivery Method): nasal cannula  O2 Flow (L/min): 2      PHYSICAL EXAM:  General:  NAD, Non-toxic  Neurology: lethargic, difficulty responding verbally, nonfocal  Respiratory: Clear to auscultation bilaterally  CV: RRR, S1S2, no murmurs, rubs or gallops  Abdominal: Soft, Non-tender, non-distended, normal bowel sounds  Extremities: No edema,  Line Sites: Clear  Skin: No rash                          8.0    33.32 )-----------( 623      ( 22 Feb 2023 06:27 )             27.3   WBC Count: 33.32 (02-22 @ 06:27)  86.9 Neut  WBC Count: 32.27 (02-21 @ 06:20)  WBC Count: 28.46 (02-20 @ 07:00)  WBC Count: 28.15 (02-19 @ 03:52)  WBC Count: 33.96 (02-18 @ 16:38)  WBC Count: 34.71 (02-18 @ 06:42)      02-22    134<L>  |  105  |  20  ----------------------------<  87  4.4   |  20<L>  |  0.62    Ca    11.8<H>      22 Feb 2023 06:27  Phos  2.0     02-22  Mg     1.70     02-22    TPro  6.7  /  Alb  2.5<L>  /  TBili  0.2  /  DBili  x   /  AST  23  /  ALT  23  /  AlkPhos  213<H>  02-22      MICROBIOLOGY:  .Sputum Sputum  02-17-23   Moderate Enterobacter cloacae complex  Normal Respiratory Susan present   (02.17.23 @ 08:20)   - Ceftolozane/tazobactam: R >8   - Ceftazidime/Avibactam: S <=4   - Piperacillin/Tazobactam: R 64   - Tobramycin: S <=2   - Amikacin: S <=16   - Trimethoprim/Sulfamethoxazole: S <=0.5/9.5   Gram Stain:   Numerous Squamous epithelial cells per low power field   Numerous polymorphonuclear leukocytes per low power field   Numerous Gram Negative Rods per oil power field   Numerous Yeast like cells per oil power field   Moderate Gram Positive Cocci in Clusters per oil power field   Results consistent with oropharyngeal contamination   - Amoxicillin/Clavulanic Acid: R >16/8   - Ampicillin: R >16 These ampicillin results predict results for amoxicillin   - Ampicillin/Sulbactam: R >16/8 Enterobacter, Klebsiella aerogenes, Citrobacter, and Serratia may develop resistance during prolonged therapy (3-4 days)   - Aztreonam: R >16   - Cefazolin: R >16 Enterobacter, Klebsiella aerogenes, Citrobacter, and Serratia may develop resistance during prolonged therapy (3-4 days)   - Cefepime: S 4   - Cefoxitin: R >16   - Ceftriaxone: R >32 Enterobacter, Klebsiella aerogenes, Citrobacter, and Serratia may develop resistance during prolonged therapy   - Ciprofloxacin: S <=0.25   - Ertapenem: I 1   - Gentamicin: S <=2   - Imipenem: S <=1   - Levofloxacin: S <=0.5   - Meropenem: S <=1       .Blood Blood-Peripheral  02-17-23   No Growth Final  --  --      .Blood Blood-Venous  02-17-23   No Growth Final  --  --      Clean Catch Clean Catch (Midstream)  02-13-23   <10,000 CFU/mL Normal Urogenital Susan  --  --      .Blood Blood-Peripheral  02-13-23   No Growth Final  --  --      .Blood Blood-Venous  02-13-23   No Growth Final  --  --      .Body Fluid Synovial Fluid  02-05-23   No growth at 14 days.  --    No polymorphonuclear leukocytes per low power field  No organisms seen per oil power field  by cytocentrifuge      Clean Catch Clean Catch (Midstream)  02-05-23   No growth  --  --      .Blood Blood-Peripheral  02-05-23   No Growth Final  --  --      .Blood Blood-Peripheral  02-05-23   No Growth Final  --  --      .Blood Blood-Peripheral  01-24-23   No Growth Final  --  --      .Blood Blood-Venous  01-24-23   No Growth Final  --  --    RADIOLOGY:  < from: Xray Chest 1 View- PORTABLE-Urgent (Xray Chest 1 View- PORTABLE-Urgent .) (02.18.23 @ 12:58) >  IMPRESSION:  Postoperative changes left lung volume loss similar to prior. Pleural   parenchymal opacity left hemithorax similar to prior .No definite acute   infiltrate pneumothorax or other new abnormality. Right chest port   reidentified in situ    < end of copied text >      Delonte Simms MD; Division of Infectious Disease; Pager: [phone]; nights and weekends: [phone]

## 2023-02-22 NOTE — PROGRESS NOTE ADULT - ATTENDING COMMENTS
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 Found to have SIRS (tachy, leukocytosis) and c/f right knee septic arthritis s/p arthrocentesis on 2/5 not yielding any organisms however +hemarthrosis.    Patient seen and examined at bedside. Reports having sputum otherwise no other complaints. Lung exam noted for diffuse coarse breath sound. R foot swelling seems to be improving.     #Acute respiratory failure with hypoxemia: possibly aspiration in the setting of multiple teeth extractions. ID consulted; appreciate recs. Continue to wean O2 as tolerated. Chest PT, xopenex, deep suction as needed    #SIRS criteria: fever, tachycardic, leukocytosis: considering infection vs malignant related fever vs clot (though LE negative for DVT on 2/5): repeat culture and MRSA neg. Sputum culture growing enterobacter. Vanco discontinued. Continue uma per ID recs.    #Hemarthrosis - Right knee cultures NGTD, ESR/CRP elevated, suspect inflammation in setting of hemarthrosis and malignancy. Right knee immobilizer while out of bed    #Met SCC Esophageal Ca - Was planned to start RT as outpatient but never started. Previous right patellar bx path with SCC, Rad Onc with no present plans for RT inpatient    #Hypercalcemia : new, suspect due to malignancy, low PTH, normal Vit D. Downtrended on IVF but still remains elevated. s/p calcitonin. s/p extractions 2/16. Pending dental recs regarding further extraction plan. Endo consulted given runs of V-tach 2/18.  Discussed w. endo, second round of calcitonin administered 2/18. Also s/p zometa 2/18. Per endo, risks of untreated hypercalcemia outweigh lower risks of OJN. C/w IVF. If w/ c/f volume overload will consider diuresis. F/u further endo recs    #V-Tach: Noted. Likely multi-factorial in etiology. Suspect some component likely in the setting of hypercalcemia. Tele monitoring. Echo: EF 74%, mild pulm htn. Rest of Mgt as above.     #Cancer pain - C/w methadone 2.5mg q12 per palliative. Will need palliative followup for continued methadone refills.      Discussed with HS.

## 2023-02-22 NOTE — PROGRESS NOTE ADULT - ASSESSMENT
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    1) Hypercalcemia of malignancy  Labs show suppressed PTH (6) and negative PTHRP suggesting cause of hypercalcemia to be local bone osteolysis from metastatic disease. DDx includes high 1,25 Vit D but less likely for this malignancy. 1,25 D resulted 78 upper normal range. 25OHD 33.4 wnl.  Risk of ONJ even with current dental issues is greatly outweighed by hypercalcemia of malignancy and bone mets.  S/p calcitonin 4 doses plus 2 additional doses previously.  Pt received first dose of zoledronate 4mg IV on 2/18/23. corrected calcium is uptrending 13 today several days after dose.   -maintain hydration status. encourage PO as tolerated and if able to give further IV hydration would recommend doing so today.  -Pt has possible dental extraction tomorrow, plan to be finalized on 2/23.  -Ultimate treatment of hypercalcemia is oncology-directed --> planned for outpatient palliative RT. No inpatient plans. thus after dental work would consider xgeva/denosumab for hypercalcemia management. Repeated dosing would need to be coordinated outpatient with oncology. Of note - denosumab has general higher ONJ risk in setting of malignancy. Options for hypercalcemia are limited for this pt if bisphosphonate dosing is not sufficient for control of hypercalcemia  - would use PO phos for repletion    2) Hypothyroidism  TSH 2/21 increased to 23.4 (prior 7.56) and Free T4 0.9 (prior 1.0)  Noted that levothyroxine is being given at same time as pantoprazole and other po meds.  Please time levothyroxine for 5 AM and other oral meds in AM for 8AM.  Ensure 1 hour at least between levothyroxine and tube feeds or other po meds, especially pantoprazole.  Ensure at least 4 hours between levothyroxine and multivitamin.  Continue levothyroxine 150 mcg PEG daily  recheck TSH and Free T4 in 1 week, 2/28/23.    3) Prediabetes  FS q6h, on bolus feeds but not having hyperglycemia  If needed can add low Admelog correction scale q6h  HbA1c 5.8%  no DM meds for dc.    discussed with team    Andie Licona MD  Attending Physician   Department of Endocrinology, Diabetes and Metabolism   [employer] Teams on 02-22-23 @ 16:17    If before 9AM or after 5PM, or on weekends/holidays, please call the Endocrine answering service for assistance ([phone]).  For nonurgent matters, please email [email or web address] for assistance.

## 2023-02-22 NOTE — PROGRESS NOTE ADULT - PROBLEM SELECTOR PLAN 1
baseline tachy in 110s likely in setting of medical disease cancer but newly tachy to 160s 2/13  baseline leukocytosis in 20k range -> increased to 35k 2/14  newly febrile 2/13  MRSA neg at admission  unclear source; initial concern for septic arthritis but tap neg for infection; possible aspiration pneumonia given CXR findings  PCT elevated to 5  Lactate elevation, downtrended  -BCx -> NGTD  -UA/Ucx neg  -CXR to look for pneumonia given concern for aspiration -> right perihilar opacity  -RVP neg  -empiric Zosyn -> switched to uma due to fevers as per ID  -trend WBC/monitor fever curve   -add vancomycin and do MRSA/MSSA -> dc vanc as MRSA neg  - sputum cx results -> Enterobacter cloacae, f/u sensitivities -> if ID ok, can transition to FQ -> c/w uma  -ID c/s as unclear source; aspiration pneumonia vs post-obstructive vs malignancy

## 2023-02-22 NOTE — PROGRESS NOTE ADULT - SUBJECTIVE AND OBJECTIVE BOX
Sultan Chapito MD  PGY 1 Department of Internal Medicine        Patient is a 67y old  Male who presents with a chief complaint of r. knee pain (21 Feb 2023 15:02)      SUBJECTIVE / OVERNIGHT EVENTS: Pt seen and examined. No acute overnight events. Denies fevers, chills, CP, SOB, Abdominal pain, N/V, Constipation, Diarrhea        MEDICATIONS  (STANDING):  ascorbic acid 500 milliGRAM(s) Oral <User Schedule>  chlorhexidine 2% Cloths 1 Application(s) Topical daily  enoxaparin Injectable 40 milliGRAM(s) SubCutaneous every 24 hours  gabapentin Solution 300 milliGRAM(s) Oral three times a day  lactated ringers. 1000 milliLiter(s) (70 mL/Hr) IV Continuous <Continuous>  levalbuterol Inhalation 1.25 milliGRAM(s) Inhalation every 6 hours  levothyroxine 150 MICROGram(s) Oral <User Schedule>  lidocaine   4% Patch 1 Patch Transdermal daily  meropenem  IVPB      meropenem  IVPB 1000 milliGRAM(s) IV Intermittent every 8 hours  methadone   Solution 2.5 milliGRAM(s) Oral <User Schedule>  multivitamin 1 Tablet(s) Oral <User Schedule>  pantoprazole   Suspension 40 milliGRAM(s) Oral <User Schedule>  polyethylene glycol 3350 17 Gram(s) Oral <User Schedule>  senna Syrup 10 milliLiter(s) Oral <User Schedule>  sodium chloride 3%  Inhalation 4 milliLiter(s) Inhalation every 12 hours    MEDICATIONS  (PRN):  naloxone Injectable 0.3 milliGRAM(s) IV Push every 3 minutes PRN opioid induced AMS  ondansetron Injectable 4 milliGRAM(s) IV Push every 8 hours PRN Nausea and/or Vomiting  oxyCODONE    IR 10 milliGRAM(s) Oral every 4 hours PRN Severe Pain (7 - 10)  oxyCODONE    IR 5 milliGRAM(s) Oral every 4 hours PRN Moderate Pain (4 - 6)      I&O's Summary    20 Feb 2023 07:01  -  21 Feb 2023 07:00  --------------------------------------------------------  IN: 2580 mL / OUT: 1550 mL / NET: 1030 mL    21 Feb 2023 07:01  -  22 Feb 2023 05:19  --------------------------------------------------------  IN: 2075 mL / OUT: 550 mL / NET: 1525 mL        Vital Signs Last 24 Hrs  T(C): 37.3 (22 Feb 2023 02:01), Max: 37.3 (21 Feb 2023 22:08)  T(F): 99.1 (22 Feb 2023 02:01), Max: 99.2 (21 Feb 2023 22:08)  HR: 109 (22 Feb 2023 02:01) (104 - 135)  BP: 118/70 (22 Feb 2023 02:01) (110/68 - 141/84)  BP(mean): --  RR: 18 (22 Feb 2023 02:01) (18 - 19)  SpO2: 100% (22 Feb 2023 02:01) (94% - 100%)    Parameters below as of 22 Feb 2023 02:01  Patient On (Oxygen Delivery Method): nasal cannula  O2 Flow (L/min): 3      CAPILLARY BLOOD GLUCOSE      POCT Blood Glucose.: 112 mg/dL (21 Feb 2023 22:17)  POCT Blood Glucose.: 85 mg/dL (21 Feb 2023 17:00)  POCT Blood Glucose.: 102 mg/dL (21 Feb 2023 11:31)  POCT Blood Glucose.: 108 mg/dL (21 Feb 2023 07:32)      PHYSICAL EXAM:  GENERAL: NAD  HEENT: hoarse voice noted; mouth appears to be healing appropriately  NECK: Supple, No JVD  CHEST/LUNG: congested, rhonchi and wheezes present as before, on 3L NC  HEART: Tachycardic with regular rhythm; No murmurs, rubs, or gallops  ABDOMEN: Soft, Nontender, Nondistended; Bowel sounds present, + PEG tube in place cdi  EXTREMITIES:  2+ Peripheral Pulses, No clubbing, cyanosis. + R knee erythema, edema with TTP, incisions cdi, ROM limited 2/2 pain. No pitting edema noted  NEUROLOGY: nonfocal. AxOx3. more alert and awake today  SKIN: No rashes or lesions        LABS:                        8.6    32.27 )-----------( 661      ( 21 Feb 2023 06:20 )             28.7     Auto Eosinophil # 0.47  / Auto Eosinophil % 1.5   / Auto Neutrophil # 28.15 / Auto Neutrophil % 87.2  / BANDS % x                            8.1    28.46 )-----------( 671      ( 20 Feb 2023 07:00 )             27.5     Auto Eosinophil # 0.73  / Auto Eosinophil % 2.6   / Auto Neutrophil # 24.47 / Auto Neutrophil % 85.9  / BANDS % x        02-20    139  |  106  |  20  ----------------------------<  104<H>  3.9   |  20<L>  |  0.61    Ca    11.6<H>      20 Feb 2023 07:00  Phos  2.0     02-20  TPro  6.8  /  Alb  2.8<L>  /  TBili  <0.2  /  DBili  x   /  AST  22  /  ALT  21  /  AlkPhos  196<H>  02-20    PT/INR - ( 21 Feb 2023 06:20 )   PT: 15.5 sec;   INR: 1.33 ratio         PTT - ( 21 Feb 2023 06:20 )  PTT:27.5 sec        Lactate, Blood: 4.7 mmol/L (02-17 @ 04:30)        RADIOLOGY & ADDITIONAL TESTS:    Imaging Personally Reviewed:    Consultant(s) Notes Reviewed:      Care Discussed with Consultants/Other Providers:   Sultan Chapito MD  PGY 1 Department of Internal Medicine        Patient is a 67y old  Male who presents with a chief complaint of r. knee pain (21 Feb 2023 15:02)      SUBJECTIVE / OVERNIGHT EVENTS: Pt seen and examined. No acute overnight events. Denies fevers, chills, CP, SOB, Abdominal pain, N/V, Constipation, Diarrhea. Cough and SOB overall improved. Patient feels "ok." Pain well controlled. Tele sinus tachycardia w/ frequent PACs, occasional PVCs, and a PAT        MEDICATIONS  (STANDING):  ascorbic acid 500 milliGRAM(s) Oral <User Schedule>  chlorhexidine 2% Cloths 1 Application(s) Topical daily  enoxaparin Injectable 40 milliGRAM(s) SubCutaneous every 24 hours  gabapentin Solution 300 milliGRAM(s) Oral three times a day  lactated ringers. 1000 milliLiter(s) (70 mL/Hr) IV Continuous <Continuous>  levalbuterol Inhalation 1.25 milliGRAM(s) Inhalation every 6 hours  levothyroxine 150 MICROGram(s) Oral <User Schedule>  lidocaine   4% Patch 1 Patch Transdermal daily  meropenem  IVPB      meropenem  IVPB 1000 milliGRAM(s) IV Intermittent every 8 hours  methadone   Solution 2.5 milliGRAM(s) Oral <User Schedule>  multivitamin 1 Tablet(s) Oral <User Schedule>  pantoprazole   Suspension 40 milliGRAM(s) Oral <User Schedule>  polyethylene glycol 3350 17 Gram(s) Oral <User Schedule>  senna Syrup 10 milliLiter(s) Oral <User Schedule>  sodium chloride 3%  Inhalation 4 milliLiter(s) Inhalation every 12 hours    MEDICATIONS  (PRN):  naloxone Injectable 0.3 milliGRAM(s) IV Push every 3 minutes PRN opioid induced AMS  ondansetron Injectable 4 milliGRAM(s) IV Push every 8 hours PRN Nausea and/or Vomiting  oxyCODONE    IR 10 milliGRAM(s) Oral every 4 hours PRN Severe Pain (7 - 10)  oxyCODONE    IR 5 milliGRAM(s) Oral every 4 hours PRN Moderate Pain (4 - 6)      I&O's Summary    20 Feb 2023 07:01  -  21 Feb 2023 07:00  --------------------------------------------------------  IN: 2580 mL / OUT: 1550 mL / NET: 1030 mL    21 Feb 2023 07:01  -  22 Feb 2023 05:19  --------------------------------------------------------  IN: 2075 mL / OUT: 550 mL / NET: 1525 mL        Vital Signs Last 24 Hrs  T(C): 37.3 (22 Feb 2023 02:01), Max: 37.3 (21 Feb 2023 22:08)  T(F): 99.1 (22 Feb 2023 02:01), Max: 99.2 (21 Feb 2023 22:08)  HR: 109 (22 Feb 2023 02:01) (104 - 135)  BP: 118/70 (22 Feb 2023 02:01) (110/68 - 141/84)  BP(mean): --  RR: 18 (22 Feb 2023 02:01) (18 - 19)  SpO2: 100% (22 Feb 2023 02:01) (94% - 100%)    Parameters below as of 22 Feb 2023 02:01  Patient On (Oxygen Delivery Method): nasal cannula  O2 Flow (L/min): 3      CAPILLARY BLOOD GLUCOSE      POCT Blood Glucose.: 112 mg/dL (21 Feb 2023 22:17)  POCT Blood Glucose.: 85 mg/dL (21 Feb 2023 17:00)  POCT Blood Glucose.: 102 mg/dL (21 Feb 2023 11:31)  POCT Blood Glucose.: 108 mg/dL (21 Feb 2023 07:32)      PHYSICAL EXAM:  GENERAL: NAD  HEENT: hoarse voice noted; mouth appears to be healing appropriately  NECK: Supple, No JVD  CHEST/LUNG: congested, rhonchi and wheezes present as before, on 3L NC  HEART: Tachycardic with regular rhythm; No murmurs, rubs, or gallops  ABDOMEN: Soft, Nontender, Nondistended; Bowel sounds present, + PEG tube in place cdi  EXTREMITIES:  2+ Peripheral Pulses, No clubbing, cyanosis. + R knee erythema, edema with TTP, incisions cdi, ROM limited 2/2 pain. No pitting edema noted  NEUROLOGY: nonfocal. AxOx3. more alert and awake today  SKIN: No rashes or lesions        LABS:                        8.6    32.27 )-----------( 661      ( 21 Feb 2023 06:20 )             28.7     Auto Eosinophil # 0.47  / Auto Eosinophil % 1.5   / Auto Neutrophil # 28.15 / Auto Neutrophil % 87.2  / BANDS % x                            8.1    28.46 )-----------( 671      ( 20 Feb 2023 07:00 )             27.5     Auto Eosinophil # 0.73  / Auto Eosinophil % 2.6   / Auto Neutrophil # 24.47 / Auto Neutrophil % 85.9  / BANDS % x        02-20    139  |  106  |  20  ----------------------------<  104<H>  3.9   |  20<L>  |  0.61    Ca    11.6<H>      20 Feb 2023 07:00  Phos  2.0     02-20  TPro  6.8  /  Alb  2.8<L>  /  TBili  <0.2  /  DBili  x   /  AST  22  /  ALT  21  /  AlkPhos  196<H>  02-20    PT/INR - ( 21 Feb 2023 06:20 )   PT: 15.5 sec;   INR: 1.33 ratio         PTT - ( 21 Feb 2023 06:20 )  PTT:27.5 sec        Lactate, Blood: 4.7 mmol/L (02-17 @ 04:30)        RADIOLOGY & ADDITIONAL TESTS:    Imaging Personally Reviewed:    Consultant(s) Notes Reviewed:      Care Discussed with Consultants/Other Providers:   Sultan Chapito MD  PGY 1 Department of Internal Medicine        Patient is a 67y old  Male who presents with a chief complaint of r. knee pain (21 Feb 2023 15:02)      SUBJECTIVE / OVERNIGHT EVENTS: Pt seen and examined. No acute overnight events. Denies fevers, chills, CP, SOB, Abdominal pain, N/V, Constipation, Diarrhea. Cough and SOB overall improved. Patient feels "ok." Pain well controlled. Tele sinus tachycardia w/ frequent PACs, occasional PVCs, and a PAT        MEDICATIONS  (STANDING):  ascorbic acid 500 milliGRAM(s) Oral <User Schedule>  chlorhexidine 2% Cloths 1 Application(s) Topical daily  enoxaparin Injectable 40 milliGRAM(s) SubCutaneous every 24 hours  gabapentin Solution 300 milliGRAM(s) Oral three times a day  lactated ringers. 1000 milliLiter(s) (70 mL/Hr) IV Continuous <Continuous>  levalbuterol Inhalation 1.25 milliGRAM(s) Inhalation every 6 hours  levothyroxine 150 MICROGram(s) Oral <User Schedule>  lidocaine   4% Patch 1 Patch Transdermal daily  meropenem  IVPB      meropenem  IVPB 1000 milliGRAM(s) IV Intermittent every 8 hours  methadone   Solution 2.5 milliGRAM(s) Oral <User Schedule>  multivitamin 1 Tablet(s) Oral <User Schedule>  pantoprazole   Suspension 40 milliGRAM(s) Oral <User Schedule>  polyethylene glycol 3350 17 Gram(s) Oral <User Schedule>  senna Syrup 10 milliLiter(s) Oral <User Schedule>  sodium chloride 3%  Inhalation 4 milliLiter(s) Inhalation every 12 hours    MEDICATIONS  (PRN):  naloxone Injectable 0.3 milliGRAM(s) IV Push every 3 minutes PRN opioid induced AMS  ondansetron Injectable 4 milliGRAM(s) IV Push every 8 hours PRN Nausea and/or Vomiting  oxyCODONE    IR 10 milliGRAM(s) Oral every 4 hours PRN Severe Pain (7 - 10)  oxyCODONE    IR 5 milliGRAM(s) Oral every 4 hours PRN Moderate Pain (4 - 6)      I&O's Summary    20 Feb 2023 07:01  -  21 Feb 2023 07:00  --------------------------------------------------------  IN: 2580 mL / OUT: 1550 mL / NET: 1030 mL    21 Feb 2023 07:01  -  22 Feb 2023 05:19  --------------------------------------------------------  IN: 2075 mL / OUT: 550 mL / NET: 1525 mL        Vital Signs Last 24 Hrs  T(C): 37.3 (22 Feb 2023 02:01), Max: 37.3 (21 Feb 2023 22:08)  T(F): 99.1 (22 Feb 2023 02:01), Max: 99.2 (21 Feb 2023 22:08)  HR: 109 (22 Feb 2023 02:01) (104 - 135)  BP: 118/70 (22 Feb 2023 02:01) (110/68 - 141/84)  BP(mean): --  RR: 18 (22 Feb 2023 02:01) (18 - 19)  SpO2: 100% (22 Feb 2023 02:01) (94% - 100%)    Parameters below as of 22 Feb 2023 02:01  Patient On (Oxygen Delivery Method): nasal cannula  O2 Flow (L/min): 3      CAPILLARY BLOOD GLUCOSE      POCT Blood Glucose.: 112 mg/dL (21 Feb 2023 22:17)  POCT Blood Glucose.: 85 mg/dL (21 Feb 2023 17:00)  POCT Blood Glucose.: 102 mg/dL (21 Feb 2023 11:31)  POCT Blood Glucose.: 108 mg/dL (21 Feb 2023 07:32)      PHYSICAL EXAM:  GENERAL: NAD  HEENT: hoarse voice noted; mouth appears to be healing appropriately  NECK: Supple, No JVD  CHEST/LUNG: congested, rhonchi and wheezes present as before, on 3L NC  HEART: Tachycardic with regular rhythm; No murmurs, rubs, or gallops  ABDOMEN: Soft, Nontender, slightly distended; Bowel sounds present, + PEG tube in place cdi  EXTREMITIES:  2+ Peripheral Pulses, No clubbing, cyanosis. + R knee erythema, edema with TTP, incisions cdi, ROM limited 2/2 pain. No pitting edema noted  NEUROLOGY: nonfocal. AxOx3. more alert and awake today  SKIN: No rashes or lesions        LABS:                        8.6    32.27 )-----------( 661      ( 21 Feb 2023 06:20 )             28.7     Auto Eosinophil # 0.47  / Auto Eosinophil % 1.5   / Auto Neutrophil # 28.15 / Auto Neutrophil % 87.2  / BANDS % x                            8.1    28.46 )-----------( 671      ( 20 Feb 2023 07:00 )             27.5     Auto Eosinophil # 0.73  / Auto Eosinophil % 2.6   / Auto Neutrophil # 24.47 / Auto Neutrophil % 85.9  / BANDS % x        02-20    139  |  106  |  20  ----------------------------<  104<H>  3.9   |  20<L>  |  0.61    Ca    11.6<H>      20 Feb 2023 07:00  Phos  2.0     02-20  TPro  6.8  /  Alb  2.8<L>  /  TBili  <0.2  /  DBili  x   /  AST  22  /  ALT  21  /  AlkPhos  196<H>  02-20    PT/INR - ( 21 Feb 2023 06:20 )   PT: 15.5 sec;   INR: 1.33 ratio         PTT - ( 21 Feb 2023 06:20 )  PTT:27.5 sec        Lactate, Blood: 4.7 mmol/L (02-17 @ 04:30)        RADIOLOGY & ADDITIONAL TESTS:    Imaging Personally Reviewed:    Consultant(s) Notes Reviewed:      Care Discussed with Consultants/Other Providers:

## 2023-02-23 NOTE — PROGRESS NOTE ADULT - ASSESSMENT
67M with  metastatic esophageal cancer (dx 8 years ago) to bone on irinotecan, r. knee, hypothyroidism, GERD, HTN recent RUL pna s/p cefepime, r. patellar mass excision --> metastatic squamous cell carcinoma on 1/7/23 was admitted on 2/5/23  w/ right knee pain and swelling for 2 days.    Recently hospitalized  underwent patellectomy for met SCC on 1/7/23  Course complicated by pneumonia attributed to Enterobacter treated with Cefepime 1/11 --> 1/18  hypercalcemia - calcitonin administered 2/18, 2/19, Zometa 2/18    Recent in patient Antibiotics  Vanco 1/1-->1/4;  2/5 2/13--> 2/17  Zosyn 1/1;  2/5, 2/6l 2/17 -->  Cefepime 1/1-->4; 1/11-->18  Cefazolin 1/7  Meropenem  2/17 -->    encephalopathy  leukemoid reaction  hypercalcemia  s/p dental extraction 2/16 to reduce risk of OsteoNecrosis of Jaw  He large tumor burden and mass with scant residual left lung  2/17 Neg MRSA PCR  2/17 Sputum with Enterobacter cloacae:   no bacteremia documented    despite antibiotic therapy, he appears very ill, exam suggests continued aspiration,  hypercalcemia has yet to respond to treatment and may be contributing to his delerium    Suggest  Continue  Meropenem 1 gm every 8 hours  - 7 day course through 2/24 anticipated

## 2023-02-23 NOTE — PROGRESS NOTE ADULT - PROBLEM SELECTOR PLAN 1
baseline tachy in 110s likely in setting of medical disease cancer but newly tachy to 160s 2/13  baseline leukocytosis in 20k range -> increased to 35k 2/14  newly febrile 2/13  MRSA neg at admission  unclear source; initial concern for septic arthritis but tap neg for infection; possible aspiration pneumonia given CXR findings  PCT elevated to 5  Lactate elevation, downtrended  -BCx -> NGTD  -UA/Ucx neg  -CXR to look for pneumonia given concern for aspiration -> right perihilar opacity  -RVP neg  -empiric Zosyn -> switched to uma due to fevers as per ID  -trend WBC/monitor fever curve   -add vancomycin and do MRSA/MSSA -> dc vanc as MRSA neg  - sputum cx results -> Enterobacter cloacae, f/u sensitivities -> if ID ok, can transition to FQ -> c/w uma  -ID c/s as unclear source; aspiration pneumonia vs post-obstructive vs malignancy baseline tachy in 110s likely in setting of medical disease cancer but newly tachy to 160s 2/13  baseline leukocytosis in 20k range -> increased to 35k 2/14  newly febrile 2/13  MRSA neg at admission  unclear source; initial concern for septic arthritis but tap neg for infection; possible aspiration pneumonia given CXR findings  PCT elevated to 5  Lactate elevation, downtrended  -BCx -> NGTD  -UA/Ucx neg  -CXR to look for pneumonia given concern for aspiration -> right perihilar opacity  -RVP neg  -empiric Zosyn -> switched to uma due to fevers as per ID  -trend WBC/monitor fever curve   -add vancomycin and do MRSA/MSSA -> dc vanc as MRSA neg  - sputum cx results -> Enterobacter cloacae, f/u sensitivities -> if ID ok, can transition to FQ -> c/w uma  -ID c/s as unclear source; aspiration pneumonia vs post-obstructive vs malignancy  **continues to spike fevers and worsening leukocytosis -> will touch base on ID if this is infectious or related to malignancy

## 2023-02-23 NOTE — PROGRESS NOTE ADULT - ATTENDING COMMENTS
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 Found to have SIRS (tachy, leukocytosis) and c/f right knee septic arthritis s/p arthrocentesis on 2/5 not yielding any organisms however +hemarthrosis.    Patient seen and examined at bedside with son and family. Reports feeling fine and has no complaints. Went for dental procedure today. Continues to have sputum. Lung exam noted for diffuse coarse breath sound. R foot swelling nontender.     #Acute respiratory failure with hypoxemia: possibly aspiration in the setting of multiple teeth extractions. ID consulted; appreciate recs. Continue to wean O2 as tolerated. Chest PT, xopenex, deep suction as needed    #SIRS criteria: fever, tachycardic, leukocytosis: considering infection vs malignant related fever vs clot (though LE negative for DVT on 2/5): repeat culture and MRSA neg. Sputum culture growing enterobacter. Vanco discontinued. Continue uma per ID recs. Given uptrending WBC, would repeat duplex to eval for DVT    #Hemarthrosis - Right knee cultures NGTD, ESR/CRP elevated, suspect inflammation in setting of hemarthrosis and malignancy. Right knee immobilizer while out of bed    #Met SCC Esophageal Ca - Was planned to start RT as outpatient but never started. Previous right patellar bx path with SCC, Rad Onc with no present plans for RT inpatient    #Hypercalcemia : new, suspect due to malignancy, low PTH, normal Vit D. Downtrended on IVF but still remains elevated. s/p calcitonin. s/p extractions 2/16 and 2/23. Endo consulted given runs of V-tach 2/18. s/p zometa 2/18. Per endo, risks of untreated hypercalcemia outweigh lower risks of OJN. C/w IVF. If w/ c/f volume overload will consider diuresis. F/u further endo recs: additional calcitonin today    #V-Tach: Resolved. Likely multi-factorial in etiology. Suspect some component likely in the setting of hypercalcemia. Tele monitoring. Echo: EF 74%, mild pulm htn. Rest of Mgt as above.     #Cancer pain - C/w methadone 2.5mg q12 per palliative. Will need palliative followup for continued methadone refills.      Discussed with HS.

## 2023-02-23 NOTE — PROGRESS NOTE ADULT - ASSESSMENT
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    1) Hypercalcemia of malignancy  Labs show suppressed PTH (6) and negative PTHRP suggesting cause of hypercalcemia to be local bone osteolysis from metastatic disease. DDx includes high 1,25 Vit D but less likely for this malignancy. 1,25 D resulted 78 upper normal range. 25OHD 33.4 wnl.  Risk of ONJ even with current dental issues is greatly outweighed by hypercalcemia of malignancy and bone mets.  S/p calcitonin 4 doses plus 2 additional doses previously.  Pt received first dose of zoledronate 4mg IV on 2/18/23. corrected calcium is stable today at 13.1 several days after dose.   -maintain hydration status. encourage PO as tolerated and if able to give further IV hydration would recommend doing so today.  -s/p dental extraction again today  -Ultimate treatment of hypercalcemia is oncology-directed --> planned for outpatient palliative RT. No inpatient plans. thus after dental work would consider xgeva/denosumab for hypercalcemia management. Repeated dosing would need to be coordinated outpatient with oncology. Of note - denosumab has general higher ONJ risk in setting of malignancy. Options for hypercalcemia are limited for this pt if bisphosphonate dosing is not sufficient for control of hypercalcemia  - would use PO phos for repletion  -Consider 2 additional doses of calcitonin if calcium uptrend    2) Hypothyroidism  TSH 2/21 increased to 23.4 (prior 7.56) and Free T4 0.9 (prior 1.0)  Noted that levothyroxine is being given at same time as pantoprazole and other po meds.  Please time levothyroxine for 5 AM and other oral meds in AM for 8AM.  Ensure 1 hour at least between levothyroxine and tube feeds or other po meds, especially pantoprazole.  Ensure at least 4 hours between levothyroxine and multivitamin.  Continue levothyroxine 150 mcg PEG daily  recheck TSH and Free T4 in 1 week, 2/28/23.    3) Prediabetes  FS q6h, on bolus feeds but not having hyperglycemia  If needed can add low Admelog correction scale q6h  HbA1c 5.8%  no DM meds for dc.    discussed with team    Lesli Shields MD  Division of Endocrinology  Pager: [phone]    If after 6PM or before 9AM, or on weekends/holidays, please call endocrine answering service for assistance ([phone]).  For nonurgent matters email [email or web address] for assistance.

## 2023-02-23 NOTE — PROGRESS NOTE ADULT - SUBJECTIVE AND OBJECTIVE BOX
Follow Up:  leukocytosis    Interval History/ROS:  ill appearing, awake - does not verbalize or answer questions    Allergies  No Known Allergies    ANTIMICROBIALS:  meropenem  IVPB    meropenem  IVPB 1000 every 8 hours      OTHER MEDS:  MEDICATIONS  (STANDING):  acetaminophen     Tablet .. 650 every 6 hours PRN  calcitonin Injectable 250 every 12 hours  enoxaparin Injectable 40 every 24 hours  gabapentin Solution 300 three times a day  levalbuterol Inhalation 1.25 every 12 hours  levothyroxine 150 <User Schedule>  methadone   Solution 2.5 <User Schedule>  ondansetron Injectable 4 every 8 hours PRN  oxyCODONE    IR 10 every 4 hours PRN  oxyCODONE    IR 5 every 4 hours PRN  pantoprazole   Suspension 40 <User Schedule>  polyethylene glycol 3350 17 <User Schedule>  senna Syrup 10 <User Schedule>  sodium chloride 3%  Inhalation 4 every 12 hours      Vital Signs Last 24 Hrs  T(C): 37.2 (2023 12:50), Max: 38.1 (2023 18:00)  T(F): 99 (2023 12:50), Max: 100.6 (2023 18:00)  HR: 101 (2023 16:32) (101 - 139)  BP: 139/68 (2023 12:50) (110/60 - 140/78)  BP(mean): --  RR: 20 (2023 12:50) (18 - 20)  SpO2: 97% (2023 16:32) (94% - 100%)    Parameters below as of 2023 16:32  Patient On (Oxygen Delivery Method): nasal cannula        PHYSICAL EXAM:  General: WN/WD NAD, Non-toxic  Neurology: A&Ox3, nonfocal  Respiratory: Clear to auscultation bilaterally  CV: RRR, S1S2, no murmurs, rubs or gallops  Abdominal: Soft, Non-tender, non-distended, normal bowel sounds  Extremities: No edema, + peripheral pulses  Line Sites: Clear  Skin: No rash                          8.1    36.78 )-----------( 599      ( 2023 05:13 )             27.2   WBC Count: 36.78 ( @ 05:13)  WBC Count: 33.32 ( @ 06:27)  WBC Count: 32.27 ( @ 06:20)  WBC Count: 28.46 ( @ 07:00)  WBC Count: 28.15 ( @ 03:52)          132<L>  |  102  |  20  ----------------------------<  99  5.0   |  18<L>  |  0.64    Ca    11.9<H>      2023 05:13  Phos  1.8       Mg     1.80         TPro  6.8  /  Alb  2.5<L>  /  TBili  0.4  /  DBili  x   /  AST  26  /  ALT  22  /  AlkPhos  213<H>        Urinalysis Basic - ( 2023 06:02 )    Color: Light Yellow / Appearance: Clear / S.017 / pH: x  Gluc: x / Ketone: Negative  / Bili: Negative / Urobili: <2 mg/dL   Blood: x / Protein: 100 mg/dL / Nitrite: Negative   Leuk Esterase: Negative / RBC: 2 /HPF / WBC 1 /HPF   Sq Epi: x / Non Sq Epi: 2 /HPF / Bacteria: Negative    MICROBIOLOGY:  .Sputum Sputum  23   Moderate Enterobacter cloacae complex  Normal Respiratory Susan present  --  Enterobacter cloacae complex      .Blood Blood-Peripheral  23   No Growth Final  --  --      .Blood Blood-Venous  23   No Growth Final  --  --      Clean Catch Clean Catch (Midstream)  23   <10,000 CFU/mL Normal Urogenital Susan  --  --      .Blood Blood-Peripheral  23   No Growth Final  --  --      .Blood Blood-Venous  23   No Growth Final  --  --      .Body Fluid Synovial Fluid  23   No growth at 14 days.  --    No polymorphonuclear leukocytes per low power field  No organisms seen per oil power field  by cytocentrifuge      Clean Catch Clean Catch (Midstream)  23   No growth  --  --      .Blood Blood-Peripheral  23   No Growth Final  --  --      .Blood Blood-Peripheral  23   No Growth Final  --  --    Rapid RVP Result: NotDetec ( @ 06:57)    RADIOLOGY:  < from: Xray Chest 1 View- PORTABLE-Urgent (Xray Chest 1 View- PORTABLE-Urgent .) (23 @ 12:58) >  IMPRESSION:  Postoperative changes left lung volume loss similar to prior. Pleural   parenchymal opacity left hemithorax similar to prior .No definite acute   infiltrate pneumothorax or other new abnormality. Right chest port   reidentified in situ    < end of copied text >      Delonte Simms MD; Division of Infectious Disease; Pager: [phone]; nights and weekends: [phone]

## 2023-02-23 NOTE — PROGRESS NOTE ADULT - SUBJECTIVE AND OBJECTIVE BOX
Sultan Chapito MD  PGY 1 Department of Internal Medicine        Patient is a 67y old  Male who presents with a chief complaint of r. knee pain (22 Feb 2023 17:45)      SUBJECTIVE / OVERNIGHT EVENTS: Pt seen and examined. No acute overnight events. Denies fevers, chills, CP, SOB, Abdominal pain, N/V, Constipation, Diarrhea        MEDICATIONS  (STANDING):  ascorbic acid 500 milliGRAM(s) Oral <User Schedule>  chlorhexidine 2% Cloths 1 Application(s) Topical daily  enoxaparin Injectable 40 milliGRAM(s) SubCutaneous every 24 hours  gabapentin Solution 300 milliGRAM(s) Oral three times a day  lactated ringers. 1000 milliLiter(s) (100 mL/Hr) IV Continuous <Continuous>  levalbuterol Inhalation 1.25 milliGRAM(s) Inhalation every 12 hours  levothyroxine 150 MICROGram(s) Oral <User Schedule>  lidocaine   4% Patch 1 Patch Transdermal daily  meropenem  IVPB 1000 milliGRAM(s) IV Intermittent every 8 hours  meropenem  IVPB      methadone   Solution 2.5 milliGRAM(s) Oral <User Schedule>  multivitamin 1 Tablet(s) Oral <User Schedule>  pantoprazole   Suspension 40 milliGRAM(s) Oral <User Schedule>  polyethylene glycol 3350 17 Gram(s) Oral <User Schedule>  senna Syrup 10 milliLiter(s) Oral <User Schedule>  sodium chloride 3%  Inhalation 4 milliLiter(s) Inhalation every 12 hours    MEDICATIONS  (PRN):  acetaminophen     Tablet .. 650 milliGRAM(s) Oral every 6 hours PRN Temp greater or equal to 38C (100.4F)  naloxone Injectable 0.3 milliGRAM(s) IV Push every 3 minutes PRN opioid induced AMS  ondansetron Injectable 4 milliGRAM(s) IV Push every 8 hours PRN Nausea and/or Vomiting  oxyCODONE    IR 10 milliGRAM(s) Oral every 4 hours PRN Severe Pain (7 - 10)  oxyCODONE    IR 5 milliGRAM(s) Oral every 4 hours PRN Moderate Pain (4 - 6)      I&O's Summary    22 Feb 2023 07:01  -  23 Feb 2023 07:00  --------------------------------------------------------  IN: 772 mL / OUT: 1450 mL / NET: -678 mL        Vital Signs Last 24 Hrs  T(C): 37.7 (23 Feb 2023 06:00), Max: 38.1 (22 Feb 2023 18:00)  T(F): 99.8 (23 Feb 2023 06:00), Max: 100.6 (22 Feb 2023 18:00)  HR: 120 (23 Feb 2023 06:00) (112 - 120)  BP: 138/75 (23 Feb 2023 06:00) (110/60 - 140/78)  BP(mean): --  RR: 19 (23 Feb 2023 06:00) (18 - 19)  SpO2: 98% (23 Feb 2023 06:00) (94% - 100%)    Parameters below as of 23 Feb 2023 06:00  Patient On (Oxygen Delivery Method): nasal cannula  O2 Flow (L/min): 2      CAPILLARY BLOOD GLUCOSE      POCT Blood Glucose.: 87 mg/dL (23 Feb 2023 04:06)  POCT Blood Glucose.: 113 mg/dL (22 Feb 2023 21:57)  POCT Blood Glucose.: 90 mg/dL (22 Feb 2023 17:00)  POCT Blood Glucose.: 111 mg/dL (22 Feb 2023 11:46)  POCT Blood Glucose.: 97 mg/dL (22 Feb 2023 07:05)      PHYSICAL EXAM:  GENERAL: NAD  HEENT: hoarse voice noted; mouth appears to be healing appropriately  NECK: Supple, No JVD  CHEST/LUNG: congested, rhonchi and wheezes present as before, on 3L NC  HEART: Tachycardic with regular rhythm; No murmurs, rubs, or gallops  ABDOMEN: Soft, Nontender, slightly distended; Bowel sounds present, + PEG tube in place cdi  EXTREMITIES:  2+ Peripheral Pulses, No clubbing, cyanosis. + R knee erythema, edema with TTP, incisions cdi, ROM limited 2/2 pain. No pitting edema noted  NEUROLOGY: nonfocal. AxOx3. more alert and awake today  SKIN: No rashes or lesions       LABS:                        8.0    33.32 )-----------( 623      ( 22 Feb 2023 06:27 )             27.3     Auto Eosinophil # 0.33  / Auto Eosinophil % 1.0   / Auto Neutrophil # 28.96 / Auto Neutrophil % 86.9  / BANDS % x        02-22    134<L>  |  105  |  20  ----------------------------<  87  4.4   |  20<L>  |  0.62    Ca    11.8<H>      22 Feb 2023 06:27  Mg     1.70     02-22  Phos  2.0     02-22  TPro  6.7  /  Alb  2.5<L>  /  TBili  0.2  /  DBili  x   /  AST  23  /  ALT  23  /  AlkPhos  213<H>  02-22    PT/INR - ( 22 Feb 2023 06:27 )   PT: 15.9 sec;   INR: 1.37 ratio         PTT - ( 22 Feb 2023 06:27 )  PTT:28.0 sec        Lactate, Blood: 4.7 mmol/L (02-17 @ 04:30)        RADIOLOGY & ADDITIONAL TESTS:    Imaging Personally Reviewed:    Consultant(s) Notes Reviewed:      Care Discussed with Consultants/Other Providers:   Sultan Chapito MD  PGY 1 Department of Internal Medicine        Patient is a 67y old  Male who presents with a chief complaint of r. knee pain (22 Feb 2023 17:45)      SUBJECTIVE / OVERNIGHT EVENTS: Pt seen and examined. Febrile in evening, given tylenol and recultured. Denies feeling fevers, chills, CP, SOB, Abdominal pain, N/V, Constipation, Diarrhea. Cough stable. States had a BM and urinating ok.         MEDICATIONS  (STANDING):  ascorbic acid 500 milliGRAM(s) Oral <User Schedule>  chlorhexidine 2% Cloths 1 Application(s) Topical daily  enoxaparin Injectable 40 milliGRAM(s) SubCutaneous every 24 hours  gabapentin Solution 300 milliGRAM(s) Oral three times a day  lactated ringers. 1000 milliLiter(s) (100 mL/Hr) IV Continuous <Continuous>  levalbuterol Inhalation 1.25 milliGRAM(s) Inhalation every 12 hours  levothyroxine 150 MICROGram(s) Oral <User Schedule>  lidocaine   4% Patch 1 Patch Transdermal daily  meropenem  IVPB 1000 milliGRAM(s) IV Intermittent every 8 hours  meropenem  IVPB      methadone   Solution 2.5 milliGRAM(s) Oral <User Schedule>  multivitamin 1 Tablet(s) Oral <User Schedule>  pantoprazole   Suspension 40 milliGRAM(s) Oral <User Schedule>  polyethylene glycol 3350 17 Gram(s) Oral <User Schedule>  senna Syrup 10 milliLiter(s) Oral <User Schedule>  sodium chloride 3%  Inhalation 4 milliLiter(s) Inhalation every 12 hours    MEDICATIONS  (PRN):  acetaminophen     Tablet .. 650 milliGRAM(s) Oral every 6 hours PRN Temp greater or equal to 38C (100.4F)  naloxone Injectable 0.3 milliGRAM(s) IV Push every 3 minutes PRN opioid induced AMS  ondansetron Injectable 4 milliGRAM(s) IV Push every 8 hours PRN Nausea and/or Vomiting  oxyCODONE    IR 10 milliGRAM(s) Oral every 4 hours PRN Severe Pain (7 - 10)  oxyCODONE    IR 5 milliGRAM(s) Oral every 4 hours PRN Moderate Pain (4 - 6)      I&O's Summary    22 Feb 2023 07:01  -  23 Feb 2023 07:00  --------------------------------------------------------  IN: 772 mL / OUT: 1450 mL / NET: -678 mL        Vital Signs Last 24 Hrs  T(C): 37.7 (23 Feb 2023 06:00), Max: 38.1 (22 Feb 2023 18:00)  T(F): 99.8 (23 Feb 2023 06:00), Max: 100.6 (22 Feb 2023 18:00)  HR: 120 (23 Feb 2023 06:00) (112 - 120)  BP: 138/75 (23 Feb 2023 06:00) (110/60 - 140/78)  BP(mean): --  RR: 19 (23 Feb 2023 06:00) (18 - 19)  SpO2: 98% (23 Feb 2023 06:00) (94% - 100%)    Parameters below as of 23 Feb 2023 06:00  Patient On (Oxygen Delivery Method): nasal cannula  O2 Flow (L/min): 2      CAPILLARY BLOOD GLUCOSE      POCT Blood Glucose.: 87 mg/dL (23 Feb 2023 04:06)  POCT Blood Glucose.: 113 mg/dL (22 Feb 2023 21:57)  POCT Blood Glucose.: 90 mg/dL (22 Feb 2023 17:00)  POCT Blood Glucose.: 111 mg/dL (22 Feb 2023 11:46)  POCT Blood Glucose.: 97 mg/dL (22 Feb 2023 07:05)      PHYSICAL EXAM:  GENERAL: NAD  HEENT: hoarse voice noted; mouth appears to be healing appropriately  NECK: Supple, No JVD  CHEST/LUNG: congested, rhonchi and wheezes present as before, on 2L NC  HEART: Tachycardic with regular rhythm; No murmurs, rubs, or gallops  ABDOMEN: Soft, Nontender, slightly distended; Bowel sounds present, + PEG tube in place cdi  EXTREMITIES:  2+ Peripheral Pulses, No clubbing, cyanosis. + R knee erythema, edema with TTP, incisions cdi, ROM limited 2/2 pain. No pitting edema noted  NEUROLOGY: nonfocal. AxOx3. more alert and awake today  SKIN: No rashes or lesions       LABS:                        8.0    33.32 )-----------( 623      ( 22 Feb 2023 06:27 )             27.3     Auto Eosinophil # 0.33  / Auto Eosinophil % 1.0   / Auto Neutrophil # 28.96 / Auto Neutrophil % 86.9  / BANDS % x        02-22    134<L>  |  105  |  20  ----------------------------<  87  4.4   |  20<L>  |  0.62    Ca    11.8<H>      22 Feb 2023 06:27  Mg     1.70     02-22  Phos  2.0     02-22  TPro  6.7  /  Alb  2.5<L>  /  TBili  0.2  /  DBili  x   /  AST  23  /  ALT  23  /  AlkPhos  213<H>  02-22    PT/INR - ( 22 Feb 2023 06:27 )   PT: 15.9 sec;   INR: 1.37 ratio         PTT - ( 22 Feb 2023 06:27 )  PTT:28.0 sec        Lactate, Blood: 4.7 mmol/L (02-17 @ 04:30)        RADIOLOGY & ADDITIONAL TESTS:    Imaging Personally Reviewed:    Consultant(s) Notes Reviewed:      Care Discussed with Consultants/Other Providers:   Sultan Chapito MD  PGY 1 Department of Internal Medicine        Patient is a 67y old  Male who presents with a chief complaint of r. knee pain (22 Feb 2023 17:45)      SUBJECTIVE / OVERNIGHT EVENTS: Pt seen and examined. Febrile in evening, given tylenol and recultured. Denies feeling fevers, chills, CP, SOB, Abdominal pain, N/V, Constipation, Diarrhea. Cough stable. States had a BM and urinating ok. Tele sinus tachy 110-120s with occasional PVCs.        MEDICATIONS  (STANDING):  ascorbic acid 500 milliGRAM(s) Oral <User Schedule>  chlorhexidine 2% Cloths 1 Application(s) Topical daily  enoxaparin Injectable 40 milliGRAM(s) SubCutaneous every 24 hours  gabapentin Solution 300 milliGRAM(s) Oral three times a day  lactated ringers. 1000 milliLiter(s) (100 mL/Hr) IV Continuous <Continuous>  levalbuterol Inhalation 1.25 milliGRAM(s) Inhalation every 12 hours  levothyroxine 150 MICROGram(s) Oral <User Schedule>  lidocaine   4% Patch 1 Patch Transdermal daily  meropenem  IVPB 1000 milliGRAM(s) IV Intermittent every 8 hours  meropenem  IVPB      methadone   Solution 2.5 milliGRAM(s) Oral <User Schedule>  multivitamin 1 Tablet(s) Oral <User Schedule>  pantoprazole   Suspension 40 milliGRAM(s) Oral <User Schedule>  polyethylene glycol 3350 17 Gram(s) Oral <User Schedule>  senna Syrup 10 milliLiter(s) Oral <User Schedule>  sodium chloride 3%  Inhalation 4 milliLiter(s) Inhalation every 12 hours    MEDICATIONS  (PRN):  acetaminophen     Tablet .. 650 milliGRAM(s) Oral every 6 hours PRN Temp greater or equal to 38C (100.4F)  naloxone Injectable 0.3 milliGRAM(s) IV Push every 3 minutes PRN opioid induced AMS  ondansetron Injectable 4 milliGRAM(s) IV Push every 8 hours PRN Nausea and/or Vomiting  oxyCODONE    IR 10 milliGRAM(s) Oral every 4 hours PRN Severe Pain (7 - 10)  oxyCODONE    IR 5 milliGRAM(s) Oral every 4 hours PRN Moderate Pain (4 - 6)      I&O's Summary    22 Feb 2023 07:01  -  23 Feb 2023 07:00  --------------------------------------------------------  IN: 772 mL / OUT: 1450 mL / NET: -678 mL        Vital Signs Last 24 Hrs  T(C): 37.7 (23 Feb 2023 06:00), Max: 38.1 (22 Feb 2023 18:00)  T(F): 99.8 (23 Feb 2023 06:00), Max: 100.6 (22 Feb 2023 18:00)  HR: 120 (23 Feb 2023 06:00) (112 - 120)  BP: 138/75 (23 Feb 2023 06:00) (110/60 - 140/78)  BP(mean): --  RR: 19 (23 Feb 2023 06:00) (18 - 19)  SpO2: 98% (23 Feb 2023 06:00) (94% - 100%)    Parameters below as of 23 Feb 2023 06:00  Patient On (Oxygen Delivery Method): nasal cannula  O2 Flow (L/min): 2      CAPILLARY BLOOD GLUCOSE      POCT Blood Glucose.: 87 mg/dL (23 Feb 2023 04:06)  POCT Blood Glucose.: 113 mg/dL (22 Feb 2023 21:57)  POCT Blood Glucose.: 90 mg/dL (22 Feb 2023 17:00)  POCT Blood Glucose.: 111 mg/dL (22 Feb 2023 11:46)  POCT Blood Glucose.: 97 mg/dL (22 Feb 2023 07:05)      PHYSICAL EXAM:  GENERAL: NAD  HEENT: hoarse voice noted; mouth appears to be healing appropriately  NECK: Supple, No JVD  CHEST/LUNG: congested, rhonchi and wheezes present as before, on 2L NC  HEART: Tachycardic with regular rhythm; No murmurs, rubs, or gallops  ABDOMEN: Soft, Nontender, slightly distended; Bowel sounds present, + PEG tube in place cdi  EXTREMITIES:  2+ Peripheral Pulses, No clubbing, cyanosis. + R knee erythema, edema with TTP, incisions cdi, ROM limited 2/2 pain. No pitting edema noted  NEUROLOGY: nonfocal. AxOx3. more alert and awake today  SKIN: No rashes or lesions       LABS:                        8.0    33.32 )-----------( 623      ( 22 Feb 2023 06:27 )             27.3     Auto Eosinophil # 0.33  / Auto Eosinophil % 1.0   / Auto Neutrophil # 28.96 / Auto Neutrophil % 86.9  / BANDS % x        02-22    134<L>  |  105  |  20  ----------------------------<  87  4.4   |  20<L>  |  0.62    Ca    11.8<H>      22 Feb 2023 06:27  Mg     1.70     02-22  Phos  2.0     02-22  TPro  6.7  /  Alb  2.5<L>  /  TBili  0.2  /  DBili  x   /  AST  23  /  ALT  23  /  AlkPhos  213<H>  02-22    PT/INR - ( 22 Feb 2023 06:27 )   PT: 15.9 sec;   INR: 1.37 ratio         PTT - ( 22 Feb 2023 06:27 )  PTT:28.0 sec        Lactate, Blood: 4.7 mmol/L (02-17 @ 04:30)        RADIOLOGY & ADDITIONAL TESTS:    Imaging Personally Reviewed:    Consultant(s) Notes Reviewed:      Care Discussed with Consultants/Other Providers:   Sultan Chapito MD  PGY 1 Department of Internal Medicine        Patient is a 67y old  Male who presents with a chief complaint of r. knee pain (22 Feb 2023 17:45)      SUBJECTIVE / OVERNIGHT EVENTS: Pt seen and examined. Febrile in evening, given tylenol and recultured. Denies feeling fevers, chills, CP, SOB, Abdominal pain, N/V, Constipation, Diarrhea. Cough stable. States had a BM and urinating ok. Tele sinus tachy 110-120s with occasional PVCs.        MEDICATIONS  (STANDING):  ascorbic acid 500 milliGRAM(s) Oral <User Schedule>  chlorhexidine 2% Cloths 1 Application(s) Topical daily  enoxaparin Injectable 40 milliGRAM(s) SubCutaneous every 24 hours  gabapentin Solution 300 milliGRAM(s) Oral three times a day  lactated ringers. 1000 milliLiter(s) (100 mL/Hr) IV Continuous <Continuous>  levalbuterol Inhalation 1.25 milliGRAM(s) Inhalation every 12 hours  levothyroxine 150 MICROGram(s) Oral <User Schedule>  lidocaine   4% Patch 1 Patch Transdermal daily  meropenem  IVPB 1000 milliGRAM(s) IV Intermittent every 8 hours  meropenem  IVPB      methadone   Solution 2.5 milliGRAM(s) Oral <User Schedule>  multivitamin 1 Tablet(s) Oral <User Schedule>  pantoprazole   Suspension 40 milliGRAM(s) Oral <User Schedule>  polyethylene glycol 3350 17 Gram(s) Oral <User Schedule>  senna Syrup 10 milliLiter(s) Oral <User Schedule>  sodium chloride 3%  Inhalation 4 milliLiter(s) Inhalation every 12 hours    MEDICATIONS  (PRN):  acetaminophen     Tablet .. 650 milliGRAM(s) Oral every 6 hours PRN Temp greater or equal to 38C (100.4F)  naloxone Injectable 0.3 milliGRAM(s) IV Push every 3 minutes PRN opioid induced AMS  ondansetron Injectable 4 milliGRAM(s) IV Push every 8 hours PRN Nausea and/or Vomiting  oxyCODONE    IR 10 milliGRAM(s) Oral every 4 hours PRN Severe Pain (7 - 10)  oxyCODONE    IR 5 milliGRAM(s) Oral every 4 hours PRN Moderate Pain (4 - 6)      I&O's Summary    22 Feb 2023 07:01  -  23 Feb 2023 07:00  --------------------------------------------------------  IN: 772 mL / OUT: 1450 mL / NET: -678 mL        Vital Signs Last 24 Hrs  T(C): 37.7 (23 Feb 2023 06:00), Max: 38.1 (22 Feb 2023 18:00)  T(F): 99.8 (23 Feb 2023 06:00), Max: 100.6 (22 Feb 2023 18:00)  HR: 120 (23 Feb 2023 06:00) (112 - 120)  BP: 138/75 (23 Feb 2023 06:00) (110/60 - 140/78)  BP(mean): --  RR: 19 (23 Feb 2023 06:00) (18 - 19)  SpO2: 98% (23 Feb 2023 06:00) (94% - 100%)    Parameters below as of 23 Feb 2023 06:00  Patient On (Oxygen Delivery Method): nasal cannula  O2 Flow (L/min): 2      CAPILLARY BLOOD GLUCOSE      POCT Blood Glucose.: 87 mg/dL (23 Feb 2023 04:06)  POCT Blood Glucose.: 113 mg/dL (22 Feb 2023 21:57)  POCT Blood Glucose.: 90 mg/dL (22 Feb 2023 17:00)  POCT Blood Glucose.: 111 mg/dL (22 Feb 2023 11:46)  POCT Blood Glucose.: 97 mg/dL (22 Feb 2023 07:05)      PHYSICAL EXAM:  GENERAL: NAD  HEENT: hoarse voice noted; mouth appears to be healing appropriately  NECK: Supple, No JVD  CHEST/LUNG: congested, rhonchi and wheezes present as before, on 2L NC  HEART: Tachycardic with regular rhythm; No murmurs, rubs, or gallops  ABDOMEN: Soft, Nontender, slightly distended; Bowel sounds present, + PEG tube in place cdi  EXTREMITIES:  2+ Peripheral Pulses, No clubbing, cyanosis. + R knee erythema, edema with TTP, incisions cdi, ROM limited 2/2 pain. No pitting edema noted  NEUROLOGY: nonfocal. difficult to assess orientation as patient's voice is very hoarse. but nods head to questions and is awake  SKIN: No rashes or lesions       LABS:                        8.0    33.32 )-----------( 623      ( 22 Feb 2023 06:27 )             27.3     Auto Eosinophil # 0.33  / Auto Eosinophil % 1.0   / Auto Neutrophil # 28.96 / Auto Neutrophil % 86.9  / BANDS % x        02-22    134<L>  |  105  |  20  ----------------------------<  87  4.4   |  20<L>  |  0.62    Ca    11.8<H>      22 Feb 2023 06:27  Mg     1.70     02-22  Phos  2.0     02-22  TPro  6.7  /  Alb  2.5<L>  /  TBili  0.2  /  DBili  x   /  AST  23  /  ALT  23  /  AlkPhos  213<H>  02-22    PT/INR - ( 22 Feb 2023 06:27 )   PT: 15.9 sec;   INR: 1.37 ratio         PTT - ( 22 Feb 2023 06:27 )  PTT:28.0 sec        Lactate, Blood: 4.7 mmol/L (02-17 @ 04:30)        RADIOLOGY & ADDITIONAL TESTS:    Imaging Personally Reviewed:    Consultant(s) Notes Reviewed:      Care Discussed with Consultants/Other Providers:

## 2023-02-23 NOTE — PROGRESS NOTE ADULT - PROBLEM SELECTOR PLAN 2
elev serum Ca 11.9  PTH below normal, Phos low -> most likely hyperca of malignancy  ionized justo elevated, not responding much to IVFs.   mild to moderate hypercalcemia, unclear if patient's new onset abdominal pain and vomiting related  - EKG reviewed no ischemic changes  - 1L NS bolus, followed by 100cc/h maintenance -> will dc fluids as has been getting continuous fluids and lungs sound congested -> f/u official CXR read -> perihilar opacity -> can consider fluids in 10-12 hour periods  - S/p calcitonin on 2/13-2/14 and IVFs -> not much improvement in ionized calcium  - monitor icals   - might benefit from bisphosphonate if not responsive to calcitonin but would need dental eval -> would need imaging (CT maxillofacial) prior to extraction, which would be necessary prior to clearance -> dental extraction 2/16 -> will need another extraction (tentatively 2/23) -> will clarify with dental if second procedure still warranted given bisphosphonate already given  - 12/20: currently still on IV fluids at rate of 70mL/hr, s/p 2 more doses calcitonin given 2/18-2/19 and Endo input it is okay to start bisphosphonate, they spoke with family at bedside about jaw osteonecrosis risks and they agreed, first dose given zolendronate 2/19 -> might require a few days to see response -> slowly downtrending elev serum Ca 11.9  PTH below normal, Phos low -> most likely hyperca of malignancy  ionized justo elevated, not responding much to IVFs.   mild to moderate hypercalcemia, unclear if patient's new onset abdominal pain and vomiting related  - EKG reviewed no ischemic changes  - 1L NS bolus, followed by 100cc/h maintenance -> will dc fluids as has been getting continuous fluids and lungs sound congested -> f/u official CXR read -> perihilar opacity -> can consider fluids in 10-12 hour periods  - S/p calcitonin on 2/13-2/14 and IVFs -> not much improvement in ionized calcium  - monitor icals   - might benefit from bisphosphonate if not responsive to calcitonin but would need dental eval -> would need imaging (CT maxillofacial) prior to extraction, which would be necessary prior to clearance -> dental extraction 2/16 -> will need another extraction (tentatively 2/23) -> will clarify with dental if second procedure still warranted given bisphosphonate already given  - 12/20: currently still on IV fluids at rate of 70mL/hr, s/p 2 more doses calcitonin given 2/18-2/19 and Endo input it is okay to start bisphosphonate, they spoke with family at bedside about jaw osteonecrosis risks and they agreed, first dose given zolendronate 2/19 -> might require a few days to see response -> slowly downtrending but then uptrend again (as per endo might require denosumab) elev serum Ca 11.9  PTH below normal, Phos low -> most likely hyperca of malignancy  ionized justo elevated, not responding much to IVFs.   mild to moderate hypercalcemia, unclear if patient's new onset abdominal pain and vomiting related  - EKG reviewed no ischemic changes  - 1L NS bolus, followed by 100cc/h maintenance -> will dc fluids as has been getting continuous fluids and lungs sound congested -> f/u official CXR read -> perihilar opacity -> can consider fluids in 10-12 hour periods  - S/p calcitonin on 2/13-2/14 and IVFs -> not much improvement in ionized calcium  - monitor icals   - might benefit from bisphosphonate if not responsive to calcitonin but would need dental eval -> would need imaging (CT maxillofacial) prior to extraction, which would be necessary prior to clearance -> dental extraction 2/16 -> will need another extraction (on 2/23)  - 12/20: currently still on IV fluids at rate of 70mL/hr, s/p 2 more doses calcitonin given 2/18-2/19 and Endo input it is okay to start bisphosphonate, they spoke with family at bedside about jaw osteonecrosis risks and they agreed, first dose given zolendronate 2/19 -> might require a few days to see response -> slowly downtrending but then uptrend again (as per endo might require denosumab), increased fluid rate

## 2023-02-23 NOTE — PROGRESS NOTE ADULT - SUBJECTIVE AND OBJECTIVE BOX
Chief Complaint: Hypercalcemia    History: family at bedside  s/p dental extraction today  some SOB earlier    MEDICATIONS  (STANDING):  ascorbic acid 500 milliGRAM(s) Oral <User Schedule>  calcitonin Injectable 250 International Unit(s) IntraMuscular every 12 hours  chlorhexidine 2% Cloths 1 Application(s) Topical daily  enoxaparin Injectable 40 milliGRAM(s) SubCutaneous every 24 hours  gabapentin Solution 300 milliGRAM(s) Oral three times a day  lactated ringers. 1000 milliLiter(s) (100 mL/Hr) IV Continuous <Continuous>  levalbuterol Inhalation 1.25 milliGRAM(s) Inhalation every 12 hours  levothyroxine 150 MICROGram(s) Oral <User Schedule>  lidocaine   4% Patch 1 Patch Transdermal daily  meropenem  IVPB      meropenem  IVPB 1000 milliGRAM(s) IV Intermittent every 8 hours  methadone   Solution 2.5 milliGRAM(s) Oral <User Schedule>  multivitamin 1 Tablet(s) Oral <User Schedule>  pantoprazole   Suspension 40 milliGRAM(s) Oral <User Schedule>  polyethylene glycol 3350 17 Gram(s) Oral <User Schedule>  potassium phosphate / sodium phosphate Powder (PHOS-NaK) 2 Packet(s) Oral two times a day  senna Syrup 10 milliLiter(s) Oral <User Schedule>  sodium chloride 3%  Inhalation 4 milliLiter(s) Inhalation every 12 hours    MEDICATIONS  (PRN):  acetaminophen     Tablet .. 650 milliGRAM(s) Oral every 6 hours PRN Temp greater or equal to 38C (100.4F)  naloxone Injectable 0.3 milliGRAM(s) IV Push every 3 minutes PRN opioid induced AMS  ondansetron Injectable 4 milliGRAM(s) IV Push every 8 hours PRN Nausea and/or Vomiting  oxyCODONE    IR 10 milliGRAM(s) Oral every 4 hours PRN Severe Pain (7 - 10)  oxyCODONE    IR 5 milliGRAM(s) Oral every 4 hours PRN Moderate Pain (4 - 6)      Allergies    No Known Allergies    Intolerances      Review of Systems:  ALL OTHER SYSTEMS REVIEWED AND NEGATIVE      PHYSICAL EXAM:  VITALS: T(C): 37.2 (02-23-23 @ 12:50)  T(F): 99 (02-23-23 @ 12:50), Max: 100.6 (02-22-23 @ 18:00)  HR: 101 (02-23-23 @ 16:32) (101 - 139)  BP: 139/68 (02-23-23 @ 12:50) (110/60 - 140/78)  RR:  (18 - 20)  SpO2:  (94% - 100%)  Wt(kg): --  GENERAL: NAD  EYES: No proptosis, no lid lag, anicteric  HEENT:  Atraumatic, Normocephalic, moist mucous membranes  RESPIRATORY: nasal cannula in place, coarse breath sounds      CAPILLARY BLOOD GLUCOSE      POCT Blood Glucose.: 106 mg/dL (23 Feb 2023 16:50)  POCT Blood Glucose.: 89 mg/dL (23 Feb 2023 12:40)  POCT Blood Glucose.: 125 mg/dL (23 Feb 2023 07:34)  POCT Blood Glucose.: 87 mg/dL (23 Feb 2023 04:06)  POCT Blood Glucose.: 113 mg/dL (22 Feb 2023 21:57)  POCT Blood Glucose.: 90 mg/dL (22 Feb 2023 17:00)      02-23    132<L>  |  102  |  20  ----------------------------<  99  5.0   |  18<L>  |  0.64    eGFR: 104    Ca    11.9<H>      02-23  Mg     1.80     02-23  Phos  1.8     02-23    TPro  6.8  /  Alb  2.5<L>  /  TBili  0.4  /  DBili  x   /  AST  26  /  ALT  22  /  AlkPhos  213<H>  02-23      A1C with Estimated Average Glucose Result: 5.8 % (01-13-23 @ 06:10)      Thyroid Function Tests:  02-21 @ 06:20 TSH 23.86 FreeT4 0.9 T3 -- Anti TPO -- Anti Thyroglobulin Ab -- TSI --  02-10 @ 06:30 TSH 7.56 FreeT4 1.0 T3 -- Anti TPO -- Anti Thyroglobulin Ab -- TSI --

## 2023-02-23 NOTE — PROGRESS NOTE ADULT - SUBJECTIVE AND OBJECTIVE BOX
Pt presents for EXT for bisphosphonate treatment    *INTERVAL HPI/OVERNIGHT EVENTS:    MEDICATIONS  (STANDING):  acetaminophen   IVPB .. 1000 milliGRAM(s) IV Intermittent once  ascorbic acid 500 milliGRAM(s) Oral <User Schedule>  chlorhexidine 2% Cloths 1 Application(s) Topical daily  enoxaparin Injectable 40 milliGRAM(s) SubCutaneous every 24 hours  gabapentin Solution 300 milliGRAM(s) Oral three times a day  lactated ringers. 1000 milliLiter(s) (100 mL/Hr) IV Continuous <Continuous>  levalbuterol Inhalation 1.25 milliGRAM(s) Inhalation every 12 hours  levothyroxine 150 MICROGram(s) Oral <User Schedule>  lidocaine   4% Patch 1 Patch Transdermal daily  meropenem  IVPB      meropenem  IVPB 1000 milliGRAM(s) IV Intermittent every 8 hours  methadone   Solution 2.5 milliGRAM(s) Oral <User Schedule>  multivitamin 1 Tablet(s) Oral <User Schedule>  pantoprazole   Suspension 40 milliGRAM(s) Oral <User Schedule>  polyethylene glycol 3350 17 Gram(s) Oral <User Schedule>  potassium phosphate / sodium phosphate Powder (PHOS-NaK) 2 Packet(s) Oral two times a day  senna Syrup 10 milliLiter(s) Oral <User Schedule>  sodium chloride 3%  Inhalation 4 milliLiter(s) Inhalation every 12 hours    MEDICATIONS  (PRN):  acetaminophen     Tablet .. 650 milliGRAM(s) Oral every 6 hours PRN Temp greater or equal to 38C (100.4F)  naloxone Injectable 0.3 milliGRAM(s) IV Push every 3 minutes PRN opioid induced AMS  ondansetron Injectable 4 milliGRAM(s) IV Push every 8 hours PRN Nausea and/or Vomiting  oxyCODONE    IR 10 milliGRAM(s) Oral every 4 hours PRN Severe Pain (7 - 10)  oxyCODONE    IR 5 milliGRAM(s) Oral every 4 hours PRN Moderate Pain (4 - 6)      Allergies    No Known Allergies    Intolerances        Vital Signs Last 24 Hrs  T(C): 37.2 (2023 12:50), Max: 38.1 (2023 18:00)  T(F): 99 (2023 12:50), Max: 100.6 (2023 18:00)  HR: 139 (2023 12:50) (112 - 139)  BP: 139/68 (2023 12:50) (110/60 - 140/78)  BP(mean): --  RR: 20 (2023 12:50) (18 - 20)  SpO2: 95% (2023 12:50) (94% - 100%)    Parameters below as of 2023 12:50  Patient On (Oxygen Delivery Method): nasal cannula  O2 Flow (L/min): 2      LABS:                        8.1    36.78 )-----------( 599      ( 2023 05:13 )             27.2         132<L>  |  102  |  20  ----------------------------<  99  5.0   |  18<L>  |  0.64    Ca    11.9<H>      2023 05:13  Phos  1.8       Mg     1.80         TPro  6.8  /  Alb  2.5<L>  /  TBili  0.4  /  DBili  x   /  AST  26  /  ALT  22  /  AlkPhos  213<H>      WBC Count: 36.78 K/uL *H* [3.80 - 10.50] ( @ 05:13)  Platelet Count - Automated: 599 K/uL *H* [150 - 400] ( @ 05:13)  INR: 1.38 ratio *H* [0.88 - 1.16] ( @ 05:13)    Urinalysis Basic - ( 2023 06:02 )    Color: Light Yellow / Appearance: Clear / S.017 / pH: x  Gluc: x / Ketone: Negative  / Bili: Negative / Urobili: <2 mg/dL   Blood: x / Protein: 100 mg/dL / Nitrite: Negative   Leuk Esterase: Negative / RBC: 2 /HPF / WBC 1 /HPF   Sq Epi: x / Non Sq Epi: 2 /HPF / Bacteria: Negative      Pt presents for EXT root tips #6, 7, 9 and 10 for bisphosphonate treatment.  - Med team started bisphosphonate tx on 23 prior to dental optimization     Consultation with OMFS  - As per Dr. Kaye, recommend extracting remaining clinically exposed root tips #6, 7, 9 and 10. Avoid root tips not clinically exposed.    Radiograph:   Anterior and posterior PA obtained and reviewed  - Root tips  #6, 7, 9 and 10 with no PARL  - Root fragment on #14 site and buried in soft tissue with no PARL    Assessment:  - Clinically visible root tips #6, 7, 9 and 10 with gross decay  - No clinically visible root tips on the UL    Procedure discussed with patient. Pt is fully aware and expressed understanding  - Risk, benefit and possible complications reviewed  - Time Out Form completed  - Verbal and written informed consent obtained  - Anesthesia: 3 Carpules of 4% Articaine with 1:100K epi and 2 Carpules of 2% Lidocaine with 1:100K epi via local infiltration  - Mid-crestal incision completed with 15 Blade from 6-11  - Full thickness flap used buccally and palatally  - Elevator and rongeur used to deliver root tips #6, 7, 9 and 10 without complications  - Curette used  - Bone file and rongeur used to smooth out bone  - Irrigated with sterile saline, placed collagen plug and 3-0 chromic gut sutures  - Had patient bite firmly with gauze  - Hemostasis obtained  - POIG both verbally and written. Pt signed PO instructions.  - Pt tolerated tx well and left satisfied    Recommendation  1) Pt is dentally optimized to continue bisphosphonate tx   2) F/U with outpatient dentist for comprehensive care    Zoë Amado, NELAS #[id number] Pt presents for EXT for bisphosphonate treatment    *INTERVAL HPI/OVERNIGHT EVENTS:    MEDICATIONS  (STANDING):  acetaminophen   IVPB .. 1000 milliGRAM(s) IV Intermittent once  ascorbic acid 500 milliGRAM(s) Oral <User Schedule>  chlorhexidine 2% Cloths 1 Application(s) Topical daily  enoxaparin Injectable 40 milliGRAM(s) SubCutaneous every 24 hours  gabapentin Solution 300 milliGRAM(s) Oral three times a day  lactated ringers. 1000 milliLiter(s) (100 mL/Hr) IV Continuous <Continuous>  levalbuterol Inhalation 1.25 milliGRAM(s) Inhalation every 12 hours  levothyroxine 150 MICROGram(s) Oral <User Schedule>  lidocaine   4% Patch 1 Patch Transdermal daily  meropenem  IVPB      meropenem  IVPB 1000 milliGRAM(s) IV Intermittent every 8 hours  methadone   Solution 2.5 milliGRAM(s) Oral <User Schedule>  multivitamin 1 Tablet(s) Oral <User Schedule>  pantoprazole   Suspension 40 milliGRAM(s) Oral <User Schedule>  polyethylene glycol 3350 17 Gram(s) Oral <User Schedule>  potassium phosphate / sodium phosphate Powder (PHOS-NaK) 2 Packet(s) Oral two times a day  senna Syrup 10 milliLiter(s) Oral <User Schedule>  sodium chloride 3%  Inhalation 4 milliLiter(s) Inhalation every 12 hours    MEDICATIONS  (PRN):  acetaminophen     Tablet .. 650 milliGRAM(s) Oral every 6 hours PRN Temp greater or equal to 38C (100.4F)  naloxone Injectable 0.3 milliGRAM(s) IV Push every 3 minutes PRN opioid induced AMS  ondansetron Injectable 4 milliGRAM(s) IV Push every 8 hours PRN Nausea and/or Vomiting  oxyCODONE    IR 10 milliGRAM(s) Oral every 4 hours PRN Severe Pain (7 - 10)  oxyCODONE    IR 5 milliGRAM(s) Oral every 4 hours PRN Moderate Pain (4 - 6)      Allergies    No Known Allergies    Intolerances        Vital Signs Last 24 Hrs  T(C): 37.2 (2023 12:50), Max: 38.1 (2023 18:00)  T(F): 99 (2023 12:50), Max: 100.6 (2023 18:00)  HR: 139 (2023 12:50) (112 - 139)  BP: 139/68 (2023 12:50) (110/60 - 140/78)  BP(mean): --  RR: 20 (2023 12:50) (18 - 20)  SpO2: 95% (2023 12:50) (94% - 100%)    Parameters below as of 2023 12:50  Patient On (Oxygen Delivery Method): nasal cannula  O2 Flow (L/min): 2      LABS:                        8.1    36.78 )-----------( 599      ( 2023 05:13 )             27.2         132<L>  |  102  |  20  ----------------------------<  99  5.0   |  18<L>  |  0.64    Ca    11.9<H>      2023 05:13  Phos  1.8       Mg     1.80         TPro  6.8  /  Alb  2.5<L>  /  TBili  0.4  /  DBili  x   /  AST  26  /  ALT  22  /  AlkPhos  213<H>      WBC Count: 36.78 K/uL *H* [3.80 - 10.50] ( @ 05:13)  Platelet Count - Automated: 599 K/uL *H* [150 - 400] ( @ 05:13)  INR: 1.38 ratio *H* [0.88 - 1.16] ( @ 05:13)    Urinalysis Basic - ( 2023 06:02 )    Color: Light Yellow / Appearance: Clear / S.017 / pH: x  Gluc: x / Ketone: Negative  / Bili: Negative / Urobili: <2 mg/dL   Blood: x / Protein: 100 mg/dL / Nitrite: Negative   Leuk Esterase: Negative / RBC: 2 /HPF / WBC 1 /HPF   Sq Epi: x / Non Sq Epi: 2 /HPF / Bacteria: Negative      Pt presents for EXT root tips #6, 7, 9 and 10 for bisphosphonate treatment.  - Med team started bisphosphonate tx on 23 prior to dental optimization     Consultation with OMFS  - As per Dr. Kaye, recommend extracting remaining clinically exposed root tips #6, 7, 9 and 10. Avoid root tips not clinically exposed.    Radiograph:   Anterior and posterior PA obtained and reviewed  - Root tips  #6, 7, 9 and 10 with no PARL  - Root fragment on #14 site and buried in soft tissue with no PARL    Assessment:  - Clinically visible root tips #6, 7, 9 and 10 with gross decay  - No clinically visible root tips on the UL    Procedure discussed with patient. Pt is fully aware and expressed understanding  - Risk, benefit and possible complications reviewed  - Time Out Form completed  - Verbal and written informed consent obtained  - Anesthesia: 3 Carpules of 4% Articaine with 1:100K epi and 2 Carpules of 2% Lidocaine with 1:100K epi via local infiltration  - Mid-crestal incision completed with 15 Blade from 6-11  - Full thickness flap used buccally and palatally  - Elevator and rongeur used to deliver root tips #6, 7, 9 and 10 without complications  - Curette used  - Bone file and rongeur used to smooth out bone  - Irrigated with sterile saline, placed collagen plug and 3-0 chromic gut sutures  - Had patient bite firmly with gauze  - Hemostasis obtained  - POIG both verbally and written. Pt signed PO instructions.  - Pt tolerated tx well and left satisfied    Recommendation  1) Pt is dentally optimized to continue bisphosphonate. However, hold bisphosphonate for two weeks after EXT.  2) F/U with outpatient dentist for comprehensive care    Zoë Amado DDS #[id number]

## 2023-02-24 NOTE — CHART NOTE - NSCHARTNOTEFT_GEN_A_CORE
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  Patient is awake, no complaints of any discomfort during visit. RN reports patient is tolerating tube feeding well. RN reports no GI distress noted at this time. Reports loose bowel movement yesterday, bowel meds on hold. Will continue to monitor bowel movement. Current TF order: Bolus TwoCalHN @ 225ml q6hrs (900ml in volume, 1800kcal, 75.2gm protein, 630ml free water from feed). Meeting patient's estimated kcal/protein needs. Noted weight gain of 1.8kg/ 2.8% BW x ~2weeks, might 2/2 fluid shift. Noted patient has 1+ edema to left ankle, right ankle, left knee, 2+ edema to right knee. Will continue to monitor weight trend. As per RN flow sheet, patient has stage II pressure injury to right buttock. On Prosource 1x/day (60kcal, 15gm protein), multivitamin and vitamin c to promote wound healing. Noted patient has prediabetes, QiL5q-4.8% (1/23), POCT-95(2/24), 125(2/23), 113(2/22). On endocrinology following. As per endocrinology note dated 2/20/2023, glucose levels well controlled, BG is within goal: 100-180mg/dl, 'If FS BG levels persistently > 150mg/dL and patient remains on tube feeds, recommend to consider carb consistent formulation & initiate admelog low dose correction scales q6h w/ hypoglycemia protocol.' Recommend continue with current TF regimen and monitor POCT. Noted low phos-2.2(2/24), on potassium phos for repletion.     __________________ Pertinent Medications__________________   MEDICATIONS  (STANDING):  ascorbic acid 500 milliGRAM(s) Oral <User Schedule>  chlorhexidine 2% Cloths 1 Application(s) Topical daily  enoxaparin Injectable 40 milliGRAM(s) SubCutaneous every 24 hours  gabapentin Solution 300 milliGRAM(s) Oral three times a day  lactated ringers. 1000 milliLiter(s) (125 mL/Hr) IV Continuous <Continuous>  levalbuterol Inhalation 1.25 milliGRAM(s) Inhalation every 6 hours  levothyroxine 150 MICROGram(s) Oral <User Schedule>  lidocaine   4% Patch 1 Patch Transdermal daily  meropenem  IVPB 1000 milliGRAM(s) IV Intermittent every 8 hours  methadone   Solution 2.5 milliGRAM(s) Oral <User Schedule>  multivitamin 1 Tablet(s) Oral <User Schedule>  pantoprazole   Suspension 40 milliGRAM(s) Oral <User Schedule>  polyethylene glycol 3350 17 Gram(s) Oral <User Schedule>  potassium phosphate / sodium phosphate Powder (PHOS-NaK) 2 Packet(s) Oral two times a day  senna Syrup 10 milliLiter(s) Oral <User Schedule>  sodium chloride 3%  Inhalation 4 milliLiter(s) Inhalation every 12 hours    MEDICATIONS  (PRN):  acetaminophen     Tablet .. 650 milliGRAM(s) Oral every 6 hours PRN Temp greater or equal to 38C (100.4F)  naloxone Injectable 0.3 milliGRAM(s) IV Push every 3 minutes PRN opioid induced AMS  ondansetron Injectable 4 milliGRAM(s) IV Push every 8 hours PRN Nausea and/or Vomiting  oxyCODONE    IR 5 milliGRAM(s) Oral every 4 hours PRN Moderate Pain (4 - 6)  oxyCODONE    IR 10 milliGRAM(s) Oral every 4 hours PRN Severe Pain (7 - 10)      __________________ Pertinent Labs__________________   02-24 Na136 mmol/L Glu 103 mg/dL<H> K+ 4.4 mmol/L Cr  0.67 mg/dL BUN 20 mg/dL 02-24 Phos 2.2 mg/dL<L> 02-24 Alb 2.7 g/dL<L>    Estimated Needs:   [x ] no change since previous assessment  Based on current weight: 63kg (2/5),   Estimated energy needs: 25-30kcal/kg/day= 1677-2013kcal/day  Estimated protein needs: 1.0-1.5gm/kg/day= 67-100gm/day    Previous Nutrition Diagnosis:   [ x] Increased Nutrient Needs   Nutrition Diagnosis is [x ] ongoing   New Nutrition Diagnosis: [ x] not applicable    Interventions:   Recommend  [x] Continue with current TF regimen: Bolus TwoCalHN @ 225ml q6hrs (900ml in volume, 1800kcal, 75.2gm protein, 630ml free water from feed). Free water flushes per MD discretion.   [x] Continue to provide Prosource 1x/day (60kcal, 15gm protein), multivitamin and vitamin c to promote wound healing.  [x ] Monitor POCT.   [x ] Monitor bowel movement.   [x ] Monitor and replete electrolytes.      Monitoring and Evaluation:   [x ] Tube feeding tolerance [ x] weights [x ] follow up per protocol  [x ] other: bowel movement, skin integrity, labs.

## 2023-02-24 NOTE — PROGRESS NOTE ADULT - PROBLEM SELECTOR PLAN 5
-c/w tylenol mild pain  -oxycodone 5 q4 prn for moderate pain, oxy 10 q4 prn for severe pain  -c/w gabapentin 300 mg TID    #Reactive airway dx  Wheezing and rhonchi on exam with wet cough; concern for aspiration  -due to tachycardia will try Xopenex instead of duoneb  -IS and chest PT -c/w tylenol mild pain  -oxycodone 5 q4 prn for moderate pain, oxy 10 q4 prn for severe pain  -c/w gabapentin 300 mg TID    #Reactive airway dx  Wheezing and rhonchi on exam with wet cough; concern for aspiration  -due to tachycardia will try Xopenex instead of duoneb  -IS and chest PT -> chest vest ordered

## 2023-02-24 NOTE — PROGRESS NOTE ADULT - ASSESSMENT
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    1) Hypercalcemia of malignancy  Labs show suppressed PTH (6) and negative PTHRP suggesting cause of hypercalcemia to be local bone osteolysis from metastatic disease. DDx includes high 1,25 Vit D but less likely for this malignancy. 1,25 D resulted 78 upper normal range. 25OHD 33.4 wnl.  Risk of ONJ even with current dental issues is greatly outweighed by hypercalcemia of malignancy and bone mets.  S/p calcitonin 4 doses plus 2 additional doses previously.  Pt received first dose of zoledronate 4mg IV on 2/18/23. corrected calcium is stable today at 13.1 several days after dose.   -maintain hydration status. encourage PO as tolerated and if able to give further IV hydration would recommend doing so today.  -s/p dental extraction again yesterday 2/23 . Per wife now extractions are completed.  -Ultimate treatment of hypercalcemia is oncology-directed --> planned for outpatient palliative RT. No inpatient plans. thus after dental work would consider xgeva/denosumab for hypercalcemia management. Repeated dosing would need to be coordinated outpatient with oncology. Of note - denosumab has general higher ONJ risk in setting of malignancy. Options for hypercalcemia are limited for this pt if bisphosphonate dosing is not sufficient for control of hypercalcemia  - would use PO phos for repletion  -s/p 2 additional doses of calcitonin given overnight, as well as a dose of furosemide.  -Corrected calcium today 12.8 slightly improved, will monitor for now.  -If corrected calcium increases above 13 will consider ordering denosumab at that time.  -Iv fluids as tolerated per primary team, monitor for fluid overload.    2) Hypothyroidism  TSH 2/21 increased to 23.4 (prior 7.56) and Free T4 0.9 (prior 1.0)  Noted that levothyroxine is being given at same time as pantoprazole and other po meds.  Please time levothyroxine for 5 AM and other oral meds in AM for 8AM.  Ensure 1 hour at least between levothyroxine and tube feeds or other po meds, especially pantoprazole.  Ensure at least 4 hours between levothyroxine and multivitamin.  Continue levothyroxine 150 mcg PEG daily  recheck TSH and Free T4 2/28/23.    3) Prediabetes  FS q6h, on bolus feeds but not having hyperglycemia  If needed can add low Admelog correction scale q6h  HbA1c 5.8%  no DM meds for dc.    discussed with team    Lesli Shields MD  Division of Endocrinology  Pager: [phone]    If after 6PM or before 9AM, or on weekends/holidays, please call endocrine answering service for assistance ([phone]).  For nonurgent matters email [email or web address] for assistance.

## 2023-02-24 NOTE — PROGRESS NOTE ADULT - SUBJECTIVE AND OBJECTIVE BOX
Sultan Chapito MD  PGY 1 Department of Internal Medicine        Patient is a 67y old  Male who presents with a chief complaint of r. knee pain (2023 17:13)      SUBJECTIVE / OVERNIGHT EVENTS: Pt seen and examined. No acute overnight events. Denies fevers, chills, CP, SOB, Abdominal pain, N/V, Constipation, Diarrhea        MEDICATIONS  (STANDING):  ascorbic acid 500 milliGRAM(s) Oral <User Schedule>  calcitonin Injectable 250 International Unit(s) IntraMuscular every 12 hours  chlorhexidine 2% Cloths 1 Application(s) Topical daily  enoxaparin Injectable 40 milliGRAM(s) SubCutaneous every 24 hours  gabapentin Solution 300 milliGRAM(s) Oral three times a day  lactated ringers. 1000 milliLiter(s) (100 mL/Hr) IV Continuous <Continuous>  levalbuterol Inhalation 1.25 milliGRAM(s) Inhalation every 12 hours  levothyroxine 150 MICROGram(s) Oral <User Schedule>  lidocaine   4% Patch 1 Patch Transdermal daily  meropenem  IVPB      meropenem  IVPB 1000 milliGRAM(s) IV Intermittent every 8 hours  methadone   Solution 2.5 milliGRAM(s) Oral <User Schedule>  multivitamin 1 Tablet(s) Oral <User Schedule>  pantoprazole   Suspension 40 milliGRAM(s) Oral <User Schedule>  polyethylene glycol 3350 17 Gram(s) Oral <User Schedule>  potassium phosphate / sodium phosphate Powder (PHOS-NaK) 2 Packet(s) Oral two times a day  senna Syrup 10 milliLiter(s) Oral <User Schedule>  sodium chloride 3%  Inhalation 4 milliLiter(s) Inhalation every 12 hours    MEDICATIONS  (PRN):  acetaminophen     Tablet .. 650 milliGRAM(s) Oral every 6 hours PRN Temp greater or equal to 38C (100.4F)  naloxone Injectable 0.3 milliGRAM(s) IV Push every 3 minutes PRN opioid induced AMS  ondansetron Injectable 4 milliGRAM(s) IV Push every 8 hours PRN Nausea and/or Vomiting  oxyCODONE    IR 10 milliGRAM(s) Oral every 4 hours PRN Severe Pain (7 - 10)  oxyCODONE    IR 5 milliGRAM(s) Oral every 4 hours PRN Moderate Pain (4 - 6)      I&O's Summary    2023 07:01  -  2023 07:00  --------------------------------------------------------  IN: 772 mL / OUT: 1450 mL / NET: -678 mL    2023 07:01  -  2023 05:12  --------------------------------------------------------  IN: 1336 mL / OUT: 325 mL / NET: 1011 mL        Vital Signs Last 24 Hrs  T(C): 36.7 (2023 05:00), Max: 37.7 (2023 06:00)  T(F): 98.1 (2023 05:00), Max: 99.8 (2023 06:00)  HR: 108 (2023 05:00) (101 - 142)  BP: 115/68 (2023 05:00) (108/60 - 139/68)  BP(mean): --  RR: 20 (2023 05:00) (19 - 20)  SpO2: 96% (2023 05:00) (94% - 100%)    Parameters below as of 2023 05:00  Patient On (Oxygen Delivery Method): nasal cannula  O2 Flow (L/min): 2      CAPILLARY BLOOD GLUCOSE      POCT Blood Glucose.: 123 mg/dL (2023 04:09)  POCT Blood Glucose.: 125 mg/dL (2023 21:22)  POCT Blood Glucose.: 106 mg/dL (2023 16:50)  POCT Blood Glucose.: 89 mg/dL (2023 12:40)  POCT Blood Glucose.: 125 mg/dL (2023 07:34)      PHYSICAL EXAM:  GENERAL: NAD  HEENT: hoarse voice noted; mouth appears to be healing appropriately  NECK: Supple, No JVD  CHEST/LUNG: congested, rhonchi and wheezes present as before, on 2L NC  HEART: Tachycardic with regular rhythm; No murmurs, rubs, or gallops  ABDOMEN: Soft, Nontender, slightly distended; Bowel sounds present, + PEG tube in place cdi  EXTREMITIES:  2+ Peripheral Pulses, No clubbing, cyanosis. + R knee erythema, edema with TTP, incisions cdi, ROM limited 2/2 pain. No pitting edema noted  NEUROLOGY: nonfocal. difficult to assess orientation as patient's voice is very hoarse. but nods head to questions and is awake  SKIN: No rashes or lesions       LABS:                        8.1    36.78 )-----------( 599      ( 2023 05:13 )             27.2     Auto Eosinophil # 0.55  / Auto Eosinophil % 1.5   / Auto Neutrophil # 32.75 / Auto Neutrophil % 89.0  / BANDS % x                            8.0    33.32 )-----------( 623      ( 2023 06:27 )             27.3     Auto Eosinophil # 0.33  / Auto Eosinophil % 1.0   / Auto Neutrophil # 28.96 / Auto Neutrophil % 86.9  / BANDS % x            132<L>  |  102  |  20  ----------------------------<  99  5.0   |  18<L>  |  0.64      134<L>  |  105  |  20  ----------------------------<  87  4.4   |  20<L>  |  0.62    Ca    11.9<H>      2023 05:13  Mg     1.80       Phos  1.8       TPro  6.8  /  Alb  2.5<L>  /  TBili  0.4  /  DBili  x   /  AST  26  /  ALT  22  /  AlkPhos  213<H>    TPro  6.7  /  Alb  2.5<L>  /  TBili  0.2  /  DBili  x   /  AST  23  /  ALT  23  /  AlkPhos  213<H>      PT/INR - ( 2023 05:13 )   PT: 16.1 sec;   INR: 1.38 ratio         PTT - ( 2023 05:13 )  PTT:28.3 sec      Urinalysis Basic - ( 2023 06:02 )    Color: Light Yellow / Appearance: Clear / S.017 / pH: x  Gluc: x / Ketone: Negative  / Bili: Negative / Urobili: <2 mg/dL   Blood: x / Protein: 100 mg/dL / Nitrite: Negative   Leuk Esterase: Negative / RBC: 2 /HPF / WBC 1 /HPF   Sq Epi: x / Non Sq Epi: 2 /HPF / Bacteria: Negative            RADIOLOGY & ADDITIONAL TESTS:    Imaging Personally Reviewed:    Consultant(s) Notes Reviewed:      Care Discussed with Consultants/Other Providers:   Sultan Chapito MD  PGY 1 Department of Internal Medicine        Patient is a 67y old  Male who presents with a chief complaint of r. knee pain (2023 17:13)      SUBJECTIVE / OVERNIGHT EVENTS: Pt seen and examined. Received IV tylenol x 2 for tachycardia above baseline. Tele sinus tachy, a few periods of HR 130s-140s. Patient denies fevers, chills, CP, Abdominal pain, N/V, Constipation, Diarrhea. Had a big loose BM as per nurse so bowel meds held. Notes cough is improved but communication difficult to understand due to hoarse voice. Feels pain is well controlled and that knee is stable.         MEDICATIONS  (STANDING):  ascorbic acid 500 milliGRAM(s) Oral <User Schedule>  calcitonin Injectable 250 International Unit(s) IntraMuscular every 12 hours  chlorhexidine 2% Cloths 1 Application(s) Topical daily  enoxaparin Injectable 40 milliGRAM(s) SubCutaneous every 24 hours  gabapentin Solution 300 milliGRAM(s) Oral three times a day  lactated ringers. 1000 milliLiter(s) (100 mL/Hr) IV Continuous <Continuous>  levalbuterol Inhalation 1.25 milliGRAM(s) Inhalation every 12 hours  levothyroxine 150 MICROGram(s) Oral <User Schedule>  lidocaine   4% Patch 1 Patch Transdermal daily  meropenem  IVPB      meropenem  IVPB 1000 milliGRAM(s) IV Intermittent every 8 hours  methadone   Solution 2.5 milliGRAM(s) Oral <User Schedule>  multivitamin 1 Tablet(s) Oral <User Schedule>  pantoprazole   Suspension 40 milliGRAM(s) Oral <User Schedule>  polyethylene glycol 3350 17 Gram(s) Oral <User Schedule>  potassium phosphate / sodium phosphate Powder (PHOS-NaK) 2 Packet(s) Oral two times a day  senna Syrup 10 milliLiter(s) Oral <User Schedule>  sodium chloride 3%  Inhalation 4 milliLiter(s) Inhalation every 12 hours    MEDICATIONS  (PRN):  acetaminophen     Tablet .. 650 milliGRAM(s) Oral every 6 hours PRN Temp greater or equal to 38C (100.4F)  naloxone Injectable 0.3 milliGRAM(s) IV Push every 3 minutes PRN opioid induced AMS  ondansetron Injectable 4 milliGRAM(s) IV Push every 8 hours PRN Nausea and/or Vomiting  oxyCODONE    IR 10 milliGRAM(s) Oral every 4 hours PRN Severe Pain (7 - 10)  oxyCODONE    IR 5 milliGRAM(s) Oral every 4 hours PRN Moderate Pain (4 - 6)      I&O's Summary    2023 07:01  -  2023 07:00  --------------------------------------------------------  IN: 772 mL / OUT: 1450 mL / NET: -678 mL    2023 07:01  -  2023 05:12  --------------------------------------------------------  IN: 1336 mL / OUT: 325 mL / NET: 1011 mL        Vital Signs Last 24 Hrs  T(C): 36.7 (2023 05:00), Max: 37.7 (2023 06:00)  T(F): 98.1 (2023 05:00), Max: 99.8 (2023 06:00)  HR: 108 (2023 05:00) (101 - 142)  BP: 115/68 (2023 05:00) (108/60 - 139/68)  BP(mean): --  RR: 20 (2023 05:00) (19 - 20)  SpO2: 96% (2023 05:00) (94% - 100%)    Parameters below as of 2023 05:00  Patient On (Oxygen Delivery Method): nasal cannula  O2 Flow (L/min): 2      CAPILLARY BLOOD GLUCOSE      POCT Blood Glucose.: 123 mg/dL (2023 04:09)  POCT Blood Glucose.: 125 mg/dL (2023 21:22)  POCT Blood Glucose.: 106 mg/dL (2023 16:50)  POCT Blood Glucose.: 89 mg/dL (2023 12:40)  POCT Blood Glucose.: 125 mg/dL (2023 07:34)      PHYSICAL EXAM:  GENERAL: NAD  HEENT: hoarse voice noted; mouth appears to be healing appropriately  NECK: Supple, No JVD  CHEST/LUNG: congested, rhonchi and wheezes present as before, on 2L NC  HEART: Tachycardic with regular rhythm; No murmurs, rubs, or gallops  ABDOMEN: Soft, Nontender, slightly distended; Bowel sounds present, + PEG tube in place cdi  EXTREMITIES:  2+ Peripheral Pulses, No clubbing, cyanosis. + R knee erythema, edema with TTP, incisions cdi, ROM limited 2/2 pain. right distal leg swelling noted, non-painful  NEUROLOGY: nonfocal. difficult to assess orientation as patient's voice is very hoarse. but nods head to questions and is awake and alert  SKIN: No rashes or lesions       LABS:                        8.1    36.78 )-----------( 599      ( 2023 05:13 )             27.2     Auto Eosinophil # 0.55  / Auto Eosinophil % 1.5   / Auto Neutrophil # 32.75 / Auto Neutrophil % 89.0  / BANDS % x                            8.0    33.32 )-----------( 623      ( 2023 06:27 )             27.3     Auto Eosinophil # 0.33  / Auto Eosinophil % 1.0   / Auto Neutrophil # 28.96 / Auto Neutrophil % 86.9  / BANDS % x            132<L>  |  102  |  20  ----------------------------<  99  5.0   |  18<L>  |  0.64      134<L>  |  105  |  20  ----------------------------<  87  4.4   |  20<L>  |  0.62    Ca    11.9<H>      2023 05:13  Mg     1.80       Phos  1.8       TPro  6.8  /  Alb  2.5<L>  /  TBili  0.4  /  DBili  x   /  AST  26  /  ALT  22  /  AlkPhos  213<H>    TPro  6.7  /  Alb  2.5<L>  /  TBili  0.2  /  DBili  x   /  AST  23  /  ALT  23  /  AlkPhos  213<H>  02-    PT/INR - ( 2023 05:13 )   PT: 16.1 sec;   INR: 1.38 ratio         PTT - ( 2023 05:13 )  PTT:28.3 sec      Urinalysis Basic - ( 2023 06:02 )    Color: Light Yellow / Appearance: Clear / S.017 / pH: x  Gluc: x / Ketone: Negative  / Bili: Negative / Urobili: <2 mg/dL   Blood: x / Protein: 100 mg/dL / Nitrite: Negative   Leuk Esterase: Negative / RBC: 2 /HPF / WBC 1 /HPF   Sq Epi: x / Non Sq Epi: 2 /HPF / Bacteria: Negative            RADIOLOGY & ADDITIONAL TESTS:    Imaging Personally Reviewed:    Consultant(s) Notes Reviewed:      Care Discussed with Consultants/Other Providers:   Sultan Chapito MD  PGY 1 Department of Internal Medicine        Patient is a 67y old  Male who presents with a chief complaint of r. knee pain (2023 17:13)      SUBJECTIVE / OVERNIGHT EVENTS: Pt seen and examined. Received IV tylenol x 2 for tachycardia above baseline. Tele sinus tachy, a few periods of HR 130s-140s. Patient denies fevers, chills, CP, Abdominal pain, N/V, Constipation, Diarrhea. Had a big loose BM as per nurse so bowel meds held. Notes cough is improved but communication difficult to understand due to hoarse voice. Feels pain is well controlled and that knee is stable.         MEDICATIONS  (STANDING):  ascorbic acid 500 milliGRAM(s) Oral <User Schedule>  calcitonin Injectable 250 International Unit(s) IntraMuscular every 12 hours  chlorhexidine 2% Cloths 1 Application(s) Topical daily  enoxaparin Injectable 40 milliGRAM(s) SubCutaneous every 24 hours  gabapentin Solution 300 milliGRAM(s) Oral three times a day  lactated ringers. 1000 milliLiter(s) (100 mL/Hr) IV Continuous <Continuous>  levalbuterol Inhalation 1.25 milliGRAM(s) Inhalation every 12 hours  levothyroxine 150 MICROGram(s) Oral <User Schedule>  lidocaine   4% Patch 1 Patch Transdermal daily  meropenem  IVPB      meropenem  IVPB 1000 milliGRAM(s) IV Intermittent every 8 hours  methadone   Solution 2.5 milliGRAM(s) Oral <User Schedule>  multivitamin 1 Tablet(s) Oral <User Schedule>  pantoprazole   Suspension 40 milliGRAM(s) Oral <User Schedule>  polyethylene glycol 3350 17 Gram(s) Oral <User Schedule>  potassium phosphate / sodium phosphate Powder (PHOS-NaK) 2 Packet(s) Oral two times a day  senna Syrup 10 milliLiter(s) Oral <User Schedule>  sodium chloride 3%  Inhalation 4 milliLiter(s) Inhalation every 12 hours    MEDICATIONS  (PRN):  acetaminophen     Tablet .. 650 milliGRAM(s) Oral every 6 hours PRN Temp greater or equal to 38C (100.4F)  naloxone Injectable 0.3 milliGRAM(s) IV Push every 3 minutes PRN opioid induced AMS  ondansetron Injectable 4 milliGRAM(s) IV Push every 8 hours PRN Nausea and/or Vomiting  oxyCODONE    IR 10 milliGRAM(s) Oral every 4 hours PRN Severe Pain (7 - 10)  oxyCODONE    IR 5 milliGRAM(s) Oral every 4 hours PRN Moderate Pain (4 - 6)      I&O's Summary    2023 07:01  -  2023 07:00  --------------------------------------------------------  IN: 772 mL / OUT: 1450 mL / NET: -678 mL    2023 07:01  -  2023 05:12  --------------------------------------------------------  IN: 1336 mL / OUT: 325 mL / NET: 1011 mL        Vital Signs Last 24 Hrs  T(C): 36.7 (2023 05:00), Max: 37.7 (2023 06:00)  T(F): 98.1 (2023 05:00), Max: 99.8 (2023 06:00)  HR: 108 (2023 05:00) (101 - 142)  BP: 115/68 (2023 05:00) (108/60 - 139/68)  BP(mean): --  RR: 20 (2023 05:00) (19 - 20)  SpO2: 96% (2023 05:00) (94% - 100%)    Parameters below as of 2023 05:00  Patient On (Oxygen Delivery Method): nasal cannula  O2 Flow (L/min): 2      CAPILLARY BLOOD GLUCOSE      POCT Blood Glucose.: 123 mg/dL (2023 04:09)  POCT Blood Glucose.: 125 mg/dL (2023 21:22)  POCT Blood Glucose.: 106 mg/dL (2023 16:50)  POCT Blood Glucose.: 89 mg/dL (2023 12:40)  POCT Blood Glucose.: 125 mg/dL (2023 07:34)      PHYSICAL EXAM:  GENERAL: NAD  HEENT: hoarse voice noted; mouth appears to be healing appropriately  NECK: Supple, No JVD  CHEST/LUNG: congested, rhonchi and wheezes present as before more prominent on right; NC not in place  HEART: Tachycardic with regular rhythm; No murmurs, rubs, or gallops  ABDOMEN: Soft, Nontender, slightly distended; Bowel sounds present, + PEG tube in place cdi  EXTREMITIES:  2+ Peripheral Pulses, No clubbing, cyanosis. + R knee erythema, edema with TTP, incisions cdi, ROM limited 2/2 pain. right distal leg swelling noted, non-painful  NEUROLOGY: nonfocal. difficult to assess orientation as patient's voice is very hoarse. but nods head to questions and is awake and alert  SKIN: No rashes or lesions       LABS:                        8.1    36.78 )-----------( 599      ( 2023 05:13 )             27.2     Auto Eosinophil # 0.55  / Auto Eosinophil % 1.5   / Auto Neutrophil # 32.75 / Auto Neutrophil % 89.0  / BANDS % x                            8.0    33.32 )-----------( 623      ( 2023 06:27 )             27.3     Auto Eosinophil # 0.33  / Auto Eosinophil % 1.0   / Auto Neutrophil # 28.96 / Auto Neutrophil % 86.9  / BANDS % x            132<L>  |  102  |  20  ----------------------------<  99  5.0   |  18<L>  |  0.64      134<L>  |  105  |  20  ----------------------------<  87  4.4   |  20<L>  |  0.62    Ca    11.9<H>      2023 05:13  Mg     1.80       Phos  1.8       TPro  6.8  /  Alb  2.5<L>  /  TBili  0.4  /  DBili  x   /  AST  26  /  ALT  22  /  AlkPhos  213<H>    TPro  6.7  /  Alb  2.5<L>  /  TBili  0.2  /  DBili  x   /  AST  23  /  ALT  23  /  AlkPhos  213<H>      PT/INR - ( 2023 05:13 )   PT: 16.1 sec;   INR: 1.38 ratio         PTT - ( 2023 05:13 )  PTT:28.3 sec      Urinalysis Basic - ( 2023 06:02 )    Color: Light Yellow / Appearance: Clear / S.017 / pH: x  Gluc: x / Ketone: Negative  / Bili: Negative / Urobili: <2 mg/dL   Blood: x / Protein: 100 mg/dL / Nitrite: Negative   Leuk Esterase: Negative / RBC: 2 /HPF / WBC 1 /HPF   Sq Epi: x / Non Sq Epi: 2 /HPF / Bacteria: Negative            RADIOLOGY & ADDITIONAL TESTS:    Imaging Personally Reviewed:    Consultant(s) Notes Reviewed:      Care Discussed with Consultants/Other Providers:

## 2023-02-24 NOTE — PROGRESS NOTE ADULT - SUBJECTIVE AND OBJECTIVE BOX
Chief Complaint: Hypercalcemia of malignancy    History: per wife more alert today, no new complaints  finished with dental extractions  tolerating enteral feeds  received 2 additional doses of calcitonin overnight    MEDICATIONS  (STANDING):  ascorbic acid 500 milliGRAM(s) Oral <User Schedule>  chlorhexidine 2% Cloths 1 Application(s) Topical daily  enoxaparin Injectable 40 milliGRAM(s) SubCutaneous every 24 hours  gabapentin Solution 300 milliGRAM(s) Oral three times a day  lactated ringers. 1000 milliLiter(s) (125 mL/Hr) IV Continuous <Continuous>  levalbuterol Inhalation 1.25 milliGRAM(s) Inhalation every 6 hours  levothyroxine 150 MICROGram(s) Oral <User Schedule>  lidocaine   4% Patch 1 Patch Transdermal daily  meropenem  IVPB 1000 milliGRAM(s) IV Intermittent every 8 hours  methadone   Solution 2.5 milliGRAM(s) Oral <User Schedule>  multivitamin 1 Tablet(s) Oral <User Schedule>  pantoprazole   Suspension 40 milliGRAM(s) Oral <User Schedule>  polyethylene glycol 3350 17 Gram(s) Oral <User Schedule>  potassium phosphate / sodium phosphate Powder (PHOS-NaK) 2 Packet(s) Oral two times a day  senna Syrup 10 milliLiter(s) Oral <User Schedule>  sodium chloride 3%  Inhalation 4 milliLiter(s) Inhalation every 12 hours    MEDICATIONS  (PRN):  acetaminophen     Tablet .. 650 milliGRAM(s) Oral every 6 hours PRN Temp greater or equal to 38C (100.4F)  naloxone Injectable 0.3 milliGRAM(s) IV Push every 3 minutes PRN opioid induced AMS  ondansetron Injectable 4 milliGRAM(s) IV Push every 8 hours PRN Nausea and/or Vomiting  oxyCODONE    IR 5 milliGRAM(s) Oral every 4 hours PRN Moderate Pain (4 - 6)  oxyCODONE    IR 10 milliGRAM(s) Oral every 4 hours PRN Severe Pain (7 - 10)      Allergies    No Known Allergies    Intolerances      Review of Systems:    ALL OTHER SYSTEMS REVIEWED AND NEGATIVE    PHYSICAL EXAM:  VITALS: T(C): 36.9 (02-24-23 @ 10:56)  T(F): 98.4 (02-24-23 @ 10:56), Max: 99.1 (02-23-23 @ 20:31)  HR: 130 (02-24-23 @ 12:44) (101 - 142)  BP: 100/62 (02-24-23 @ 10:56) (100/62 - 127/71)  RR:  (20 - 20)  SpO2:  (94% - 97%)  Wt(kg): --  GENERAL: NAD  EYES: No proptosis, no lid lag, anicteric  HEENT:  Atraumatic, Normocephalic, moist mucous membranes  RESPIRATORY: nonlabored respirations, on room air    CAPILLARY BLOOD GLUCOSE      POCT Blood Glucose.: 116 mg/dL (24 Feb 2023 11:36)  POCT Blood Glucose.: 95 mg/dL (24 Feb 2023 07:32)  POCT Blood Glucose.: 123 mg/dL (24 Feb 2023 04:09)  POCT Blood Glucose.: 125 mg/dL (23 Feb 2023 21:22)  POCT Blood Glucose.: 106 mg/dL (23 Feb 2023 16:50)      02-24    136  |  105  |  20  ----------------------------<  103<H>  4.4   |  20<L>  |  0.67    eGFR: 102    Ca    11.8<H>      02-24  Mg     1.90     02-24  Phos  2.2     02-24    TPro  7.2  /  Alb  2.7<L>  /  TBili  0.2  /  DBili  x   /  AST  27  /  ALT  24  /  AlkPhos  219<H>  02-24      A1C with Estimated Average Glucose Result: 5.8 % (01-13-23 @ 06:10)      Thyroid Function Tests:  02-21 @ 06:20 TSH 23.86 FreeT4 0.9 T3 -- Anti TPO -- Anti Thyroglobulin Ab -- TSI --  02-10 @ 06:30 TSH 7.56 FreeT4 1.0 T3 -- Anti TPO -- Anti Thyroglobulin Ab -- TSI --

## 2023-02-24 NOTE — PROGRESS NOTE ADULT - ASSESSMENT
67M with  metastatic esophageal cancer (dx 8 years ago) to bone on irinotecan, r. knee, hypothyroidism, GERD, HTN recent RUL pna s/p cefepime, r. patellar mass excision --> metastatic squamous cell carcinoma on 1/7/23 was admitted on 2/5/23  w/ right knee pain and swelling for 2 days.    Recently hospitalized  underwent patellectomy for met SCC on 1/7/23  Course complicated by pneumonia attributed to Enterobacter treated with Cefepime 1/11 --> 1/18  hypercalcemia - calcitonin administered 2/18, 2/19, Zometa 2/18    Recent in patient Antibiotics  Vanco 1/1-->1/4;  2/5 2/13--> 2/17  Zosyn 1/1;  2/5, 2/6l 2/17 -->  Cefepime 1/1-->4; 1/11-->18  Cefazolin 1/7  Meropenem  2/17 -->    encephalopathy  leukemoid reaction  hypercalcemia  2/16 dental extraction to reduce risk of OsteoNecrosis of Jaw  2/23: Elevator and rongeur used to deliver root tips #6, 7, 9 and 10 without complications  He large tumor burden and mass with scant residual left lung  2/17 Neg MRSA PCR  2/17 Sputum with Enterobacter cloacae:   no bacteremia documented    2/24 modestly improved appearance  hypercalcemia has yet to respond to treatment and may be contributing to his delerium    Suggest  Continue  Meropenem 1 gm every 8 hours   repeat chest x ray    discussed with primary team    can opiates be reduced?   pulmonary toilet  - to have compression vest this afternoon

## 2023-02-24 NOTE — PROGRESS NOTE ADULT - SUBJECTIVE AND OBJECTIVE BOX
Follow Up:  leukocytosis    Interval History/ROS:  more awake  - wife and grandson at bedside    Allergies  No Known Allergies    ANTIMICROBIALS:  meropenem  IVPB    meropenem  IVPB 1000 every 8 hours      OTHER MEDS:  MEDICATIONS  (STANDING):  acetaminophen     Tablet .. 650 every 6 hours PRN  enoxaparin Injectable 40 every 24 hours  gabapentin Solution 300 three times a day  levalbuterol Inhalation 1.25 every 6 hours  levothyroxine 150 <User Schedule>  methadone   Solution 2.5 <User Schedule>  ondansetron Injectable 4 every 8 hours PRN  oxyCODONE    IR 5 every 4 hours PRN  oxyCODONE    IR 10 every 4 hours PRN  pantoprazole   Suspension 40 <User Schedule>  polyethylene glycol 3350 17 <User Schedule>  senna Syrup 10 <User Schedule>  sodium chloride 3%  Inhalation 4 every 12 hours      Vital Signs Last 24 Hrs  T(C): 36.9 (2023 10:56), Max: 37.3 (2023 20:31)  T(F): 98.4 (2023 10:56), Max: 99.1 (2023 20:31)  HR: 133 (2023 10:56) (101 - 142)  BP: 100/62 (2023 10:56) (100/62 - 139/68)  BP(mean): --  RR: 20 (2023 10:56) (20 - 20)  SpO2: 95% (2023 10:56) (94% - 97%)    Parameters below as of 2023 10:56  Patient On (Oxygen Delivery Method): room air        PHYSICAL EXAM:  General:  NAD, Non-toxic  Neurology: Awake  - does not verbalize  Respiratory: coarse rhonchi  R.L   exam modestly improved since   CV: RRR, S1S2, no murmurs, rubs or gallops  Abdominal: Soft, Non-tender, non-distended, normal bowel sounds  Extremities: No edema  Line Sites: Clear  Skin: No rash                          8.6    34.36 )-----------( 679      ( 2023 06:49 )             29.3   WBC Count: 34.36 ( @ 06:49)  WBC Count: 36.78 ( @ 05:13)  WBC Count: 33.32 ( @ 06:27)  WBC Count: 32.27 ( @ 06:20)  WBC Count: 28.46 ( @ 07:00)          136  |  105  |  20  ----------------------------<  103<H>  4.4   |  20<L>  |  0.67    Ca    11.8<H>      2023 06:49  Phos  2.2       Mg     1.90         TPro  7.2  /  Alb  2.7<L>  /  TBili  0.2  /  DBili  x   /  AST  27  /  ALT  24  /  AlkPhos  219<H>        Urinalysis Basic - ( 2023 06:02 )    Color: Light Yellow / Appearance: Clear / S.017 / pH: x  Gluc: x / Ketone: Negative  / Bili: Negative / Urobili: <2 mg/dL   Blood: x / Protein: 100 mg/dL / Nitrite: Negative   Leuk Esterase: Negative / RBC: 2 /HPF / WBC 1 /HPF   Sq Epi: x / Non Sq Epi: 2 /HPF / Bacteria: Negative    MICROBIOLOGY:  .Blood Blood-Peripheral  23   No growth to date.  --  --      .Sputum Sputum  23   Moderate Enterobacter cloacae complex  Normal Respiratory Susan present  --  Enterobacter cloacae complex      .Blood Blood-Peripheral  23   No Growth Final  --  --      .Blood Blood-Venous  23   No Growth Final  --  --      Clean Catch Clean Catch (Midstream)  23   <10,000 CFU/mL Normal Urogenital Susan  --  --      .Blood Blood-Peripheral  23   No Growth Final  --  --      .Blood Blood-Venous  23   No Growth Final  --  --      .Body Fluid Synovial Fluid  23   No growth at 14 days.  --    No polymorphonuclear leukocytes per low power field  No organisms seen per oil power field  by cytocentrifuge      Clean Catch Clean Catch (Midstream)  23   No growth  --  --      .Blood Blood-Peripheral  23   No Growth Final  --  --      .Blood Blood-Peripheral  23   No Growth Final  --  --    Rapid RVP Result: NotDetec ( @ 06:57)    RADIOLOGY:  < from: Xray Chest 1 View- PORTABLE-Urgent (Xray Chest 1 View- PORTABLE-Urgent .) (23 @ 12:58) >  IMPRESSION:  Postoperative changes left lung volume loss similar to prior. Pleural   parenchymal opacity left hemithorax similar to prior .No definite acute   infiltrate pneumothorax or other new abnormality. Right chest port   reidentified in situ    < end of copied text >      Delonte Simms MD; Division of Infectious Disease; Pager: [phone]; nights and weekends: [phone]

## 2023-02-24 NOTE — PROGRESS NOTE ADULT - PROBLEM SELECTOR PLAN 2
elev serum Ca 11.9  PTH below normal, Phos low -> most likely hyperca of malignancy  ionized justo elevated, not responding much to IVFs.   mild to moderate hypercalcemia, unclear if patient's new onset abdominal pain and vomiting related  - EKG reviewed no ischemic changes  - 1L NS bolus, followed by 100cc/h maintenance -> will dc fluids as has been getting continuous fluids and lungs sound congested -> f/u official CXR read -> perihilar opacity -> can consider fluids in 10-12 hour periods  - S/p calcitonin on 2/13-2/14 and IVFs -> not much improvement in ionized calcium  - monitor icals   - might benefit from bisphosphonate if not responsive to calcitonin but would need dental eval -> would need imaging (CT maxillofacial) prior to extraction, which would be necessary prior to clearance -> dental extraction 2/16 -> will need another extraction (on 2/23)  - 12/20: currently still on IV fluids at rate of 70mL/hr, s/p 2 more doses calcitonin given 2/18-2/19 and Endo input it is okay to start bisphosphonate, they spoke with family at bedside about jaw osteonecrosis risks and they agreed, first dose given zolendronate 2/19 -> might require a few days to see response -> slowly downtrending but then uptrend again (as per endo might require denosumab), increased fluid rate elev serum Ca 11.9  PTH below normal, Phos low -> most likely hyperca of malignancy  ionized justo elevated, not responding much to IVFs.   mild to moderate hypercalcemia, unclear if patient's new onset abdominal pain and vomiting related  - EKG reviewed no ischemic changes  - 1L NS bolus, followed by 100cc/h maintenance -> will dc fluids as has been getting continuous fluids and lungs sound congested -> f/u official CXR read -> perihilar opacity -> can consider fluids in 10-12 hour periods  - S/p calcitonin on 2/13-2/14 and IVFs -> not much improvement in ionized calcium  - monitor icals   - might benefit from bisphosphonate if not responsive to calcitonin but would need dental eval -> would need imaging (CT maxillofacial) prior to extraction, which would be necessary prior to clearance -> dental extraction 2/16 -> will need another extraction (on 2/23)  - 12/20: currently still on IV fluids at rate of 70mL/hr, s/p 2 more doses calcitonin given 2/18-2/19 and Endo input it is okay to start bisphosphonate, they spoke with family at bedside about jaw osteonecrosis risks and they agreed, first dose given zolendronate 2/19 -> might require a few days to see response -> slowly downtrending but then uptrend again (as per endo might require denosumab), increased fluid rate (might require Lasix if we continue fluids at this rate) elev serum Ca (hovering in moderate range once corrected; ionized in mild range; unclear if symptomatic)  PTH below normal, Phos low -> most likely hyperca of malignancy  ionized justo elevated, not responding much to IVFs.   mild to moderate hypercalcemia, unclear if patient's new onset abdominal pain and vomiting related  - EKG reviewed no ischemic changes  - 1L NS bolus, followed by 100cc/h maintenance -> will dc fluids as has been getting continuous fluids and lungs sound congested -> f/u official CXR read -> perihilar opacity -> can consider fluids in 10-12 hour periods  - S/p calcitonin on 2/13-2/14 and IVFs -> not much improvement in ionized calcium  - monitor icals   - might benefit from bisphosphonate if not responsive to calcitonin but would need dental eval -> would need imaging (CT maxillofacial) prior to extraction, which would be necessary prior to clearance -> dental extraction 2/16 -> will need another extraction (on 2/23)  - 12/20: currently still on IV fluids at rate of 70mL/hr, s/p 2 more doses calcitonin given 2/18-2/19 and Endo input it is okay to start bisphosphonate, they spoke with family at bedside about jaw osteonecrosis risks and they agreed, first dose given zolendronate 2/19 -> might require a few days to see response -> slowly downtrending but then uptrend again (as per endo might require denosumab), increased fluid rate (will also trial Lasix IV 20 given fluids and for synergistic effect on calcium excretion + 2 more doses of calcitionin

## 2023-02-24 NOTE — PROGRESS NOTE ADULT - PROBLEM SELECTOR PLAN 1
baseline tachy in 110s likely in setting of medical disease cancer but newly tachy to 160s 2/13  baseline leukocytosis in 20k range -> increased to 35k 2/14  newly febrile 2/13  MRSA neg at admission  unclear source; initial concern for septic arthritis but tap neg for infection; possible aspiration pneumonia given CXR findings  PCT elevated to 5  Lactate elevation, downtrended  -BCx -> NGTD  -UA/Ucx neg  -CXR to look for pneumonia given concern for aspiration -> right perihilar opacity  -RVP neg  -empiric Zosyn -> switched to uma due to fevers as per ID  -trend WBC/monitor fever curve   -add vancomycin and do MRSA/MSSA -> dc vanc as MRSA neg  - sputum cx results -> Enterobacter cloacae, f/u sensitivities -> if ID ok, can transition to FQ -> c/w uma  -ID c/s as unclear source; aspiration pneumonia vs post-obstructive vs malignancy  **continues to spike fevers and worsening leukocytosis -> will touch base on ID if this is infectious or related to malignancy baseline tachy in 110s likely in setting of medical disease cancer but newly tachy to 160s 2/13  baseline leukocytosis in 20k range -> increased to 35k 2/14  newly febrile 2/13  MRSA neg at admission  unclear source; initial concern for septic arthritis but tap neg for infection; possible aspiration pneumonia given CXR findings  PCT elevated to 5  Lactate elevation, downtrended  -BCx -> NGTD  -UA/Ucx neg  -CXR to look for pneumonia given concern for aspiration -> right perihilar opacity  -RVP neg  -empiric Zosyn -> switched to uma due to fevers as per ID  -trend WBC/monitor fever curve   -add vancomycin and do MRSA/MSSA -> dc vanc as MRSA neg  - sputum cx results -> Enterobacter cloacae, f/u sensitivities -> if ID ok, can transition to FQ -> c/w uma  -ID c/s as unclear source; aspiration pneumonia vs post-obstructive vs malignancy  **continues to spike fevers and worsening leukocytosis -> ID believes this is leukomoid reaction and related to continued aspiration baseline tachy in 110s likely in setting of medical disease cancer but newly tachy to 160s 2/13  baseline leukocytosis in 20k range -> increased to 35k 2/14  newly febrile 2/13  MRSA neg at admission  unclear source; initial concern for septic arthritis but tap neg for infection; possible aspiration pneumonia given CXR findings  PCT elevated to 5  Lactate elevation, downtrended  -BCx -> NGTD  -UA/Ucx neg  -CXR to look for pneumonia given concern for aspiration -> right perihilar opacity  -RVP neg  -empiric Zosyn -> switched to uma due to fevers as per ID  -trend WBC/monitor fever curve   -add vancomycin and do MRSA/MSSA -> dc vanc as MRSA neg  - sputum cx results -> Enterobacter cloacae, f/u sensitivities -> if ID ok, can transition to FQ -> c/w uma  -ID c/s as unclear source; aspiration pneumonia vs post-obstructive vs malignancy  **continues to spike fevers and worsening leukocytosis -> ID believes this is leukomoid reaction and related to continued aspiration  -duplex ordered to eval for alternative causes baseline tachy in 110s likely in setting of medical disease cancer but newly tachy to 160s 2/13  baseline leukocytosis in 20k range -> increased to 35k 2/14  newly febrile 2/13  MRSA neg at admission  unclear source; initial concern for septic arthritis but tap neg for infection; possible aspiration pneumonia given CXR findings  PCT elevated to 5  Lactate elevation, downtrended  -BCx -> NGTD  -UA/Ucx neg  -CXR to look for pneumonia given concern for aspiration -> right perihilar opacity  -RVP neg  -empiric Zosyn -> switched to uma due to fevers as per ID  -trend WBC/monitor fever curve   -add vancomycin and do MRSA/MSSA -> dc vanc as MRSA neg  - sputum cx results -> Enterobacter cloacae, f/u sensitivities -> if ID ok, can transition to FQ -> c/w uma until 2/24  -ID c/s as unclear source; aspiration pneumonia vs post-obstructive vs malignancy  **continues to spike fevers and worsening leukocytosis -> ID believes this is leukomoid reaction and related to continued aspiration  -duplex ordered to eval for alternative causes

## 2023-02-24 NOTE — PROVIDER CONTACT NOTE (OTHER) - ASSESSMENT
Patient A+Ox4, garbled speech. Denies chest pain or SOB. No acute s/s of distress noted. Vitals as per flowsheet.

## 2023-02-24 NOTE — PROVIDER CONTACT NOTE (OTHER) - ASSESSMENT
Patient A+Ox4 with garbled speech, team aware. Denies chest pain or SOB. Vitals as per flowsheet. No acute s/s of distress noted

## 2023-02-24 NOTE — PROGRESS NOTE ADULT - ATTENDING COMMENTS
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 Found to have SIRS (tachy, leukocytosis) and c/f right knee septic arthritis s/p arthrocentesis on 2/5 not yielding any organisms however +hemarthrosis.    Patient seen and examined at bedside with wife. Reports feeling fine and has no complaints. Per wife, leg swelling is improving. Lung exam noted for diffuse coarse breath sound, slightly improved. R foot swelling nontender.     #Acute respiratory failure with hypoxemia: possibly aspiration pna in the setting of multiple teeth extractions. ID consulted; appreciate recs. Continue to wean O2 as tolerated. chest vest, xopenex, deep suction as needed    #SIRS criteria: fever, tachycardic, leukocytosis: considering infection vs malignant related fever vs clot (though LE negative for DVT on 2/5): repeat culture and MRSA neg. Sputum culture growing enterobacter. Vanco discontinued. Continue uma per ID recs. Given uptrending WBC, would repeat duplex to eval for DVT    #Hemarthrosis - Right knee cultures NGTD, ESR/CRP elevated, suspect inflammation in setting of hemarthrosis and malignancy. Right knee immobilizer while out of bed    #Met SCC Esophageal Ca - Was planned to start RT as outpatient but never started. Previous right patellar bx path with SCC, Rad Onc with no present plans for RT inpatient    #Hypercalcemia : new, suspect due to malignancy, low PTH, normal Vit D. s/p calcitonin. s/p extractions 2/16 and 2/23. Endo consulted given runs of V-tach 2/18. s/p zometa 2/18. Per endo, risks of untreated hypercalcemia outweigh lower risks of OJN. Increase IVF rate from 100 to 125cc/hr. If w/ c/f volume overload will consider diuresis. F/u endo recs    #V-Tach: Resolved. Likely multi-factorial in etiology. Suspect some component likely in the setting of hypercalcemia. Tele monitoring. Echo: EF 74%, mild pulm htn. Rest of Mgt as above.     #Cancer pain - C/w methadone 2.5mg q12 per palliative. Consider lowering it or switching to different regimen if concern for sedation. Will touch base with palliative care      Discussed with HS.

## 2023-02-25 NOTE — PROGRESS NOTE ADULT - PROBLEM SELECTOR PLAN 2
elev serum Ca (hovering in moderate range once corrected; ionized in mild range; unclear if symptomatic)  PTH below normal, Phos low -> most likely hyperca of malignancy  ionized justo elevated, not responding much to IVFs.   mild to moderate hypercalcemia, unclear if patient's new onset abdominal pain and vomiting related  - EKG reviewed no ischemic changes  - 1L NS bolus, followed by 100cc/h maintenance -> will dc fluids as has been getting continuous fluids and lungs sound congested -> f/u official CXR read -> perihilar opacity -> can consider fluids in 10-12 hour periods  - S/p calcitonin on 2/13-2/14 and IVFs -> not much improvement in ionized calcium  - monitor icals   - might benefit from bisphosphonate if not responsive to calcitonin but would need dental eval -> would need imaging (CT maxillofacial) prior to extraction, which would be necessary prior to clearance -> dental extraction 2/16 -> will need another extraction (on 2/23)  - 12/20: currently still on IV fluids at rate of 70mL/hr, s/p 2 more doses calcitonin given 2/18-2/19 and Endo input it is okay to start bisphosphonate, they spoke with family at bedside about jaw osteonecrosis risks and they agreed, first dose given zolendronate 2/19 -> might require a few days to see response -> slowly downtrending but then uptrend again (as per endo might require denosumab), increased fluid rate (will also trial Lasix IV 20 given fluids and for synergistic effect on calcium excretion + 2 more doses of calcitionin

## 2023-02-25 NOTE — PROGRESS NOTE ADULT - PROBLEM SELECTOR PLAN 5
-c/w tylenol mild pain  -oxycodone 5 q4 prn for moderate pain, oxy 10 q4 prn for severe pain  -c/w gabapentin 300 mg TID    #Reactive airway dx  Wheezing and rhonchi on exam with wet cough; concern for aspiration  -due to tachycardia will try Xopenex instead of duoneb  -IS and chest PT -> chest vest ordered -SCC esophageal cancer dx 8 years ago on irinotecan   CTA decreased RUL ground glass opacities and unchanged large mass in left upper hemithorax invading left chest   wall with erosive changes of left 3rd through 5th ribs  Previous right patellar bx path with SCC  -has outpt f/u with onc Dr. Gi Thomas  -outpatient rad onc Dr. Brittany Reza   -rad onc: no RT inpatient with open infected wound

## 2023-02-25 NOTE — PROGRESS NOTE ADULT - SUBJECTIVE AND OBJECTIVE BOX
Sultan Chapito MD  PGY 1 Department of Internal Medicine        Patient is a 67y old  Male who presents with a chief complaint of r. knee pain (2023 13:59)      SUBJECTIVE / OVERNIGHT EVENTS: Pt seen and examined. No acute overnight events. Denies fevers, chills, CP, SOB, Abdominal pain, N/V, Constipation, Diarrhea        MEDICATIONS  (STANDING):  ascorbic acid 500 milliGRAM(s) Oral <User Schedule>  chlorhexidine 2% Cloths 1 Application(s) Topical daily  enoxaparin Injectable 40 milliGRAM(s) SubCutaneous every 24 hours  gabapentin Solution 300 milliGRAM(s) Oral three times a day  lactated ringers. 1000 milliLiter(s) (125 mL/Hr) IV Continuous <Continuous>  levalbuterol Inhalation 1.25 milliGRAM(s) Inhalation every 6 hours  levothyroxine 150 MICROGram(s) Oral <User Schedule>  lidocaine   4% Patch 1 Patch Transdermal daily  meropenem  IVPB 1000 milliGRAM(s) IV Intermittent every 8 hours  methadone   Solution 2.5 milliGRAM(s) Oral <User Schedule>  multivitamin 1 Tablet(s) Oral <User Schedule>  pantoprazole   Suspension 40 milliGRAM(s) Oral <User Schedule>  polyethylene glycol 3350 17 Gram(s) Oral <User Schedule>  senna Syrup 10 milliLiter(s) Oral <User Schedule>  sodium chloride 3%  Inhalation 4 milliLiter(s) Inhalation every 12 hours    MEDICATIONS  (PRN):  acetaminophen     Tablet .. 650 milliGRAM(s) Oral every 6 hours PRN Temp greater or equal to 38C (100.4F)  naloxone Injectable 0.3 milliGRAM(s) IV Push every 3 minutes PRN opioid induced AMS  ondansetron Injectable 4 milliGRAM(s) IV Push every 8 hours PRN Nausea and/or Vomiting  oxyCODONE    IR 5 milliGRAM(s) Oral every 4 hours PRN Moderate Pain (4 - 6)  oxyCODONE    IR 10 milliGRAM(s) Oral every 4 hours PRN Severe Pain (7 - 10)      I&O's Summary    2023 07:01  -  2023 07:00  --------------------------------------------------------  IN: 1672 mL / OUT: 625 mL / NET: 1047 mL    2023 07:01  -  2023 05:31  --------------------------------------------------------  IN: 1712 mL / OUT: 1280 mL / NET: 432 mL        Vital Signs Last 24 Hrs  T(C): 36.9 (2023 01:00), Max: 37.3 (2023 09:00)  T(F): 98.4 (), Max: 99.1 (2023 09:00)  HR: 110 (:00) (105 - 142)  BP: 120/65 (:00) (93/56 - 147/71)  BP(mean): --  RR: 20 (:) (20 - 20)  SpO2: 95% (:) (93% - 100%)    Parameters below as of :00  Patient On (Oxygen Delivery Method): room air        CAPILLARY BLOOD GLUCOSE      POCT Blood Glucose.: 104 mg/dL (2023 04:35)  POCT Blood Glucose.: 124 mg/dL (2023 21:29)  POCT Blood Glucose.: 102 mg/dL (2023 16:47)  POCT Blood Glucose.: 116 mg/dL (2023 11:36)  POCT Blood Glucose.: 95 mg/dL (2023 07:32)      PHYSICAL EXAM:  GENERAL: NAD  HEENT: hoarse voice noted; mouth appears to be healing appropriately  NECK: Supple, No JVD  CHEST/LUNG: congested, rhonchi and wheezes present as before more prominent on right; NC not in place  HEART: Tachycardic with regular rhythm; No murmurs, rubs, or gallops  ABDOMEN: Soft, Nontender, slightly distended; Bowel sounds present, + PEG tube in place cdi  EXTREMITIES:  2+ Peripheral Pulses, No clubbing, cyanosis. + R knee erythema, edema with TTP, incisions cdi, ROM limited 2/2 pain. right distal leg swelling noted, non-painful  NEUROLOGY: nonfocal. difficult to assess orientation as patient's voice is very hoarse. but nods head to questions and is awake and alert  SKIN: No rashes or lesions         LABS:                        8.6    34.36 )-----------( 679      ( 2023 06:49 )             29.3     Auto Eosinophil # 0.64  / Auto Eosinophil % 1.9   / Auto Neutrophil # 29.40 / Auto Neutrophil % 85.6  / BANDS % x        -    136  |  105  |  20  ----------------------------<  103<H>  4.4   |  20<L>  |  0.67    Ca    11.8<H>      2023 06:49  Mg     1.90       Phos  2.2     -  TPro  7.2  /  Alb  2.7<L>  /  TBili  0.2  /  DBili  x   /  AST  27  /  ALT  24  /  AlkPhos  219<H>  -24          Urinalysis Basic - ( 2023 06:02 )    Color: Light Yellow / Appearance: Clear / S.017 / pH: x  Gluc: x / Ketone: Negative  / Bili: Negative / Urobili: <2 mg/dL   Blood: x / Protein: 100 mg/dL / Nitrite: Negative   Leuk Esterase: Negative / RBC: 2 /HPF / WBC 1 /HPF   Sq Epi: x / Non Sq Epi: 2 /HPF / Bacteria: Negative            RADIOLOGY & ADDITIONAL TESTS:    Imaging Personally Reviewed:    Consultant(s) Notes Reviewed:      Care Discussed with Consultants/Other Providers:   Sultan Chapito MD  PGY 1 Department of Internal Medicine        Patient is a 67y old  Male who presents with a chief complaint of r. knee pain (2023 13:59)      SUBJECTIVE / OVERNIGHT EVENTS: Pt seen and examined. Denies fevers, chills, CP, SOB, Abdominal pain, N/V, Constipation, Diarrhea. Occasional episodes of tachycardia above baseline to 130-140s that resolved with IV tylenol. As per family, patient occasionally confused, thinking he's at home.         MEDICATIONS  (STANDING):  ascorbic acid 500 milliGRAM(s) Oral <User Schedule>  chlorhexidine 2% Cloths 1 Application(s) Topical daily  enoxaparin Injectable 40 milliGRAM(s) SubCutaneous every 24 hours  gabapentin Solution 300 milliGRAM(s) Oral three times a day  lactated ringers. 1000 milliLiter(s) (125 mL/Hr) IV Continuous <Continuous>  levalbuterol Inhalation 1.25 milliGRAM(s) Inhalation every 6 hours  levothyroxine 150 MICROGram(s) Oral <User Schedule>  lidocaine   4% Patch 1 Patch Transdermal daily  meropenem  IVPB 1000 milliGRAM(s) IV Intermittent every 8 hours  methadone   Solution 2.5 milliGRAM(s) Oral <User Schedule>  multivitamin 1 Tablet(s) Oral <User Schedule>  pantoprazole   Suspension 40 milliGRAM(s) Oral <User Schedule>  polyethylene glycol 3350 17 Gram(s) Oral <User Schedule>  senna Syrup 10 milliLiter(s) Oral <User Schedule>  sodium chloride 3%  Inhalation 4 milliLiter(s) Inhalation every 12 hours    MEDICATIONS  (PRN):  acetaminophen     Tablet .. 650 milliGRAM(s) Oral every 6 hours PRN Temp greater or equal to 38C (100.4F)  naloxone Injectable 0.3 milliGRAM(s) IV Push every 3 minutes PRN opioid induced AMS  ondansetron Injectable 4 milliGRAM(s) IV Push every 8 hours PRN Nausea and/or Vomiting  oxyCODONE    IR 5 milliGRAM(s) Oral every 4 hours PRN Moderate Pain (4 - 6)  oxyCODONE    IR 10 milliGRAM(s) Oral every 4 hours PRN Severe Pain (7 - 10)      I&O's Summary    2023 07:01  -  2023 07:00  --------------------------------------------------------  IN: 1672 mL / OUT: 625 mL / NET: 1047 mL    2023 07:01  -  2023 05:31  --------------------------------------------------------  IN: 1712 mL / OUT: 1280 mL / NET: 432 mL        Vital Signs Last 24 Hrs  T(C): 36.9 (2023 01:00), Max: 37.3 (2023 09:00)  T(F): 98.4 (:00), Max: 99.1 (2023 09:00)  HR: 110 (:) (105 - 142)  BP: 120/65 (:) (93/56 - 147/71)  BP(mean): --  RR: 20 (:) (20 - 20)  SpO2: 95% (:) (93% - 100%)    Parameters below as of :00  Patient On (Oxygen Delivery Method): room air        CAPILLARY BLOOD GLUCOSE      POCT Blood Glucose.: 104 mg/dL (2023 04:35)  POCT Blood Glucose.: 124 mg/dL (2023 21:29)  POCT Blood Glucose.: 102 mg/dL (2023 16:47)  POCT Blood Glucose.: 116 mg/dL (2023 11:36)  POCT Blood Glucose.: 95 mg/dL (2023 07:32)      PHYSICAL EXAM:  GENERAL: NAD, appears to be improving  HEENT: hoarse voice noted; mouth appears to be healing appropriately  NECK: Supple, No JVD  CHEST/LUNG: congested, rhonchi and wheezes present as before more prominent on right; on RA  HEART: Tachycardic with regular rhythm; No murmurs, rubs, or gallops  ABDOMEN: Soft, Nontender, slightly distended; Bowel sounds present, + PEG tube in place cdi  EXTREMITIES:  2+ Peripheral Pulses, No clubbing, cyanosis. + R knee erythema, edema with TTP, incisions cdi, ROM limited 2/2 pain. right distal leg swelling noted, non-painful  NEUROLOGY: nonfocal. difficult to assess orientation as patient's voice is very hoarse. but nods head to questions and is awake and alert. thinks he is at home  SKIN: No rashes or lesions         LABS:                        8.6    34.36 )-----------( 679      ( 2023 06:49 )             29.3     Auto Eosinophil # 0.64  / Auto Eosinophil % 1.9   / Auto Neutrophil # 29.40 / Auto Neutrophil % 85.6  / BANDS % x        -    136  |  105  |  20  ----------------------------<  103<H>  4.4   |  20<L>  |  0.67    Ca    11.8<H>      2023 06:49  Mg     1.90     02-24  Phos  2.2     02-24  TPro  7.2  /  Alb  2.7<L>  /  TBili  0.2  /  DBili  x   /  AST  27  /  ALT  24  /  AlkPhos  219<H>  -24          Urinalysis Basic - ( 2023 06:02 )    Color: Light Yellow / Appearance: Clear / S.017 / pH: x  Gluc: x / Ketone: Negative  / Bili: Negative / Urobili: <2 mg/dL   Blood: x / Protein: 100 mg/dL / Nitrite: Negative   Leuk Esterase: Negative / RBC: 2 /HPF / WBC 1 /HPF   Sq Epi: x / Non Sq Epi: 2 /HPF / Bacteria: Negative            RADIOLOGY & ADDITIONAL TESTS:    Imaging Personally Reviewed:    Consultant(s) Notes Reviewed:      Care Discussed with Consultants/Other Providers:

## 2023-02-25 NOTE — PROGRESS NOTE ADULT - PROBLEM SELECTOR PLAN 3
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 Carter: no infection, likely tumor growth, pt declining palliative amputation at this time Family noting patient has occasionally been confused  difficult to assess orientation as patient's hoarse voice makes communication difficult though not orientated to place today (as per family chronically not oriented to time)  multifactorical could be in setting to infection, hypercalcemia, inflammation from cancer, hospital delirium, medications (meropenem vs opioids though not on high dose of opioids)  Neuro exam nonfocal  -tx causes  -not agitated so conservative measures

## 2023-02-25 NOTE — PROVIDER CONTACT NOTE (OTHER) - ASSESSMENT
Pt HR has been sustaining in the 140s. In the past IV tylenlol has brought the HR back down. Pt asymptomatic

## 2023-02-25 NOTE — PROGRESS NOTE ADULT - PROBLEM SELECTOR PLAN 8
-protonix 40 mg recheck TSH and free T4 as per endo  TSH still high, T4 normal but on low end  -c/w synthroid 150 mcg (different time than other meds and hold TFs one hour before and after)  -appreciate endo recs  -recheck on 2/28

## 2023-02-25 NOTE — PROGRESS NOTE ADULT - PROBLEM SELECTOR PLAN 1
baseline tachy in 110s likely in setting of medical disease cancer but newly tachy to 160s 2/13  baseline leukocytosis in 20k range -> increased to 35k 2/14  newly febrile 2/13  MRSA neg at admission  unclear source; initial concern for septic arthritis but tap neg for infection; possible aspiration pneumonia given CXR findings  PCT elevated to 5  Lactate elevation, downtrended  -BCx -> NGTD  -UA/Ucx neg  -CXR to look for pneumonia given concern for aspiration -> right perihilar opacity  -RVP neg  -empiric Zosyn -> switched to uma due to fevers as per ID  -trend WBC/monitor fever curve   -add vancomycin and do MRSA/MSSA -> dc vanc as MRSA neg  - sputum cx results -> Enterobacter cloacae, f/u sensitivities -> if ID ok, can transition to FQ -> c/w uma until 2/24  -ID c/s as unclear source; aspiration pneumonia vs post-obstructive vs malignancy  **continues to spike fevers and worsening leukocytosis -> ID believes this is leukomoid reaction and related to continued aspiration  -duplex ordered to eval for alternative causes

## 2023-02-25 NOTE — PROGRESS NOTE ADULT - ATTENDING COMMENTS
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 Found to have SIRS (tachy, leukocytosis) and c/f right knee septic arthritis s/p arthrocentesis on 2/5 not yielding any organisms however +hemarthrosis.    Patient seen and examined at bedside with wife. Reports feeling fine and has no complaints. Lung exam noted for diffuse coarse breath sound, unchange from prior. R foot swelling nontender.     #Acute respiratory failure with hypoxemia: possibly aspiration pna in the setting of multiple teeth extractions. ID consulted; appreciate recs. Chest vest, xopenex, deep suction as needed    #SIRS criteria: fever, tachycardic, leukocytosis: considering infection vs malignant related fever vs clot (though LE negative for DVT on 2/5): repeat culture and MRSA neg. Sputum culture growing enterobacter. Vanco discontinued. Continue uma per ID recs. Given uptrending WBC, would repeat duplex to eval for DVT    #Hemarthrosis - Right knee cultures NGTD, ESR/CRP elevated, suspect inflammation in setting of hemarthrosis and malignancy. Right knee immobilizer while out of bed    #Met SCC Esophageal Ca - Was planned to start RT as outpatient but never started. Previous right patellar bx path with SCC, Rad Onc with no present plans for RT inpatient    #Hypercalcemia : new, suspect due to malignancy, low PTH, normal Vit D. s/p calcitonin. s/p extractions 2/16 and 2/23. Endo consulted given runs of V-tach 2/18. s/p zometa 2/18. Per endo, risks of untreated hypercalcemia outweigh lower risks of OJN. C/w IVF rate from 100 to 125cc/hr. If w/ c/f volume overload will consider diuresis. F/u endo recs    #V-Tach: Resolved. Likely multi-factorial in etiology. Suspect some component likely in the setting of hypercalcemia. Tele monitoring. Echo: EF 74%, mild pulm htn. Rest of Mgt as above.     #Cancer pain - C/w methadone 2.5mg q12 per palliative. Family concern about sedation. Given knee pain has improved now, can trial daily dosing instead of BID per palliative.      Discussed with HS.

## 2023-02-25 NOTE — PROGRESS NOTE ADULT - PROBLEM SELECTOR PLAN 7
recheck TSH and free T4 as per endo  TSH still high, T4 normal but on low end  -c/w synthroid 150 mcg (different time than other meds and hold TFs one hour before and after)  -appreciate endo recs  -recheck on 2/28 Hb 7.8 on admission (baseline 8-9)  possibly due to hemarthrosis of right knee vs AOCD iso malignancy  - trend CBC  - transfuse Hb <7  - s/p 1u prbc

## 2023-02-25 NOTE — PROGRESS NOTE ADULT - PROBLEM SELECTOR PLAN 6
Hb 7.8 on admission (baseline 8-9)  possibly due to hemarthrosis of right knee vs AOCD iso malignancy  - trend CBC  - transfuse Hb <7  - s/p 1u prbc -c/w tylenol mild pain  -oxycodone 5 q4 prn for moderate pain, oxy 10 q4 prn for severe pain  -c/w gabapentin 300 mg TID    #Reactive airway dx  Wheezing and rhonchi on exam with wet cough; concern for aspiration  -due to tachycardia will try Xopenex instead of duoneb  -IS and chest PT -> chest vest ordered

## 2023-02-26 NOTE — PROGRESS NOTE ADULT - ATTENDING COMMENTS
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 Found to have SIRS (tachy, leukocytosis) and c/f right knee septic arthritis s/p arthrocentesis on 2/5 not yielding any organisms however +hemarthrosis.    Patient seen and examined at bedside with wife and son. Reports feeling fine and has no complaints. Lung exam noted for diffuse coarse breath sound, grossly unchanged from prior. R foot swelling also unchanged. Had BM today per nursing. Pain controlled.      #Acute respiratory failure with hypoxemia: possibly aspiration pna in the setting of multiple teeth extractions. Weaned to room air. ID consulted; appreciate recs. Chest vest, xopenex, deep suction as needed    #SIRS criteria: fever, tachycardic, leukocytosis: considering infection vs malignancy vs clot (though LE negative for DVT on 2/5): repeat culture and MRSA neg. Sputum culture growing enterobacter. Vanco discontinued. Continue uma per ID recs. Given persistent leukocytosis 30s(>last admission which was in the 20s), would repeat duplex to eval for DVT    #Hemarthrosis - Right knee cultures NGTD, ESR/CRP elevated, suspect inflammation in setting of hemarthrosis and malignancy. Right knee immobilizer while out of bed    #Met SCC Esophageal Ca - Was planned to start RT as outpatient but never started. Previous right patellar bx path with SCC, Rad Onc with no present plans for RT inpatient    #Hypercalcemia : new, suspect due to malignancy, low PTH, normal Vit D. s/p calcitonin. s/p extractions 2/16 and 2/23. Endo consulted given runs of V-tach 2/18. s/p zometa 2/18. Per endo, risks of untreated hypercalcemia outweigh lower risks of OJN. Lower IVF from 125cc to 75cc today given Ca slightly improved from yesterday. If w/ c/f volume overload will consider diuresis. F/u endo recs    #V-Tach: Resolved. Likely multi-factorial in etiology. Suspect some component likely in the setting of hypercalcemia. Tele monitoring. Echo: EF 74%, mild pulm htn. Rest of Mgt as above.     #Cancer pain - C/w methadone 2.5mg daily (lowered from BID given family concern about sedation) and now knee pain has improved. Pain controlled for now.      Discussed with HS.

## 2023-02-26 NOTE — PROGRESS NOTE ADULT - SUBJECTIVE AND OBJECTIVE BOX
Sultan Chapito MD  PGY 1 Department of Internal Medicine        Patient is a 67y old  Male who presents with a chief complaint of r. knee pain (25 Feb 2023 05:30)      SUBJECTIVE / OVERNIGHT EVENTS: Pt seen and examined. No acute overnight events. Denies fevers, chills, CP, SOB, Abdominal pain, N/V, Constipation, Diarrhea        MEDICATIONS  (STANDING):  acetaminophen    Suspension .. 650 milliGRAM(s) Oral every 6 hours  ascorbic acid 500 milliGRAM(s) Oral <User Schedule>  chlorhexidine 2% Cloths 1 Application(s) Topical daily  enoxaparin Injectable 40 milliGRAM(s) SubCutaneous every 24 hours  gabapentin Solution 300 milliGRAM(s) Oral three times a day  lactated ringers. 1000 milliLiter(s) (125 mL/Hr) IV Continuous <Continuous>  levalbuterol Inhalation 1.25 milliGRAM(s) Inhalation every 6 hours  levothyroxine 150 MICROGram(s) Oral <User Schedule>  lidocaine   4% Patch 1 Patch Transdermal daily  melatonin 3 milliGRAM(s) Oral at bedtime  meropenem  IVPB 1000 milliGRAM(s) IV Intermittent every 8 hours  methadone   Solution 2.5 milliGRAM(s) Oral at bedtime  multivitamin 1 Tablet(s) Oral <User Schedule>  pantoprazole   Suspension 40 milliGRAM(s) Oral <User Schedule>  polyethylene glycol 3350 17 Gram(s) Oral <User Schedule>  potassium phosphate / sodium phosphate Powder (PHOS-NaK) 2 Packet(s) Oral two times a day  senna Syrup 10 milliLiter(s) Oral <User Schedule>  sodium chloride 3%  Inhalation 4 milliLiter(s) Inhalation every 12 hours    MEDICATIONS  (PRN):  naloxone Injectable 0.3 milliGRAM(s) IV Push every 3 minutes PRN opioid induced AMS  ondansetron Injectable 4 milliGRAM(s) IV Push every 8 hours PRN Nausea and/or Vomiting  oxyCODONE    IR 10 milliGRAM(s) Oral every 4 hours PRN Severe Pain (7 - 10)  oxyCODONE    IR 5 milliGRAM(s) Oral every 4 hours PRN Moderate Pain (4 - 6)      I&O's Summary    24 Feb 2023 07:01  -  25 Feb 2023 07:00  --------------------------------------------------------  IN: 1824.5 mL / OUT: 1580 mL / NET: 244.5 mL    25 Feb 2023 07:01  -  26 Feb 2023 05:08  --------------------------------------------------------  IN: 2960.5 mL / OUT: 875 mL / NET: 2085.5 mL        Vital Signs Last 24 Hrs  T(C): 37 (26 Feb 2023 01:16), Max: 37.8 (25 Feb 2023 16:50)  T(F): 98.6 (26 Feb 2023 01:16), Max: 100 (25 Feb 2023 16:50)  HR: 121 (26 Feb 2023 01:16) (114 - 140)  BP: 110/60 (26 Feb 2023 01:16) (110/60 - 136/73)  BP(mean): --  RR: 20 (26 Feb 2023 01:16) (18 - 20)  SpO2: 100% (26 Feb 2023 01:16) (95% - 100%)    Parameters below as of 26 Feb 2023 01:16  Patient On (Oxygen Delivery Method): room air        CAPILLARY BLOOD GLUCOSE      POCT Blood Glucose.: 97 mg/dL (26 Feb 2023 04:15)  POCT Blood Glucose.: 105 mg/dL (25 Feb 2023 21:54)  POCT Blood Glucose.: 89 mg/dL (25 Feb 2023 16:56)  POCT Blood Glucose.: 96 mg/dL (25 Feb 2023 11:50)  POCT Blood Glucose.: 115 mg/dL (25 Feb 2023 07:47)      PHYSICAL EXAM:  GENERAL: NAD, appears to be improving  HEENT: hoarse voice noted; mouth appears to be healing appropriately  NECK: Supple, No JVD  CHEST/LUNG: congested, rhonchi and wheezes present as before more prominent on right; on RA  HEART: Tachycardic with regular rhythm; No murmurs, rubs, or gallops  ABDOMEN: Soft, Nontender, slightly distended; Bowel sounds present, + PEG tube in place cdi  EXTREMITIES:  2+ Peripheral Pulses, No clubbing, cyanosis. + R knee erythema, edema with TTP, incisions cdi, ROM limited 2/2 pain. right distal leg swelling noted, non-painful  NEUROLOGY: nonfocal. difficult to assess orientation as patient's voice is very hoarse. but nods head to questions and is awake and alert. thinks he is at home  SKIN: No rashes or lesions     LABS:                        7.9    33.17 )-----------( 700      ( 25 Feb 2023 07:08 )             26.6     Auto Eosinophil # 0.16  / Auto Eosinophil % 0.5   / Auto Neutrophil # 30.08 / Auto Neutrophil % 90.7  / BANDS % x                            8.6    34.36 )-----------( 679      ( 24 Feb 2023 06:49 )             29.3     Auto Eosinophil # 0.64  / Auto Eosinophil % 1.9   / Auto Neutrophil # 29.40 / Auto Neutrophil % 85.6  / BANDS % x        02-25    137  |  105  |  16  ----------------------------<  108<H>  4.0   |  21<L>  |  0.56  02-24    136  |  105  |  20  ----------------------------<  103<H>  4.4   |  20<L>  |  0.67    Ca    11.6<H>      25 Feb 2023 07:08  Mg     1.60     02-25  Phos  2.1     02-25  TPro  7.1  /  Alb  2.7<L>  /  TBili  0.2  /  DBili  x   /  AST  26  /  ALT  20  /  AlkPhos  213<H>  02-25  TPro  7.2  /  Alb  2.7<L>  /  TBili  0.2  /  DBili  x   /  AST  27  /  ALT  24  /  AlkPhos  219<H>  02-24    PT/INR - ( 25 Feb 2023 07:08 )   PT: 15.9 sec;   INR: 1.37 ratio         PTT - ( 25 Feb 2023 07:08 )  PTT:28.1 sec              RADIOLOGY & ADDITIONAL TESTS:    Imaging Personally Reviewed:    Consultant(s) Notes Reviewed:      Care Discussed with Consultants/Other Providers:   Sultan Chapito MD  PGY 1 Department of Internal Medicine        Patient is a 67y old  Male who presents with a chief complaint of r. knee pain (25 Feb 2023 05:30)      SUBJECTIVE / OVERNIGHT EVENTS: Pt seen and examined. No acute overnight events. Denies fevers, chills, CP, SOB, N/V, Constipation, Diarrhea. Says breathing feels fine but abdomen feels tight. States he has had a BM. Pain still controlled after methadone decreased to QD. Tele sinus tachy.         MEDICATIONS  (STANDING):  acetaminophen    Suspension .. 650 milliGRAM(s) Oral every 6 hours  ascorbic acid 500 milliGRAM(s) Oral <User Schedule>  chlorhexidine 2% Cloths 1 Application(s) Topical daily  enoxaparin Injectable 40 milliGRAM(s) SubCutaneous every 24 hours  gabapentin Solution 300 milliGRAM(s) Oral three times a day  lactated ringers. 1000 milliLiter(s) (125 mL/Hr) IV Continuous <Continuous>  levalbuterol Inhalation 1.25 milliGRAM(s) Inhalation every 6 hours  levothyroxine 150 MICROGram(s) Oral <User Schedule>  lidocaine   4% Patch 1 Patch Transdermal daily  melatonin 3 milliGRAM(s) Oral at bedtime  meropenem  IVPB 1000 milliGRAM(s) IV Intermittent every 8 hours  methadone   Solution 2.5 milliGRAM(s) Oral at bedtime  multivitamin 1 Tablet(s) Oral <User Schedule>  pantoprazole   Suspension 40 milliGRAM(s) Oral <User Schedule>  polyethylene glycol 3350 17 Gram(s) Oral <User Schedule>  potassium phosphate / sodium phosphate Powder (PHOS-NaK) 2 Packet(s) Oral two times a day  senna Syrup 10 milliLiter(s) Oral <User Schedule>  sodium chloride 3%  Inhalation 4 milliLiter(s) Inhalation every 12 hours    MEDICATIONS  (PRN):  naloxone Injectable 0.3 milliGRAM(s) IV Push every 3 minutes PRN opioid induced AMS  ondansetron Injectable 4 milliGRAM(s) IV Push every 8 hours PRN Nausea and/or Vomiting  oxyCODONE    IR 10 milliGRAM(s) Oral every 4 hours PRN Severe Pain (7 - 10)  oxyCODONE    IR 5 milliGRAM(s) Oral every 4 hours PRN Moderate Pain (4 - 6)      I&O's Summary    24 Feb 2023 07:01  -  25 Feb 2023 07:00  --------------------------------------------------------  IN: 1824.5 mL / OUT: 1580 mL / NET: 244.5 mL    25 Feb 2023 07:01  -  26 Feb 2023 05:08  --------------------------------------------------------  IN: 2960.5 mL / OUT: 875 mL / NET: 2085.5 mL        Vital Signs Last 24 Hrs  T(C): 37 (26 Feb 2023 01:16), Max: 37.8 (25 Feb 2023 16:50)  T(F): 98.6 (26 Feb 2023 01:16), Max: 100 (25 Feb 2023 16:50)  HR: 121 (26 Feb 2023 01:16) (114 - 140)  BP: 110/60 (26 Feb 2023 01:16) (110/60 - 136/73)  BP(mean): --  RR: 20 (26 Feb 2023 01:16) (18 - 20)  SpO2: 100% (26 Feb 2023 01:16) (95% - 100%)    Parameters below as of 26 Feb 2023 01:16  Patient On (Oxygen Delivery Method): room air        CAPILLARY BLOOD GLUCOSE      POCT Blood Glucose.: 97 mg/dL (26 Feb 2023 04:15)  POCT Blood Glucose.: 105 mg/dL (25 Feb 2023 21:54)  POCT Blood Glucose.: 89 mg/dL (25 Feb 2023 16:56)  POCT Blood Glucose.: 96 mg/dL (25 Feb 2023 11:50)  POCT Blood Glucose.: 115 mg/dL (25 Feb 2023 07:47)      PHYSICAL EXAM:  GENERAL: NAD, appears to be improving  HEENT: hoarse voice noted; mouth appears to be healing appropriately  NECK: Supple, No JVD  CHEST/LUNG: congested, rhonchi and wheezes present as before more prominent on right; on RA  HEART: Tachycardic with regular rhythm; No murmurs, rubs, or gallops  ABDOMEN: Soft, Nontender, distended; Bowel sounds present, + PEG tube in place cdi  EXTREMITIES:  2+ Peripheral Pulses, No clubbing, cyanosis. + R knee erythema, edema with TTP, incisions cdi, ROM limited 2/2 pain. right distal leg swelling noted, non-painful  NEUROLOGY: nonfocal. difficult to assess orientation as patient's voice is very hoarse. but nods head to questions and is awake and alert.  SKIN: No rashes or lesions     LABS:                        7.9    33.17 )-----------( 700      ( 25 Feb 2023 07:08 )             26.6     Auto Eosinophil # 0.16  / Auto Eosinophil % 0.5   / Auto Neutrophil # 30.08 / Auto Neutrophil % 90.7  / BANDS % x                            8.6    34.36 )-----------( 679      ( 24 Feb 2023 06:49 )             29.3     Auto Eosinophil # 0.64  / Auto Eosinophil % 1.9   / Auto Neutrophil # 29.40 / Auto Neutrophil % 85.6  / BANDS % x        02-25    137  |  105  |  16  ----------------------------<  108<H>  4.0   |  21<L>  |  0.56  02-24    136  |  105  |  20  ----------------------------<  103<H>  4.4   |  20<L>  |  0.67    Ca    11.6<H>      25 Feb 2023 07:08  Mg     1.60     02-25  Phos  2.1     02-25  TPro  7.1  /  Alb  2.7<L>  /  TBili  0.2  /  DBili  x   /  AST  26  /  ALT  20  /  AlkPhos  213<H>  02-25  TPro  7.2  /  Alb  2.7<L>  /  TBili  0.2  /  DBili  x   /  AST  27  /  ALT  24  /  AlkPhos  219<H>  02-24    PT/INR - ( 25 Feb 2023 07:08 )   PT: 15.9 sec;   INR: 1.37 ratio         PTT - ( 25 Feb 2023 07:08 )  PTT:28.1 sec              RADIOLOGY & ADDITIONAL TESTS:    Imaging Personally Reviewed:    Consultant(s) Notes Reviewed:      Care Discussed with Consultants/Other Providers:   Sultan Chapito MD  PGY 1 Department of Internal Medicine        Patient is a 67y old  Male who presents with a chief complaint of r. knee pain (25 Feb 2023 05:30)      SUBJECTIVE / OVERNIGHT EVENTS: Pt seen and examined. No acute overnight events. Denies fevers, chills, CP, SOB, N/V, Constipation, Diarrhea. Says breathing feels fine but abdomen feels tight. States he has had a BM. Pain still controlled after methadone decreased to QD. Tele sinus tachy. +2.3 L I/O        MEDICATIONS  (STANDING):  acetaminophen    Suspension .. 650 milliGRAM(s) Oral every 6 hours  ascorbic acid 500 milliGRAM(s) Oral <User Schedule>  chlorhexidine 2% Cloths 1 Application(s) Topical daily  enoxaparin Injectable 40 milliGRAM(s) SubCutaneous every 24 hours  gabapentin Solution 300 milliGRAM(s) Oral three times a day  lactated ringers. 1000 milliLiter(s) (125 mL/Hr) IV Continuous <Continuous>  levalbuterol Inhalation 1.25 milliGRAM(s) Inhalation every 6 hours  levothyroxine 150 MICROGram(s) Oral <User Schedule>  lidocaine   4% Patch 1 Patch Transdermal daily  melatonin 3 milliGRAM(s) Oral at bedtime  meropenem  IVPB 1000 milliGRAM(s) IV Intermittent every 8 hours  methadone   Solution 2.5 milliGRAM(s) Oral at bedtime  multivitamin 1 Tablet(s) Oral <User Schedule>  pantoprazole   Suspension 40 milliGRAM(s) Oral <User Schedule>  polyethylene glycol 3350 17 Gram(s) Oral <User Schedule>  potassium phosphate / sodium phosphate Powder (PHOS-NaK) 2 Packet(s) Oral two times a day  senna Syrup 10 milliLiter(s) Oral <User Schedule>  sodium chloride 3%  Inhalation 4 milliLiter(s) Inhalation every 12 hours    MEDICATIONS  (PRN):  naloxone Injectable 0.3 milliGRAM(s) IV Push every 3 minutes PRN opioid induced AMS  ondansetron Injectable 4 milliGRAM(s) IV Push every 8 hours PRN Nausea and/or Vomiting  oxyCODONE    IR 10 milliGRAM(s) Oral every 4 hours PRN Severe Pain (7 - 10)  oxyCODONE    IR 5 milliGRAM(s) Oral every 4 hours PRN Moderate Pain (4 - 6)      I&O's Summary    24 Feb 2023 07:01  -  25 Feb 2023 07:00  --------------------------------------------------------  IN: 1824.5 mL / OUT: 1580 mL / NET: 244.5 mL    25 Feb 2023 07:01  -  26 Feb 2023 05:08  --------------------------------------------------------  IN: 2960.5 mL / OUT: 875 mL / NET: 2085.5 mL        Vital Signs Last 24 Hrs  T(C): 37 (26 Feb 2023 01:16), Max: 37.8 (25 Feb 2023 16:50)  T(F): 98.6 (26 Feb 2023 01:16), Max: 100 (25 Feb 2023 16:50)  HR: 121 (26 Feb 2023 01:16) (114 - 140)  BP: 110/60 (26 Feb 2023 01:16) (110/60 - 136/73)  BP(mean): --  RR: 20 (26 Feb 2023 01:16) (18 - 20)  SpO2: 100% (26 Feb 2023 01:16) (95% - 100%)    Parameters below as of 26 Feb 2023 01:16  Patient On (Oxygen Delivery Method): room air        CAPILLARY BLOOD GLUCOSE      POCT Blood Glucose.: 97 mg/dL (26 Feb 2023 04:15)  POCT Blood Glucose.: 105 mg/dL (25 Feb 2023 21:54)  POCT Blood Glucose.: 89 mg/dL (25 Feb 2023 16:56)  POCT Blood Glucose.: 96 mg/dL (25 Feb 2023 11:50)  POCT Blood Glucose.: 115 mg/dL (25 Feb 2023 07:47)      PHYSICAL EXAM:  GENERAL: NAD, appears to be improving  HEENT: hoarse voice noted; mouth appears to be healing appropriately  NECK: Supple, No JVD  CHEST/LUNG: congested, rhonchi and wheezes present as before more prominent on right; on RA  HEART: Tachycardic with regular rhythm; No murmurs, rubs, or gallops  ABDOMEN: Soft, Nontender, distended; Bowel sounds present, + PEG tube in place cdi  EXTREMITIES:  2+ Peripheral Pulses, No clubbing, cyanosis. + R knee erythema, edema with TTP, incisions cdi, ROM limited 2/2 pain. right distal leg swelling noted, non-painful  NEUROLOGY: nonfocal. difficult to assess orientation as patient's voice is very hoarse. but nods head to questions and is awake and alert.  SKIN: No rashes or lesions     LABS:                        7.9    33.17 )-----------( 700      ( 25 Feb 2023 07:08 )             26.6     Auto Eosinophil # 0.16  / Auto Eosinophil % 0.5   / Auto Neutrophil # 30.08 / Auto Neutrophil % 90.7  / BANDS % x                            8.6    34.36 )-----------( 679      ( 24 Feb 2023 06:49 )             29.3     Auto Eosinophil # 0.64  / Auto Eosinophil % 1.9   / Auto Neutrophil # 29.40 / Auto Neutrophil % 85.6  / BANDS % x        02-25    137  |  105  |  16  ----------------------------<  108<H>  4.0   |  21<L>  |  0.56  02-24    136  |  105  |  20  ----------------------------<  103<H>  4.4   |  20<L>  |  0.67    Ca    11.6<H>      25 Feb 2023 07:08  Mg     1.60     02-25  Phos  2.1     02-25  TPro  7.1  /  Alb  2.7<L>  /  TBili  0.2  /  DBili  x   /  AST  26  /  ALT  20  /  AlkPhos  213<H>  02-25  TPro  7.2  /  Alb  2.7<L>  /  TBili  0.2  /  DBili  x   /  AST  27  /  ALT  24  /  AlkPhos  219<H>  02-24    PT/INR - ( 25 Feb 2023 07:08 )   PT: 15.9 sec;   INR: 1.37 ratio         PTT - ( 25 Feb 2023 07:08 )  PTT:28.1 sec              RADIOLOGY & ADDITIONAL TESTS:    Imaging Personally Reviewed:    Consultant(s) Notes Reviewed:      Care Discussed with Consultants/Other Providers:   Sultan Chapito MD  PGY 1 Department of Internal Medicine        Patient is a 67y old  Male who presents with a chief complaint of r. knee pain (25 Feb 2023 05:30)      SUBJECTIVE / OVERNIGHT EVENTS: Pt seen and examined. No acute overnight events. Denies fevers, chills, CP, SOB, N/V, Constipation, Diarrhea. Says breathing feels fine but abdomen feels tight. States he has had a BM. Pain still controlled after methadone decreased to QD. Tele sinus tachy. +2.3 L I/O        MEDICATIONS  (STANDING):  acetaminophen    Suspension .. 650 milliGRAM(s) Oral every 6 hours  ascorbic acid 500 milliGRAM(s) Oral <User Schedule>  chlorhexidine 2% Cloths 1 Application(s) Topical daily  enoxaparin Injectable 40 milliGRAM(s) SubCutaneous every 24 hours  gabapentin Solution 300 milliGRAM(s) Oral three times a day  lactated ringers. 1000 milliLiter(s) (125 mL/Hr) IV Continuous <Continuous>  levalbuterol Inhalation 1.25 milliGRAM(s) Inhalation every 6 hours  levothyroxine 150 MICROGram(s) Oral <User Schedule>  lidocaine   4% Patch 1 Patch Transdermal daily  melatonin 3 milliGRAM(s) Oral at bedtime  meropenem  IVPB 1000 milliGRAM(s) IV Intermittent every 8 hours  methadone   Solution 2.5 milliGRAM(s) Oral at bedtime  multivitamin 1 Tablet(s) Oral <User Schedule>  pantoprazole   Suspension 40 milliGRAM(s) Oral <User Schedule>  polyethylene glycol 3350 17 Gram(s) Oral <User Schedule>  potassium phosphate / sodium phosphate Powder (PHOS-NaK) 2 Packet(s) Oral two times a day  senna Syrup 10 milliLiter(s) Oral <User Schedule>  sodium chloride 3%  Inhalation 4 milliLiter(s) Inhalation every 12 hours    MEDICATIONS  (PRN):  naloxone Injectable 0.3 milliGRAM(s) IV Push every 3 minutes PRN opioid induced AMS  ondansetron Injectable 4 milliGRAM(s) IV Push every 8 hours PRN Nausea and/or Vomiting  oxyCODONE    IR 10 milliGRAM(s) Oral every 4 hours PRN Severe Pain (7 - 10)  oxyCODONE    IR 5 milliGRAM(s) Oral every 4 hours PRN Moderate Pain (4 - 6)      I&O's Summary    24 Feb 2023 07:01  -  25 Feb 2023 07:00  --------------------------------------------------------  IN: 1824.5 mL / OUT: 1580 mL / NET: 244.5 mL    25 Feb 2023 07:01  -  26 Feb 2023 05:08  --------------------------------------------------------  IN: 2960.5 mL / OUT: 875 mL / NET: 2085.5 mL        Vital Signs Last 24 Hrs  T(C): 37 (26 Feb 2023 01:16), Max: 37.8 (25 Feb 2023 16:50)  T(F): 98.6 (26 Feb 2023 01:16), Max: 100 (25 Feb 2023 16:50)  HR: 121 (26 Feb 2023 01:16) (114 - 140)  BP: 110/60 (26 Feb 2023 01:16) (110/60 - 136/73)  BP(mean): --  RR: 20 (26 Feb 2023 01:16) (18 - 20)  SpO2: 100% (26 Feb 2023 01:16) (95% - 100%)    Parameters below as of 26 Feb 2023 01:16  Patient On (Oxygen Delivery Method): room air        CAPILLARY BLOOD GLUCOSE      POCT Blood Glucose.: 97 mg/dL (26 Feb 2023 04:15)  POCT Blood Glucose.: 105 mg/dL (25 Feb 2023 21:54)  POCT Blood Glucose.: 89 mg/dL (25 Feb 2023 16:56)  POCT Blood Glucose.: 96 mg/dL (25 Feb 2023 11:50)  POCT Blood Glucose.: 115 mg/dL (25 Feb 2023 07:47)      PHYSICAL EXAM:  GENERAL: NAD, appears to be improving  HEENT: hoarse voice noted; mouth appears to be healing appropriately  NECK: Supple, No JVD  CHEST/LUNG: congested, rhonchi and wheezes present as before more prominent on right; on RA  HEART: Tachycardic with regular rhythm; No murmurs, rubs, or gallops  ABDOMEN: Soft, Nontender, distended; Bowel sounds present, + PEG tube in place   EXTREMITIES:  2+ Peripheral Pulses, No clubbing, cyanosis. + R knee erythema, edema with TTP, incisions cdi, ROM limited 2/2 pain. right distal leg swelling noted, non-painful  NEUROLOGY: nonfocal. difficult to assess orientation as patient's voice is very hoarse. but nods head to questions and is awake and alert.  SKIN: No rashes or lesions     LABS:                        7.9    33.17 )-----------( 700      ( 25 Feb 2023 07:08 )             26.6     Auto Eosinophil # 0.16  / Auto Eosinophil % 0.5   / Auto Neutrophil # 30.08 / Auto Neutrophil % 90.7  / BANDS % x                            8.6    34.36 )-----------( 679      ( 24 Feb 2023 06:49 )             29.3     Auto Eosinophil # 0.64  / Auto Eosinophil % 1.9   / Auto Neutrophil # 29.40 / Auto Neutrophil % 85.6  / BANDS % x        02-25    137  |  105  |  16  ----------------------------<  108<H>  4.0   |  21<L>  |  0.56  02-24    136  |  105  |  20  ----------------------------<  103<H>  4.4   |  20<L>  |  0.67    Ca    11.6<H>      25 Feb 2023 07:08  Mg     1.60     02-25  Phos  2.1     02-25  TPro  7.1  /  Alb  2.7<L>  /  TBili  0.2  /  DBili  x   /  AST  26  /  ALT  20  /  AlkPhos  213<H>  02-25  TPro  7.2  /  Alb  2.7<L>  /  TBili  0.2  /  DBili  x   /  AST  27  /  ALT  24  /  AlkPhos  219<H>  02-24    PT/INR - ( 25 Feb 2023 07:08 )   PT: 15.9 sec;   INR: 1.37 ratio         PTT - ( 25 Feb 2023 07:08 )  PTT:28.1 sec              RADIOLOGY & ADDITIONAL TESTS:    Imaging Personally Reviewed:    Consultant(s) Notes Reviewed:      Care Discussed with Consultants/Other Providers:

## 2023-02-26 NOTE — PROGRESS NOTE ADULT - PROBLEM SELECTOR PLAN 6
-c/w tylenol mild pain  -oxycodone 5 q4 prn for moderate pain, oxy 10 q4 prn for severe pain  -c/w gabapentin 300 mg TID    #Reactive airway dx  Wheezing and rhonchi on exam with wet cough; concern for aspiration  -due to tachycardia will try Xopenex instead of duoneb  -IS and chest PT -> chest vest ordered

## 2023-02-26 NOTE — PROGRESS NOTE ADULT - PROBLEM SELECTOR PLAN 3
Family noting patient has occasionally been confused  difficult to assess orientation as patient's hoarse voice makes communication difficult though not orientated to place today (as per family chronically not oriented to time)  multifactorical could be in setting to infection, hypercalcemia, inflammation from cancer, hospital delirium, medications (meropenem vs opioids though not on high dose of opioids)  Neuro exam nonfocal  -tx causes  -not agitated so conservative measures Family noting patient has occasionally been confused  difficult to assess orientation as patient's hoarse voice makes communication difficult though not orientated to place today (as per family chronically not oriented to time)  multifactorical could be in setting to infection, hypercalcemia, inflammation from cancer, hospital delirium, medications (meropenem vs opioids though not on high dose of opioids)  Neuro exam nonfocal  -tx causes  -not agitated so conservative measures  -decrease methadone to QD

## 2023-02-27 NOTE — PROGRESS NOTE ADULT - ATTENDING COMMENTS
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  Completed dental extractions last week.  Corrected calcium 13.3 today.  Recommend denosumab 120 mg SQ x 1. Trend calcium.  Will need close outpatient follow up with oncologist to trend calcium to determine about further need for calcium lowering therapy.  Hypothyroidism recheck TFTs in AM.  Complex patient high level decision making.    Lesli Shields MD  Division of Endocrinology  Pager: [phone]    If after 6PM or before 9AM, or on weekends/holidays, please call endocrine answering service for assistance ([phone]).  For nonurgent matters email [email or web address] for assistance.

## 2023-02-27 NOTE — PROGRESS NOTE ADULT - SUBJECTIVE AND OBJECTIVE BOX
Sultan Chapito MD  PGY 1 Department of Internal Medicine        Patient is a 67y old  Male who presents with a chief complaint of r. knee pain (26 Feb 2023 05:07)      SUBJECTIVE / OVERNIGHT EVENTS: Pt seen and examined. No acute overnight events. Denies fevers, chills, CP, SOB, Abdominal pain, N/V, Constipation, Diarrhea        MEDICATIONS  (STANDING):  acetaminophen    Suspension .. 650 milliGRAM(s) Oral every 6 hours  ascorbic acid 500 milliGRAM(s) Oral <User Schedule>  chlorhexidine 2% Cloths 1 Application(s) Topical daily  enoxaparin Injectable 40 milliGRAM(s) SubCutaneous every 24 hours  gabapentin Solution 300 milliGRAM(s) Oral three times a day  lactated ringers. 1000 milliLiter(s) (75 mL/Hr) IV Continuous <Continuous>  levalbuterol Inhalation 1.25 milliGRAM(s) Inhalation every 6 hours  levothyroxine 150 MICROGram(s) Oral <User Schedule>  lidocaine   4% Patch 1 Patch Transdermal daily  melatonin 3 milliGRAM(s) Oral at bedtime  meropenem  IVPB 1000 milliGRAM(s) IV Intermittent every 8 hours  methadone   Solution 2.5 milliGRAM(s) Oral at bedtime  multivitamin 1 Tablet(s) Oral <User Schedule>  nystatin Powder 1 Application(s) Topical every 12 hours  pantoprazole   Suspension 40 milliGRAM(s) Oral <User Schedule>  polyethylene glycol 3350 17 Gram(s) Oral <User Schedule>  senna Syrup 10 milliLiter(s) Oral <User Schedule>  sodium chloride 3%  Inhalation 4 milliLiter(s) Inhalation every 12 hours    MEDICATIONS  (PRN):  naloxone Injectable 0.3 milliGRAM(s) IV Push every 3 minutes PRN opioid induced AMS  ondansetron Injectable 4 milliGRAM(s) IV Push every 8 hours PRN Nausea and/or Vomiting  oxyCODONE    IR 10 milliGRAM(s) Oral every 4 hours PRN Severe Pain (7 - 10)  oxyCODONE    IR 5 milliGRAM(s) Oral every 4 hours PRN Moderate Pain (4 - 6)      I&O's Summary    25 Feb 2023 07:01  -  26 Feb 2023 07:00  --------------------------------------------------------  IN: 3572.5 mL / OUT: 1225 mL / NET: 2347.5 mL    26 Feb 2023 07:01  -  27 Feb 2023 05:22  --------------------------------------------------------  IN: 1624 mL / OUT: 600 mL / NET: 1024 mL        Vital Signs Last 24 Hrs  T(C): 36.7 (27 Feb 2023 05:00), Max: 37.2 (26 Feb 2023 09:00)  T(F): 98.1 (27 Feb 2023 05:00), Max: 99 (27 Feb 2023 01:00)  HR: 114 (27 Feb 2023 05:00) (96 - 123)  BP: 130/62 (27 Feb 2023 05:00) (102/62 - 150/75)  BP(mean): --  RR: 20 (27 Feb 2023 05:00) (19 - 20)  SpO2: 95% (27 Feb 2023 05:00) (94% - 99%)    Parameters below as of 27 Feb 2023 05:00  Patient On (Oxygen Delivery Method): room air        CAPILLARY BLOOD GLUCOSE      POCT Blood Glucose.: 108 mg/dL (26 Feb 2023 23:51)  POCT Blood Glucose.: 122 mg/dL (26 Feb 2023 16:53)  POCT Blood Glucose.: 118 mg/dL (26 Feb 2023 10:04)      PHYSICAL EXAM:  GENERAL: NAD, appears to be improving  HEENT: hoarse voice noted; mouth appears to be healing appropriately  NECK: Supple, No JVD  CHEST/LUNG: congested, rhonchi and wheezes present as before more prominent on right; on RA  HEART: Tachycardic with regular rhythm; No murmurs, rubs, or gallops  ABDOMEN: Soft, Nontender, distended; Bowel sounds present, + PEG tube in place   EXTREMITIES:  2+ Peripheral Pulses, No clubbing, cyanosis. + R knee erythema, edema with TTP, incisions cdi, ROM limited 2/2 pain. right distal leg swelling noted, non-painful  NEUROLOGY: nonfocal. difficult to assess orientation as patient's voice is very hoarse. but nods head to questions and is awake and alert.  SKIN: No rashes or lesions      LABS:                        8.1    33.12 )-----------( 663      ( 26 Feb 2023 06:32 )             27.0     Auto Eosinophil # 0.00  / Auto Eosinophil % 0.0   / Auto Neutrophil # 29.68 / Auto Neutrophil % 87.0  / BANDS % 2.6                          7.9    33.17 )-----------( 700      ( 25 Feb 2023 07:08 )             26.6     Auto Eosinophil # 0.16  / Auto Eosinophil % 0.5   / Auto Neutrophil # 30.08 / Auto Neutrophil % 90.7  / BANDS % x        02-26    133<L>  |  105  |  13  ----------------------------<  80  4.4   |  19<L>  |  0.57  02-25    137  |  105  |  16  ----------------------------<  108<H>  4.0   |  21<L>  |  0.56    Ca    11.7<H>      26 Feb 2023 06:32  Mg     1.90     02-26  Phos  2.4     02-26  TPro  7.0  /  Alb  2.7<L>  /  TBili  0.3  /  DBili  x   /  AST  23  /  ALT  22  /  AlkPhos  211<H>  02-26  TPro  7.1  /  Alb  2.7<L>  /  TBili  0.2  /  DBili  x   /  AST  26  /  ALT  20  /  AlkPhos  213<H>  02-25    PT/INR - ( 26 Feb 2023 06:32 )   PT: 15.4 sec;   INR: 1.32 ratio         PTT - ( 26 Feb 2023 06:32 )  PTT:27.0 sec              RADIOLOGY & ADDITIONAL TESTS:    Imaging Personally Reviewed:    Consultant(s) Notes Reviewed:      Care Discussed with Consultants/Other Providers:   Sultan Chapito MD  PGY 1 Department of Internal Medicine        Patient is a 67y old  Male who presents with a chief complaint of r. knee pain (26 Feb 2023 05:07)      SUBJECTIVE / OVERNIGHT EVENTS: Pt seen and examined. No acute overnight events. Patient having BMs. Notes some pain in knee but does not want more pain medication. Says SOB and cough is stable. No abdominal pain. Tele sinus tachy 100s-120s. No fevers but patient noting he has been feeling chills.         MEDICATIONS  (STANDING):  acetaminophen    Suspension .. 650 milliGRAM(s) Oral every 6 hours  ascorbic acid 500 milliGRAM(s) Oral <User Schedule>  chlorhexidine 2% Cloths 1 Application(s) Topical daily  enoxaparin Injectable 40 milliGRAM(s) SubCutaneous every 24 hours  gabapentin Solution 300 milliGRAM(s) Oral three times a day  lactated ringers. 1000 milliLiter(s) (75 mL/Hr) IV Continuous <Continuous>  levalbuterol Inhalation 1.25 milliGRAM(s) Inhalation every 6 hours  levothyroxine 150 MICROGram(s) Oral <User Schedule>  lidocaine   4% Patch 1 Patch Transdermal daily  melatonin 3 milliGRAM(s) Oral at bedtime  meropenem  IVPB 1000 milliGRAM(s) IV Intermittent every 8 hours  methadone   Solution 2.5 milliGRAM(s) Oral at bedtime  multivitamin 1 Tablet(s) Oral <User Schedule>  nystatin Powder 1 Application(s) Topical every 12 hours  pantoprazole   Suspension 40 milliGRAM(s) Oral <User Schedule>  polyethylene glycol 3350 17 Gram(s) Oral <User Schedule>  senna Syrup 10 milliLiter(s) Oral <User Schedule>  sodium chloride 3%  Inhalation 4 milliLiter(s) Inhalation every 12 hours    MEDICATIONS  (PRN):  naloxone Injectable 0.3 milliGRAM(s) IV Push every 3 minutes PRN opioid induced AMS  ondansetron Injectable 4 milliGRAM(s) IV Push every 8 hours PRN Nausea and/or Vomiting  oxyCODONE    IR 10 milliGRAM(s) Oral every 4 hours PRN Severe Pain (7 - 10)  oxyCODONE    IR 5 milliGRAM(s) Oral every 4 hours PRN Moderate Pain (4 - 6)      I&O's Summary    25 Feb 2023 07:01  -  26 Feb 2023 07:00  --------------------------------------------------------  IN: 3572.5 mL / OUT: 1225 mL / NET: 2347.5 mL    26 Feb 2023 07:01  -  27 Feb 2023 05:22  --------------------------------------------------------  IN: 1624 mL / OUT: 600 mL / NET: 1024 mL        Vital Signs Last 24 Hrs  T(C): 36.7 (27 Feb 2023 05:00), Max: 37.2 (26 Feb 2023 09:00)  T(F): 98.1 (27 Feb 2023 05:00), Max: 99 (27 Feb 2023 01:00)  HR: 114 (27 Feb 2023 05:00) (96 - 123)  BP: 130/62 (27 Feb 2023 05:00) (102/62 - 150/75)  BP(mean): --  RR: 20 (27 Feb 2023 05:00) (19 - 20)  SpO2: 95% (27 Feb 2023 05:00) (94% - 99%)    Parameters below as of 27 Feb 2023 05:00  Patient On (Oxygen Delivery Method): room air        CAPILLARY BLOOD GLUCOSE      POCT Blood Glucose.: 108 mg/dL (26 Feb 2023 23:51)  POCT Blood Glucose.: 122 mg/dL (26 Feb 2023 16:53)  POCT Blood Glucose.: 118 mg/dL (26 Feb 2023 10:04)      PHYSICAL EXAM:  GENERAL: NAD  HEENT: hoarse voice noted; mouth appears to be healing appropriately  NECK: Supple, No JVD  CHEST/LUNG: congested, rhonchi and wheezes present as before more prominent on right; on RA  HEART: Tachycardic with regular rhythm; No murmurs, rubs, or gallops  ABDOMEN: Soft, Nontender, distended; Bowel sounds present, + PEG tube in place, some redness noted around site  EXTREMITIES:  2+ Peripheral Pulses, No clubbing, cyanosis. + R knee erythema, edema with TTP, incisions cdi, ROM limited 2/2 pain. right distal leg swelling noted, non-painful  NEUROLOGY: nonfocal. difficult to assess orientation as patient's voice is very hoarse. but nods head to questions and is awake and alert.  SKIN: No rashes or lesions      LABS:                        8.1    33.12 )-----------( 663      ( 26 Feb 2023 06:32 )             27.0     Auto Eosinophil # 0.00  / Auto Eosinophil % 0.0   / Auto Neutrophil # 29.68 / Auto Neutrophil % 87.0  / BANDS % 2.6                          7.9    33.17 )-----------( 700      ( 25 Feb 2023 07:08 )             26.6     Auto Eosinophil # 0.16  / Auto Eosinophil % 0.5   / Auto Neutrophil # 30.08 / Auto Neutrophil % 90.7  / BANDS % x        02-26    133<L>  |  105  |  13  ----------------------------<  80  4.4   |  19<L>  |  0.57  02-25    137  |  105  |  16  ----------------------------<  108<H>  4.0   |  21<L>  |  0.56    Ca    11.7<H>      26 Feb 2023 06:32  Mg     1.90     02-26  Phos  2.4     02-26  TPro  7.0  /  Alb  2.7<L>  /  TBili  0.3  /  DBili  x   /  AST  23  /  ALT  22  /  AlkPhos  211<H>  02-26  TPro  7.1  /  Alb  2.7<L>  /  TBili  0.2  /  DBili  x   /  AST  26  /  ALT  20  /  AlkPhos  213<H>  02-25    PT/INR - ( 26 Feb 2023 06:32 )   PT: 15.4 sec;   INR: 1.32 ratio         PTT - ( 26 Feb 2023 06:32 )  PTT:27.0 sec              RADIOLOGY & ADDITIONAL TESTS:    Imaging Personally Reviewed:    Consultant(s) Notes Reviewed:      Care Discussed with Consultants/Other Providers:

## 2023-02-27 NOTE — PROGRESS NOTE ADULT - PROBLEM SELECTOR PLAN 1
baseline tachy in 110s likely in setting of medical disease cancer but newly tachy to 160s 2/13  baseline leukocytosis in 20k range -> increased to 35k 2/14  newly febrile 2/13  MRSA neg at admission  unclear source; initial concern for septic arthritis but tap neg for infection; possible aspiration pneumonia given CXR findings  PCT elevated to 5  Lactate elevation, downtrended  -BCx -> NGTD  -UA/Ucx neg  -CXR to look for pneumonia given concern for aspiration -> right perihilar opacity  -RVP neg  -empiric Zosyn -> switched to uma due to fevers as per ID  -trend WBC/monitor fever curve   -add vancomycin and do MRSA/MSSA -> dc vanc as MRSA neg  - sputum cx results -> Enterobacter cloacae, f/u sensitivities -> if ID ok, can transition to FQ -> c/w uma until 2/24  -ID c/s as unclear source; aspiration pneumonia vs post-obstructive vs malignancy  **continues to spike fevers and worsening leukocytosis -> ID believes this is leukomoid reaction and related to continued aspiration  -duplex ordered to eval for alternative causes baseline tachy in 110s likely in setting of medical disease cancer but newly tachy to 160s 2/13  baseline leukocytosis in 20k range -> increased to 35k 2/14  newly febrile 2/13  MRSA neg at admission  unclear source; initial concern for septic arthritis but tap neg for infection; possible aspiration pneumonia given CXR findings  PCT elevated to 5  Lactate elevation, downtrended  -BCx -> NGTD  -UA/Ucx neg  -CXR to look for pneumonia given concern for aspiration -> right perihilar opacity  -RVP neg  -empiric Zosyn -> switched to uma due to fevers as per ID  -trend WBC/monitor fever curve   -add vancomycin and do MRSA/MSSA -> dc vanc as MRSA neg  - sputum cx results -> Enterobacter cloacae, f/u sensitivities -> if ID ok, can transition to FQ -> c/w uma until 2/28  -ID c/s as unclear source; aspiration pneumonia vs post-obstructive vs malignancy  **continues to spike fevers and worsening leukocytosis -> ID believes this is leukomoid reaction and related to continued aspiration  -duplex ordered to eval for alternative causes -> neg baseline tachy in 110s likely in setting of medical disease cancer but newly tachy to 160s 2/13  baseline leukocytosis in 20k range -> increased to 35k 2/14  newly febrile 2/13  MRSA neg at admission  unclear source; initial concern for septic arthritis but tap neg for infection; possible aspiration pneumonia given CXR findings  PCT elevated to 5  Lactate elevation, downtrended  -BCx -> NGTD  -UA/Ucx neg  -CXR to look for pneumonia given concern for aspiration -> right perihilar opacity  -RVP neg  -empiric Zosyn -> switched to uma due to fevers as per ID  -trend WBC/monitor fever curve   -add vancomycin and do MRSA/MSSA -> dc vanc as MRSA neg  - sputum cx results -> Enterobacter cloacae, f/u sensitivities -> if ID ok, can transition to FQ -> c/w uma until 2/28  -ID c/s as unclear source; aspiration pneumonia vs post-obstructive vs malignancy  **continues to spike fevers (last fever on ______)  and worsening leukocytosis -> ID believes this is leukomoid reaction and related to continued aspiration  -duplex ordered to eval for alternative causes -> neg baseline tachy in 110s likely in setting of medical disease cancer but newly tachy to 160s 2/13  baseline leukocytosis in 20k range -> increased to 35k 2/14  newly febrile 2/13  MRSA neg at admission  unclear source; initial concern for septic arthritis but tap neg for infection; possible aspiration pneumonia given CXR findings  PCT elevated to 5  Lactate elevation, downtrended  -BCx -> NGTD  -UA/Ucx neg  -CXR to look for pneumonia given concern for aspiration -> right perihilar opacity  -RVP neg  -empiric Zosyn -> switched to uma due to fevers as per ID  -trend WBC/monitor fever curve   -add vancomycin and do MRSA/MSSA -> dc vanc as MRSA neg  - sputum cx results -> Enterobacter cloacae, f/u sensitivities -> if ID ok, can transition to FQ -> c/w uma until 2/28  -ID c/s as unclear source; aspiration pneumonia vs post-obstructive vs malignancy  **continues to spike fevers (last fever on 2/22)  and worsening leukocytosis -> ID believes this is leukomoid reaction and related to continued aspiration  -duplex ordered to eval for alternative causes -> neg

## 2023-02-27 NOTE — PROGRESS NOTE ADULT - PROBLEM SELECTOR PLAN 3
Family noting patient has occasionally been confused  difficult to assess orientation as patient's hoarse voice makes communication difficult though not orientated to place today (as per family chronically not oriented to time)  multifactorical could be in setting to infection, hypercalcemia, inflammation from cancer, hospital delirium, medications (meropenem vs opioids though not on high dose of opioids)  Neuro exam nonfocal  -tx causes  -not agitated so conservative measures  -decrease methadone to QD Family noting patient has occasionally been confused  difficult to assess orientation as patient's hoarse voice makes communication difficult though not orientated to place today (as per family chronically not oriented to time)  multifactorical could be in setting to infection, hypercalcemia, inflammation from cancer, hospital delirium, medications (meropenem vs opioids though not on high dose of opioids)  Neuro exam nonfocal  -tx causes  -not agitated so conservative measures  -decreased methadone to QD

## 2023-02-27 NOTE — PROGRESS NOTE ADULT - SUBJECTIVE AND OBJECTIVE BOX
Follow Up:  leukocytosis    Interval History/ROS:  lethargy after methadone    Allergies  No Known Allergies    ANTIMICROBIALS:  meropenem  IVPB 1000 every 8 hours    OTHER MEDS:  MEDICATIONS  (STANDING):  acetaminophen    Suspension .. 650 every 6 hours  enoxaparin Injectable 40 every 24 hours  gabapentin Solution 300 three times a day  levalbuterol Inhalation 1.25 every 6 hours  levothyroxine 150 <User Schedule>  melatonin 3 at bedtime  methadone   Solution 2.5 at bedtime  ondansetron Injectable 4 every 8 hours PRN  oxyCODONE    IR 10 every 4 hours PRN  oxyCODONE    IR 5 every 4 hours PRN  pantoprazole   Suspension 40 <User Schedule>  polyethylene glycol 3350 17 <User Schedule>  senna Syrup 10 <User Schedule>  sodium chloride 3%  Inhalation 4 every 12 hours      Vital Signs Last 24 Hrs  T(C): 36.3 (27 Feb 2023 11:33), Max: 37.2 (27 Feb 2023 01:00)  T(F): 97.3 (27 Feb 2023 11:33), Max: 99 (27 Feb 2023 01:00)  HR: 124 (27 Feb 2023 11:33) (90 - 124)  BP: 127/65 (27 Feb 2023 11:33) (111/60 - 130/62)  BP(mean): --  RR: 19 (27 Feb 2023 11:33) (19 - 20)  SpO2: 95% (27 Feb 2023 11:33) (93% - 99%)    Parameters below as of 27 Feb 2023 11:33  Patient On (Oxygen Delivery Method): room air        PHYSICAL EXAM:  General: NAD, Non-toxic  Neurology: lethargic, rousable, nonfocal  Respiratory: some air entry on left,  bilat coarse rhonchi are less prominent than noted last week  CV: RRR, S1S2, no murmurs, rubs or gallops  Abdominal: Soft, Non-tender, non-distended, normal bowel sounds  Extremities: decreased RLE edema  Line Sites: Clear  Skin: No rash                        8.0    30.73 )-----------( 693      ( 27 Feb 2023 05:42 )             27.4   WBC Count: 30.73 (02-27 @ 05:42)  WBC Count: 33.12 (02-26 @ 06:32)  WBC Count: 33.17 (02-25 @ 07:08)  WBC Count: 34.36 (02-24 @ 06:49)  WBC Count: 36.78 (02-23 @ 05:13)    02-27    134<L>  |  104  |  17  ----------------------------<  103<H>  3.9   |  20<L>  |  0.64    Ca    12.1<H>      27 Feb 2023 05:42  Phos  2.3     02-27  Mg     1.80     02-27    TPro  6.6  /  Alb  2.5<L>  /  TBili  <0.2  /  DBili  x   /  AST  22  /  ALT  20  /  AlkPhos  206<H>  02-27      MICROBIOLOGY:  .Blood Blood-Peripheral  02-22-23   No growth to date.  --  --      .Sputum Sputum  02-17-23   Moderate Enterobacter cloacae complex  Normal Respiratory Susan present  --  Enterobacter cloacae complex      .Blood Blood-Peripheral  02-17-23   No Growth Final  --  --      .Blood Blood-Venous  02-17-23   No Growth Final  --  --      Clean Catch Clean Catch (Midstream)  02-13-23   <10,000 CFU/mL Normal Urogenital Susan  --  --      .Blood Blood-Peripheral  02-13-23   No Growth Final  --  --      .Blood Blood-Venous  02-13-23   No Growth Final  --  --      .Body Fluid Synovial Fluid  02-05-23   No growth at 14 days.  --    No polymorphonuclear leukocytes per low power field  No organisms seen per oil power field  by cytocentrifuge      Clean Catch Clean Catch (Midstream)  02-05-23   No growth  --  --      .Blood Blood-Peripheral  02-05-23   No Growth Final  --  --      .Blood Blood-Peripheral  02-05-23   No Growth Final  --  --      Rapid RVP Result: NotDetec (02-23 @ 06:57)    RADIOLOGY:  < from: Xray Chest 1 View- PORTABLE-Urgent (Xray Chest 1 View- PORTABLE-Urgent .) (02.24.23 @ 13:58) >  FINDINGS:  Large peripheral mass in the left lung with erosion of the third and   fourth ribs is unchanged.    Right basilar peripheral opacities somewhat more prominent than the study   of 6 days ago could represent pneumonia.    No effusions or pneumothorax.    < end of copied text >      Delonte Simms MD; Division of Infectious Disease; Pager: [phone]; nights and weekends: [phone]

## 2023-02-27 NOTE — PROGRESS NOTE ADULT - ASSESSMENT
67M with  metastatic esophageal cancer (dx 8 years ago) to bone on irinotecan, r. knee, hypothyroidism, GERD, HTN recent RUL pna s/p cefepime, r. patellar mass excision --> metastatic squamous cell carcinoma on 1/7/23 was admitted on 2/5/23  w/ right knee pain and swelling for 2 days.    Recently hospitalized  underwent patellectomy for met SCC on 1/7/23  Course complicated by pneumonia attributed to Enterobacter treated with Cefepime 1/11 --> 1/18  hypercalcemia - calcitonin administered 2/18, 2/19, Zometa 2/18    Recent in patient Antibiotics  Vanco 1/1-->1/4;  2/5 2/13--> 2/17  Zosyn 1/1;  2/5, 2/6l 2/17 -->  Cefepime 1/1-->4; 1/11-->18  Cefazolin 1/7  Meropenem  2/17 -->    encephalopathy  leukemoid reaction  hypercalcemia  2/16 dental extraction to reduce risk of OsteoNecrosis of Jaw  2/23: Elevator and rongeur used to deliver root tips #6, 7, 9 and 10 without complications  He large tumor burden and mass with scant residual left lung  2/17 Neg MRSA PCR  2/17 Sputum with Enterobacter cloacae:   no bacteremia documented    2/24 modestly improved appearance  hypercalcemia has yet to respond to treatment     Suggest  Stop   Meropenem 1 gm every 8 hours in am  can methadone be discontinued?

## 2023-02-27 NOTE — PROVIDER CONTACT NOTE (OTHER) - ACTION/TREATMENT ORDERED:
Provider notified and assessed patient at bedside. PRN Oxycodone given, patient refuseed tylenol at this time, EKG performed, pending any additional orders. no new orders at this time

## 2023-02-27 NOTE — PROGRESS NOTE ADULT - ASSESSMENT
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    1) Hypercalcemia of malignancy  Labs show suppressed PTH (6) and negative PTHRP suggesting cause of hypercalcemia to be local bone osteolysis from metastatic disease. DDx includes high 1,25 Vit D but less likely for this malignancy. 1,25 D resulted 78 upper normal range. 25OHD 33.4 wnl.  Risk of ONJ even with current dental issues is greatly outweighed by hypercalcemia of malignancy and bone mets.  S/p calcitonin 4 doses plus 2 additional doses previously.  Pt received first dose of zoledronate 4mg IV on 2/18/23. corrected calcium is stable today at 13.1 several days after dose.   -maintain hydration status. encourage PO as tolerated and if able to give further IV hydration would recommend doing so today.  -s/p dental extraction again 2/23 . Per wife now extractions are completed.  -Ultimate treatment of hypercalcemia is oncology-directed --> planned for outpatient palliative RT. No inpatient plans. thus after dental work would consider xgeva/denosumab for hypercalcemia management. Repeated dosing would need to be coordinated outpatient with oncology. Of note - denosumab has general higher ONJ risk in setting of malignancy. Options for hypercalcemia are limited for this pt if bisphosphonate dosing is not sufficient for control of hypercalcemia  -would use PO phos for repletion  -s/p 2 additional doses of calcitonin given 2/23, as well as a dose of furosemide.  -Corrected calcium today 13.3, trending back up  -tele monitoring  -If corrected calcium increases above 13 will consider ordering denosumab at that time.  -Iv fluids as tolerated per primary team, monitor for fluid overload.    2) Hypothyroidism  TSH 2/21 increased to 23.4 (prior 7.56) and Free T4 0.9 (prior 1.0)  Noted that levothyroxine is being given at same time as pantoprazole and other po meds.  Please time levothyroxine for 5 AM and other oral meds in AM for 8AM.  Ensure 1 hour at least between levothyroxine and tube feeds or other po meds, especially pantoprazole.  Ensure at least 4 hours between levothyroxine and multivitamin.  Continue levothyroxine 150 mcg PEG daily  recheck TSH and Free T4 2/28/23.    3) Prediabetes  FS q6h, on bolus feeds but not having hyperglycemia  If needed can add low Admelog correction scale q6h  HbA1c 5.8%  no DM meds for dc.      Suyapa Russell,    Endocrine Fellow  For follow up questions, discharge recommendations, or new consults please call answering service at [phone] (weekdays), [phone] (nights/weekends). For nonurgent matters, please email [email or web address] or [email or web address]    68 yo male with metastatic esophageal carcinoma including known bone involvement presents with new onset hypercalcemia, as well as hypothyroidism and prediabetes.    1) Hypercalcemia of malignancy  Labs show suppressed PTH (6) and negative PTHRP suggesting cause of hypercalcemia to be local bone osteolysis from metastatic disease. DDx includes high 1,25 Vit D but less likely for this malignancy. 1,25 D resulted 78 upper normal range. 25OHD 33.4 wnl.  Risk of ONJ even with current dental issues is greatly outweighed by hypercalcemia of malignancy and bone mets.  S/p calcitonin 4 doses plus 2 additional doses previously.  Pt received first dose of zoledronate 4mg IV on 2/18/23. corrected calcium is stable today at 13.1 several days after dose.   -maintain hydration status. encourage PO as tolerated and if able to give further IV hydration would recommend doing so today.  -s/p dental extraction again 2/23 . Per wife now extractions are completed.  -Ultimate treatment of hypercalcemia is oncology-directed --> planned for outpatient palliative RT. No inpatient plans. thus after dental work would consider xgeva/denosumab for hypercalcemia management. Repeated dosing would need to be coordinated outpatient with oncology. Of note - denosumab has general higher ONJ risk in setting of malignancy. Options for hypercalcemia are limited for this pt if bisphosphonate dosing is not sufficient for control of hypercalcemia  -would use PO phos for repletion  -s/p 2 additional doses of calcitonin given 2/23, as well as a dose of furosemide.  -Corrected calcium today 13.3, trending back up  -Recommend Denosumab 120mg SQ x 1 dose today   -tele monitoring  -Iv fluids as tolerated per primary team, monitor for fluid overload.    2) Hypothyroidism  TSH 2/21 increased to 23.4 (prior 7.56) and Free T4 0.9 (prior 1.0)  Noted that levothyroxine is being given at same time as pantoprazole and other po meds.  Please time levothyroxine for 5 AM and other oral meds in AM for 8AM.  Ensure 1 hour at least between levothyroxine and tube feeds or other po meds, especially pantoprazole.  Ensure at least 4 hours between levothyroxine and multivitamin.  Continue levothyroxine 150 mcg PEG daily  recheck TSH and Free T4 2/28/23.    3) Prediabetes  FS q6h, on bolus feeds but not having hyperglycemia  If needed can add low Admelog correction scale q6h  HbA1c 5.8%  no DM meds for dc.    recs discussed with primary team     Case discussed with Dr. Dread Russell, DO   Endocrine Fellow  For follow up questions, discharge recommendations, or new consults please call answering service at [phone] (weekdays), [phone] (nights/weekends). For nonurgent matters, please email [email or web address] or [email or web address]

## 2023-02-27 NOTE — PROGRESS NOTE ADULT - ATTENDING COMMENTS
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 Found to have SIRS (tachy, leukocytosis) w/ c/f right knee septic arthritis s/p arthrocentesis on 2/5 not yielding any organisms however +hemarthrosis. Course c/b V-Tach- now resolved. S/p TTE showing EF 74%, mild pulm htn. Course also c/b hypercalcemia and acute respiratory failure with hypoxemia.     Patient seen and examined at bedside with wife and son.      #Acute respiratory failure with hypoxemia: possibly aspiration pna in the setting of multiple teeth extractions. Weaned to room air. ID following and recs appreciated. Will c/w Chest vest, xopenex, deep suction as needed    #SIRS criteria: fever, tachycardic, leukocytosis: considering infection vs malignancy vs clot (less likely as LE doppler negative for DVT on 2/5 and 2/26): Repeat cultures and MRSA neg. Sputum culture growing enterobacter. Now on uma per ID recs. Monitor WBC levels     #Hemarthrosis - Right knee cultures NGTD, ESR/CRP elevated, suspect inflammation in setting of hemarthrosis and malignancy. Right knee immobilizer while out of bed. Monitor H/H    #Hypercalcemia : new, suspect due to malignancy, low PTH, normal Vit D. S/p calcitonin, ivfs and lasix. s/p extractions 2/16 and 2/23. Endo following.  Pt s/p zometa on 2/18. C/w ivfs for now. Given calcium levels despite zometa giving denosumab today per endo recs     #Met SCC Esophageal Ca - Was planned to start RT as outpatient but never started. Previous right patellar bx path with SCC, Rad Onc with no present plans for RT inpatient. For cancer related pain . C/w methadone 2.5mg daily (lowered from BID given family concern about sedation) and now knee pain has improved.        Discussed with HS2 and family at bedside

## 2023-02-27 NOTE — PROGRESS NOTE ADULT - PROBLEM SELECTOR PLAN 2
elev serum Ca (hovering in moderate range once corrected; ionized in mild range; unclear if symptomatic)  PTH below normal, Phos low -> most likely hyperca of malignancy  ionized justo elevated, not responding much to IVFs.   mild to moderate hypercalcemia, unclear if patient's new onset abdominal pain and vomiting related  - EKG reviewed no ischemic changes  - 1L NS bolus, followed by 100cc/h maintenance -> will dc fluids as has been getting continuous fluids and lungs sound congested -> f/u official CXR read -> perihilar opacity -> can consider fluids in 10-12 hour periods  - S/p calcitonin on 2/13-2/14 and IVFs -> not much improvement in ionized calcium  - monitor icals   - might benefit from bisphosphonate if not responsive to calcitonin but would need dental eval -> would need imaging (CT maxillofacial) prior to extraction, which would be necessary prior to clearance -> dental extraction 2/16 -> will need another extraction (on 2/23)  - 12/20: currently still on IV fluids at rate of 70mL/hr, s/p 2 more doses calcitonin given 2/18-2/19 and Endo input it is okay to start bisphosphonate, they spoke with family at bedside about jaw osteonecrosis risks and they agreed, first dose given zolendronate 2/19 -> might require a few days to see response -> slowly downtrending but then uptrend again (as per endo might require denosumab), increased fluid rate (will also trial Lasix IV 20 given fluids and for synergistic effect on calcium excretion + 2 more doses of calcitionin elev serum Ca (hovering in moderate range once corrected; ionized in mild range; unclear if symptomatic)  PTH below normal, Phos low -> most likely hyperca of malignancy  ionized justo elevated, not responding much to IVFs.   mild to moderate hypercalcemia, unclear if patient's new onset abdominal pain and vomiting related  - EKG reviewed no ischemic changes  - 1L NS bolus, followed by 100cc/h maintenance -> will dc fluids as has been getting continuous fluids and lungs sound congested -> f/u official CXR read -> perihilar opacity -> can consider fluids in 10-12 hour periods  - S/p calcitonin on 2/13-2/14 and IVFs -> not much improvement in ionized calcium  - monitor icals   - might benefit from bisphosphonate if not responsive to calcitonin but would need dental eval -> would need imaging (CT maxillofacial) prior to extraction, which would be necessary prior to clearance -> dental extraction 2/16 -> will need another extraction (on 2/23)  - 12/20: currently still on IV fluids at rate of 70mL/hr, s/p 2 more doses calcitonin given 2/18-2/19 and Endo input it is okay to start bisphosphonate, they spoke with family at bedside about jaw osteonecrosis risks and they agreed, first dose given zolendronate 2/19 -> might require a few days to see response -> slowly downtrending but then uptrend again (as per endo might require denosumab), increased fluid rate (will also trial Lasix IV 20 given fluids and for synergistic effect on calcium excretion + 2 more doses of calcitionin -> Xgeva ordered 2/27

## 2023-02-27 NOTE — PROGRESS NOTE ADULT - SUBJECTIVE AND OBJECTIVE BOX
ENDOCRINE FOLLOW UP     Chief Complaint:     History:     MEDICATIONS  (STANDING):  acetaminophen    Suspension .. 650 milliGRAM(s) Oral every 6 hours  ascorbic acid 500 milliGRAM(s) Oral <User Schedule>  chlorhexidine 2% Cloths 1 Application(s) Topical daily  enoxaparin Injectable 40 milliGRAM(s) SubCutaneous every 24 hours  gabapentin Solution 300 milliGRAM(s) Oral three times a day  lactated ringers. 1000 milliLiter(s) (75 mL/Hr) IV Continuous <Continuous>  levalbuterol Inhalation 1.25 milliGRAM(s) Inhalation every 6 hours  levothyroxine 150 MICROGram(s) Oral <User Schedule>  lidocaine   4% Patch 1 Patch Transdermal daily  melatonin 3 milliGRAM(s) Oral at bedtime  meropenem  IVPB 1000 milliGRAM(s) IV Intermittent every 8 hours  methadone   Solution 2.5 milliGRAM(s) Oral at bedtime  multivitamin 1 Tablet(s) Oral <User Schedule>  nystatin Powder 1 Application(s) Topical every 12 hours  pantoprazole   Suspension 40 milliGRAM(s) Oral <User Schedule>  polyethylene glycol 3350 17 Gram(s) Oral <User Schedule>  potassium phosphate / sodium phosphate Powder (PHOS-NaK) 2 Packet(s) Oral two times a day  senna Syrup 10 milliLiter(s) Oral <User Schedule>  sodium chloride 3%  Inhalation 4 milliLiter(s) Inhalation every 12 hours    MEDICATIONS  (PRN):  naloxone Injectable 0.3 milliGRAM(s) IV Push every 3 minutes PRN opioid induced AMS  ondansetron Injectable 4 milliGRAM(s) IV Push every 8 hours PRN Nausea and/or Vomiting  oxyCODONE    IR 10 milliGRAM(s) Oral every 4 hours PRN Severe Pain (7 - 10)  oxyCODONE    IR 5 milliGRAM(s) Oral every 4 hours PRN Moderate Pain (4 - 6)      Allergies    No Known Allergies    Intolerances        ROS: All other systems reviewed and negative    PHYSICAL EXAM:  VITALS: T(C): 36.8 (02-27-23 @ 08:00)  T(F): 98.2 (02-27-23 @ 08:00), Max: 99 (02-27-23 @ 01:00)  HR: 90 (02-27-23 @ 08:32) (90 - 133)  BP: 117/73 (02-27-23 @ 08:00) (102/62 - 150/75)  RR:  (19 - 20)  SpO2:  (93% - 99%)  Wt(kg): --  GENERAL: NAD, resting comfortably   EYES: No proptosis,  anicteric  HEENT:  Atraumatic, Normocephalic, moist mucous membranes  RESPIRATORY: Nonlabored respirations on room air, normal rate/effort   CARDIOVASCULAR: Regular rate and rhythm; No murmurs  GI: Soft, nontender, non distended, normal bowel sounds  NEURO: AOx3, moves all extremities spontaenuously   PSYCH:  reactive affect, euthymic mood    POCT Blood Glucose.: 122 mg/dL (02-27-23 @ 06:16)  POCT Blood Glucose.: 108 mg/dL (02-26-23 @ 23:51)  POCT Blood Glucose.: 122 mg/dL (02-26-23 @ 16:53)  POCT Blood Glucose.: 118 mg/dL (02-26-23 @ 10:04)  POCT Blood Glucose.: 97 mg/dL (02-26-23 @ 04:15)  POCT Blood Glucose.: 105 mg/dL (02-25-23 @ 21:54)  POCT Blood Glucose.: 89 mg/dL (02-25-23 @ 16:56)  POCT Blood Glucose.: 96 mg/dL (02-25-23 @ 11:50)  POCT Blood Glucose.: 115 mg/dL (02-25-23 @ 07:47)  POCT Blood Glucose.: 104 mg/dL (02-25-23 @ 04:35)  POCT Blood Glucose.: 124 mg/dL (02-24-23 @ 21:29)  POCT Blood Glucose.: 102 mg/dL (02-24-23 @ 16:47)  POCT Blood Glucose.: 116 mg/dL (02-24-23 @ 11:36)      02-27    134<L>  |  104  |  17  ----------------------------<  103<H>  3.9   |  20<L>  |  0.64    eGFR: 104    Ca    12.1<H>      02-27  Mg     1.80     02-27  Phos  2.3     02-27    TPro  6.6  /  Alb  2.5<L>  /  TBili  <0.2  /  DBili  x   /  AST  22  /  ALT  20  /  AlkPhos  206<H>  02-27      A1C with Estimated Average Glucose Result: 5.8 % (01-13-23 @ 06:10)      Thyroid Stimulating Hormone, Serum: 23.86 uIU/mL (02-21-23 @ 06:20)  Thyroid Stimulating Hormone, Serum: 7.56 uIU/mL (02-10-23 @ 06:30)  Thyroid Stimulating Hormone, Serum: 6.55 uIU/mL (02-06-23 @ 06:41)   ENDOCRINE FOLLOW UP     Chief Complaint: hypercalcemia of malignancy     History: Discussed calcium levels today w/ wife & family, corrected > 13. Now s/p all dental extractions. Patient appears better today per wife. More awake, alert, eating.     MEDICATIONS  (STANDING):  acetaminophen    Suspension .. 650 milliGRAM(s) Oral every 6 hours  ascorbic acid 500 milliGRAM(s) Oral <User Schedule>  chlorhexidine 2% Cloths 1 Application(s) Topical daily  enoxaparin Injectable 40 milliGRAM(s) SubCutaneous every 24 hours  gabapentin Solution 300 milliGRAM(s) Oral three times a day  lactated ringers. 1000 milliLiter(s) (75 mL/Hr) IV Continuous <Continuous>  levalbuterol Inhalation 1.25 milliGRAM(s) Inhalation every 6 hours  levothyroxine 150 MICROGram(s) Oral <User Schedule>  lidocaine   4% Patch 1 Patch Transdermal daily  melatonin 3 milliGRAM(s) Oral at bedtime  meropenem  IVPB 1000 milliGRAM(s) IV Intermittent every 8 hours  methadone   Solution 2.5 milliGRAM(s) Oral at bedtime  multivitamin 1 Tablet(s) Oral <User Schedule>  nystatin Powder 1 Application(s) Topical every 12 hours  pantoprazole   Suspension 40 milliGRAM(s) Oral <User Schedule>  polyethylene glycol 3350 17 Gram(s) Oral <User Schedule>  potassium phosphate / sodium phosphate Powder (PHOS-NaK) 2 Packet(s) Oral two times a day  senna Syrup 10 milliLiter(s) Oral <User Schedule>  sodium chloride 3%  Inhalation 4 milliLiter(s) Inhalation every 12 hours    MEDICATIONS  (PRN):  naloxone Injectable 0.3 milliGRAM(s) IV Push every 3 minutes PRN opioid induced AMS  ondansetron Injectable 4 milliGRAM(s) IV Push every 8 hours PRN Nausea and/or Vomiting  oxyCODONE    IR 10 milliGRAM(s) Oral every 4 hours PRN Severe Pain (7 - 10)  oxyCODONE    IR 5 milliGRAM(s) Oral every 4 hours PRN Moderate Pain (4 - 6)      Allergies    No Known Allergies    Intolerances        ROS: All other systems reviewed and negative    PHYSICAL EXAM:  VITALS: T(C): 36.8 (02-27-23 @ 08:00)  T(F): 98.2 (02-27-23 @ 08:00), Max: 99 (02-27-23 @ 01:00)  HR: 90 (02-27-23 @ 08:32) (90 - 133)  BP: 117/73 (02-27-23 @ 08:00) (102/62 - 150/75)  RR:  (19 - 20)  SpO2:  (93% - 99%)  Wt(kg): --  GENERAL: NAD, resting comfortably   EYES: No proptosis,  anicteric  HEENT:  Atraumatic, Normocephalic, moist mucous membranes  RESPIRATORY: Nonlabored respirations on room air, normal rate/effort   GI: Soft, nontender, non distended  NEURO: awake, alert, moving extremities spontaneously   PSYCH:  reactive affect, euthymic mood    POCT Blood Glucose.: 122 mg/dL (02-27-23 @ 06:16)  POCT Blood Glucose.: 108 mg/dL (02-26-23 @ 23:51)  POCT Blood Glucose.: 122 mg/dL (02-26-23 @ 16:53)  POCT Blood Glucose.: 118 mg/dL (02-26-23 @ 10:04)  POCT Blood Glucose.: 97 mg/dL (02-26-23 @ 04:15)  POCT Blood Glucose.: 105 mg/dL (02-25-23 @ 21:54)  POCT Blood Glucose.: 89 mg/dL (02-25-23 @ 16:56)  POCT Blood Glucose.: 96 mg/dL (02-25-23 @ 11:50)  POCT Blood Glucose.: 115 mg/dL (02-25-23 @ 07:47)  POCT Blood Glucose.: 104 mg/dL (02-25-23 @ 04:35)  POCT Blood Glucose.: 124 mg/dL (02-24-23 @ 21:29)  POCT Blood Glucose.: 102 mg/dL (02-24-23 @ 16:47)  POCT Blood Glucose.: 116 mg/dL (02-24-23 @ 11:36)      02-27    134<L>  |  104  |  17  ----------------------------<  103<H>  3.9   |  20<L>  |  0.64    eGFR: 104    Ca    12.1<H>      02-27  Mg     1.80     02-27  Phos  2.3     02-27    TPro  6.6  /  Alb  2.5<L>  /  TBili  <0.2  /  DBili  x   /  AST  22  /  ALT  20  /  AlkPhos  206<H>  02-27      A1C with Estimated Average Glucose Result: 5.8 % (01-13-23 @ 06:10)      Thyroid Stimulating Hormone, Serum: 23.86 uIU/mL (02-21-23 @ 06:20)  Thyroid Stimulating Hormone, Serum: 7.56 uIU/mL (02-10-23 @ 06:30)  Thyroid Stimulating Hormone, Serum: 6.55 uIU/mL (02-06-23 @ 06:41)

## 2023-02-27 NOTE — PROVIDER CONTACT NOTE (OTHER) - ASSESSMENT
Patient is A&Ox3 agitated at this time. Patient denies chest pain, shortness of breath or palpitations. Patient sustaining HR in 140s, all other vitals stable as documented. Patient has course lung sounds, provider aware and at bedside.

## 2023-02-28 NOTE — PROGRESS NOTE ADULT - PROBLEM SELECTOR PLAN 3
Family noting patient has occasionally been confused  difficult to assess orientation as patient's hoarse voice makes communication difficult though not orientated to place today (as per family chronically not oriented to time)  multifactorical could be in setting to infection, hypercalcemia, inflammation from cancer, hospital delirium, medications (meropenem vs opioids though not on high dose of opioids)  Neuro exam nonfocal  -tx causes  -not agitated so conservative measures  -decreased methadone to QD Family noting patient has occasionally been confused  difficult to assess orientation as patient's hoarse voice makes communication difficult though not orientated to place today (as per family chronically not oriented to time)  multifactorical could be in setting to infection, hypercalcemia, inflammation from cancer, hospital delirium, medications (meropenem vs opioids though not on high dose of opioids)  Neuro exam nonfocal  -tx causes  -not agitated so conservative measures  -decreased methadone to QD (patient pain uncontrolled? will ask family their preference) Family noting patient has occasionally been confused. As per family chronically not oriented to time  Multifactorical etiology in setting to infection, hypercalcemia, inflammation from cancer, hospital delirium, medications (meropenem vs opioids though not on high dose of opioids)  Neuro exam nonfocal    Improving  -tx underlying causes as stated   -monitor mental status  -not agitated so conservative measures

## 2023-02-28 NOTE — PROGRESS NOTE ADULT - ATTENDING COMMENTS
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  Completed dental extractions last week.  Corrected calcium 13.3 --> 12.8 today after denosumab 120 mg SQ received yesterday 2/27. Expect several days for full response to be seen.  Monitor for improvements in mental status with lowering of calcium.  Will need close outpatient follow up with oncologist to trend calcium to determine about further need for calcium lowering therapy.  Hypothyroidism rechecked TFTs which are improved now that levothyroxine is being administered on empty stomach.  Complex patient high level decision making.    Lesli Shields MD  Division of Endocrinology  Pager: [phone]    If after 6PM or before 9AM, or on weekends/holidays, please call endocrine answering service for assistance ([phone]).  For nonurgent matters email [email or web address] for assistance.

## 2023-02-28 NOTE — PROGRESS NOTE ADULT - PROBLEM SELECTOR PLAN 6
-c/w tylenol mild pain  -oxycodone 5 q4 prn for moderate pain, oxy 10 q4 prn for severe pain  -c/w gabapentin 300 mg TID    #Reactive airway dx  Wheezing and rhonchi on exam with wet cough; concern for aspiration  -due to tachycardia will try Xopenex instead of duoneb  -IS and chest PT -> chest vest ordered -c/w tylenol mild pain  -oxycodone 5 q4 prn for moderate pain, oxy 10 q4 prn for severe pain  -c/w gabapentin 300 mg TID    #Reactive airway dx  Wheezing and rhonchi on exam with wet cough; concern for aspiration  -due to tachycardia will try Xopenex instead of duoneb  -IS and chest PT -> chest vest ordered  -O2 as needed -> will likely require on d/c -c/w tylenol mild pain  -oxycodone 5 q4 prn for moderate pain, oxy 10 q4 prn for severe pain  -c/w gabapentin 300 mg TID

## 2023-02-28 NOTE — PROGRESS NOTE ADULT - ASSESSMENT
67M with  metastatic esophageal cancer (dx 8 years ago) to bone on irinotecan, r. knee, hypothyroidism, GERD, HTN recent RUL pna s/p cefepime, r. patellar mass excision --> metastatic squamous cell carcinoma on 1/7/23 was admitted on 2/5/23  w/ right knee pain and swelling for 2 days.    Recently hospitalized  underwent patellectomy for met SCC on 1/7/23  Course complicated by pneumonia attributed to Enterobacter treated with Cefepime 1/11 --> 1/18  hypercalcemia - calcitonin administered 2/18, 2/19, Zometa 2/18    Recent in patient Antibiotics  Vanco 1/1-->1/4;  2/5 2/13--> 2/17  Zosyn 1/1;  2/5, 2/6l 2/17 -->  Cefepime 1/1-->4; 1/11-->18  Cefazolin 1/7  Meropenem  2/17 -->    encephalopathy  leukemoid reaction  hypercalcemia  2/16 dental extraction to reduce risk of OsteoNecrosis of Jaw  2/23: Elevator and rongeur used to deliver root tips #6, 7, 9 and 10 without complications  He has large tumor burden and mass with scant residual left lung  2/17 Neg MRSA PCR  2/17 Sputum with Enterobacter cloacae:   no bacteremia documented    2/24 modestly improved appearance  hypercalcemia has yet to respond to treatment     Suggest  Continue   Meropenem 1 gm every 8 hours for now  vigorous pulmonary toilet    discussed with resident

## 2023-02-28 NOTE — PROGRESS NOTE ADULT - PROBLEM SELECTOR PLAN 4
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 Carter: no infection, likely tumor growth, pt declining palliative amputation at this time S/p arthrocentesis 2/5 with RBCs c/f hemarthrosis, less likely septic arthritis given neg cx  ESR/CRP elevated  LE US negative  XR tib-fib and knee unremarkable  Aspiration cx, bcxs ngtd, MRSA neg    - C/w pain control. Palliative recs: methadone 2.5mg bid (now decreased to 2.5mg), Oxy IR 5mg q4 prn mild mod pain, Oxy IR 10mg q4 prn severe pain  - per ortho recs: no additional imaging or interventions at this time. Outpatient ortho Dr. Carter: no infection, likely tumor growth, pt declining palliative amputation at this time

## 2023-02-28 NOTE — PROGRESS NOTE ADULT - ATTENDING SUPERVISION STATEMENT
Fellow
Resident

## 2023-02-28 NOTE — PROGRESS NOTE ADULT - SUBJECTIVE AND OBJECTIVE BOX
ENDOCRINE FOLLOW UP     Chief Complaint: hypercalcemia of malignancy    History: Ca trending down slightly, 12.84 corrected today, received first dose of xgeva yesterday.    MEDICATIONS  (STANDING):  acetaminophen    Suspension .. 650 milliGRAM(s) Oral every 6 hours  ascorbic acid 500 milliGRAM(s) Oral <User Schedule>  chlorhexidine 2% Cloths 1 Application(s) Topical daily  enoxaparin Injectable 40 milliGRAM(s) SubCutaneous every 24 hours  gabapentin Solution 300 milliGRAM(s) Oral three times a day  levalbuterol Inhalation 1.25 milliGRAM(s) Inhalation every 6 hours  levothyroxine 150 MICROGram(s) Oral <User Schedule>  lidocaine   4% Patch 1 Patch Transdermal daily  melatonin 3 milliGRAM(s) Oral at bedtime  meropenem  IVPB 1000 milliGRAM(s) IV Intermittent every 8 hours  methadone   Solution 2.5 milliGRAM(s) Oral at bedtime  multivitamin 1 Tablet(s) Oral <User Schedule>  nystatin Powder 1 Application(s) Topical every 12 hours  pantoprazole   Suspension 40 milliGRAM(s) Oral <User Schedule>  polyethylene glycol 3350 17 Gram(s) Oral <User Schedule>  potassium phosphate / sodium phosphate Powder (PHOS-NaK) 2 Packet(s) Oral two times a day  senna Syrup 10 milliLiter(s) Oral <User Schedule>  sodium chloride 3%  Inhalation 4 milliLiter(s) Inhalation every 12 hours    MEDICATIONS  (PRN):  naloxone Injectable 0.3 milliGRAM(s) IV Push every 3 minutes PRN opioid induced AMS  ondansetron Injectable 4 milliGRAM(s) IV Push every 8 hours PRN Nausea and/or Vomiting  oxyCODONE    IR 10 milliGRAM(s) Oral every 4 hours PRN Severe Pain (7 - 10)  oxyCODONE    IR 5 milliGRAM(s) Oral every 4 hours PRN Moderate Pain (4 - 6)      Allergies    No Known Allergies    Intolerances        ROS: All other systems reviewed and negative    PHYSICAL EXAM:  VITALS: T(C): 37.4 (02-28-23 @ 11:41)  T(F): 99.3 (02-28-23 @ 11:41), Max: 99.3 (02-28-23 @ 11:41)  HR: 117 (02-28-23 @ 11:41) (96 - 148)  BP: 112/52 (02-28-23 @ 11:41) (110/58 - 139/75)  RR:  (18 - 20)  SpO2:  (86% - 100%)  Wt(kg): --  GENERAL: NAD, resting comfortably   EYES: No proptosis,  anicteric  HEENT:  Atraumatic, Normocephalic, moist mucous membranes  RESPIRATORY: Nonlabored respirations on room air, normal rate/effort   NEURO: Awake/alert, moving extremities spontaenuously   PSYCH:  reactive affect, euthymic mood    POCT Blood Glucose.: 133 mg/dL (02-28-23 @ 11:34)  POCT Blood Glucose.: 129 mg/dL (02-28-23 @ 09:45)  POCT Blood Glucose.: 101 mg/dL (02-28-23 @ 03:41)  POCT Blood Glucose.: 104 mg/dL (02-27-23 @ 21:33)  POCT Blood Glucose.: 125 mg/dL (02-27-23 @ 16:46)  POCT Blood Glucose.: 113 mg/dL (02-27-23 @ 11:55)  POCT Blood Glucose.: 122 mg/dL (02-27-23 @ 06:16)  POCT Blood Glucose.: 108 mg/dL (02-26-23 @ 23:51)  POCT Blood Glucose.: 122 mg/dL (02-26-23 @ 16:53)  POCT Blood Glucose.: 118 mg/dL (02-26-23 @ 10:04)  POCT Blood Glucose.: 97 mg/dL (02-26-23 @ 04:15)  POCT Blood Glucose.: 105 mg/dL (02-25-23 @ 21:54)  POCT Blood Glucose.: 89 mg/dL (02-25-23 @ 16:56)      02-28    137  |  105  |  17  ----------------------------<  115<H>  4.4   |  19<L>  |  0.61    eGFR: 105    Ca    11.8<H>      02-28  Mg     1.60     02-28  Phos  1.8     02-28    TPro  7.1  /  Alb  2.7<L>  /  TBili  0.2  /  DBili  x   /  AST  27  /  ALT  23  /  AlkPhos  241<H>  02-28      A1C with Estimated Average Glucose Result: 5.8 % (01-13-23 @ 06:10)      Thyroid Stimulating Hormone, Serum: 4.32 uIU/mL (02-28-23 @ 07:20)  Thyroid Stimulating Hormone, Serum: 23.86 uIU/mL (02-21-23 @ 06:20)  Thyroid Stimulating Hormone, Serum: 7.56 uIU/mL (02-10-23 @ 06:30)  Thyroid Stimulating Hormone, Serum: 6.55 uIU/mL (02-06-23 @ 06:41)

## 2023-02-28 NOTE — PROGRESS NOTE ADULT - PROBLEM SELECTOR PLAN 2
elev serum Ca (hovering in moderate range once corrected; ionized in mild range; unclear if symptomatic)  PTH below normal, Phos low -> most likely hyperca of malignancy  ionized justo elevated, not responding much to IVFs.   mild to moderate hypercalcemia, unclear if patient's new onset abdominal pain and vomiting related  - EKG reviewed no ischemic changes  - 1L NS bolus, followed by 100cc/h maintenance -> will dc fluids as has been getting continuous fluids and lungs sound congested -> f/u official CXR read -> perihilar opacity -> can consider fluids in 10-12 hour periods  - S/p calcitonin on 2/13-2/14 and IVFs -> not much improvement in ionized calcium  - monitor icals   - might benefit from bisphosphonate if not responsive to calcitonin but would need dental eval -> would need imaging (CT maxillofacial) prior to extraction, which would be necessary prior to clearance -> dental extraction 2/16 -> will need another extraction (on 2/23)  - 12/20: currently still on IV fluids at rate of 70mL/hr, s/p 2 more doses calcitonin given 2/18-2/19 and Endo input it is okay to start bisphosphonate, they spoke with family at bedside about jaw osteonecrosis risks and they agreed, first dose given zolendronate 2/19 -> might require a few days to see response -> slowly downtrending but then uptrend again (as per endo might require denosumab), increased fluid rate (will also trial Lasix IV 20 given fluids and for synergistic effect on calcium excretion + 2 more doses of calcitionin -> Xgeva ordered 2/27 elev serum Ca (hovering in moderate range once corrected; ionized in mild range; unclear if symptomatic)  PTH below normal, Phos low -> most likely hyperca of malignancy  ionized justo elevated, not responding much to IVFs.   mild to moderate hypercalcemia, unclear if patient's new onset abdominal pain and vomiting related  - EKG reviewed no ischemic changes  - 1L NS bolus, followed by 100cc/h maintenance -> will dc fluids as has been getting continuous fluids and lungs sound congested -> f/u official CXR read -> perihilar opacity -> can consider fluids in 10-12 hour periods  - S/p calcitonin on 2/13-2/14 and IVFs -> not much improvement in ionized calcium  - monitor icals   - might benefit from bisphosphonate if not responsive to calcitonin but would need dental eval -> would need imaging (CT maxillofacial) prior to extraction, which would be necessary prior to clearance -> dental extraction 2/16 -> will need another extraction (on 2/23)  - 12/20: currently still on IV fluids at rate of 70mL/hr, s/p 2 more doses calcitonin given 2/18-2/19 and Endo input it is okay to start bisphosphonate, they spoke with family at bedside about jaw osteonecrosis risks and they agreed, first dose given zolendronate 2/19 -> might require a few days to see response -> slowly downtrending but then uptrend again (as per endo might require denosumab), increased fluid rate (will also trial Lasix IV 20 given fluids and for synergistic effect on calcium excretion + 2 more doses of calcitionin -> Xgeva ordered 2/27  -will consider transitioning from fluids to FW flushes elev serum Ca (hovering in moderate range once corrected; ionized in mild range; unclear if symptomatic)  PTH below normal, Phos low -> most likely hyperca of malignancy  ionized justo elevated, not responding much to IVFs.   mild to moderate hypercalcemia, unclear if patient's new onset abdominal pain and vomiting related  - EKG reviewed no ischemic changes  - 1L NS bolus, followed by 100cc/h maintenance -> will dc fluids as has been getting continuous fluids and lungs sound congested -> f/u official CXR read -> perihilar opacity -> can consider fluids in 10-12 hour periods  - S/p calcitonin on 2/13-2/14 and IVFs -> not much improvement in ionized calcium  - monitor icals   - might benefit from bisphosphonate if not responsive to calcitonin but would need dental eval -> would need imaging (CT maxillofacial) prior to extraction, which would be necessary prior to clearance -> dental extraction 2/16 -> will need another extraction (on 2/23)  - 12/20: currently still on IV fluids at rate of 70mL/hr, s/p 2 more doses calcitonin given 2/18-2/19 and Endo input it is okay to start bisphosphonate, they spoke with family at bedside about jaw osteonecrosis risks and they agreed, first dose given zolendronate 2/19 -> might require a few days to see response -> slowly downtrending but then uptrend again (as per endo might require denosumab), increased fluid rate (will also trial Lasix IV 20 given fluids and for synergistic effect on calcium excretion + 2 more doses of calcitionin -> Xgeva ordered 2/27  -transition from fluids to FW flushes elev serum Ca (hovering in moderate range once corrected; ionized in mild range; unclear if symptomatic)  PTH below normal, Phos low -> most likely hyperca of malignancy  ionized justo elevated, not responding much to IVFs.   mild to moderate hypercalcemia, unclear if patient's new onset abdominal pain and vomiting related  - EKG reviewed no ischemic changes  - 1L NS bolus, followed by 100cc/h maintenance -> will dc fluids as has been getting continuous fluids and lungs sound congested -> f/u official CXR read -> perihilar opacity -> can consider fluids in 10-12 hour periods  - S/p calcitonin on 2/13-2/14 and IVFs -> not much improvement in ionized calcium  - monitor icals   - might benefit from bisphosphonate if not responsive to calcitonin but would need dental eval -> would need imaging (CT maxillofacial) prior to extraction, which would be necessary prior to clearance -> dental extraction 2/16 -> will need another extraction (on 2/23)  - 12/20: currently still on IV fluids at rate of 70mL/hr, s/p 2 more doses calcitonin given 2/18-2/19 and Endo input it is okay to start bisphosphonate, they spoke with family at bedside about jaw osteonecrosis risks and they agreed, first dose given zolendronate 2/19 -> might require a few days to see response -> slowly downtrending but then uptrend again (as per endo might require denosumab), increased fluid rate (will also trial Lasix IV 20 given fluids and for synergistic effect on calcium excretion + 2 more doses of calcitionin -> Xgeva ordered 2/27  -transition from fluids to FW flushes 2/28 PTH below normal, Phos low -> most likely hyperca of malignancy  EKG reviewed no ischemic changes  - S/p calcitonin on 2/13-2/14, 2/18-2/19 and IVFs.   -S/p dental extraction 2/16 and on 2/23 to reduce risk of jaw osteonecrosis w/ bisphosphonate administration.   - S/p zolendronate 2/19   -Given minimal improvement Xgeva given on 2/27 per endo recs   -monitor ca levels, will take a couple days to see effect of xgeva   -will transition from ivfs fluids to FW flushes 2/28

## 2023-02-28 NOTE — PROGRESS NOTE ADULT - SUBJECTIVE AND OBJECTIVE BOX
Sultan Chapito MD  PGY 1 Department of Internal Medicine        Patient is a 67y old  Male who presents with a chief complaint of r. knee pain (27 Feb 2023 15:45)      SUBJECTIVE / OVERNIGHT EVENTS: Pt seen and examined. No acute overnight events. Denies fevers, chills, CP, SOB, Abdominal pain, N/V, Constipation, Diarrhea        MEDICATIONS  (STANDING):  acetaminophen    Suspension .. 650 milliGRAM(s) Oral every 6 hours  ascorbic acid 500 milliGRAM(s) Oral <User Schedule>  chlorhexidine 2% Cloths 1 Application(s) Topical daily  enoxaparin Injectable 40 milliGRAM(s) SubCutaneous every 24 hours  gabapentin Solution 300 milliGRAM(s) Oral three times a day  lactated ringers. 1000 milliLiter(s) (75 mL/Hr) IV Continuous <Continuous>  levalbuterol Inhalation 1.25 milliGRAM(s) Inhalation every 6 hours  levothyroxine 150 MICROGram(s) Oral <User Schedule>  lidocaine   4% Patch 1 Patch Transdermal daily  melatonin 3 milliGRAM(s) Oral at bedtime  meropenem  IVPB 1000 milliGRAM(s) IV Intermittent every 8 hours  methadone   Solution 2.5 milliGRAM(s) Oral at bedtime  multivitamin 1 Tablet(s) Oral <User Schedule>  nystatin Powder 1 Application(s) Topical every 12 hours  pantoprazole   Suspension 40 milliGRAM(s) Oral <User Schedule>  polyethylene glycol 3350 17 Gram(s) Oral <User Schedule>  potassium phosphate / sodium phosphate Powder (PHOS-NaK) 2 Packet(s) Oral two times a day  senna Syrup 10 milliLiter(s) Oral <User Schedule>  sodium chloride 3%  Inhalation 4 milliLiter(s) Inhalation every 12 hours    MEDICATIONS  (PRN):  naloxone Injectable 0.3 milliGRAM(s) IV Push every 3 minutes PRN opioid induced AMS  ondansetron Injectable 4 milliGRAM(s) IV Push every 8 hours PRN Nausea and/or Vomiting  oxyCODONE    IR 10 milliGRAM(s) Oral every 4 hours PRN Severe Pain (7 - 10)  oxyCODONE    IR 5 milliGRAM(s) Oral every 4 hours PRN Moderate Pain (4 - 6)      I&O's Summary    26 Feb 2023 07:01  -  27 Feb 2023 07:00  --------------------------------------------------------  IN: 2049 mL / OUT: 1650 mL / NET: 399 mL    27 Feb 2023 07:01  -  28 Feb 2023 05:18  --------------------------------------------------------  IN: 325 mL / OUT: 880 mL / NET: -555 mL        Vital Signs Last 24 Hrs  T(C): 37.2 (28 Feb 2023 00:00), Max: 37.2 (28 Feb 2023 00:00)  T(F): 98.9 (28 Feb 2023 00:00), Max: 98.9 (28 Feb 2023 00:00)  HR: 114 (28 Feb 2023 00:00) (90 - 148)  BP: 117/68 (28 Feb 2023 00:00) (110/58 - 139/75)  BP(mean): --  RR: 18 (28 Feb 2023 00:00) (18 - 20)  SpO2: 98% (28 Feb 2023 00:00) (93% - 100%)    Parameters below as of 28 Feb 2023 00:00  Patient On (Oxygen Delivery Method): room air        CAPILLARY BLOOD GLUCOSE      POCT Blood Glucose.: 101 mg/dL (28 Feb 2023 03:41)  POCT Blood Glucose.: 104 mg/dL (27 Feb 2023 21:33)  POCT Blood Glucose.: 125 mg/dL (27 Feb 2023 16:46)  POCT Blood Glucose.: 113 mg/dL (27 Feb 2023 11:55)  POCT Blood Glucose.: 122 mg/dL (27 Feb 2023 06:16)      PHYSICAL EXAM:  GENERAL: NAD  HEENT: hoarse voice noted; mouth appears to be healing appropriately  NECK: Supple, No JVD  CHEST/LUNG: congested, rhonchi and wheezes present as before more prominent on right; on RA  HEART: Tachycardic with regular rhythm; No murmurs, rubs, or gallops  ABDOMEN: Soft, Nontender, distended; Bowel sounds present, + PEG tube in place, some redness noted around site  EXTREMITIES:  2+ Peripheral Pulses, No clubbing, cyanosis. + R knee erythema, edema with TTP, incisions cdi, ROM limited 2/2 pain. right distal leg swelling noted, non-painful  NEUROLOGY: nonfocal. difficult to assess orientation as patient's voice is very hoarse. but nods head to questions and is awake and alert.  SKIN: No rashes or lesions        LABS:                        8.0    30.73 )-----------( 693      ( 27 Feb 2023 05:42 )             27.4     Auto Eosinophil # 0.84  / Auto Eosinophil % 2.7   / Auto Neutrophil # 26.68 / Auto Neutrophil % 86.9  / BANDS % x                            8.1    33.12 )-----------( 663      ( 26 Feb 2023 06:32 )             27.0     Auto Eosinophil # 0.00  / Auto Eosinophil % 0.0   / Auto Neutrophil # 29.68 / Auto Neutrophil % 87.0  / BANDS % 2.6      02-27    134<L>  |  104  |  17  ----------------------------<  103<H>  3.9   |  20<L>  |  0.64  02-26    133<L>  |  105  |  13  ----------------------------<  80  4.4   |  19<L>  |  0.57    Ca    12.1<H>      27 Feb 2023 05:42  Mg     1.80     02-27  Phos  2.3     02-27  TPro  6.6  /  Alb  2.5<L>  /  TBili  <0.2  /  DBili  x   /  AST  22  /  ALT  20  /  AlkPhos  206<H>  02-27  TPro  7.0  /  Alb  2.7<L>  /  TBili  0.3  /  DBili  x   /  AST  23  /  ALT  22  /  AlkPhos  211<H>  02-26    PT/INR - ( 26 Feb 2023 06:32 )   PT: 15.4 sec;   INR: 1.32 ratio         PTT - ( 26 Feb 2023 06:32 )  PTT:27.0 sec              RADIOLOGY & ADDITIONAL TESTS:    Imaging Personally Reviewed:    Consultant(s) Notes Reviewed:      Care Discussed with Consultants/Other Providers:   Sultan Chapito MD  PGY 1 Department of Internal Medicine        Patient is a 67y old  Male who presents with a chief complaint of r. knee pain (27 Feb 2023 15:45)      SUBJECTIVE / OVERNIGHT EVENTS: Pt seen and examined. Patient declined some meds O/N including methadone but nurse able to give prn oxycodone. Later tachy to 140s, received an additional methadone x 1 and improved. Tele sinus tachy 100s-140s, this morning in high 120s. Patient appearing anxious at time of encounter as nurses getting ready to draw blood. Difficult to understand his speech but it seems his pain requires better control. Denied any abdominal pain, SOB, or chills.    Later in morning after encounter, patient desaturated to 86 on RA. Placed on 3L and back at 93%.        MEDICATIONS  (STANDING):  acetaminophen    Suspension .. 650 milliGRAM(s) Oral every 6 hours  ascorbic acid 500 milliGRAM(s) Oral <User Schedule>  chlorhexidine 2% Cloths 1 Application(s) Topical daily  enoxaparin Injectable 40 milliGRAM(s) SubCutaneous every 24 hours  gabapentin Solution 300 milliGRAM(s) Oral three times a day  lactated ringers. 1000 milliLiter(s) (75 mL/Hr) IV Continuous <Continuous>  levalbuterol Inhalation 1.25 milliGRAM(s) Inhalation every 6 hours  levothyroxine 150 MICROGram(s) Oral <User Schedule>  lidocaine   4% Patch 1 Patch Transdermal daily  melatonin 3 milliGRAM(s) Oral at bedtime  meropenem  IVPB 1000 milliGRAM(s) IV Intermittent every 8 hours  methadone   Solution 2.5 milliGRAM(s) Oral at bedtime  multivitamin 1 Tablet(s) Oral <User Schedule>  nystatin Powder 1 Application(s) Topical every 12 hours  pantoprazole   Suspension 40 milliGRAM(s) Oral <User Schedule>  polyethylene glycol 3350 17 Gram(s) Oral <User Schedule>  potassium phosphate / sodium phosphate Powder (PHOS-NaK) 2 Packet(s) Oral two times a day  senna Syrup 10 milliLiter(s) Oral <User Schedule>  sodium chloride 3%  Inhalation 4 milliLiter(s) Inhalation every 12 hours    MEDICATIONS  (PRN):  naloxone Injectable 0.3 milliGRAM(s) IV Push every 3 minutes PRN opioid induced AMS  ondansetron Injectable 4 milliGRAM(s) IV Push every 8 hours PRN Nausea and/or Vomiting  oxyCODONE    IR 10 milliGRAM(s) Oral every 4 hours PRN Severe Pain (7 - 10)  oxyCODONE    IR 5 milliGRAM(s) Oral every 4 hours PRN Moderate Pain (4 - 6)      I&O's Summary    26 Feb 2023 07:01  -  27 Feb 2023 07:00  --------------------------------------------------------  IN: 2049 mL / OUT: 1650 mL / NET: 399 mL    27 Feb 2023 07:01  -  28 Feb 2023 05:18  --------------------------------------------------------  IN: 325 mL / OUT: 880 mL / NET: -555 mL        Vital Signs Last 24 Hrs  T(C): 37.2 (28 Feb 2023 00:00), Max: 37.2 (28 Feb 2023 00:00)  T(F): 98.9 (28 Feb 2023 00:00), Max: 98.9 (28 Feb 2023 00:00)  HR: 114 (28 Feb 2023 00:00) (90 - 148)  BP: 117/68 (28 Feb 2023 00:00) (110/58 - 139/75)  BP(mean): --  RR: 18 (28 Feb 2023 00:00) (18 - 20)  SpO2: 98% (28 Feb 2023 00:00) (93% - 100%)    Parameters below as of 28 Feb 2023 00:00  Patient On (Oxygen Delivery Method): room air        CAPILLARY BLOOD GLUCOSE      POCT Blood Glucose.: 101 mg/dL (28 Feb 2023 03:41)  POCT Blood Glucose.: 104 mg/dL (27 Feb 2023 21:33)  POCT Blood Glucose.: 125 mg/dL (27 Feb 2023 16:46)  POCT Blood Glucose.: 113 mg/dL (27 Feb 2023 11:55)  POCT Blood Glucose.: 122 mg/dL (27 Feb 2023 06:16)      PHYSICAL EXAM:  GENERAL: NAD  HEENT: hoarse voice noted  NECK: Supple, No JVD  CHEST/LUNG: congested, rhonchi and wheezes present as before more prominent on right; on RA  HEART: Tachycardic with regular rhythm; No murmurs, rubs, or gallops  ABDOMEN: Soft, Nontender, distended; Bowel sounds present, + PEG tube in place, some redness noted around site  EXTREMITIES:  2+ Peripheral Pulses, No clubbing, cyanosis. + R knee erythema, edema with TTP, incisions cdi, ROM limited 2/2 pain. right distal leg swelling noted, non-painful  NEUROLOGY: nonfocal. difficult to assess orientation as patient's voice is very hoarse. but nods head to questions and is awake and alert.  SKIN: No rashes or lesions        LABS:                        8.0    30.73 )-----------( 693      ( 27 Feb 2023 05:42 )             27.4     Auto Eosinophil # 0.84  / Auto Eosinophil % 2.7   / Auto Neutrophil # 26.68 / Auto Neutrophil % 86.9  / BANDS % x                            8.1    33.12 )-----------( 663      ( 26 Feb 2023 06:32 )             27.0     Auto Eosinophil # 0.00  / Auto Eosinophil % 0.0   / Auto Neutrophil # 29.68 / Auto Neutrophil % 87.0  / BANDS % 2.6      02-27    134<L>  |  104  |  17  ----------------------------<  103<H>  3.9   |  20<L>  |  0.64  02-26    133<L>  |  105  |  13  ----------------------------<  80  4.4   |  19<L>  |  0.57    Ca    12.1<H>      27 Feb 2023 05:42  Mg     1.80     02-27  Phos  2.3     02-27  TPro  6.6  /  Alb  2.5<L>  /  TBili  <0.2  /  DBili  x   /  AST  22  /  ALT  20  /  AlkPhos  206<H>  02-27  TPro  7.0  /  Alb  2.7<L>  /  TBili  0.3  /  DBili  x   /  AST  23  /  ALT  22  /  AlkPhos  211<H>  02-26    PT/INR - ( 26 Feb 2023 06:32 )   PT: 15.4 sec;   INR: 1.32 ratio         PTT - ( 26 Feb 2023 06:32 )  PTT:27.0 sec              RADIOLOGY & ADDITIONAL TESTS:    Imaging Personally Reviewed:    Consultant(s) Notes Reviewed:      Care Discussed with Consultants/Other Providers:

## 2023-02-28 NOTE — PROGRESS NOTE ADULT - SUBJECTIVE AND OBJECTIVE BOX
Follow Up:  aspiration, FTT, encephalopathy    Interval History/ROS:  de sat earlier today    Allergies  No Known Allergies    ANTIMICROBIALS:  meropenem  IVPB 1000 every 8 hours      OTHER MEDS:  MEDICATIONS  (STANDING):  acetaminophen    Suspension .. 650 every 6 hours  enoxaparin Injectable 40 every 24 hours  gabapentin Solution 300 three times a day  levalbuterol Inhalation 1.25 every 6 hours  levothyroxine 150 <User Schedule>  melatonin 3 at bedtime  methadone   Solution 2.5 at bedtime  ondansetron Injectable 4 every 8 hours PRN  oxyCODONE    IR 10 every 4 hours PRN  oxyCODONE    IR 5 every 4 hours PRN  pantoprazole   Suspension 40 <User Schedule>  polyethylene glycol 3350 17 <User Schedule>  senna Syrup 10 <User Schedule>  sodium chloride 3%  Inhalation 4 every 12 hours      Vital Signs Last 24 Hrs  T(C): 37.4 (28 Feb 2023 11:41), Max: 37.4 (28 Feb 2023 11:41)  T(F): 99.3 (28 Feb 2023 11:41), Max: 99.3 (28 Feb 2023 11:41)  HR: 117 (28 Feb 2023 11:41) (96 - 148)  BP: 112/52 (28 Feb 2023 11:41) (110/58 - 139/75)  BP(mean): --  RR: 19 (28 Feb 2023 11:41) (18 - 20)  SpO2: 95% (28 Feb 2023 11:41) (86% - 100%)    Parameters below as of 28 Feb 2023 11:41  Patient On (Oxygen Delivery Method): nasal cannula  O2 Flow (L/min): 3    PHYSICAL EXAM:  General: WN/WD NAD, Non-toxic  Neurology: A&Ox3, nonfocal  Respiratory: wetter rhonchi on right anterior chest  CV: RRR, S1S2, no murmurs, rubs or gallops  Abdominal: Soft, Non-tender, non-distended, normal bowel sounds  Extremities: No edema  Line Sites: Clear  Skin: No rash                        8.1    32.31 )-----------( 684      ( 28 Feb 2023 07:20 )             27.5   WBC Count: 32.31 (02-28 @ 07:20)  WBC Count: 30.73 (02-27 @ 05:42)  WBC Count: 33.12 (02-26 @ 06:32)  WBC Count: 33.17 (02-25 @ 07:08)  WBC Count: 34.36 (02-24 @ 06:49)    02-28    137  |  105  |  17  ----------------------------<  115<H>  4.4   |  19<L>  |  0.61    Ca    11.8<H>      28 Feb 2023 07:20  Phos  1.8     02-28  Mg     1.60     02-28    TPro  7.1  /  Alb  2.7<L>  /  TBili  0.2  /  DBili  x   /  AST  27  /  ALT  23  /  AlkPhos  241<H>  02-28    MICROBIOLOGY:  .Blood Blood-Peripheral  02-22-23   No Growth Final  --  --      .Sputum Sputum  02-17-23   Moderate Enterobacter cloacae complex  Normal Respiratory Susan present  --  Enterobacter cloacae complex      .Blood Blood-Peripheral  02-17-23   No Growth Final  --  --      .Blood Blood-Venous  02-17-23   No Growth Final  --  --      Clean Catch Clean Catch (Midstream)  02-13-23   <10,000 CFU/mL Normal Urogenital Susan  --  --      .Blood Blood-Peripheral  02-13-23   No Growth Final  --  --      .Blood Blood-Venous  02-13-23   No Growth Final  --  --      .Body Fluid Synovial Fluid  02-05-23   No growth at 14 days.  --    No polymorphonuclear leukocytes per low power field  No organisms seen per oil power field  by cytocentrifuge      Clean Catch Clean Catch (Midstream)  02-05-23   No growth  --  --      .Blood Blood-Peripheral  02-05-23   No Growth Final  --  --      .Blood Blood-Peripheral  02-05-23   No Growth Final  --  --    Rapid RVP Result: NotDetec (02-23 @ 06:57)    RADIOLOGY:  < from: Xray Chest 1 View- PORTABLE-Urgent (Xray Chest 1 View- PORTABLE-Urgent .) (02.28.23 @ 12:07) >  IMPRESSION: Follow-up large left-sided soft tissue mass.    < end of copied text >    Delonte Simms MD; Division of Infectious Disease; Pager: [phone]; nights and weekends: [phone]

## 2023-02-28 NOTE — PROGRESS NOTE ADULT - PROBLEM SELECTOR PLAN 5
-SCC esophageal cancer dx 8 years ago on irinotecan   CTA decreased RUL ground glass opacities and unchanged large mass in left upper hemithorax invading left chest   wall with erosive changes of left 3rd through 5th ribs  Previous right patellar bx path with SCC  -has outpt f/u with onc Dr. Gi Thomas  -outpatient rad onc Dr. Brittany Reza   -rad onc: no RT inpatient with open infected wound SCC esophageal cancer dx 8 years ago on irinotecan   CTA decreased RUL ground glass opacities and unchanged large mass in left upper hemithorax invading left chest   wall with erosive changes of left 3rd through 5th ribs  Previous right patellar bx path with SCC  -has outpt f/u with onc Dr. Gi Smith  -outpatient rad onc Dr. Brittany Reza. Per rad onc: no RT inpatient with open infected wound

## 2023-02-28 NOTE — PROGRESS NOTE ADULT - ATTENDING COMMENTS
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 Found to have SIRS (tachy, leukocytosis) w/ c/f right knee septic arthritis s/p arthrocentesis on 2/5 not yielding any organisms however +hemarthrosis. Course c/b V-Tach- now resolved. S/p TTE showing EF 74%, mild pulm htn. Course also c/b hypercalcemia and acute respiratory failure with hypoxemia likely 2/2 aspiration PNA.      On events noted.     #Acute respiratory failure with hypoxemia: possibly aspiration pna in the setting of multiple teeth extractions. Monitor O2 sat requirements and supplement as needed. Will c/w Chest vest, xopenex, deep suction as needed. ID following and recs appreciated. Will c/w meropenem for now.    #SIRS criteria: fever, tachycardic, leukocytosis: considering aspiration vs malignancy vs clot (less likely as LE doppler negative for DVT on 2/5 and 2/26): Repeat cultures and MRSA neg. Sputum culture growing enterobacter. Now on uma per ID recs. Monitor WBC levels     #Hemarthrosis - Right knee cultures NGTD, ESR/CRP elevated, suspect inflammation in setting of hemarthrosis and malignancy. Right knee immobilizer while out of bed. Monitor H/H. Pain control PRN    #Hypercalcemia : Suspect due to malignancy, low PTH, normal Vit D. S/p calcitonin, ivfs and lasix. s/p extractions 2/16 and 2/23. Endo following.  Pt s/p zometa on 2/18 and denosumab on 2/27.  C/w fluids for now. Monitor ca levels    #Met SCC Esophageal Ca - Was planned to start RT as outpatient but never started. Previous right patellar bx path with SCC, Rad Onc with no present plans for RT inpatient. C/w pain control For cancer related pain: methadone 2.5mg daily (lowered from BID given family concern about sedation) and gabapentin w/ PRN oxycodone.     Discussed with HS2 and wife at bedside

## 2023-02-28 NOTE — PROGRESS NOTE ADULT - PROBLEM SELECTOR PLAN 8
recheck TSH and free T4 as per endo  TSH still high, T4 normal but on low end  -c/w synthroid 150 mcg (different time than other meds and hold TFs one hour before and after)  -appreciate endo recs  -recheck on 2/28 recheck TSH and free T4 as per endo  TSH still high, T4 normal but on low end  -c/w synthroid 150 mcg (different time than other meds and hold TFs one hour before and after)  -appreciate endo recs  -recheck on 2/28 -> TSH mildly elevated but significantly improved recheck TSH and free T4 as per endo  TSH still high, T4 normal but on low end  -c/w synthroid 150 mcg (different time than other meds and hold TFs one hour before and after)  -appreciate endo recs  -recheck on 2/28 -> TSH (4.32) mildly elevated but significantly improved (from 23.86) -endo following  -c/w synthroid 150 mcg (different time than other meds and hold TFs one hour before and after)  -recheck on 2/28 -> TSH (4.32) mildly elevated but significantly improved (from 23.86)

## 2023-02-28 NOTE — PROGRESS NOTE ADULT - PROBLEM SELECTOR PLAN 1
baseline tachy in 110s likely in setting of medical disease cancer but newly tachy to 160s 2/13  baseline leukocytosis in 20k range -> increased to 35k 2/14  newly febrile 2/13  MRSA neg at admission  unclear source; initial concern for septic arthritis but tap neg for infection; possible aspiration pneumonia given CXR findings  PCT elevated to 5  Lactate elevation, downtrended  -BCx -> NGTD  -UA/Ucx neg  -CXR to look for pneumonia given concern for aspiration -> right perihilar opacity  -RVP neg  -empiric Zosyn -> switched to uma due to fevers as per ID  -trend WBC/monitor fever curve   -add vancomycin and do MRSA/MSSA -> dc vanc as MRSA neg  - sputum cx results -> Enterobacter cloacae, f/u sensitivities -> if ID ok, can transition to FQ -> c/w uma until 2/28  -ID c/s as unclear source; aspiration pneumonia vs post-obstructive vs malignancy  **continues to spike fevers (last fever on 2/22)  and worsening leukocytosis -> ID believes this is leukomoid reaction and related to continued aspiration  -duplex ordered to eval for alternative causes -> neg baseline tachy in 110s likely in setting of medical disease cancer but newly tachy to 160s 2/13  baseline leukocytosis in 20k range -> increased to 35k 2/14  newly febrile 2/13  MRSA neg at admission  unclear source; initial concern for septic arthritis but tap neg for infection; possible aspiration pneumonia given CXR findings  PCT elevated to 5  Lactate elevation, downtrended  -BCx -> NGTD  -UA/Ucx neg  -CXR to look for pneumonia given concern for aspiration -> right perihilar opacity  -RVP neg  -empiric Zosyn -> switched to uma due to fevers as per ID  -trend WBC/monitor fever curve   -add vancomycin and do MRSA/MSSA -> dc vanc as MRSA neg  - sputum cx results -> Enterobacter cloacae, f/u sensitivities -> if ID ok, can transition to FQ -> c/w uma until 2/28  -ID c/s as unclear source; aspiration pneumonia vs post-obstructive vs malignancy  **continues to spike fevers (last fever on 2/22) and worsening leukocytosis from b/l-> ID believes this is leukomoid reaction and related to continued aspiration  -duplex ordered to eval for alternative causes -> neg baseline tachy in 110s likely in setting of medical disease cancer but newly tachy to 160s 2/13  baseline leukocytosis in 20k range -> increased to 35k 2/14  newly febrile 2/13  MRSA neg at admission  mostly likely 2/2 aspiration pneumonia, Scx w/ growth  PCT elevated to 5  Lactate elevation, downtrended  -BCx -> NGTD  -UA/Ucx neg  -CXR to look for pneumonia given concern for aspiration -> right perihilar opacity  -RVP neg  -empiric Zosyn (2/5-2/9, 2/) -> switched to uam (2/17 - ) due to fevers as per ID  -trend WBC/monitor fever curve   -add vancomycin and do MRSA/MSSA -> dc vanc as MRSA neg  - sputum cx results -> Enterobacter cloacae sensitive to FQs but clinically not improving -> c/w uma given CXR findings on 2/28  -ID c/s as unclear source; aspiration pneumonia vs post-obstructive vs malignancy  **continues to spike fevers (last fever on 2/22) and worsening leukocytosis from b/l-> ID believes this is leukomoid reaction and related to continued aspiration  -duplex ordered to eval for alternative causes -> neg baseline tachy in 110s likely in setting of medical disease cancer but newly tachy to 160s 2/13  baseline leukocytosis in 20k range -> increased to 35k 2/14  newly febrile 2/13  MRSA neg at admission  mostly likely 2/2 aspiration pneumonia, Scx w/ growth  PCT elevated to 5  Lactate elevation, downtrended  -BCx -> NGTD  -UA/Ucx neg  -CXR to look for pneumonia given concern for aspiration -> right perihilar opacity  -RVP neg  -empiric Zosyn (2/5-2/9, 2/) -> switched to uma (2/17 - ) due to fevers as per ID  -trend WBC/monitor fever curve   -add vancomycin and do MRSA/MSSA -> dc vanc as MRSA neg  - sputum cx results -> Enterobacter cloacae sensitive to FQs but clinically not improving -> c/w uma as per ID given CXR findings on 2/28  -ID c/s as unclear source; aspiration pneumonia vs post-obstructive vs malignancy  **continues to spike fevers (last fever on 2/22 though has been declining rectal temps) and worsening leukocytosis from b/l-> ID believes this is leukomoid reaction and related to continued aspiration  -duplex ordered to eval for alternative causes -> neg baseline tachy in 110s likely in setting of medical disease cancer but newly tachy to 160s 2/13  baseline leukocytosis in 20k range -> increased to 35k 2/14  newly febrile 2/13  MRSA neg at admission  mostly likely 2/2 aspiration pneumonia, Scx w/ growth  PCT elevated to 5  Lactate elevation, downtrended  -BCx -> NGTD  -UA/Ucx neg  -CXR to look for pneumonia given concern for aspiration -> right perihilar opacity  -RVP neg  -empiric Zosyn (2/5-2/9, 2/) -> switched to uma (2/17 - ) due to fevers as per ID  -trend WBC/monitor fever curve   -add vancomycin and do MRSA/MSSA -> dc vanc as MRSA neg  - sputum cx results -> Enterobacter cloacae sensitive to FQs but clinically not improving -> c/w uma as per ID given desat on 2/28  -ID c/s as unclear source; aspiration pneumonia vs post-obstructive vs malignancy  **continues to spike fevers (last fever on 2/22 though has been declining rectal temps) and worsening leukocytosis from b/l-> ID believes this is leukomoid reaction and related to continued aspiration  -duplex ordered to eval for alternative causes -> neg Mostly likely 2/2 aspiration pneumonia +/- post-obstructive +/- malignancy  MRSA neg at admission  Scx w/ growth  Enterobacter cloacae   PCT elevated to 5  BCx -> NGTD  UA/Ucx neg  CXR -> right perihilar opacity  RVP neg  duplex ordered to eval for alternative causes -> neg  - Pt s/p empiric Zosyn (2/5-2/9, 2/) ->then switched to uma (2/17 - ) due to recurrent fevers. Vancomycin was also added then dcd as MRSA/MSSA neg. C/w uma for now per ID given desat on 2/28.  -trend WBC/monitor fever curve. Pt has been afebrile  -C/w nebs. Due to tachycardia will try Xopenex instead of duoneb  -IS and chest PT -> chest vest ordered  -O2 as needed -> may require on d/c

## 2023-02-28 NOTE — PROGRESS NOTE ADULT - ASSESSMENT
66 yo male with metastatic esophageal carcinoma including known bone involvement presents with new onset hypercalcemia, as well as hypothyroidism and prediabetes.    1) Hypercalcemia of malignancy  Labs show suppressed PTH (6) and negative PTHRP suggesting cause of hypercalcemia to be local bone osteolysis from metastatic disease. DDx includes high 1,25 Vit D but less likely for this malignancy. 1,25 D resulted 78 upper normal range. 25OHD 33.4 wnl.  Risk of ONJ even with current dental issues is greatly outweighed by hypercalcemia of malignancy and bone mets.  S/p calcitonin 4 doses plus 2 additional doses previously.  -s/p first dose of zoledronate 4mg IV on 2/18/23 - failed bisphosphonate tx, remained persistently hypercalcemic   -maintain hydration status. encourage PO as tolerated and if able to give further IV hydration would recommend doing so today.  -s/p dental extraction again 2/23 . Per wife now extractions are completed.  -s/p first dose of Denosumab 120mg on 2/27/23 (corrected Ca 13.3 at that time)  -Ultimate treatment of hypercalcemia is oncology-directed --> planned for outpatient palliative RT. No inpatient plans. thus after dental work would consider xgeva/denosumab for hypercalcemia management. Repeated dosing would need to be coordinated outpatient with oncology. Of note - denosumab has general higher ONJ risk in setting of malignancy. Options for hypercalcemia are limited for this pt if bisphosphonate dosing is not sufficient for control of hypercalcemia  -would use PO phos for repletion  -s/p 2 additional doses of calcitonin given 2/23, as well as a dose of furosemide.  -Corrected calcium today 12.84, mildly improved   -tele monitoring  -Iv fluids/free water flushes as tolerated per primary team, monitor for fluid overload.    2) Hypothyroidism  TSH 2/21 increased to 23.4 (prior 7.56) and Free T4 0.9 (prior 1.0)  Noted that levothyroxine is being given at same time as pantoprazole and other po meds.  Please time levothyroxine for 5 AM and other oral meds in AM for 8AM.  Ensure 1 hour at least between levothyroxine and tube feeds or other po meds, especially pantoprazole.  Ensure at least 4 hours between levothyroxine and multivitamin.  2/28/23 TSH trending down to 4.32, FT4 1.4 wnl   Continue levothyroxine 150 mcg PEG daily    3) Prediabetes  FS q6h, on bolus feeds but not having hyperglycemia  If needed can add low Admelog correction scale q6h  HbA1c 5.8%  no DM meds for dc.    Suyapa Russell,    Endocrine Fellow  For follow up questions, discharge recommendations, or new consults please call answering service at [phone] (weekdays), [phone] (nights/weekends). For nonurgent matters, please email [email or web address] or [email or web address]

## 2023-03-01 NOTE — PROGRESS NOTE ADULT - PROBLEM SELECTOR PLAN 3
Family noting patient has occasionally been confused. As per family chronically not oriented to time  Multifactorical etiology in setting to infection, hypercalcemia, inflammation from cancer, hospital delirium, medications (meropenem vs opioids though not on high dose of opioids)  Neuro exam nonfocal    Improving  -tx underlying causes as stated   -monitor mental status  -not agitated so conservative measures

## 2023-03-01 NOTE — PROGRESS NOTE ADULT - ASSESSMENT
66 yo male with metastatic esophageal carcinoma including known bone involvement presents with new onset hypercalcemia, as well as hypothyroidism and prediabetes.    1) Hypercalcemia of malignancy  Labs show suppressed PTH (6) and negative PTHRP suggesting cause of hypercalcemia to be local bone osteolysis from metastatic disease. DDx includes high 1,25 Vit D but less likely for this malignancy. 1,25 D resulted 78 upper normal range. 25OHD 33.4 wnl.  Risk of ONJ even with current dental issues is greatly outweighed by hypercalcemia of malignancy and bone mets.  S/p calcitonin 4 doses plus 2 additional doses previously.  -s/p first dose of zoledronate 4mg IV on 2/18/23 - failed bisphosphonate tx, remained persistently hypercalcemic   -maintain hydration status. encourage PO as tolerated and if able to give further IV hydration would recommend doing so today.  -s/p dental extraction again 2/23 . Per wife now extractions are completed.  -s/p first dose of Denosumab 120mg on 2/27/23 (corrected Ca 13.3 at that time)  -Ultimate treatment of hypercalcemia is oncology-directed --> planned for outpatient palliative RT. No inpatient plans. thus after dental work would consider xgeva/denosumab for hypercalcemia management. Repeated dosing would need to be coordinated outpatient with oncology. Of note - denosumab has general higher ONJ risk in setting of malignancy. Options for hypercalcemia are limited for this pt if bisphosphonate dosing is not sufficient for control of hypercalcemia  -would use PO phos for repletion  -s/p 2 additional doses of calcitonin given 2/23, as well as a dose of furosemide.  -Corrected calcium today 12.84-->12.3, mildly improved   -tele monitoring  -Iv fluids/free water flushes as tolerated per primary team, monitor for fluid overload.    2) Hypothyroidism  TSH 2/21 increased to 23.4 (prior 7.56) and Free T4 0.9 (prior 1.0)  Noted that levothyroxine is being given at same time as pantoprazole and other po meds.  Please time levothyroxine for 5 AM and other oral meds in AM for 8AM.  Ensure 1 hour at least between levothyroxine and tube feeds or other po meds, especially pantoprazole.  Ensure at least 4 hours between levothyroxine and multivitamin.  2/28/23 TSH trending down to 4.32, FT4 1.4 wnl   Continue levothyroxine 150 mcg PEG daily    3) Prediabetes  FS q6h, on bolus feeds but not having hyperglycemia  If needed can add low Admelog correction scale q6h  HbA1c 5.8%  no DM meds for dc.    Andie Licona MD  Attending Physician   Department of Endocrinology, Diabetes and Metabolism   [employer] Teams on 03-01-23 @ 15:34    If before 9AM or after 5PM, or on weekends/holidays, please call the Endocrine answering service for assistance ([phone]).  For nonurgent matters, please email [email or web address] for assistance.

## 2023-03-01 NOTE — PROGRESS NOTE ADULT - PROBLEM SELECTOR PLAN 1
Mostly likely 2/2 aspiration pneumonia +/- post-obstructive +/- malignancy  MRSA neg at admission  Scx w/ growth  Enterobacter cloacae   PCT elevated to 5  BCx -> NGTD  UA/Ucx neg  CXR -> right perihilar opacity  RVP neg  duplex ordered to eval for alternative causes -> neg  - Pt s/p empiric Zosyn (2/5-2/9, 2/) ->then switched to uma (2/17 - ) due to recurrent fevers. Vancomycin was also added then dcd as MRSA/MSSA neg. C/w uma for now per ID given desat on 2/28.  -trend WBC/monitor fever curve. Pt has been afebrile  -C/w nebs. Due to tachycardia will try Xopenex instead of duoneb  -IS and chest PT -> chest vest ordered  -O2 as needed -> may require on d/c

## 2023-03-01 NOTE — PROGRESS NOTE ADULT - ASSESSMENT
67M with  metastatic esophageal cancer (dx 8 years ago) to bone on irinotecan, r. knee, hypothyroidism, GERD, HTN recent RUL pna s/p cefepime, r. patellar mass excision --> metastatic squamous cell carcinoma on 1/7/23 was admitted on 2/5/23  w/ right knee pain and swelling for 2 days.    Recently hospitalized  underwent patellectomy for met SCC on 1/7/23  Course complicated by pneumonia attributed to Enterobacter treated with Cefepime 1/11 --> 1/18  hypercalcemia - calcitonin administered 2/18, 2/19, Zometa 2/18    Recent in patient Antibiotics  Vanco 1/1-->1/4;  2/5 2/13--> 2/17  Zosyn 1/1;  2/5, 2/6l 2/17 -->  Cefepime 1/1-->4; 1/11-->18  Cefazolin 1/7  Meropenem  2/17 -->    encephalopathy  leukemoid reaction  hypercalcemia  2/16 dental extraction to reduce risk of OsteoNecrosis of Jaw  2/23: Elevator and rongeur used to deliver root tips #6, 7, 9 and 10 without complications  He has large tumor burden and mass with scant residual left lung  2/17 Neg MRSA PCR  2/17 Sputum with Enterobacter cloacae:   no bacteremia documented    2/24 modestly improved appearance  2/28  appears more alert    Suggest  Continue   Meropenem 1 gm every 8 hours for now  vigorous pulmonary toilet

## 2023-03-01 NOTE — PROGRESS NOTE ADULT - SUBJECTIVE AND OBJECTIVE BOX
HPI: 66 yo male with metastatic esophageal carcinoma including known bone involvement presents with new onset hypercalcemia, as well as hypothyroidism and prediabetes.    Interval history:  son at bedside  pt was able to rest today  corrected calcium 12.3 today    MEDICATIONS  (STANDING):  acetaminophen    Suspension .. 650 milliGRAM(s) Oral every 6 hours  ascorbic acid 500 milliGRAM(s) Oral <User Schedule>  chlorhexidine 2% Cloths 1 Application(s) Topical daily  enoxaparin Injectable 40 milliGRAM(s) SubCutaneous every 24 hours  gabapentin Solution 300 milliGRAM(s) Oral three times a day  levalbuterol Inhalation 1.25 milliGRAM(s) Inhalation every 6 hours  levothyroxine 150 MICROGram(s) Oral <User Schedule>  lidocaine   4% Patch 1 Patch Transdermal daily  melatonin 3 milliGRAM(s) Oral at bedtime  meropenem  IVPB 1000 milliGRAM(s) IV Intermittent every 8 hours  methadone   Solution 2.5 milliGRAM(s) Oral at bedtime  multivitamin 1 Tablet(s) Oral <User Schedule>  nystatin Powder 1 Application(s) Topical every 12 hours  pantoprazole   Suspension 40 milliGRAM(s) Oral <User Schedule>  senna Syrup 10 milliLiter(s) Oral <User Schedule>  sodium chloride 3%  Inhalation 4 milliLiter(s) Inhalation every 12 hours    MEDICATIONS  (PRN):  naloxone Injectable 0.3 milliGRAM(s) IV Push every 3 minutes PRN opioid induced AMS  ondansetron Injectable 4 milliGRAM(s) IV Push every 8 hours PRN Nausea and/or Vomiting      Allergies    No Known Allergies    Intolerances        Review of Systems:  unable to assess due to mental status    PHYSICAL EXAM:  VITALS: T(C): 36.4 (03-01-23 @ 11:08)  T(F): 97.5 (03-01-23 @ 11:08), Max: 102.4 (02-28-23 @ 23:13)  HR: 95 (03-01-23 @ 11:08) (85 - 150)  BP: 112/57 (03-01-23 @ 11:08) (94/53 - 129/69)  RR:  (18 - 20)  SpO2:  (95% - 100%)  Wt(kg): --  GENERAL: NAD, well-groomed, well-developed, chronically ill appearing  EYES: No proptosis, no lid lag, anicteric  HEENT:  Atraumatic, normocephalic, dry mucous membranes  RESPIRATORY: nonlabored respirations, no wheezing  PSYCH:  normal affect, normal mood    POCT Blood Glucose.: 107 mg/dL (03-01-23 @ 10:53)  POCT Blood Glucose.: 96 mg/dL (03-01-23 @ 03:52)  POCT Blood Glucose.: 91 mg/dL (02-28-23 @ 21:28)  POCT Blood Glucose.: 131 mg/dL (02-28-23 @ 16:57)  POCT Blood Glucose.: 133 mg/dL (02-28-23 @ 11:34)  POCT Blood Glucose.: 129 mg/dL (02-28-23 @ 09:45)  POCT Blood Glucose.: 101 mg/dL (02-28-23 @ 03:41)  POCT Blood Glucose.: 104 mg/dL (02-27-23 @ 21:33)  POCT Blood Glucose.: 125 mg/dL (02-27-23 @ 16:46)  POCT Blood Glucose.: 113 mg/dL (02-27-23 @ 11:55)  POCT Blood Glucose.: 122 mg/dL (02-27-23 @ 06:16)  POCT Blood Glucose.: 108 mg/dL (02-26-23 @ 23:51)  POCT Blood Glucose.: 122 mg/dL (02-26-23 @ 16:53)                            7.1    27.42 )-----------( 680      ( 01 Mar 2023 06:30 )             24.7       03-01    138  |  107  |  24<H>  ----------------------------<  117<H>  4.0   |  20<L>  |  0.83    eGFR: 96    Ca    11.1<H>      03-01  Mg     1.80     03-01  Phos  3.6     03-01    TPro  6.3  /  Alb  2.5<L>  /  TBili  0.2  /  DBili  x   /  AST  21  /  ALT  20  /  AlkPhos  202<H>  03-01      Thyroid Function Tests:  02-28 @ 07:20 TSH 4.32 FreeT4 1.4 T3 -- Anti TPO -- Anti Thyroglobulin Ab -- TSI --  02-21 @ 06:20 TSH 23.86 FreeT4 0.9 T3 -- Anti TPO -- Anti Thyroglobulin Ab -- TSI --          Radiology:

## 2023-03-01 NOTE — PROVIDER CONTACT NOTE (OTHER) - ASSESSMENT
Pt A&O x2 (self and place), pt tachycardic on telemetry, rectal temp 102.4, nonverbal indicators of chest pain absent, pt dyspneic on exertion at baseline on 3L NC, Continuous pulse ox maintained, see flowsheet for vital signs

## 2023-03-01 NOTE — PROGRESS NOTE ADULT - PROBLEM SELECTOR PLAN 2
PTH below normal, Phos low -> most likely hyperca of malignancy  EKG reviewed no ischemic changes  - S/p calcitonin on 2/13-2/14, 2/18-2/19 and IVFs.   -S/p dental extraction 2/16 and on 2/23 to reduce risk of jaw osteonecrosis w/ bisphosphonate administration.   - S/p zolendronate 2/19   -Given minimal improvement Xgeva given on 2/27 per endo recs   -monitor ca levels, will take a couple days to see effect of xgeva   -will transition from ivfs fluids to FW flushes 2/28

## 2023-03-01 NOTE — PROGRESS NOTE ADULT - PROBLEM SELECTOR PLAN 6
-c/w tylenol mild pain  -oxycodone 5 q4 prn for moderate pain, oxy 10 q4 prn for severe pain  -c/w gabapentin 300 mg TID

## 2023-03-01 NOTE — PROGRESS NOTE ADULT - SUBJECTIVE AND OBJECTIVE BOX
Follow Up:  leukocytosis    Interval History/ROS:  more awake    Allergies  No Known Allergies    ANTIMICROBIALS:  meropenem  IVPB 1000 every 8 hours      OTHER MEDS:  MEDICATIONS  (STANDING):  acetaminophen    Suspension .. 650 every 6 hours  enoxaparin Injectable 40 every 24 hours  gabapentin Solution 300 three times a day  levalbuterol Inhalation 1.25 every 6 hours  levothyroxine 150 <User Schedule>  melatonin 3 at bedtime  methadone   Solution 2.5 at bedtime  ondansetron Injectable 4 every 8 hours PRN  pantoprazole   Suspension 40 <User Schedule>  senna Syrup 10 <User Schedule>  sodium chloride 3%  Inhalation 4 every 12 hours      Vital Signs Last 24 Hrs  T(C): 36.4 (01 Mar 2023 11:08), Max: 39.1 (28 Feb 2023 23:13)  T(F): 97.5 (01 Mar 2023 11:08), Max: 102.4 (28 Feb 2023 23:13)  HR: 95 (01 Mar 2023 11:08) (85 - 150)  BP: 112/57 (01 Mar 2023 11:08) (94/53 - 129/69)  BP(mean): --  RR: 18 (01 Mar 2023 11:08) (18 - 20)  SpO2: 100% (01 Mar 2023 11:08) (95% - 100%)    Parameters below as of 01 Mar 2023 11:08  Patient On (Oxygen Delivery Method): nasal cannula  O2 Flow (L/min): 3      PHYSICAL EXAM:  General: NAD, Non-toxic  Neurology: Awake  Respiratory: some air entry left chest,  bilat rhonchi  CV: RRR, S1S2, no murmurs, rubs or gallops  Abdominal: Soft, Non-tender, non-distended, normal bowel sounds  Extremities: No edema,   Line Sites: Clear  Skin: No rash                        7.1    27.42 )-----------( 680      ( 01 Mar 2023 06:30 )             24.7   WBC Count: 27.42 (03-01 @ 06:30)  WBC Count: 32.31 (02-28 @ 07:20)  WBC Count: 30.73 (02-27 @ 05:42)  WBC Count: 33.12 (02-26 @ 06:32)  WBC Count: 33.17 (02-25 @ 07:08)      03-01    138  |  107  |  24<H>  ----------------------------<  117<H>  4.0   |  20<L>  |  0.83    Ca    11.1<H>      01 Mar 2023 06:30  Phos  3.6     03-01  Mg     1.80     03-01    TPro  6.3  /  Alb  2.5<L>  /  TBili  0.2  /  DBili  x   /  AST  21  /  ALT  20  /  AlkPhos  202<H>  03-01      MICROBIOLOGY:  .Blood Blood-Peripheral  02-22-23   No Growth Final  --  --      .Sputum Sputum  02-17-23   Moderate Enterobacter cloacae complex  Normal Respiratory Susan present  --  Enterobacter cloacae complex      .Blood Blood-Peripheral  02-17-23   No Growth Final  --  --      .Blood Blood-Venous  02-17-23   No Growth Final  --  --      Clean Catch Clean Catch (Midstream)  02-13-23   <10,000 CFU/mL Normal Urogenital Susan  --  --      .Blood Blood-Peripheral  02-13-23   No Growth Final  --  --      .Blood Blood-Venous  02-13-23   No Growth Final  --  --      .Body Fluid Synovial Fluid  02-05-23   No growth at 14 days.  --    No polymorphonuclear leukocytes per low power field  No organisms seen per oil power field  by cytocentrifuge      Clean Catch Clean Catch (Midstream)  02-05-23   No growth  --  --      .Blood Blood-Peripheral  02-05-23   No Growth Final  --  --      .Blood Blood-Peripheral  02-05-23   No Growth Final  --  --    Rapid RVP Result: NotDetec (02-23 @ 06:57)        RADIOLOGY:  < from: Xray Chest 1 View- PORTABLE-Urgent (Xray Chest 1 View- PORTABLE-Urgent .) (03.01.23 @ 01:22) >  IMPRESSION: Peripheral right sided airspace opacity consistent with   pneumonia.    < end of copied text >      Delonte Simms MD; Division of Infectious Disease; Pager: [phone]; nights and weekends: [phone]

## 2023-03-01 NOTE — PROGRESS NOTE ADULT - SUBJECTIVE AND OBJECTIVE BOX
Patient is a 67y old  Male who presents with a chief complaint of r. knee pain (01 Mar 2023 13:31)      SUBJECTIVE / OVERNIGHT EVENTS: No acute events overnight. Pt has no new complaints. Son at bedside. Discussed plan and answered all questions     ADDITIONAL REVIEW OF SYSTEMS:    MEDICATIONS  (STANDING):  acetaminophen    Suspension .. 650 milliGRAM(s) Oral every 6 hours  ascorbic acid 500 milliGRAM(s) Oral <User Schedule>  chlorhexidine 2% Cloths 1 Application(s) Topical daily  enoxaparin Injectable 40 milliGRAM(s) SubCutaneous every 24 hours  gabapentin Solution 300 milliGRAM(s) Oral three times a day  levalbuterol Inhalation 1.25 milliGRAM(s) Inhalation every 6 hours  levothyroxine 150 MICROGram(s) Oral <User Schedule>  lidocaine   4% Patch 1 Patch Transdermal daily  melatonin 3 milliGRAM(s) Oral at bedtime  meropenem  IVPB 1000 milliGRAM(s) IV Intermittent every 8 hours  methadone   Solution 2.5 milliGRAM(s) Oral at bedtime  multivitamin 1 Tablet(s) Oral <User Schedule>  nystatin Powder 1 Application(s) Topical every 12 hours  pantoprazole   Suspension 40 milliGRAM(s) Oral <User Schedule>  senna Syrup 10 milliLiter(s) Oral <User Schedule>  sodium chloride 3%  Inhalation 4 milliLiter(s) Inhalation every 12 hours    MEDICATIONS  (PRN):  naloxone Injectable 0.3 milliGRAM(s) IV Push every 3 minutes PRN opioid induced AMS  ondansetron Injectable 4 milliGRAM(s) IV Push every 8 hours PRN Nausea and/or Vomiting      CAPILLARY BLOOD GLUCOSE      POCT Blood Glucose.: 107 mg/dL (01 Mar 2023 10:53)  POCT Blood Glucose.: 96 mg/dL (01 Mar 2023 03:52)  POCT Blood Glucose.: 91 mg/dL (28 Feb 2023 21:28)  POCT Blood Glucose.: 131 mg/dL (28 Feb 2023 16:57)    I&O's Summary    28 Feb 2023 07:01  -  01 Mar 2023 07:00  --------------------------------------------------------  IN: 2625 mL / OUT: 900 mL / NET: 1725 mL    01 Mar 2023 07:01  -  01 Mar 2023 15:06  --------------------------------------------------------  IN: 525 mL / OUT: 0 mL / NET: 525 mL        PHYSICAL EXAM:  Vital Signs Last 24 Hrs  T(C): 36.4 (01 Mar 2023 11:08), Max: 39.1 (28 Feb 2023 23:13)  T(F): 97.5 (01 Mar 2023 11:08), Max: 102.4 (28 Feb 2023 23:13)  HR: 95 (01 Mar 2023 11:08) (85 - 150)  BP: 112/57 (01 Mar 2023 11:08) (94/53 - 129/69)  BP(mean): --  RR: 18 (01 Mar 2023 11:08) (18 - 20)  SpO2: 100% (01 Mar 2023 11:08) (95% - 100%)    Parameters below as of 01 Mar 2023 11:08  Patient On (Oxygen Delivery Method): nasal cannula  O2 Flow (L/min): 3    GENERAL: NAD  HEENT: hoarse voice noted  NECK: Supple, No JVD  CHEST/LUNG: congested, rhonchi and wheezes present as before more prominent on right; on RA  HEART: Tachycardic with regular rhythm; No murmurs, rubs, or gallops  ABDOMEN: Soft, Nontender, distended; Bowel sounds present, + PEG tube in place, some redness noted around site  EXTREMITIES:  2+ Peripheral Pulses, No clubbing, cyanosis. + R knee erythema, edema with TTP, incisions cdi, ROM limited 2/2 pain. R distal leg swelling noted, non-painful  NEUROLOGY: nonfocal. difficult to assess orientation as patient's voice is very hoarse. but nods head to questions and is awake and alert.  SKIN: No rashes or lesions     LABS:                        7.1    27.42 )-----------( 680      ( 01 Mar 2023 06:30 )             24.7     03-01    138  |  107  |  24<H>  ----------------------------<  117<H>  4.0   |  20<L>  |  0.83    Ca    11.1<H>      01 Mar 2023 06:30  Phos  3.6     03-01  Mg     1.80     03-01    TPro  6.3  /  Alb  2.5<L>  /  TBili  0.2  /  DBili  x   /  AST  21  /  ALT  20  /  AlkPhos  202<H>  03-01                RADIOLOGY & ADDITIONAL TESTS:  Results Reviewed:   Imaging Personally Reviewed:  Electrocardiogram Personally Reviewed:    COORDINATION OF CARE:  Care Discussed with Consultants/Other Providers [Y/N]:  Prior or Outpatient Records Reviewed [Y/N]:

## 2023-03-01 NOTE — PROVIDER CONTACT NOTE (OTHER) - ASSESSMENT
Pt A&O x2 (self and place), pt tachycardic on telemetry, nonverbal indicators of chest pain absent, pt dyspneic at baseline on 3l NC, see flowsheet for vital signs

## 2023-03-01 NOTE — PROGRESS NOTE ADULT - PROBLEM SELECTOR PLAN 8
-endo following  -c/w synthroid 150 mcg (different time than other meds and hold TFs one hour before and after)  -recheck on 2/28 -> TSH (4.32) mildly elevated but significantly improved (from 23.86)

## 2023-03-01 NOTE — CHART NOTE - NSCHARTNOTEFT_GEN_A_CORE
[facility] Medicine Night Coverage     Notified by RN, patient tachycardic to 150's and Rectal temperature of 102.4 F. Patient seen and assessed at bedside, resting comfortably in NAD. AOx1-2.   EKG reviewed, normal sinus tachycardia. [facility] Medicine Night Coverage     Notified by RN, patient tachycardic to 150's and Rectal temperature of 102.4 F. Patient seen and assessed at bedside, resting comfortably in NAD. AOx1-2. Evaluation limited d/t mental status.  EKG reviewed, normal sinus tachycardia. Given Toradol 15mg IVP. Lopressor 2.5 mg administered. Ice packs placed.     Will monitor closely overnight. Will convey overnight events to AM team. [facility] Medicine Night Coverage     Notified by RN, patient tachycardic to 150's and Rectal temperature of 102.4 F. Patient seen and assessed at bedside, resting comfortably in NAD. AOx1-2 (waxes and wanes; at baseline) Evaluation limited d/t mental status, pt unable to provide pertinent history. Lungs clear to ascultation; S1 S2 auscultated; +tachycardia and regular rhythm.       Vital Signs Last 24 Hrs  T(C): 37.9 (01 Mar 2023 01:20), Max: 39.1 (28 Feb 2023 23:13)  T(F): 100.3 (01 Mar 2023 01:20), Max: 102.4 (28 Feb 2023 23:13)  HR: 102 (01 Mar 2023 01:20) (102 - 150)  BP: 124/64 (28 Feb 2023 23:35) (112/52 - 134/75)  RR: 20 (28 Feb 2023 22:44) (18 - 20)  SpO2: 97% (01 Mar 2023 00:03) (86% - 98%)    O2 Parameters below as of 01 Mar 2023 00:03  Patient On (Oxygen Delivery Method): nasal cannula  O2 Flow (L/min): 3      EKG reviewed, sinus tachycardia 148bpm. Given Toradol 15mg IVP. Lopressor 2.5 mg administered. Ice packs placed.  Sinus tachycardia likely 2/2 fever, blood cultures obtained, chest x-ray ordered, will f/u results.   Continue w/ Meropenem.     Will monitor closely overnight. Will convey overnight events to AM team.      Rona Bermeo PA-C  Department of Medicine   [id number]

## 2023-03-01 NOTE — PROGRESS NOTE ADULT - PROBLEM SELECTOR PLAN 5
SCC esophageal cancer dx 8 years ago on irinotecan   CTA decreased RUL ground glass opacities and unchanged large mass in left upper hemithorax invading left chest   wall with erosive changes of left 3rd through 5th ribs  Previous right patellar bx path with SCC  -has outpt f/u with onc Dr. Gi Smith  -outpatient rad onc Dr. Brittany Reza. Per rad onc: no RT inpatient with open infected wound

## 2023-03-01 NOTE — PROGRESS NOTE ADULT - PROBLEM SELECTOR PLAN 4
S/p arthrocentesis 2/5 with RBCs c/f hemarthrosis, less likely septic arthritis given neg cx  ESR/CRP elevated  LE US negative  XR tib-fib and knee unremarkable  Aspiration cx, bcxs ngtd, MRSA neg    - C/w pain control. Palliative recs: methadone 2.5mg bid (now decreased to 2.5mg), Oxy IR 5mg q4 prn mild mod pain, Oxy IR 10mg q4 prn severe pain  - per ortho recs: no additional imaging or interventions at this time. Outpatient ortho Dr. Carter: no infection, likely tumor growth, pt declining palliative amputation at this time

## 2023-03-02 NOTE — PROVIDER CONTACT NOTE (CRITICAL VALUE NOTIFICATION) - PERSON GIVING RESULT:
GLORIA Saavedra
VERÓNICA Dunaway
Miriam R
Edmundo
Chris Benson
Anthony/ Olszewski, Chris
Magalie Harkins-ryan
laboratory/ michael Gonzalez
Dariel
HAKEEM Biggs/Laboratory
hematology/ LAURA Erickson

## 2023-03-02 NOTE — PROGRESS NOTE ADULT - PROBLEM SELECTOR PLAN 3
PTH below normal, Phos low -> most likely hypercalcemia of malignancy  EKG reviewed no ischemic changes  - S/p calcitonin on 2/13-2/14, 2/18-2/19 and IVFs.   -S/p dental extraction 2/16 and on 2/23 to reduce risk of jaw osteonecrosis w/ bisphosphonate administration.   - S/p zolendronate 2/19   -Given minimal improvement Xgeva given on 2/27 per endo recs   -monitor ca levels, will take a couple days to see effect of xgeva   -will transition from ivfs fluids to FW flushes 2/28

## 2023-03-02 NOTE — PROVIDER CONTACT NOTE (CRITICAL VALUE NOTIFICATION) - TEST AND RESULT REPORTED:
Hgb 7/ Hct 23.9
calcium 13.1
VBG 4.5
VBG Ionized calcium 1.78
lactate=4.9
WBC 42.82
Hemoglobin 6. 7
Lactate 4.7
VBG calcium 1.75
Hemoglobin 7
BCx+ aerobic bottle for gram cocci in pairs

## 2023-03-02 NOTE — PROVIDER CONTACT NOTE (CRITICAL VALUE NOTIFICATION) - NAME OF MD/NP/PA/DO NOTIFIED:
VIKI Colon
Burton Morillo
RALEIGH Uriarte
Burton
Dr Boyd Gianluca
HS2 Yisel Manrique
Yisel Manrique
Burton Morillo
Yisel Manrique
MD Linder notified
VIKI Colon

## 2023-03-02 NOTE — PROGRESS NOTE ADULT - ASSESSMENT
67M with  metastatic esophageal cancer (dx 8 years ago) to bone on irinotecan, r. knee, hypothyroidism, GERD, HTN recent RUL pna s/p cefepime, r. patellar mass excision --> metastatic squamous cell carcinoma on 1/7/23 was admitted on 2/5/23  w/ right knee pain and swelling for 2 days.    Recently hospitalized  underwent patellectomy for met SCC on 1/7/23  Course complicated by pneumonia attributed to Enterobacter treated with Cefepime 1/11 --> 1/18  hypercalcemia - calcitonin administered 2/18, 2/19, Zometa 2/18    Recent in patient Antibiotics  Vanco 1/1-->1/4;  2/5 2/13--> 2/17  Zosyn 1/1;  2/5, 2/6l 2/17 -->  Cefepime 1/1-->4; 1/11-->18  Cefazolin 1/7  Meropenem  2/17 -->    encephalopathy  leukemoid reaction  hypercalcemia  2/16 dental extraction to reduce risk of OsteoNecrosis of Jaw  2/23: Elevator and rongeur used to deliver root tips #6, 7, 9 and 10 without complications  He has large tumor burden and mass with scant residual left lung  2/17 Neg MRSA PCR  2/17 Sputum with Enterobacter cloacae:   no bacteremia documented    2/24 modestly improved appearance  2/28  appears more alert  3/1  fever   +BC Enterococcus faecium    Suggest  STOP  Meropenem  Vancomycin 1 gm every 12 hours  repeat blood cultures, check vanco level after 4th dose  vigorous pulmonary toilet    I will be out until 3/6 - ID service will see

## 2023-03-02 NOTE — PROGRESS NOTE ADULT - PROBLEM SELECTOR PLAN 1
Mostly likely 2/2 aspiration pneumonia +/- post-obstructive +/- malignancy  MRSA neg at admission  Scx w/ growth  Enterobacter cloacae   PCT elevated to 5  BCx -> NGTD  Hypotensive overnight on 3/1/23 and more encephalopathic   UA/Ucx neg  CXR -> right perihilar opacity  RVP neg  duplex ordered to eval for alternative causes -> neg  - Pt s/p empiric Zosyn (2/5-2/9, 2/) ->then switched to uma (2/17 - ) due to recurrent fevers. Vancomycin was also added then dcd as MRSA/MSSA neg. C/w uma for now per ID given desat on 2/28.  -trend WBC/monitor fever curve. Pt has been afebrile  -C/w nebs. Due to tachycardia will try Xopenex instead of duoneb  -IS and chest PT -> chest vest ordered  -O2 as needed -> may require on d/c

## 2023-03-02 NOTE — PROGRESS NOTE ADULT - SUBJECTIVE AND OBJECTIVE BOX
Patient is a 67y old  Male who presents with a chief complaint of r. knee pain (01 Mar 2023 15:31)      SUBJECTIVE / OVERNIGHT EVENTS: Pt febrile to 101.9 overnight and hypotensive to 80s systolic. BP improved this AM. Wife at bedside and concerned pt more encephalopathic today  Wife is at bedside. Discussed plan including sepsis, hypercalcemia and anemia management. She expressed wanting DNR/DNI but wants to discuss with her son       ADDITIONAL REVIEW OF SYSTEMS:    MEDICATIONS  (STANDING):  acetaminophen    Suspension .. 650 milliGRAM(s) Oral every 6 hours  ascorbic acid 500 milliGRAM(s) Oral <User Schedule>  chlorhexidine 2% Cloths 1 Application(s) Topical daily  enoxaparin Injectable 40 milliGRAM(s) SubCutaneous every 24 hours  gabapentin Solution 300 milliGRAM(s) Oral three times a day  levalbuterol Inhalation 1.25 milliGRAM(s) Inhalation every 6 hours  levothyroxine 150 MICROGram(s) Oral <User Schedule>  lidocaine   4% Patch 1 Patch Transdermal daily  melatonin 3 milliGRAM(s) Oral at bedtime  meropenem  IVPB 1000 milliGRAM(s) IV Intermittent every 8 hours  methadone   Solution 2.5 milliGRAM(s) Oral at bedtime  multivitamin 1 Tablet(s) Oral <User Schedule>  nystatin Powder 1 Application(s) Topical every 12 hours  pantoprazole   Suspension 40 milliGRAM(s) Oral <User Schedule>  senna Syrup 10 milliLiter(s) Oral <User Schedule>  sodium chloride 3%  Inhalation 4 milliLiter(s) Inhalation every 12 hours    MEDICATIONS  (PRN):  naloxone Injectable 0.3 milliGRAM(s) IV Push every 3 minutes PRN opioid induced AMS  ondansetron Injectable 4 milliGRAM(s) IV Push every 8 hours PRN Nausea and/or Vomiting      CAPILLARY BLOOD GLUCOSE      POCT Blood Glucose.: 122 mg/dL (02 Mar 2023 07:38)  POCT Blood Glucose.: 99 mg/dL (02 Mar 2023 03:33)  POCT Blood Glucose.: 94 mg/dL (01 Mar 2023 21:16)  POCT Blood Glucose.: 120 mg/dL (01 Mar 2023 16:49)    I&O's Summary    01 Mar 2023 07:01  -  02 Mar 2023 07:00  --------------------------------------------------------  IN: 2100 mL / OUT: 500 mL / NET: 1600 mL    02 Mar 2023 07:01  -  02 Mar 2023 11:48  --------------------------------------------------------  IN: 525 mL / OUT: 225 mL / NET: 300 mL        PHYSICAL EXAM:  Vital Signs Last 24 Hrs  T(C): 36.6 (02 Mar 2023 06:12), Max: 38.8 (01 Mar 2023 22:44)  T(F): 97.8 (02 Mar 2023 06:12), Max: 101.9 (01 Mar 2023 22:44)  HR: 103 (02 Mar 2023 06:12) (103 - 144)  BP: 108/61 (02 Mar 2023 06:12) (87/54 - 145/74)  BP(mean): --  RR: 20 (02 Mar 2023 06:12) (19 - 20)  SpO2: 100% (02 Mar 2023 06:12) (96% - 100%)    Parameters below as of 02 Mar 2023 06:12  Patient On (Oxygen Delivery Method): nasal cannula  O2 Flow (L/min): 2    GENERAL: NAD  NECK: Supple, No JVD  CHEST/LUNG: congested, rhonchi and wheezes present as before more prominent on right; on RA  HEART: Tachycardic with regular rhythm; No murmurs, rubs, or gallops  ABDOMEN: Soft, Nontender, distended; Bowel sounds present, + PEG tube in place, some redness noted around site  EXTREMITIES:  2+ Peripheral Pulses, No clubbing, cyanosis. + R knee erythema, edema with TTP, incisions cdi, ROM limited 2/2 pain. R distal leg swelling noted, non-painful  NEUROLOGY:  More lethargic and less responsive today 3/2. nonfocal. difficult to assess orientation as patient's voice is very hoarse.   SKIN: No rashes or lesions     LABS:                        7.0    24.88 )-----------( 683      ( 02 Mar 2023 06:30 )             23.9     03-02    139  |  109<H>  |  20  ----------------------------<  82  4.3   |  20<L>  |  0.62    Ca    10.9<H>      02 Mar 2023 06:30  Phos  2.3     03-02  Mg     1.90     03-02    TPro  6.2  /  Alb  2.4<L>  /  TBili  <0.2  /  DBili  x   /  AST  25  /  ALT  22  /  AlkPhos  206<H>  03-02              Culture - Blood (collected 01 Mar 2023 01:15)  Source: .Blood Blood-Peripheral  Preliminary Report (02 Mar 2023 07:01):    No growth to date.    Culture - Blood (collected 01 Mar 2023 01:00)  Source: .Blood Blood-Venous  Gram Stain (02 Mar 2023 09:06):    Growth in aerobic bottle: Gram positive cocci in pairs  Preliminary Report (02 Mar 2023 09:06):    Growth in aerobic bottle: Gram positive cocci in pairs    ***Blood Panel PCR results on this specimen are available    approximately 3 hours after the Gram stain result.***    Gram stain, PCR, and/or culture results may not always    correspond due to difference in methodologies.    ************************************************************    This PCR assay was performed by multiplex PCR. This    Assay tests for 66 bacterial and resistance gene targets.    Please refer to the [facility] test directory    at [email or web address] for details.  Organism: Blood Culture PCR (02 Mar 2023 11:30)  Organism: Blood Culture PCR (02 Mar 2023 11:30)        RADIOLOGY & ADDITIONAL TESTS:  Results Reviewed:   Imaging Personally Reviewed:  Electrocardiogram Personally Reviewed:    COORDINATION OF CARE:  Care Discussed with Consultants/Other Providers [Y/N]:  Prior or Outpatient Records Reviewed [Y/N]:   Patient is a 67y old  Male who presents with a chief complaint of r. knee pain (01 Mar 2023 15:31)      SUBJECTIVE / OVERNIGHT EVENTS: Pt febrile to 101.9 overnight and hypotensive to 80s systolic. BP improved this AM. Wife at bedside and concerned pt more encephalopathic today  Wife is at bedside. Discussed plan including sepsis, hypercalcemia and anemia management. She expressed that she is considering DNR/DNI but wants to discuss with her son, still undecided       ADDITIONAL REVIEW OF SYSTEMS:    MEDICATIONS  (STANDING):  acetaminophen    Suspension .. 650 milliGRAM(s) Oral every 6 hours  ascorbic acid 500 milliGRAM(s) Oral <User Schedule>  chlorhexidine 2% Cloths 1 Application(s) Topical daily  enoxaparin Injectable 40 milliGRAM(s) SubCutaneous every 24 hours  gabapentin Solution 300 milliGRAM(s) Oral three times a day  levalbuterol Inhalation 1.25 milliGRAM(s) Inhalation every 6 hours  levothyroxine 150 MICROGram(s) Oral <User Schedule>  lidocaine   4% Patch 1 Patch Transdermal daily  melatonin 3 milliGRAM(s) Oral at bedtime  meropenem  IVPB 1000 milliGRAM(s) IV Intermittent every 8 hours  methadone   Solution 2.5 milliGRAM(s) Oral at bedtime  multivitamin 1 Tablet(s) Oral <User Schedule>  nystatin Powder 1 Application(s) Topical every 12 hours  pantoprazole   Suspension 40 milliGRAM(s) Oral <User Schedule>  senna Syrup 10 milliLiter(s) Oral <User Schedule>  sodium chloride 3%  Inhalation 4 milliLiter(s) Inhalation every 12 hours    MEDICATIONS  (PRN):  naloxone Injectable 0.3 milliGRAM(s) IV Push every 3 minutes PRN opioid induced AMS  ondansetron Injectable 4 milliGRAM(s) IV Push every 8 hours PRN Nausea and/or Vomiting      CAPILLARY BLOOD GLUCOSE      POCT Blood Glucose.: 122 mg/dL (02 Mar 2023 07:38)  POCT Blood Glucose.: 99 mg/dL (02 Mar 2023 03:33)  POCT Blood Glucose.: 94 mg/dL (01 Mar 2023 21:16)  POCT Blood Glucose.: 120 mg/dL (01 Mar 2023 16:49)    I&O's Summary    01 Mar 2023 07:01  -  02 Mar 2023 07:00  --------------------------------------------------------  IN: 2100 mL / OUT: 500 mL / NET: 1600 mL    02 Mar 2023 07:01  -  02 Mar 2023 11:48  --------------------------------------------------------  IN: 525 mL / OUT: 225 mL / NET: 300 mL        PHYSICAL EXAM:  Vital Signs Last 24 Hrs  T(C): 36.6 (02 Mar 2023 06:12), Max: 38.8 (01 Mar 2023 22:44)  T(F): 97.8 (02 Mar 2023 06:12), Max: 101.9 (01 Mar 2023 22:44)  HR: 103 (02 Mar 2023 06:12) (103 - 144)  BP: 108/61 (02 Mar 2023 06:12) (87/54 - 145/74)  BP(mean): --  RR: 20 (02 Mar 2023 06:12) (19 - 20)  SpO2: 100% (02 Mar 2023 06:12) (96% - 100%)    Parameters below as of 02 Mar 2023 06:12  Patient On (Oxygen Delivery Method): nasal cannula  O2 Flow (L/min): 2    GENERAL: NAD  NECK: Supple, No JVD  CHEST/LUNG: congested, rhonchi and wheezes present as before more prominent on right; on RA  HEART: Tachycardic with regular rhythm; No murmurs, rubs, or gallops  ABDOMEN: Soft, Nontender, distended; Bowel sounds present, + PEG tube in place, some redness noted around site  EXTREMITIES:  2+ Peripheral Pulses, No clubbing, cyanosis. + R knee erythema, edema with TTP, incisions cdi, ROM limited 2/2 pain. R distal leg swelling noted, non-painful  NEUROLOGY:  More lethargic and less responsive today 3/2. nonfocal. difficult to assess orientation as patient's voice is very hoarse.   SKIN: No rashes or lesions     LABS:                        7.0    24.88 )-----------( 683      ( 02 Mar 2023 06:30 )             23.9     03-02    139  |  109<H>  |  20  ----------------------------<  82  4.3   |  20<L>  |  0.62    Ca    10.9<H>      02 Mar 2023 06:30  Phos  2.3     03-02  Mg     1.90     03-02    TPro  6.2  /  Alb  2.4<L>  /  TBili  <0.2  /  DBili  x   /  AST  25  /  ALT  22  /  AlkPhos  206<H>  03-02              Culture - Blood (collected 01 Mar 2023 01:15)  Source: .Blood Blood-Peripheral  Preliminary Report (02 Mar 2023 07:01):    No growth to date.    Culture - Blood (collected 01 Mar 2023 01:00)  Source: .Blood Blood-Venous  Gram Stain (02 Mar 2023 09:06):    Growth in aerobic bottle: Gram positive cocci in pairs  Preliminary Report (02 Mar 2023 09:06):    Growth in aerobic bottle: Gram positive cocci in pairs    ***Blood Panel PCR results on this specimen are available    approximately 3 hours after the Gram stain result.***    Gram stain, PCR, and/or culture results may not always    correspond due to difference in methodologies.    ************************************************************    This PCR assay was performed by multiplex PCR. This    Assay tests for 66 bacterial and resistance gene targets.    Please refer to the [facility] test directory    at [email or web address] for details.  Organism: Blood Culture PCR (02 Mar 2023 11:30)  Organism: Blood Culture PCR (02 Mar 2023 11:30)        RADIOLOGY & ADDITIONAL TESTS:  Results Reviewed:   Imaging Personally Reviewed:  Electrocardiogram Personally Reviewed:    COORDINATION OF CARE:  Care Discussed with Consultants/Other Providers [Y/N]:  Prior or Outpatient Records Reviewed [Y/N]:

## 2023-03-02 NOTE — PROVIDER CONTACT NOTE (CRITICAL VALUE NOTIFICATION) - ASSESSMENT
Pt A&Ox2-3, waxes and weans, hard to understand due to garbled speech. Pt denies pain at this time. Satting well on 2L NC, lung sounds coarse. Pt NSR to ST on tele. Pt with liquids BMs, and voiding without difficulty. No distress noted at this time.
Patient A&Ox4. Vital signs within normal patient limits. Tachycardic on telemetry monitor. Breathing labored and on 3L nasal cannula. Patient denies chest pain. Medications and diet tolerated well. Safety precautions maintained, call bell in reach, bed at lowest position.
lactate=4.9. patient was in respiratory distress. RRT was called
Pt A&Ox2-3, waxes and weans, hard to understand due to garbled speech. Pt denies pain at this time. Satting well on 2L NC, lung sounds coarse. Pt NSR to ST on tele. Pt with liquids BMs, and voiding without difficulty. No distress noted at this time.
Patient A&Ox4. Vital signs within normal patient limits. Tachycardic on telemetry monitor. Breathing labored and on 3L nasal cannula. Patient denies chest pain. Medications and diet tolerated well. Safety precautions maintained, call bell in reach, bed at lowest position.
Patient A&Ox4. Vital signs within normal patient limits. Tachycardic on telemetry monitor. Breathing labored and on 3L nasal cannula. Patient denies chest pain. Medications and diet tolerated well. Safety precautions maintained, call bell in reach, bed at lowest position.
Patient is A&Ox4. Patient denies chest pain or discomfort. Patient breathing on 3LPM nasal canula. sinus tachycardia on telemetry monitoring.
A&Ox4. Breathing on 3Lnc. sinus tachycardia on telemetry monitoring.
Alert and oriented x4, no signs of active bleeding noted.
calcium 13.1. patient was in respiratory distress. RRT was called

## 2023-03-02 NOTE — PROGRESS NOTE ADULT - ASSESSMENT
66 yo male with metastatic esophageal carcinoma including known bone involvement presents with new onset hypercalcemia, as well as hypothyroidism and prediabetes.    1) Hypercalcemia of malignancy  Labs show suppressed PTH (6) and negative PTHRP suggesting cause of hypercalcemia to be local bone osteolysis from metastatic disease. DDx includes high 1,25 Vit D but less likely for this malignancy. 1,25 D resulted 78 upper normal range. 25OHD 33.4 wnl.  Risk of ONJ even with current dental issues is greatly outweighed by hypercalcemia of malignancy and bone mets.  S/p calcitonin 4 doses plus 2 additional doses previously.  -s/p first dose of zoledronate 4mg IV on 2/18/23 - failed bisphosphonate tx, remained persistently hypercalcemic   -maintain hydration status. encourage PO as tolerated and if able to give further IV hydration would recommend doing so today.  -s/p dental extraction again 2/23 . Per wife now extractions are completed.  -s/p first dose of Denosumab 120mg on 2/27/23 (corrected Ca 13.3 at that time)  -Ultimate treatment of hypercalcemia is oncology-directed --> planned for outpatient palliative RT. No inpatient plans. thus after dental work would consider xgeva/denosumab for hypercalcemia management. Repeated dosing would need to be coordinated outpatient with oncology. Of note - denosumab has general higher ONJ risk in setting of malignancy. Options for hypercalcemia are limited for this pt if bisphosphonate dosing is not sufficient for control of hypercalcemia  -would use PO phos for repletion  -s/p 2 additional doses of calcitonin given 2/23, as well as a dose of furosemide.  -Corrected calcium today 12.84-->12.3 --> 12.2, gradually improving  -tele monitoring  -Iv fluids/free water flushes as tolerated per primary team, monitor for fluid overload.    2) Hypothyroidism  TSH 2/21 increased to 23.4 (prior 7.56) and Free T4 0.9 (prior 1.0)  Noted that levothyroxine is being given at same time as pantoprazole and other po meds.  Please time levothyroxine for 5 AM and other oral meds in AM for 8AM.  Ensure 1 hour at least between levothyroxine and tube feeds or other po meds, especially pantoprazole.  Ensure at least 4 hours between levothyroxine and multivitamin.  2/28/23 TSH trending down to 4.32, FT4 1.4 wnl   Continue levothyroxine 150 mcg PEG daily    3) Prediabetes  FS q6h, on bolus feeds but not having hyperglycemia  If needed can add low Admelog correction scale q6h  HbA1c 5.8%  no DM meds for dc.  Since glucose has been well controlled would consider discontinuing point of care glucose checks    Lesli Shields MD  Division of Endocrinology  Pager: [phone]    If after 6PM or before 9AM, or on weekends/holidays, please call endocrine answering service for assistance ([phone]).  For nonurgent matters email [email or web address] for assistance.

## 2023-03-02 NOTE — PROGRESS NOTE ADULT - SUBJECTIVE AND OBJECTIVE BOX
Follow Up:  met esophageal ca    Interval History/ROS:  lethargy with analgesics,   fever    Allergies  No Known Allergies    ANTIMICROBIALS:  vancomycin  IVPB 1000 every 12 hours      OTHER MEDS:  MEDICATIONS  (STANDING):  acetaminophen    Suspension .. 650 every 6 hours  enoxaparin Injectable 40 every 24 hours  gabapentin Solution 300 three times a day  levalbuterol Inhalation 1.25 every 6 hours  levothyroxine 150 <User Schedule>  melatonin 3 at bedtime  methadone   Solution 2.5 at bedtime  ondansetron Injectable 4 every 8 hours PRN  pantoprazole   Suspension 40 <User Schedule>  senna Syrup 10 <User Schedule>  sodium chloride 3%  Inhalation 4 every 12 hours      Vital Signs Last 24 Hrs  T(C): 36.7 (02 Mar 2023 11:50), Max: 38.8 (01 Mar 2023 22:44)  T(F): 98.1 (02 Mar 2023 11:50), Max: 101.9 (01 Mar 2023 22:44)  HR: 111 (02 Mar 2023 11:50) (103 - 144)  BP: 123/61 (02 Mar 2023 11:50) (87/54 - 145/74)  BP(mean): --  RR: 20 (02 Mar 2023 11:50) (19 - 20)  SpO2: 95% (02 Mar 2023 11:50) (95% - 100%)    Parameters below as of 02 Mar 2023 11:50  Patient On (Oxygen Delivery Method): nasal cannula  O2 Flow (L/min): 2      PHYSICAL EXAM:  General: WN/WD NAD, Non-toxic  Neurology: A&Ox3, nonfocal  Respiratory: Clear to auscultation bilaterally  CV: RRR, S1S2, no murmurs, rubs or gallops  Abdominal: Soft, Non-tender, non-distended, normal bowel sounds  Extremities: 1+ edema  Line Sites: Clear  Skin: No rash                        7.3    26.42 )-----------( 677      ( 02 Mar 2023 12:13 )             25.3       03-02    139  |  109<H>  |  20  ----------------------------<  82  4.3   |  20<L>  |  0.62    Ca    10.9<H>      02 Mar 2023 06:30  Phos  2.3     03-02  Mg     1.90     03-02    TPro  6.2  /  Alb  2.4<L>  /  TBili  <0.2  /  DBili  x   /  AST  25  /  ALT  22  /  AlkPhos  206<H>  03-02      MICROBIOLOGY:  .Blood Blood-Peripheral  03-01-23   No growth to date.  --  --      .Blood Blood-Venous  03-01-23   Growth in aerobic bottle: Gram positive cocci in pairs  (03.01.23 @ 01:00)  - Enterococcus faecium: Detec       .Blood Blood-Peripheral  02-22-23   No Growth Final  --  --      .Sputum Sputum  02-17-23   Moderate Enterobacter cloacae complex  Normal Respiratory Susan present  --  Enterobacter cloacae complex      .Blood Blood-Peripheral  02-17-23   No Growth Final  --  --      .Blood Blood-Venous  02-17-23   No Growth Final  --  --      Clean Catch Clean Catch (Midstream)  02-13-23   <10,000 CFU/mL Normal Urogenital Susan  --  --      .Blood Blood-Peripheral  02-13-23   No Growth Final  --  --      .Blood Blood-Venous  02-13-23   No Growth Final  --  --      .Body Fluid Synovial Fluid  02-05-23   No growth at 14 days.  --    No polymorphonuclear leukocytes per low power field  No organisms seen per oil power field  by cytocentrifuge      Clean Catch Clean Catch (Midstream)  02-05-23   No growth  --  --      .Blood Blood-Peripheral  02-05-23   No Growth Final  --  --      .Blood Blood-Peripheral  02-05-23   No Growth Final  --  --        .Blood Blood-Peripheral  Blood Culture PCR    v          RADIOLOGY:    Delonte Simms MD; Division of Infectious Disease; Pager: [phone]; nights and weekends: [phone]

## 2023-03-02 NOTE — PROGRESS NOTE ADULT - CONVERSATION DETAILS
Discussed GOC with patient's wife  She would like for pt to be DNR/DNI but would like to discuss further with her son and have final decision today. Would still like for all necessary medical treatment to be pursued including antibiotics and transfusions   She Discussed GOC with patient's wife. Patricia Amaral   She would like for pt to be DNR/DNI but would like to discuss further with her son and have final decision later today. Would still like for all necessary medical treatment to be pursued including antibiotics and transfusions     I called Patricia later in the day 4pm to discuss MOLST, no answer and unable to leave VM

## 2023-03-02 NOTE — PROVIDER CONTACT NOTE (CRITICAL VALUE NOTIFICATION) - RECOMMENDATIONS
Monitor for signs and symptoms of bleeding.
No intervention at this time
Notify provider, continue lactated ringers, await any further orders
MD Linder notified
continue continuous infusion of lactated ringers. or increase rate of infusion
notify MD
No intervention at this time
notify MD
Gianluca Dumas notified
Monitor for s/s of infection. maintain aseptic technique.
No intervention at this time

## 2023-03-02 NOTE — PROVIDER CONTACT NOTE (CRITICAL VALUE NOTIFICATION) - NS PROVIDER READ BACK
Please advise med refill request  Order pending  LOV 06/08/2020  NOV 09/14/2020  
yes
no
yes
no
yes
VBG Ionized calcium 1.78/yes

## 2023-03-02 NOTE — PROVIDER CONTACT NOTE (CRITICAL VALUE NOTIFICATION) - BACKGROUND
Admitted for right knee pain- esophageal cancer found to have mets to the bone. s/p teeth extraction.
Patient admitted with right knee effusion. Hx. of esophageal cancer with mets, neuropathic pain, GERD, HTN  patient had RRT for respiratory distress
Patient admitted with right knee effusion. Hx of esophageal cancer with jordi, neuropathic pain, gerd, htn
Patient admitted with right knee effusion. Hx. of esophageal cancer with mets, neuropathic pain, GERD, HTN  patient had RRT for respiratory distress
patient admitted for for effusion of right knee. pt came to floor for sepsis and tachycardia. patient past medical history includes lung metastases, esophageal cancer
patient admitted for for effusion of right knee. pt came to floor for sepsis and tachycardia. patient past medical history includes lung metastases, esophageal cancer
Patient admitted with right knee effusion. Hx of esophageal cancer with jordi, neuropathic pain, gerd, htn
PMH metastatic esophageal cancer, RUL PNA, R patellar mass excision. admitted for R knee pain and swelling
patient admitted for for effusion of right knee. pt came to floor for sepsis and tachycardia. patient past medical history includes lung metastases, esophageal cancer
PMH metastatic esophageal cancer, RUL PNA, R patellar mass excision. admitted for R knee pain and swelling
Admitted for right knee pain- esophageal cancer found to have mets to the bone. s/p teeth extraction.

## 2023-03-02 NOTE — PROGRESS NOTE ADULT - PROBLEM SELECTOR PLAN 2
- BCx growing enterococcus, sensitivities pending  - Will continue Meropenem  - Repeat BCx   - f/u ID recs - BCx from 3/1 growing enterococcus, sensitivities pending  - Will continue Meropenem  - Repeat BCx tomorrow for clearance   - f/u ID recs

## 2023-03-02 NOTE — PROGRESS NOTE ADULT - SUBJECTIVE AND OBJECTIVE BOX
Chief Complaint: Hypercalcemia of malignancy    History: per wife at bedside mildly improved alertness  no new complaints    MEDICATIONS  (STANDING):  acetaminophen    Suspension .. 650 milliGRAM(s) Oral every 6 hours  ascorbic acid 500 milliGRAM(s) Oral <User Schedule>  chlorhexidine 2% Cloths 1 Application(s) Topical daily  enoxaparin Injectable 40 milliGRAM(s) SubCutaneous every 24 hours  gabapentin Solution 300 milliGRAM(s) Oral three times a day  levalbuterol Inhalation 1.25 milliGRAM(s) Inhalation every 6 hours  levothyroxine 150 MICROGram(s) Oral <User Schedule>  lidocaine   4% Patch 1 Patch Transdermal daily  melatonin 3 milliGRAM(s) Oral at bedtime  meropenem  IVPB 1000 milliGRAM(s) IV Intermittent every 8 hours  methadone   Solution 2.5 milliGRAM(s) Oral at bedtime  methadone   Solution 2.5 milliGRAM(s) Oral at bedtime  multivitamin 1 Tablet(s) Oral <User Schedule>  nystatin Powder 1 Application(s) Topical every 12 hours  pantoprazole   Suspension 40 milliGRAM(s) Oral <User Schedule>  senna Syrup 10 milliLiter(s) Oral <User Schedule>  sodium chloride 3%  Inhalation 4 milliLiter(s) Inhalation every 12 hours    MEDICATIONS  (PRN):  naloxone Injectable 0.3 milliGRAM(s) IV Push every 3 minutes PRN opioid induced AMS  ondansetron Injectable 4 milliGRAM(s) IV Push every 8 hours PRN Nausea and/or Vomiting      Allergies    No Known Allergies    Intolerances      Review of Systems:    UNABLE TO OBTAIN    PHYSICAL EXAM:  VITALS: T(C): 36.7 (03-02-23 @ 11:50)  T(F): 98.1 (03-02-23 @ 11:50), Max: 101.9 (03-01-23 @ 22:44)  HR: 111 (03-02-23 @ 11:50) (103 - 144)  BP: 123/61 (03-02-23 @ 11:50) (87/54 - 145/74)  RR:  (19 - 20)  SpO2:  (95% - 100%)  Wt(kg): --  GENERAL: NAD, appears comfortable  EYES: No proptosis, no lid lag, anicteric  HEENT:  Atraumatic, Normocephalic, moist mucous membranes  RESPIRATORY: nasal cannula in place, nonlabored    CAPILLARY BLOOD GLUCOSE      POCT Blood Glucose.: 122 mg/dL (02 Mar 2023 07:38)  POCT Blood Glucose.: 99 mg/dL (02 Mar 2023 03:33)  POCT Blood Glucose.: 94 mg/dL (01 Mar 2023 21:16)  POCT Blood Glucose.: 120 mg/dL (01 Mar 2023 16:49)      03-02    139  |  109<H>  |  20  ----------------------------<  82  4.3   |  20<L>  |  0.62    eGFR: 105    Ca    10.9<H>      03-02  Mg     1.90     03-02  Phos  2.3     03-02    TPro  6.2  /  Alb  2.4<L>  /  TBili  <0.2  /  DBili  x   /  AST  25  /  ALT  22  /  AlkPhos  206<H>  03-02      A1C with Estimated Average Glucose Result: 5.8 % (01-13-23 @ 06:10)      Thyroid Function Tests:  02-28 @ 07:20 TSH 4.32 FreeT4 1.4 T3 -- Anti TPO -- Anti Thyroglobulin Ab -- TSI --  02-21 @ 06:20 TSH 23.86 FreeT4 0.9 T3 -- Anti TPO -- Anti Thyroglobulin Ab -- TSI --

## 2023-03-03 NOTE — PROGRESS NOTE ADULT - ASSESSMENT
66 yo male with metastatic esophageal carcinoma including known bone involvement presents with new onset hypercalcemia, as well as hypothyroidism and prediabetes.    1) Hypercalcemia of malignancy  Labs show suppressed PTH (6) and negative PTHRP suggesting cause of hypercalcemia to be local bone osteolysis from metastatic disease. DDx includes high 1,25 Vit D but less likely for this malignancy. 1,25 D resulted 78 upper normal range. 25OHD 33.4 wnl.  Risk of ONJ even with current dental issues is greatly outweighed by hypercalcemia of malignancy and bone mets.  S/p calcitonin 4 doses plus 2 additional doses previously.  -s/p first dose of zoledronate 4mg IV on 2/18/23 - failed bisphosphonate tx, remained persistently hypercalcemic   -maintain hydration status. encourage PO as tolerated and if able to give further IV hydration would recommend doing so today.  -s/p dental extraction again 2/23 . Per wife now extractions are completed.  -s/p first dose of Denosumab 120mg on 2/27/23 (corrected Ca 13.3 at that time). May need to consider redosing in one week  -Ultimate treatment of hypercalcemia is oncology-directed --> planned for outpatient palliative RT. No inpatient plans. thus after dental work would consider xgeva/denosumab for hypercalcemia management. Repeated dosing would need to be coordinated outpatient with oncology. Of note - denosumab has general higher ONJ risk in setting of malignancy. Options for hypercalcemia are limited for this pt if bisphosphonate dosing is not sufficient for control of hypercalcemia  -would use PO phos for repletion  -s/p 2 additional doses of calcitonin given 2/23, as well as a dose of furosemide.  -Corrected calcium today 12.84-->12.3 --> 12.2->12.7  -tele monitoring  -Iv fluids/free water flushes as tolerated per primary team, monitor for fluid overload.    2) Hypothyroidism  TSH 2/21 increased to 23.4 (prior 7.56) and Free T4 0.9 (prior 1.0)  Noted that levothyroxine is being given at same time as pantoprazole and other po meds.  Please time levothyroxine for 5 AM and other oral meds in AM for 8AM.  Ensure 1 hour at least between levothyroxine and tube feeds or other po meds, especially pantoprazole.  Ensure at least 4 hours between levothyroxine and multivitamin.  2/28/23 TSH trending down to 4.32, FT4 1.4 wnl   Continue levothyroxine 150 mcg PEG daily    3) Prediabetes  FS q6h, on bolus feeds but not having hyperglycemia  If needed can add low Admelog correction scale q6h  HbA1c 5.8%  no DM meds for dc.  Since glucose has been well controlled would consider discontinuing point of care glucose checks    Andie Licona MD  Attending Physician   Department of Endocrinology, Diabetes and Metabolism    [employer] Teams on 03-03-23 @ 12:27    If before 9AM or after 5PM, or on weekends/holidays, please call the Endocrine answering service for assistance ([phone]).  For nonurgent matters, please email [email or web address] for assistance.

## 2023-03-03 NOTE — PROGRESS NOTE ADULT - ASSESSMENT
67M with  metastatic esophageal cancer (dx 8 years ago) to bone on irinotecan, r. knee, hypothyroidism, GERD, HTN recent RUL pna s/p cefepime, r. patellar mass excision --> metastatic squamous cell carcinoma on 1/7/23 was admitted on 2/5/23  w/ right knee pain and swelling for 2 days.    Recently hospitalized  underwent patellectomy for met SCC on 1/7/23  Course complicated by pneumonia attributed to Enterobacter treated with Cefepime 1/11 --> 1/18  hypercalcemia - calcitonin administered 2/18, 2/19, Zometa 2/18    Recent in patient Antibiotics  Vanco 1/1-->1/4;  2/5 2/13--> 2/17  Zosyn 1/1;  2/5, 2/6l 2/17 -->  Cefepime 1/1-->4; 1/11-->18  Cefazolin 1/7  Meropenem  2/17 -->    #encephalopathy  #leukemoid reaction  #hypercalcemia  #Enterococcal bacteremia, unclear source    2/16 dental extraction to reduce risk of OsteoNecrosis of Jaw  2/23: Elevator and rongeur used to deliver root tips #6, 7, 9 and 10 without complications  He has large tumor burden and mass with scant residual left lung  2/17 Neg MRSA PCR  2/17 Sputum with Enterobacter cloacae:   no bacteremia documented    2/24 modestly improved appearance  2/28  appears more alert  3/1  fever   +BC Enterococcus faecium    Recommendations  Continue Vancomycin 1 gm every 12 hours  repeat blood cultures, check vanco level after 4th dose  vigorous pulmonary toilet    Rio Cuellar MD  Division of Infectious Diseases     67M with  metastatic esophageal cancer (dx 8 years ago) to bone on irinotecan, r. knee, hypothyroidism, GERD, HTN recent RUL pna s/p cefepime, r. patellar mass excision --> metastatic squamous cell carcinoma on 1/7/23 was admitted on 2/5/23  w/ right knee pain and swelling for 2 days.    Recently hospitalized  underwent patellectomy for met SCC on 1/7/23  Course complicated by pneumonia attributed to Enterobacter treated with Cefepime 1/11 --> 1/18  hypercalcemia - calcitonin administered 2/18, 2/19, Zometa 2/18    Recent in patient Antibiotics  Vanco 1/1-->1/4;  2/5 2/13--> 2/17  Zosyn 1/1;  2/5, 2/6l 2/17 -->  Cefepime 1/1-->4; 1/11-->18  Cefazolin 1/7  Meropenem  2/17 -->    #encephalopathy  #leukemoid reaction  #hypercalcemia  #Enterococcal bacteremia, unclear source    2/16 dental extraction to reduce risk of OsteoNecrosis of Jaw  2/23: Elevator and rongeur used to deliver root tips #6, 7, 9 and 10 without complications  He has large tumor burden and mass with scant residual left lung  2/17 Neg MRSA PCR  2/17 Sputum with Enterobacter cloacae:   no bacteremia documented    2/24 modestly improved appearance  2/28  appears more alert  3/1  fever   +BC Enterococcus faecium    Recommendations  Continue Vancomycin 1 gm every 12 hours  repeat blood cultures, check vanco level after 4th dose  Follow repeat blood cultures  vigorous pulmonary toilet  Follow fever curve and WBC count    Rio Cuellar MD  Division of Infectious Diseases

## 2023-03-03 NOTE — PROGRESS NOTE ADULT - SUBJECTIVE AND OBJECTIVE BOX
HPI: 66 yo male with metastatic esophageal carcinoma including known bone involvement presents with new onset hypercalcemia, as well as hypothyroidism and prediabetes.    Interval history:    corrected calcium 12.7  lethargic  wife at bedside said he was more alert earlier    MEDICATIONS  (STANDING):  acetaminophen    Suspension .. 650 milliGRAM(s) Oral every 6 hours  ascorbic acid 500 milliGRAM(s) Oral <User Schedule>  chlorhexidine 2% Cloths 1 Application(s) Topical daily  enoxaparin Injectable 40 milliGRAM(s) SubCutaneous every 24 hours  gabapentin Solution 300 milliGRAM(s) Oral three times a day  levalbuterol Inhalation 1.25 milliGRAM(s) Inhalation every 6 hours  levothyroxine 150 MICROGram(s) Oral <User Schedule>  lidocaine   4% Patch 1 Patch Transdermal daily  melatonin 3 milliGRAM(s) Oral at bedtime  methadone   Solution 2.5 milliGRAM(s) Oral at bedtime  multivitamin 1 Tablet(s) Oral <User Schedule>  nystatin Powder 1 Application(s) Topical every 12 hours  pantoprazole   Suspension 40 milliGRAM(s) Oral <User Schedule>  senna Syrup 10 milliLiter(s) Oral <User Schedule>  sodium chloride 3%  Inhalation 4 milliLiter(s) Inhalation every 12 hours  vancomycin  IVPB 1000 milliGRAM(s) IV Intermittent every 12 hours    MEDICATIONS  (PRN):  naloxone Injectable 0.3 milliGRAM(s) IV Push every 3 minutes PRN opioid induced AMS  ondansetron Injectable 4 milliGRAM(s) IV Push every 8 hours PRN Nausea and/or Vomiting      Allergies    No Known Allergies    Intolerances        Review of Systems:  unable to assess due to mental status    PHYSICAL EXAM:  VITALS: T(C): 36.7 (03-03-23 @ 12:05)  T(F): 98 (03-03-23 @ 12:05), Max: 100.2 (03-03-23 @ 07:35)  HR: 104 (03-03-23 @ 12:05) (75 - 117)  BP: 117/60 (03-03-23 @ 12:05) (117/60 - 128/72)  RR:  (19 - 20)  SpO2:  (94% - 99%)  Wt(kg): --  GENERAL: NAD, well-groomed, well-developed  EYES: No proptosis, no lid lag, anicteric  HEENT:  Atraumatic, normocephalic, dry mucous membranes  RESPIRATORY: nonlabored respirations, no wheezing  PSYCH:, normal affect, normal mood    POCT Blood Glucose.: 93 mg/dL (03-03-23 @ 11:45)  POCT Blood Glucose.: 125 mg/dL (03-03-23 @ 08:31)  POCT Blood Glucose.: 173 mg/dL (03-03-23 @ 06:14)  POCT Blood Glucose.: 127 mg/dL (03-03-23 @ 00:51)  POCT Blood Glucose.: 139 mg/dL (03-02-23 @ 18:49)  POCT Blood Glucose.: 117 mg/dL (03-02-23 @ 13:40)  POCT Blood Glucose.: 122 mg/dL (03-02-23 @ 07:38)  POCT Blood Glucose.: 99 mg/dL (03-02-23 @ 03:33)  POCT Blood Glucose.: 94 mg/dL (03-01-23 @ 21:16)  POCT Blood Glucose.: 120 mg/dL (03-01-23 @ 16:49)  POCT Blood Glucose.: 107 mg/dL (03-01-23 @ 10:53)  POCT Blood Glucose.: 96 mg/dL (03-01-23 @ 03:52)  POCT Blood Glucose.: 91 mg/dL (02-28-23 @ 21:28)  POCT Blood Glucose.: 131 mg/dL (02-28-23 @ 16:57)                            8.0    24.16 )-----------( 574      ( 03 Mar 2023 03:45 )             29.2       03-03    134<L>  |  105  |  19  ----------------------------<  64<L>  TNP   |  18<L>  |  0.52    eGFR: 110    Ca    11.5<H>      03-03  Mg     2.00     03-03  Phos  2.6     03-03    TPro  7.5  /  Alb  2.5<L>  /  TBili  0.4  /  DBili  x   /  AST  85<H>  /  ALT  36  /  AlkPhos  248<H>  03-03      Thyroid Function Tests:  02-28 @ 07:20 TSH 4.32 FreeT4 1.4 T3 -- Anti TPO -- Anti Thyroglobulin Ab -- TSI --  02-21 @ 06:20 TSH 23.86 FreeT4 0.9 T3 -- Anti TPO -- Anti Thyroglobulin Ab -- TSI --          Radiology:

## 2023-03-03 NOTE — CHART NOTE - NSCHARTNOTEFT_GEN_A_CORE
Source:   other [x] nurse, medical chart   Diet rx: NPO with Tube Feed: Tube Feeding Modality: Gastrostomy TwoCal HN (TWOCALHNRTH)  Total Volume for 24 Hours (mL): 900 Bolus Total Volume of Bolus (mL):  225  Total # of Feeds: 4  Tube Feed Frequency: Every 6 hours     Bolus Feed Rate (mL per Hour): 112.5   Bolus Feed Duration (in Hours): 2   Frequency: Every 6 Hours  ---> to provide ~1800 Kcal, ~75 gm protein in 24 hrs   No Carb Prosource     Qty per Day:  1 (02-17-23 @ 11:17) [Active]   --> 15 gm Protein   	  Pt 68 yo male with metastatic esophageal carcinoma bone involvement presented hypercalcemia, as well as hypothyroidism and prediabetes - per chart review.    At time of visit, Pt asleep. Of note, Pt NPO with Tube Feeding, via PEG, Bolus feeds. Per nurse, Pt appears tolerating Tube feeding well; no vomiting or diarrhea @ this time. +BM (3/2) fecal incontinence, per flow sheet. Pt with pressure injury to R buttock, reported. RDN remains available, nurse made aware.      Pt's height: 67" (2/5)      IBW: 148#+/-10%     Pt's weights: 64.9 kg (2/28), 63 kg (2/5)   Pertinent Medications: Lovenox, Vit C, Multivitamin, Protonix, Senna, Syrup, Zofran (PRN),    Pertinent Labs: (3/3) WBC 24.16 H, H/H 8.0/29.2 L,  H, Na 134 L, Glu 64 L, Albumin 2.5 L, AST 85 H,  H;    (1/13) HbA1c 5.8% H  Skin: per flow sheet, R buttock - stage II pressure ulcer   1+edema to L foot/ankle/knee   2+edema to R foot/ankle/knee      Estimated Needs: [x] recalculated:   Estimated Energy need: ~5396-0427 Kcal/Day (@ 25-30 Kcal/Kg ABW - 63 kg)  Estimated Protein need: ~ gm Protein/day (@ 1.0-1.5 gm Protein/kg IBW - 67.2 kg)    Previous Nutrition Diagnosis: [x] Increased Nutrient Needs   Nutrition Diagnosis is [x] ongoing   New Nutrition Diagnosis: [x] not applicable    Nutrition Interventions/Recommendations:   1. Continue Tube Feeding, bolus, as ordered; Monitor Tube Feeding tolerance;   2. Add free water flushes per MD discretion;   3. Continue No Carb Prosource (1 pkg = 15 gms Protein) Qty per day: 1, as ordered;   4. Monitor labs, daily weights, hydration status;

## 2023-03-03 NOTE — PROGRESS NOTE ADULT - PROBLEM SELECTOR PLAN 1
Mostly likely 2/2 aspiration pneumonia +/- post-obstructive +/- malignancy  MRSA neg at admission  Scx w/ growth  Enterobacter cloacae   PCT elevated to 5  BCx -> NGTD  UA/Ucx neg  CXR -> right perihilar opacity  RVP neg  duplex ordered to eval for alternative causes -> neg  - Pt s/p empiric Zosyn (2/5-2/9, 2/) ->then switched to uma (2/17 - ) due to recurrent fevers. 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 Pt has been afebrile  -C/w nebs. Pharmacy doesn't have any xopenex at all. Will do duoneb for now. Will check with pharmacy again [facility] to see if has xopenex is back in stock  -IS and chest PT -> chest vest ordered  -O2 as needed -> may require on d/c

## 2023-03-03 NOTE — PROGRESS NOTE ADULT - SUBJECTIVE AND OBJECTIVE BOX
Follow Up:  metastatic esophageal cancer    Interval History/ROS:  Overnight: No acute events.  Patient remains afebrile.  Otherwise hemodynamically stable.  Latest labs show a leukocytosis of 24.1, CMP with renal function within normal limits, , AST 85.  Latest blood culture from 3/1/2023 is growing Enterococcus faecium, sensitivities pending. repeat cultures pending.  Latest chest x-ray from 3/3/2023 shows patchy peripheral right mid and lower lung airspace opacity.    Patient seen examined at bedside.    Allergies  No Known Allergies    ANTIMICROBIALS:  vancomycin  IVPB 1000 every 12 hours    OTHER MEDS:  MEDICATIONS  (STANDING):  acetaminophen    Suspension .. 650 every 6 hours  enoxaparin Injectable 40 every 24 hours  gabapentin Solution 300 three times a day  levalbuterol Inhalation 1.25 every 6 hours  levothyroxine 150 <User Schedule>  melatonin 3 at bedtime  methadone   Solution 2.5 at bedtime  ondansetron Injectable 4 every 8 hours PRN  pantoprazole   Suspension 40 <User Schedule>  senna Syrup 10 <User Schedule>  sodium chloride 3%  Inhalation 4 every 12 hours    Vital Signs Last 24 Hrs  T(C): 37.9 (03 Mar 2023 07:35), Max: 37.9 (03 Mar 2023 07:35)  T(F): 100.2 (03 Mar 2023 07:35), Max: 100.2 (03 Mar 2023 07:35)  HR: 104 (03 Mar 2023 09:30) (75 - 117)  BP: 128/72 (03 Mar 2023 09:30) (123/61 - 128/72)  BP(mean): --  RR: 20 (03 Mar 2023 09:30) (20 - 20)  SpO2: 99% (03 Mar 2023 09:30) (94% - 99%)    Parameters below as of 03 Mar 2023 09:30  Patient On (Oxygen Delivery Method): nasal cannula  O2 Flow (L/min): 2    PHYSICAL EXAM:  General: WN/WD NAD, Non-toxic  Neurology: A&Ox3, nonfocal  Respiratory: Clear to auscultation bilaterally  CV: RRR, S1S2, no murmurs, rubs or gallops  Abdominal: Soft, Non-tender, non-distended, normal bowel sounds  Extremities: 1+ edema  Line Sites: Clear  Skin: No rash               8.0    24.16 )-----------( 574      ( 03 Mar 2023 03:45 )             29.2       03-03    134<L>  |  105  |  19  ----------------------------<  64<L>  TNP   |  18<L>  |  0.52    Ca    11.5<H>      03 Mar 2023 03:45  Phos  2.6     03-03  Mg     2.00     03-03    TPro  7.5  /  Alb  2.5<L>  /  TBili  0.4  /  DBili  x   /  AST  85<H>  /  ALT  36  /  AlkPhos  248<H>  03-03          MICROBIOLOGY:  v    Culture - Blood (collected 01 Mar 2023 01:15)  Source: .Blood Blood-Peripheral  Preliminary Report (02 Mar 2023 07:01):    No growth to date.    Culture - Blood (collected 01 Mar 2023 01:00)  Source: .Blood Blood-Venous  Gram Stain (02 Mar 2023 09:06):    Growth in aerobic bottle: Gram positive cocci in pairs  Preliminary Report (02 Mar 2023 09:06):    Growth in aerobic bottle: Gram positive cocci in pairs    ***Blood Panel PCR results on this specimen are available    approximately 3 hours after the Gram stain result.***    Gram stain, PCR, and/or culture results may not always    correspond due to difference in methodologies.    ************************************************************    This PCR assay was performed by multiplex PCR. This    Assay tests for 66 bacterial and resistance gene targets.    Please refer to the [facility] test directory    at [email or web address] for details.  Organism: Blood Culture PCR (02 Mar 2023 11:30)  Organism: Blood Culture PCR (02 Mar 2023 11:30)      -  Enterococcus faecium: Detec      Method Type: PCR                    RADIOLOGY:   Follow Up:  metastatic esophageal cancer    Interval History/ROS:  Overnight: No acute events.  Patient remains afebrile.  Otherwise hemodynamically stable.  Latest labs show a leukocytosis of 24.1, CMP with renal function within normal limits, , AST 85.  Latest blood culture from 3/1/2023 is growing Enterococcus faecium, sensitivities pending. repeat cultures pending.  Latest chest x-ray from 3/3/2023 shows patchy peripheral right mid and lower lung airspace opacity.    Patient seen examined at bedside. Appears in no distress. Wife is at bedside. No new complaints.    Allergies  No Known Allergies    ANTIMICROBIALS:  vancomycin  IVPB 1000 every 12 hours    OTHER MEDS:  MEDICATIONS  (STANDING):  acetaminophen    Suspension .. 650 every 6 hours  enoxaparin Injectable 40 every 24 hours  gabapentin Solution 300 three times a day  levalbuterol Inhalation 1.25 every 6 hours  levothyroxine 150 <User Schedule>  melatonin 3 at bedtime  methadone   Solution 2.5 at bedtime  ondansetron Injectable 4 every 8 hours PRN  pantoprazole   Suspension 40 <User Schedule>  senna Syrup 10 <User Schedule>  sodium chloride 3%  Inhalation 4 every 12 hours    Vital Signs Last 24 Hrs  T(C): 37.9 (03 Mar 2023 07:35), Max: 37.9 (03 Mar 2023 07:35)  T(F): 100.2 (03 Mar 2023 07:35), Max: 100.2 (03 Mar 2023 07:35)  HR: 104 (03 Mar 2023 09:30) (75 - 117)  BP: 128/72 (03 Mar 2023 09:30) (123/61 - 128/72)  BP(mean): --  RR: 20 (03 Mar 2023 09:30) (20 - 20)  SpO2: 99% (03 Mar 2023 09:30) (94% - 99%)    Parameters below as of 03 Mar 2023 09:30  Patient On (Oxygen Delivery Method): nasal cannula  O2 Flow (L/min): 2    PHYSICAL EXAM:  General: WN/WD NAD, Non-toxic  Neurology: A&Ox3, nonfocal  Respiratory: Clear to auscultation bilaterally  CV: RRR, S1S2, no murmurs, rubs or gallops  Abdominal: Soft, Non-tender, non-distended, normal bowel sounds  Extremities: 1+ edema  Line Sites: Clear  Skin: No rash               8.0    24.16 )-----------( 574      ( 03 Mar 2023 03:45 )             29.2       03-03    134<L>  |  105  |  19  ----------------------------<  64<L>  TNP   |  18<L>  |  0.52    Ca    11.5<H>      03 Mar 2023 03:45  Phos  2.6     03-03  Mg     2.00     03-03    TPro  7.5  /  Alb  2.5<L>  /  TBili  0.4  /  DBili  x   /  AST  85<H>  /  ALT  36  /  AlkPhos  248<H>  03-03          MICROBIOLOGY:  v    Culture - Blood (collected 01 Mar 2023 01:15)  Source: .Blood Blood-Peripheral  Preliminary Report (02 Mar 2023 07:01):    No growth to date.    Culture - Blood (collected 01 Mar 2023 01:00)  Source: .Blood Blood-Venous  Gram Stain (02 Mar 2023 09:06):    Growth in aerobic bottle: Gram positive cocci in pairs  Preliminary Report (02 Mar 2023 09:06):    Growth in aerobic bottle: Gram positive cocci in pairs    ***Blood Panel PCR results on this specimen are available    approximately 3 hours after the Gram stain result.***    Gram stain, PCR, and/or culture results may not always    correspond due to difference in methodologies.    ************************************************************    This PCR assay was performed by multiplex PCR. This    Assay tests for 66 bacterial and resistance gene targets.    Please refer to the [facility] Labs test directory    at [email or web address] for details.  Organism: Blood Culture PCR (02 Mar 2023 11:30)  Organism: Blood Culture PCR (02 Mar 2023 11:30)      -  Enterococcus faecium: Detec      Method Type: PCR                    RADIOLOGY:

## 2023-03-03 NOTE — CHART NOTE - NSCHARTNOTEFT_GEN_A_CORE
[facility] Medicine Night Coverage     Notified by RN, patient with increased WOB. Patient seen and assessed at bedside, resting in bed noted to be slightly diaphoretic. Rectal temperature of 100.2 F and tachy to 130's otherwise vitally stable. Exam limited d/t difficulty talking. Denies CP or SOB.       Vital Signs Last 24 Hrs  T(C): 36.9 (03 Mar 2023 05:10), Max: 36.9 (03 Mar 2023 05:10)  T(F): 98.4 (03 Mar 2023 05:10), Max: 98.4 (03 Mar 2023 05:10)  HR: 117 (03 Mar 2023 05:10) (75 - 117)  BP: 127/75 (03 Mar 2023 05:10) (123/61 - 127/75)  BP(mean): --  RR: 20 (03 Mar 2023 05:10) (20 - 20)  SpO2: 96% (03 Mar 2023 05:10) (94% - 98%)    PHYSICAL EXAM:  General: Awake and alert.  Diaphoretic.   Heart: +tachycardiac, normal rhythm.  Normal S1 and S2.  No murmurs, rubs, or gallops.    Lungs: on 3LNC. Congested and diffuse rhonchi heard on ascultation. +abdominal breathing. No wheezes, crackles      #Increased WOB likely 2/2 PNA   -On vancomycin  -IV Tylenol ordered for low-grade temperature.  -Chest PT and suction ordered.   -Spoke with pharmacy, Xopenex can be given early at 8 AM.   -Will monitor closely   -Will convey overnight events to AM team       Rona Bermeo PA-C  Department of Medicine   [id number] [facility] Medicine Night Coverage     Notified by RN, patient with increased WOB. Patient seen and assessed at bedside, resting in bed noted to be slightly diaphoretic. Rectal temperature of 100.2 F and tachy to 130's otherwise vitally stable. Exam limited d/t difficulty talking. Denies CP or SOB.       Vital Signs Last 24 Hrs  T(C): 36.9 (03 Mar 2023 05:10), Max: 36.9 (03 Mar 2023 05:10)  T(F): 98.4 (03 Mar 2023 05:10), Max: 98.4 (03 Mar 2023 05:10)  HR: 117 (03 Mar 2023 05:10) (75 - 117)  BP: 127/75 (03 Mar 2023 05:10) (123/61 - 127/75)  BP(mean): --  RR: 20 (03 Mar 2023 05:10) (20 - 20)  SpO2: 96% (03 Mar 2023 05:10) (94% - 98%)    PHYSICAL EXAM:  General: Awake and alert.  Diaphoretic.   Heart: +tachycardiac, normal rhythm.  Normal S1 and S2.  No murmurs, rubs, or gallops.    Lungs: on 3LNC. Congested and diffuse rhonchi heard on ascultation. +abdominal breathing. No wheezes, crackles      #Increased WOB likely 2/2 PNA   -On vancomycin  -IV Tylenol 325 ordered for low-grade rectal temperature of 100.2.  -Chest PT and suction ordered.   -Spoke with pharmacy, Xopenex can be given early at 8 AM.   -Will monitor closely   -Will convey overnight events to AM team       Rona Bermeo PA-C  Department of Medicine   [id number] [facility] Medicine Night Coverage     Notified by RN, patient with increased WOB. Patient seen and assessed at bedside, resting in bed noted to be slightly diaphoretic. Rectal temperature of 100.2 F and tachy to 130's otherwise vitally stable. Exam limited d/t difficulty talking. Denies CP or SOB.       Vital Signs Last 24 Hrs  T(C): 36.9 (03 Mar 2023 05:10), Max: 36.9 (03 Mar 2023 05:10)  T(F): 98.4 (03 Mar 2023 05:10), Max: 98.4 (03 Mar 2023 05:10)  HR: 117 (03 Mar 2023 05:10) (75 - 117)  BP: 127/75 (03 Mar 2023 05:10) (123/61 - 127/75)  BP(mean): --  RR: 20 (03 Mar 2023 05:10) (20 - 20)  SpO2: 96% (03 Mar 2023 05:10) (94% - 98%)    PHYSICAL EXAM:  General: Awake and alert.  Diaphoretic.   Heart: +tachycardiac, normal rhythm.  Normal S1 and S2.  No murmurs, rubs, or gallops.    Lungs: on 3LNC. Congested and diffuse rhonchi heard on ascultation. +abdominal breathing. No wheezes, crackles      #Increased WOB likely 2/2 PNA   -On vancomycin  -IV Tylenol 325 ordered for low-grade rectal temperature of 100.2.  -Chest PT and suction ordered.   -Spoke with pharmacy, Xopenex can be given early at 8 AM.   -CXR ordered. Will f/u.   -Will monitor closely   -Will convey overnight events to AM team       Rona Bermeo PA-C  Department of Medicine   [id number]

## 2023-03-03 NOTE — PROGRESS NOTE ADULT - PROBLEM SELECTOR PLAN 2
PTH below normal, Phos low -> most likely hyperca of malignancy  EKG reviewed no ischemic changes  - S/p calcitonin on 2/13-2/14, 2/18-2/19 and IVFs.   -S/p dental extraction 2/16 and on 2/23 to reduce risk of jaw osteonecrosis w/ bisphosphonate administration.   - S/p zolendronate 2/19   -Given minimal improvement Xgeva given on 2/27 per endo recs   -monitor ca levels, will take a couple days to see effect of xgeva   -will transition from ivfs fluids to FW flushes 2/28-->increase free water flushes 300q6 to q4 3/3

## 2023-03-03 NOTE — PROGRESS NOTE ADULT - SUBJECTIVE AND OBJECTIVE BOX
PROGRESS NOTE:     Patient is a 67y old  Male who presents with a chief complaint of r. knee pain (03 Mar 2023 12:25)      SUBJECTIVE / OVERNIGHT EVENTS: was having increased WOB this morning. Xray unchanged grossly. Per family at bedside, patient is slowly improving. More awake and coughing is better. Denies any complaints at this time.    ADDITIONAL REVIEW OF SYSTEMS:    MEDICATIONS  (STANDING):  acetaminophen    Suspension .. 650 milliGRAM(s) Oral every 6 hours  ascorbic acid 500 milliGRAM(s) Oral <User Schedule>  chlorhexidine 2% Cloths 1 Application(s) Topical daily  enoxaparin Injectable 40 milliGRAM(s) SubCutaneous every 24 hours  gabapentin Solution 300 milliGRAM(s) Oral three times a day  levalbuterol Inhalation 1.25 milliGRAM(s) Inhalation every 6 hours  levothyroxine 150 MICROGram(s) Oral <User Schedule>  lidocaine   4% Patch 1 Patch Transdermal daily  melatonin 3 milliGRAM(s) Oral at bedtime  methadone   Solution 2.5 milliGRAM(s) Oral at bedtime  multivitamin 1 Tablet(s) Oral <User Schedule>  nystatin Powder 1 Application(s) Topical every 12 hours  pantoprazole   Suspension 40 milliGRAM(s) Oral <User Schedule>  senna Syrup 10 milliLiter(s) Oral <User Schedule>  sodium chloride 3%  Inhalation 4 milliLiter(s) Inhalation every 12 hours  vancomycin  IVPB 1000 milliGRAM(s) IV Intermittent every 12 hours    MEDICATIONS  (PRN):  naloxone Injectable 0.3 milliGRAM(s) IV Push every 3 minutes PRN opioid induced AMS  ondansetron Injectable 4 milliGRAM(s) IV Push every 8 hours PRN Nausea and/or Vomiting      CAPILLARY BLOOD GLUCOSE      POCT Blood Glucose.: 93 mg/dL (03 Mar 2023 11:45)  POCT Blood Glucose.: 125 mg/dL (03 Mar 2023 08:31)  POCT Blood Glucose.: 173 mg/dL (03 Mar 2023 06:14)  POCT Blood Glucose.: 127 mg/dL (03 Mar 2023 00:51)  POCT Blood Glucose.: 139 mg/dL (02 Mar 2023 18:49)    I&O's Summary    02 Mar 2023 07:01  -  03 Mar 2023 07:00  --------------------------------------------------------  IN: 2100 mL / OUT: 1000 mL / NET: 1100 mL        PHYSICAL EXAM:  Vital Signs Last 24 Hrs  T(C): 36.7 (03 Mar 2023 12:05), Max: 37.9 (03 Mar 2023 07:35)  T(F): 98 (03 Mar 2023 12:05), Max: 100.2 (03 Mar 2023 07:35)  HR: 104 (03 Mar 2023 12:05) (75 - 117)  BP: 117/60 (03 Mar 2023 12:05) (117/60 - 128/72)  BP(mean): --  RR: 19 (03 Mar 2023 12:05) (19 - 20)  SpO2: 97% (03 Mar 2023 12:05) (94% - 99%)    Parameters below as of 03 Mar 2023 09:30  Patient On (Oxygen Delivery Method): nasal cannula  O2 Flow (L/min): 2      CONSTITUTIONAL: NAD, laying in bed comfortable  RESPIRATORY: Normal respiratory effort; lungs are clear to auscultation bilaterally  CARDIOVASCULAR: tachycardia and regular rhythm, normal S1 and S2  ABDOMEN: Nontender to palpation, normoactive bowel sounds, no rebound/guarding  MUSCLOSKELETAL: no clubbing or cyanosis of digits  Neuro: moving all extremities  PSYCH: A+O to person, place; affect appropriate    LABS:                        8.0    24.16 )-----------( 574      ( 03 Mar 2023 03:45 )             29.2     03-03    134<L>  |  105  |  19  ----------------------------<  64<L>  TNP   |  18<L>  |  0.52    Ca    11.5<H>      03 Mar 2023 03:45  Phos  2.6     03-03  Mg     2.00     03-03    TPro  7.5  /  Alb  2.5<L>  /  TBili  0.4  /  DBili  x   /  AST  85<H>  /  ALT  36  /  AlkPhos  248<H>  03-03              Culture - Blood (collected 01 Mar 2023 01:15)  Source: .Blood Blood-Peripheral  Preliminary Report (02 Mar 2023 07:01):    No growth to date.    Culture - Blood (collected 01 Mar 2023 01:00)  Source: .Blood Blood-Venous  Gram Stain (02 Mar 2023 09:06):    Growth in aerobic bottle: Gram positive cocci in pairs  Preliminary Report (03 Mar 2023 12:11):    Growth in aerobic bottle: Enterococcus faecium    Susceptibility to follow.    ***Blood Panel PCR results on this specimen are available    approximately 3 hours after the Gram stain result.***    Gram stain, PCR, and/or culture results may not always    correspond due to difference in methodologies.    ************************************************************    This PCR assay was performed by multiplex PCR. This    Assay tests for 66 bacterial and resistance gene targets.    Please refer to the [facility] test directory    at [email or web address] for details.  Organism: Blood Culture PCR (02 Mar 2023 11:30)  Organism: Blood Culture PCR (02 Mar 2023 11:30)        RADIOLOGY & ADDITIONAL TESTS:  Results Reviewed:   Imaging Personally Reviewed:  Electrocardiogram Personally Reviewed:    COORDINATION OF CARE:  Care Discussed with Consultants/Other Providers [Y/N]:  Prior or Outpatient Records Reviewed [Y/N]:

## 2023-03-04 NOTE — PROVIDER CONTACT NOTE (OTHER) - DATE AND TIME:
02-Mar-2023 22:40
04-Mar-2023 08:00
13-Feb-2023 10:05
06-Feb-2023 05:35
24-Feb-2023 09:05
24-Feb-2023 18:13
07-Feb-2023 23:12
06-Feb-2023 14:50
08-Feb-2023 06:54
06-Feb-2023 01:15
06-Feb-2023 22:28
13-Feb-2023 09:40
16-Feb-2023 22:30
28-Feb-2023 22:40
06-Feb-2023 04:17
17-Feb-2023 23:20
25-Feb-2023 06:17
28-Feb-2023 23:15
16-Feb-2023 22:05
28-Feb-2023 20:30

## 2023-03-04 NOTE — PROVIDER CONTACT NOTE (OTHER) - NAME OF MD/NP/PA/DO NOTIFIED:
Dr. Allen Turner
Dr. Linder notified
SHANELL Manrique
HS2 resident Vargas Dunn
MD Warren
Rona Pena
Rona Pena
Sultan Chapito MORENO
MD Amauri Moreno
MD [place]
SHANELL Manrique
Dr. Allen [place]
MD Sultan Uriarte
MD Warren
Rona Pena
Yisel Manrique
Georgina Lees
Saroj Kaye
Sultan Chapito MORENO
Ishan Manrique

## 2023-03-04 NOTE — PROVIDER CONTACT NOTE (OTHER) - ASSESSMENT
Patient alert but unable to assess orientation status because patient has extensive garbled speech from mucus build-up. Patient tachycardic to 130s, afebrile, BP stable.

## 2023-03-04 NOTE — PROVIDER CONTACT NOTE (OTHER) - BACKGROUND
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 knee, recent RUL pna s/p cefepime, r. patellar mass excision 1 month ago who presents w/ right knee pain and swelling for the past 2 days.
Pt HR has been sustaining in the 140s. In the past IV tylenlol has brought the HR back down. Pt positive for pneumonia and is currently taking meropenum.
Pt admitted for R knee pain.
pt admitted with effusion of Right knee
pt admitted with effusion of Right knee s/p patellar mass excision 1 month ago   hx: lung metastases, esophageal cancer to bone, recent PNA s/p cefepime  patellar mass excision 1 month ago
Patient admitted with right knee effusion. Hx of esophageal cancer with jordi, hypothyroid, gerd, htn, and peg
pt admitted with effusion of R knee
pt admitted with effusion of Right knee s/p patellar mass excision 1 month ago   hx: lung metastases, esophageal cancer to bone, recent PNA s/p cefepime  patellar mass excision 1 month ago
66 yo M with hx of metastatic esophageal cancer to bone. Admitted for right knee pain and swelling s/p right patella mass excision
Hx of metastatic esophageal cancer to bone, RUL PNA, R Patellar mass excision
Patient admitted with right knee effusion. Hx of esophageal cancer with jordi, hypothyroid, gerd, htn, and peg
Patient admitted with right knee effusion. Hx of esophageal cancer with jordi, hypothyroid, gerd, htn, and peg
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 knee, recent RUL pna s/p cefepime, r. patellar mass excision 1 month ago who presents w/ right knee pain and swelling for the past 2 days.
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 knee, recent RUL pna s/p cefepime, r. patellar mass excision 1 month ago who presents w/ right knee pain and swelling for the past 2 days.
pt admitted with effusion of Right knee
pt admitted with effusion of Right knee s/p patellar mass excision 1 month ago   hx: lung metastases, esophageal cancer to bone, recent PNA s/p cefepime  patellar mass excision 1 month ago
66 yo M with hx of metastatic esophageal cancer to bone. Admitted for right knee pain and swelling s/p right patella mass excision
66 yo M with hx of metastatic esophageal cancer to bone. Admitted for right knee pain and swelling s/p right patella mass excision
pt admitted with effusion of R knee
pt admitted with effusion of Right knee

## 2023-03-04 NOTE — PROGRESS NOTE ADULT - PROBLEM SELECTOR PLAN 2
PTH below normal, Phos low -> most likely hyperca of malignancy  EKG reviewed no ischemic changes  - S/p calcitonin on 2/13-2/14, 2/18-2/19 and IVFs.   -S/p dental extraction 2/16 and on 2/23 to reduce risk of jaw osteonecrosis w/ bisphosphonate administration.   - S/p zolendronate 2/19   -Given minimal improvement Xgeva given on 2/27 per endo recs   -monitor ca levels, will take a couple days to see effect of xgeva   -will transition from ivfs fluids to FW flushes 2/28-->increase free water flushes 300q6 to q4 3/3 but not tolerating. Given risk of aspiration and increase WOB today, will hold off FW flushes and do IVF now

## 2023-03-04 NOTE — PROVIDER CONTACT NOTE (OTHER) - REASON
HE=072 on EKG, RR=38, SPO2=97% 3L nasal cannula
HR sustaining 140
Pt Tachycardic
Rectal temp 102.9
HR sustaining in 140s
patient desatting to 80s on 2L NC
hr 118
patient complains of shortness of breath. OM=905/61, Gc=570, temp=98.4 axillary, RR=40, Spo2=89% on room air.
Pt Tachycardic on telemetry and febrile
Pt Tachycardic on telemetry and febrile
hr 103
Pt Tachycardic on telemetry
TV=619, IV=265/62, RR=30, SPO2=98% room air, temp=97.3 axillary- patient sounds very congested in throat and auscultation
hr 110
patient tachy to 130-140s on telemetry monitor
Patient tachy to 140s on telemetry monitor
Pt tachycardic at 160

## 2023-03-04 NOTE — PROGRESS NOTE ADULT - PROBLEM SELECTOR PLAN 1
Mostly likely 2/2 aspiration pneumonia +/- post-obstructive +/- malignancy  MRSA neg at admission  Scx w/ growth  Enterobacter cloacae   PCT elevated to 5  BCx -> NGTD  UA/Ucx neg  CXR -> right perihilar opacity  RVP neg  duplex ordered to eval for alternative causes -> neg  - Pt s/p empiric Zosyn (2/5-2/9, 2/) ->then switched to uma (2/17 - ) due to recurrent fevers. 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 Pt has been afebrile  -C/w nebs. Pharmacy doesn't have any xopenex at all. Will do duoneb for now. Will check with pharmacy again libertad to see if has xopenex is back in stock  -IS and chest PT -> chest vest ordered-->increase from q12 to q6  -O2 as needed -> may require on d/c

## 2023-03-04 NOTE — PROGRESS NOTE ADULT - SUBJECTIVE AND OBJECTIVE BOX
PROGRESS NOTE:     Patient is a 67y old  Male who presents with a chief complaint of r. knee pain (03 Mar 2023 15:23)      SUBJECTIVE / OVERNIGHT EVENTS: desatted this morning. Was having a lot of phlegm. Spiked fever overnight. Denies chest pain.     ADDITIONAL REVIEW OF SYSTEMS:    MEDICATIONS  (STANDING):  acetaminophen    Suspension .. 650 milliGRAM(s) Oral every 6 hours  albuterol/ipratropium for Nebulization 3 milliLiter(s) Nebulizer every 12 hours  ascorbic acid 500 milliGRAM(s) Oral <User Schedule>  chlorhexidine 2% Cloths 1 Application(s) Topical daily  enoxaparin Injectable 40 milliGRAM(s) SubCutaneous every 24 hours  gabapentin Solution 300 milliGRAM(s) Oral three times a day  lactated ringers. 1000 milliLiter(s) (100 mL/Hr) IV Continuous <Continuous>  levothyroxine 150 MICROGram(s) Oral <User Schedule>  lidocaine   4% Patch 1 Patch Transdermal daily  magnesium sulfate  IVPB 2 Gram(s) IV Intermittent once  melatonin 3 milliGRAM(s) Oral at bedtime  methadone   Solution 2.5 milliGRAM(s) Oral at bedtime  multivitamin 1 Tablet(s) Oral <User Schedule>  nystatin Powder 1 Application(s) Topical every 12 hours  pantoprazole   Suspension 40 milliGRAM(s) Oral <User Schedule>  senna Syrup 10 milliLiter(s) Oral <User Schedule>  sodium chloride 3%  Inhalation 4 milliLiter(s) Inhalation every 12 hours  vancomycin  IVPB 1000 milliGRAM(s) IV Intermittent every 12 hours    MEDICATIONS  (PRN):  naloxone Injectable 0.3 milliGRAM(s) IV Push every 3 minutes PRN opioid induced AMS  ondansetron Injectable 4 milliGRAM(s) IV Push every 8 hours PRN Nausea and/or Vomiting      CAPILLARY BLOOD GLUCOSE        I&O's Summary    03 Mar 2023 07:01  -  04 Mar 2023 07:00  --------------------------------------------------------  IN: 2900 mL / OUT: 600 mL / NET: 2300 mL    04 Mar 2023 07:01  -  04 Mar 2023 17:28  --------------------------------------------------------  IN: 625 mL / OUT: 50 mL / NET: 575 mL        PHYSICAL EXAM:  Vital Signs Last 24 Hrs  T(C): 36.2 (04 Mar 2023 12:00), Max: 38.4 (04 Mar 2023 01:45)  T(F): 97.2 (04 Mar 2023 12:00), Max: 101.1 (04 Mar 2023 01:45)  HR: 109 (04 Mar 2023 12:00) (109 - 149)  BP: 132/75 (04 Mar 2023 12:00) (100/67 - 132/75)  BP(mean): --  RR: 20 (04 Mar 2023 12:00) (18 - 22)  SpO2: 97% (04 Mar 2023 12:00) (96% - 100%)    Parameters below as of 04 Mar 2023 09:48  Patient On (Oxygen Delivery Method): nasal cannula      CONSTITUTIONAL: sitting in bed uncomfortable  RESPIRATORY: mildly increased respiratory effort; diffuse coarse breath sound  CARDIOVASCULAR: tachycardia and regular rhythm, normal S1 and S2  ABDOMEN: Nontender to palpation, normoactive bowel sounds, no rebound/guarding  MUSCLOSKELETAL: no clubbing or cyanosis of digits  Neuro: moving all extremities  PSYCH: A+O to person; affect appropriate    LABS:                        8.1    34.32 )-----------( 735      ( 04 Mar 2023 03:51 )             27.7     03-04    132<L>  |  103  |  25<H>  ----------------------------<  107<H>  4.4   |  18<L>  |  0.70    Ca    11.6<H>      04 Mar 2023 03:51  Phos  2.0     03-04  Mg     1.80     03-04    TPro  7.2  /  Alb  2.7<L>  /  TBili  0.3  /  DBili  x   /  AST  30  /  ALT  32  /  AlkPhos  273<H>  03-04              Culture - Blood (collected 03 Mar 2023 03:46)  Source: .Blood Blood  Preliminary Report (04 Mar 2023 07:01):    No growth to date.    Culture - Blood (collected 03 Mar 2023 03:29)  Source: .Blood Blood  Preliminary Report (04 Mar 2023 07:01):    No growth to date.        RADIOLOGY & ADDITIONAL TESTS:  Results Reviewed:   Imaging Personally Reviewed:  Electrocardiogram Personally Reviewed:    COORDINATION OF CARE:  Care Discussed with Consultants/Other Providers [Y/N]:  Prior or Outpatient Records Reviewed [Y/N]:

## 2023-03-04 NOTE — PROVIDER CONTACT NOTE (OTHER) - SITUATION
Patient sustaining HR of 140s
Patient tachy to 140s on telemetry monitor
Tachycardic at 160
hr 118
AY=756, MB=344/62, RR=30, SPO2=98% room air, temp=97.3 axillary- patient sounds very congested in throat and auscultation
Pt HR sustaining 140
Pt tachycardic with HR sustaining 140's on telemetry and febrile with rectal temp 101.9
Rectal temp 102.9
LW=167 on EKG, RR=38, SPO2=97% 3L nasal cannula
hr 110
Pt HR sustaining in the 120's.
respiratory distress. EJ=793/61, Re=243, temp=98.4 axillary, RR=40, Spo2=89% on room air.
Patient noted to desat to 80s on 2L NC
Pt tachycardic with HR sustaining 150's on telemetry and febrile with rectal temp 102.4
Pt tachycardic with HR sustaining 150's on telemetry.
hr 103
patient tachy to 130-140s on telemetry monitor

## 2023-03-05 NOTE — PROGRESS NOTE ADULT - PROBLEM SELECTOR PLAN 2
PTH below normal, Phos low -> most likely hyperca of malignancy  EKG reviewed no ischemic changes  - S/p calcitonin on 2/13-2/14, 2/18-2/19 and IVFs.   -S/p dental extraction 2/16 and on 2/23 to reduce risk of jaw osteonecrosis w/ bisphosphonate administration.   - S/p zolendronate 2/19   -Given minimal improvement Xgeva given on 2/27 per endo recs   -monitor ca levels, will take a couple days to see effect of xgeva   -will transition from ivfs fluids to FW flushes 2/28-->increase free water flushes 300q6 to q4 3/3 but not tolerating. Given risk of aspiration and increased wob 3/4, will hold off FW flushes and do IVF. If respiratory status improves libertad, consider transitioning from IVF to free water flushes tomorrow

## 2023-03-05 NOTE — PROGRESS NOTE ADULT - SUBJECTIVE AND OBJECTIVE BOX
PROGRESS NOTE:     Patient is a 67y old  Male who presents with a chief complaint of r. knee pain (04 Mar 2023 09:28)      SUBJECTIVE / OVERNIGHT EVENTS: no acute event overnight. Reports feeling fine today. Per wife at bedside, bringing up a lot of sputum after the vest treatment. Mental status is better today.    ADDITIONAL REVIEW OF SYSTEMS:    MEDICATIONS  (STANDING):  acetaminophen    Suspension .. 650 milliGRAM(s) Oral every 6 hours  albuterol/ipratropium for Nebulization 3 milliLiter(s) Nebulizer every 12 hours  ascorbic acid 500 milliGRAM(s) Oral <User Schedule>  chlorhexidine 2% Cloths 1 Application(s) Topical daily  enoxaparin Injectable 40 milliGRAM(s) SubCutaneous every 24 hours  gabapentin Solution 300 milliGRAM(s) Oral three times a day  lactated ringers. 1000 milliLiter(s) (100 mL/Hr) IV Continuous <Continuous>  levothyroxine 150 MICROGram(s) Oral <User Schedule>  lidocaine   4% Patch 1 Patch Transdermal daily  melatonin 3 milliGRAM(s) Oral at bedtime  methadone   Solution 2.5 milliGRAM(s) Oral at bedtime  multivitamin 1 Tablet(s) Oral <User Schedule>  nystatin Powder 1 Application(s) Topical every 12 hours  pantoprazole   Suspension 40 milliGRAM(s) Oral <User Schedule>  sodium chloride 3%  Inhalation 4 milliLiter(s) Inhalation every 12 hours  vancomycin  IVPB 1000 milliGRAM(s) IV Intermittent every 12 hours    MEDICATIONS  (PRN):  naloxone Injectable 0.3 milliGRAM(s) IV Push every 3 minutes PRN opioid induced AMS  ondansetron Injectable 4 milliGRAM(s) IV Push every 8 hours PRN Nausea and/or Vomiting  senna Syrup 10 milliLiter(s) Oral at bedtime PRN Constipation      CAPILLARY BLOOD GLUCOSE        I&O's Summary    04 Mar 2023 07:01  -  05 Mar 2023 07:00  --------------------------------------------------------  IN: 2525 mL / OUT: 400 mL / NET: 2125 mL        PHYSICAL EXAM:  Vital Signs Last 24 Hrs  T(C): 37.7 (05 Mar 2023 14:10), Max: 37.7 (05 Mar 2023 13:55)  T(F): 99.9 (05 Mar 2023 14:10), Max: 99.9 (05 Mar 2023 13:55)  HR: 120 (05 Mar 2023 14:10) (103 - 120)  BP: 108/64 (05 Mar 2023 14:10) (104/62 - 122/68)  BP(mean): --  RR: 19 (05 Mar 2023 14:10) (17 - 20)  SpO2: 100% (05 Mar 2023 14:10) (99% - 100%)    Parameters below as of 05 Mar 2023 14:10  Patient On (Oxygen Delivery Method): nasal cannula  O2 Flow (L/min): 4      CONSTITUTIONAL: NAD, laying in bed comfortably  RESPIRATORY: Normal respiratory effort; diffuse coarse breath sound (improved from yesterday  CARDIAC: tachycardia, regular rhythm  ABDOMEN: Nontender to palpation, normoactive bowel sounds, no rebound/guarding  MUSCLOSKELETAL: no clubbing or cyanosis of digits; R knee is mildly tender to palpation  SKIN: no new rash, erythema, lesion  PSYCH: A+O to person; affect appropriate    LABS:                        6.9    27.06 )-----------( 654      ( 05 Mar 2023 10:13 )             23.7     03-05    134<L>  |  102  |  22  ----------------------------<  108<H>  3.9   |  21<L>  |  0.65    Ca    10.7<H>      05 Mar 2023 06:51  Phos  2.7     03-05  Mg     2.10     03-05    TPro  6.2  /  Alb  2.3<L>  /  TBili  <0.2  /  DBili  x   /  AST  17  /  ALT  24  /  AlkPhos  227<H>  03-05              Culture - Blood (collected 03 Mar 2023 03:46)  Source: .Blood Blood  Preliminary Report (04 Mar 2023 07:01):    No growth to date.    Culture - Blood (collected 03 Mar 2023 03:29)  Source: .Blood Blood  Preliminary Report (04 Mar 2023 07:01):    No growth to date.        RADIOLOGY & ADDITIONAL TESTS:  Results Reviewed:   Imaging Personally Reviewed:  Electrocardiogram Personally Reviewed:    COORDINATION OF CARE:  Care Discussed with Consultants/Other Providers [Y/N]:  Prior or Outpatient Records Reviewed [Y/N]:

## 2023-03-05 NOTE — PROGRESS NOTE ADULT - PROBLEM SELECTOR PLAN 1
Mostly likely 2/2 aspiration pneumonia +/- post-obstructive +/- malignancy  MRSA neg at admission  Scx w/ growth  Enterobacter cloacae   PCT elevated to 5  BCx -> NGTD  UA/Ucx neg  CXR -> right perihilar opacity  RVP neg  duplex ordered to eval for alternative causes -> neg  - Pt s/p empiric Zosyn (2/5-2/9, 2/) ->then switched to uma (2/17 - ) due to recurrent fevers. 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 Pt has been afebrile  -C/w nebs. Xopenex is back in stock 3/5. Ordered for q6 standing for now  -IS and chest PT -> chest vest ordered-->increased from q12 to q6  -O2 as needed -> may require on d/c

## 2023-03-05 NOTE — PROGRESS NOTE ADULT - PROBLEM SELECTOR PLAN 7
Hb 7.8 on admission (baseline 8-9)  possibly due to hemarthrosis of right knee vs AOCD iso malignancy  - trend CBC  - transfuse Hb <7  - 1u prbc today

## 2023-03-05 NOTE — CHART NOTE - NSCHARTNOTEFT_GEN_A_CORE
Vital Signs Last 24 Hrs  T(C): 37.3 (05 Mar 2023 04:20), Max: 37.6 (04 Mar 2023 18:25)  T(F): 99.1 (05 Mar 2023 04:20), Max: 99.6 (04 Mar 2023 18:25)  HR: 105 (05 Mar 2023 04:20) (104 - 119)  BP: 112/57 (05 Mar 2023 04:20) (104/62 - 132/75)  BP(mean): --  RR: 17 (05 Mar 2023 04:20) (17 - 20)  SpO2: 100% (05 Mar 2023 04:20) (97% - 100%)    Parameters below as of 05 Mar 2023 04:20  Patient On (Oxygen Delivery Method): nasal cannula  O2 Flow (L/min): 4                        6.9    27.06 )-----------( 654      ( 05 Mar 2023 10:13 )             23.7   03-05    134<L>  |  102  |  22  ----------------------------<  108<H>  3.9   |  21<L>  |  0.65    Ca    10.7<H>      05 Mar 2023 06:51  Phos  2.7     03-05  Mg     2.10     03-05    TPro  6.2  /  Alb  2.3<L>  /  TBili  <0.2  /  DBili  x   /  AST  17  /  ALT  24  /  AlkPhos  227<H>  03-05    H/H noted as above, no active signs of bleeding. Results and case discussed with Dr. Amado >1u prbc ordered, no need for IV lasix post transfusion for now, will monitor respiratory status closely and trend CBC, discussed with RN

## 2023-03-05 NOTE — PROGRESS NOTE ADULT - CONVERSATION DETAILS
Discussed with son and wife that we are continuing to do aggressive chest vest/chest pt therapy to help mobilize secretion to help with respiratory status. However, patient remains at high risk of aspiration. Family explains that he wouldn't want to go through intubation or resuscitation if such situation needing those intervention was arisen. Otherwise, family wants to continue all other medical treatment. Molst form filled out and in chart.

## 2023-03-06 NOTE — PROGRESS NOTE ADULT - SUBJECTIVE AND OBJECTIVE BOX
Chief Complaint: Hypercalcemia of malignancy    History: resting in bed  denies complaints    MEDICATIONS  (STANDING):  acetaminophen    Suspension .. 650 milliGRAM(s) Oral every 6 hours  ascorbic acid 500 milliGRAM(s) Oral <User Schedule>  chlorhexidine 2% Cloths 1 Application(s) Topical daily  enoxaparin Injectable 40 milliGRAM(s) SubCutaneous every 24 hours  gabapentin Solution 300 milliGRAM(s) Oral three times a day  lactated ringers. 1000 milliLiter(s) (75 mL/Hr) IV Continuous <Continuous>  levalbuterol Inhalation 0.63 milliGRAM(s) Inhalation every 6 hours  levothyroxine 150 MICROGram(s) Oral <User Schedule>  lidocaine   4% Patch 1 Patch Transdermal daily  melatonin 3 milliGRAM(s) Oral at bedtime  methadone   Solution 2.5 milliGRAM(s) Oral at bedtime  multivitamin 1 Tablet(s) Oral <User Schedule>  nystatin Powder 1 Application(s) Topical every 12 hours  pantoprazole   Suspension 40 milliGRAM(s) Oral <User Schedule>  sodium chloride 3%  Inhalation 4 milliLiter(s) Inhalation every 12 hours  vancomycin  IVPB 1000 milliGRAM(s) IV Intermittent every 12 hours    MEDICATIONS  (PRN):  naloxone Injectable 0.3 milliGRAM(s) IV Push every 3 minutes PRN opioid induced AMS  ondansetron Injectable 4 milliGRAM(s) IV Push every 8 hours PRN Nausea and/or Vomiting  oxyCODONE    IR 10 milliGRAM(s) Oral every 4 hours PRN Severe Pain (7 - 10)  oxyCODONE    IR 5 milliGRAM(s) Oral every 4 hours PRN Moderate Pain (4 - 6)  senna Syrup 10 milliLiter(s) Oral at bedtime PRN Constipation      Allergies    No Known Allergies    Intolerances      Review of Systems:  ALL OTHER SYSTEMS REVIEWED AND NEGATIVE      PHYSICAL EXAM:  VITALS: T(C): 36.6 (03-06-23 @ 11:42)  T(F): 97.9 (03-06-23 @ 11:42), Max: 99.3 (03-05-23 @ 16:55)  HR: 116 (03-06-23 @ 11:42) (97 - 116)  BP: 139/79 (03-06-23 @ 11:42) (106/64 - 139/79)  RR:  (18 - 19)  SpO2:  (95% - 100%)  Wt(kg): --  GENERAL: NAD, appears comfortable  EYES: No proptosis, no lid lag, anicteric  HEENT:  Atraumatic, Normocephalic, moist mucous membranes  RESPIRATORY: nonlabored respirations  PSYCH: Alert and awake    CAPILLARY BLOOD GLUCOSE          03-06    132<L>  |  102  |  15  ----------------------------<  96  4.3   |  23  |  0.56    eGFR: 108    Ca    10.6<H>      03-06  Mg     2.00     03-06  Phos  1.9     03-06    TPro  6.5  /  Alb  2.3<L>  /  TBili  0.3  /  DBili  x   /  AST  27  /  ALT  28  /  AlkPhos  251<H>  03-06      A1C with Estimated Average Glucose Result: 5.8 % (01-13-23 @ 06:10)      Thyroid Function Tests:  02-28 @ 07:20 TSH 4.32 FreeT4 1.4 T3 -- Anti TPO -- Anti Thyroglobulin Ab -- TSI --  02-21 @ 06:20 TSH 23.86 FreeT4 0.9 T3 -- Anti TPO -- Anti Thyroglobulin Ab -- TSI --

## 2023-03-06 NOTE — PROGRESS NOTE ADULT - ASSESSMENT
67M with  metastatic esophageal cancer (dx 8 years ago) to bone on irinotecan, r. knee, hypothyroidism, GERD, HTN recent RUL pna s/p cefepime, r. patellar mass excision --> metastatic squamous cell carcinoma on 1/7/23 was admitted on 2/5/23  w/ right knee pain and swelling for 2 days.    Recently hospitalized  underwent patellectomy for met SCC on 1/7/23  Course complicated by pneumonia attributed to Enterobacter treated with Cefepime 1/11 --> 1/18  hypercalcemia - calcitonin administered 2/18, 2/19, Zometa 2/18    Recent in patient Antibiotics  Vanco 1/1-->1/4;  2/5 2/13--> 2/17; 3/2-->  Zosyn 1/1;  2/5, 2/6l 2/17 -->  Cefepime 1/1-->4; 1/11-->18  Cefazolin 1/7  Meropenem  2/17 --> 3/2    encephalopathy  leukemoid reaction  hypercalcemia  2/16 dental extraction to reduce risk of OsteoNecrosis of Jaw  2/23: Elevator and rongeur used to deliver root tips #6, 7, 9 and 10 without complications  He has large tumor burden and mass with scant residual left lung  2/17 Neg MRSA PCR  2/17 Sputum with Enterobacter cloacae:   no bacteremia documented    2/24 modestly improved appearance  2/28  appears more alert  3/1  fever   +BC Enterococcus faecium [facility] to Vanco 3/2-->    transient bacteremia    Suggest  Continue Vancomycin      continue Vanco through 3/11  vigorous pulmonary toilet

## 2023-03-06 NOTE — PROGRESS NOTE ADULT - PROBLEM SELECTOR PLAN 4
S/p arthrocentesis 2/5 with RBCs c/f hemarthrosis, less likely septic arthritis given neg cx  ESR/CRP elevated  LE US negative  XR tib-fib and knee unremarkable  Aspiration cx, bcxs ngtd, MRSA neg    - C/w pain control. Palliative recs: methadone 2.5mg bid (now decreased to 2.5mg qd), Oxy IR 5mg q4 prn mild mod pain, Oxy IR 10mg q4 prn severe pain  - per ortho recs: no additional imaging or interventions at this time. Outpatient ortho Dr. Carter: no infection, likely tumor growth, pt declining palliative amputation at this time

## 2023-03-06 NOTE — PROGRESS NOTE ADULT - PROBLEM SELECTOR PLAN 2
PTH below normal, Phos low -> most likely hyperca of malignancy  EKG reviewed no ischemic changes  - S/p calcitonin on 2/13-2/14, 2/18-2/19 and IVFs.   -S/p dental extraction 2/16 and on 2/23 to reduce risk of jaw osteonecrosis w/ bisphosphonate administration.   - S/p zolendronate 2/19   -Given minimal improvement Xgeva given on 2/27 per endo recs   -monitor ca levels, will take a couple days to see effect of xgeva   -will transition from ivfs fluids to FW flushes 2/28-->increase free water flushes 300q6 to q4 3/3 but not tolerating. Given IVF over weekend, will transition back to free water flushes 300ml q8h on 3/6. Plan to gradually increase to q6h on 3/7.

## 2023-03-06 NOTE — PROGRESS NOTE ADULT - PROBLEM SELECTOR PLAN 1
Mostly likely 2/2 aspiration pneumonia +/- post-obstructive +/- malignancy  MRSA neg at admission  Scx w/ growth  Enterobacter cloacae   PCT elevated to 5  BCx -> NGTD  UA/Ucx neg  CXR -> right perihilar opacity  RVP neg  duplex ordered to eval for alternative causes -> neg  - Pt s/p empiric Zosyn (2/5-2/9, 2/) ->then switched to uma (2/17 - ) due to recurrent fevers. 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 Pt has been afebrile  -C/w nebs. Xopenex is back in stock 3/5. Ordered for q6 standing for now  -IS and chest PT -> chest vest ordered-->increased from q12 to q6  -O2 as needed -> may require on d/c  -overall appears to be improving with vanco

## 2023-03-06 NOTE — PROGRESS NOTE ADULT - SUBJECTIVE AND OBJECTIVE BOX
[facility] Department of Hospital Medicine  Vee Montenegro MD  Available on MS Teams  Pager: [phone]    Patient is a 67y old  Male who presents with a chief complaint of r. knee pain (06 Mar 2023 14:12)    OVERNIGHT EVENTS: No acute events overnight.    SUBJECTIVE: Pt seen and examined at bedside this morning. Patient's wife and son present at bedside. Patient appearing fatigued but easily arousable and communicating. Patient with weak voice but nodding/shaking his head to respond to questions. Endorses pain in his knee. Denies any CP or SOB.    ADDITIONAL REVIEW OF SYSTEMS: as above.    MEDICATIONS  (STANDING):  acetaminophen    Suspension .. 650 milliGRAM(s) Oral every 6 hours  ascorbic acid 500 milliGRAM(s) Oral <User Schedule>  chlorhexidine 2% Cloths 1 Application(s) Topical daily  enoxaparin Injectable 40 milliGRAM(s) SubCutaneous every 24 hours  gabapentin Solution 300 milliGRAM(s) Oral three times a day  lactated ringers. 1000 milliLiter(s) (75 mL/Hr) IV Continuous <Continuous>  levalbuterol Inhalation 0.63 milliGRAM(s) Inhalation every 6 hours  levothyroxine 150 MICROGram(s) Oral <User Schedule>  lidocaine   4% Patch 1 Patch Transdermal daily  melatonin 3 milliGRAM(s) Oral at bedtime  methadone   Solution 2.5 milliGRAM(s) Oral at bedtime  multivitamin 1 Tablet(s) Oral <User Schedule>  nystatin Powder 1 Application(s) Topical every 12 hours  pantoprazole   Suspension 40 milliGRAM(s) Oral <User Schedule>  sodium chloride 3%  Inhalation 4 milliLiter(s) Inhalation every 12 hours  vancomycin  IVPB 1000 milliGRAM(s) IV Intermittent every 12 hours    MEDICATIONS  (PRN):  naloxone Injectable 0.3 milliGRAM(s) IV Push every 3 minutes PRN opioid induced AMS  ondansetron Injectable 4 milliGRAM(s) IV Push every 8 hours PRN Nausea and/or Vomiting  oxyCODONE    IR 10 milliGRAM(s) Oral every 4 hours PRN Severe Pain (7 - 10)  oxyCODONE    IR 5 milliGRAM(s) Oral every 4 hours PRN Moderate Pain (4 - 6)  senna Syrup 10 milliLiter(s) Oral at bedtime PRN Constipation    CAPILLARY BLOOD GLUCOSE    I&O's Summary    05 Mar 2023 07:01  -  06 Mar 2023 07:00  --------------------------------------------------------  IN: 1215 mL / OUT: 600 mL / NET: 615 mL    06 Mar 2023 07:01  -  06 Mar 2023 14:41  --------------------------------------------------------  IN: 300 mL / OUT: 0 mL / NET: 300 mL    PHYSICAL EXAM:  Vital Signs Last 24 Hrs  T(C): 36.6 (06 Mar 2023 11:42), Max: 37.4 (05 Mar 2023 16:55)  T(F): 97.9 (06 Mar 2023 11:42), Max: 99.3 (05 Mar 2023 16:55)  HR: 116 (06 Mar 2023 11:42) (97 - 116)  BP: 139/79 (06 Mar 2023 11:42) (106/64 - 139/79)  BP(mean): --  RR: 18 (06 Mar 2023 11:42) (18 - 19)  SpO2: 95% (06 Mar 2023 11:42) (95% - 100%)    Parameters below as of 06 Mar 2023 11:42  Patient On (Oxygen Delivery Method): nasal cannula    CONSTITUTIONAL: NAD, well-developed  HEAD: Normocephalic, atraumatic  EYES: EOMI, PERRL  ENT: no rhinorrhea, no pharyngeal erythema  RESPIRATORY: No increased work of breathing, CTAB, transmitted upper airway sounds appreciated  CARDIOVASCULAR: RRR, S1 and S2 present, no m/r/g  ABDOMEN: soft, NT, ND, bowel sounds present  EXTREMITIES: RLE with 1+ pitting edema  MUSCULOSKELETAL: R knee with TTP  NEURO: A&Ox3, moving all extremities    LABS:                        8.5    25.21 )-----------( 643      ( 06 Mar 2023 04:45 )             27.8     03-06    132<L>  |  102  |  15  ----------------------------<  96  4.3   |  23  |  0.56    Ca    10.6<H>      06 Mar 2023 04:45  Phos  1.9     03-06  Mg     2.00     03-06    TPro  6.5  /  Alb  2.3<L>  /  TBili  0.3  /  DBili  x   /  AST  27  /  ALT  28  /  AlkPhos  251<H>  03-06    RADIOLOGY & ADDITIONAL TESTS:    Results Reviewed:   Imaging Personally Reviewed:  Electrocardiogram Personally Reviewed:    COORDINATION OF CARE:  Care Discussed with Consultants/Other Providers [Y/N]:  Prior or Outpatient Records Reviewed [Y/N]:

## 2023-03-06 NOTE — ADVANCED PRACTICE NURSE CONSULT - ASSESSMENT
Left arm cleansed with CHG. Under ultrasound guidance, placed Arrow Endurance Extended Dwell Peripheral Catheter System 20G / 6cm into Left Median Vein. Brisk  blood return, flushed with 20mls normal saline. Minimal blood loss. Patient tolerated procedure well. CHG dressing placed. All sharps accounted for.

## 2023-03-06 NOTE — PROGRESS NOTE ADULT - ASSESSMENT
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    1) Hypercalcemia of malignancy  Labs show suppressed PTH (6) and negative PTHRP suggesting cause of hypercalcemia to be local bone osteolysis from metastatic disease. DDx includes high 1,25 Vit D but less likely for this malignancy. 1,25 D resulted 78 upper normal range. 25OHD 33.4 wnl.  Risk of ONJ even with current dental issues is greatly outweighed by hypercalcemia of malignancy and bone mets.  Completed inpatient dental extractions.  S/p calcitonin 4 doses plus 2 additional doses previously.  -s/p first dose of zoledronate 4mg IV on 2/18/23 - failed bisphosphonate tx, remained persistently hypercalcemic   -maintain hydration status. encourage PO as tolerated and if able to give further IV hydration would recommend doing so today.  -s/p dental extraction again 2/23 . Per wife now extractions are completed.  -s/p first dose of Denosumab 120mg on 2/27/23 (corrected Ca was 13.3 at that time). May need to consider redosing if worsening of hypercalcemia occurs  -Ultimate treatment of hypercalcemia is oncology-directed --> planned for outpatient palliative RT. No inpatient plans.  -would use PO phos for repletion  -s/p 2 additional doses of calcitonin given 2/23, as well as a dose of furosemide.  -Corrected calcium today 12.84-->12.3 --> 12.2->12.7 --> 11.96 improved  -tele monitoring  -Iv fluids/free water flushes as tolerated per primary team, monitor for fluid overload.  -Trend conservatively for now    2) Hypothyroidism  TSH 2/21 increased to 23.4 (prior 7.56) and Free T4 0.9 (prior 1.0)  Noted that levothyroxine is being given at same time as pantoprazole and other po meds.  Please time levothyroxine for 5 AM and other oral meds in AM for 8AM.  Ensure 1 hour at least between levothyroxine and tube feeds or other po meds, especially pantoprazole.  Ensure at least 4 hours between levothyroxine and multivitamin.  2/28/23 TSH trending down to 4.32, FT4 1.4 wnl   Continue levothyroxine 150 mcg PEG daily    3) Prediabetes  FS q6h, on bolus feeds but not having hyperglycemia  If needed can add low Admelog correction scale q6h  HbA1c 5.8%  no DM meds for dc.  Since glucose has been well controlled would consider discontinuing point of care glucose checks    Lesli Shields MD  Division of Endocrinology  Pager: [phone]    If after 6PM or before 9AM, or on weekends/holidays, please call endocrine answering service for assistance ([phone]).  For nonurgent matters email [email or web address] for assistance.

## 2023-03-06 NOTE — PROGRESS NOTE ADULT - SUBJECTIVE AND OBJECTIVE BOX
Follow Up:   Enterococcal bacteremia    Interval History/ROS:  denies complaints    Allergies  No Known Allergies    ANTIMICROBIALS:  vancomycin  IVPB 1000 every 12 hours    OTHER MEDS:  MEDICATIONS  (STANDING):  acetaminophen    Suspension .. 650 every 6 hours  enoxaparin Injectable 40 every 24 hours  gabapentin Solution 300 three times a day  levalbuterol Inhalation 0.63 every 6 hours  levothyroxine 150 <User Schedule>  melatonin 3 at bedtime  methadone   Solution 2.5 at bedtime  ondansetron Injectable 4 every 8 hours PRN  oxyCODONE    IR 10 every 4 hours PRN  oxyCODONE    IR 5 every 4 hours PRN  pantoprazole   Suspension 40 <User Schedule>  senna Syrup 10 at bedtime PRN  sodium chloride 3%  Inhalation 4 every 12 hours      Vital Signs Last 24 Hrs  T(C): 36.6 (06 Mar 2023 11:42), Max: 37.4 (05 Mar 2023 16:55)  T(F): 97.9 (06 Mar 2023 11:42), Max: 99.3 (05 Mar 2023 16:55)  HR: 116 (06 Mar 2023 11:42) (97 - 116)  BP: 139/79 (06 Mar 2023 11:42) (106/64 - 139/79)  BP(mean): --  RR: 18 (06 Mar 2023 11:42) (18 - 19)  SpO2: 95% (06 Mar 2023 11:42) (95% - 100%)    Parameters below as of 06 Mar 2023 11:42  Patient On (Oxygen Delivery Method): nasal cannula        PHYSICAL EXAM:  General: Ill appearing NAD, Non-toxic  Neurology: fatigues, responds promptly to  voice  Respiratory: bilateral rhonchi    more prominent on Right  CV: RRR, S1S2, no murmurs, rubs or gallops  Abdominal: Soft, Non-tender, non-distended, normal bowel sounds  Extremities: No edema  Line Sites: Clear  Skin: No rash                        8.5    25.21 )-----------( 643      ( 06 Mar 2023 04:45 )             27.8   WBC Count: 25.21 (03-06 @ 04:45)  WBC Count: 26.24 (03-05 @ 22:42)  WBC Count: 27.06 (03-05 @ 10:13)  WBC Count: 26.33 (03-05 @ 06:51)  WBC Count: 34.32 (03-04 @ 03:51)  WBC Count: 24.16 (03-03 @ 03:45)  WBC Count: 26.42 (03-02 @ 12:13)  WBC Count: 24.88 (03-02 @ 06:30)       03-06    132<L>  |  102  |  15  ----------------------------<  96  4.3   |  23  |  0.56    Ca    10.6<H>      06 Mar 2023 04:45  Phos  1.9     03-06  Mg     2.00     03-06    TPro  6.5  /  Alb  2.3<L>  /  TBili  0.3  /  DBili  x   /  AST  27  /  ALT  28  /  AlkPhos  251<H>  03-06      MICROBIOLOGY:  .Blood Blood  03-03-23   No growth to date.  --  --      .Blood Blood  03-03-23   No growth to date.  --  --      .Blood Blood-Peripheral  03-01-23   No Growth Final  --  --      .Blood Blood-Venous  03-01-23   Growth in aerobic bottle: Enterococcus faecium  [facility] Vanco  RACHAEL = 1      .Blood Blood-Peripheral  02-22-23   No Growth Final  --  --      .Sputum Sputum  02-17-23   Moderate Enterobacter cloacae complex  Normal Respiratory Susan present  --  Enterobacter cloacae complex      .Blood Blood-Peripheral  02-17-23   No Growth Final  --  --      .Blood Blood-Venous  02-17-23   No Growth Final  --  --      Clean Catch Clean Catch (Midstream)  02-13-23   <10,000 CFU/mL Normal Urogenital Susan  --  --      .Blood Blood-Peripheral  02-13-23   No Growth Final  --  --      .Blood Blood-Venous  02-13-23   No Growth Final  --  --      .Body Fluid Synovial Fluid  02-05-23   No growth at 14 days.  --    No polymorphonuclear leukocytes per low power field  No organisms seen per oil power field  by cytocentrifuge      Clean Catch Clean Catch (Midstream)  02-05-23   No growth  --  --      .Blood Blood-Peripheral  02-05-23   No Growth Final  --  --      .Blood Blood-Peripheral  02-05-23   No Growth Final  --  --      Vancomycin Level, Trough: 14.7 ug/mL (03-06-23 @ 04:45)    RADIOLOGY:  < from: Xray Chest 1 View- PORTABLE-Urgent (Xray Chest 1 View- PORTABLE-Urgent .) (03.03.23 @ 10:07) >  FINDINGS:  Patchy peripheral right mid and lower lung field airspace opacity.   Unchanged left soft tissue mass.  The heart size is not well evaluated on this projection.    IMPRESSION:  No interval change in the appearance of the lungs.    < end of copied text >      Delonte Simms MD; Division of Infectious Disease; Pager: [phone]; nights and weekends: [phone]

## 2023-03-07 NOTE — CHART NOTE - NSCHARTNOTEFT_GEN_A_CORE
RRT called for AMS. Pt with increased WOB, SpO2 100 on NRB, and tachycardic to 160s. Pt frequently attempting to remove NRB mask. Per wife and RN pt A&Ox2 at baseline, now A&Ox0. Pt awake and alert to voice, protecting airway. Suction performed, pt placed on BIPAP. Pt continues to attempt to remove mask. GOC discussion initiated with wife. Comfort care introduced to wife, wife refusing comfort, states she "doesn't want to give up on him." Pt remains DNR/DNI.     AMS and increased WOB possibly 2/2 worsening PNA  - PCO2 69 pH 7.23, cont BIPAP. Wife at bedside to redirect pt but if unable to tolerate BIPAP, consider HFNC.   - IV tylenol given, cont vanc, added zosyn   - CXR: pending read   - f/u w ID and palliative in AM  - f/u BCx, UA  - continue chest PT, nebs and suction PRN  - Pt remains DNR/DNI    [facility]IC made aware. Will continue to monitor

## 2023-03-07 NOTE — PROGRESS NOTE ADULT - ASSESSMENT
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 knee, hypothyroidism, GERD, recent RUL pna s/p cefepime, r. patellar mass excision 1 month ago who presents w/ right knee pain and swelling for the past 2 days. He also notes having pus like drainage. He is having difficulty w/ ambulation as a result. Had a fever outpatient 100.7 F, leukocytosis in ED to 27k. Denies dysuria, polyuria, melena, hematochezia, h/a, LOC, visual changes, cp. Radiation onc was consulted last admission, pt to f/u outpt for RT adjuvant. In the ED right knee arthrocentesis was done, cultures were sent. Palliative care consulted for pain management

## 2023-03-07 NOTE — PROGRESS NOTE ADULT - PROBLEM SELECTOR PLAN 5
SCC esophageal cancer dx 8 years ago on irinotecan   CTA decreased RUL ground glass opacities and unchanged large mass in left upper hemithorax invading left chest   wall with erosive changes of left 3rd through 5th ribs  Previous right patellar bx path with SCC  -has outpt f/u with onc Dr. Gi Smith  -outpatient rad onc Dr. Brittany Reza.   -per palliative, will re-consult rad-onc for possible palliative radiation

## 2023-03-07 NOTE — PROGRESS NOTE ADULT - SUBJECTIVE AND OBJECTIVE BOX
[facility] Department of Hospital Medicine  Vee Montenegro MD  Available on MS Teams  Pager: [phone]    Patient is a 67y old  Male who presents with a chief complaint of r. knee pain (07 Mar 2023 18:15)    OVERNIGHT EVENTS: No acute events overnight.    SUBJECTIVE: Pt seen and examined at bedside this morning. Patient's wife and son at bedside. Patient appearing more alert and interactive today. Following commands however with persistent cough with secretions and soft voice. Patient otherwise appearing comfortable.     ADDITIONAL REVIEW OF SYSTEMS: as above.    MEDICATIONS  (STANDING):  acetaminophen    Suspension .. 650 milliGRAM(s) Oral every 6 hours  acetaminophen   IVPB .. 1000 milliGRAM(s) IV Intermittent once  ascorbic acid 500 milliGRAM(s) Oral <User Schedule>  chlorhexidine 2% Cloths 1 Application(s) Topical daily  enoxaparin Injectable 40 milliGRAM(s) SubCutaneous every 24 hours  gabapentin Solution 400 milliGRAM(s) Oral three times a day  lactated ringers. 1000 milliLiter(s) (75 mL/Hr) IV Continuous <Continuous>  levalbuterol Inhalation 0.63 milliGRAM(s) Inhalation every 6 hours  levothyroxine 150 MICROGram(s) Oral <User Schedule>  lidocaine   4% Patch 1 Patch Transdermal daily  melatonin 3 milliGRAM(s) Oral at bedtime  multivitamin 1 Tablet(s) Oral <User Schedule>  nystatin Powder 1 Application(s) Topical every 12 hours  pantoprazole   Suspension 40 milliGRAM(s) Oral <User Schedule>  sodium chloride 3%  Inhalation 4 milliLiter(s) Inhalation every 12 hours  vancomycin  IVPB 1000 milliGRAM(s) IV Intermittent every 12 hours    MEDICATIONS  (PRN):  naloxone Injectable 0.3 milliGRAM(s) IV Push every 3 minutes PRN opioid induced AMS  ondansetron Injectable 4 milliGRAM(s) IV Push every 8 hours PRN Nausea and/or Vomiting  oxyCODONE    IR 10 milliGRAM(s) Oral every 4 hours PRN Severe Pain (7 - 10)  oxyCODONE    IR 5 milliGRAM(s) Oral every 4 hours PRN Moderate Pain (4 - 6)  senna Syrup 10 milliLiter(s) Oral at bedtime PRN Constipation    CAPILLARY BLOOD GLUCOSE    POCT Blood Glucose.: 111 mg/dL (07 Mar 2023 05:40)    I&O's Summary    06 Mar 2023 07:01  -  07 Mar 2023 07:00  --------------------------------------------------------  IN: 1550 mL / OUT: 800 mL / NET: 750 mL    07 Mar 2023 07:01  -  07 Mar 2023 20:32  --------------------------------------------------------  IN: 1125 mL / OUT: 825 mL / NET: 300 mL    PHYSICAL EXAM:  Vital Signs Last 24 Hrs  T(C): 36.3 (07 Mar 2023 11:36), Max: 36.6 (07 Mar 2023 05:40)  T(F): 97.4 (07 Mar 2023 11:36), Max: 97.9 (07 Mar 2023 05:40)  HR: 87 (07 Mar 2023 16:07) (87 - 126)  BP: 113/63 (07 Mar 2023 11:36) (112/62 - 137/91)  BP(mean): --  RR: 19 (07 Mar 2023 11:36) (17 - 20)  SpO2: 96% (07 Mar 2023 19:13) (71% - 100%)    Parameters below as of 07 Mar 2023 19:13  Patient On (Oxygen Delivery Method): mask, nonrebreather    CONSTITUTIONAL: NAD, well-developed  HEAD: Normocephalic, atraumatic  EYES: EOMI, PERRL  ENT: no rhinorrhea, no pharyngeal erythema  RESPIRATORY: No increased work of breathing, transmitted upper airway sounds unchanged from prior  CARDIOVASCULAR: RRR, S1 and S2 present, no m/r/g  ABDOMEN: soft, NT, ND, bowel sounds present  EXTREMITIES: trace pitting edema over R ankle, R knee with post-surgical changes  MUSCULOSKELETAL: no joint swelling, no tenderness to palpation  NEURO: A&Ox3, moving all extremities    LABS:                        8.5    27.13 )-----------( 606      ( 07 Mar 2023 06:30 )             29.2     03-07    131<L>  |  98  |  12  ----------------------------<  139<H>  3.8   |  22  |  0.51    Ca    10.3      07 Mar 2023 06:30  Phos  2.1     03-07  Mg     1.70     03-07    TPro  6.4  /  Alb  2.2<L>  /  TBili  <0.2  /  DBili  x   /  AST  22  /  ALT  27  /  AlkPhos  256<H>  03-07    RADIOLOGY & ADDITIONAL TESTS:    Results Reviewed:   Imaging Personally Reviewed:  Electrocardiogram Personally Reviewed:    COORDINATION OF CARE:  Care Discussed with Consultants/Other Providers [Y/N]:  Prior or Outpatient Records Reviewed [Y/N]:

## 2023-03-07 NOTE — PROGRESS NOTE ADULT - PROBLEM SELECTOR PLAN 3
Family noting patient has occasionally been confused. As per family chronically not oriented to time  Multifactorical etiology in setting to infection, hypercalcemia, inflammation from cancer, hospital delirium, medications (meropenem vs opioids though not on high dose of opioids)  Neuro exam nonfocal  overall improving  -tx underlying causes as stated   -monitor mental status  -not agitated so conservative measures

## 2023-03-07 NOTE — PROGRESS NOTE ADULT - PROBLEM SELECTOR PLAN 7
Hb 7.8 on admission (baseline 8-9)  possibly due to hemarthrosis of right knee vs AOCD iso malignancy  - trend CBC  - transfuse Hb <7  - overall hgb currently stable

## 2023-03-07 NOTE — PROGRESS NOTE ADULT - SUBJECTIVE AND OBJECTIVE BOX
Follow Up:  bacteremia    Interval History/ROS:  comfortable     Allergies  No Known Allergies    ANTIMICROBIALS:  vancomycin  IVPB 1000 every 12 hours      OTHER MEDS:  MEDICATIONS  (STANDING):  acetaminophen    Suspension .. 650 every 6 hours  enoxaparin Injectable 40 every 24 hours  gabapentin Solution 400 three times a day  levalbuterol Inhalation 0.63 every 6 hours  levothyroxine 150 <User Schedule>  melatonin 3 at bedtime  ondansetron Injectable 4 every 8 hours PRN  oxyCODONE    IR 10 every 4 hours PRN  oxyCODONE    IR 5 every 4 hours PRN  pantoprazole   Suspension 40 <User Schedule>  senna Syrup 10 at bedtime PRN  sodium chloride 3%  Inhalation 4 every 12 hours      Vital Signs Last 24 Hrs  T(C): 36.3 (07 Mar 2023 11:36), Max: 36.6 (07 Mar 2023 05:40)  T(F): 97.4 (07 Mar 2023 11:36), Max: 97.9 (07 Mar 2023 05:40)  HR: 87 (07 Mar 2023 16:07) (87 - 126)  BP: 113/63 (07 Mar 2023 11:36) (112/62 - 137/91)  BP(mean): --  RR: 19 (07 Mar 2023 11:36) (17 - 20)  SpO2: 94% (07 Mar 2023 17:17) (79% - 100%)    Parameters below as of 07 Mar 2023 17:17  Patient On (Oxygen Delivery Method): nasal cannula  O2 Flow (L/min): 6      PHYSICAL EXAM:  General: NAD, Non-toxic  Neurology: A&Ox3, nonfocal  Respiratory: Coarse rhonchi right chest,  transmitted  breath sounds on left  CV: RRR, S1S2, no murmurs, rubs or gallops  Abdominal: Soft, Non-tender, non-distended, normal bowel sounds  Extremities: No edema  Line Sites: Clear  Skin: No rash                          8.5    27.13 )-----------( 606      ( 07 Mar 2023 06:30 )             29.2   WBC Count: 27.13 (03-07 @ 06:30)  WBC Count: 25.21 (03-06 @ 04:45)  WBC Count: 26.24 (03-05 @ 22:42)  WBC Count: 27.06 (03-05 @ 10:13)  WBC Count: 26.33 (03-05 @ 06:51)  WBC Count: 34.32 (03-04 @ 03:51)  WBC Count: 24.16 (03-03 @ 03:45)    03-07    131<L>  |  98  |  12  ----------------------------<  139<H>  3.8   |  22  |  0.51    Ca    10.3      07 Mar 2023 06:30  Phos  2.1     03-07  Mg     1.70     03-07    TPro  6.4  /  Alb  2.2<L>  /  TBili  <0.2  /  DBili  x   /  AST  22  /  ALT  27  /  AlkPhos  256<H>  03-07      MICROBIOLOGY:  .Blood Blood  03-03-23   No growth to date.  --  --      .Blood Blood  03-03-23   No growth to date.  --  --      .Blood Blood-Peripheral  03-01-23   No Growth Final  --  --      .Blood Blood-Venous  03-01-23   Growth in aerobic bottle: [facility]      .Blood Blood-Peripheral  02-22-23   No Growth Final  --  --      .Sputum Sputum  02-17-23   Moderate Enterobacter cloacae complex  Normal Respiratory Susan present  --  Enterobacter cloacae complex      .Blood Blood-Peripheral  02-17-23   No Growth Final  --  --      .Blood Blood-Venous  02-17-23   No Growth Final  --  --      Clean Catch Clean Catch (Midstream)  02-13-23   <10,000 CFU/mL Normal Urogenital Susan  --  --      .Blood Blood-Peripheral  02-13-23   No Growth Final  --  --      .Blood Blood-Venous  02-13-23   No Growth Final  --  --    RADIOLOGY:  < from: Xray Chest 1 View- PORTABLE-Urgent (Xray Chest 1 View- PORTABLE-Urgent .) (03.03.23 @ 10:07) >  FINDINGS:  Patchy peripheral right mid and lower lung field airspace opacity.   Unchanged left soft tissue mass.  The heart size is not well evaluated on this projection.    IMPRESSION:  No interval change in the appearance of the lungs.    < end of copied text >      Delonte Simms MD; Division of Infectious Disease; Pager: [phone]; nights and weekends: [phone]

## 2023-03-07 NOTE — PROGRESS NOTE ADULT - PROBLEM SELECTOR PLAN 2
PTH below normal, Phos low -> most likely hyperca of malignancy  EKG reviewed no ischemic changes  - S/p calcitonin on 2/13-2/14, 2/18-2/19 and IVFs.   -S/p dental extraction 2/16 and on 2/23 to reduce risk of jaw osteonecrosis w/ bisphosphonate administration.   - S/p zolendronate 2/19   -Given minimal improvement Xgeva given on 2/27 per endo recs   -monitor ca levels, will take a couple days to see effect of xgeva   -now on FW flushes 300cc q6h  -Ca levels overall gradually downtrending

## 2023-03-07 NOTE — PROGRESS NOTE ADULT - PROBLEM SELECTOR PLAN 1
Was taking Oxycodone IR 5-10 mg per Peg PRN , patient reports was not taking more than 2-3 per day  - As per wife, methadone 2.5mg BID made patient hallucinate, primary team decreased to 2.5 at bedtime. Wife and pt share that pain is not nearly as bad as it has been in the past.   - Discontinued methadone 2.5mg, c/w ATC Tylenol  - Gabapentin increased to 400mg TID   - c/w Oxy IR 5 mg q 4 hours prn for mild mod pain  - c/w Oxy IR 10 mg q 4 hours prn severe pain  - PRN Narcan  - Bowel regimen while on opioids  - Can consider radiation re-consult, last note 2/8

## 2023-03-07 NOTE — PROGRESS NOTE ADULT - PROBLEM SELECTOR PLAN 1
Mostly likely 2/2 aspiration pneumonia +/- post-obstructive +/- malignancy  MRSA neg at admission  Scx w/ growth  Enterobacter cloacae   PCT elevated to 5  BCx -> NGTD  UA/Ucx neg  CXR -> right perihilar opacity  RVP neg  duplex ordered to eval for alternative causes -> neg  - Pt s/p empiric Zosyn (2/5-2/9, 2/) ->then switched to uma (2/17 - ) due to recurrent fevers. 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 Pt has been afebrile  -C/w nebs. Xopenex is back in stock 3/5. Ordered for q6 standing for now  -IS and chest PT -> chest vest ordered-->increased from q12 to q6  -O2 as needed -> may require on d/c  -overall appears to be improving with vanco, plan to continue through 3/11 per ID

## 2023-03-07 NOTE — PROGRESS NOTE ADULT - ASSESSMENT
67M with  metastatic esophageal cancer (dx 8 years ago) to bone on irinotecan, r. knee, hypothyroidism, GERD, HTN recent RUL pna s/p cefepime, r. patellar mass excision --> metastatic squamous cell carcinoma on 1/7/23 was admitted on 2/5/23  w/ right knee pain and swelling for 2 days.    Recently hospitalized  underwent patellectomy for met SCC on 1/7/23  Course complicated by pneumonia attributed to Enterobacter treated with Cefepime 1/11 --> 1/18  hypercalcemia - calcitonin administered 2/18, 2/19, Zometa 2/18    Recent in patient Antibiotics  Vanco 1/1-->1/4;  2/5 2/13--> 2/17; 3/2-->  Zosyn 1/1;  2/5, 2/6l 2/17 -->  Cefepime 1/1-->4; 1/11-->18  Cefazolin 1/7  Meropenem  2/17 --> 3/2    encephalopathy  cleared  leukemoid reaction  hypercalcemia  imr;[place]  2/16 dental extraction to reduce risk of OsteoNecrosis of Jaw  2/23: Elevator and rongeur used to deliver root tips #6, 7, 9 and 10 without complications  He has large tumor burden and mass with scant residual left lung  2/17 Neg MRSA PCR  2/17 Sputum with Enterobacter cloacae:   no bacteremia documented    2/24 modestly improved appearance  2/28  appears more alert  3/1  fever   +BC Enterococcus faecium [facility] to Vanco 3/2-->    transient bacteremia    Suggest  Continue Vancomycin      continue Vanco through 3/11  vigorous pulmonary toilet  Can Vest for Airway Clearance be provided for home use?    I will be out 3/8 - Please call ID if needed

## 2023-03-07 NOTE — RAPID RESPONSE TEAM SUMMARY - NSOTHERINTERVENTIONSRRT_GEN_ALL_CORE
Blood cultures obtained   Tube feeds held   ABG obtained   CBC, CMP obtained   BiPAP ordered for tachypnea and hypercarbia. 10/5 at 60% FiO2.   Frequent re-orientation and prevention of patient taking off BiPAP mask was discussed at bedside with nursing staff and primary team; restraints cannot be used on a patient on BiPAP.   ABG resulted; patient is in respiratory acidosis with retention of CO2 (baseline CO2 in 30s, today it is 69). Patient should remain on BiPAP and gas should be re-assessed in an hour     Discussed with primary team the importance of speaking to family about ongoing GOC; given patient's worsening condition (worsening infection, aspiration risk), comorbidities (cancer), patient has poor prognosis and will likely benefit from a comfort care approach rather than more invasive and futile measures.  Dutasteride Pregnancy And Lactation Text: This medication is absolutely contraindicated in women, especially during pregnancy and breast feeding. Feminization of male fetuses is possible if taking while pregnant.

## 2023-03-07 NOTE — PROGRESS NOTE ADULT - PROBLEM SELECTOR PLAN 4
S/p arthrocentesis 2/5 with RBCs c/f hemarthrosis, less likely septic arthritis given neg cx  ESR/CRP elevated  LE US negative  XR tib-fib and knee unremarkable  Aspiration cx, bcxs ngtd, MRSA neg  - C/w pain control. Palliative recs: Oxy IR 5mg q4 prn mild mod pain, Oxy IR 10mg q4 prn severe pain  - per ortho recs: no additional imaging or interventions at this time. Outpatient ortho Dr. Carter: no infection, likely tumor growth, pt declining palliative amputation at this time

## 2023-03-07 NOTE — RAPID RESPONSE TEAM SUMMARY - NSSITUATIONBACKGROUNDRRT_GEN_ALL_CORE
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    RRT called for tachypnea and questionable change in mental status. Patient seen at bedside visibly tachypneic and minimally conversive, trying to take off NRB mask.     Blood glucose 151   saturating 98-99% on NRB   BP stable   Afebrile orally, but rectal temperature not obtained     Patient has been treated with vancomycin for potential infection, recently completed course of meropenem   Patient is a DNR/DNI, pallative care is following for pain control

## 2023-03-07 NOTE — RAPID RESPONSE TEAM SUMMARY - NSMEDICATIONSRRT_GEN_ALL_CORE
1g IV tylenol administered   Zosyn ordered for broadening of abx given potential aspiration concern and worsening CXR

## 2023-03-07 NOTE — PROGRESS NOTE ADULT - PROBLEM SELECTOR PLAN 3
PPS 40% needs assistance with all basic needs  Prior to surgery patient was able to ambulate and moderately independent of ADL's  Ortho recs right leg amputation- pt not in agreement  - Pt following recommend rehab

## 2023-03-07 NOTE — PROGRESS NOTE ADULT - PROBLEM SELECTOR PLAN 6
-c/w tylenol mild pain  -oxycodone 5 q4 prn for moderate pain, oxy 10 q4 prn for severe pain  -c/w gabapentin 400 mg TID

## 2023-03-07 NOTE — PROGRESS NOTE ADULT - SUBJECTIVE AND OBJECTIVE BOX
[facility] Geriatrics and Palliative Care  Meseret Lopes, Palliative Care Nurse Practitioner  Contact Info: Page [phone] (Including Nights/Weekends), Message on Microsoft Teams (Meseret Lopes), or leave VM at Palliative Office [phone] (non-urgent)    SUBJECTIVE AND OBJECTIVE:   Indication for Geriatrics and Palliative Care Services/INTERVAL HPI: Pain control  Pt seen this AM, pt minimally interactive wife at bedside. Wife shared that methadone 2.5mg BID made patient hallucinate since then methadone was decreased to once a day at bedtime. Pt and wife share that pain is minimal and only with movement. Discussed discontinuing methadone and only using PRN oxycodone and ATC Tylenol Pt and wife in agreement     OVERNIGHT EVENTS: Pt required 1 PRN Oxycodone 5mg within 24hrs.    DNR on chart:Yes  Yes      Allergies    No Known Allergies    Intolerances    MEDICATIONS  (STANDING):  acetaminophen    Suspension .. 650 milliGRAM(s) Oral every 6 hours  ascorbic acid 500 milliGRAM(s) Oral <User Schedule>  chlorhexidine 2% Cloths 1 Application(s) Topical daily  enoxaparin Injectable 40 milliGRAM(s) SubCutaneous every 24 hours  gabapentin Solution 400 milliGRAM(s) Oral three times a day  lactated ringers. 1000 milliLiter(s) (75 mL/Hr) IV Continuous <Continuous>  levalbuterol Inhalation 0.63 milliGRAM(s) Inhalation every 6 hours  levothyroxine 150 MICROGram(s) Oral <User Schedule>  lidocaine   4% Patch 1 Patch Transdermal daily  melatonin 3 milliGRAM(s) Oral at bedtime  multivitamin 1 Tablet(s) Oral <User Schedule>  nystatin Powder 1 Application(s) Topical every 12 hours  pantoprazole   Suspension 40 milliGRAM(s) Oral <User Schedule>  sodium chloride 3%  Inhalation 4 milliLiter(s) Inhalation every 12 hours  vancomycin  IVPB 1000 milliGRAM(s) IV Intermittent every 12 hours    MEDICATIONS  (PRN):  naloxone Injectable 0.3 milliGRAM(s) IV Push every 3 minutes PRN opioid induced AMS  ondansetron Injectable 4 milliGRAM(s) IV Push every 8 hours PRN Nausea and/or Vomiting  oxyCODONE    IR 10 milliGRAM(s) Oral every 4 hours PRN Severe Pain (7 - 10)  oxyCODONE    IR 5 milliGRAM(s) Oral every 4 hours PRN Moderate Pain (4 - 6)  senna Syrup 10 milliLiter(s) Oral at bedtime PRN Constipation    -------------------------------------------------------------------------------------------------------  ITEMS UNCHECKED ARE NOT PRESENT    PRESENT SYMPTOMS: [ ]Unable to self-report - see [ ] CPOT [x ] PAINADS [ ] RDOS  Source if other than patient:  [ ]Family   [ ]Team     Pain:  [x ]yes [ ]no  QOL impact - unable to ambulate  Location - right knee  Aggravating factors - movement  Quality - sharp  Radiation - none  Timing- intermittent   Severity (0-10 scale): 6/10  Minimal acceptable level (0-10 scale)/pain goal: 2    CPOT:    https://www.[email or web address]/getattachment/[email or web address]/Critical-Care-Pain-Observation-Tool-(CPOT)    PAINAD Score: See PAINAD tool and score below       RDOS: See RDOS tool and score below   0 to 2  minimal or no respiratory distress   3  mild distress  4 to 6 moderate distress  >7 severe distress    Dyspnea:                           [ ]Mild [ ]Moderate [ ]Severe  Anxiety:                             [ ]Mild [ ]Moderate [ ]Severe  Fatigue:                             [ ]Mild [ ]Moderate [ ]Severe  Nausea:                             [ ]Mild [ ]Moderate [ ]Severe  Loss of appetite:              [ ]Mild [ ]Moderate [ ]Severe  Constipation:                    [ ]Mild [ ]Moderate [ ]Severe  Other Symptoms:  [x ]All other review of systems negative     Home Medications for Symptoms if present:    I Stop Reference no:     -------------------------------------------------------------------------------------------------------  PCSSQ[Palliative Care Spiritual Screening Question]   Severity (0-10):0  Score of 4 or > indicate consideration of Chaplaincy referral.  Chaplaincy Referral: [ ] yes [ ] refused [ ] following [ x] Deferred     Caregiver [place]? : [ ] yes [ ] no [ ] Deferred [x] Declined             Social work referral [ ] Patient & Family Centered Care Referral [ ]     Anticipatory Grief present?:  [ ] yes [ ] no  [ x] Deferred                  Social work referral [ ] Chaplaincy Referral [ ]    -------------------------------------------------------------------------------------------------------  PHYSICAL EXAM:  Vital Signs Last 24 Hrs  T(C): 36.3 (07 Mar 2023 11:36), Max: 36.6 (07 Mar 2023 05:40)  T(F): 97.4 (07 Mar 2023 11:36), Max: 97.9 (07 Mar 2023 05:40)  HR: 107 (07 Mar 2023 11:36) (94 - 126)  BP: 113/63 (07 Mar 2023 11:36) (112/62 - 137/91)  BP(mean): --  RR: 19 (07 Mar 2023 11:36) (17 - 20)  SpO2: 100% (07 Mar 2023 11:36) (98% - 100%)    Parameters below as of 07 Mar 2023 11:36  Patient On (Oxygen Delivery Method): nasal cannula  O2 Flow (L/min): 4   I&O's Summary    06 Mar 2023 07:01  -  07 Mar 2023 07:00  --------------------------------------------------------  IN: 1550 mL / OUT: 800 mL / NET: 750 mL    07 Mar 2023 07:01  -  07 Mar 2023 14:14  --------------------------------------------------------  IN: 612.5 mL / OUT: 550 mL / NET: 62.5 mL       GENERAL:   [ ]Cachexia  [x ]Alert  [x ]Oriented x2   [ ]Lethargic  [ ]Unarousable  [ x]Verbal  [ ]Non-Verbal    Behavioral:   [ ]Anxiety  [ ]Delirium [ ]Agitation [ ]Other    HEENT:  [x ]Normal   [ ]Dry mouth   [ ]ET Tube/Trach  [ ]Oral lesions    PULMONARY:   [ ]Clear [ ]Tachypnea  [ x]Audible excessive secretions   [x ]Rhonchi        [x ]Right [ ]Left [ ]Bilateral  [ ]Crackles        [ ]Right [ ]Left [ ]Bilateral  [ ]Wheezing     [ ]Right [ ]Left [ ]Bilateral  [x ]Diminished BS [ ] Right [ x]Left [ ]Bilateral    CARDIOVASCULAR:    [x ]Regular [ ]Irregular [ ]Tachy  [ ][place] [ ]Murmur [ ]Other    GASTROINTESTINAL:  [x ]Soft  [ ]Distended   [x ]+BS  [ x]Non tender [ ]Tender  [ ]Other [x ]PEG [ ]OGT/ NGT   Last BM: 3/7    GENITOURINARY:  [ ]Normal [x ]Incontinent   [ ]Oliguria/Anuria   [ ][place]    MUSCULOSKELETAL:   [ ]Normal   [ ]Weakness  [x ]Bed/Wheelchair bound [x ]Edema- RLE    NEUROLOGIC:   [x ]No focal deficits  [ ] Cognitive impairment  [ ] Dysphagia [ ]Dysarthria [ ] Paresis [ ]Other     SKIN:   [ ]Normal  [ ]Rash  [ ]Other  [ x]Pressure ulcer(s) [x ]y [ ]n present on admission    -------------------------------------------------------------------------------------------------------  CRITICAL CARE:  [ ]Shock Present  [ ]Septic [ ]Cardiogenic [ ]Neurologic [ ]Hypovolemic  [ ]Vasopressors [ ]Inotropes  [ ]Respiratory failure present [ ]Mechanical Ventilation [ ]Non-invasive ventilatory support [ ]High-Flow   [ ]Acute  [ ]Chronic [ ]Hypoxic  [ ]Hypercarbic [ ]Other  [ ]Other organ failure     -------------------------------------------------------------------------------------------------------  LABS:                        8.5    27.13 )-----------( 606      ( 07 Mar 2023 06:30 )             29.2   03-07    131<L>  |  98  |  12  ----------------------------<  139<H>  3.8   |  22  |  0.51    Ca    10.3      07 Mar 2023 06:30  Phos  2.1     03-07  Mg     1.70     03-07    TPro  6.4  /  Alb  2.2<L>  /  TBili  <0.2  /  DBili  x   /  AST  22  /  ALT  27  /  AlkPhos  256<H>  03-07    -------------------------------------------------------------------------------------------------------  RADIOLOGY & ADDITIONAL STUDIES: < from: Xray Chest 1 View- PORTABLE-Urgent (Xray Chest 1 View- PORTABLE-Urgent .) (03.03.23 @ 10:07) >  FINDINGS:  Patchy peripheral right mid and lower lung field airspace opacity.   Unchanged left soft tissue mass.  The heart size is not well evaluated on this projection.    IMPRESSION:  No interval change in the appearance of the lungs.    --- End of Report ---    < end of copied text >  -------------------------------------------------------------------------------------------------------  Protein Calorie Malnutrition Present: [ ]mild [ ]moderate [ ]severe [ ]underweight [ ]morbid obesity  https://www.[facility].org/vault/2440/web/files/ONC/Table_Clinical%20Characteristics%20to%20Document%20Malnutrition-White%20JV%20et%20al%202012.pdf    Height (cm): 170.2 (02-05-23 @ 06:07), 170.2 (01-07-23 @ 06:50), 157.5 (11-10-22 @ 06:53)  Weight (kg): 63 (02-05-23 @ 21:53), 66.4 (01-07-23 @ 06:50), 69.4 (11-10-22 @ 06:53)  BMI (kg/m2): 21.7 (02-05-23 @ 21:53), 22.9 (02-05-23 @ 06:07), 22.9 (01-07-23 @ 06:50)    Palliative Performance Status Version 2:   See PPSv2 tool and score below       [ ]PPSV2 < or = 30%  [ ]significant weight loss [ ]poor nutritional intake [ ]anasarca[ ]Artificial Nutrition    Other REFERRALS:  [ ]Hospice  [ ]Child Life  [ ]Social Work  [ ]Case management [ ]Holistic Therapy     -------------------------------------------------------------------------------------------------------  Goals of Care Document:

## 2023-03-07 NOTE — PROGRESS NOTE ADULT - PROBLEM SELECTOR PLAN 2
SCC esophageal cancer dx 8 years ago  Patient known to [facility] , Asya   Outpatient f/u with onc Dr Gi Thomas, manages his pain RX as well   RT onc Dr Brittany Reza  (first RT was scheduled for 2/7  CTA with unchanged large mass in upper left hemithorax invading left chest wall with erosive changes of 3-4 ribs.  - Ortho follow-  recs right leg amputation- pt not in agreement. No other ortho intervention at this time

## 2023-03-08 NOTE — PROVIDER CONTACT NOTE (CHANGE IN STATUS NOTIFICATION) - ACTION/TREATMENT ORDERED:
Rapid Called. JUSTINE Cam and Madonna team at bedside for decline in status.
JUSTINE Quijano and team at bedside. RT at bedside, Bipap removed and placed on nonrebreather for patient unable to protect airway.

## 2023-03-08 NOTE — CHART NOTE - NSCHARTNOTEFT_GEN_A_CORE
Notified by RN pt hypoxic and unresponsive. Pt seen at bedside, unresponsive to noxious stimuli, carotid pulse palpable. Pt on BIPAP, SpO2 26% with good PLETH. BIPAP removed and patient placed on NRB. Wife at bedside, wife informed the next step would be intubation. Wife confirms [facility] DNR/DNI. Will continue to monitor.

## 2023-03-08 NOTE — PROGRESS NOTE ADULT - PROBLEM SELECTOR PLAN 5
SCC esophageal cancer dx 8 years ago on irinotecan   CTA decreased RUL ground glass opacities and unchanged large mass in left upper hemithorax invading left chest   wall with erosive changes of left 3rd through 5th ribs  Previous right patellar bx path with SCC  -has outpt f/u with onc Dr. Gi Thomas  -outpatient rad onc Dr. Brittany Reza.   -no utility for RT at this time as patient is comfort measures only

## 2023-03-08 NOTE — PROGRESS NOTE ADULT - PROBLEM SELECTOR PLAN 3
Family noting patient has occasionally been confused. As per family chronically not oriented to time  Multifactorical etiology in setting to infection, hypercalcemia, inflammation from cancer, hospital delirium, medications (meropenem vs opioids though not on high dose of opioids)  -tx underlying causes as stated   -not agitated so conservative measures  -initially improving, now with marked deterioration overnight  -currently with fixed pupils, and unresponsive  -will c/w comfort measures only

## 2023-03-08 NOTE — PROGRESS NOTE ADULT - PROBLEM SELECTOR PLAN 7
Hb 7.8 on admission (baseline 8-9)  possibly due to hemarthrosis of right knee vs AOCD iso malignancy  - trend CBC  - transfuse Hb <7  - overall hgb currently stable  - no further lab draws

## 2023-03-08 NOTE — DISCHARGE NOTE FOR THE EXPIRED PATIENT - HOSPITAL COURSE
67M w/ PMHx of metastatic esophageal CA (dx 8 years ago) to bone on irinotecan, r. knee with PEG, hypothyroidism, GERD, recent RUL pna s/p cefepime, r. patellar mass excision 1 month ago who presented w/ right knee pain and swelling for the past 2 days. Found to have SIRS c/f septic arthritis with neg cxs s/p R knee arthrocentesis. Course c/b recurrent fevers and Hypercalcemia. Patient with esophageal cancer had difficulty swallowing and breathing. Suspected to have pneumonia causing hypoxic respiratory failure leading to cardiopulmonary arrest.

## 2023-03-08 NOTE — PROGRESS NOTE ADULT - PROBLEM SELECTOR PLAN 1
Was taking Oxycodone IR 5-10 mg per Peg PRN , patient reports was not taking more than 2-3 per day  - As per wife, methadone 2.5mg BID made patient hallucinate, primary team decreased to 2.5 at bedtime. Wife and pt share that pain is not nearly as bad as it has been in the past.   - Discontinued methadone 2.5mg 3/7    - Pt RRT 3/7 for aspiration/hypoxia - now actively dying   - PRN Dilaudid 0.5mg Q2hrs for pain/dyspnea/tachypnea

## 2023-03-08 NOTE — PROGRESS NOTE ADULT - ASSESSMENT
ASSESSMENT:  Performance status is deteriorating- he is now a DNR.  No further intervention is planned.    Jose Elias Haro, DO

## 2023-03-08 NOTE — PROGRESS NOTE ADULT - NS ATTEND AMEND GEN_ALL_CORE FT
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 knee, hypothyroidism, GERD, recent RUL pna s/p cefepime, r. patellar mass excision 1 month ago who presents w/ right knee pain and swelling for the past 2 days. Palliative Care consulted for complex decision making in the setting of advanced illness.   Started Methadone solution 2.5mg BID  Continue with PRN oxy  Page for uncontrolled symptoms [id number]
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 knee, hypothyroidism, GERD, recent RUL pna s/p cefepime, r. patellar mass excision 1 month ago who presents w/ right knee pain and swelling for the past 2 days.   Palliative Care re-consulted for complex decision making in the setting of advanced illness.   Given PRn usage will d/c methadone, continue with PRN oxy, increase gabapentin to 400 TID  Will continue to follow   Page for uncontrolled symptoms [id number]
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  Palliative Care following for evaluation and management of pain/ symptoms.     Overnight events noted, patient had RRT overnight for tachypnea and altered mental status.   Per primary team, patient likely with worsening clinical status due to aspiration. Per primary team's discussion with family, family opted for comfort centric approach of care.     Patient seen at bedside in the active dying process. Family at bedside.     Physical Exam  General: Appears uncomfortable   HEENT: Dry mouth.   Cardio: RRR  Pulm: Agonal breathing with tachypnea   Abd: Soft. Nondistended. Nontender.   Neuro: Unarousable. Unable to follow commands.   Extremity: Right knee with post surgical changes  Skin: See nursing skin assessment for further details.     Symptom management recommendations for comfort measures as follows:   - IV Dilaudid 0.5mg q2 PRN pain/dyspnea  - IV Robinul 0.4mg q6 PRN secretions  - IV Ativan 0.5mg q2 PRN agitation/ anxiety     Case reviewed with primary team

## 2023-03-08 NOTE — PROGRESS NOTE ADULT - SUBJECTIVE AND OBJECTIVE BOX
Mr. Benson, 67 y.o. M with h/o esophageal cancer, metastatic to bone, s/p R patellar excision, and seen for prior RT consult for Right knee RT? on 1/25/23 during prior hospitalization.  He was discharged on 1/26/23 and was planned to see Rad Onc Dr. Wen? on 2/7 but returned to the hospital  c/o severe pain and fevers on 2/5/23 where he has remained with discussions of tumor recurrence vs septic arthritis.    As he has remained inpatient, we are again asked about inpatient RT to the right patellar site.     MRI from prior admission is below.     Based on recent chart note today: Does Not appear stable for RT due to hypoxia:     Notified by RN pt hypoxic and unresponsive. Pt seen at bedside, unresponsive to noxious stimuli, carotid pulse palpable. Pt on BIPAP, SpO2 26% with good PLETH. BIPAP removed and patient placed on NRB. Wife at bedside, wife informed the next step would be intubation. Wife confirms [facility] DNR/DNI. Will continue to monitor.      will follow, if stable, may offer RT but not stable at this time to be transported to our department on hi flow with low saturations.     will d/w Dr. Haro.     seen by orthopedics: 2/9/23 note:   Patient seen and examined at bedside with Dr. Carter. No evidence of infection. Likely tumor recurrence. Discussed with the patient nonoperative and operative options, which at this point is an amputation only. Patient has no interest in such a procedure at this time. Now, our recommendations are for a knee immobilizer while out of bed for comfort, no need for knee immobilization in bed. We also defer to oncology for chemotherapy and/or radiation for further management. Additionally, for the wound, it can be dressed with bacitracin and a dry dressing.    < from: MR Knee w/wo IV Cont, Right (01.05.23 @ 14:51) >  IMPRESSION:  1.  Ill-defined comminuted pathologic right patellar fracture including   transverse component through the midpole with approximately 2 cm   retraction of the dominant proximal fragment. Superimposed acute   osteomyelitis and/or osteonecrosis of the enhancing dominant distal   patellar fragment. Limited evaluation for residual osseous metastasis in   this setting.  2.  Moderate volume complex right knee joint effusion without acute on   chronic synovitis. Consider aspiration analysis.  3.  Additional lower grade and chronic findings as detailed in body   section of this report.    < end of copied text >          He has has a few RRT's called prior  for tachypnea and tachycardia, No PE found on CT angio.     < from: CT Angio Chest PE Protocol w/ IV Cont (02.05.23 @ 19:05) >  MPRESSION:    No pulmonary embolism to the level of the segmental pulmonary arteries.    Patchy right upper lobe ground glass opacities are decreased as compared   with1/10/2023.    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    --- End of Report ---    < end of copied text >   Mr. Benson, 67 y.o. M with h/o esophageal cancer, metastatic to bone, s/p R patellar excision, and seen for prior RT consult for Right knee RT? on 1/25/23 during prior hospitalization.  He was discharged on 1/26/23 and was planned to see Rad Onc Dr. Wen? on 2/7 but returned to the hospital  c/o severe pain and fevers on 2/5/23 where he has remained with discussions of tumor recurrence vs septic arthritis.    As he has remained inpatient, we are again asked about inpatient RT to the right patellar site.     MRI from prior admission is below.     Based on recent chart note today: Does Not appear stable for RT due to hypoxia:     Notified by RN pt hypoxic and unresponsive. Pt seen at bedside, unresponsive to noxious stimuli, carotid pulse palpable. Pt on BIPAP, SpO2 26% with good PLETH. BIPAP removed and patient placed on NRB. Wife at bedside, wife informed the next step would be intubation. Wife confirms C DNR/DNI. Will continue to monitor.      will follow, if stable, may offer RT but not stable at this time to be transported to our department on hi flow with low saturations.   HIs performance status no longer makes him a candidate for RT now.     D/w Dr. Haro today.      seen by orthopedics: 2/9/23 note:   Patient seen and examined at bedside with Dr. Carter. No evidence of infection. Likely tumor recurrence. Discussed with the patient nonoperative and operative options, which at this point is an amputation only. Patient has no interest in such a procedure at this time. Now, our recommendations are for a knee immobilizer while out of bed for comfort, no need for knee immobilization in bed. We also defer to oncology for chemotherapy and/or radiation for further management. Additionally, for the wound, it can be dressed with bacitracin and a dry dressing.    < from: MR Knee w/wo IV Cont, Right (01.05.23 @ 14:51) >  IMPRESSION:  1.  Ill-defined comminuted pathologic right patellar fracture including   transverse component through the midpole with approximately 2 cm   retraction of the dominant proximal fragment. Superimposed acute   osteomyelitis and/or osteonecrosis of the enhancing dominant distal   patellar fragment. Limited evaluation for residual osseous metastasis in   this setting.  2.  Moderate volume complex right knee joint effusion without acute on   chronic synovitis. Consider aspiration analysis.  3.  Additional lower grade and chronic findings as detailed in body   section of this report.    < end of copied text >          He has has a few RRT's called prior  for tachypnea and tachycardia, No PE found on CT angio.     < from: CT Angio Chest PE Protocol w/ IV Cont (02.05.23 @ 19:05) >  MPRESSION:    No pulmonary embolism to the level of the segmental pulmonary arteries.    Patchy right upper lobe ground glass opacities are decreased as compared   with1/10/2023.    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    --- End of Report ---    < end of copied text >   Mr. Benson, 67 y.o. M with h/o esophageal cancer, metastatic to bone, s/p R patellar excision, and seen for prior RT consult for Right knee RT? on 1/25/23 during prior hospitalization.  He was discharged on 1/26/23 and was planned to see Rad Onc Dr. Wen? on 2/7 but returned to the hospital  c/o severe pain and fevers on 2/5/23 where he has remained with discussions of tumor recurrence vs septic arthritis.    As he has remained inpatient, we are again asked about inpatient RT to the right patellar site.     MRI from prior admission is below.     Based on recent chart note today: Does Not appear stable for RT due to hypoxia:     Notified by RN pt hypoxic and unresponsive. Pt seen at bedside, unresponsive to noxious stimuli, carotid pulse palpable. Pt on BIPAP, SpO2 26% with good PLETH. BIPAP removed and patient placed on NRB. Wife at bedside, wife informed the next step would be intubation. Wife confirms C DNR/DNI. Will continue to monitor.    Seen at bedside, unresponsive, eyes rolled up, on NRB mask hi flow, family at bedside holding brady told he was "on his last breaths."  Not a candidate for any RT.       D/w Dr. Haro today.      seen by orthopedics: 2/9/23 note:   Patient seen and examined at bedside with Dr. Carter. No evidence of infection. Likely tumor recurrence. Discussed with the patient nonoperative and operative options, which at this point is an amputation only. Patient has no interest in such a procedure at this time. Now, our recommendations are for a knee immobilizer while out of bed for comfort, no need for knee immobilization in bed. We also defer to oncology for chemotherapy and/or radiation for further management. Additionally, for the wound, it can be dressed with bacitracin and a dry dressing.    < from: MR Knee w/wo IV Cont, Right (01.05.23 @ 14:51) >  IMPRESSION:  1.  Ill-defined comminuted pathologic right patellar fracture including   transverse component through the midpole with approximately 2 cm   retraction of the dominant proximal fragment. Superimposed acute   osteomyelitis and/or osteonecrosis of the enhancing dominant distal   patellar fragment. Limited evaluation for residual osseous metastasis in   this setting.  2.  Moderate volume complex right knee joint effusion without acute on   chronic synovitis. Consider aspiration analysis.  3.  Additional lower grade and chronic findings as detailed in body   section of this report.    < end of copied text >          He has has a few RRT's called prior  for tachypnea and tachycardia, No PE found on CT angio.     < from: CT Angio Chest PE Protocol w/ IV Cont (02.05.23 @ 19:05) >  MPRESSION:    No pulmonary embolism to the level of the segmental pulmonary arteries.    Patchy right upper lobe ground glass opacities are decreased as compared   with1/10/2023.    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    --- End of Report ---    < end of copied text >

## 2023-03-08 NOTE — PROGRESS NOTE ADULT - ASSESSMENT
66 yo male with metastatic esophageal carcinoma including known bone involvement presents with new onset hypercalcemia, as well as hypothyroidism and prediabetes.    1) Hypercalcemia of malignancy  Labs show suppressed PTH (6) and negative PTHRP suggesting cause of hypercalcemia to be local bone osteolysis from metastatic disease. DDx includes high 1,25 Vit D but less likely for this malignancy. 1,25 D resulted 78 upper normal range. 25OHD 33.4 wnl.  Risk of ONJ even with current dental issues is greatly outweighed by hypercalcemia of malignancy and bone mets.  Completed inpatient dental extractions.  S/p calcitonin 4 doses plus 2 additional doses previously.  -s/p first dose of zoledronate 4mg IV on 2/18/23 - failed bisphosphonate tx, remained persistently hypercalcemic   -maintain hydration status. encourage PO as tolerated and if able to give further IV hydration would recommend doing so today.  -s/p dental extraction again 2/23 . Per wife now extractions are completed.  -s/p first dose of Denosumab 120mg on 2/27/23 (corrected Ca was 13.3 at that time).   -Ultimate treatment of hypercalcemia is oncology-directed --> planned for outpatient palliative RT. No inpatient plans.  -would use PO phos for repletion  -s/p 2 additional doses of calcitonin given 2/23, as well as a dose of furosemide.  -Corrected calcium today 12.84-->12.3 --> 12.2->12.7 --> 11.96 -->worsened to 12.8  - now on comfort measures without plan for active treatment     2) Hypothyroidism  TSH 2/21 increased to 23.4 (prior 7.56) and Free T4 0.9 (prior 1.0)  Noted that levothyroxine is being given at same time as pantoprazole and other po meds.  Please time levothyroxine for 5 AM and other oral meds in AM for 8AM.  Ensure 1 hour at least between levothyroxine and tube feeds or other po meds, especially pantoprazole.  Ensure at least 4 hours between levothyroxine and multivitamin.  2/28/23 TSH trending down to 4.32, FT4 1.4 wnl   Continue levothyroxine 150 mcg PEG daily    3) Prediabetes  discontinue fingerstick checks    Andie Licona MD  Attending Physician   Department of Endocrinology, Diabetes and Metabolism   [employer] Teams on 03-08-23 @ 15:54    If before 9AM or after 5PM, or on weekends/holidays, please call the Endocrine answering service for assistance ([phone]).  For nonurgent matters, please email [email or web address] for assistance.

## 2023-03-08 NOTE — PROGRESS NOTE ADULT - PROBLEM SELECTOR PLAN 10
dvt ppx: lovenox   diet: TFs  dispo: PT rec rehab but family wants home
dvt ppx: no chemoprophylaxis in setting of comfort measures  diet: holding tube feeds  dispo: patient currently actively decompensating and is comfort measures only
dvt ppx: lovenox   diet: TFs  dispo: PT rec rehab but family wants home
-protonix 40 mg

## 2023-03-08 NOTE — PROGRESS NOTE ADULT - PROBLEM SELECTOR PLAN 1
Mostly likely 2/2 aspiration pneumonia +/- post-obstructive +/- malignancy  MRSA neg at admission  Scx w/ growth  Enterobacter cloacae   PCT elevated to 5  BCx -> NGTD  UA/Ucx neg  CXR -> right perihilar opacity  RVP neg  duplex ordered to eval for alternative causes -> neg  - Pt s/p empiric Zosyn (2/5-2/9, 2/) ->then switched to uma (2/17 - ) due to recurrent fevers. Uma was dc'ed.  -patient likely with aspiration episode overnight on 3/7. Now with marked decompensation. Per family no further antibiotics or treatment, comfort measures only.

## 2023-03-08 NOTE — PROGRESS NOTE ADULT - PROBLEM SELECTOR PROBLEM 10
Nutrition, metabolism, and development symptoms
GERD (gastroesophageal reflux disease)
Nutrition, metabolism, and development symptoms

## 2023-03-08 NOTE — PROGRESS NOTE ADULT - PROVIDER SPECIALTY LIST ADULT
Dental
Hospitalist
Dental
Infectious Disease
Internal Medicine
Orthopedics
Rad Onc
Dental
Infectious Disease
Internal Medicine
Orthopedics
Endocrinology
Endocrinology
Infectious Disease
Internal Medicine
Orthopedics
Palliative Care
Palliative Care
Endocrinology
Internal Medicine
Hospitalist
Endocrinology
Internal Medicine
Endocrinology
Palliative Care
Internal Medicine
Internal Medicine
Hospitalist
Hospitalist
Internal Medicine
Hospitalist
Hospitalist
Internal Medicine
Internal Medicine
Hospitalist
Internal Medicine
Internal Medicine
Hospitalist
Internal Medicine

## 2023-03-08 NOTE — PROGRESS NOTE ADULT - PROBLEM SELECTOR PLAN 4
S/p arthrocentesis 2/5 with RBCs c/f hemarthrosis, less likely septic arthritis given neg cx  ESR/CRP elevated  LE US negative  XR tib-fib and knee unremarkable  Aspiration cx, bcxs ngtd, MRSA neg  - C/w pain control. Palliative recs: Oxy IR 5mg q4 prn mild mod pain, Oxy IR 10mg q4 prn severe pain  - per ortho recs: no additional imaging or interventions at this time. Outpatient ortho Dr. Carter: no infection, likely tumor growth, pt declining palliative amputation at this time  -c/w pain control with dilaudid PRNs

## 2023-03-08 NOTE — PROGRESS NOTE ADULT - PROBLEM SELECTOR PLAN 3
SCC esophageal cancer dx 8 years ago  Patient known to [facility] , [facility]ya   Outpatient f/u with onc Dr Gi Thomas, manages his pain RX as well   RT onc Dr Brittany Reza  (first RT was scheduled for 2/7  CTA with unchanged large mass in upper left hemithorax invading left chest wall with erosive changes of 3-4 ribs.  - Ortho follow-  recs right leg amputation- pt not in agreement. No other ortho intervention at this time

## 2023-03-08 NOTE — PROGRESS NOTE ADULT - SUBJECTIVE AND OBJECTIVE BOX
HPI: 68 yo male with metastatic esophageal carcinoma including known bone involvement presents with new onset hypercalcemia, as well as hypothyroidism and prediabetes.    Interval history:  decompensated overnight, now comfort measures are in place  corrected calcium worsened to 12.8    MEDICATIONS  (STANDING):  acetaminophen   IVPB .. 1000 milliGRAM(s) IV Intermittent once  chlorhexidine 2% Cloths 1 Application(s) Topical daily  lidocaine   4% Patch 1 Patch Transdermal daily    MEDICATIONS  (PRN):  glycopyrrolate Injectable 0.4 milliGRAM(s) IV Push every 6 hours PRN secretions  HYDROmorphone  Injectable 0.5 milliGRAM(s) IV Push every 2 hours PRN SOB/Tachypnea/Dyspnea/pain  naloxone Injectable 0.3 milliGRAM(s) IV Push every 3 minutes PRN opioid induced AMS      Allergies    No Known Allergies    Intolerances        Review of Systems:  unable to assess due to mental status    PHYSICAL EXAM:  VITALS: T(C): 36.2 (03-08-23 @ 01:15)  T(F): 97.1 (03-08-23 @ 01:15), Max: 98.3 (03-07-23 @ 20:15)  HR: 61 (03-08-23 @ 09:12) (61 - 145)  BP: 71/45 (03-08-23 @ 09:12) (71/45 - 143/72)  RR:  (10 - 25)  SpO2:  (71% - 100%)  Wt(kg): --  GENERAL: NAD, well-groomed, well-developed  EYES: No proptosis, no lid lag, anicteric  HEENT:  Atraumatic, normocephalic, moist mucous membranes  RESPIRATORY: nonlabored respirations, no wheezing  PSYCH: Alert and oriented x 3, normal affect, normal mood    POCT Blood Glucose.: 151 mg/dL (03-07-23 @ 20:41)  POCT Blood Glucose.: 111 mg/dL (03-07-23 @ 05:40)                            9.6    48.67 )-----------( 818      ( 08 Mar 2023 06:46 )             34.9       03-08    133<L>  |  99  |  17  ----------------------------<  155<H>  4.9   |  23  |  1.03    eGFR: 80    Ca    11.5<H>      03-08  Mg     2.70     03-08  Phos  7.6     03-08    TPro  6.7  /  Alb  2.4<L>  /  TBili  0.3  /  DBili  x   /  AST  22  /  ALT  30  /  AlkPhos  277<H>  03-07      Thyroid Function Tests:  02-28 @ 07:20 TSH 4.32 FreeT4 1.4 T3 -- Anti TPO -- Anti Thyroglobulin Ab -- TSI --  02-21 @ 06:20 TSH 23.86 FreeT4 0.9 T3 -- Anti TPO -- Anti Thyroglobulin Ab -- TSI --          Radiology:            HPI: 68 yo male with metastatic esophageal carcinoma including known bone involvement presents with new onset hypercalcemia, as well as hypothyroidism and prediabetes.    Interval history:  decompensated overnight, now comfort measures are in place  corrected calcium worsened to 12.8    MEDICATIONS  (STANDING):  acetaminophen   IVPB .. 1000 milliGRAM(s) IV Intermittent once  chlorhexidine 2% Cloths 1 Application(s) Topical daily  lidocaine   4% Patch 1 Patch Transdermal daily    MEDICATIONS  (PRN):  glycopyrrolate Injectable 0.4 milliGRAM(s) IV Push every 6 hours PRN secretions  HYDROmorphone  Injectable 0.5 milliGRAM(s) IV Push every 2 hours PRN SOB/Tachypnea/Dyspnea/pain  naloxone Injectable 0.3 milliGRAM(s) IV Push every 3 minutes PRN opioid induced AMS      Allergies    No Known Allergies    Intolerances        Review of Systems:  unable to assess due to mental status    PHYSICAL EXAM:  VITALS: T(C): 36.2 (03-08-23 @ 01:15)  T(F): 97.1 (03-08-23 @ 01:15), Max: 98.3 (03-07-23 @ 20:15)  HR: 61 (03-08-23 @ 09:12) (61 - 145)  BP: 71/45 (03-08-23 @ 09:12) (71/45 - 143/72)  RR:  (10 - 25)  SpO2:  (71% - 100%)  Wt(kg): --  GENERAL: NAD, chronically ill appearing, on NFB  EYES: No proptosis, no lid lag, anicteric  HEENT:  Atraumatic, normocephalic, dry mucous membranes  RESPIRATORY: nonlabored respirations, no wheezing  PSYCH: sedated, calm    POCT Blood Glucose.: 151 mg/dL (03-07-23 @ 20:41)  POCT Blood Glucose.: 111 mg/dL (03-07-23 @ 05:40)                            9.6    48.67 )-----------( 818      ( 08 Mar 2023 06:46 )             34.9       03-08    133<L>  |  99  |  17  ----------------------------<  155<H>  4.9   |  23  |  1.03    eGFR: 80    Ca    11.5<H>      03-08  Mg     2.70     03-08  Phos  7.6     03-08    TPro  6.7  /  Alb  2.4<L>  /  TBili  0.3  /  DBili  x   /  AST  22  /  ALT  30  /  AlkPhos  277<H>  03-07      Thyroid Function Tests:  02-28 @ 07:20 TSH 4.32 FreeT4 1.4 T3 -- Anti TPO -- Anti Thyroglobulin Ab -- TSI --  02-21 @ 06:20 TSH 23.86 FreeT4 0.9 T3 -- Anti TPO -- Anti Thyroglobulin Ab -- TSI --          Radiology:

## 2023-03-08 NOTE — PROGRESS NOTE ADULT - CONVERSATION DETAILS
Discussed patient's current condition, prognosis and treatment options with patient's wife and patient's son at bedside. Informed them that patient likely had hypoxic episode overnight due to recurrent aspiration. Patient currently unresponsive without any pupillary reflexes. Patient remains on NRB at this time, with AM labs showing marked elevations in WBC count. Patient also agonally breathing at this time. Informed family members that patient is actively in the dying process. Patricia and Omkar stated that they would not want the patient to suffer and would like to prioritize his comfort. Palliative care team also present. Comfort care orders and PRNs placed in EMR.

## 2023-03-08 NOTE — PROGRESS NOTE ADULT - SUBJECTIVE AND OBJECTIVE BOX
[facility] Geriatrics and Palliative Care  Meseret Lopes Palliative Care Nurse Practitioner  Contact Info: Page [phone] (Including Nights/Weekends), Message on Microsoft Teams (Meseret Lopes), or leave  at Palliative Office [phone] (non-urgent)    SUBJECTIVE AND OBJECTIVE:  Indication for Geriatrics and Palliative Care Services/INTERVAL HPI: Pain control.  Pt seen this am with family at the bedside, pt actively dying.    OVERNIGHT EVENTS: RRT for aspiration/hypoxia - family opted for comfort care.     DNR on chart:Yes  Yes      Allergies    No Known Allergies    Intolerances    MEDICATIONS  (STANDING):  acetaminophen   IVPB .. 1000 milliGRAM(s) IV Intermittent once  chlorhexidine 2% Cloths 1 Application(s) Topical daily  lidocaine   4% Patch 1 Patch Transdermal daily    MEDICATIONS  (PRN):  glycopyrrolate Injectable 0.4 milliGRAM(s) IV Push every 6 hours PRN secretions  HYDROmorphone  Injectable 0.5 milliGRAM(s) IV Push every 2 hours PRN SOB/Tachypnea/Dyspnea/pain  naloxone Injectable 0.3 milliGRAM(s) IV Push every 3 minutes PRN opioid induced AMS    -------------------------------------------------------------------------------------------------------  ITEMS UNCHECKED ARE NOT PRESENT    PRESENT SYMPTOMS: [ ]Unable to self-report - see [ ] CPOT [ x] PAINADS [x ] RDOS  Source if other than patient:  [ ]Family   [ ]Team     Pain:  [ ]yes [ ]no  QOL impact -   Location -                    Aggravating factors -  Quality -  Radiation -  Timing-  Severity (0-10 scale):  Minimal acceptable level (0-10 scale)/pain goal:    CPOT:    https://www.sccm.org/getattachment/[email or web address]/Critical-Care-Pain-Observation-Tool-(CPOT)    PAINAD Score: See PAINAD tool and score below       RDOS: See RDOS tool and score below   0 to 2  minimal or no respiratory distress   3  mild distress  4 to 6 moderate distress  >7 severe distress    Dyspnea:                           [ ]Mild [ x]Moderate [ ]Severe  Anxiety:                             [ ]Mild [ ]Moderate [ ]Severe  Fatigue:                             [ ]Mild [ ]Moderate [ ]Severe  Nausea:                             [ ]Mild [ ]Moderate [ ]Severe  Loss of appetite:              [ ]Mild [ ]Moderate [ ]Severe  Constipation:                    [ ]Mild [ ]Moderate [ ]Severe  Other Symptoms:  [ x]All other review of systems negative     Home Medications for Symptoms if present:    I Stop Reference no:     -------------------------------------------------------------------------------------------------------  PCSSQ[Palliative Care Spiritual Screening Question]   Severity (0-10): 0  Score of 4 or > indicate consideration of Chaplaincy referral.  Chaplaincy Referral: [ ] yes [x ] refused [ ] following [ ] Deferred     Caregiver [place]? : [ ] yes [ ] no [ ] Deferred [x ] Declined             Social work referral [ ] Patient & Family Centered Care Referral [ ]     Anticipatory Grief present?:  [ ] yes [ ] no  [x  ] Deferred                  Social work referral [ ] Chaplaincy Referral [ ]    -------------------------------------------------------------------------------------------------------  PHYSICAL EXAM:  Vital Signs Last 24 Hrs  T(C): 36.2 (08 Mar 2023 01:15), Max: 36.8 (07 Mar 2023 20:15)  T(F): 97.1 (08 Mar 2023 01:15), Max: 98.3 (07 Mar 2023 20:15)  HR: 61 (08 Mar 2023 09:12) (61 - 145)  BP: 71/45 (08 Mar 2023 09:12) (71/45 - 143/72)  BP(mean): 55 (08 Mar 2023 04:20) (55 - 72)  RR: 10 (08 Mar 2023 09:12) (10 - 25)  SpO2: 97% (08 Mar 2023 09:12) (71% - 100%)    Parameters below as of 08 Mar 2023 09:12  Patient On (Oxygen Delivery Method): mask, nonrebreather     I&O's Summary    07 Mar 2023 07:01  -  08 Mar 2023 07:00  --------------------------------------------------------  IN: 1125 mL / OUT: 925 mL / NET: 200 mL       GENERAL:   [ ]Cachexia  [ ]Alert  [ ]Oriented x   [ ]Lethargic  [x ]Unarousable  [ ]Verbal  [x ]Non-Verbal    Behavioral:   [ ]Anxiety  [ ]Delirium [ ]Agitation [ x]Other    HEENT:  [ ]Normal   [ ]Dry mouth   [ ]ET Tube/Trach  [ ]Oral lesions    PULMONARY:   [ ]Clear [ ]Tachypnea  [ ]Audible excessive secretions   [x ]Rhonchi        [ ]Right [ ]Left [ ]Bilateral  [ ]Crackles        [ ]Right [ ]Left [ ]Bilateral  [ ]Wheezing     [ ]Right [ ]Left [ ]Bilateral  [ ]Diminished BS [ ] Right [ ]Left [ ]Bilateral    CARDIOVASCULAR:    [ ]Regular [x ]Irregular [ ]Tachy  [ ][place] [ ]Murmur [ ]Other    GASTROINTESTINAL:  [x ]Soft  [ ]Distended   [ ]+BS  [x ]Non tender [ ]Tender  [ ]Other [ ]PEG [ ]OGT/ NGT   Last BM: 3/7    GENITOURINARY:  [ ]Normal [x ]Incontinent   [ ]Oliguria/Anuria   [ ]Oswald    MUSCULOSKELETAL:   [ ]Normal   [ ]Weakness  [x ]Bed/Wheelchair bound [ ]Edema    NEUROLOGIC:   [ ]No focal deficits  [x ] Cognitive impairment  [ ] Dysphagia [ ]Dysarthria [ ] Paresis [ ]Other     SKIN:   [ ]Normal  [ ]Rash  [ ]Other  [x ]Pressure ulcer(s) [x ]y [ ]n present on admission    -------------------------------------------------------------------------------------------------------  CRITICAL CARE:  [ ]Shock Present  [ ]Septic [ ]Cardiogenic [ ]Neurologic [ ]Hypovolemic  [ ]Vasopressors [ ]Inotropes  [ ]Respiratory failure present [ ]Mechanical Ventilation [ ]Non-invasive ventilatory support [ ]High-Flow   [ ]Acute  [ ]Chronic [ ]Hypoxic  [ ]Hypercarbic [ ]Other  [ ]Other organ failure     -------------------------------------------------------------------------------------------------------  LABS:                        9.6    48.67 )-----------( 818      ( 08 Mar 2023 06:46 )             34.9   03-08    133<L>  |  99  |  17  ----------------------------<  155<H>  4.9   |  23  |  1.03    Ca    11.5<H>      08 Mar 2023 06:46  Phos  7.6     03-08  Mg     2.70     03-08    TPro  6.7  /  Alb  2.4<L>  /  TBili  0.3  /  DBili  x   /  AST  22  /  ALT  30  /  AlkPhos  277<H>  03-07      -------------------------------------------------------------------------------------------------------  RADIOLOGY & ADDITIONAL STUDIES: < from: Xray Chest 1 View- PORTABLE-Urgent (Xray Chest 1 View- PORTABLE-Urgent .) (03.07.23 @ 21:07) >  IMPRESSION:  External tubes, lines and/or devices described as above..  Diffuse bilateral airspace opacities likely secondary to multifocal   pneumonia.  Redemonstrated left-sided soft tissue mass    --- End of Report ---    < end of copied text >    ------------------------------------------------------------------------------------------------------  Protein Calorie Malnutrition Present: [ ]mild [ ]moderate [ ]severe [ ]underweight [ ]morbid obesity  https://www.andeal.org/vault/1150/web/files/ONC/Table_Clinical%20Characteristics%20to%20Document%20Malnutrition-White%20JV%20et%20al%202012.pdf    Height (cm): 170.2 (02-05-23 @ 06:07), 170.2 (01-07-23 @ 06:50), 157.5 (11-10-22 @ 06:53)  Weight (kg): 63 (02-05-23 @ 21:53), 66.4 (01-07-23 @ 06:50), 69.4 (11-10-22 @ 06:53)  BMI (kg/m2): 21.7 (02-05-23 @ 21:53), 22.9 (02-05-23 @ 06:07), 22.9 (01-07-23 @ 06:50)    Palliative Performance Status Version 2:   See PPSv2 tool and score below       [ ]PPSV2 < or = 30%  [ ]significant weight loss [ ]poor nutritional intake [ ]anasarca[ ]Artificial Nutrition    Other REFERRALS:  [ ]Hospice  [ ]Child Life  [ ]Social Work  [ ]Case management [ ]Holistic Therapy     -------------------------------------------------------------------------------------------------------  Goals of Care Document: [facility] Geriatrics and Palliative Care  Meseret Lopes Palliative Care Nurse Practitioner  Contact Info: Page [phone] (Including Nights/Weekends), Message on Microsoft Teams (Meseret Lopes), or leave  at Palliative Office [phone] (non-urgent)    SUBJECTIVE AND OBJECTIVE:  Indication for Geriatrics and Palliative Care Services/INTERVAL HPI: Pain control.  Pt seen this am with family at the bedside, pt actively dying.    OVERNIGHT EVENTS: RRT for aspiration/hypoxia - family opted for comfort care.     DNR on chart:Yes  Yes      Allergies    No Known Allergies    Intolerances    MEDICATIONS  (STANDING):  acetaminophen   IVPB .. 1000 milliGRAM(s) IV Intermittent once  chlorhexidine 2% Cloths 1 Application(s) Topical daily  lidocaine   4% Patch 1 Patch Transdermal daily    MEDICATIONS  (PRN):  glycopyrrolate Injectable 0.4 milliGRAM(s) IV Push every 6 hours PRN secretions  HYDROmorphone  Injectable 0.5 milliGRAM(s) IV Push every 2 hours PRN SOB/Tachypnea/Dyspnea/pain  naloxone Injectable 0.3 milliGRAM(s) IV Push every 3 minutes PRN opioid induced AMS    -------------------------------------------------------------------------------------------------------  ITEMS UNCHECKED ARE NOT PRESENT    PRESENT SYMPTOMS: [ ]Unable to self-report - see [ ] CPOT [ x] PAINADS [x ] RDOS  Source if other than patient:  [ ]Family   [ ]Team     Pain:  [ ]yes [ ]no  QOL impact -   Location -                    Aggravating factors -  Quality -  Radiation -  Timing-  Severity (0-10 scale):  Minimal acceptable level (0-10 scale)/pain goal:    CPOT:    https://www.sccm.org/getattachment/[email or web address]-[email or web address]-[email or web address]-1892f4915v0e/Critical-Care-Pain-Observation-Tool-(CPOT)    PAINAD Score: See PAINAD tool and score below       RDOS: See RDOS tool and score below   0 to 2  minimal or no respiratory distress   3  mild distress  4 to 6 moderate distress  >7 severe distress    Dyspnea:                           [ ]Mild [ x]Moderate [ ]Severe  Anxiety:                             [ ]Mild [ ]Moderate [ ]Severe  Fatigue:                             [ ]Mild [ ]Moderate [ ]Severe  Nausea:                             [ ]Mild [ ]Moderate [ ]Severe  Loss of appetite:              [ ]Mild [ ]Moderate [ ]Severe  Constipation:                    [ ]Mild [ ]Moderate [ ]Severe  Other Symptoms:  [ x]All other review of systems negative     Home Medications for Symptoms if present:    I Stop Reference no:     -------------------------------------------------------------------------------------------------------  PCSSQ[Palliative Care Spiritual Screening Question]   Severity (0-10): 0  Score of 4 or > indicate consideration of Chaplaincy referral.  Chaplaincy Referral: [ ] yes [x ] refused [ ] following [ ] Deferred     Caregiver [place]? : [ ] yes [ ] no [ ] Deferred [x ] Declined             Social work referral [ ] Patient & Family Centered Care Referral [ ]     Anticipatory Grief present?:  [ ] yes [ ] no  [x  ] Deferred                  Social work referral [ ] Chaplaincy Referral [ ]    -------------------------------------------------------------------------------------------------------  PHYSICAL EXAM:  Vital Signs Last 24 Hrs  T(C): 36.2 (08 Mar 2023 01:15), Max: 36.8 (07 Mar 2023 20:15)  T(F): 97.1 (08 Mar 2023 01:15), Max: 98.3 (07 Mar 2023 20:15)  HR: 61 (08 Mar 2023 09:12) (61 - 145)  BP: 71/45 (08 Mar 2023 09:12) (71/45 - 143/72)  BP(mean): 55 (08 Mar 2023 04:20) (55 - 72)  RR: 10 (08 Mar 2023 09:12) (10 - 25)  SpO2: 97% (08 Mar 2023 09:12) (71% - 100%)    Parameters below as of 08 Mar 2023 09:12  Patient On (Oxygen Delivery Method): mask, nonrebreather     I&O's Summary    07 Mar 2023 07:01  -  08 Mar 2023 07:00  --------------------------------------------------------  IN: 1125 mL / OUT: 925 mL / NET: 200 mL       GENERAL:   [ ]Cachexia  [ ]Alert  [ ]Oriented x   [ ]Lethargic  [x ]Unarousable  [ ]Verbal  [x ]Non-Verbal    Behavioral:   [ ]Anxiety  [ ]Delirium [ ]Agitation [ x]Other    HEENT:  [ ]Normal   [ x]Dry mouth   [ ]ET Tube/Trach  [ ]Oral lesions    PULMONARY:   [ ]Clear [ ]Tachypnea  [ ]Audible excessive secretions   [x ]Rhonchi        [ ]Right [ ]Left [ ]Bilateral  [ ]Crackles        [ ]Right [ ]Left [ ]Bilateral  [ ]Wheezing     [ ]Right [ ]Left [ ]Bilateral  [ ]Diminished BS [ ] Right [ ]Left [ ]Bilateral    CARDIOVASCULAR:    [ ]Regular [x ]Irregular [ ]Tachy  [ ][place]wick [ ]Murmur [ ]Other    GASTROINTESTINAL:  [x ]Soft  [ ]Distended   [ ]+BS  [x ]Non tender [ ]Tender  [ ]Other [ ]PEG [ ]OGT/ NGT   Last BM: 3/7    GENITOURINARY:  [ ]Normal [x ]Incontinent   [ ]Oliguria/Anuria   [ ]Oswald    MUSCULOSKELETAL:   [ ]Normal   [ ]Weakness  [x ]Bed/Wheelchair bound [ ]Edema    NEUROLOGIC:   [ ]No focal deficits  [x ] Cognitive impairment  [ ] Dysphagia [ ]Dysarthria [ ] Paresis [ ]Other     SKIN:   [ ]Normal  [ ]Rash  [ ]Other  [x ]Pressure ulcer(s) [x ]y [ ]n present on admission    -------------------------------------------------------------------------------------------------------  CRITICAL CARE:  [ ]Shock Present  [ ]Septic [ ]Cardiogenic [ ]Neurologic [ ]Hypovolemic  [ ]Vasopressors [ ]Inotropes  [ ]Respiratory failure present [ ]Mechanical Ventilation [ ]Non-invasive ventilatory support [ ]High-Flow   [ ]Acute  [ ]Chronic [ ]Hypoxic  [ ]Hypercarbic [ ]Other  [ ]Other organ failure     -------------------------------------------------------------------------------------------------------  LABS:                        9.6    48.67 )-----------( 818      ( 08 Mar 2023 06:46 )             34.9   03-08    133<L>  |  99  |  17  ----------------------------<  155<H>  4.9   |  23  |  1.03    Ca    11.5<H>      08 Mar 2023 06:46  Phos  7.6     03-08  Mg     2.70     03-08    TPro  6.7  /  Alb  2.4<L>  /  TBili  0.3  /  DBili  x   /  AST  22  /  ALT  30  /  AlkPhos  277<H>  03-07      -------------------------------------------------------------------------------------------------------  RADIOLOGY & ADDITIONAL STUDIES: < from: Xray Chest 1 View- PORTABLE-Urgent (Xray Chest 1 View- PORTABLE-Urgent .) (03.07.23 @ 21:07) >  IMPRESSION:  External tubes, lines and/or devices described as above..  Diffuse bilateral airspace opacities likely secondary to multifocal   pneumonia.  Redemonstrated left-sided soft tissue mass    --- End of Report ---    < end of copied text >    ------------------------------------------------------------------------------------------------------  Protein Calorie Malnutrition Present: [ ]mild [ ]moderate [ ]severe [ ]underweight [ ]morbid obesity  https://www.andeal.org/vault/8290/web/files/ONC/Table_Clinical%20Characteristics%20to%20Document%20Malnutrition-White%20JV%20et%20al%202012.pdf    Height (cm): 170.2 (02-05-23 @ 06:07), 170.2 (01-07-23 @ 06:50), 157.5 (11-10-22 @ 06:53)  Weight (kg): 63 (02-05-23 @ 21:53), 66.4 (01-07-23 @ 06:50), 69.4 (11-10-22 @ 06:53)  BMI (kg/m2): 21.7 (02-05-23 @ 21:53), 22.9 (02-05-23 @ 06:07), 22.9 (01-07-23 @ 06:50)    Palliative Performance Status Version 2:   See PPSv2 tool and score below       [ ]PPSV2 < or = 30%  [ ]significant weight loss [ ]poor nutritional intake [ ]anasarca[ ]Artificial Nutrition    Other REFERRALS:  [ ]Hospice  [ ]Child Life  [ ]Social Work  [ ]Case management [ ]Holistic Therapy     -------------------------------------------------------------------------------------------------------  Goals of Care Document: [facility] Geriatrics and Palliative Care  Meseret Lopes Palliative Care Nurse Practitioner  Contact Info: Page [phone] (Including Nights/Weekends), Message on Microsoft Teams (Meseret Lopes), or leave  at Palliative Office [phone] (non-urgent)    SUBJECTIVE AND OBJECTIVE:  Indication for Geriatrics and Palliative Care Services/INTERVAL HPI: Pain control.  Pt seen this am with family at the bedside, pt actively dying.    OVERNIGHT EVENTS: RRT for aspiration/hypoxia - family opted for comfort care.     DNR on chart:Yes  Yes      Allergies    No Known Allergies    Intolerances    MEDICATIONS  (STANDING):  acetaminophen   IVPB .. 1000 milliGRAM(s) IV Intermittent once  chlorhexidine 2% Cloths 1 Application(s) Topical daily  lidocaine   4% Patch 1 Patch Transdermal daily    MEDICATIONS  (PRN):  glycopyrrolate Injectable 0.4 milliGRAM(s) IV Push every 6 hours PRN secretions  HYDROmorphone  Injectable 0.5 milliGRAM(s) IV Push every 2 hours PRN SOB/Tachypnea/Dyspnea/pain  naloxone Injectable 0.3 milliGRAM(s) IV Push every 3 minutes PRN opioid induced AMS    -------------------------------------------------------------------------------------------------------  ITEMS UNCHECKED ARE NOT PRESENT    PRESENT SYMPTOMS: [ ]Unable to self-report - see [ ] CPOT [ x] PAINADS [x ] RDOS  Source if other than patient:  [ ]Family   [ ]Team     Pain:  [ ]yes [ ]no  QOL impact -   Location -                    Aggravating factors -  Quality -  Radiation -  Timing-  Severity (0-10 scale):  Minimal acceptable level (0-10 scale)/pain goal:    CPOT:    https://www.sccm.org/getattachment/[email or web address]/Critical-Care-Pain-Observation-Tool-(CPOT)    PAINAD Score: See PAINAD tool and score below       RDOS: See RDOS tool and score below   0 to 2  minimal or no respiratory distress   3  mild distress  4 to 6 moderate distress  >7 severe distress    Dyspnea:                           [ ]Mild [ x]Moderate [ ]Severe  Anxiety:                             [ ]Mild [ ]Moderate [ ]Severe  Fatigue:                             [ ]Mild [ ]Moderate [ ]Severe  Nausea:                             [ ]Mild [ ]Moderate [ ]Severe  Loss of appetite:              [ ]Mild [ ]Moderate [ ]Severe  Constipation:                    [ ]Mild [ ]Moderate [ ]Severe  Other Symptoms:  [ ]All other review of systems negative - unable to obtain due to encephalopathy       -------------------------------------------------------------------------------------------------------  PCSSQ[Palliative Care Spiritual Screening Question]   Severity (0-10): 0  Score of 4 or > indicate consideration of Chaplaincy referral.  Chaplaincy Referral: [ ] yes [x ] refused [ ] following [ ] Deferred     Caregiver [place]? : [ ] yes [ ] no [ ] Deferred [x ] Declined             Social work referral [ ] Patient & Family Centered Care Referral [ ]     Anticipatory Grief present?:  [ ] yes [ ] no  [x  ] Deferred                  Social work referral [ ] Chaplaincy Referral [ ]    -------------------------------------------------------------------------------------------------------  PHYSICAL EXAM:  Vital Signs Last 24 Hrs  T(C): 36.2 (08 Mar 2023 01:15), Max: 36.8 (07 Mar 2023 20:15)  T(F): 97.1 (08 Mar 2023 01:15), Max: 98.3 (07 Mar 2023 20:15)  HR: 61 (08 Mar 2023 09:12) (61 - 145)  BP: 71/45 (08 Mar 2023 09:12) (71/45 - 143/72)  BP(mean): 55 (08 Mar 2023 04:20) (55 - 72)  RR: 10 (08 Mar 2023 09:12) (10 - 25)  SpO2: 97% (08 Mar 2023 09:12) (71% - 100%)    Parameters below as of 08 Mar 2023 09:12  Patient On (Oxygen Delivery Method): mask, nonrebreather     I&O's Summary    07 Mar 2023 07:01  -  08 Mar 2023 07:00  --------------------------------------------------------  IN: 1125 mL / OUT: 925 mL / NET: 200 mL       GENERAL:   [ ]Cachexia  [ ]Alert  [ ]Oriented x   [ ]Lethargic  [x ]Unarousable  [ ]Verbal  [x ]Non-Verbal    Behavioral:   [ ]Anxiety  [ ]Delirium [ ]Agitation [ x]Other- encephalopathy     HEENT:  [ ]Normal   [ x]Dry mouth   [ ]ET Tube/Trach  [ ]Oral lesions    PULMONARY:   [ ]Clear [ ]Tachypnea  [ ]Audible excessive secretions   [x ]Rhonchi        [ ]Right [ ]Left [x ]Bilateral  [ ]Crackles        [ ]Right [ ]Left [ ]Bilateral  [ ]Wheezing     [ ]Right [ ]Left [ ]Bilateral  [ ]Diminished BS [ ] Right [ ]Left [ ]Bilateral    CARDIOVASCULAR:    [ ]Regular [x ]Irregular [ ]Tachy  [ ]Chadwick [ ]Murmur [ ]Other    GASTROINTESTINAL:  [x ]Soft  [ ]Distended   [ ]+BS  [x ]Non tender [ ]Tender  [ ]Other [ ]PEG [ ]OGT/ NGT   Last BM: 3/7    GENITOURINARY:  [ ]Normal [x ]Incontinent   [ ]Oliguria/Anuria   [ ]Oswald    MUSCULOSKELETAL:   [ ]Normal   [ ]Weakness  [x ]Bed/Wheelchair bound [ ]Edema    NEUROLOGIC:   [ ]No focal deficits  [x ] Cognitive impairment  [ ] Dysphagia [ ]Dysarthria [ ] Paresis [ ]Other     SKIN:   [ ]Normal  [ ]Rash  [ ]Other  [x ]Pressure ulcer(s) [x ]y [ ]n present on admission    -------------------------------------------------------------------------------------------------------  CRITICAL CARE:  [ ]Shock Present  [ ]Septic [ ]Cardiogenic [ ]Neurologic [ ]Hypovolemic  [ ]Vasopressors [ ]Inotropes  [ ]Respiratory failure present [ ]Mechanical Ventilation [ ]Non-invasive ventilatory support [ ]High-Flow   [ ]Acute  [ ]Chronic [ ]Hypoxic  [ ]Hypercarbic [ ]Other  [ ]Other organ failure     -------------------------------------------------------------------------------------------------------  LABS:                        9.6    48.67 )-----------( 818      ( 08 Mar 2023 06:46 )             34.9   03-08    133<L>  |  99  |  17  ----------------------------<  155<H>  4.9   |  23  |  1.03    Ca    11.5<H>      08 Mar 2023 06:46  Phos  7.6     03-08  Mg     2.70     03-08    TPro  6.7  /  Alb  2.4<L>  /  TBili  0.3  /  DBili  x   /  AST  22  /  ALT  30  /  AlkPhos  277<H>  03-07      -------------------------------------------------------------------------------------------------------  RADIOLOGY & ADDITIONAL STUDIES: < from: Xray Chest 1 View- PORTABLE-Urgent (Xray Chest 1 View- PORTABLE-Urgent .) (03.07.23 @ 21:07) >  IMPRESSION:  External tubes, lines and/or devices described as above..  Diffuse bilateral airspace opacities likely secondary to multifocal   pneumonia.  Redemonstrated left-sided soft tissue mass    --- End of Report ---    < end of copied text >    ------------------------------------------------------------------------------------------------------  Protein Calorie Malnutrition Present: [ ]mild [ ]moderate [ ]severe [ ]underweight [ ]morbid obesity  https://www.andeal.org/vault/1080/web/files/ONC/Table_Clinical%20Characteristics%20to%20Document%20Malnutrition-White%20JV%20et%20al%202012.pdf    Height (cm): 170.2 (02-05-23 @ 06:07), 170.2 (01-07-23 @ 06:50), 157.5 (11-10-22 @ 06:53)  Weight (kg): 63 (02-05-23 @ 21:53), 66.4 (01-07-23 @ 06:50), 69.4 (11-10-22 @ 06:53)  BMI (kg/m2): 21.7 (02-05-23 @ 21:53), 22.9 (02-05-23 @ 06:07), 22.9 (01-07-23 @ 06:50)    Palliative Performance Status Version 2:   See PPSv2 tool and score below       [x ]PPSV2 < or = 30%  [ ]significant weight loss [ ]poor nutritional intake [ ]anasarca[ ]Artificial Nutrition    Other REFERRALS:  [ ]Hospice  [ ]Child Life  [ ]Social Work  [x ]Case management [ ]Holistic Therapy     -------------------------------------------------------------------------------------------------------  Goals of Care Document:

## 2023-03-08 NOTE — PROGRESS NOTE ADULT - CONVERSATION/DISCUSSION
Diagnosis/Prognosis/MOLST Discussed/Treatment Options
MOLST Discussed
Diagnosis/MOLST Discussed
Diagnosis/Prognosis/MOLST Discussed

## 2023-03-08 NOTE — PROGRESS NOTE ADULT - SUBJECTIVE AND OBJECTIVE BOX
[facility] Department of Hospital Medicine  Vee Montenegro MD  Available on MS Teams  Pager: [phone]    Patient is a 67y old  Male who presents with a chief complaint of r. knee pain (08 Mar 2023 13:11)    OVERNIGHT EVENTS: Overnight RRT called for acute hypoxia, likely 2/2 aspiration episode. Patient with abx coverage broadened to zosyn and vancomycin and placed on NRB mask.    SUBJECTIVE: Pt seen and examined at bedside this morning. Patient with marked deterioration noted. Currently unresponsive, on NRB mask, however breathing agonally. Noted to be hypotensive as well with unreactive pupils. Family at bedside later in the morning. Discussed overall case with patient's wife and son. They state that they do not want him to suffer. Comfort measures implemented.    ADDITIONAL REVIEW OF SYSTEMS: unable to obtain 2/2 patient's mental status    MEDICATIONS  (STANDING):  acetaminophen   IVPB .. 1000 milliGRAM(s) IV Intermittent once  chlorhexidine 2% Cloths 1 Application(s) Topical daily  lidocaine   4% Patch 1 Patch Transdermal daily    MEDICATIONS  (PRN):  glycopyrrolate Injectable 0.4 milliGRAM(s) IV Push every 6 hours PRN secretions  HYDROmorphone  Injectable 0.5 milliGRAM(s) IV Push every 2 hours PRN SOB/Tachypnea/Dyspnea/pain  naloxone Injectable 0.3 milliGRAM(s) IV Push every 3 minutes PRN opioid induced AMS    CAPILLARY BLOOD GLUCOSE    POCT Blood Glucose.: 151 mg/dL (07 Mar 2023 20:41)    I&O's Summary    07 Mar 2023 07:01  -  08 Mar 2023 07:00  --------------------------------------------------------  IN: 1125 mL / OUT: 925 mL / NET: 200 mL    PHYSICAL EXAM:  Vital Signs Last 24 Hrs  T(C): 36.2 (08 Mar 2023 01:15), Max: 36.8 (07 Mar 2023 20:15)  T(F): 97.1 (08 Mar 2023 01:15), Max: 98.3 (07 Mar 2023 20:15)  HR: 61 (08 Mar 2023 09:12) (61 - 145)  BP: 71/45 (08 Mar 2023 09:12) (71/45 - 143/72)  BP(mean): 55 (08 Mar 2023 04:20) (55 - 72)  RR: 10 (08 Mar 2023 09:12) (10 - 25)  SpO2: 97% (08 Mar 2023 09:12) (71% - 100%)    Parameters below as of 08 Mar 2023 09:12  Patient On (Oxygen Delivery Method): mask, nonrebreather    CONSTITUTIONAL: Unresponsive to verbal and tactile stimuli, on NRB mask, agonally breathing  HEAD: Normocephalic, atraumatic  EYES: pupils fixed, unresponsive to light  ENT: no rhinorrhea, no pharyngeal erythema  RESPIRATORY: agonal breathing, transmitted upper airway sounds  CARDIOVASCULAR: RRR, intermittently bradycardic, S1 and S2 present, no m/r/g  ABDOMEN: soft, NT, ND, bowel sounds present  EXTREMITIES: R knee with post surgical changes  NEURO: A&Ox0, unresponsive, pupils fixed    LABS:                        9.6    48.67 )-----------( 818      ( 08 Mar 2023 06:46 )             34.9     03-08    133<L>  |  99  |  17  ----------------------------<  155<H>  4.9   |  23  |  1.03    Ca    11.5<H>      08 Mar 2023 06:46  Phos  7.6     03-08  Mg     2.70     03-08    TPro  6.7  /  Alb  2.4<L>  /  TBili  0.3  /  DBili  x   /  AST  22  /  ALT  30  /  AlkPhos  277<H>  03-07    RADIOLOGY & ADDITIONAL TESTS:    Results Reviewed:   Imaging Personally Reviewed:  Electrocardiogram Personally Reviewed:    COORDINATION OF CARE:  Care Discussed with Consultants/Other Providers [Y/N]:  Prior or Outpatient Records Reviewed [Y/N]:

## 2023-03-08 NOTE — PROVIDER CONTACT NOTE (CHANGE IN STATUS NOTIFICATION) - BACKGROUND
Noted Patient is DNR/DNI
Baseline AAOX2 and follows commands. Patient had CP earlier in the afternoon, copious amount of phlegm suctioned per the wife.

## 2023-03-08 NOTE — PROGRESS NOTE ADULT - PROBLEM SELECTOR PLAN 2
PTH below normal, Phos low -> most likely hyperca of malignancy  EKG reviewed no ischemic changes  - S/p calcitonin on 2/13-2/14, 2/18-2/19 and IVFs.   -S/p dental extraction 2/16 and on 2/23 to reduce risk of jaw osteonecrosis w/ bisphosphonate administration.   - S/p zolendronate 2/19   -Given minimal improvement Xgeva given on 2/27 per endo recs   -monitor ca levels, will take a couple days to see effect of xgeva   -Ca levels overall gradually downtrending  -no further IVF or FW flushes, comfort measures only

## 2023-03-08 NOTE — PROVIDER CONTACT NOTE (CHANGE IN STATUS NOTIFICATION) - SITUATION
Patient change in mental status: restless, trying to remove the nonrebreather mask, HR sustaining in the 140s, diaphoretic and desating in the 70s.
Patient not responding to verbal/ tactile stimuli

## 2023-03-09 NOTE — CHART NOTE - NSCHARTNOTEFT_GEN_A_CORE
Spoke to wife  "I don't know what happened. At least he is not suffering any longer."  Condolences offered

## 2023-03-09 NOTE — CHART NOTE - NSCHARTNOTESELECT_GEN_ALL_CORE
Event Note
Follow-up/Nutrition Services
Follow-up/Nutrition Services
ISTOP/Event Note
Nutrition Services
Ortho/Event Note
rad onc/Event Note
Condolence call/Event Note
Endocrinology/Event Note
Endocrinology/Event Note
Event Note
Event Note
Follow-up/Nutrition Services
Med Clearance/Event Note
Palliative Care/Off Service Note

## 2023-03-13 LAB
CULTURE RESULTS: SIGNIFICANT CHANGE UP
SPECIMEN SOURCE: SIGNIFICANT CHANGE UP

## 2023-06-02 NOTE — H&P ADULT - REASON FOR ADMISSION
Impression: Other secondary cataract, bilateral: H26.493. Plan: Discussed findings and educated patient on condition. Patient happy with vision through today's MRx.  RTC in 1 year for dayanna. R knee pain

## 2024-04-22 NOTE — PROVIDER CONTACT NOTE (OTHER) - RECOMMENDATIONS
MD Donald Johnson made aware. New order received. Reglan d/c DISPLAY PLAN FREE TEXT DISPLAY PLAN FREE TEXT MD Donald Johnson made aware. New order received. Reglan d/c and new parameter for vital signs DISPLAY PLAN FREE TEXT DISPLAY PLAN FREE TEXT DISPLAY PLAN FREE TEXT DISPLAY PLAN FREE TEXT DISPLAY PLAN FREE TEXT DISPLAY PLAN FREE TEXT

## 2024-06-22 NOTE — ED PROVIDER NOTE - SEVERE SEPSIS CRITERIA MET YN (MLM)
06/22/24 0255   During Hemodialysis Assessment   Blood Flow Rate (mL/min) 300 mL/min   Dialysate Flow Rate (mL/min) 700 ml/min   Ultrafiltration Rate (mL/Hr) 500 mL/Hr   Arteriovenous Lines Secure Yes   Arterial Pressure (mmHg) -170 mmHg   Venous Pressure (mmHg) 60   Blood Volume Processed (Liters) 0 L   UF Removed (mL) 0 mL   TMP 50   Venous Line in Air Detector Yes   Intake (mL) 200 mL   Transducer Dry Yes   Access Visible Yes    notified of access issue? N/A   Heparin given? N/A   Intra-Hemodialysis Comments HD initiated     Report received from primary RN. HD started per MD order.   Sepsis Criteria were met:

## 2025-01-09 NOTE — PROVIDER CONTACT NOTE (OTHER) - ACTION/TREATMENT ORDERED:
Pt called and was warm transferred to office.    Chest PT, nasal suction, respiratory vest, respiratory treatment, increase O2.

## 2025-04-19 NOTE — PROGRESS NOTE ADULT - PROBLEM SELECTOR PLAN 9
- takes synthroid 150 mcg PO daily at home  - TSH 11.98 but free T4 WNL    Plan  - c/w PO synthroid 150 mg (home med), please administer during 4 hour gap in tube feeds  - patient previously reported absence of new symptoms of hypothyroidism including constipation, hypotension, dry skin, brittle hair  - recommend outpatient follow-up as TSH may be inaccurate in context of current hospitalization and could instead be suggestive of sick euthyroid syndrome EMT/paramedic

## 2025-06-24 NOTE — PROGRESS NOTE ADULT - PROBLEM SELECTOR PROBLEM 3
Hemarthrosis Angel Romero Physician [facility]  NEPHRO [address]  Scheduled Appointment: 09/22/2025
